# Patient Record
Sex: FEMALE | Race: BLACK OR AFRICAN AMERICAN | Employment: OTHER | URBAN - METROPOLITAN AREA
[De-identification: names, ages, dates, MRNs, and addresses within clinical notes are randomized per-mention and may not be internally consistent; named-entity substitution may affect disease eponyms.]

---

## 2016-06-06 DEVICE — BASEPLATE TIB SZ 5 KNEE CEM FIX BEAR ATTUNE
Type: IMPLANTABLE DEVICE | Site: KNEE | Status: NON-FUNCTIONAL
Removed: 2019-03-26

## 2017-01-04 ENCOUNTER — HOSPITAL ENCOUNTER (OUTPATIENT)
Dept: PHYSICAL THERAPY | Age: 52
Discharge: HOME | End: 2017-01-04
Payer: SUBSIDIZED

## 2017-01-04 PROCEDURE — 97140 MANUAL THERAPY 1/> REGIONS: CPT | Performed by: PHYSICAL THERAPIST

## 2017-01-04 PROCEDURE — 97014 ELECTRIC STIMULATION THERAPY: CPT | Performed by: PHYSICAL THERAPIST

## 2017-01-04 PROCEDURE — 97110 THERAPEUTIC EXERCISES: CPT | Performed by: PHYSICAL THERAPIST

## 2017-01-04 NOTE — PROGRESS NOTES
[facility]  [address], [address] [facility]    OUTPATIENT physical Therapy DAILY Treatment NOTe  Visit: 9    NAME: Leonel Cobos AGE: 46 y.o. GENDER: female  DATE: 1/4/2017  REFERRING PHYSICIAN: Melany Morales MD        GOALS  Short term goals  Time frame: 3 weeks  1. Patient will be compliance and independent with the initial HEP as evidenced by being able to perform without cuing. 2. Patient will report a 50% improvement in symptoms. 3. Patient report a 50% improvement in sleeping. 4. Patient will tolerate 15 minutes of clinic activities before onset of symptoms.      Long term goals  Time frame: 6 weeks  1. Patient will reports pain level decreased to 3/10 to allow increased ease of movement. 2. Patient will have an improved score on the LEFS outcome measure by 40 points to demonstrate an increase in functional activity tolerance. 3. Patient will be independent in final individualized HEP. 4. Patient will sleep 6-8 hours without being interrupted by pain. SUBJECTIVE:     \"I'm in so much pain today. I don't know how my right buttcheek is so sore. \"  Pain In:  10/10 right sided low back and right buttock;  Pain out: 5/10    OBJECTIVE DATA SUMMARY:     Pain:  Location:Pain right flank, low back down right LE  Quality: sharp  Now: 7.5/10  Best:  Worst:10/10  Factors that improve pain: rest, ice     Posture:    Flexed trunk     Strength:    Right LE    Knee extension, limited by pain in right hip     Range of Motion:    Trunk ROM     Flex 25 % of normal  Extension trace  Right SB trace  Left SB 25% of normal     Spinal Assessment:    Flattened lumbar spine     Joint Mobility:    Pt unable to tolerate mob of lumbar spine     Palpation:    TTP tx and lumbar paraspinal, gluteal, ITB     Neurologic Assessment:  Tone: normal  Sensation: pt reports numbness in lateral right LE  Reflexes: not tested     Special Tests:    + slump test R  +SLR on R     Mobility:  Transitional Movements: Antalgic   Gait: Antalgic, using st cane     Balance: Not tested     Functional Measure:     LEFS: 9/80     TREATMENT/INTERVENTION:  Modalities (Rationale): MHP with ES to right low back and right hip in left sidelying with right leg off mat for 15 minutes pre treatment to decrease pain and muscle guarding. Ice on right knee in sidelying for 15 minutes post session. -held     Therapeutic Exercises:  HEP     KTC, may need to use towel  LTR    PPT    Seated HS stretch    Additional clinical activity:  LBE x5 min level 3 -held today    Supine PPT: 10 reps with 10 second holds    Hip add with ball: 15 reps with 5 second holds   Hip abd with red TB: 15 reps     BKTC with yellow ball: 10 reps  HS Stretch using blue band   Single leg fall out  Bridges with TA hold: 10 reps  Forward flexion on blue physioball: 10 reps  Supine piriformis stretch, figure 4  Sidelying right leg dangle off mat: 4 reps with 30 second holds  VMO SLR Rt: 10 reps  Sidelying right hip abduction: 10 reps   Steamboats, no resistance x15    Manual Therapy:   Piriformis stretch 3 reps for 20 seconds  Trigger point release to right piriformis in sidelying  Muscle energy: 3 reps with 10 seconds -held (ASIS equal)  Isometric alt hip contraction  STM and foam roller to right hip musculature and right IT band      Neuro Re-Education:  Discussed importance of sitting with lumbar support, in good supportive chair.     Ambulation/Gait Training:  None today; decreased wt bearing on right LE secondary to pain      Activity tolerance and post treatment pain report: tolerated well    Education:  Education was provided to the patient on the following topics:   [x]    No changes were made to the home exercise program.  []    The following changes were made to the home exercise program:   Patient verbalized understanding of the topics presented.       ASSESSMENT:   Patient demonstrates equal ASIS pre and post session; hypomobility noted at right but little to no SIJ discomfort this date. Patient presents with increased pain in right low back and right buttock. Tenderness to palpation of right piriformis this date. Patient responded well to e stim and heat prior to exercises and manual therapy in order to decrease pain and muscle spasm. TENS unit utilized during supine mat exercises to improve tolerance due to increased pain and tenderness prior to session. Encouraged glute strengthening for home as able. Patients progression toward goals is as follows:  [x]     Improving appropriately and progressing toward goals  []     Improving slowly and progressing toward goals  []     Not making progress toward goals and plan of care will be adjusted    PLAN OF CARE:   Patient continues to benefit from skilled intervention to address the above impairments. [x]    Continue treatment per established plan of care.   []     Recommend the following changes to the plan of care    Recommendations/Intent for next treatment: continue to address core deficits, re-assess SIJ, strengthening of glut max/med    Brenda Porter, PT   Time Calculation: 55 mins  Patient Time in clinic:   Start Time: 9535   Stop Time: 4852

## 2017-01-06 ENCOUNTER — HOSPITAL ENCOUNTER (OUTPATIENT)
Dept: PHYSICAL THERAPY | Age: 52
Discharge: HOME | End: 2017-01-06
Payer: SUBSIDIZED

## 2017-01-06 ENCOUNTER — OFFICE VISIT (OUTPATIENT)
Dept: INTERNAL MEDICINE CLINIC | Age: 52
End: 2017-01-06

## 2017-01-06 ENCOUNTER — HOSPITAL ENCOUNTER (OUTPATIENT)
Dept: LAB | Age: 52
Discharge: HOME | End: 2017-01-06

## 2017-01-06 VITALS
RESPIRATION RATE: 18 BRPM | DIASTOLIC BLOOD PRESSURE: 89 MMHG | SYSTOLIC BLOOD PRESSURE: 137 MMHG | WEIGHT: 159.5 LBS | TEMPERATURE: 97.4 F | OXYGEN SATURATION: 99 % | HEART RATE: 82 BPM | BODY MASS INDEX: 30.11 KG/M2 | HEIGHT: 61 IN

## 2017-01-06 DIAGNOSIS — M54.31 RIGHT SIDED SCIATICA: ICD-10-CM

## 2017-01-06 DIAGNOSIS — Z96.651 STATUS POST TOTAL RIGHT KNEE REPLACEMENT: ICD-10-CM

## 2017-01-06 DIAGNOSIS — M17.11 PRIMARY OSTEOARTHRITIS OF RIGHT KNEE: Primary | ICD-10-CM

## 2017-01-06 DIAGNOSIS — E03.9 ACQUIRED HYPOTHYROIDISM: ICD-10-CM

## 2017-01-06 DIAGNOSIS — M54.41 MIDLINE LOW BACK PAIN WITH RIGHT-SIDED SCIATICA, UNSPECIFIED CHRONICITY: ICD-10-CM

## 2017-01-06 PROCEDURE — 97014 ELECTRIC STIMULATION THERAPY: CPT | Performed by: PHYSICAL THERAPIST

## 2017-01-06 PROCEDURE — 99001 SPECIMEN HANDLING PT-LAB: CPT | Performed by: NURSE PRACTITIONER

## 2017-01-06 PROCEDURE — 97110 THERAPEUTIC EXERCISES: CPT | Performed by: PHYSICAL THERAPIST

## 2017-01-06 PROCEDURE — 97140 MANUAL THERAPY 1/> REGIONS: CPT | Performed by: PHYSICAL THERAPIST

## 2017-01-06 RX ORDER — OXYCODONE AND ACETAMINOPHEN 10; 325 MG/1; MG/1
1 TABLET ORAL
Qty: 30 TAB | Refills: 0 | Status: SHIPPED | OUTPATIENT
Start: 2017-01-06 | End: 2017-02-03 | Stop reason: SDUPTHER

## 2017-01-06 RX ORDER — DULOXETIN HYDROCHLORIDE 30 MG/1
30 CAPSULE, DELAYED RELEASE ORAL DAILY
Qty: 30 CAP | Refills: 2 | Status: SHIPPED | OUTPATIENT
Start: 2017-01-06 | End: 2017-03-03 | Stop reason: SDUPTHER

## 2017-01-06 RX ORDER — OXYCODONE AND ACETAMINOPHEN 5; 325 MG/1; MG/1
1 TABLET ORAL
Qty: 45 TAB | Refills: 0 | Status: CANCELLED | OUTPATIENT
Start: 2017-01-06

## 2017-01-06 NOTE — PROGRESS NOTES
Maya Dean is a 46 y.o. female and presents with Medication Refill (oders pending)    Subjective:  Pt here to f/u right knee pain and lower back pain. Seen spinal specialist Dr. Heidy Unger, no treatments advised. Still in PT for sciatica. Last pain med last night. Pain Scale: 10 - Worst pain ever/10. Continues Gabapentin BID. Tried NORCO WITH OXYCODONE by Dr. Aubree Beth with no relief. Also with more fatigue, seen by GI about 2 weeks ago with labs and told thyroid levels are off. Advised to fax report to Dr. La Nena Kumar, endocrinologist who did thyroidectomy, will have next appt later this month. Review of Systems  Constitutional: Restarted immunotherapy yesterday. negative for fevers, chills, anorexia and weight loss  Eyes:   negative for visual disturbance, drainage, and irritation  ENT:   negative for tinnitus,sore throat,ear pain,and hoarseness  Respiratory:  + COPD improved.  negative for hemoptysis  CV:   negative for chest pain, palpitations, and lower extremity edema  GI:   negative for nausea, vomiting, diarrhea, abdominal pain, and melena  Endo:               negative for polyuria,polydipsia,polyphagia, and heat intolerance  Genitourinary: negative for urgency, dysuria, retention, and hematuria  Integument:  negative for rash, ulcerations  Hematologic:  negative for easy bruising and bleeding  Musculoskel:  negative for muscle weakness  Neurological:  negative for headaches, dizziness, vertigo,and memory problems  Behavl/Psych: negative for feelings of anxiety, depression, suicide, and mood changes    Past Medical History   Diagnosis Date    Asthma      uses inhalers    Chronic obstructive pulmonary disease (HCC)      bronchitis    GERD (gastroesophageal reflux disease)     Hypertension     Ill-defined condition      environmental allergies     Ill-defined condition      Multiple body piercings; unable to remove tongue and lip piercings    Thyroid disease      hypo    Ventral hernia 12/31/2013 Past Surgical History   Procedure Laterality Date    Hx hysterectomy       partial    Hx other surgical       hiatal hernia repair    Hx mohs procedure Right 3/8/11    Hx heent  2009     thyroidectomy     Social History     Social History    Marital status: SINGLE     Spouse name: N/A    Number of children: N/A    Years of education: N/A     Social History Main Topics    Smoking status: Former Smoker     Packs/day: 0.50     Years: 1.50     Types: Cigarettes     Quit date: 10/1/2015    Smokeless tobacco: Never Used      Comment: patient quit smoking for 10 years, began smoking again and then quit again in 2015    Alcohol use 0.6 oz/week     1 Glasses of wine per week    Drug use: No    Sexual activity: Yes     Partners: Female     Other Topics Concern    None     Social History Narrative     Family History   Problem Relation Age of Onset    Cancer Father      lung cancer    Heart Disease Mother     Hypertension Mother     Diabetes Mother     Anesth Problems Neg Hx      Current Outpatient Prescriptions   Medication Sig Dispense Refill    acetaminophen (TYLENOL) 650 mg CR tablet Take 1 Tab by mouth three (3) times daily as needed for Pain. 90 Tab 11    lidocaine (LIDODERM) 5 % Apply patch to the affected area for 12 hours a day and remove for 12 hours a day. 30 Each 11    fluticasone (FLONASE) 50 mcg/actuation nasal spray 2 Sprays by Both Nostrils route daily. 1 Bottle 11    oxyCODONE-acetaminophen (PERCOCET) 5-325 mg per tablet Take 1 Tab by mouth two (2) times daily as needed for Pain. Max Daily Amount: 2 Tabs. 45 Tab 0    gabapentin (NEURONTIN) 300 mg capsule Take 1 Cap by mouth three (3) times daily. Indications: right sciatica 90 Cap 3    methocarbamol (ROBAXIN-750) 750 mg tablet Take 1 Tab by mouth four (4) times daily as needed for Pain (spasms). 60 Tab 11    ondansetron (ZOFRAN ODT) 4 mg disintegrating tablet Take 1 Tab by mouth every eight (8) hours as needed for Nausea.  20 Tab 3  levothyroxine (SYNTHROID) 125 mcg tablet Take 1 Tab by mouth Daily (before breakfast). 90 Tab 3    amLODIPine (NORVASC) 5 mg tablet TAKE 1 TABLET BY MOUTH EVERY DAY 90 Tab 3    miscellaneous medical supply misc Shower seat for chronic knee pain, pt planning to have right TKR in June due to condition. Very limited range of motion. 1 Each 0    hydrochlorothiazide (HYDRODIURIL) 25 mg tablet daily. 6    montelukast (SINGULAIR) 10 mg tablet daily. 6    pantoprazole (PROTONIX) 40 mg tablet two (2) times a day. 5    loratadine (CLARITIN) 10 mg tablet Take 1 Tab by mouth daily. 30 Tab 5    budesonide-formoterol (SYMBICORT) 160-4.5 mcg/actuation HFA inhaler Take 2 Puffs by inhalation two (2) times a day.  albuterol (PROAIR HFA) 90 mcg/actuation inhaler Take 2 Puffs by inhalation every four (4) hours as needed.  albuterol (PROVENTIL VENTOLIN) 2.5 mg /3 mL (0.083 %) nebulizer solution by Nebulization route three (3) times daily.  diazePAM (VALIUM) 5 mg tablet        Allergies   Allergen Reactions    Mold Shortness of Breath and Itching    Ibuprofen Nausea Only       Objective:  Visit Vitals    /89 (BP 1 Location: Right arm, BP Patient Position: Sitting)    Pulse 82    Temp 97.4 °F (36.3 °C) (Oral)    Resp 18    Ht 5' 1\" (1.549 m)    Wt 159 lb 8 oz (72.3 kg)    LMP 09/14/2016 (Approximate)    SpO2 99%    BMI 30.14 kg/m2     Physical Exam:   General appearance - alert, fair appearing, and in moderate distress. Grimacing, moaning, and repositioning often. Mental status - A/O x 4, depressed mood and flat affect. Chest - CTA. Symmetric chest rise. No rales, cough, wheezing, or rhonchi. Heart - Normal rate, regular rhythm. Normal S1, S2. No MGR or clicks. Ext- Radial, DP pulses, 2+ bilaterally. No clubbing or cyanosis. Skin-Warm and dry. No hyperpigmentation, ulcerations, or suspicious lesions. Neuro - Normal speech, no focal findings or movement disorder.  Normal strength and muscle tone. Antalgic gait using cane. Right knee - LROM, improving. Medial aspect very TTP, no effusion. Back- alignment midline. Lumbar spinal and right paraspinal tenderness. No CVAT. LROM, +SLR. Assessment/Plan:  Chronic pain-UDS, Increased oxycodone to 10-325mg (#30 tabs). Continue PT. Anti-inflammatory LCS advised.  was reviewed while planning for pain management, no indications of drug diversion suspected. Prescription history is not suspicious for controlled substance overuse. See below for other orders   Follow-up Disposition: Not on File      ICD-10-CM ICD-9-CM    1. Primary osteoarthritis of right knee M17.11 715.16 oxyCODONE-acetaminophen (PERCOCET) 5-325 mg per tablet   2. Status post total right knee replacement Z96.651 V43.65 oxyCODONE-acetaminophen (PERCOCET) 5-325 mg per tablet   3. Right sided sciatica M54.31 724.3 oxyCODONE-acetaminophen (PERCOCET) 5-325 mg per tablet   4. Midline low back pain with right-sided sciatica, unspecified chronicity M54.41 724.3 oxyCODONE-acetaminophen (PERCOCET) 5-325 mg per tablet   5. Acquired hypothyroidism E03.9 244.9      No orders of the defined types were placed in this encounter. Mireya Khan expressed understanding of plan. An After Visit Summary was offered/printed and given to the patient.

## 2017-01-06 NOTE — PROGRESS NOTES
[facility]  [address], [address] [facility]    OUTPATIENT physical Therapy DAILY Treatment NOTe  Visit: 10    NAME: Ross Wesley AGE: 46 y.o. GENDER: female  DATE: 1/6/2017  REFERRING PHYSICIAN: Emma Black MD        GOALS  Short term goals  Time frame: 3 weeks  1. Patient will be compliance and independent with the initial HEP as evidenced by being able to perform without cuing. 2. Patient will report a 50% improvement in symptoms. 3. Patient report a 50% improvement in sleeping. 4. Patient will tolerate 15 minutes of clinic activities before onset of symptoms.      Long term goals  Time frame: 6 weeks  1. Patient will reports pain level decreased to 3/10 to allow increased ease of movement. 2. Patient will have an improved score on the LEFS outcome measure by 40 points to demonstrate an increase in functional activity tolerance. 3. Patient will be independent in final individualized HEP. 4. Patient will sleep 6-8 hours without being interrupted by pain. SUBJECTIVE:     \"I''m still have a lot of pain. \"    Pain In: 8/10 right sided low back and right buttock;  Pain out: 6/10    OBJECTIVE DATA SUMMARY:     Pain:  Location:Pain right flank, low back down right LE  Quality: sharp  Now: 7.5/10  Best:  Worst:10/10  Factors that improve pain: rest, ice     Posture:    Flexed trunk     Strength:    Right LE    Knee extension, limited by pain in right hip     Range of Motion:    Trunk ROM     Flex 25 % of normal  Extension trace  Right SB trace  Left SB 25% of normal     Spinal Assessment:    Flattened lumbar spine     Joint Mobility:    Pt unable to tolerate mob of lumbar spine     Palpation:    TTP tx and lumbar paraspinal, gluteal, ITB     Neurologic Assessment:  Tone: normal  Sensation: pt reports numbness in lateral right LE  Reflexes: not tested     Special Tests:    + slump test R  +SLR on R     Mobility:  Transitional Movements: Antalgic   Gait: Antalgic, using st cane     Balance: Not tested     Functional Measure:     LEFS: 9/80     TREATMENT/INTERVENTION:  Modalities (Rationale): MHP with ES to right low back and right hip in left sidelying with right leg off mat for 15 minutes pre treatment to decrease pain and muscle guarding. Ice on right knee in sidelying for 15 minutes post session. -held     Therapeutic Exercises:  HEP     KTC, may need to use towel  LTR    PPT    Seated HS stretch    Additional clinical activity:  LBE x5 min level 3 -held today    Supine PPT: 10 reps with 10 second holds    Hip add with ball: 15 reps with 5 second holds   Hip abd with red TB: 15 reps     BKTC with yellow ball: 10 reps  HS Stretch using blue band   Single leg fall out  Bridges with TA hold: 10 reps  Forward flexion on blue physioball: 10 reps  Supine piriformis stretch, figure 4  Sidelying right leg dangle off mat: 4 reps with 30 second holds  VMO SLR Rt: 10 reps  Sidelying right hip abduction: 10 reps   Steamboats, no resistance x15    Manual Therapy:   Piriformis stretch 3 reps for 20 seconds  Trigger point release to right piriformis in sidelying  Muscle energy: 3 reps with 10 seconds  Isometric alt hip contraction  STM and foam roller to right IT band and quadratus lumborum  PA mob at sacrum grade 4     Neuro Re-Education:  Discussed importance of sitting with lumbar support, in good supportive chair.     Ambulation/Gait Training:  None today; decreased wt bearing on right LE secondary to pain      Activity tolerance and post treatment pain report: tolerated well    Education:  Education was provided to the patient on the following topics:   [x]    No changes were made to the home exercise program.  []    The following changes were made to the home exercise program:   Patient verbalized understanding of the topics presented. ASSESSMENT:   Patient continues to present with right low back and buttock pain.  Patient demonstrates improved gait pattern this session, relying less on straight cane and able to maintain upright easily. Tenderness to palpation of right piriformis this date. Patient responded well to e stim and heat prior to exercises and manual therapy in order to decrease pain and muscle spasm. TENS unit utilized during supine mat exercises to improve tolerance due to increased pain and tenderness prior to session. Encouraged glute strengthening for home as able. Prep for discharge in two visits. Patients progression toward goals is as follows:  [x]     Improving appropriately and progressing toward goals  []     Improving slowly and progressing toward goals  []     Not making progress toward goals and plan of care will be adjusted    PLAN OF CARE:   Patient continues to benefit from skilled intervention to address the above impairments. [x]    Continue treatment per established plan of care.   []     Recommend the following changes to the plan of care    Recommendations/Intent for next treatment: Prep for discharge in two visits    Alyssa Manley, PT   Time Calculation: 65 mins  Patient Time in clinic:   Start Time: 0830   Stop Time: 1123

## 2017-01-06 NOTE — PROGRESS NOTES
1. Have you been to the ER, urgent care clinic since your last visit? Hospitalized since your last visit? No    2. Have you seen or consulted any other health care providers outside of the [address] [place] [address] since your last visit? Include any pap smears or colon screening. No     Pt is here for   Chief Complaint   Patient presents with    Medication Refill     oders pending     Pt states pain level is a 10 right knee and back. ..  Pt states last had something for pain last night

## 2017-01-06 NOTE — PATIENT INSTRUCTIONS
Lumbar Stenosis: Care Instructions  Your Care Instructions    Stenosis in the spine is a narrowing of the canal that is around the spinal cord and nerve roots in your back. It can happen as part of aging. Sometimes bone and other tissue grow into this canal and press on the spinal nerves. This can cause pain, numbness, and weakness. When it happens in the lower part of your back, it is called lumbar stenosis. Lumbar stenosis can cause problems in the legs, feet, and rear end (buttocks). You may be able to relieve symptoms of lumbar stenosis with pain medicine. Your doctor may suggest physical therapy and exercises to keep your spine strong and flexible. Some people try cortisone shots to reduce swelling. If pain and numbness in your legs are still so bad that you cannot do your normal activities, you may need surgery. Follow-up care is a key part of your treatment and safety. Be sure to make and go to all appointments, and call your doctor if you are having problems. It's also a good idea to know your test results and keep a list of the medicines you take. How can you care for yourself at home? · Take an over-the-counter pain medicine, such as acetaminophen (Tylenol), ibuprofen (Advil, Motrin), or naproxen (Aleve). Read and follow all instructions on the label. · Do not take two or more pain medicines at the same time unless the doctor told you to. Many pain medicines have acetaminophen, which is Tylenol. Too much acetaminophen (Tylenol) can be harmful. · Stay at a healthy weight. Being overweight puts extra strain on your spine. · Change positions often when you sit or stand. This can ease pain. For example, lean forward. This may reduce pressure on the spinal cord and its nerves. · Avoid doing things that make your symptoms worse. Walking downhill and standing for a long time may cause pain. · Stretch and strengthen your back muscles as your doctor or physical therapist recommends.  If your doctor says it is okay to do them, these exercises may help. ¨ Lie on your back with your knees bent. Gently pull one bent knee to your chest. Put that foot back on the floor, and then pull the other knee to your chest.  ¨ Do pelvic tilts. Lie on your back with your knees bent. Tighten your stomach muscles. Pull your belly button (navel) in and up toward your ribs. You should feel like your back is pressing to the floor and your hips and pelvis are slightly lifting off the floor. Hold for 6 seconds while breathing smoothly. ¨ Stand with your back flat against a wall. Slowly slide down until your knees are slightly bent. Hold for 10 seconds, then slide back up the wall. · Remove or change anything in your house that may cause you to fall. Keep walkways clear of clutter, electrical cords, and throw rugs. When should you call for help? Call 911 anytime you think you may need emergency care. For example, call if:  · You are unable to move a leg at all. Call your doctor now or seek immediate medical care if:  · You have new or worse symptoms in your legs, belly, or buttocks. Symptoms may include:  ¨ Numbness or tingling. ¨ Weakness. ¨ Pain. · You lose bladder or bowel control. Watch closely for changes in your health, and be sure to contact your doctor if:  · You are not getting better as expected. Where can you learn more? Go to http://ofelai-mindy.info/. Katja Machado in the search box to learn more about \"Lumbar Stenosis: Care Instructions. \"  Current as of: May 23, 2016  Content Version: 11.1  © 6559-8678 [email or web address]. Care instructions adapted under license by [employer] (which disclaims liability or warranty for this information). If you have questions about a medical condition or this instruction, always ask your healthcare professional. [address] any warranty or liability for your use of this information.

## 2017-01-06 NOTE — MR AVS SNAPSHOT
Visit Information Date & Time Provider Department Dept. Phone Encounter #  
 1/6/2017 10:00 AM Ana Laura Ruiz NP [address] [facility] [phone] [id number] Follow-up Instructions Return in about 4 weeks (around 2/3/2017) for 1 month f/u. Your Appointments 2/3/2017 10:20 AM  
ROUTINE CARE with Ana Laura Ruiz NP  
[address] [facility]) Appt Note: one month fu  
 [address] [address] [id number]  
[phone]  
  
   
 [address] [facility] Upcoming Health Maintenance Date Due Pneumococcal 19-64 Medium Risk (1 of 1 - PPSV23) 2/5/2017* BREAST CANCER SCRN MAMMOGRAM 2/5/2017* PAP AKA CERVICAL CYTOLOGY 2/5/2017* FOBT Q 1 YEAR AGE 50-75 5/26/2017 DTaP/Tdap/Td series (2 - Td) 7/6/2025 *Topic was postponed. The date shown is not the original due date. Allergies as of 1/6/2017  Review Complete On: 1/6/2017 By: Ana Laura Ruiz NP Severity Noted Reaction Type Reactions Mold  05/23/2016    Shortness of Breath, Itching Ibuprofen Low 03/30/2015   Intolerance Nausea Only Current Immunizations  Reviewed on 12/9/2016 Name Date Influenza Vaccine (Quad) Intradermal PF 12/9/2016 Not reviewed this visit You Were Diagnosed With   
  
 Codes Comments Primary osteoarthritis of right knee    -  Primary ICD-10-CM: M17.11 ICD-9-CM: 715.16 Status post total right knee replacement     ICD-10-CM: M92.408 ICD-9-CM: V43.65 Right sided sciatica     ICD-10-CM: M54.31 
ICD-9-CM: 724.3 Midline low back pain with right-sided sciatica, unspecified chronicity     ICD-10-CM: M54.41 
ICD-9-CM: 724.3 Acquired hypothyroidism     ICD-10-CM: E03.9 ICD-9-CM: 106. 9 Vitals BP Pulse Temp Resp Height(growth percentile) Weight(growth percentile)  137/89 (BP 1 Location: Right arm, BP Patient Position: Sitting) 82 97.4 °F (36.3 °C) (Oral) 18 5' 1\" (1.549 m) 159 lb 8 oz (72.3 kg) LMP SpO2 BMI OB Status Smoking Status 09/14/2016 (Approximate) 99% 30.14 kg/m2 Postmenopausal Former Smoker Vitals History BMI and BSA Data Body Mass Index Body Surface Area  
 30.14 kg/m 2 1.76 m 2 Preferred Pharmacy Pharmacy Name Phone [id number] [phone] Your Updated Medication List  
  
   
This list is accurate as of: 1/6/17 11:31 AM.  Always use your most recent med list.  
  
  
  
  
 acetaminophen 650 mg CR tablet Commonly known as:  TYLENOL Take 1 Tab by mouth three (3) times daily as needed for Pain. amLODIPine 5 mg tablet Commonly known as:  Suzon Salle TAKE 1 TABLET BY MOUTH EVERY DAY  
  
 diazePAM 5 mg tablet Commonly known as:  VALIUM  
  
 DULoxetine 30 mg capsule Commonly known as:  CYMBALTA Take 1 Cap by mouth daily. Indications: CHRONIC MUSCULOSKELETAL PAIN  
  
 fluticasone 50 mcg/actuation nasal spray Commonly known as:  Alric Eaves 2 Sprays by Both Nostrils route daily. gabapentin 300 mg capsule Commonly known as:  NEURONTIN Take 1 Cap by mouth three (3) times daily. Indications: right sciatica  
  
 hydroCHLOROthiazide 25 mg tablet Commonly known as:  HYDRODIURIL  
daily. levothyroxine 125 mcg tablet Commonly known as:  SYNTHROID Take 1 Tab by mouth Daily (before breakfast). lidocaine 5 % Commonly known as:  Hildegarde Una Apply patch to the affected area for 12 hours a day and remove for 12 hours a day. loratadine 10 mg tablet Commonly known as:  Hira Blowers Take 1 Tab by mouth daily. methocarbamol 750 mg tablet Commonly known as:  RYWQEPM-579 Take 1 Tab by mouth four (4) times daily as needed for Pain (spasms). miscellaneous medical supply Misc Shower seat for chronic knee pain, pt planning to have right TKR in June due to condition. Very limited range of motion. montelukast 10 mg tablet Commonly known as:  SINGULAIR  
daily. ondansetron 4 mg disintegrating tablet Commonly known as:  ZOFRAN ODT Take 1 Tab by mouth every eight (8) hours as needed for Nausea. oxyCODONE-acetaminophen  mg per tablet Commonly known as:  PERCOCET 10 Take 1 Tab by mouth daily as needed for Pain. Indications: PAIN  
  
 pantoprazole 40 mg tablet Commonly known as:  PROTONIX  
two (2) times a day. * PROAIR HFA 90 mcg/actuation inhaler Generic drug:  albuterol Take 2 Puffs by inhalation every four (4) hours as needed. * albuterol 2.5 mg /3 mL (0.083 %) nebulizer solution Commonly known as:  PROVENTIL VENTOLIN  
by Nebulization route three (3) times daily. SYMBICORT 160-4.5 mcg/actuation HFA inhaler Generic drug:  budesonide-formoterol Take 2 Puffs by inhalation two (2) times a day. * Notice: This list has 2 medication(s) that are the same as other medications prescribed for you. Read the directions carefully, and ask your doctor or other care provider to review them with you. Prescriptions Printed Refills  
 oxyCODONE-acetaminophen (PERCOCET 10)  mg per tablet 0 Sig: Take 1 Tab by mouth daily as needed for Pain. Indications: PAIN Class: Print Route: Oral  
  
Prescriptions Sent to Pharmacy Refills DULoxetine (CYMBALTA) 30 mg capsule 2 Sig: Take 1 Cap by mouth daily. Indications: CHRONIC MUSCULOSKELETAL PAIN Class: Normal  
 Pharmacy: [address] Ph #: [phone] Route: Oral  
  
We Performed the Following PAIN MGMT PANEL W/REFL, UR [[id number] Custom] TSH 3RD GENERATION [47093 CPT(R)] Follow-up Instructions Return in about 4 weeks (around 2/3/2017) for 1 month f/u. Patient Instructions Lumbar Stenosis: Care Instructions Your Care Instructions Stenosis in the spine is a narrowing of the canal that is around the spinal cord and nerve roots in your back. It can happen as part of aging. Sometimes bone and other tissue grow into this canal and press on the spinal nerves. This can cause pain, numbness, and weakness. When it happens in the lower part of your back, it is called lumbar stenosis. Lumbar stenosis can cause problems in the legs, feet, and rear end (buttocks). You may be able to relieve symptoms of lumbar stenosis with pain medicine. Your doctor may suggest physical therapy and exercises to keep your spine strong and flexible. Some people try cortisone shots to reduce swelling. If pain and numbness in your legs are still so bad that you cannot do your normal activities, you may need surgery. Follow-up care is a key part of your treatment and safety. Be sure to make and go to all appointments, and call your doctor if you are having problems. It's also a good idea to know your test results and keep a list of the medicines you take. How can you care for yourself at home? · Take an over-the-counter pain medicine, such as acetaminophen (Tylenol), ibuprofen (Advil, Motrin), or naproxen (Aleve). Read and follow all instructions on the label. · Do not take two or more pain medicines at the same time unless the doctor told you to. Many pain medicines have acetaminophen, which is Tylenol. Too much acetaminophen (Tylenol) can be harmful. · Stay at a healthy weight. Being overweight puts extra strain on your spine. · Change positions often when you sit or stand. This can ease pain. For example, lean forward. This may reduce pressure on the spinal cord and its nerves. · Avoid doing things that make your symptoms worse. Walking downhill and standing for a long time may cause pain. · Stretch and strengthen your back muscles as your doctor or physical therapist recommends. If your doctor says it is okay to do them, these exercises may help. ¨ Lie on your back with your knees bent. Gently pull one bent knee to your chest. Put that foot back on the floor, and then pull the other knee to your chest. 
¨ Do pelvic tilts. Lie on your back with your knees bent. Tighten your stomach muscles. Pull your belly button (navel) in and up toward your ribs. You should feel like your back is pressing to the floor and your hips and pelvis are slightly lifting off the floor. Hold for 6 seconds while breathing smoothly. ¨ Stand with your back flat against a wall. Slowly slide down until your knees are slightly bent. Hold for 10 seconds, then slide back up the wall. · Remove or change anything in your house that may cause you to fall. Keep walkways clear of clutter, electrical cords, and throw rugs. When should you call for help? Call 911 anytime you think you may need emergency care. For example, call if: 
· You are unable to move a leg at all. Call your doctor now or seek immediate medical care if: 
· You have new or worse symptoms in your legs, belly, or buttocks. Symptoms may include: ¨ Numbness or tingling. ¨ Weakness. ¨ Pain. · You lose bladder or bowel control. Watch closely for changes in your health, and be sure to contact your doctor if: 
· You are not getting better as expected. Where can you learn more? Go to http://ofelia-mindy.info/. Wood Nicole in the search box to learn more about \"Lumbar Stenosis: Care Instructions. \" Current as of: May 23, 2016 Content Version: 11.1 © 8005-8616 [employer]. Care instructions adapted under license by [employer] (which disclaims liability or warranty for this information). If you have questions about a medical condition or this instruction, always ask your healthcare professional. [place] [address] any warranty or liability for your use of this information. Introducing [facility]! Christen Villarreal introduces Crowd[employer]oster patient portal. Now you can access parts of your medical record, email your doctor's office, and request medication refills online. 1. In your internet browser, go to [email or web address]. [email or web address] 2. Click on the First Time User? Click Here link in the Sign In box. You will see the New Member Sign Up page. 3. Enter your [employer]oster Access Code exactly as it appears below. You will not need to use this code after youve completed the sign-up process. If you do not sign up before the expiration date, you must request a new code. · [employer] Access Code: [email or web address] Expires: 3/30/2017  8:27 AM 
 
4. Enter the last four digits of your Social Security Number (xxxx) and Date of Birth (mm/dd/yyyy) as indicated and click Submit. You will be taken to the next sign-up page. 5. Create a Crowdbooster ID. This will be your Crowd[employer]oster login ID and cannot be changed, so think of one that is secure and easy to remember. 6. Create a [employer] password. You can change your password at any time. 7. Enter your Password Reset Question and Answer. This can be used at a later time if you forget your password. 8. Enter your e-mail address. You will receive e-mail notification when new information is available in [address] 9. Click Sign Up. You can now view and download portions of your medical record. 10. Click the Download Summary menu link to download a portable copy of your medical information. If you have questions, please visit the Frequently Asked Questions section of the [employer] website. Remember, [employer] is NOT to be used for urgent needs. For medical emergencies, dial 911. Now available from your iPhone and Android! Please provide this summary of care documentation to your next provider. Your primary care clinician is listed as HUGO Elmore. If you have any questions after today's visit, please call [phone].

## 2017-01-07 LAB — TSH SERPL DL<=0.005 MIU/L-ACNC: 3.54 UIU/ML (ref 0.45–4.5)

## 2017-01-10 ENCOUNTER — HOSPITAL ENCOUNTER (OUTPATIENT)
Dept: PHYSICAL THERAPY | Age: 52
Discharge: HOME | End: 2017-01-10
Payer: SUBSIDIZED

## 2017-01-10 NOTE — PROGRESS NOTES
[facility]  [address], [address] [facility]    OUTPATIENT physical Therapy      1/10/2017:  Janette Callahan was not seen on this date for physical therapy for the following reasons:    [x]     Patient called to cancel the visit for the following reasons: Inclement weather  []     Patient missed the visit and did not call to cancel.     Maria Cadena, PT

## 2017-01-12 ENCOUNTER — HOSPITAL ENCOUNTER (OUTPATIENT)
Dept: PHYSICAL THERAPY | Age: 52
Discharge: HOME | End: 2017-01-12
Payer: SUBSIDIZED

## 2017-01-12 PROCEDURE — 97014 ELECTRIC STIMULATION THERAPY: CPT | Performed by: PHYSICAL THERAPIST

## 2017-01-12 PROCEDURE — 97110 THERAPEUTIC EXERCISES: CPT | Performed by: PHYSICAL THERAPIST

## 2017-01-12 NOTE — PROGRESS NOTES
[facility]  [address], [address] [facility]    OUTPATIENT physical Therapy DAILY Treatment NOTe  Visit: 11    NAME: Sania Orlando AGE: 46 y.o. GENDER: female  DATE: 1/12/2017  REFERRING PHYSICIAN: Vicky José MD        GOALS  Short term goals  Time frame: 3 weeks  1. Patient will be compliance and independent with the initial HEP as evidenced by being able to perform without cuing. 2. Patient will report a 50% improvement in symptoms. 3. Patient report a 50% improvement in sleeping. 4. Patient will tolerate 15 minutes of clinic activities before onset of symptoms.      Long term goals  Time frame: 6 weeks  1. Patient will reports pain level decreased to 3/10 to allow increased ease of movement. 2. Patient will have an improved score on the LEFS outcome measure by 40 points to demonstrate an increase in functional activity tolerance. 3. Patient will be independent in final individualized HEP. 4. Patient will sleep 6-8 hours without being interrupted by pain. SUBJECTIVE:     \"I' had a rough week. \"    Pain In: 8.5/10 right sided low back and right buttock;  Pain out: 6/10    OBJECTIVE DATA SUMMARY:     Pain:  Location:Pain right flank, low back down right LE  Quality: sharp  Now: 7.5/10  Best:  Worst:10/10  Factors that improve pain: rest, ice     Posture:    Flexed trunk     Strength:    Right LE    Knee extension, limited by pain in right hip     Range of Motion:    Trunk ROM     Flex 25 % of normal  Extension trace  Right SB trace  Left SB 25% of normal     Spinal Assessment:    Flattened lumbar spine     Joint Mobility:    Pt unable to tolerate mob of lumbar spine     Palpation:    TTP tx and lumbar paraspinal, gluteal, ITB     Neurologic Assessment:  Tone: normal  Sensation: pt reports numbness in lateral right LE  Reflexes: not tested     Special Tests:    + slump test R  +SLR on R     Mobility:  Transitional Movements: Antalgic   Gait: Antalgic, using st cane     Balance: Not tested     Functional Measure:     LEFS: 9/80     TREATMENT/INTERVENTION:  Modalities (Rationale): MHP with ES to right low back and right hip in left sidelying with right leg off mat for 15 minutes pre treatment to decrease pain and muscle guarding. Ice on right knee in sidelying for 15 minutes post session. -held     Therapeutic Exercises:  HEP     KTC, may need to use towel  LTR    PPT    Seated HS stretch    Additional clinical activity:  LBE x5 min level 3 -held today    Supine PPT: 10 reps with 10 second holds    Hip add with ball: 15 reps with 5 second holds   Hip abd with red TB: 15 reps     BKTC with yellow ball: 10 reps  HS Stretch using blue band   Single leg fall out  Bridges with TA hold: 15 reps  Forward flexion on blue physioball: 10 reps  Supine piriformis stretch, figure 4  Sidelying right leg dangle off mat: 4 reps with 30 second holds  VMO SLR Rt: 10 reps  Sidelying right hip abduction: 10 reps   Steamboats, no resistance x15    Manual Therapy:   Piriformis stretch 3 reps for 20 seconds  Trigger point release to right piriformis in sidelying  Muscle energy: 3 reps with 10 seconds -held; ASIS equal  Isometric alt hip contraction -held  STM and foam roller to right IT band and quadratus lumborum -held  PA mob at sacrum grade 4 -held     Neuro Re-Education:  Discussed importance of sitting with lumbar support, in good supportive chair.     Ambulation/Gait Training:  None today; decreased wt bearing on right LE secondary to pain      Activity tolerance and post treatment pain report: tolerated well    Education:  Education was provided to the patient on the following topics:   [x]    No changes were made to the home exercise program.  []    The following changes were made to the home exercise program:   Patient verbalized understanding of the topics presented. ASSESSMENT:   Patient continues to present with right low back and buttock pain.  Patient demonstrates improved gait pattern this session, relying less on straight cane and able to maintain upright easily. Tenderness to palpation of right piriformis this date. Patient responded well to e stim and heat prior to exercises and manual therapy in order to decrease pain and muscle spasm. Patient to be discharged next session; patient aware and to be provided with HEP. Patients progression toward goals is as follows:  [x]     Improving appropriately and progressing toward goals  []     Improving slowly and progressing toward goals  []     Not making progress toward goals and plan of care will be adjusted    PLAN OF CARE:   Patient continues to benefit from skilled intervention to address the above impairments. [x]    Continue treatment per established plan of care.   []     Recommend the following changes to the plan of care    Recommendations/Intent for next treatment: Prep for discharge next session; provided HEP which is in hard chart; have patient fill out LEFS outcome measure     Jessie Henderson PT   Time Calculation: 30 mins  Patient Time in clinic:   Start Time: 0930   Stop Time: 1000

## 2017-01-13 ENCOUNTER — HOSPITAL ENCOUNTER (OUTPATIENT)
Dept: GENERAL RADIOLOGY | Age: 52
Discharge: HOME | End: 2017-01-13
Payer: SELF-PAY

## 2017-01-13 DIAGNOSIS — R05.3 CHRONIC COUGH: ICD-10-CM

## 2017-01-13 PROCEDURE — 70220 X-RAY EXAM OF SINUSES: CPT

## 2017-01-13 PROCEDURE — 71020 XR CHEST PA LAT: CPT

## 2017-01-15 LAB
AMPHETAMINES UR QL SCN: NEGATIVE NG/ML
BARBITURATES UR QL SCN: NEGATIVE NG/ML
BENZODIAZ UR QL SCN: NEGATIVE NG/ML
BZE UR QL SCN: NEGATIVE NG/ML
CANNABINOIDS UR QL SCN: NEGATIVE NG/ML
CREAT UR-MCNC: 181.7 MG/DL (ref 20–300)
FENTANYL+NORFENTANYL UR QL SCN: NEGATIVE PG/ML
MEPERIDINE UR QL: NEGATIVE NG/ML
METHADONE UR QL SCN: NEGATIVE NG/ML
OPIATES UR QL SCN: NEGATIVE NG/ML
OXYCODONE+OXYMORPHONE UR QL SCN: POSITIVE
PCP UR QL: NEGATIVE NG/ML
PH UR: 5.9 [PH] (ref 4.5–8.9)
PLEASE NOTE:, 733157: ABNORMAL
PROPOXYPH UR QL SCN: NEGATIVE NG/ML
SP GR UR: 1.02
TRAMADOL UR QL SCN: NEGATIVE NG/ML

## 2017-01-16 ENCOUNTER — HOSPITAL ENCOUNTER (OUTPATIENT)
Dept: PHYSICAL THERAPY | Age: 52
Discharge: HOME | End: 2017-01-16
Payer: SUBSIDIZED

## 2017-01-16 PROCEDURE — 97110 THERAPEUTIC EXERCISES: CPT

## 2017-01-16 NOTE — PROGRESS NOTES
[facility]  [address], [address] [facility]    OUTPATIENT physical Therapy DAILY Treatment NOTe with Discharge  Visit: 12    NAME: Dorina Kanner AGE: 46 y.o. GENDER: female  DATE: 1/16/2017  REFERRING PHYSICIAN: Crystal Singletary MD        GOALS  Short term goals  Time frame: 3 weeks  1. Patient will be compliance and independent with the initial HEP as evidenced by being able to perform without cuing. 2. Patient will report a 50% improvement in symptoms. 3. Patient report a 50% improvement in sleeping. 4. Patient will tolerate 15 minutes of clinic activities before onset of symptoms.      Long term goals  Time frame: 6 weeks  1. Patient will reports pain level decreased to 3/10 to allow increased ease of movement. 2. Patient will have an improved score on the LEFS outcome measure by 40 points to demonstrate an increase in functional activity tolerance. 3. Patient will be independent in final individualized HEP. 4. Patient will sleep 6-8 hours without being interrupted by pain.            SUBJECTIVE:     Pain reports continued pain in her low back and right knee stiffness    OBJECTIVE DATA SUMMARY:     Pain:  Location:Pain right flank, low back down right LE  Quality: sharp  Now: 7.5/10  Best:  Worst:10/10  Factors that improve pain: rest, ice     Posture:    Flexed trunk     Strength:    Right LE    Knee extension, limited by pain in right hip     Range of Motion:    Trunk ROM     Flex 25 % of normal  Extension trace  Right SB trace  Left SB 25% of normal     Spinal Assessment:    Flattened lumbar spine     Joint Mobility:    Pt unable to tolerate mob of lumbar spine     Palpation:    TTP tx and lumbar paraspinal, gluteal, ITB     Neurologic Assessment:  Tone: normal  Sensation: pt reports numbness in lateral right LE  Reflexes: not tested     Special Tests:    + slump test R  +SLR on R     Mobility:  Transitional Movements: Antalgic   Gait: Antalgic, using st cane     Balance: Not tested     Functional Measure:     LEFS: 9/80     TREATMENT/INTERVENTION:  Modalities (Rationale): MHP with ES to right low back and right hip in left sidelying with right leg off mat for 15 minutes pre treatment to decrease pain and muscle guarding.     Therapeutic Exercises:  HEP     KTC, may need to use towel  LTR    PPT    Seated HS stretch    Additional clinical activity:  LBE x5 min level 3 -held today    Supine PPT: 10 reps with 10 second holds    Hip add with ball: 15 reps with 5 second holds   Hip abd with red TB: 15 reps     BKTC with yellow ball: 10 reps  HS Stretch using blue band   Single leg fall out  Bridges with TA hold: 15 reps  Forward flexion on blue physioball: 10 reps  Supine piriformis stretch, figure 4  Sidelying right leg dangle off mat: 4 reps with 30 second holds  VMO SLR Rt: 10 reps  Sidelying right hip abduction: 10 reps   Steamboats, no resistance x15    Manual Therapy:   Piriformis stretch 3 reps for 20 seconds  Trigger point release to right piriformis in sidelying  Muscle energy: 3 reps with 10 seconds -held; ASIS equal  Isometric alt hip contraction -held  STM and foam roller to right IT band and quadratus lumborum -held  PA mob at sacrum grade 4 -held     Neuro Re-Education:  Discussed importance of sitting with lumbar support, in good supportive chair.     Ambulation/Gait Training:  None today; decreased wt bearing on right LE secondary to pain      Activity tolerance and post treatment pain report: tolerated well    Education:  Education was provided to the patient on the following topics:   [x]    No changes were made to the home exercise program.  []    The following changes were made to the home exercise program:   Patient verbalized understanding of the topics presented. ASSESSMENT:   Patient continues to present with right low back and buttock pain. .Pt utilized a TENS unit during exercises, reported good response.  Pt has reached max benefit from PT, will discharge today with complete HEP. Pt will purchase a TENS unit for home use. Pt in agreement with above. Patients progression toward goals is as follows:  [x]     Improving appropriately and progressing toward goals  []     Improving slowly and progressing toward goals  []     Not making progress toward goals and plan of care will be adjusted    PLAN OF CARE:     [x]    Continue treatment per established plan of care.   [x]     Recommend the following changes to the plan of care: discharge      Sagar Paris PT     Patient Time in clinic: 35 minutes

## 2017-02-03 ENCOUNTER — OFFICE VISIT (OUTPATIENT)
Dept: INTERNAL MEDICINE CLINIC | Age: 52
End: 2017-02-03

## 2017-02-03 VITALS
SYSTOLIC BLOOD PRESSURE: 122 MMHG | WEIGHT: 170 LBS | BODY MASS INDEX: 32.1 KG/M2 | DIASTOLIC BLOOD PRESSURE: 80 MMHG | RESPIRATION RATE: 18 BRPM | TEMPERATURE: 97.9 F | HEART RATE: 74 BPM | HEIGHT: 61 IN

## 2017-02-03 DIAGNOSIS — M54.31 RIGHT SIDED SCIATICA: ICD-10-CM

## 2017-02-03 DIAGNOSIS — Z96.651 STATUS POST TOTAL RIGHT KNEE REPLACEMENT: ICD-10-CM

## 2017-02-03 DIAGNOSIS — M54.41 MIDLINE LOW BACK PAIN WITH RIGHT-SIDED SCIATICA, UNSPECIFIED CHRONICITY: ICD-10-CM

## 2017-02-03 DIAGNOSIS — M17.11 PRIMARY OSTEOARTHRITIS OF RIGHT KNEE: ICD-10-CM

## 2017-02-03 RX ORDER — OXYCODONE AND ACETAMINOPHEN 10; 325 MG/1; MG/1
1 TABLET ORAL
Qty: 30 TAB | Refills: 0 | Status: SHIPPED | OUTPATIENT
Start: 2017-02-03 | End: 2017-03-03 | Stop reason: SDUPTHER

## 2017-02-03 NOTE — PROGRESS NOTES
Sameer Duong is a 46 y.o. female and presents with Medication Refill (order spending)    Subjective:  Pt here to f/u right knee pain and lower back pain. Last pain med last night. Pain Scale: 9/10. Continues Gabapentin TID while awaiting Cymbalta, out of stock following last OV. Has not received meds from another provider. No change in pain presentation since last OV. Review of Systems  Constitutional: negative for fevers, chills, anorexia and weight loss  Eyes:   negative for visual disturbance, drainage, and irritation  ENT:   + deviated septum (per pt report) negative for tinnitus,sore throat,ear pain,and hoarseness  Respiratory:  + COPD improved.  negative for hemoptysis  CV:   negative for chest pain, palpitations, and lower extremity edema  GI:   negative for nausea, vomiting, diarrhea, abdominal pain, and melena  Endo:               negative for polyuria,polydipsia,polyphagia, and heat intolerance  Genitourinary: negative for urgency, dysuria, retention, and hematuria  Integument:  negative for rash, ulcerations  Hematologic:  negative for easy bruising and bleeding  Musculoskel:  negative for muscle weakness  Neurological:  negative for headaches, dizziness, vertigo,and memory problems  Behavl/Psych: negative for feelings of anxiety, depression, suicide, and mood changes    Past Medical History   Diagnosis Date    Asthma      uses inhalers    Chronic obstructive pulmonary disease (HCC)      bronchitis    GERD (gastroesophageal reflux disease)     Hypertension     Ill-defined condition      environmental allergies     Ill-defined condition      Multiple body piercings; unable to remove tongue and lip piercings    Thyroid disease      hypo    Ventral hernia 12/31/2013     Past Surgical History   Procedure Laterality Date    Hx hysterectomy       partial    Hx other surgical       hiatal hernia repair    Hx mohs procedure Right 3/8/11    Hx heent  2009     thyroidectomy     Social History Social History    Marital status: SINGLE     Spouse name: N/A    Number of children: N/A    Years of education: N/A     Social History Main Topics    Smoking status: Former Smoker     Packs/day: 0.50     Years: 1.50     Types: Cigarettes     Quit date: 10/1/2015    Smokeless tobacco: Never Used      Comment: patient quit smoking for 10 years, began smoking again and then quit again in 2015    Alcohol use 0.6 oz/week     1 Glasses of wine per week    Drug use: No    Sexual activity: Yes     Partners: Female     Other Topics Concern    None     Social History Narrative     Family History   Problem Relation Age of Onset    Cancer Father      lung cancer    Heart Disease Mother     Hypertension Mother     Diabetes Mother     Anesth Problems Neg Hx      Current Outpatient Prescriptions   Medication Sig Dispense Refill    oxyCODONE-acetaminophen (PERCOCET 10)  mg per tablet Take 1 Tab by mouth daily as needed for Pain. Indications: Pain 30 Tab 0    DULoxetine (CYMBALTA) 30 mg capsule Take 1 Cap by mouth daily. Indications: CHRONIC MUSCULOSKELETAL PAIN 30 Cap 2    acetaminophen (TYLENOL) 650 mg CR tablet Take 1 Tab by mouth three (3) times daily as needed for Pain. 90 Tab 11    lidocaine (LIDODERM) 5 % Apply patch to the affected area for 12 hours a day and remove for 12 hours a day. 30 Each 11    fluticasone (FLONASE) 50 mcg/actuation nasal spray 2 Sprays by Both Nostrils route daily. 1 Bottle 11    diazePAM (VALIUM) 5 mg tablet       gabapentin (NEURONTIN) 300 mg capsule Take 1 Cap by mouth three (3) times daily. Indications: right sciatica 90 Cap 3    methocarbamol (ROBAXIN-750) 750 mg tablet Take 1 Tab by mouth four (4) times daily as needed for Pain (spasms). 60 Tab 11    ondansetron (ZOFRAN ODT) 4 mg disintegrating tablet Take 1 Tab by mouth every eight (8) hours as needed for Nausea. 20 Tab 3    levothyroxine (SYNTHROID) 125 mcg tablet Take 1 Tab by mouth Daily (before breakfast). 90 Tab 3    amLODIPine (NORVASC) 5 mg tablet TAKE 1 TABLET BY MOUTH EVERY DAY 90 Tab 3    hydrochlorothiazide (HYDRODIURIL) 25 mg tablet daily. 6    montelukast (SINGULAIR) 10 mg tablet daily. 6    pantoprazole (PROTONIX) 40 mg tablet two (2) times a day. 5    loratadine (CLARITIN) 10 mg tablet Take 1 Tab by mouth daily. 30 Tab 5    budesonide-formoterol (SYMBICORT) 160-4.5 mcg/actuation HFA inhaler Take 2 Puffs by inhalation two (2) times a day.  albuterol (PROAIR HFA) 90 mcg/actuation inhaler Take 2 Puffs by inhalation every four (4) hours as needed.  albuterol (PROVENTIL VENTOLIN) 2.5 mg /3 mL (0.083 %) nebulizer solution by Nebulization route three (3) times daily.  miscellaneous medical supply misc Shower seat for chronic knee pain, pt planning to have right TKR in June due to condition. Very limited range of motion. 1 Each 0     Allergies   Allergen Reactions    Mold Shortness of Breath and Itching    Ibuprofen Nausea Only       Objective:  Visit Vitals    /80 (BP 1 Location: Right arm, BP Patient Position: Sitting)    Pulse 74    Temp 97.9 °F (36.6 °C) (Oral)    Resp 18    Ht 5' 1\" (1.549 m)    Wt 170 lb (77.1 kg)    LMP 09/14/2016 (Approximate)    BMI 32.12 kg/m2     Physical Exam:   General appearance - alert, fair appearing, and in moderate distress. Grimacing, moaning, and repositioning often. Mental status - A/O x 4, irritable mood and flat affect. Chest - Symmetric chest rise. No wheezing. No distress. Heart - Normal rate. Abdomen- Soft, round. Non-distended, NT. No pulsatile masses or hernias. Ext- Radial, DP pulses, 2+ bilaterally. No pedal edema, clubbing, or cyanosis. Skin-Warm and dry. No hyperpigmentation, ulcerations, or suspicious lesions. Neuro - Normal speech, no focal findings or movement disorder. Normal strength and muscle tone. Antalgic gait using cane. Right knee - LROM, no effusion. +allodynia.     Assessment/Plan:  Chronic pain-UDS, Increased oxycodone to 10-325mg (#30 tabs).  was reviewed while planning for pain management, no indications of drug diversion suspected. Prescription history is not suspicious for controlled substance overuse. Pain contract signed with pill inclusion. See below for other orders   Follow-up Disposition:  Return in about 4 weeks (around 3/3/2017) for pain med refill. ICD-10-CM ICD-9-CM    1. Primary osteoarthritis of right knee M17.11 715.16 PAIN MGMT PANEL W/REFL, UR      oxyCODONE-acetaminophen (PERCOCET 10)  mg per tablet   2. Status post total right knee replacement Z96.651 V43.65 PAIN MGMT PANEL W/REFL, UR      oxyCODONE-acetaminophen (PERCOCET 10)  mg per tablet   3. Right sided sciatica M54.31 724.3 PAIN MGMT PANEL W/REFL, UR      oxyCODONE-acetaminophen (PERCOCET 10)  mg per tablet   4. Midline low back pain with right-sided sciatica, unspecified chronicity M54.41 724.3 PAIN MGMT PANEL W/REFL, UR      oxyCODONE-acetaminophen (PERCOCET 10)  mg per tablet     Orders Placed This Encounter    PAIN MGMT PANEL W/REFL, UR    oxyCODONE-acetaminophen (PERCOCET 10)  mg per tablet     Sig: Take 1 Tab by mouth daily as needed for Pain. Indications: Pain     Dispense:  30 Tab     Refill:  0       Liliya Sykes expressed understanding of plan. An After Visit Summary was offered/printed and given to the patient.

## 2017-02-03 NOTE — PATIENT INSTRUCTIONS
Back Care and Preventing Injuries: Care Instructions  Your Care Instructions  You can hurt your back doing many everyday activities: lifting a heavy box, bending down to garden, exercising at the gym, and even getting out of bed. But you can keep your back strong and healthy by doing some exercises. You also can follow a few tips for sitting, sleeping, and lifting to avoid hurting your back again. Talk to your doctor before you start an exercise program. Ask for help if you want to learn more about keeping your back healthy. Follow-up care is a key part of your treatment and safety. Be sure to make and go to all appointments, and call your doctor if you are having problems. It's also a good idea to know your test results and keep a list of the medicines you take. How can you care for yourself at home? · Stay at a healthy weight to avoid strain on your lower back. · Do not smoke. Smoking increases the risk of osteoporosis, which weakens the spine. If you need help quitting, talk to your doctor about stop-smoking programs and medicines. These can increase your chances of quitting for good. · Make sure you sleep in a position that maintains your back's normal curves and on a mattress that feels comfortable. Sleep on your side with a pillow between your knees, or sleep on your back with a pillow under your knees. These positions can reduce strain on your back. · When you get out of bed, lie on your side and bend both knees. Drop your feet over the edge of the bed as you push up with both arms. Scoot to the edge of the bed. Make sure your feet are in line with your rear end (buttocks), and then stand up. · If you must stand for a long time, put one foot on a stool, ledge, or box. Exercise to strengthen your back and other muscles  · Get at least 30 minutes of exercise on most days of the week. Walking is a good choice.  You also may want to do other activities, such as running, swimming, cycling, or playing tennis or team sports. · Stretch your back muscles. Here are few exercises to try:  Cherie Trini on your back with your knees bent and your feet flat on the floor. Gently pull one bent knee to your chest. Put that foot back on the floor, and then pull the other knee to your chest. Hold for 15 to 30 seconds. Repeat 2 to 4 times. ¨ Do pelvic tilts. Lie on your back with your knees bent. Tighten your stomach muscles. Pull your belly button (navel) in and up toward your ribs. You should feel like your back is pressing to the floor and your hips and pelvis are slightly lifting off the floor. Hold for 6 seconds while breathing smoothly. · Keep your core muscles strong. The muscles of your back, belly (abdomen), and buttocks support your spine. ¨ Pull in your belly, and imagine pulling your navel toward your spine. Hold this for 6 seconds, then relax. Remember to keep breathing normally as you tense your muscles. ¨ Do curl-ups. Always do them with your knees bent. Keep your low back on the floor, and curl your shoulders toward your knees using a smooth, slow motion. Keep your arms folded across your chest. If this bothers your neck, try putting your hands behind your neck (not your head), with your elbows spread apart. ¨ Lie on your back with your knees bent and your feet flat on the floor. Tighten your belly muscles, and then push with your feet and raise your buttocks up a few inches. Hold this position 6 seconds as you continue to breathe normally, then lower yourself slowly to the floor. Repeat 8 to 12 times. ¨ If you like group exercise, try Pilates or yoga. These classes have poses that strengthen the core muscles. Protect your back when you sit  · Place a small pillow, a rolled-up towel, or a lumbar roll in the curve of your back if you need extra support. · Sit in a chair that is low enough to let you place both feet flat on the floor with both knees nearly level with your hips.  If your chair or desk is too high, use a foot rest to raise your knees. · When driving, keep your knees nearly level with your hips. Sit straight, and drive with both hands on the steering wheel. Your arms should be in a slightly bent position. · Try a kneeling chair, which helps tilt your hips forward. This takes pressure off your lower back. · Try sitting on an exercise ball. It can rock from side to side, which helps keep your back loose. Lift properly  · Squat down, bending at the hips and knees only. If you need to, put one knee to the floor and extend your other knee in front of you, bent at a right angle (half kneeling). · Press your chest straight forward. This helps keep your upper back straight while keeping a slight arch in your low back. · Hold the load as close to your body as possible, at the level of your navel. · Use your feet to change direction, taking small steps. · Lead with your hips as you change direction. Keep your shoulders in line with your hips as you move. Do not twist your body. · Set down your load carefully, squatting with your knees and hips only. When should you call for help? Watch closely for changes in your health, and be sure to contact your doctor if:  · You want more exercises to make your back and other core muscles stronger. Where can you learn more? Go to http://ofelia-mindy.info/. Enter S810 in the search box to learn more about \"Back Care and Preventing Injuries: Care Instructions. \"  Current as of: May 23, 2016  Content Version: 11.1  © 5142-2929 [employer]. Care instructions adapted under license by [employer] (which disclaims liability or warranty for this information). If you have questions about a medical condition or this instruction, always ask your healthcare professional. [address] any warranty or liability for your use of this information.

## 2017-02-11 LAB
AMPHETAMINES UR QL SCN: NEGATIVE NG/ML
BARBITURATES UR QL SCN: NEGATIVE NG/ML
BENZODIAZ UR QL: POSITIVE NG/ML
BZE UR QL SCN: NEGATIVE NG/ML
CANNABINOIDS UR QL CFM: POSITIVE
CREAT UR-MCNC: 220.6 MG/DL (ref 20–300)
FENTANYL+NORFENTANYL UR QL SCN: NEGATIVE PG/ML
MEPERIDINE UR QL: NEGATIVE NG/ML
METHADONE UR QL SCN: NEGATIVE NG/ML
OPIATES UR QL SCN: NEGATIVE NG/ML
OXYCODONE+OXYMORPHONE UR QL SCN: NEGATIVE NG/ML
PCP UR QL: NEGATIVE NG/ML
PH UR: 6.5 [PH] (ref 4.5–8.9)
PLEASE NOTE:, 733157: ABNORMAL
PROPOXYPH UR QL SCN: NEGATIVE NG/ML
SP GR UR: 1.02
TRAMADOL UR QL SCN: NEGATIVE NG/ML

## 2017-02-15 ENCOUNTER — TELEPHONE (OUTPATIENT)
Dept: INTERNAL MEDICINE CLINIC | Age: 52
End: 2017-02-15

## 2017-02-15 NOTE — TELEPHONE ENCOUNTER
Pt states she received letter from you ref to finding marijuana in her system. She is not understanding how you are finding this when she is not doing anything.  Pt # [phone]

## 2017-02-15 NOTE — TELEPHONE ENCOUNTER
I can only give results, there is no other explanation I can give her. Somehow, THC has gotten in her system. She will need to check/review anything ingested to identify the likely culprit to this result. At this time it was only a warning letter.

## 2017-03-02 DIAGNOSIS — Z12.39 SCREENING FOR MALIGNANT NEOPLASM OF BREAST: Primary | ICD-10-CM

## 2017-03-03 ENCOUNTER — OFFICE VISIT (OUTPATIENT)
Dept: INTERNAL MEDICINE CLINIC | Age: 52
End: 2017-03-03

## 2017-03-03 VITALS
DIASTOLIC BLOOD PRESSURE: 92 MMHG | SYSTOLIC BLOOD PRESSURE: 132 MMHG | WEIGHT: 169 LBS | TEMPERATURE: 97.8 F | BODY MASS INDEX: 31.91 KG/M2 | HEART RATE: 77 BPM | HEIGHT: 61 IN | RESPIRATION RATE: 18 BRPM

## 2017-03-03 DIAGNOSIS — Z23 ENCOUNTER FOR IMMUNIZATION: ICD-10-CM

## 2017-03-03 DIAGNOSIS — Z96.651 STATUS POST TOTAL RIGHT KNEE REPLACEMENT: ICD-10-CM

## 2017-03-03 DIAGNOSIS — M54.41 MIDLINE LOW BACK PAIN WITH RIGHT-SIDED SCIATICA, UNSPECIFIED CHRONICITY: ICD-10-CM

## 2017-03-03 DIAGNOSIS — M17.11 PRIMARY OSTEOARTHRITIS OF RIGHT KNEE: Primary | ICD-10-CM

## 2017-03-03 DIAGNOSIS — M54.31 RIGHT SIDED SCIATICA: ICD-10-CM

## 2017-03-03 DIAGNOSIS — L72.3 SEBACEOUS CYST: ICD-10-CM

## 2017-03-03 RX ORDER — DULOXETIN HYDROCHLORIDE 60 MG/1
60 CAPSULE, DELAYED RELEASE ORAL DAILY
Qty: 30 CAP | Refills: 11 | Status: SHIPPED | OUTPATIENT
Start: 2017-03-03 | End: 2018-02-28

## 2017-03-03 RX ORDER — OXYCODONE AND ACETAMINOPHEN 10; 325 MG/1; MG/1
TABLET ORAL
Qty: 45 TAB | Refills: 0 | Status: SHIPPED | OUTPATIENT
Start: 2017-03-03 | End: 2017-04-06 | Stop reason: SDUPTHER

## 2017-03-03 RX ORDER — PANTOPRAZOLE SODIUM 40 MG/1
TABLET, DELAYED RELEASE ORAL 2 TIMES DAILY
Refills: 5 | Status: CANCELLED | OUTPATIENT
Start: 2017-03-03

## 2017-03-03 NOTE — MR AVS SNAPSHOT
Visit Information Date & Time Provider Department Dept. Phone Encounter #  
 3/3/2017  9:20 AM Blanca Brown NP [address] [facility] [phone] [id number] Follow-up Instructions Return in about 4 weeks (around 3/31/2017) for pain med refill, PAP. Upcoming Health Maintenance Date Due  
 BREAST CANCER SCRN MAMMOGRAM 4/2/2017* PAP AKA CERVICAL CYTOLOGY 4/2/2017* FOBT Q 1 YEAR AGE 50-75 5/26/2017 DTaP/Tdap/Td series (2 - Td) 7/6/2025 *Topic was postponed. The date shown is not the original due date. Allergies as of 3/3/2017  Review Complete On: 3/3/2017 By: Blanca Brown NP Severity Noted Reaction Type Reactions Mold  05/23/2016    Shortness of Breath, Itching Ibuprofen Low 03/30/2015   Intolerance Nausea Only Current Immunizations  Reviewed on 12/9/2016 Name Date Influenza Vaccine (Quad) Intradermal PF 12/9/2016 Not reviewed this visit You Were Diagnosed With   
  
 Codes Comments Primary osteoarthritis of right knee    -  Primary ICD-10-CM: M17.11 ICD-9-CM: 715.16 Status post total right knee replacement     ICD-10-CM: Z28.464 ICD-9-CM: V43.65 Right sided sciatica     ICD-10-CM: M54.31 
ICD-9-CM: 724.3 Midline low back pain with right-sided sciatica, unspecified chronicity     ICD-10-CM: M54.41 
ICD-9-CM: 724.3 Vitals BP  
  
  
  
  
  
 (!) 132/92 (BP 1 Location: Right arm, BP Patient Position: Sitting) Vitals History BMI and BSA Data Body Mass Index Body Surface Area  
 31.93 kg/m 2 1.82 m 2 Preferred Pharmacy Pharmacy Name Phone [id number] [phone] Your Updated Medication List  
  
   
This list is accurate as of: 3/3/17 10:19 AM.  Always use your most recent med list.  
  
  
  
  
 acetaminophen 650 mg CR tablet Commonly known as:  TYLENOL  
 Take 1 Tab by mouth three (3) times daily as needed for Pain. amLODIPine 5 mg tablet Commonly known as:  Maame Angeles TAKE 1 TABLET BY MOUTH EVERY DAY  
  
 diazePAM 5 mg tablet Commonly known as:  VALIUM  
  
 DULoxetine 60 mg capsule Commonly known as:  CYMBALTA Take 1 Cap by mouth daily. Indications: CHRONIC MUSCULOSKELETAL PAIN  
  
 fluticasone 50 mcg/actuation nasal spray Commonly known as:  Jeannie Jumper 2 Sprays by Both Nostrils route daily. gabapentin 300 mg capsule Commonly known as:  NEURONTIN Take 1 Cap by mouth three (3) times daily. Indications: right sciatica  
  
 hydroCHLOROthiazide 25 mg tablet Commonly known as:  HYDRODIURIL  
daily. levothyroxine 125 mcg tablet Commonly known as:  SYNTHROID Take 1 Tab by mouth Daily (before breakfast). lidocaine 5 % Commonly known as:  Baylee Dueñas Apply patch to the affected area for 12 hours a day and remove for 12 hours a day. loratadine 10 mg tablet Commonly known as:  Radha Sanchez Take 1 Tab by mouth daily. methocarbamol 750 mg tablet Commonly known as:  RBVHNNF-055 Take 1 Tab by mouth four (4) times daily as needed for Pain (spasms). miscellaneous medical supply Misc Shower seat for chronic knee pain, pt planning to have right TKR in June due to condition. Very limited range of motion. montelukast 10 mg tablet Commonly known as:  SINGULAIR  
daily. ondansetron 4 mg disintegrating tablet Commonly known as:  ZOFRAN ODT Take 1 Tab by mouth every eight (8) hours as needed for Nausea. oxyCODONE-acetaminophen  mg per tablet Commonly known as:  PERCOCET 10 May take 1 tab TWICE DAILY, no more than every other day as needed for pain. Indications: Pain  
  
 pantoprazole 40 mg tablet Commonly known as:  PROTONIX  
two (2) times a day. * PROAIR HFA 90 mcg/actuation inhaler Generic drug:  albuterol Take 2 Puffs by inhalation every four (4) hours as needed. * albuterol 2.5 mg /3 mL (0.083 %) nebulizer solution Commonly known as:  PROVENTIL VENTOLIN  
by Nebulization route three (3) times daily. SYMBICORT 160-4.5 mcg/actuation HFA inhaler Generic drug:  budesonide-formoterol Take 2 Puffs by inhalation two (2) times a day. * Notice: This list has 2 medication(s) that are the same as other medications prescribed for you. Read the directions carefully, and ask your doctor or other care provider to review them with you. Prescriptions Printed Refills  
 oxyCODONE-acetaminophen (PERCOCET 10)  mg per tablet 0 Sig: May take 1 tab TWICE DAILY, no more than every other day as needed for pain. Indications: Pain Class: Print Prescriptions Sent to Pharmacy Refills DULoxetine (CYMBALTA) 60 mg capsule 11 Sig: Take 1 Cap by mouth daily. Indications: CHRONIC MUSCULOSKELETAL PAIN Class: Normal  
 Pharmacy: [address] [place] [address] #: [phone] Route: Oral  
  
We Performed the Following PAIN MGMT PANEL W/REFL, UR [[id number] Custom] Follow-up Instructions Return in about 4 weeks (around 3/31/2017) for pain med refill, PAP. Patient Instructions Arthritis: Care Instructions Your Care Instructions Arthritis, also called osteoarthritis, is a breakdown of the cartilage that cushions your joints. When the cartilage wears down, your bones rub against each other. This causes pain and stiffness. Many people have some arthritis as they age. Arthritis most often affects the joints of the spine, hands, hips, knees, or feet. You can take simple measures to protect your joints, ease your pain, and help you stay active. Follow-up care is a key part of your treatment and safety. Be sure to make and go to all appointments, and call your doctor if you are having problems. It's also a good idea to know your test results and keep a list of the medicines you take. How can you care for yourself at home? · Stay at a healthy weight. Being overweight puts extra strain on your joints. · Talk to your doctor or physical therapist about exercises that will help ease joint pain. ¨ Stretch. You may enjoy gentle forms of yoga to help keep your joints and muscles flexible. ¨ Walk instead of jog. Other types of exercise that are less stressful on the joints include riding a bicycle, swimming, or water exercise. ¨ Lift weights. Strong muscles help reduce stress on your joints. Stronger thigh muscles, for example, take some of the stress off of the knees and hips. Learn the right way to lift weights so you do not make joint pain worse. · Take your medicines exactly as prescribed. Call your doctor if you think you are having a problem with your medicine. · Take pain medicines exactly as directed. ¨ If the doctor gave you a prescription medicine for pain, take it as prescribed. ¨ If you are not taking a prescription pain medicine, ask your doctor if you can take an over-the-counter medicine. · Use a cane, crutch, walker, or another device if you need help to get around. These can help rest your joints. You also can use other things to make life easier, such as a higher toilet seat and padded handles on kitchen utensils. · Do not sit in low chairs, which can make it hard to get up. · Put heat or cold on your sore joints as needed. Use whichever helps you most. You also can take turns with hot and cold packs. ¨ Apply heat 2 or 3 times a day for 20 to 30 minutesusing a heating pad, hot shower, or hot packto relieve pain and stiffness. ¨ Put ice or a cold pack on your sore joint for 10 to 20 minutes at a time. Put a thin cloth between the ice and your skin. When should you call for help? Call your doctor now or seek immediate medical care if: 
· You have sudden swelling, warmth, or pain in any joint. · You have joint pain and a fever or rash. · You have such bad pain that you cannot use a joint. Watch closely for changes in your health, and be sure to contact your doctor if: 
· You have mild joint symptoms that continue even with more than 6 weeks of care at home. · You have stomach pain or other problems with your medicine. Where can you learn more? Go to http://ofelia-mindy.info/. Enter V034 in the search box to learn more about \"Arthritis: Care Instructions. \" Current as of: February 24, 2016 Content Version: 11.1 © 0660-9615 [employer]. Care instructions adapted under license by [employer] (which disclaims liability or warranty for this information). If you have questions about a medical condition or this instruction, always ask your healthcare professional. [address] any warranty or liability for your use of this information. Pneumococcal Conjugate Vaccine for Children: Care Instructions Your Care Instructions The pneumococcal shot (PCV13) protects against a type of bacteria that causes pneumonia, meningitis, blood infections (sepsis), and ear infections. All children need four dosesone at age 2 months, one at 4 months, one at 7 months, and one at 15 to 17 months. If your child does not get the shots in this time frame, ask your doctor about a schedule for catch-up shots. The shot may cause pain or swelling in the area where the shot is given. It may cause your child to feel sleepy or not feel like eating or cause a fever. These reactions may last 1 to 2 days. Follow-up care is a key part of your child's treatment and safety. Be sure to make and go to all appointments, and call your doctor if your child is having problems. It's also a good idea to know your child's test results and keep a list of the medicines your child takes. How can you care for your child at home?  
· Give your child acetaminophen (Tylenol) or ibuprofen (Advil, Motrin) for fever or for pain at the shot area. Be safe with medicines. Read and follow all instructions on the label. Do not give aspirin to anyone younger than 20. It has been linked to Reye syndrome, a serious illness. · Do not give a child two or more pain medicines at the same time unless the doctor told you to. Many pain medicines have acetaminophen, which is Tylenol. Too much acetaminophen (Tylenol) can be harmful. · Put ice or a cold pack on the sore area for 10 to 20 minutes at a time. Put a thin cloth between the ice and your child's skin. When should you call for help? Call 911 anytime you think your child may need emergency care. For example, call if: 
· Your child has symptoms of a severe allergic reaction. These may include: 
¨ Sudden raised, red areas (hives) all over the body. ¨ Swelling of the throat, mouth, lips, or tongue. ¨ Trouble breathing. ¨ Passing out (losing consciousness). Or your child may feel very lightheaded or suddenly feel weak, confused, or restless. · Your child has a seizure. Call your doctor now or seek immediate medical care if: 
· Your child has symptoms of an allergic reaction, such as: ¨ A rash or hives (raised, red areas on the skin). ¨ Itching. ¨ Swelling. ¨ Belly pain, nausea, or vomiting. · Your child has a high fever. Watch closely for changes in your child's health, and be sure to contact your doctor if: · A mild fever does not go away in 24 hours. · Your child does not get better as expected. Where can you learn more? Go to http://ofelia-mindy.info/. Enter O924 in the search box to learn more about \"Pneumococcal Conjugate Vaccine for Children: Care Instructions. \" Current as of: July 28, 2016 Content Version: 11.1 © 6342-7777 [employer]. Care instructions adapted under license by [employer] (which disclaims liability or warranty for this information).  If you have questions about a medical condition or this instruction, always ask your healthcare professional. [facility] [address] any warranty or liability for your use of this information. Introducing [facility]! Ike Tuttle introduces FREECULTR patient portal. Now you can access parts of your medical record, email your doctor's office, and request medication refills online. 1. In your internet browser, go to [email or web address] 2. Click on the First Time User? Click Here link in the Sign In box. You will see the New Member Sign Up page. 3. Enter your FREECULTR Access Code exactly as it appears below. You will not need to use this code after youve completed the sign-up process. If you do not sign up before the expiration date, you must request a new code. · FREECULTR Access Code: [email or web address] Expires: 3/30/2017  8:27 AM 
 
4. Enter the last four digits of your Social Security Number (xxxx) and Date of Birth (mm/dd/yyyy) as indicated and click Submit. You will be taken to the next sign-up page. 5. Create a FREECULTR ID. This will be your FREECULTR login ID and cannot be changed, so think of one that is secure and easy to remember. 6. Create a FREECULTR password. You can change your password at any time. 7. Enter your Password Reset Question and Answer. This can be used at a later time if you forget your password. 8. Enter your e-mail address. You will receive e-mail notification when new information is available in [address] 9. Click Sign Up. You can now view and download portions of your medical record. 10. Click the Download Summary menu link to download a portable copy of your medical information. If you have questions, please visit the Frequently Asked Questions section of the FREECULTR website. Remember, FREECULTR is NOT to be used for urgent needs. For medical emergencies, dial 911. Now available from your iPhone and Android! Please provide this summary of care documentation to your next provider. Your primary care clinician is listed as HUGO Hart. If you have any questions after today's visit, please call [phone].

## 2017-03-03 NOTE — PROGRESS NOTES
Luis Eduardo Whelan is a 46 y.o. female and presents with Medication Refill (orders pending)    Subjective:  Pt here to f/u right knee pain and lower back pain. Last pain med last night, but was off about 1 week since took twice daily 2 weeks ago for severe pain with colder weather. Pain Scale: 9/10. Has not received meds from another provider. No change in pain presentation since last OV. PT completed, still doing HEP however.     Review of Systems  Constitutional: negative for fevers, chills, anorexia and weight loss  Eyes:   negative for visual disturbance, drainage, and irritation  ENT:   + AR. negative for tinnitus,sore throat,ear pain,and hoarseness  Respiratory:  negative for hemoptysis  CV:   negative for chest pain, palpitations, and lower extremity edema  GI:   negative for nausea, vomiting, diarrhea, abdominal pain, and melena  Endo:               negative for polyuria,polydipsia,polyphagia, and heat intolerance  Genitourinary: negative for urgency, dysuria, retention, and hematuria  Integument:  negative for rash, ulcerations  Hematologic:  negative for easy bruising and bleeding  Musculoskel:  negative for muscle weakness  Neurological:  negative for headaches, dizziness, vertigo,and memory problems  Behavl/Psych: negative for feelings of anxiety, depression, suicide, and mood changes    Past Medical History:   Diagnosis Date    Asthma     uses inhalers    Chronic obstructive pulmonary disease (HCC)     bronchitis    GERD (gastroesophageal reflux disease)     Hypertension     Ill-defined condition     environmental allergies     Ill-defined condition     Multiple body piercings; unable to remove tongue and lip piercings    Thyroid disease     hypo    Ventral hernia 12/31/2013     Past Surgical History:   Procedure Laterality Date    HX HEENT  2009    thyroidectomy    HX HYSTERECTOMY      partial    HX MOHS PROCEDURES Right 3/8/11    HX OTHER SURGICAL      hiatal hernia repair     Social History     Social History    Marital status: SINGLE     Spouse name: N/A    Number of children: N/A    Years of education: N/A     Social History Main Topics    Smoking status: Former Smoker     Packs/day: 0.50     Years: 1.50     Types: Cigarettes     Quit date: 10/1/2015    Smokeless tobacco: Never Used      Comment: patient quit smoking for 10 years, began smoking again and then quit again in 2015    Alcohol use 0.6 oz/week     1 Glasses of wine per week    Drug use: No    Sexual activity: Yes     Partners: Female     Other Topics Concern    None     Social History Narrative     Family History   Problem Relation Age of Onset    Cancer Father      lung cancer    Heart Disease Mother     Hypertension Mother     Diabetes Mother     Anesth Problems Neg Hx      Current Outpatient Prescriptions   Medication Sig Dispense Refill    oxyCODONE-acetaminophen (PERCOCET 10)  mg per tablet May take 1 tab TWICE DAILY, no more than every other day as needed for pain. Indications: Pain 45 Tab 0    DULoxetine (CYMBALTA) 60 mg capsule Take 1 Cap by mouth daily. Indications: CHRONIC MUSCULOSKELETAL PAIN 30 Cap 11    acetaminophen (TYLENOL) 650 mg CR tablet Take 1 Tab by mouth three (3) times daily as needed for Pain. 90 Tab 11    lidocaine (LIDODERM) 5 % Apply patch to the affected area for 12 hours a day and remove for 12 hours a day. 30 Each 11    fluticasone (FLONASE) 50 mcg/actuation nasal spray 2 Sprays by Both Nostrils route daily. 1 Bottle 11    diazePAM (VALIUM) 5 mg tablet       gabapentin (NEURONTIN) 300 mg capsule Take 1 Cap by mouth three (3) times daily. Indications: right sciatica 90 Cap 3    methocarbamol (ROBAXIN-750) 750 mg tablet Take 1 Tab by mouth four (4) times daily as needed for Pain (spasms). 60 Tab 11    ondansetron (ZOFRAN ODT) 4 mg disintegrating tablet Take 1 Tab by mouth every eight (8) hours as needed for Nausea.  20 Tab 3    levothyroxine (SYNTHROID) 125 mcg tablet Take 1 Tab by mouth Daily (before breakfast). 90 Tab 3    amLODIPine (NORVASC) 5 mg tablet TAKE 1 TABLET BY MOUTH EVERY DAY 90 Tab 3    miscellaneous medical supply misc Shower seat for chronic knee pain, pt planning to have right TKR in June due to condition. Very limited range of motion. 1 Each 0    hydrochlorothiazide (HYDRODIURIL) 25 mg tablet daily. 6    montelukast (SINGULAIR) 10 mg tablet daily. 6    pantoprazole (PROTONIX) 40 mg tablet two (2) times a day. 5    loratadine (CLARITIN) 10 mg tablet Take 1 Tab by mouth daily. 30 Tab 5    budesonide-formoterol (SYMBICORT) 160-4.5 mcg/actuation HFA inhaler Take 2 Puffs by inhalation two (2) times a day.  albuterol (PROAIR HFA) 90 mcg/actuation inhaler Take 2 Puffs by inhalation every four (4) hours as needed.  albuterol (PROVENTIL VENTOLIN) 2.5 mg /3 mL (0.083 %) nebulizer solution by Nebulization route three (3) times daily. Allergies   Allergen Reactions    Mold Shortness of Breath and Itching    Ibuprofen Nausea Only       Objective:  Visit Vitals    BP (!) 132/92 (BP 1 Location: Right arm, BP Patient Position: Sitting)    Pulse 77    Temp 97.8 °F (36.6 °C) (Oral)    Resp 18    Ht 5' 1\" (1.549 m)    Wt 169 lb (76.7 kg)    LMP 09/14/2016 (Approximate)    BMI 31.93 kg/m2     Physical Exam:   General appearance - alert, fair appearing, and in moderate distress. Repositioning often. +raspy voice. Mental status - A/O x 4,  Normal mood and affect. Chest - left apex with exp wheezing. Rhonchi in all other lung fields. Symmetric chest rise. No wheezing. No distress. Heart - Normal rate and rhythm. Normal S1 and S2. No MGR. Abdomen- Soft, round. Non-distended, NT. No pulsatile masses or hernias. Ext- Radial, DP pulses, 2+ bilaterally. No pedal edema, clubbing, or cyanosis. Skin-Warm and dry. No hyperpigmentation, ulcerations, or suspicious lesions.  Left open comedone with underlying sebaceous cyst.  Neuro - Normal speech, no focal findings or movement disorder. Normal strength and muscle tone. Antalgic gait using cane. Right knee - LROM, no effusion. Back- alignment midline. Lumbar spinal and paraspinal tenderness. No CVAT. LROM, +SLR. Assessment/Plan:  Chronic pain-UDS, oxycodone to 10-325mg (#45 tabs).  was reviewed while planning for pain management, no indications of drug diversion suspected. Prescription history is not suspicious for controlled substance overuse. Will do PAP, given referral for mammo. See below for other orders   Follow-up Disposition:  Return in about 4 weeks (around 3/31/2017) for pain med refill, PAP. ICD-10-CM ICD-9-CM    1. Primary osteoarthritis of right knee M17.11 715.16 oxyCODONE-acetaminophen (PERCOCET 10)  mg per tablet      PAIN MGMT PANEL W/REFL, UR      DULoxetine (CYMBALTA) 60 mg capsule   2. Status post total right knee replacement Z96.651 V43.65 oxyCODONE-acetaminophen (PERCOCET 10)  mg per tablet      PAIN MGMT PANEL W/REFL, UR      DULoxetine (CYMBALTA) 60 mg capsule   3. Right sided sciatica M54.31 724.3 oxyCODONE-acetaminophen (PERCOCET 10)  mg per tablet      PAIN MGMT PANEL W/REFL, UR      DULoxetine (CYMBALTA) 60 mg capsule   4. Midline low back pain with right-sided sciatica, unspecified chronicity M54.41 724.3 oxyCODONE-acetaminophen (PERCOCET 10)  mg per tablet      PAIN MGMT PANEL W/REFL, UR      DULoxetine (CYMBALTA) 60 mg capsule   5. Encounter for immunization Z23 V03.89 PNEUMOCOCCAL POLYSACCHARIDE VACCINE, 23-VALENT, ADULT OR IMMUNOSUPPRESSED PT DOSE,     Orders Placed This Encounter    PNEUMOCOCCAL POLYSACCHARIDE VACCINE, 23-VALENT, ADULT OR IMMUNOSUPPRESSED PT DOSE,    PAIN MGMT PANEL W/REFL, UR    oxyCODONE-acetaminophen (PERCOCET 10)  mg per tablet     Sig: May take 1 tab TWICE DAILY, no more than every other day as needed for pain.   Indications: Pain     Dispense:  45 Tab     Refill:  0    DULoxetine (CYMBALTA) 60 mg capsule     Sig: Take 1 Cap by mouth daily. Indications: CHRONIC MUSCULOSKELETAL PAIN     Dispense:  30 Cap     Refill:  11       Liliya Sykes expressed understanding of plan. An After Visit Summary was offered/printed and given to the patient.

## 2017-03-03 NOTE — PATIENT INSTRUCTIONS
Arthritis: Care Instructions  Your Care Instructions  Arthritis, also called osteoarthritis, is a breakdown of the cartilage that cushions your joints. When the cartilage wears down, your bones rub against each other. This causes pain and stiffness. Many people have some arthritis as they age. Arthritis most often affects the joints of the spine, hands, hips, knees, or feet. You can take simple measures to protect your joints, ease your pain, and help you stay active. Follow-up care is a key part of your treatment and safety. Be sure to make and go to all appointments, and call your doctor if you are having problems. It's also a good idea to know your test results and keep a list of the medicines you take. How can you care for yourself at home? · Stay at a healthy weight. Being overweight puts extra strain on your joints. · Talk to your doctor or physical therapist about exercises that will help ease joint pain. ¨ Stretch. You may enjoy gentle forms of yoga to help keep your joints and muscles flexible. ¨ Walk instead of jog. Other types of exercise that are less stressful on the joints include riding a bicycle, swimming, or water exercise. ¨ Lift weights. Strong muscles help reduce stress on your joints. Stronger thigh muscles, for example, take some of the stress off of the knees and hips. Learn the right way to lift weights so you do not make joint pain worse. · Take your medicines exactly as prescribed. Call your doctor if you think you are having a problem with your medicine. · Take pain medicines exactly as directed. ¨ If the doctor gave you a prescription medicine for pain, take it as prescribed. ¨ If you are not taking a prescription pain medicine, ask your doctor if you can take an over-the-counter medicine. · Use a cane, crutch, walker, or another device if you need help to get around. These can help rest your joints.  You also can use other things to make life easier, such as a higher toilet seat and padded handles on kitchen utensils. · Do not sit in low chairs, which can make it hard to get up. · Put heat or cold on your sore joints as needed. Use whichever helps you most. You also can take turns with hot and cold packs. ¨ Apply heat 2 or 3 times a day for 20 to 30 minutes--using a heating pad, hot shower, or hot pack--to relieve pain and stiffness. ¨ Put ice or a cold pack on your sore joint for 10 to 20 minutes at a time. Put a thin cloth between the ice and your skin. When should you call for help? Call your doctor now or seek immediate medical care if:  · You have sudden swelling, warmth, or pain in any joint. · You have joint pain and a fever or rash. · You have such bad pain that you cannot use a joint. Watch closely for changes in your health, and be sure to contact your doctor if:  · You have mild joint symptoms that continue even with more than 6 weeks of care at home. · You have stomach pain or other problems with your medicine. Where can you learn more? Go to http://ofeliaTimePointsmindy.info/. Enter H536 in the search box to learn more about \"Arthritis: Care Instructions. \"  Current as of: February 24, 2016  Content Version: 11.1  © 1914-5177 [employer]. Care instructions adapted under license by [employer] (which disclaims liability or warranty for this information). If you have questions about a medical condition or this instruction, always ask your healthcare professional. [place] [address] any warranty or liability for your use of this information. Pneumococcal Conjugate Vaccine for Children: Care Instructions  Your Care Instructions  The pneumococcal shot (PCV13) protects against a type of bacteria that causes pneumonia, meningitis, blood infections (sepsis), and ear infections. All children need four doses--one at age 2 months, one at 4 months, one at 7 months, and one at 15 to 17 months.  If your child does not get the shots in this time frame, ask your doctor about a schedule for catch-up shots. The shot may cause pain or swelling in the area where the shot is given. It may cause your child to feel sleepy or not feel like eating or cause a fever. These reactions may last 1 to 2 days. Follow-up care is a key part of your child's treatment and safety. Be sure to make and go to all appointments, and call your doctor if your child is having problems. It's also a good idea to know your child's test results and keep a list of the medicines your child takes. How can you care for your child at home? · Give your child acetaminophen (Tylenol) or ibuprofen (Advil, Motrin) for fever or for pain at the shot area. Be safe with medicines. Read and follow all instructions on the label. Do not give aspirin to anyone younger than 20. It has been linked to Reye syndrome, a serious illness. · Do not give a child two or more pain medicines at the same time unless the doctor told you to. Many pain medicines have acetaminophen, which is Tylenol. Too much acetaminophen (Tylenol) can be harmful. · Put ice or a cold pack on the sore area for 10 to 20 minutes at a time. Put a thin cloth between the ice and your child's skin. When should you call for help? Call 911 anytime you think your child may need emergency care. For example, call if:  · Your child has symptoms of a severe allergic reaction. These may include:  ¨ Sudden raised, red areas (hives) all over the body. ¨ Swelling of the throat, mouth, lips, or tongue. ¨ Trouble breathing. ¨ Passing out (losing consciousness). Or your child may feel very lightheaded or suddenly feel weak, confused, or restless. · Your child has a seizure. Call your doctor now or seek immediate medical care if:  · Your child has symptoms of an allergic reaction, such as:  ¨ A rash or hives (raised, red areas on the skin). ¨ Itching. ¨ Swelling. ¨ Belly pain, nausea, or vomiting.   · Your child has a high fever. Watch closely for changes in your child's health, and be sure to contact your doctor if:  · A mild fever does not go away in 24 hours. · Your child does not get better as expected. Where can you learn more? Go to http://ofelia-mindy.info/. Enter X230 in the search box to learn more about \"Pneumococcal Conjugate Vaccine for Children: Care Instructions. \"  Current as of: July 28, 2016  Content Version: 11.1  © 6664-[address]15 [address]. Care instructions adapted under license by [employer] (which disclaims liability or warranty for this information). If you have questions about a medical condition or this instruction, always ask your healthcare professional. [address] any warranty or liability for your use of this information.

## 2017-03-03 NOTE — PROGRESS NOTES
1. Have you been to the ER, urgent care clinic since your last visit? Hospitalized since your last visit? No    2. Have you seen or consulted any other health care providers outside of the [address] [place] [address] since your last visit? Include any pap smears or colon screening.  No     Pt is here for   Chief Complaint   Patient presents with    Medication Refill     orders pending     Pt states pain level is a 9 right knee and back    Pt states last had something for pain last night

## 2017-03-11 LAB
AMPHETAMINES UR QL SCN: NEGATIVE NG/ML
BARBITURATES UR QL SCN: NEGATIVE NG/ML
BENZODIAZ UR QL SCN: NEGATIVE NG/ML
BZE UR QL SCN: NEGATIVE NG/ML
CANNABINOIDS UR QL SCN: NEGATIVE NG/ML
CREAT UR-MCNC: 106.1 MG/DL (ref 20–300)
FENTANYL+NORFENTANYL UR QL SCN: NEGATIVE PG/ML
MEPERIDINE UR QL: NEGATIVE NG/ML
METHADONE UR QL SCN: NEGATIVE NG/ML
OPIATES UR QL SCN: NEGATIVE NG/ML
OXYCODONE+OXYMORPHONE UR QL SCN: POSITIVE
PCP UR QL: NEGATIVE NG/ML
PH UR: 5.8 [PH] (ref 4.5–8.9)
PLEASE NOTE:, 733157: ABNORMAL
PROPOXYPH UR QL SCN: NEGATIVE NG/ML
SP GR UR: 1.02
SPECIMEN STATUS REPORT, ROLRST: NORMAL
TRAMADOL UR-MCNC: POSITIVE UG/ML

## 2017-03-22 ENCOUNTER — HOSPITAL ENCOUNTER (EMERGENCY)
Age: 52
Discharge: HOME | End: 2017-03-23
Attending: INTERNAL MEDICINE | Admitting: INTERNAL MEDICINE
Payer: SUBSIDIZED

## 2017-03-22 ENCOUNTER — APPOINTMENT (OUTPATIENT)
Dept: GENERAL RADIOLOGY | Age: 52
End: 2017-03-22
Attending: INTERNAL MEDICINE
Payer: SUBSIDIZED

## 2017-03-22 VITALS
SYSTOLIC BLOOD PRESSURE: 174 MMHG | WEIGHT: 165 LBS | TEMPERATURE: 97.7 F | HEIGHT: 61 IN | DIASTOLIC BLOOD PRESSURE: 102 MMHG | OXYGEN SATURATION: 99 % | HEART RATE: 75 BPM | RESPIRATION RATE: 16 BRPM | BODY MASS INDEX: 31.15 KG/M2

## 2017-03-22 DIAGNOSIS — S93.401A SPRAIN OF RIGHT ANKLE, UNSPECIFIED LIGAMENT, INITIAL ENCOUNTER: Primary | ICD-10-CM

## 2017-03-22 PROCEDURE — 73610 X-RAY EXAM OF ANKLE: CPT

## 2017-03-22 PROCEDURE — 99283 EMERGENCY DEPT VISIT LOW MDM: CPT

## 2017-03-22 PROCEDURE — 74011250637 HC RX REV CODE- 250/637: Performed by: INTERNAL MEDICINE

## 2017-03-22 PROCEDURE — L4350 ANKLE CONTROL ORTHO PRE OTS: HCPCS

## 2017-03-22 RX ORDER — IBUPROFEN 800 MG/1
800 TABLET ORAL
Qty: 20 TAB | Refills: 0 | Status: SHIPPED | OUTPATIENT
Start: 2017-03-22 | End: 2017-03-29

## 2017-03-22 RX ORDER — ONDANSETRON 4 MG/1
4 TABLET, FILM COATED ORAL
Qty: 20 TAB | Refills: 0 | Status: SHIPPED | OUTPATIENT
Start: 2017-03-22 | End: 2017-11-03 | Stop reason: SDUPTHER

## 2017-03-22 RX ORDER — ACETAMINOPHEN 500 MG
1000 TABLET ORAL ONCE
Status: COMPLETED | OUTPATIENT
Start: 2017-03-22 | End: 2017-03-22

## 2017-03-22 RX ORDER — ACETAMINOPHEN AND CODEINE PHOSPHATE 300; 30 MG/1; MG/1
1 TABLET ORAL
Qty: 20 TAB | Refills: 0 | Status: SHIPPED | OUTPATIENT
Start: 2017-03-22 | End: 2017-04-06

## 2017-03-22 RX ADMIN — ACETAMINOPHEN 1000 MG: 500 TABLET ORAL at 23:04

## 2017-03-23 NOTE — ED PROVIDER NOTES
HPI Comments: Porsha Vieyra is a 46 y.o. female with pertinent PMHx of HTN, TKR (2016) and hypothyroidism presenting ambulatory to the ED c/o constant \"12.5/10\" throbbing right ankle pain and 3/10 aching right knee pain s/p mechanical fall at ~1500 today. Pt states that her right ankle pain is increased greatly with movement and with weight bearing. Pt states that she was walking down the stairs when she tripped and fell, landing on her right knee and ankle. Pt denies any loss of consciousness, head injury, inability to ambulate, dizziness, lightheadedness, vision changes, weakness or episodes of urinary/fecal incontinence before/during/after her fall. Pt states that she has tried ice to her right ankle, with no significant relief. Pt specifically denies any N/V/D or recent fever/chills. Of note, pt states that they cannot procure a ride and will be driving home today. PCP: Henry Mayo NP  Social Hx: - tobacco use, + alcohol use, - illicit drug use    There are no other complaints, changes, or physical findings at this time. The history is provided by the patient. No  was used.         Past Medical History:   Diagnosis Date    Asthma     uses inhalers    Chronic obstructive pulmonary disease (HCC)     bronchitis    GERD (gastroesophageal reflux disease)     Hypertension     Ill-defined condition     environmental allergies     Ill-defined condition     Multiple body piercings; unable to remove tongue and lip piercings    Thyroid disease     hypo    Ventral hernia 12/31/2013       Past Surgical History:   Procedure Laterality Date    HX HEENT  2009    thyroidectomy    HX KNEE REPLACEMENT      R    HX MOHS PROCEDURES Right 3/8/11    HX OTHER SURGICAL      hiatal hernia repair    HX TUBAL LIGATION           Family History:   Problem Relation Age of Onset    Cancer Father      lung cancer    Heart Disease Mother     Hypertension Mother    [facility] Diabetes Mother    [facility] Problems Neg Hx        Social History     Social History    Marital status: SINGLE     Spouse name: N/A    Number of children: N/A    Years of education: N/A     Occupational History    Not on file. Social History Main Topics    Smoking status: Former Smoker     Packs/day: 0.50     Years: 1.50     Types: Cigarettes     Quit date: 10/1/2015    Smokeless tobacco: Never Used      Comment: patient quit smoking for 10 years, began smoking again and then quit again in 2015    Alcohol use No    Drug use: No    Sexual activity: Yes     Partners: Female     Other Topics Concern    Not on file     Social History Narrative         ALLERGIES: Hydrocodone; Mold; and Ibuprofen    Review of Systems   Constitutional: Negative. Negative for chills and fever. HENT: Negative. Eyes: Negative. Respiratory: Negative. Negative for cough, shortness of breath and wheezing. Cardiovascular: Negative. Negative for chest pain. Gastrointestinal: Negative. Negative for abdominal pain, diarrhea, nausea and vomiting. Genitourinary: Negative. Negative for difficulty urinating, dysuria and vaginal pain. Musculoskeletal: Positive for arthralgias (right knee, right ankle). Skin: Negative. Neurological: Negative. Psychiatric/Behavioral: Negative. All other systems reviewed and are negative. Vitals:    03/22/17 2220 03/22/17 2231   BP:  (!) 174/102   Pulse: 75    Resp: 16    Temp: 97.7 °F (36.5 °C)    SpO2: 99%    Weight: 74.8 kg (165 lb)    Height: 5' 1\" (1.549 m)             Physical Exam   Constitutional: She is oriented to person, place, and time. She appears well-developed and well-nourished. HENT:   Head: Normocephalic and atraumatic. Mouth/Throat: Oropharynx is clear and moist.   Eyes: Conjunctivae and EOM are normal. Pupils are equal, round, and reactive to light. Neck: Normal range of motion. Neck supple. Cardiovascular: Normal rate, regular rhythm and normal heart sounds.   Exam reveals no gallop and no friction rub. No murmur heard. Pulmonary/Chest: Effort normal and breath sounds normal. No respiratory distress. She has no wheezes. She has no rales. Abdominal: Soft. Bowel sounds are normal. She exhibits no distension. There is no tenderness. There is no rebound and no guarding. Musculoskeletal: She exhibits tenderness. She exhibits no edema. RIGHT KNEE  Able to flex knee to 90 deg  Appreciable swelling  Anterior tenderness    RIGHT ANKLE  TTP  Decreased ROM   Lymphadenopathy:     She has no cervical adenopathy. Neurological: She is alert and oriented to person, place, and time. She has normal strength. No cranial nerve deficit or sensory deficit. She displays a negative Romberg sign. Coordination and gait normal.   Skin: Skin is warm and dry. No ecchymosis, no lesion and no rash noted. Rash is not urticarial. She is not diaphoretic. No erythema. Psychiatric: She has a normal mood and affect. Nursing note and vitals reviewed. MDM  Number of Diagnoses or Management Options  Sprain of right ankle, unspecified ligament, initial encounter:   Diagnosis management comments: DDx: sprain, strain, fracture       Amount and/or Complexity of Data Reviewed  Tests in the radiology section of CPT®: ordered and reviewed  Review and summarize past medical records: yes  Independent visualization of images, tracings, or specimens: yes    Patient Progress  Patient progress: stable    ED Course       Procedures    IMAGING RESULTS:  XR ANKLE RT MIN 3 V   Final Result     EXAM: XR ANKLE RT MIN 3 V     INDICATION: Trauma.     COMPARISON: None.     FINDINGS: Three views of the right ankle demonstrate no fracture or disruption  of the ankle mortise. There is plantar heel spur. Soft tissue swelling about  the ankle.     IMPRESSION  IMPRESSION:   1. No visible fracture or disruption of the ankle mortise.    2. Soft tissue swelling.             MEDICATIONS GIVEN:  Medications   acetaminophen (TYLENOL) tablet 1,000 mg (1,000 mg Oral Given 3/22/17 7429)       IMPRESSION:  1. Sprain of right ankle, unspecified ligament, initial encounter        PLAN:  1. Current Discharge Medication List      START taking these medications    Details   ibuprofen (MOTRIN) 800 mg tablet Take 1 Tab by mouth every six (6) hours as needed for Pain for up to 7 days. Qty: 20 Tab, Refills: 0      ondansetron hcl (ZOFRAN, AS HYDROCHLORIDE,) 4 mg tablet Take 1 Tab by mouth every eight (8) hours as needed for Nausea. Qty: 20 Tab, Refills: 0      acetaminophen-codeine (TYLENOL-CODEINE #3) 300-30 mg per tablet Take 1 Tab by mouth every six (6) hours as needed for Pain. Max Daily Amount: 4 Tabs. Qty: 20 Tab, Refills: 0           2. Follow-up Information     Follow up With Details Comments Contact Info    Dyan Victor NP In 2 days If symptoms worsen [address]  [phone]          Return to ED if worse   DISCHARGE NOTE:  11:40 PM  The patient is ready for discharge. The patient's signs, symptoms, diagnosis, and discharge instructions have been discussed and the patient and/or family has conveyed their understanding. The patient and/or family is to follow up as recommended or return to the ER should their symptoms worsen. Plan has been discussed and the patient and/or family is in agreement. Written by Carlene Luna, ED Scribe, as dictated by Lizeth Morgan MD.     Attestation: This note is prepared by Lupe Alan. Fatuma Luna, acting as Scribe for Lizeth Morgan MD.    Lizeth Morgan MD: The scribe's documentation has been prepared under my direction and personally reviewed by me in its entirety. I confirm that the note above accurately reflects all work, treatment, procedures, and medical decision making performed by me.

## 2017-03-23 NOTE — ED NOTES
Patient (s) was given copy of dc instructions and one paper script(s) and no electronic scripts. Patient (s) has verbalized understanding of instructions and script (s). Patient was given a current medication reconciliation form and verbalized understanding of their medications. Patient (s) has verbalized understanding of the importance of discussing medications with  his or her physician or clinic they will be following up with. Patient alert and oriented and in no acute distress. Patient offered wheelchair from treatment area to hospital entrance, patient declined wheelchair.

## 2017-03-23 NOTE — ED NOTES
Pt in ED w/ complaint of R knee and R foot/ankle sprain & swelling X 8 hrs. Pt states she received the injury when she fell. Pt denies hitting her head and LOC w/ the fall. Pt is A&O X 4 and appears in no distress. Emergency Department Nursing Plan of Care       The Nursing Plan of Care is developed from the Nursing assessment and Emergency Department Attending provider initial evaluation. The plan of care may be reviewed in the ED Provider note.     The Plan of Care was developed with the following considerations:   Patient / Family readiness to learn indicated by:verbalized understanding  Persons(s) to be included in education: patient  Barriers to Learning/Limitations:No    Signed     Hemalatha Gary RN    3/22/2017   11:43 PM

## 2017-03-23 NOTE — DISCHARGE INSTRUCTIONS
Ankle Sprain: Care Instructions  Your Care Instructions    An ankle sprain can happen when you twist your ankle. The ligaments that support the ankle can get stretched and torn. Often the ankle is swollen and painful. Ankle sprains may take from several weeks to several months to heal. Usually, the more pain and swelling you have, the more severe your ankle sprain is and the longer it will take to heal. You can heal faster and regain strength in your ankle with good home treatment. It is very important to give your ankle time to heal completely, so that you do not easily hurt your ankle again. Follow-up care is a key part of your treatment and safety. Be sure to make and go to all appointments, and call your doctor if you are having problems. It's also a good idea to know your test results and keep a list of the medicines you take. How can you care for yourself at home? · Prop up your foot on pillows as much as possible for the next 3 days. Try to keep your ankle above the level of your heart. This will help reduce the swelling. · Follow your doctor's directions for wearing a splint or elastic bandage. Wrapping the ankle may help reduce or prevent swelling. · Your doctor may give you a splint, a brace, an air stirrup, or another form of ankle support to protect your ankle until it is healed. Wear it as directed while your ankle is healing. Do not remove it unless your doctor tells you to. After your ankle has healed, ask your doctor whether you should wear the brace when you exercise. · Put ice or cold packs on your injured ankle for 10 to 20 minutes at a time. Try to do this every 1 to 2 hours for the next 3 days (when you are awake) or until the swelling goes down. Put a thin cloth between the ice and your skin. · You may need to use crutches until you can walk without pain. If you do use crutches, try to bear some weight on your injured ankle if you can do so without pain.  This helps the ankle heal.  · Take pain medicines exactly as directed. ¨ If the doctor gave you a prescription medicine for pain, take it as prescribed. ¨ If you are not taking a prescription pain medicine, ask your doctor if you can take an over-the-counter medicine. · If you have been given ankle exercises to do at home, do them exactly as instructed. These can promote healing and help prevent lasting weakness. When should you call for help? Call your doctor now or seek immediate medical care if:  · Your pain is getting worse. · Your swelling is getting worse. · Your splint feels too tight or you are unable to loosen it. Watch closely for changes in your health, and be sure to contact your doctor if:  · You are not getting better after 1 week. Where can you learn more? Go to http://ofelia-mindy.info/. Enter K445 in the search box to learn more about \"Ankle Sprain: Care Instructions. \"  Current as of: May 23, 2016  Content Version: 11.1  © 3553-3015 [employer]. Care instructions adapted under license by [employer] (which disclaims liability or warranty for this information). If you have questions about a medical condition or this instruction, always ask your healthcare professional. [place] [address] any warranty or liability for your use of this information. Muscle Strain: Care Instructions  Your Care Instructions  A muscle strain happens when you overstretch, or pull, a muscle. It can happen when you exercise or lift something or when you have an accident. Rest and other home care can help the muscle heal.  Follow-up care is a key part of your treatment and safety. Be sure to make and go to all appointments, and call your doctor if you are having problems. Its also a good idea to know your test results and keep a list of the medicines you take. How can you care for yourself at home? · Rest the strained muscle. Do not put weight on it for a day or two. If your doctor advises you to, use crutches or a sling to rest a sore limb. · Put ice or a cold pack on the sore muscle for 10 to 20 minutes at a time to stop swelling. Put a thin cloth between the ice pack and your skin. · Prop up the sore arm or leg on a pillow when you ice it or anytime you sit or lie down during the next 3 days. Try to keep it above the level of your heart. This will help reduce swelling. · Take pain medicines exactly as directed. ¨ If the doctor gave you a prescription medicine for pain, take it as prescribed. ¨ If you are not taking a prescription pain medicine, ask your doctor if you can take an over-the-counter medicine. · Do not do anything that makes the pain worse. Return to exercise gradually as you feel better. When should you call for help? Call your doctor now or seek immediate medical care if:  · You have new severe pain. · Your injured limb is cool or pale or changes color. · You have tingling, weakness, or numbness in your injured limb. · You cannot move the injured area. Watch closely for changes in your health, and be sure to contact your doctor if:  · You cannot put weight on a joint, or it feels unsteady when you walk. · Pain and swelling get worse or do not start to get better after 2 days of home treatment. Where can you learn more? Go to http://ofelia-mindy.info/. Enter M473 in the search box to learn more about \"Muscle Strain: Care Instructions. \"  Current as of: May 23, 2016  Content Version: 11.1  © 7709-3479 [employer]. Care instructions adapted under license by [employer] (which disclaims liability or warranty for this information). If you have questions about a medical condition or this instruction, always ask your healthcare professional. [address] any warranty or liability for your use of this information.          Ankle Sprain: Rehab Exercises  Your Care Instructions  Here are some examples of typical rehabilitation exercises for your condition. Start each exercise slowly. Ease off the exercise if you start to have pain. Your doctor or physical therapist will tell you when you can start these exercises and which ones will work best for you. How to do the exercises  \"Alphabet\" exercise    1. Trace the alphabet with your toe. This helps your ankle move in all directions. Side-to-side knee swing exercise    1. Sit in a chair with your foot flat on the floor. 2. Slowly move your knee from side to side. Keep your foot pressed flat. 3. Continue this exercise for 2 to 3 minutes. Towel curl    1. While sitting, place your foot on a towel on the floor. Scrunch the towel toward you with your toes. 2. Then use your toes to push the towel away from you. 3. To make this exercise more challenging you can put something on the other end of the towel. A can of soup is about the right weight for this. Towel stretch    1. Sit with your legs extended and knees straight. 2. Place a towel around your foot just under the toes. 3. Hold each end of the towel in each hand, with your hands above your knees. 4. Pull back with the towel so that your foot stretches toward you. 5. Hold the position for at least 15 to 30 seconds. 6. Repeat 2 to 4 times a session. Do up to 5 sessions a day. Ankle eversion exercise    1. Start by sitting with your foot flat on the floor. Push your foot outward against a wall or a piece of furniture that doesn't move. Hold for about 6 seconds, and relax. Repeat 8 to 12 times. 2. After you feel comfortable with this, try using rubber tubing looped around the outside of your feet for resistance. Push your foot out to the side against the tubing, and then count to 10 as you slowly bring your foot back to the middle. Repeat 8 to 12 times. Isometric opposition exercises    1. While sitting, put your feet together flat on the floor.   2. Press your injured foot inward against your other foot. Hold for about 6 seconds, and relax. Repeat 8 to 12 times. 3. Then place the heel of your other foot on top of the injured one. Push down with the top heel while trying to push up with your injured foot. Hold for about 6 seconds, and relax. Repeat 8 to 12 times. Resisted ankle inversion    1. Sit on the floor with your good leg crossed over your other leg. 2. Hold both ends of an exercise band and loop the band around the inside of your affected foot. Then press your other foot against the band. 3. Keeping your legs crossed, slowly push your affected foot against the band so that foot moves away from your other foot. Then slowly relax. 4. Repeat 8 to 12 times. Resisted ankle eversion    1. Sit on the floor with your legs straight. 2. Hold both ends of an exercise band and loop the band around the outside of your affected foot. Then press your other foot against the band. 3. Keeping your leg straight, slowly push your affected foot outward against the band and away from your other foot without letting your leg rotate. Then slowly relax. 4. Repeat 8 to 12 times. Resisted ankle dorsiflexion    1. Tie the ends of an exercise band together to form a loop. Attach one end of the loop to a secure object or shut a door on it to hold it in place. (Or you can have someone hold one end of the loop to provide resistance.)  2. While sitting on the floor or in a chair, loop the other end of the band over the top of your affected foot. 3. Keeping your knee and leg straight, slowly flex your foot to pull back on the exercise band, and then slowly relax. 4. Repeat 8 to 12 times. Single-leg balance    1. Stand on a flat surface with your arms stretched out to your sides like you are making the letter \"T. \" Then lift your good leg off the floor, bending it at the knee. If you are not steady on your feet, use one hand to hold on to a chair, counter, or wall.   2. Standing on the leg with your affected ankle, keep that knee straight. Try to balance on that leg for up to 30 seconds. Then rest for up to 10 seconds. 3. Repeat 6 to 8 times. 4. When you can balance on your affected leg for 30 seconds with your eyes open, try to balance on it with your eyes closed. When you can do this exercise with your eyes closed for 30 seconds and with ease and no pain, try standing on a pillow or piece of foam, and repeat steps 1 through 4. Follow-up care is a key part of your treatment and safety. Be sure to make and go to all appointments, and call your doctor if you are having problems. It's also a good idea to know your test results and keep a list of the medicines you take. Where can you learn more? Go to http://ofelia-mindy.info/. Thomas Christie in the search box to learn more about \"Ankle Sprain: Rehab Exercises. \"  Current as of: May 23, 2016  Content Version: 11.1  © 2006-2016 [employer]. Care instructions adapted under license by [employer] (which disclaims liability or warranty for this information). If you have questions about a medical condition or this instruction, always ask your healthcare professional. [address] any warranty or liability for your use of this information. Learning About RICE (Rest, Ice, Compression, and Elevation)  What is RICE? RICE is a way to care for an injury. RICE helps relieve pain and swelling. It may also help with healing and flexibility. RICE stands for:  · Rest and protect the injured or sore area. · Ice or a cold pack used as soon as possible. · Compression, or wrapping the injured or sore area with an elastic bandage. · Elevation (propping up) the injured or sore area. How do you do RICE? You can use RICE for home treatment when you have general aches and pains or after an injury or surgery. Rest  · Do not put weight on the injury for at least 24 to 48 hours.   · Use crutches for a badly sprained knee or ankle.  · Support a sprained wrist, elbow, or shoulder with a sling. Ice  · Put ice or a cold pack on the injury right away to reduce pain and swelling. Frozen vegetables will also work as an ice pack. Put a thin cloth between the ice or cold pack and your skin. The cloth protects the injured area from getting too cold. · Use ice for 10 to 15 minutes at a time for the first 48 to 72 hours. Compression  · Use compression for sprains, strains, and surgeries of the arms and legs. · Wrap the injured area with an elastic bandage or compression sleeve to reduce swelling. · Don't wrap it too tightly. If the area below it feels numb, tingles, or feels cool, loosen the wrap. Elevation  · Use elevation for areas of the body that can be propped up, such as arms and legs. · Prop up the injured area on pillows whenever you use ice. Keep it propped up anytime you sit or lie down. · Try to keep the injured area at or above the level of your heart. This will help reduce swelling and bruising. Where can you learn more? Go to http://ofelia-mindy.info/. Enter Z352 in the search box to learn more about \"Learning About RICE (Rest, Ice, Compression, and Elevation). \"  Current as of: May 23, 2016  Content Version: 11.1  © 4675-5674 [employer]. Care instructions adapted under license by [email or web address] (which disclaims liability or warranty for this information). If you have questions about a medical condition or this instruction, always ask your healthcare professional. [place] [address] any warranty or liability for your use of this information.

## 2017-04-06 ENCOUNTER — OFFICE VISIT (OUTPATIENT)
Dept: INTERNAL MEDICINE CLINIC | Age: 52
End: 2017-04-06

## 2017-04-06 VITALS
RESPIRATION RATE: 18 BRPM | HEART RATE: 67 BPM | SYSTOLIC BLOOD PRESSURE: 146 MMHG | WEIGHT: 167 LBS | BODY MASS INDEX: 31.53 KG/M2 | DIASTOLIC BLOOD PRESSURE: 89 MMHG | TEMPERATURE: 98 F | HEIGHT: 61 IN

## 2017-04-06 DIAGNOSIS — M54.41 MIDLINE LOW BACK PAIN WITH RIGHT-SIDED SCIATICA, UNSPECIFIED CHRONICITY: ICD-10-CM

## 2017-04-06 DIAGNOSIS — S93.401A SPRAIN OF RIGHT ANKLE, UNSPECIFIED LIGAMENT, INITIAL ENCOUNTER: Primary | ICD-10-CM

## 2017-04-06 DIAGNOSIS — M17.11 PRIMARY OSTEOARTHRITIS OF RIGHT KNEE: ICD-10-CM

## 2017-04-06 DIAGNOSIS — M54.31 RIGHT SIDED SCIATICA: ICD-10-CM

## 2017-04-06 DIAGNOSIS — Z96.651 STATUS POST TOTAL RIGHT KNEE REPLACEMENT: ICD-10-CM

## 2017-04-06 RX ORDER — OXYCODONE AND ACETAMINOPHEN 5; 325 MG/1; MG/1
TABLET ORAL
Refills: 0 | COMMUNITY
Start: 2017-01-02 | End: 2017-04-06

## 2017-04-06 RX ORDER — OXYCODONE AND ACETAMINOPHEN 10; 325 MG/1; MG/1
TABLET ORAL
Qty: 45 TAB | Refills: 0 | Status: SHIPPED | OUTPATIENT
Start: 2017-04-06 | End: 2017-05-08 | Stop reason: SDUPTHER

## 2017-04-06 NOTE — PATIENT INSTRUCTIONS
Preventing Falls: Care Instructions  Your Care Instructions  Getting around your home safely can be a challenge if you have injuries or health problems that make it easy for you to fall. Loose rugs and furniture in walkways are among the dangers for many older people who have problems walking or who have poor eyesight. People who have conditions such as arthritis, osteoporosis, or dementia also have to be careful not to fall. You can make your home safer with a few simple measures. Follow-up care is a key part of your treatment and safety. Be sure to make and go to all appointments, and call your doctor if you are having problems. It's also a good idea to know your test results and keep a list of the medicines you take. How can you care for yourself at home? Taking care of yourself  · You may get dizzy if you do not drink enough water. To prevent dehydration, drink plenty of fluids, enough so that your urine is light yellow or clear like water. Choose water and other caffeine-free clear liquids. If you have kidney, heart, or liver disease and have to limit fluids, talk with your doctor before you increase the amount of fluids you drink. · Exercise regularly to improve your strength, muscle tone, and balance. Walk if you can. Swimming may be a good choice if you cannot walk easily. · Have your vision and hearing checked each year or any time you notice a change. If you have trouble seeing and hearing, you might not be able to avoid objects and could lose your balance. · Know the side effects of the medicines you take. Ask your doctor or pharmacist whether the medicines you take can affect your balance. Sleeping pills or sedatives can affect your balance. · Limit the amount of alcohol you drink. Alcohol can impair your balance and other senses. · Ask your doctor whether calluses or corns on your feet need to be removed.  If you wear loose-fitting shoes because of calluses or corns, you can lose your balance and fall. · Talk to your doctor if you have numbness in your feet. Preventing falls at home  · Remove raised doorway thresholds, throw rugs, and clutter. Repair loose carpet or raised areas in the floor. · Move furniture and electrical cords to keep them out of walking paths. · Use nonskid floor wax, and wipe up spills right away, especially on ceramic tile floors. · If you use a walker or cane, put rubber tips on it. If you use crutches, clean the bottoms of them regularly with an abrasive pad, such as steel wool. · Keep your house well lit, especially [place], and outside walkways. Use night-lights in areas such as hallways and bathrooms. Add extra light switches or use remote switches (such as switches that go on or off when you clap your hands) to make it easier to turn lights on if you have to get up during the night. · Install sturdy handrails on stairways. · Move items in your cabinets so that the things you use a lot are on the lower shelves (about waist level). · Keep a cordless phone and a flashlight with new batteries by your bed. If possible, put a phone in each of the main rooms of your house, or carry a cell phone in case you fall and cannot reach a phone. Or, you can wear a device around your neck or wrist. You push a button that sends a signal for help. · Wear low-heeled shoes that fit well and give your feet good support. Use footwear with nonskid soles. Check the heels and soles of your shoes for wear. Repair or replace worn heels or soles. · Do not wear socks without shoes on wood floors. · Walk on the grass when the sidewalks are slippery. If you live in an area that gets snow and ice in the winter, sprinkle salt on slippery steps and sidewalks. Preventing falls in the bath  · Install grab bars and nonskid mats inside and outside your shower or tub and near the toilet and sinks. · Use shower chairs and bath benches.   · Use a hand-held shower head that will allow you to sit while showering. · Get into a tub or shower by putting the weaker leg in first. Get out of a tub or shower with your strong side first.  · Repair loose toilet seats and consider installing a raised toilet seat to make getting on and off the toilet easier. · Keep your bathroom door unlocked while you are in the shower. Where can you learn more? Go to http://ofelia-mindy.info/. Enter [id number] [id number] in the search box to learn more about \"Preventing Falls: Care Instructions. \"  Current as of: August 4, 2016  Content Version: 11.2  © 8595-9919 [employer]. Care instructions adapted under license by [employer] (which disclaims liability or warranty for this information). If you have questions about a medical condition or this instruction, always ask your healthcare professional. [place] [address] any warranty or liability for your use of this information. Preventing Outdoor Falls: Care Instructions  Your Care Instructions  Worries about falls don't need to keep you indoors. Outdoor activities like walking have big benefits for your health. You will need to watch your step and learn a few safety measures. If you are worried about having a fall outdoors, ask your doctor about exercises, classes, or physical therapy that may help. You can learn ways to gain strength, flexibility, and balance. Ask if it might help to use a cane or walker. You can make your time outdoors safer with a few simple measures. Follow-up care is a key part of your treatment and safety. Be sure to make and go to all appointments, and call your doctor if you are having problems. It's also a good idea to know your test results and keep a list of the medicines you take. How can you prevent falls outdoors? · Wear shoes with firm soles and low heels. If you have to walk on an icy surface, use grippers that can be worn over your shoes in bad weather.   · Be extra careful if weather is bad. Walk on the grass when the sidewalks are slick. If you live in a place that gets snow and ice in the winter, sprinkle salt on slippery stairs and sidewalks. · Be careful getting on or off buses and trains or getting in and out of cars. If handrails are available, use them. · Be careful when you cross the street. Look for crosswalks or places where curb cuts or ramps are present. · Try not to hurry, especially if you are carrying something. · Be cautious in parking lots or garages. There may be curbs or changes in pavement, or the height of the pavement may vary. · Make sure to wear the correct eyeglasses, if you need them. Reading glasses or bifocals can make it harder to see hazards that might be in your way. · If you are walking outdoors for exercise, try to:  ¨ Walk in well-lighted, well-maintained areas. These include high school or college tracks, shopping malls, and public spaces. ¨ Walk with a partner. ¨ Watch out for cracked sidewalks, curbs, changes in the height of the pavement, exposed tree roots, and debris such as fallen leaves or branches. Where can you learn more? Go to http://ofelia-mindy.info/. Enter Z818 in the search box to learn more about \"Preventing Outdoor Falls: Care Instructions. \"  Current as of: August 4, 2016  Content Version: 11.2  © 5422-5506 [employer]. Care instructions adapted under license by [employer] (which disclaims liability or warranty for this information). If you have questions about a medical condition or this instruction, always ask your healthcare professional. [place] [address] any warranty or liability for your use of this information. How to Get Up Safely After a Fall: Care Instructions  Your Care Instructions  If you have injuries, health problems, or other reasons that may make it easy for you to fall at home, it is a good idea to learn how to get up safely after a fall.  Learning how to get up correctly can help you avoid making an injury worse. Also, knowing what to do if you cannot get up can help you stay safe until help arrives. Follow-up care is a key part of your treatment and safety. Be sure to make and go to all appointments, and call your doctor if you are having problems. It's also a good idea to know your test results and keep a list of the medicines you take. How can you care for yourself after a fall? If you think you can get up  First lie still for a few minutes and think about how you feel. If your body feels okay and you think you can get up safely, follow the rest of the steps below:  1. Look for a chair or other piece of furniture that is close to you. 2. Roll onto your side and rest. Roll by turning your head in the direction you want to roll, move your shoulder and arm, then hip and leg in the same direction. 3. Lie still for a moment to let your blood pressure adjust.  4. Slowly push your upper body up, lift your head, and take a moment to rest.  5. Slowly get up on your hands and knees, and crawl to the chair or other stable piece of furniture. 6. Put your hands on the chair. 7. Move one foot forward, and place it flat on the floor. Your other leg should be bent with the knee on the floor. 8. Rise slowly, turn your body, and sit in the chair. Stay seated for a bit and think about how you feel. Call for help. Even if you feel okay, let someone know what happened to you. You might not know that you have a serious injury. If you cannot get up  1. If you think you are injured after a fall or you cannot get up, try not to panic. 2. Call out for help. 3. If you have a phone within reach or you have an emergency call device, use it to call for help. 4. If you do not have a phone within reach, try to slide yourself toward it. If you cannot get to the phone, try to slide toward a door or window or a place where you think you can be heard.   5. [place] or use an object to make noise so someone might hear you. 6. If you can reach something that you can use for a pillow, place it under your head. Try to stay warm by covering yourself with a blanket or clothing while you wait for help. When should you call for help? Call 911 anytime you think you may need emergency care. For example, call if:  · You passed out (lost consciousness). · You cannot get up after a fall. · You believe you have serious or life-threatening injuries. Call your doctor now or seek immediate medical care if:  · You have severe pain. · You think you may have passed out but are not sure. · You hit your head or think you may have hit your head but are not sure. · You think your medicines may have caused you to fall. Watch closely for changes in your health, and be sure to contact your doctor if:  · You have fallen, even if you think you are not hurt. Do not feel embarrassed to let your doctor know you have fallen. Your doctor may be able to adjust your medicines or provide other help so you can prevent future falls. Where can you learn more? Go to http://ofelia-mindy.info/. Enter E051 in the search box to learn more about \"How to Get Up Safely After a Fall: Care Instructions. \"  Current as of: August 4, 2016  Content Version: 11.2  © 6277-9914 [employer]. Care instructions adapted under license by [employer] (which disclaims liability or warranty for this information). If you have questions about a medical condition or this instruction, always ask your healthcare professional. [place] [address] any warranty or liability for your use of this information.

## 2017-04-06 NOTE — PROGRESS NOTES
Pt is here for   Chief Complaint   Patient presents with    Medication Refill     orders pending    ED Follow-up     3/22/17 [facility] for Fall     Pt states pain level is a 10 right ankle. ... Pt states last had something for pain last night. ..

## 2017-04-06 NOTE — MR AVS SNAPSHOT
Visit Information Date & Time Provider Department Dept. Phone Encounter #  
 4/6/2017 11:40 AM Paco Moore NP [address] [facility] [phone] [id number] Follow-up Instructions Return in about 4 weeks (around 5/4/2017) for PAP, pain med refill. Upcoming Health Maintenance Date Due  
 PAP AKA CERVICAL CYTOLOGY 6/23/1984 BREAST CANCER SCRN MAMMOGRAM 4/6/2014 FOBT Q 1 YEAR AGE 50-75 5/26/2017 DTaP/Tdap/Td series (2 - Td) 7/6/2025 Allergies as of 4/6/2017  Review Complete On: 4/6/2017 By: Paco Moore NP Severity Noted Reaction Type Reactions Hydrocodone  03/22/2017    Itching Mold  05/23/2016    Shortness of Breath, Itching Ibuprofen Low 03/30/2015   Intolerance Nausea Only Current Immunizations  Reviewed on 3/3/2017 Name Date Influenza Vaccine (Quad) Intradermal PF 12/9/2016 Pneumococcal Polysaccharide (PPSV-23) 3/3/2017 Not reviewed this visit You Were Diagnosed With   
  
 Codes Comments Sprain of right ankle, unspecified ligament, initial encounter    -  Primary ICD-10-CM: T30.803J ICD-9-CM: 845.00 Primary osteoarthritis of right knee     ICD-10-CM: M17.11 ICD-9-CM: 715.16 Status post total right knee replacement     ICD-10-CM: W61.048 ICD-9-CM: V43.65 Right sided sciatica     ICD-10-CM: M54.31 
ICD-9-CM: 724.3 Midline low back pain with right-sided sciatica, unspecified chronicity     ICD-10-CM: M54.41 
ICD-9-CM: 724.3 Vitals BP Pulse Temp Resp Height(growth percentile) Weight(growth percentile) 146/89 (BP 1 Location: Left arm, BP Patient Position: Sitting) 67 98 °F (36.7 °C) (Oral) 18 5' 1\" (1.549 m) 167 lb (75.8 kg) LMP BMI OB Status Smoking Status 12/29/2016 (Exact Date) 31.55 kg/m2 Having regular periods Former Smoker Vitals History BMI and BSA Data Body Mass Index Body Surface Area 31.55 kg/m 2 1.81 m 2 Preferred Pharmacy Pharmacy Name Phone [id number] [phone] Your Updated Medication List  
  
   
This list is accurate as of: 4/6/17 12:52 PM.  Always use your most recent med list.  
  
  
  
  
 acetaminophen 650 mg CR tablet Commonly known as:  TYLENOL Take 1 Tab by mouth three (3) times daily as needed for Pain. amLODIPine 5 mg tablet Commonly known as:  Meghna Johnson TAKE 1 TABLET BY MOUTH EVERY DAY  
  
 diazePAM 5 mg tablet Commonly known as:  VALIUM  
  
 DULoxetine 60 mg capsule Commonly known as:  CYMBALTA Take 1 Cap by mouth daily. Indications: CHRONIC MUSCULOSKELETAL PAIN  
  
 fluticasone 50 mcg/actuation nasal spray Commonly known as:  Creasie Cockayne 2 Sprays by Both Nostrils route daily. gabapentin 300 mg capsule Commonly known as:  NEURONTIN Take 1 Cap by mouth three (3) times daily. Indications: right sciatica  
  
 hydroCHLOROthiazide 25 mg tablet Commonly known as:  HYDRODIURIL  
daily. levothyroxine 125 mcg tablet Commonly known as:  SYNTHROID Take 1 Tab by mouth Daily (before breakfast). lidocaine 5 % Commonly known as:  Zayra Buck Apply patch to the affected area for 12 hours a day and remove for 12 hours a day. loratadine 10 mg tablet Commonly known as:  Mike Petersen Take 1 Tab by mouth daily. methocarbamol 750 mg tablet Commonly known as:  OGBMBKL-278 Take 1 Tab by mouth four (4) times daily as needed for Pain (spasms). miscellaneous medical supply Misc Shower seat for chronic knee pain, pt planning to have right TKR in June due to condition. Very limited range of motion. montelukast 10 mg tablet Commonly known as:  SINGULAIR  
daily. ondansetron 4 mg disintegrating tablet Commonly known as:  ZOFRAN ODT Take 1 Tab by mouth every eight (8) hours as needed for Nausea. ondansetron hcl 4 mg tablet Commonly known as:  ZOFRAN (AS HYDROCHLORIDE) Take 1 Tab by mouth every eight (8) hours as needed for Nausea. oxyCODONE-acetaminophen  mg per tablet Commonly known as:  PERCOCET 10 May take 1 tab TWICE DAILY, no more than every other day as needed for pain. Indications: Pain  
  
 pantoprazole 40 mg tablet Commonly known as:  PROTONIX  
two (2) times a day. * PROAIR HFA 90 mcg/actuation inhaler Generic drug:  albuterol Take 2 Puffs by inhalation every four (4) hours as needed. * albuterol 2.5 mg /3 mL (0.083 %) nebulizer solution Commonly known as:  PROVENTIL VENTOLIN  
by Nebulization route three (3) times daily. SYMBICORT 160-4.5 mcg/actuation HFA inhaler Generic drug:  budesonide-formoterol Take 2 Puffs by inhalation two (2) times a day. * Notice: This list has 2 medication(s) that are the same as other medications prescribed for you. Read the directions carefully, and ask your doctor or other care provider to review them with you. Prescriptions Printed Refills  
 oxyCODONE-acetaminophen (PERCOCET 10)  mg per tablet 0 Sig: May take 1 tab TWICE DAILY, no more than every other day as needed for pain. Indications: Pain Class: Print We Performed the Following PAIN MGMT PANEL W/CINDY UR [[id number] Custom] Follow-up Instructions Return in about 4 weeks (around 5/4/2017) for PAP, pain med refill. Patient Instructions Preventing Falls: Care Instructions Your Care Instructions Getting around your home safely can be a challenge if you have injuries or health problems that make it easy for you to fall. Loose rugs and furniture in walkways are among the dangers for many older people who have problems walking or who have poor eyesight. People who have conditions such as arthritis, osteoporosis, or dementia also have to be careful not to fall. You can make your home safer with a few simple measures. Follow-up care is a key part of your treatment and safety. Be sure to make and go to all appointments, and call your doctor if you are having problems. It's also a good idea to know your test results and keep a list of the medicines you take. How can you care for yourself at home? Taking care of yourself · You may get dizzy if you do not drink enough water. To prevent dehydration, drink plenty of fluids, enough so that your urine is light yellow or clear like water. Choose water and other caffeine-free clear liquids. If you have kidney, heart, or liver disease and have to limit fluids, talk with your doctor before you increase the amount of fluids you drink. · Exercise regularly to improve your strength, muscle tone, and balance. Walk if you can. Swimming may be a good choice if you cannot walk easily. · Have your vision and hearing checked each year or any time you notice a change. If you have trouble seeing and hearing, you might not be able to avoid objects and could lose your balance. · Know the side effects of the medicines you take. Ask your doctor or pharmacist whether the medicines you take can affect your balance. Sleeping pills or sedatives can affect your balance. · Limit the amount of alcohol you drink. Alcohol can impair your balance and other senses. · Ask your doctor whether calluses or corns on your feet need to be removed. If you wear loose-fitting shoes because of calluses or corns, you can lose your balance and fall. · Talk to your doctor if you have numbness in your feet. Preventing falls at home · Remove raised doorway thresholds, throw rugs, and clutter. Repair loose carpet or raised areas in the floor. · Move furniture and electrical cords to keep them out of walking paths. · Use nonskid floor wax, and wipe up spills right away, especially on ceramic tile floors. · If you use a walker or cane, put rubber tips on it.  If you use crutches, clean the bottoms of them regularly with an abrasive pad, such as steel wool. · Keep your house well lit, especially Eleonore Guppy, and outside walkways. Use night-lights in areas such as hallways and bathrooms. Add extra light switches or use remote switches (such as switches that go on or off when you clap your hands) to make it easier to turn lights on if you have to get up during the night. · Install sturdy handrails on stairways. · Move items in your cabinets so that the things you use a lot are on the lower shelves (about waist level). · Keep a cordless phone and a flashlight with new batteries by your bed. If possible, put a phone in each of the main rooms of your house, or carry a cell phone in case you fall and cannot reach a phone. Or, you can wear a device around your neck or wrist. You push a button that sends a signal for help. · Wear low-heeled shoes that fit well and give your feet good support. Use footwear with nonskid soles. Check the heels and soles of your shoes for wear. Repair or replace worn heels or soles. · Do not wear socks without shoes on wood floors. · Walk on the grass when the sidewalks are slippery. If you live in an area that gets snow and ice in the winter, sprinkle salt on slippery steps and sidewalks. Preventing falls in the bath · Install grab bars and nonskid mats inside and outside your shower or tub and near the toilet and sinks. · Use shower chairs and bath benches. · Use a hand-held shower head that will allow you to sit while showering. · Get into a tub or shower by putting the weaker leg in first. Get out of a tub or shower with your strong side first. 
· Repair loose toilet seats and consider installing a raised toilet seat to make getting on and off the toilet easier. · Keep your bathroom door unlocked while you are in the shower. Where can you learn more? Go to http://ofelia-mindy.info/. Enter [id number] [id number] in the search box to learn more about \"Preventing Falls: Care Instructions. \" Current as of: August 4, 2016 Content Version: 11.2 © 2234-3922 [employer]. Care instructions adapted under license by [employer] (which disclaims liability or warranty for this information). If you have questions about a medical condition or this instruction, always ask your healthcare professional. [address] any warranty or liability for your use of this information. Preventing Outdoor Falls: Care Instructions Your Care Instructions Worries about falls don't need to keep you indoors. Outdoor activities like walking have big benefits for your health. You will need to watch your step and learn a few safety measures. If you are worried about having a fall outdoors, ask your doctor about exercises, classes, or physical therapy that may help. You can learn ways to gain strength, flexibility, and balance. Ask if it might help to use a cane or walker. You can make your time outdoors safer with a few simple measures. Follow-up care is a key part of your treatment and safety. Be sure to make and go to all appointments, and call your doctor if you are having problems. It's also a good idea to know your test results and keep a list of the medicines you take. How can you prevent falls outdoors? · Wear shoes with firm soles and low heels. If you have to walk on an icy surface, use grippers that can be worn over your shoes in bad weather. · Be extra careful if weather is bad. Walk on the grass when the sidewalks are slick. If you live in a place that gets snow and ice in the winter, sprinkle salt on slippery stairs and sidewalks. · Be careful getting on or off buses and trains or getting in and out of cars. If handrails are available, use them. · Be careful when you cross the street. Look for crosswalks or places where curb cuts or ramps are present. · Try not to hurry, especially if you are carrying something. · Be cautious in parking lots or garages. There may be curbs or changes in pavement, or the height of the pavement may vary. · Make sure to wear the correct eyeglasses, if you need them. Reading glasses or bifocals can make it harder to see hazards that might be in your way. · If you are walking outdoors for exercise, try to: 
¨ Walk in well-lighted, well-maintained areas. These include high school or college tracks, shopping malls, and public spaces. ¨ Walk with a partner. ¨ Watch out for cracked sidewalks, curbs, changes in the height of the pavement, exposed tree roots, and debris such as fallen leaves or branches. Where can you learn more? Go to http://ofelia-mindy.info/. Enter X153 in the search box to learn more about \"Preventing Outdoor Falls: Care Instructions. \" Current as of: August 4, 2016 Content Version: 11.2 © 3384-2366 [employer]. Care instructions adapted under license by [employer] (which disclaims liability or warranty for this information). If you have questions about a medical condition or this instruction, always ask your healthcare professional. [address] any warranty or liability for your use of this information. How to Get Up Safely After a Fall: Care Instructions Your Care Instructions If you have injuries, health problems, or other reasons that may make it easy for you to fall at home, it is a good idea to learn how to get up safely after a fall. Learning how to get up correctly can help you avoid making an injury worse. Also, knowing what to do if you cannot get up can help you stay safe until help arrives. Follow-up care is a key part of your treatment and safety. Be sure to make and go to all appointments, and call your doctor if you are having problems.  It's also a good idea to know your test results and keep a list of the medicines you take. How can you care for yourself after a fall? If you think you can get up First lie still for a few minutes and think about how you feel. If your body feels okay and you think you can get up safely, follow the rest of the steps below: 1. Look for a chair or other piece of furniture that is close to you. 2. Roll onto your side and rest. Roll by turning your head in the direction you want to roll, move your shoulder and arm, then hip and leg in the same direction. 3. Lie still for a moment to let your blood pressure adjust. 
4. Slowly push your upper body up, lift your head, and take a moment to rest. 
5. Slowly get up on your hands and knees, and crawl to the chair or other stable piece of furniture. 6. Put your hands on the chair. 7. Move one foot forward, and place it flat on the floor. Your other leg should be bent with the knee on the floor. 8. Rise slowly, turn your body, and sit in the chair. Stay seated for a bit and think about how you feel. Call for help. Even if you feel okay, let someone know what happened to you. You might not know that you have a serious injury. If you cannot get up 1. If you think you are injured after a fall or you cannot get up, try not to panic. 2. Call out for help. 3. If you have a phone within reach or you have an emergency call device, use it to call for help. 4. If you do not have a phone within reach, try to slide yourself toward it. If you cannot get to the phone, try to slide toward a door or window or a place where you think you can be heard. 5. Dickinson or use an object to make noise so someone might hear you. 6. If you can reach something that you can use for a pillow, place it under your head. Try to stay warm by covering yourself with a blanket or clothing while you wait for help. When should you call for help? Call 911 anytime you think you may need emergency care. For example, call if: 
· You passed out (lost consciousness). · You cannot get up after a fall. · You believe you have serious or life-threatening injuries. Call your doctor now or seek immediate medical care if: 
· You have severe pain. · You think you may have passed out but are not sure. · You hit your head or think you may have hit your head but are not sure. · You think your medicines may have caused you to fall. Watch closely for changes in your health, and be sure to contact your doctor if: 
· You have fallen, even if you think you are not hurt. Do not feel embarrassed to let your doctor know you have fallen. Your doctor may be able to adjust your medicines or provide other help so you can prevent future falls. Where can you learn more? Go to http://ofelia-mindy.info/. Enter I930 in the search box to learn more about \"How to Get Up Safely After a Fall: Care Instructions. \" Current as of: August 4, 2016 Content Version: 11.2 © 4529-5903 [employer]. Care instructions adapted under license by [employer] (which disclaims liability or warranty for this information). If you have questions about a medical condition or this instruction, always ask your healthcare professional. [address] any warranty or liability for your use of this information. Introducing [facility]! Honey Nunez introduces Tempeest patient portal. Now you can access parts of your medical record, email your doctor's office, and request medication refills online. 1. In your internet browser, go to [email or web address] 2. Click on the First Time User? Click Here link in the Sign In box. You will see the New Member Sign Up page. 3. Enter your Tempeest Access Code exactly as it appears below. You will not need to use this code after youve completed the sign-up process. If you do not sign up before the expiration date, you must request a new code. · Tempeest Access Code: [email or web address] Expires: 6/28/2017 10:08 PM 
 
4. Enter the last four digits of your Social Security Number (xxxx) and Date of Birth (mm/dd/yyyy) as indicated and click Submit. You will be taken to the next sign-up page. 5. Create a eTruck ID. This will be your eTruck login ID and cannot be changed, so think of one that is secure and easy to remember. 6. Create a eTruck password. You can change your password at any time. 7. Enter your Password Reset Question and Answer. This can be used at a later time if you forget your password. 8. Enter your e-mail address. You will receive e-mail notification when new information is available in [address] 9. Click Sign Up. You can now view and download portions of your medical record. 10. Click the Download Summary menu link to download a portable copy of your medical information. If you have questions, please visit the Frequently Asked Questions section of the eTruck website. Remember, eTruck is NOT to be used for urgent needs. For medical emergencies, dial 911. Now available from your iPhone and Android! Please provide this summary of care documentation to your next provider. Your primary care clinician is listed as [facility] [place]. If you have any questions after today's visit, please call [phone].

## 2017-04-06 NOTE — PROGRESS NOTES
Cristian Clements is a 46 y.o. female and presents with Medication Refill (orders pending) and ED Follow-up (3/22/17 [facility] for Fall)    Subjective:  Pt here to f/u right knee pain and lower back pain. Had fall on 3/23 when left knee gave out. Seen in ER and told she sprained her right ankle. Last pain med last night. Pain Scale: 10 - Worst pain ever/10. Has not received meds from another provider. No change in pain presentation since last OV. Continues HEP.     Review of Systems  Constitutional: negative for fevers, chills, anorexia and weight loss  Eyes:   negative for visual disturbance, drainage, and irritation  ENT:   + AR. negative for tinnitus,sore throat,ear pain,and hoarseness  Respiratory:  + cough, Asthma/COPD. negative for hemoptysis  CV:   negative for chest pain, palpitations, and lower extremity edema  GI:   negative for nausea, vomiting, diarrhea, abdominal pain, and melena  Endo:               negative for polyuria,polydipsia,polyphagia, and heat intolerance  Genitourinary: negative for urgency, dysuria, retention, and hematuria  Integument:  negative for rash, ulcerations  Hematologic:  negative for easy bruising and bleeding  Musculoskel:  negative for muscle weakness  Neurological:  negative for headaches, dizziness, vertigo,and memory problems  Behavl/Psych: negative for feelings of anxiety, depression, suicide, and mood changes    Past Medical History:   Diagnosis Date    Asthma     uses inhalers    Chronic obstructive pulmonary disease (HCC)     bronchitis    GERD (gastroesophageal reflux disease)     Hypertension     Ill-defined condition     environmental allergies     Ill-defined condition     Multiple body piercings; unable to remove tongue and lip piercings    Thyroid disease     hypo    Ventral hernia 12/31/2013     Past Surgical History:   Procedure Laterality Date    HX HEENT  2009    thyroidectomy    HX KNEE REPLACEMENT      R    HX MOHS PROCEDURES Right 3/8/11    HX OTHER SURGICAL      hiatal hernia repair    HX TUBAL LIGATION       Social History     Social History    Marital status: SINGLE     Spouse name: N/A    Number of children: N/A    Years of education: N/A     Social History Main Topics    Smoking status: Former Smoker     Packs/day: 0.50     Years: 1.50     Types: Cigarettes     Quit date: 10/1/2015    Smokeless tobacco: Never Used      Comment: patient quit smoking for 10 years, began smoking again and then quit again in 2015    Alcohol use No    Drug use: No    Sexual activity: Yes     Partners: Female     Other Topics Concern    None     Social History Narrative     Family History   Problem Relation Age of Onset    Cancer Father      lung cancer    Heart Disease Mother     Hypertension Mother     Diabetes Mother     Anesth Problems Neg Hx      Current Outpatient Prescriptions   Medication Sig Dispense Refill    oxyCODONE-acetaminophen (PERCOCET 10)  mg per tablet May take 1 tab TWICE DAILY, no more than every other day as needed for pain. Indications: Pain 45 Tab 0    ondansetron hcl (ZOFRAN, AS HYDROCHLORIDE,) 4 mg tablet Take 1 Tab by mouth every eight (8) hours as needed for Nausea. 20 Tab 0    DULoxetine (CYMBALTA) 60 mg capsule Take 1 Cap by mouth daily. Indications: CHRONIC MUSCULOSKELETAL PAIN 30 Cap 11    acetaminophen (TYLENOL) 650 mg CR tablet Take 1 Tab by mouth three (3) times daily as needed for Pain. 90 Tab 11    lidocaine (LIDODERM) 5 % Apply patch to the affected area for 12 hours a day and remove for 12 hours a day. 30 Each 11    fluticasone (FLONASE) 50 mcg/actuation nasal spray 2 Sprays by Both Nostrils route daily. 1 Bottle 11    gabapentin (NEURONTIN) 300 mg capsule Take 1 Cap by mouth three (3) times daily. Indications: right sciatica 90 Cap 3    methocarbamol (ROBAXIN-750) 750 mg tablet Take 1 Tab by mouth four (4) times daily as needed for Pain (spasms).  60 Tab 11    ondansetron (ZOFRAN ODT) 4 mg disintegrating tablet Take 1 Tab by mouth every eight (8) hours as needed for Nausea. 20 Tab 3    levothyroxine (SYNTHROID) 125 mcg tablet Take 1 Tab by mouth Daily (before breakfast). 90 Tab 3    amLODIPine (NORVASC) 5 mg tablet TAKE 1 TABLET BY MOUTH EVERY DAY 90 Tab 3    miscellaneous medical supply misc Shower seat for chronic knee pain, pt planning to have right TKR in June due to condition. Very limited range of motion. 1 Each 0    hydrochlorothiazide (HYDRODIURIL) 25 mg tablet daily. 6    montelukast (SINGULAIR) 10 mg tablet daily. 6    pantoprazole (PROTONIX) 40 mg tablet two (2) times a day. 5    loratadine (CLARITIN) 10 mg tablet Take 1 Tab by mouth daily. 30 Tab 5    budesonide-formoterol (SYMBICORT) 160-4.5 mcg/actuation HFA inhaler Take 2 Puffs by inhalation two (2) times a day.  albuterol (PROAIR HFA) 90 mcg/actuation inhaler Take 2 Puffs by inhalation every four (4) hours as needed.  albuterol (PROVENTIL VENTOLIN) 2.5 mg /3 mL (0.083 %) nebulizer solution by Nebulization route three (3) times daily. Allergies   Allergen Reactions    Hydrocodone Itching    Mold Shortness of Breath and Itching    Ibuprofen Nausea Only       Objective:  Visit Vitals    /89 (BP 1 Location: Left arm, BP Patient Position: Sitting)    Pulse 67    Temp 98 °F (36.7 °C) (Oral)    Resp 18    Ht 5' 1\" (1.549 m)    Wt 167 lb (75.8 kg)    LMP 12/29/2016 (Exact Date)    BMI 31.55 kg/m2     Physical Exam:   General appearance - alert, fair appearing, and in moderate distress. +raspy voice. Mental status - A/O x 4,  Normal mood and affect. Chest - Symmetric chest rise. No wheezing. No distress. Heart - Normal rate . Abdomen- Soft, round. Non-distended, NT. No pulsatile masses or hernias. Ext- No pedal edema, clubbing, or cyanosis. Right ankle with air splint. Skin-Warm and dry. No hyperpigmentation, ulcerations, or suspicious lesions. Left open comedone scabbed.    Neuro - Normal speech, no focal findings or movement disorder. Normal strength and muscle tone. Antalgic gait using cane. Right knee - LROM, no effusion. Trace swelling to quadriceps region, just above patella. Back- alignment midline. Lumbar spinal and paraspinal tenderness. No CVAT. LROM. Assessment/Plan:  Chronic pain-UDS, oxycodone to 10-325mg (#45 tabs), will drop back to #30 tab next OV.  was reviewed while planning for pain management, no indications of drug diversion suspected. Prescription history is not suspicious for controlled substance overuse. See below for other orders   Follow-up Disposition:  Return in about 4 weeks (around 5/4/2017) for PAP, pain med refill. ICD-10-CM ICD-9-CM    1. Sprain of right ankle, unspecified ligament, initial encounter S93.401A 845.00 oxyCODONE-acetaminophen (PERCOCET 10)  mg per tablet      PAIN MGMT PANEL W/REFL, UR   2. Primary osteoarthritis of right knee M17.11 715.16 oxyCODONE-acetaminophen (PERCOCET 10)  mg per tablet      PAIN MGMT PANEL W/REFL, UR   3. Status post total right knee replacement Z96.651 V43.65 oxyCODONE-acetaminophen (PERCOCET 10)  mg per tablet      PAIN MGMT PANEL W/REFL, UR   4. Right sided sciatica M54.31 724.3 oxyCODONE-acetaminophen (PERCOCET 10)  mg per tablet      PAIN MGMT PANEL W/REFL, UR   5. Midline low back pain with right-sided sciatica, unspecified chronicity M54.41 724.3 oxyCODONE-acetaminophen (PERCOCET 10)  mg per tablet      PAIN MGMT PANEL W/REFL, UR     Orders Placed This Encounter    PAIN MGMT PANEL W/REFL, UR    DISCONTD: oxyCODONE-acetaminophen (PERCOCET) 5-325 mg per tablet     Sig: TAKE 1 TABLET BY MOUTH TWICE A DAY AS NEEDED FOR PAIN     Refill:  0    oxyCODONE-acetaminophen (PERCOCET 10)  mg per tablet     Sig: May take 1 tab TWICE DAILY, no more than every other day as needed for pain. Indications: Pain     Dispense:  45 Tab     Refill:  0       Liliya Sykes expressed understanding of plan.  An After Visit Summary was offered/printed and given to the patient.

## 2017-04-12 LAB
AMPHETAMINES UR QL SCN: NEGATIVE NG/ML
BARBITURATES UR QL SCN: NEGATIVE NG/ML
BENZODIAZ UR QL SCN: NEGATIVE NG/ML
BZE UR QL SCN: NEGATIVE NG/ML
CANNABINOIDS UR QL CFM: NEGATIVE
CREAT UR-MCNC: 166.6 MG/DL (ref 20–300)
FENTANYL+NORFENTANYL UR QL SCN: NEGATIVE PG/ML
MEPERIDINE UR QL: NEGATIVE NG/ML
METHADONE UR QL SCN: NEGATIVE NG/ML
OPIATES UR QL SCN: NEGATIVE NG/ML
OXYCODONE+OXYMORPHONE UR QL SCN: POSITIVE
PCP UR QL: NEGATIVE NG/ML
PH UR: 5.7 [PH] (ref 4.5–8.9)
PLEASE NOTE:, 733157: ABNORMAL
PROPOXYPH UR QL SCN: NEGATIVE NG/ML
SP GR UR: 1.02
TRAMADOL UR QL SCN: NEGATIVE NG/ML

## 2017-05-08 ENCOUNTER — OFFICE VISIT (OUTPATIENT)
Dept: INTERNAL MEDICINE CLINIC | Age: 52
End: 2017-05-08

## 2017-05-08 VITALS
OXYGEN SATURATION: 97 % | WEIGHT: 166 LBS | BODY MASS INDEX: 31.34 KG/M2 | DIASTOLIC BLOOD PRESSURE: 117 MMHG | HEART RATE: 77 BPM | HEIGHT: 61 IN | SYSTOLIC BLOOD PRESSURE: 193 MMHG | RESPIRATION RATE: 18 BRPM | TEMPERATURE: 98 F

## 2017-05-08 DIAGNOSIS — G89.29 CHRONIC BILATERAL LOW BACK PAIN WITH RIGHT-SIDED SCIATICA: ICD-10-CM

## 2017-05-08 DIAGNOSIS — F32.0 MILD SINGLE CURRENT EPISODE OF MAJOR DEPRESSIVE DISORDER (HCC): ICD-10-CM

## 2017-05-08 DIAGNOSIS — Z96.651 STATUS POST TOTAL RIGHT KNEE REPLACEMENT: ICD-10-CM

## 2017-05-08 DIAGNOSIS — I10 MALIGNANT HYPERTENSION: ICD-10-CM

## 2017-05-08 DIAGNOSIS — M54.41 CHRONIC BILATERAL LOW BACK PAIN WITH RIGHT-SIDED SCIATICA: ICD-10-CM

## 2017-05-08 DIAGNOSIS — M25.561 CHRONIC PAIN OF RIGHT KNEE: Primary | ICD-10-CM

## 2017-05-08 DIAGNOSIS — G89.29 CHRONIC PAIN OF RIGHT KNEE: Primary | ICD-10-CM

## 2017-05-08 DIAGNOSIS — M17.11 PRIMARY OSTEOARTHRITIS OF RIGHT KNEE: ICD-10-CM

## 2017-05-08 RX ORDER — HYDROCHLOROTHIAZIDE 25 MG/1
25 TABLET ORAL DAILY
Qty: 30 TAB | Refills: 11 | Status: SHIPPED | OUTPATIENT
Start: 2017-05-08 | End: 2018-05-28 | Stop reason: SDUPTHER

## 2017-05-08 RX ORDER — BACLOFEN 20 MG/1
20 TABLET ORAL
Qty: 60 TAB | Refills: 3 | Status: SHIPPED | OUTPATIENT
Start: 2017-05-08 | End: 2018-04-19 | Stop reason: ALTCHOICE

## 2017-05-08 RX ORDER — OXYCODONE AND ACETAMINOPHEN 10; 325 MG/1; MG/1
TABLET ORAL
Qty: 30 TAB | Refills: 0 | Status: SHIPPED | OUTPATIENT
Start: 2017-05-08 | End: 2017-06-15 | Stop reason: SDUPTHER

## 2017-05-08 RX ORDER — GABAPENTIN 800 MG/1
800 TABLET ORAL 3 TIMES DAILY
Qty: 90 TAB | Refills: 11 | Status: SHIPPED | OUTPATIENT
Start: 2017-05-08 | End: 2018-09-08 | Stop reason: SDUPTHER

## 2017-05-08 RX ORDER — SERTRALINE HYDROCHLORIDE 25 MG/1
25 TABLET, FILM COATED ORAL DAILY
Qty: 30 TAB | Refills: 11 | Status: SHIPPED | OUTPATIENT
Start: 2017-05-08 | End: 2017-08-10 | Stop reason: SDUPTHER

## 2017-05-08 NOTE — LETTER
Dominic Tello :1965 MR #:[id number] Provider Name:Imani Valdez SHAGGY Rosario  
*RKWN-469* BSMG-491 () Page 1 of 5 Initial M[employer] CONTROLLED SUBSTANCE AGREEMENT I may be prescribed medications that are controlled substances as part  of my treatment plan for management of my medical condition(s). The goal of my treatment plan is to maintain and/or improve my health and wellbeing. Because controlled substances have an increased risk of abuse or harm, continual re-evaluation is needed determine if the goals of my treatment plan are being met for my safety and the safety of others. [facility] Billie  am entering into this Controlled Substance Agreement with my provider, Sydnee Craft NP at [place] [address] . I understand that successful treatment requires mutual trust and honesty between me and my provider. I understand that there are state and federal laws and regulations which apply to the medications that my provider may prescribe that must be followed. I understand there are risks and benefits ts of taking the medicines that my provider may prescribe. I understand and agree that following this Agreement is necessary in continuing my provider-patient relationship and success of my treatment plan. As a part of my treatment plan, I agree to the following: COMMUNICATION: 
 
1. I will communicate fully with my provider about my medical condition(s), including the effect on my daily life and how well my medications are helping. I will tell my provider all of the medications that I take for any reason, including medications I receive from another health care provider, and will notify my provider about all issues, problems or concerns, including any side effects, which may be related to my medications. I understand that this information allows my provider to adjust my treatment plan to help manage my medical condition.  I understand that this information will become part of my permanent medical record. 2. I will notify my provider if I have a history of alcohol/drug misuse/addiction or if I have had treatment for alcohol/drug addiction in the past, or if I have a new problem with or concern about alcohol/drug use/addiction, because this increases the likelihood of high risk behaviors and may lead to serious medical conditions. 3. Females Only: I will notify my provider if I am or become pregnant, or if I intend to become pregnant, or if I intend to breastfeed. I understand that communication of these issues with my provider is important, due to possible effects my medication could have on an unborn fetus or breastfeeding child. Name:. Ross Dao :1965 MR #:[id number] Provider Name:Imani Alcala NP  
[facility] MG-[facility] () Page 2 of 5 Initial [employer] MISUSE OF MEDICATIONS / DRUGS: 
 
1. I agree to take all controlled substances as prescribed, and will not misuse or abuse any controlled substances prescribed by my provider. For my safety, I will not increase the amount of medicine I take without first talking with and getting permission from my provider. 2. If I have a medical emergency, another health care provider may prescribe me medication. If I seek emergency treatment, I will notify my provider within seventy-two (72) hours. 3. I understand that my provider may discuss my use and/or possible misuse/abuse of controlled substances and alcohol, as appropriate, with any health care provider involved in my care, pharmacist or legal authority. ILLEGAL DRUGS: 
 
1. I will not use illegal drugs of any kind, including but not limited to marijuana, heroin, cocaine, or any prescription drug which is not prescribed to me. DRUG DIVERSION / PRESCRIPTION FRAUD: 
 
1. I will not share, sell, trade, give away, or otherwise misuse my prescriptions or medications. 2. I will not alter any prescriptions provided to me by my provider. SINGLE PROVIDER: 
 
1. I agree that all controlled substances that I take will be prescribed only by my provider (or his/her covering provider) under this Agreement. This agreement does not prevent me from seeking emergency medical treatment or receiving pain management related to a surgery. PROTECTING MEDICATIONS: 
 
1. I am responsible for keeping my prescriptions and medications in a safe and secure place including safeguarding them from loss or theft. I understand that lost, stolen or damaged/destroyed prescriptions or medications will not be replaced. Name:. Amarilis Prakash :1965 MR #:[id number] Provider Name:Imani Lara NP  
*ZGYS-726* BSMG-491 () Page 3 of 5 Initial [employer] PRESCRIPTION RENEWALS/REFILLS: 
 
1. I will follow my controlled substance medication schedule as prescribed by my provider. 2. I understand and agree that I will make any requests for renewals or refills of my prescriptions only at the time of an office visit or during my providers regular office hours subject to the prescription refill requirements of the individual practice. 3. I understand that my provider may not call in prescriptions for controlled substances to my pharmacy. 4. I understand that my provider may adjust or discontinue these medications as deemed appropriate for my medical treatment plan. This Agreement does not guarantee the prescription of controlled medications. 5. I agree that if my medications are adjusted or discontinued, I will properly dispose of any remaining medications. I understand that I will be required to dispose of any remaining controlled medications prior to being provided with any prescriptions for other controlled medications.  
 
 
1. I authorize my provider and my pharmacy to cooperate fully with any local, state, or federal law enforcement agency in the investigation of any possible misuse, sale, or other diversion of my controlled substance prescriptions or medications. RISKS: 
 
 
1. I understand that if I do not adhere to this Agreement in any way, my provider may change my prescriptions, stop prescribing controlled substances or end our provider-patient relationship. 2. If my provider decides to stop prescribing medication, or decides to end our provider-patient relationship,my provider may require that I taper my medications slowly. If necessary, my provider may also provide a prescription for other medications to treat my withdrawal symptoms. UNDERSTANDING THIS AGREEMENT: 
 
I understand that my provider may adjust or stop my prescriptions for controlled substances based on my medical condition and my treatment plan. I understand that this Agreement does not guarantee that I will be prescribed medications or controlled substances. I understand that controlled substances may be just one part 
of my treatment plan.  
 
My initial on each page and my signature below shows that I have read each page of this Agreement, I have had an opportunity to ask questions, and all of my questions have been answered to my satisfaction by my provider. By signing below, I agree to comply with this Agreement, and I understand that if I do not follow the Agreements listed above, my provider may stop 
 
 
 
_________________________________________  Date/Time 5/8/2017 2:18 PM   
             (Patient Signature)

## 2017-05-08 NOTE — PATIENT INSTRUCTIONS

## 2017-05-08 NOTE — PROGRESS NOTES
Encounter for pain management. Chronic Pain:  Pt here to f/u right knee pain and lower back pain. Had fall on 3/23 when left knee gave out. Seen in ER and told she sprained her right ankle. Last pain med last night. Pain Scale: 10 - Worst pain ever/10. Has not received meds from another provider. No change in pain presentation since last OV. Continues HEP. Patient has chronic BL knee pain for years, had right TKR last year, but pain and swelling exacerbating lately. Worsening left knee pain. Also with lower back pain and right sided sciatica since surgery. Has IMAGING for lumbar spine and right knee and has been seeing/seen by [facility] and spinal specialist months ago. Last PT earlier this year, finished. Pain in the right knee, leg, and lower back is still limiting walking, sitting, and standing, exacerbated by forementioned acitivities to include stair climbing. Has tried prednisone, tramadol, injections, PT, gabapentin, cymbalta in past with minimal relief. Pain has been controlled with Oxycodone, last taken yesterday, ran out earlier due to excruciating pain. Worse over past few days. The medication is kept safe by staying with her. Has NOT seen any other providers since last OV for pain medication. No significant changes to pain presentation since last OV. Symptoms onset: problem is longstanding. Rheumatological ROS: ongoing significant pain which is stable and controlled by pain med. Response to treatment plan: waxing and waning. Hypertension Review:  The patient has hypertension  Diet and Lifestyle: generally does try to follow a  low sodium diet, exercises rarely due to worsening pain. Home BP Monitoring: is not measured at home. Pertinent ROS: taking medications as instructed, but ran out of HCTZ yesterday. no medication side effects noted. No TIA's, chest pain on exertion, dyspnea on exertion, or swelling of ankles.    BP Readings from Last 3 Encounters:   05/08/17 (!) 193/117 04/06/17 146/89   03/22/17 (!) 174/102     Patient is seen for symptoms of depression. Treatment includes none, but taking Cymbalta for chronic pain. No psychotherapy in past or treatment for depression. The patient denies recurrent thoughts of death and suicidal thoughts without plan. The patient experiences the following side effects from the treatment: none.   PHQ over the last two weeks 5/8/2017   Little interest or pleasure in doing things More than half the days   Feeling down, depressed or hopeless Nearly every day   Total Score PHQ 2 5   Trouble falling or staying asleep, or sleeping too much More than half the days   Feeling tired or having little energy Several days   Poor appetite or overeating Several days   Feeling bad about yourself - or that you are a failure or have let yourself or your family down Nearly every day   Trouble concentrating on things such as school, work, reading or watching TV Not at all   Moving or speaking so slowly that other people could have noticed; or the opposite being so fidgety that others notice Not at all   Thoughts of being better off dead, or hurting yourself in some way Not at all   PHQ 9 Score 12   How difficult have these problems made it for you to do your work, take care of your home and get along with others Very difficult     Review of Systems  Constitutional: negative for fevers, chills, anorexia and weight loss  Eyes:   negative for visual disturbance, drainage, and irritation  ENT:   negative for tinnitus,sore throat,nasal congestion,ear pain,and hoarseness  Respiratory:  negative for cough, hemoptysis, dyspnea, and wheezing  CV:   negative for chest pain, palpitations, and lower extremity edema  GI:   negative for nausea, vomiting, diarrhea, abdominal pain, and melena  Endo:               negative for polyuria,polydipsia,polyphagia, and heat intolerance  Genitourinary: negative for frequency, urgency, dysuria, retention, and hematuria  Integument: negative for rash, ulcerations, and pruritus  Hematologic:  negative for easy bruising and bleeding  Musculoskel: negative for  muscle weakness,and joint pain/swelling  Neurological:  negative for headaches, dizziness, vertigo,and memory problems  Behavl/Psych: negative for feelings of  suicide    1./2. Medical history/Past medical History   Past Medical History:   Diagnosis Date    Asthma     uses inhalers    Chronic obstructive pulmonary disease (HCC)     bronchitis    GERD (gastroesophageal reflux disease)     Hypertension     Ill-defined condition     environmental allergies     Ill-defined condition     Multiple body piercings; unable to remove tongue and lip piercings    Thyroid disease     hypo    Ventral hernia 12/31/2013     Past Surgical History:   Procedure Laterality Date    HX HEENT  2009    thyroidectomy    HX KNEE REPLACEMENT      R    HX MOHS PROCEDURES Right 3/8/11    HX OTHER SURGICAL      hiatal hernia repair    HX TUBAL LIGATION       Patient Active Problem List   Diagnosis Code    Ventral hernia K43.9    Chronic pain of right knee M25.561, G89.29    Chronic right shoulder pain M25.511, G89.29    Environmental and seasonal allergies J30.89    Ventral hernia without obstruction or gangrene K43.9    Primary osteoarthritis of right knee M17.11    Mixed simple and mucopurulent chronic bronchitis (HCC) J41.8    Acquired hypothyroidism E03.9    Chronic bilateral low back pain with right-sided sciatica M54.41, G89.29    Status post total right knee replacement Z96.651    Malignant hypertension I10    Mild single current episode of major depressive disorder ([facility] [address] F32.0       3. Applicable records from prior treatment providers are apart of [facility] under the media/encounters tab. 4. Diagnostic, therapeutic and laboratory results are available in the [address] [place] [address] chart.       5. Consultation notes are available for review in the media/encounters tab of the [address] [place] [address] chart.    6. Treatment goals include: pain control, improve activity level and function in regards to activities of daily living, and improved comfort level. Previously pt has been limited by pain in all these aspects. 7. The risks and benefits of treatment have been discussed at this office visit with the pt.  she understands that the medication has addictive potential.  Additionally the pt has been advised that narcotic pain medication may impair mental and/or physical ability required for performance of tasks such as driving or operating any other machinery. 8. Pt has an updated signed pain contract on file and can be found under the media section of [facility]. 9. The pain contract is reviewed. Pain medication will be continued at the lower dosage. Urine drug screening ordered/collected today. Diagnostic studies are not indicated at this time. Interventional procedure are not indicated at this time. 10. Medication prescibed is oxycodone every 24 hours PRN # 30 with zero refills for a 1 month supply.  was reviewed while planning for pain/anxiety management, no indications of drug diversion suspected. Prescription history is NOT suspicious for controlled substance overuse. 11. Patient instructions have been reviewed in detail as outlined above and in the pain contract. 12. Re-evaluation is planned for 1 month(s). Current Outpatient Prescriptions   Medication Sig Dispense Refill    oxyCODONE-acetaminophen (PERCOCET 10)  mg per tablet May take 1 tab TWICE DAILY, no more than every other day as needed for pain. Indications: Pain 30 Tab 0    gabapentin (NEURONTIN) 800 mg tablet Take 1 Tab by mouth three (3) times daily. 90 Tab 11    hydroCHLOROthiazide (HYDRODIURIL) 25 mg tablet Take 1 Tab by mouth daily. Indications: hypertension 30 Tab 11    sertraline (ZOLOFT) 25 mg tablet Take 1 Tab by mouth daily.  30 Tab 11    ondansetron hcl (ZOFRAN, AS HYDROCHLORIDE,) 4 mg tablet Take 1 Tab by mouth every eight (8) hours as needed for Nausea. 20 Tab 0    DULoxetine (CYMBALTA) 60 mg capsule Take 1 Cap by mouth daily. Indications: CHRONIC MUSCULOSKELETAL PAIN 30 Cap 11    acetaminophen (TYLENOL) 650 mg CR tablet Take 1 Tab by mouth three (3) times daily as needed for Pain. 90 Tab 11    lidocaine (LIDODERM) 5 % Apply patch to the affected area for 12 hours a day and remove for 12 hours a day. 30 Each 11    fluticasone (FLONASE) 50 mcg/actuation nasal spray 2 Sprays by Both Nostrils route daily. 1 Bottle 11    methocarbamol (ROBAXIN-750) 750 mg tablet Take 1 Tab by mouth four (4) times daily as needed for Pain (spasms). 60 Tab 11    ondansetron (ZOFRAN ODT) 4 mg disintegrating tablet Take 1 Tab by mouth every eight (8) hours as needed for Nausea. 20 Tab 3    levothyroxine (SYNTHROID) 125 mcg tablet Take 1 Tab by mouth Daily (before breakfast). 90 Tab 3    amLODIPine (NORVASC) 5 mg tablet TAKE 1 TABLET BY MOUTH EVERY DAY 90 Tab 3    miscellaneous medical supply misc Shower seat for chronic knee pain, pt planning to have right TKR in June due to condition. Very limited range of motion. 1 Each 0    montelukast (SINGULAIR) 10 mg tablet daily. 6    pantoprazole (PROTONIX) 40 mg tablet two (2) times a day. 5    loratadine (CLARITIN) 10 mg tablet Take 1 Tab by mouth daily. 30 Tab 5    budesonide-formoterol (SYMBICORT) 160-4.5 mcg/actuation HFA inhaler Take 2 Puffs by inhalation two (2) times a day.  albuterol (PROAIR HFA) 90 mcg/actuation inhaler Take 2 Puffs by inhalation every four (4) hours as needed.  albuterol (PROVENTIL VENTOLIN) 2.5 mg /3 mL (0.083 %) nebulizer solution by Nebulization route three (3) times daily.        Allergies   Allergen Reactions    Hydrocodone Itching    Mold Shortness of Breath and Itching    Ibuprofen Nausea Only       Objective:  Visit Vitals    BP (!) 193/117 (BP 1 Location: Left arm, BP Patient Position: Sitting)  Pulse 77    Temp 98 °F (36.7 °C) (Oral)    Resp 18    Ht 5' 1\" (1.549 m)    Wt 166 lb (75.3 kg)    SpO2 97%    BMI 31.37 kg/m2     Wt Readings from Last 3 Encounters:   05/08/17 166 lb (75.3 kg)   04/06/17 167 lb (75.8 kg)   03/22/17 165 lb (74.8 kg)     Physical Exam:   General appearance - alert, fair appearing, and in moderate distress. Mental status - A/O x 4, depressed mood and affect. +tearful. Neck -Supple ,normal CSP. FROM, non-tender. No significant adenopathy/thyromegaly. No JVD. Chest - CTA. Symmetric chest rise. No wheezing, rales or rhonchi. Heart - Normal rate, regular rhythm. Normal S1, S2. No MGR or clicks. Abdomen - Soft,non-distended. Normoactive BS in all quadrants. NT, no mass or HSM. Ext- Radial, DP pulses, 2+ bilaterally. No pedal edema, clubbing, or cyanosis. Skin-Warm and dry. No hyperpigmentation, ulcerations, or suspicious lesions. Neuro - Normal speech, no focal findings or movement disorder. Normal strength and muscle tone. Antalgic gait using cane. Right Knee- LROM. Global  TTP. Varus deformity. No erythema. +crepitus and effusion. Back- alignment midline. Lumbar spinal and left paraspinal tenderness. No CVAT. LROM, +SLR. Assessment/Plan:  REFERRED to pain specialist. Increased gabapentin to 800 mg TID from 600 mg. Continue cymbalta. zoloft added, may refer INI at f/u. Reviewed addiction potential and warned this is last visit with overuse noted. NO PILLS in bottle for count today on med filled 4/25/17. HCTZ refilled, if BP elevated on Friday for BP check will increase amlodipine to 10 mg. I spent greater than 50% of 40 minute visit counseling patient about diagnostic results, impressions, prognosis, risk/benefits of treatment options, medication management and adequate follow-up, importance of adherence to treatment plan, and risk factor reduction.   Medication Side Effects and Warnings were discussed with patient: yes   Patient Labs were reviewed: yes  Patient Past Records were reviewed: yes    See below for other orders   Follow-up Disposition:  Return in about 4 weeks (around 6/5/2017) for zoloft f/u, chr pain. ICD-10-CM ICD-9-CM    1. Chronic pain of right knee M25.561 719.46 REFERRAL TO ORTHOPEDICS    G89.29 338.29 PAIN MGMT PANEL W/REFL, UR   2. Primary osteoarthritis of right knee M17.11 715.16 REFERRAL TO ORTHOPEDICS      PAIN MGMT PANEL W/REFL, UR      oxyCODONE-acetaminophen (PERCOCET 10)  mg per tablet   3. Chronic bilateral low back pain with right-sided sciatica M54.41 724.2 REFERRAL TO ORTHOPEDICS    G89.29 724.3 PAIN MGMT PANEL W/REFL, UR     338.29 gabapentin (NEURONTIN) 800 mg tablet   4. Status post total right knee replacement Z96.651 V43.65 oxyCODONE-acetaminophen (PERCOCET 10)  mg per tablet   5. Malignant hypertension I10 401.0 hydroCHLOROthiazide (HYDRODIURIL) 25 mg tablet   6. Mild single current episode of major depressive disorder (HCC) F32.0 296.21 sertraline (ZOLOFT) 25 mg tablet     Orders Placed This Encounter    PAIN MGMT PANEL W/REFL, UR    REFERRAL TO ORTHOPEDICS     Referral Priority:   Routine     Referral Type:   Consultation     Referral Reason:   Specialty Services Required     Referred to Provider:   Adarsh Luque MD    oxyCODONE-acetaminophen (PERCOCET 10)  mg per tablet     Sig: May take 1 tab TWICE DAILY, no more than every other day as needed for pain. Indications: Pain     Dispense:  30 Tab     Refill:  0    gabapentin (NEURONTIN) 800 mg tablet     Sig: Take 1 Tab by mouth three (3) times daily. Dispense:  90 Tab     Refill:  11    hydroCHLOROthiazide (HYDRODIURIL) 25 mg tablet     Sig: Take 1 Tab by mouth daily. Indications: hypertension     Dispense:  30 Tab     Refill:  11    sertraline (ZOLOFT) 25 mg tablet     Sig: Take 1 Tab by mouth daily. Dispense:  30 Tab     Refill:  11       Liliya Pitt expressed understanding of plan.  An After Visit Summary was offered/printed and given to the patient.

## 2017-05-08 NOTE — PROGRESS NOTES
Opioid Risk Tool:Clinician Form    1. Family History of Substance Abuse:   Alcohol Female 1 Male 3 =0  Illegal drugs Female 2 Male 3 =0  Prescription drugs Female 4 Male 4 +=0    2. Personal History of Substance Abuse:   Alcohol Y 3 =0  Illegal drugs Y  4=0   Prescription drugs Y  5 =0    3. Age (angel box if between 12 and 39) Y 1 =0    4. History of Preadolescent Sexual Abuse: Female 3 Male 0 =0    5.  Psychological Disease   Attention deficit disorder, obsessive- compulsive disorder, bipolar, schizophrenia Y  2 =0  Depression Y  1=1    Scoring Totals: (1)  (0-3) Low    (4-7) Moderate  (>8) High

## 2017-05-08 NOTE — MR AVS SNAPSHOT
Visit Information Date & Time Provider Department Dept. Phone Encounter #  
 5/8/2017  1:40 PM Urban Swift NP [address] [facility] [phone] [id number] Follow-up Instructions Return in about 4 weeks (around 6/5/2017) for zoloft f/u, chr pain. Upcoming Health Maintenance Date Due  
 PAP AKA CERVICAL CYTOLOGY 6/23/1984 BREAST CANCER SCRN MAMMOGRAM 4/6/2014 FOBT Q 1 YEAR AGE 50-75 5/26/2017 INFLUENZA AGE 9 TO ADULT 8/1/2017 DTaP/Tdap/Td series (2 - Td) 7/6/2025 Allergies as of 5/8/2017  Review Complete On: 5/8/2017 By: Kasandra Aguilar. ALYSSA Medina Severity Noted Reaction Type Reactions Hydrocodone  03/22/2017    Itching Mold  05/23/2016    Shortness of Breath, Itching Ibuprofen Low 03/30/2015   Intolerance Nausea Only Current Immunizations  Reviewed on 3/3/2017 Name Date Influenza Vaccine (Quad) Intradermal PF 12/9/2016 Pneumococcal Polysaccharide (PPSV-23) 3/3/2017 Not reviewed this visit You Were Diagnosed With   
  
 Codes Comments Chronic pain of right knee    -  Primary ICD-10-CM: M25.561, R00.91 ICD-9-CM: 719.46, 338.29 Primary osteoarthritis of right knee     ICD-10-CM: M17.11 ICD-9-CM: 715.16 Chronic bilateral low back pain with right-sided sciatica     ICD-10-CM: M54.41, G89.29 ICD-9-CM: 724.2, 724.3, 338.29 Status post total right knee replacement     ICD-10-CM: D04.206 ICD-9-CM: V43.65 Malignant hypertension     ICD-10-CM: I10 
ICD-9-CM: 401.0 Mild single current episode of major depressive disorder (HCC)     ICD-10-CM: F32.0 ICD-9-CM: 296.21 Vitals BP Pulse Temp Resp Height(growth percentile) Weight(growth percentile) (!) 193/117 (BP 1 Location: Left arm, BP Patient Position: Sitting) 77 98 °F (36.7 °C) (Oral) 18 5' 1\" (1.549 m) 166 lb (75.3 kg) SpO2 BMI OB Status Smoking Status 97% 31.37 kg/m2 Unknown Former Smoker Vitals History BMI and BSA Data Body Mass Index Body Surface Area  
 31.37 kg/m 2 1.8 m 2 Preferred Pharmacy Pharmacy Name Phone [id number] [phone] Your Updated Medication List  
  
   
This list is accurate as of: 5/8/17  2:44 PM.  Always use your most recent med list.  
  
  
  
  
 acetaminophen 650 mg CR tablet Commonly known as:  TYLENOL Take 1 Tab by mouth three (3) times daily as needed for Pain. amLODIPine 5 mg tablet Commonly known as:  Ninetta Hikes TAKE 1 TABLET BY MOUTH EVERY DAY DULoxetine 60 mg capsule Commonly known as:  CYMBALTA Take 1 Cap by mouth daily. Indications: CHRONIC MUSCULOSKELETAL PAIN  
  
 fluticasone 50 mcg/actuation nasal spray Commonly known as:  Shaila Bough 2 Sprays by Both Nostrils route daily. gabapentin 800 mg tablet Commonly known as:  NEURONTIN Take 1 Tab by mouth three (3) times daily. hydroCHLOROthiazide 25 mg tablet Commonly known as:  HYDRODIURIL Take 1 Tab by mouth daily. Indications: hypertension  
  
 levothyroxine 125 mcg tablet Commonly known as:  SYNTHROID Take 1 Tab by mouth Daily (before breakfast). lidocaine 5 % Commonly known as:  Azalee Kawasaki Apply patch to the affected area for 12 hours a day and remove for 12 hours a day. loratadine 10 mg tablet Commonly known as:  Latrell Luis Take 1 Tab by mouth daily. methocarbamol 750 mg tablet Commonly known as:  [id number] Take 1 Tab by mouth four (4) times daily as needed for Pain (spasms). miscellaneous medical supply Misc Shower seat for chronic knee pain, pt planning to have right TKR in June due to condition. Very limited range of motion. montelukast 10 mg tablet Commonly known as:  SINGULAIR  
daily. ondansetron 4 mg disintegrating tablet Commonly known as:  ZOFRAN ODT Take 1 Tab by mouth every eight (8) hours as needed for Nausea. ondansetron hcl 4 mg tablet Commonly known as:  ZOFRAN (AS HYDROCHLORIDE) Take 1 Tab by mouth every eight (8) hours as needed for Nausea. oxyCODONE-acetaminophen  mg per tablet Commonly known as:  PERCOCET 10 May take 1 tab TWICE DAILY, no more than every other day as needed for pain. Indications: Pain  
  
 pantoprazole 40 mg tablet Commonly known as:  PROTONIX  
two (2) times a day. * PROAIR HFA 90 mcg/actuation inhaler Generic drug:  albuterol Take 2 Puffs by inhalation every four (4) hours as needed. * albuterol 2.5 mg /3 mL (0.083 %) nebulizer solution Commonly known as:  PROVENTIL VENTOLIN  
by Nebulization route three (3) times daily. sertraline 25 mg tablet Commonly known as:  ZOLOFT Take 1 Tab by mouth daily. SYMBICORT 160-4.5 mcg/actuation HFA inhaler Generic drug:  budesonide-formoterol Take 2 Puffs by inhalation two (2) times a day. * Notice: This list has 2 medication(s) that are the same as other medications prescribed for you. Read the directions carefully, and ask your doctor or other care provider to review them with you. Prescriptions Printed Refills  
 oxyCODONE-acetaminophen (PERCOCET 10)  mg per tablet 0 Sig: May take 1 tab TWICE DAILY, no more than every other day as needed for pain. Indications: Pain Class: Print Prescriptions Sent to Pharmacy Refills  
 gabapentin (NEURONTIN) 800 mg tablet 11 Sig: Take 1 Tab by mouth three (3) times daily. Class: Normal  
 Pharmacy: [address] [place] [address] Ph #: [phone] Route: Oral  
 hydroCHLOROthiazide (HYDRODIURIL) 25 mg tablet 11 Sig: Take 1 Tab by mouth daily. Indications: hypertension Class: Normal  
 Pharmacy: [address] [place] [address] Ph #: [phone] Route: Oral  
 sertraline (ZOLOFT) 25 mg tablet 11 Sig: Take 1 Tab by mouth daily.   
 Class: Normal  
 Pharmacy: [facility]/pharmacy #[id number] - Michaela PANCHAL Ph #: [phone] Route: Oral  
  
We Performed the Following PAIN MGMT PANEL W/REFL, UR [[id number] Custom] REFERRAL TO ORTHOPEDICS [OZD387 Custom] Comments:  
 [facility] evaluate patient for right leg sciatica [facility] warm salt water [facility] Call to schedule appt: [phone] Located at [address] [facility] Follow-up Instructions Return in about 4 weeks (around 6/5/2017) for zoloft f/u, chr pain. Referral Information Referral ID Referred By Referred To  
  
 [id number] Erum Red MD   
   [address] [facility] [address] [facility] Phone: [phone] Fax: [phone] Visits Status Start Date End Date 1 New Request 5/8/17 5/8/18 If your referral has a status of pending review or denied, additional information will be sent to support the outcome of this decision. Patient Instructions High Blood Pressure: Care Instructions Your Care Instructions If your blood pressure is usually above 140/90, you have high blood pressure, or hypertension. That means the top number is 140 or higher or the bottom number is 90 or higher, or both. Despite what a lot of people think, high blood pressure usually doesn't cause headaches or make you feel dizzy or lightheaded. It usually has no symptoms. But it does increase your risk for heart attack, stroke, and kidney or eye damage. The higher your blood pressure, the more your risk increases. Your doctor will give you a goal for your blood pressure. Your goal will be based on your health and your age. An example of a goal is to keep your blood pressure below 140/90. Lifestyle changes, such as eating healthy and being active, are always important to help lower blood pressure.  You might also take medicine to reach your blood pressure goal. 
 Follow-up care is a key part of your treatment and safety. Be sure to make and go to all appointments, and call your doctor if you are having problems. It's also a good idea to know your test results and keep a list of the medicines you take. How can you care for yourself at home? Medical treatment · If you stop taking your medicine, your blood pressure will go back up. You may take one or more types of medicine to lower your blood pressure. Be safe with medicines. Take your medicine exactly as prescribed. Call your doctor if you think you are having a problem with your medicine. · Talk to your doctor before you start taking aspirin every day. Aspirin can help certain people lower their risk of a heart attack or stroke. But taking aspirin isn't right for everyone, because it can cause serious bleeding. · See your doctor regularly. You may need to see the doctor more often at first or until your blood pressure comes down. · If you are taking blood pressure medicine, talk to your doctor before you take decongestants or anti-inflammatory medicine, such as ibuprofen. Some of these medicines can raise blood pressure. · Learn how to check your blood pressure at home. Lifestyle changes · Stay at a healthy weight. This is especially important if you put on weight around the waist. Losing even 10 pounds can help you lower your blood pressure. · If your doctor recommends it, get more exercise. Walking is a good choice. Bit by bit, increase the amount you walk every day. Try for at least 30 minutes on most days of the week. You also may want to swim, bike, or do other activities. · Avoid or limit alcohol. Talk to your doctor about whether you can drink any alcohol. · Try to limit how much sodium you eat to less than 2,300 milligrams (mg) a day. Your doctor may ask you to try to eat less than 1,500 mg a day.  
· Eat plenty of fruits (such as bananas and oranges), vegetables, legumes, whole grains, and low-fat dairy products. · Lower the amount of saturated fat in your diet. Saturated fat is found in animal products such as milk, cheese, and meat. Limiting these foods may help you lose weight and also lower your risk for heart disease. · Do not smoke. Smoking increases your risk for heart attack and stroke. If you need help quitting, talk to your doctor about stop-smoking programs and medicines. These can increase your chances of quitting for good. When should you call for help? Call 911 anytime you think you may need emergency care. This may mean having symptoms that suggest that your blood pressure is causing a serious heart or blood vessel problem. Your blood pressure may be over 180/110. For example, call 911 if: 
· You have symptoms of a heart attack. These may include: ¨ Chest pain or pressure, or a strange feeling in the chest. 
¨ Sweating. ¨ Shortness of breath. ¨ Nausea or vomiting. ¨ Pain, pressure, or a strange feeling in the back, neck, jaw, or upper belly or in one or both shoulders or arms. ¨ Lightheadedness or sudden weakness. ¨ A fast or irregular heartbeat. · You have symptoms of a stroke. These may include: 
¨ Sudden numbness, tingling, weakness, or loss of movement in your face, arm, or leg, especially on only one side of your body. ¨ Sudden vision changes. ¨ Sudden trouble speaking. ¨ Sudden confusion or trouble understanding simple statements. ¨ Sudden problems with walking or balance. ¨ A sudden, severe headache that is different from past headaches. · You have severe back or belly pain. Do not wait until your blood pressure comes down on its own. Get help right away. Call your doctor now or seek immediate care if: 
· Your blood pressure is much higher than normal (such as 180/110 or higher), but you don't have symptoms. · You think high blood pressure is causing symptoms, such as: ¨ Severe headache. ¨ Blurry vision. Watch closely for changes in your health, and be sure to contact your doctor if: 
· Your blood pressure measures 140/90 or higher at least 2 times. That means the top number is 140 or higher or the bottom number is 90 or higher, or both. · You think you may be having side effects from your blood pressure medicine. · Your blood pressure is usually normal, but it goes above normal at least 2 times. Where can you learn more? Go to http://ofelia-mindy.info/. Enter C974 in the search box to learn more about \"High Blood Pressure: Care Instructions. \" Current as of: August 8, 2016 Content Version: 11.2 © 4825-4979 [employer]. Care instructions adapted under license by [employer] (which disclaims liability or warranty for this information). If you have questions about a medical condition or this instruction, always ask your healthcare professional. [address] any warranty or liability for your use of this information. Introducing [facility]! Ly Cleverly introduces [employer] patient portal. Now you can access parts of your medical record, email your doctor's office, and request medication refills online. 1. In your internet browser, go to [email or web address] 2. Click on the First Time User? Click Here link in the Sign In box. You will see the New Member Sign Up page. 3. Enter your [employer] Access Code exactly as it appears below. You will not need to use this code after youve completed the sign-up process. If you do not sign up before the expiration date, you must request a new code. · [employer] Access Code: [email or web address] Expires: 6/28/2017 10:08 PM 
 
4. Enter the last four digits of your Social Security Number (xxxx) and Date of Birth (mm/dd/yyyy) as indicated and click Submit. You will be taken to the next sign-up page. 5. Create a [employer] ID.  This will be your [employer] login ID and cannot be changed, so think of one that is secure and easy to remember. 6. Create a [employer] password. You can change your password at any time. 7. Enter your Password Reset Question and Answer. This can be used at a later time if you forget your password. 8. Enter your e-mail address. You will receive e-mail notification when new information is available in [address] 9. Click Sign Up. You can now view and download portions of your medical record. 10. Click the Download Summary menu link to download a portable copy of your medical information. If you have questions, please visit the Frequently Asked Questions section of the [employer] website. Remember, [employer] is NOT to be used for urgent needs. For medical emergencies, dial 911. Now available from your iPhone and Android! Please provide this summary of care documentation to your next provider. Your primary care clinician is listed as HUGO Anderson. If you have any questions after today's visit, please call [phone].

## 2017-05-11 PROBLEM — Z02.89 PAIN MANAGEMENT CONTRACT SIGNED: Status: ACTIVE | Noted: 2017-05-08

## 2017-05-12 ENCOUNTER — HOSPITAL ENCOUNTER (EMERGENCY)
Age: 52
Discharge: HOME | End: 2017-05-12
Attending: EMERGENCY MEDICINE | Admitting: EMERGENCY MEDICINE
Payer: SUBSIDIZED

## 2017-05-12 ENCOUNTER — APPOINTMENT (OUTPATIENT)
Dept: GENERAL RADIOLOGY | Age: 52
End: 2017-05-12
Attending: EMERGENCY MEDICINE
Payer: SUBSIDIZED

## 2017-05-12 ENCOUNTER — CLINICAL SUPPORT (OUTPATIENT)
Dept: INTERNAL MEDICINE CLINIC | Age: 52
End: 2017-05-12

## 2017-05-12 VITALS
TEMPERATURE: 98.6 F | SYSTOLIC BLOOD PRESSURE: 153 MMHG | RESPIRATION RATE: 18 BRPM | HEIGHT: 61 IN | OXYGEN SATURATION: 98 % | BODY MASS INDEX: 31.15 KG/M2 | DIASTOLIC BLOOD PRESSURE: 106 MMHG | WEIGHT: 165 LBS | HEART RATE: 96 BPM

## 2017-05-12 VITALS — DIASTOLIC BLOOD PRESSURE: 106 MMHG | SYSTOLIC BLOOD PRESSURE: 177 MMHG

## 2017-05-12 DIAGNOSIS — S83.91XA SPRAIN OF RIGHT KNEE, UNSPECIFIED LIGAMENT, INITIAL ENCOUNTER: ICD-10-CM

## 2017-05-12 DIAGNOSIS — S93.401A SPRAIN OF RIGHT ANKLE, UNSPECIFIED LIGAMENT, INITIAL ENCOUNTER: ICD-10-CM

## 2017-05-12 DIAGNOSIS — S63.610A SPRAIN OF RIGHT INDEX FINGER, UNSPECIFIED SITE OF FINGER, INITIAL ENCOUNTER: Primary | ICD-10-CM

## 2017-05-12 DIAGNOSIS — Z01.30 BP CHECK: Primary | ICD-10-CM

## 2017-05-12 PROCEDURE — 73610 X-RAY EXAM OF ANKLE: CPT

## 2017-05-12 PROCEDURE — L1930 AFO PLASTIC: HCPCS

## 2017-05-12 PROCEDURE — 73130 X-RAY EXAM OF HAND: CPT

## 2017-05-12 PROCEDURE — 73562 X-RAY EXAM OF KNEE 3: CPT

## 2017-05-12 PROCEDURE — 99283 EMERGENCY DEPT VISIT LOW MDM: CPT

## 2017-05-12 NOTE — DISCHARGE INSTRUCTIONS
Ankle Sprain: Care Instructions  Your Care Instructions    An ankle sprain can happen when you twist your ankle. The ligaments that support the ankle can get stretched and torn. Often the ankle is swollen and painful. Ankle sprains may take from several weeks to several months to heal. Usually, the more pain and swelling you have, the more severe your ankle sprain is and the longer it will take to heal. You can heal faster and regain strength in your ankle with good home treatment. It is very important to give your ankle time to heal completely, so that you do not easily hurt your ankle again. Follow-up care is a key part of your treatment and safety. Be sure to make and go to all appointments, and call your doctor if you are having problems. It's also a good idea to know your test results and keep a list of the medicines you take. How can you care for yourself at home? · Prop up your foot on pillows as much as possible for the next 3 days. Try to keep your ankle above the level of your heart. This will help reduce the swelling. · Follow your doctor's directions for wearing a splint or elastic bandage. Wrapping the ankle may help reduce or prevent swelling. · Your doctor may give you a splint, a brace, an air stirrup, or another form of ankle support to protect your ankle until it is healed. Wear it as directed while your ankle is healing. Do not remove it unless your doctor tells you to. After your ankle has healed, ask your doctor whether you should wear the brace when you exercise. · Put ice or cold packs on your injured ankle for 10 to 20 minutes at a time. Try to do this every 1 to 2 hours for the next 3 days (when you are awake) or until the swelling goes down. Put a thin cloth between the ice and your skin. · You may need to use crutches until you can walk without pain. If you do use crutches, try to bear some weight on your injured ankle if you can do so without pain.  This helps the ankle heal.  · Take pain medicines exactly as directed. ¨ If the doctor gave you a prescription medicine for pain, take it as prescribed. ¨ If you are not taking a prescription pain medicine, ask your doctor if you can take an over-the-counter medicine. · If you have been given ankle exercises to do at home, do them exactly as instructed. These can promote healing and help prevent lasting weakness. When should you call for help? Call your doctor now or seek immediate medical care if:  · Your pain is getting worse. · Your swelling is getting worse. · Your splint feels too tight or you are unable to loosen it. Watch closely for changes in your health, and be sure to contact your doctor if:  · You are not getting better after 1 week. Where can you learn more? Go to http://ofelia-mindy.info/. Enter J521 in the search box to learn more about \"Ankle Sprain: Care Instructions. \"  Current as of: May 23, 2016  Content Version: 11.2  © 4358-1450 [employer]. Care instructions adapted under license by [employer] (which disclaims liability or warranty for this information). If you have questions about a medical condition or this instruction, always ask your healthcare professional. [place] [address] any warranty or liability for your use of this information. Knee Sprain: Care Instructions  Your Care Instructions    A knee sprain is one or more stretched, partly torn, or completely torn knee ligaments. Ligaments are bands of ropelike tissue that connect bone to bone and make the knee stable. The knee has four main ligaments. Knee sprains often happen because of a twisting or bending injury from sports such as skiing, basketball, soccer, or football. The knee turns one way while the lower or upper leg goes another way. A sprain also can happen when the knee is hit from the side or the front.   If a knee ligament is slightly stretched, you will probably need only home treatment. You may need a splint or brace (immobilizer) for a partly torn ligament. A complete tear may need surgery. A minor knee sprain may take up to 6 weeks to heal, while a severe sprain may take months. Follow-up care is a key part of your treatment and safety. Be sure to make and go to all appointments, and call your doctor if you are having problems. It's also a good idea to know your test results and keep a list of the medicines you take. How can you care for yourself at home? · Follow instructions about how much weight you can put on your leg and how to walk with crutches. · Prop up your leg on a pillow when you ice it or anytime you sit or lie down for the next 3 days. Try to keep it above the level of your heart. This will help reduce swelling. · Put ice or a cold pack on your knee for 10 to 20 minutes at a time. Try to do this every 1 to 2 hours for the next 3 days (when you are awake) or until the swelling goes down. Put a thin cloth between the ice and your skin. Do not get the splint wet. · If you have an elastic bandage, make sure it is snug but not so tight that your leg is numb, tingles, or swells below the bandage. You can loosen the bandage if it is too tight. · Your doctor may recommend a brace (immobilizer) to support your knee while it heals. Wear it as directed. · Ask your doctor if you can take an over-the-counter pain medicine, such as acetaminophen (Tylenol), ibuprofen (Advil, Motrin), or naproxen (Aleve). Be safe with medicines. Read and follow all instructions on the label. When should you call for help? Call 911 anytime you think you may need emergency care. For example, call if:  · You have sudden chest pain and shortness of breath, or you cough up blood. Call your doctor now or seek immediate medical care if:  · You have increased or severe pain. · You cannot move your toes or ankle. · Your foot is cool or pale or changes color.   · You have tingling, weakness, or numbness in your foot or leg. · Your splint or brace feels too tight. · You are unable to straighten the knee, or the knee \"locks. \"  · You have signs of a blood clot in your leg, such as:  ¨ Pain in your calf, back of the knee, thigh, or groin. ¨ Redness and swelling in your leg. Watch closely for changes in your health, and be sure to contact your doctor if:  · Your pain is not getting better or is getting worse. Where can you learn more? Go to http://ofelia-mindy.info/. Enter N406 in the search box to learn more about \"Knee Sprain: Care Instructions. \"  Current as of: May 23, 2016  Content Version: 11.2  © 7664-1422 [employer]. Care instructions adapted under license by [employer] (which disclaims liability or warranty for this information). If you have questions about a medical condition or this instruction, always ask your healthcare professional. [place] [address] any warranty or liability for your use of this information. Finger Sprain: Rehab Exercises  Your Care Instructions  Here are some examples of typical rehabilitation exercises for your condition. Start each exercise slowly. Ease off the exercise if you start to have pain. Your doctor or your physical or occupational therapist will tell you when you can start these exercises and which ones will work best for you. How to do the exercises  Finger extension    1. Place your hand flat on a table, palm down. 2. Lift and then lower your affected finger off the table. 3. Repeat 8 to 12 times. MP extension    1. Place your good hand on a table, palm up. Put your hand with the affected finger on top of your good hand with your fingers wrapped around the thumb of your good hand like you are making a fist.  2. Slowly uncurl the joints of your hand with the affected finger where your fingers connect to your hand so that only the top two joints of your fingers are bent.  Your fingers will look like a hook. 3. Move back to your starting position, with your fingers wrapped around your good thumb. 4. Repeat 8 to 12 times. DIP flexion    1. With your good hand, grasp your affected finger. Your thumb will be on the top side of your finger just below the joint that is closest to your fingernail. 2. Slowly bend your affected finger only at the joint closest to your fingernail. Hold for about 6 seconds. 3. Repeat 8 to 12 times. PIP extension (with MP extension)    1. Place your good hand on a table, palm up. Put your hand with the affected finger on top of your good hand. 2. Use the thumb and fingers of your good hand to grasp below the middle joint of your affected finger. 3. Bend and then straighten the last two joints of your affected finger. 4. Repeat 8 to 12 times. Isolated PIP flexion    1. Place the hand with the affected finger flat on a table, palm up. With your other hand, press down on the fingers that are not affected. Your affected finger will be free to move. 2. Slowly bend your affected finger. Hold for about 6 seconds. Then straighten your finger. 3. Repeat 8 to 12 times. Imaginary ball squeeze    1. Pretend to hold an imaginary ball. 2. Slowly bend your fingers around the imaginary ball, and squeeze the \"ball\" for about 6 seconds. Then slowly straighten your fingers to release the \"ball. \"  3. Repeat 8 to 12 times. Tendon glides    In this exercise, the steps follow one another to a make a continuous movement. 1. Hold your hand upward. Your fingers and thumb will be pointing straight up. Your wrist should be relaxed, following the line of your fingers and thumb. 2. Curl your fingers so that the top two joints in them are bent, and your fingers wrap down. Your fingertips should touch or be near the base of your fingers. Your fingers will look like a hook. 3. Make a fist by bending your knuckles. Your thumb can gently rest against your index (pointing) finger.   4. Unwind your fingers slightly so that your fingertips can touch the base of your palm. Your thumb can rest against your index finger. 5. Move back to your starting position, with your fingers and thumb pointing up. 6. Repeat the series of motions 8 to 12 times. Towel squeeze    1. Place a small towel roll on a table. 2. With your palm facing down, grab the towel and squeeze it for about 6 seconds. Then slowly straighten your fingers to release the towel. 3. Repeat 8 to 12 times. Towel grab    1. Fold a small towel in half, and lay it flat on a table. 2. Put your hand flat on the towel, palm down. Grab the towel, and scrunch it toward you until your hand is in a fist.  3. Slowly straighten your fingers to push the towel back so it is flat on the table again. 4. Repeat 8 to 12 times. Follow-up care is a key part of your treatment and safety. Be sure to make and go to all appointments, and call your doctor if you are having problems. It's also a good idea to know your test results and keep a list of the medicines you take. Where can you learn more? Go to http://ofelia-mindy.info/. Enter 0498 [id number] in the search box to learn more about \"Finger Sprain: Rehab Exercises. \"  Current as of: May 23, 2016  Content Version: 11.2  © 8876-2991 [employer]. Care instructions adapted under license by [employer] (which disclaims liability or warranty for this information). If you have questions about a medical condition or this instruction, always ask your healthcare professional. [address] any warranty or liability for your use of this information.

## 2017-05-12 NOTE — PROGRESS NOTES
Pt was instructed to take 10mg of the amlodipine and to return on Monday 5/15/17 for BP check  BP Readings from Last 3 Encounters:   05/12/17 (!) 177/106   05/12/17 (!) 153/106   05/08/17 (!) 193/117

## 2017-05-12 NOTE — ED PROVIDER NOTES
Patient is a 46 y.o. female presenting with fall. The history is provided by the patient and medical records. Fall   Fall occurred: steps. She fell from a height of ground level. There was no blood loss. The point of impact was the right knee. The pain is present in the right knee (r ankle, r index finger). The pain is moderate. She was ambulatory at the scene. There was no entrapment after the fall. Pertinent negatives include no numbness, no loss of consciousness and no laceration. The risk factors include recurrent falls. The symptoms are aggravated by activity, standing, pressure on injury and use of injured limb. She has tried nothing for the symptoms. Pt with frequent falls. Already walks with cane. Has had R knee replacement at [facility] last year. Multiple events of \"legs giving out\" and falling over past few months. States she will speak with Dr Marycarmen Madrid about this at her appt today. [facility]  reviewed. Past Medical History:   Diagnosis Date    Asthma     uses inhalers    Chronic obstructive pulmonary disease (HCC)     bronchitis    GERD (gastroesophageal reflux disease)     Hypertension     Ill-defined condition     environmental allergies     Ill-defined condition     Multiple body piercings; unable to remove tongue and lip piercings    Thyroid disease     hypo    Ventral hernia 12/31/2013       Past Surgical History:   Procedure Laterality Date    HX HEENT  2009    thyroidectomy    HX KNEE REPLACEMENT      R    HX MOHS PROCEDURES Right 3/8/11    HX OTHER SURGICAL      hiatal hernia repair    HX TUBAL LIGATION           Family History:   Problem Relation Age of Onset    Cancer Father      lung cancer    Heart Disease Mother     Hypertension Mother    [facility] Diabetes Mother     Anesth Problems Neg Hx        Social History     Social History    Marital status: SINGLE     Spouse name: N/A    Number of children: N/A    Years of education: N/A     Occupational History    Not on file. Social History Main Topics    Smoking status: Former Smoker     Packs/day: 0.50     Years: 1.50     Types: Cigarettes     Quit date: 10/1/2015    Smokeless tobacco: Never Used      Comment: patient quit smoking for 10 years, began smoking again and then quit again in 2015    Alcohol use No    Drug use: No    Sexual activity: Yes     Partners: Female     Other Topics Concern    Not on file     Social History Narrative         ALLERGIES: Hydrocodone; Mold; and Ibuprofen    Review of Systems   Musculoskeletal: Positive for arthralgias. Neurological: Negative for loss of consciousness and numbness. All other systems reviewed and are negative. Vitals:    05/12/17 0652   BP: (!) 153/106   Pulse: 96   Resp: 18   Temp: 98.6 °F (37 °C)   SpO2: 98%   Weight: 74.8 kg (165 lb)   Height: 5' 1\" (1.549 m)            Physical Exam   Constitutional: She is oriented to person, place, and time. She appears well-developed and well-nourished. No distress. Pleasant appropriate Black female   HENT:   Head: Normocephalic and atraumatic. Mouth/Throat: Oropharynx is clear and moist.   Eyes: Conjunctivae and EOM are normal. Pupils are equal, round, and reactive to light. Right eye exhibits no discharge. Left eye exhibits no discharge. No scleral icterus. Neck: Normal range of motion. Neck supple. Cardiovascular: Normal rate and intact distal pulses. Pulmonary/Chest: Effort normal. No respiratory distress. Abdominal: Soft. Musculoskeletal: Normal range of motion. She exhibits edema and tenderness. Neurological: She is alert and oriented to person, place, and time. No cranial nerve deficit. Coordination normal.   Skin: Skin is warm and dry. No laceration noted. She is not diaphoretic. Psychiatric: She has a normal mood and affect. Her behavior is normal.   Nursing note and vitals reviewed.        MDM  ED Course       Procedures

## 2017-05-14 LAB
AMPHETAMINES UR QL SCN: NEGATIVE NG/ML
BARBITURATES UR QL SCN: NEGATIVE NG/ML
BENZODIAZ UR QL SCN: NEGATIVE NG/ML
BZE UR QL SCN: NEGATIVE NG/ML
CANNABINOIDS UR QL SCN: NEGATIVE NG/ML
CREAT UR-MCNC: 26.2 MG/DL (ref 20–300)
FENTANYL+NORFENTANYL UR QL SCN: NEGATIVE PG/ML
MEPERIDINE UR QL: NEGATIVE NG/ML
METHADONE UR QL SCN: NEGATIVE NG/ML
OPIATES UR QL SCN: NEGATIVE NG/ML
OXYCODONE+OXYMORPHONE UR QL SCN: NEGATIVE
PCP UR QL: NEGATIVE NG/ML
PH UR: 6.2 [PH] (ref 4.5–8.9)
PLEASE NOTE:, 733157: NORMAL
PROPOXYPH UR QL SCN: NEGATIVE NG/ML
SP GR UR: 1.01
TRAMADOL UR QL SCN: NEGATIVE NG/ML

## 2017-05-15 ENCOUNTER — CLINICAL SUPPORT (OUTPATIENT)
Dept: INTERNAL MEDICINE CLINIC | Age: 52
End: 2017-05-15

## 2017-05-15 VITALS — DIASTOLIC BLOOD PRESSURE: 96 MMHG | HEART RATE: 74 BPM | SYSTOLIC BLOOD PRESSURE: 133 MMHG

## 2017-05-15 DIAGNOSIS — I10 ESSENTIAL HYPERTENSION WITH GOAL BLOOD PRESSURE LESS THAN 140/90: ICD-10-CM

## 2017-05-15 DIAGNOSIS — Z01.30 BP CHECK: Primary | ICD-10-CM

## 2017-05-15 RX ORDER — AMLODIPINE BESYLATE 10 MG/1
TABLET ORAL
Qty: 90 TAB | Refills: 3 | Status: SHIPPED | OUTPATIENT
Start: 2017-05-15 | End: 2018-06-15 | Stop reason: SDUPTHER

## 2017-05-15 NOTE — PROGRESS NOTES
Pt is here for   Chief Complaint   Patient presents with    Blood Pressure Check     Pt states she's taking 10 mg of Amlodipine    Pt BP during today's visit 133/96    New Rx for 10mg of the Amlodipine will be sent to the pharmacy. Pt was instructed to return to have BP check during her next office visit.   BP Readings from Last 3 Encounters:   05/15/17 (!) 133/96   05/12/17 (!) 177/106   05/12/17 (!) 153/106

## 2017-05-15 NOTE — PROGRESS NOTES
Discussed misuse of medication prior to visit. Pt verbalized understanding of need to take med ONLY as prescribed and NOT more frequently.

## 2017-06-15 ENCOUNTER — OFFICE VISIT (OUTPATIENT)
Dept: INTERNAL MEDICINE CLINIC | Age: 52
End: 2017-06-15

## 2017-06-15 VITALS
HEART RATE: 72 BPM | OXYGEN SATURATION: 100 % | SYSTOLIC BLOOD PRESSURE: 199 MMHG | BODY MASS INDEX: 30 KG/M2 | HEIGHT: 61 IN | WEIGHT: 158.9 LBS | TEMPERATURE: 98.3 F | RESPIRATION RATE: 18 BRPM | DIASTOLIC BLOOD PRESSURE: 107 MMHG

## 2017-06-15 DIAGNOSIS — J30.89 ENVIRONMENTAL AND SEASONAL ALLERGIES: ICD-10-CM

## 2017-06-15 DIAGNOSIS — Z96.651 STATUS POST TOTAL RIGHT KNEE REPLACEMENT: ICD-10-CM

## 2017-06-15 DIAGNOSIS — I10 MALIGNANT HYPERTENSION: ICD-10-CM

## 2017-06-15 DIAGNOSIS — M25.562 CHRONIC PAIN OF LEFT KNEE: ICD-10-CM

## 2017-06-15 DIAGNOSIS — G89.29 CHRONIC BILATERAL LOW BACK PAIN WITH RIGHT-SIDED SCIATICA: Primary | ICD-10-CM

## 2017-06-15 DIAGNOSIS — M17.11 PRIMARY OSTEOARTHRITIS OF RIGHT KNEE: ICD-10-CM

## 2017-06-15 DIAGNOSIS — Z02.89 PAIN MANAGEMENT CONTRACT SIGNED: ICD-10-CM

## 2017-06-15 DIAGNOSIS — G89.29 CHRONIC PAIN OF LEFT KNEE: ICD-10-CM

## 2017-06-15 DIAGNOSIS — M54.41 CHRONIC BILATERAL LOW BACK PAIN WITH RIGHT-SIDED SCIATICA: Primary | ICD-10-CM

## 2017-06-15 RX ORDER — OXYCODONE AND ACETAMINOPHEN 10; 325 MG/1; MG/1
TABLET ORAL
Qty: 30 TAB | Refills: 0 | Status: SHIPPED | OUTPATIENT
Start: 2017-06-15 | End: 2017-07-13 | Stop reason: SDUPTHER

## 2017-06-15 NOTE — PROGRESS NOTES
Pt is here for   Chief Complaint   Patient presents with    Medication Refill     orders pending     1. Have you been to the ER, urgent care clinic since your last visit? Hospitalized since your last visit? No    2. Have you seen or consulted any other health care providers outside of the [address] [place] [address] since your last visit? Include any pap smears or colon screening.  No     Pt states last had something for pain yesterday (Baclofen)

## 2017-06-15 NOTE — PATIENT INSTRUCTIONS
Duloxetine (By mouth)   Duloxetine (doo-LOX-[id number]-teen)  Treats depression, anxiety, diabetic peripheral neuropathy, fibromyalgia, and chronic muscle or bone pain. This medicine is an SSNRI. Brand Name(s): Cymbalta, DermacinRx Murtaza Moran   There may be other brand names for this medicine. When This Medicine Should Not Be Used: This medicine is not right for everyone. Do not use it if you had an allergic reaction to duloxetine. How to Use This Medicine:   Capsule, Delayed Release Capsule  · Take your medicine as directed. Your dose may need to be changed several times to find what works best for you. · Delayed-release capsule: Swallow the capsule whole. Do not crush, chew, break, or open it. · This medicine should come with a Medication Guide. Ask your pharmacist for a copy if you do not have one. · Missed dose: Take a dose as soon as you remember. If it is almost time for your next dose, wait until then and take a regular dose. Do not take extra medicine to make up for a missed dose. · Store the medicine in a closed container at room temperature, away from heat, moisture, and direct light. Drugs and Foods to Avoid:   Ask your doctor or pharmacist before using any other medicine, including over-the-counter medicines, vitamins, and herbal products. · Do not take duloxetine if you have used an MAO inhibitor (MAOI) within the past 14 days. Do not start taking an MAO inhibitor within 5 days of stopping duloxetine. · Some medicines can affect how duloxetine works.  Tell your doctor if you are using any of the following:  ¨ Buspirone, cimetidine, ciprofloxacin, enoxacin, fentanyl, lithium, Karie's wort, theophylline, tramadol, tryptophan, or warfarin  ¨ Amphetamines  ¨ Blood pressure medicine  ¨ Diuretic (water pill)  ¨ Medicine for heart rhythm problems (including flecainide, propafenone, quinidine)  ¨ Medicine to treat migraine headaches (including triptans)  ¨ NSAID pain or arthritis medicine (including aspirin, celecoxib, diclofenac, ibuprofen, naproxen)  ¨ Other medicine to treat depression or mood disorders (including amitriptyline, desipramine, fluoxetine, imipramine, nortriptyline, paroxetine)  ¨ Phenothiazine medicine (including thioridazine)  · Tell your doctor if you use anything else that makes you sleepy. Some examples are allergy medicine, narcotic pain medicine, and alcohol. · Do not drink alcohol while you are using this medicine. Warnings While Using This Medicine:   · Tell your doctor if you are pregnant or breastfeeding, or if you have kidney disease, liver disease, diabetes, digestion problems, glaucoma, heart disease, high or low blood pressure, or problems with urination. Tell your doctor if you smoke or you have a history of seizures, or drug or alcohol addiction. · This medicine may cause the following problems:   ¨ Serious liver problems  ¨ Serotonin syndrome (more likely when used with certain other medicines)  ¨ Increased risk of bleeding problems  ¨ Serious skin reactions  ¨ Low sodium levels in the blood  · This medicine can increase thoughts of suicide. Tell your doctor right away if you start to feel depressed and have thoughts about hurting yourself. · This medicine can cause changes in your blood pressure. This may make you dizzy or drowsy. Do not drive or do anything that could be dangerous until you know how this medicine affects you. Stand up slowly to avoid falls. · Do not stop using this medicine suddenly. Your doctor will need to slowly decrease your dose before you stop it completely. · Your doctor will check your progress and the effects of this medicine at regular visits. Keep all appointments. · Keep all medicine out of the reach of children. Never share your medicine with anyone.   Possible Side Effects While Using This Medicine:   Call your doctor right away if you notice any of these side effects:  · Allergic reaction: Itching or hives, swelling in your face or hands, swelling or tingling in your mouth or throat, chest tightness, trouble breathing  · Anxiety, restlessness, fever, fast heartbeat, sweating, muscle spasms, diarrhea, seeing or hearing things that are not there  · Blistering, peeling, red skin rash  · Confusion, weakness, muscle twitching  · Dark urine or pale stools, nausea, vomiting, loss of appetite, stomach pain, yellow skin or eyes  · Decrease in how much or how often you urinate  · Eye pain, vision changes, seeing halos around lights  · Feeling more energetic than usual  · Lightheadedness, dizziness, or fainting  · Unusual moods or behaviors, worsening depression, thoughts about hurting yourself, trouble sleeping  · Unusual bleeding or bruising  If you notice these less serious side effects, talk with your doctor:   · Decrease in appetite or weight  · Dry mouth, constipation, mild nausea  · Unusual drowsiness, sleepiness, or tiredness  If you notice other side effects that you think are caused by this medicine, tell your doctor. Call your doctor for medical advice about side effects. You may report side effects to FDA at 8-047-FDA-6242  © 2017 [facility] Information is for End User's use only and may not be sold, redistributed or otherwise used for commercial purposes. The above information is an  only. It is not intended as medical advice for individual conditions or treatments. Talk to your doctor, nurse or pharmacist before following any medical regimen to see if it is safe and effective for you.

## 2017-06-15 NOTE — MR AVS SNAPSHOT
Visit Information Date & Time Provider Department Dept. Phone Encounter #  
 6/15/2017  9:20 AM Dago Coleman NP [address] [facility] [phone] [id number] Follow-up Instructions Return in about 4 weeks (around 7/13/2017) for pain med refill. BP check tomorrow. Your Appointments 7/5/2017  3:40 PM  
ROUTINE CARE with Dago Coleman NP  
[address] [facility] [address] [facility]) Appt Note: pain med refill [address] [facility] [place] [address] [id number]  
[phone]  
  
   
 [address] [facility] Upcoming Health Maintenance Date Due  
 PAP AKA CERVICAL CYTOLOGY 6/23/1984 BREAST CANCER SCRN MAMMOGRAM 4/6/2014 FOBT Q 1 YEAR AGE 50-75 5/26/2017 INFLUENZA AGE 9 TO ADULT 8/1/2017 DTaP/Tdap/Td series (2 - Td) 7/6/2025 Allergies as of 6/15/2017  Review Complete On: 6/15/2017 By: Dago Coleman NP Severity Noted Reaction Type Reactions Hydrocodone  03/22/2017    Itching Mold  05/23/2016    Shortness of Breath, Itching Ibuprofen Low 03/30/2015   Intolerance Nausea Only Current Immunizations  Reviewed on 3/3/2017 Name Date Influenza Vaccine (Quad) Intradermal PF 12/9/2016 Pneumococcal Polysaccharide (PPSV-23) 3/3/2017 Not reviewed this visit You Were Diagnosed With   
  
 Codes Comments Primary osteoarthritis of right knee     ICD-10-CM: M17.11 ICD-9-CM: 715.16 Status post total right knee replacement     ICD-10-CM: U63.869 ICD-9-CM: V43.65 Vitals BP Pulse Temp Resp Height(growth percentile) Weight(growth percentile) (!) 199/107 (BP 1 Location: Right arm, BP Patient Position: Sitting) 72 98.3 °F (36.8 °C) (Oral) 18 5' 1\" (1.549 m) 158 lb 14.4 oz (72.1 kg) SpO2 BMI OB Status Smoking Status 100% 30.02 kg/m2 Unknown Former Smoker Vitals History BMI and BSA Data Body Mass Index Body Surface Area  30.02 kg/m 2 1.76 m 2  
  
  
 Preferred Pharmacy Pharmacy Name Phone [id number] Serrano [phone] Your Updated Medication List  
  
   
This list is accurate as of: 6/15/17 10:34 AM.  Always use your most recent med list.  
  
  
  
  
 acetaminophen 650 mg CR tablet Commonly known as:  TYLENOL Take 1 Tab by mouth three (3) times daily as needed for Pain. amLODIPine 10 mg tablet Commonly known as:  Benjiman Floro TAKE 1 TABLET BY MOUTH EVERY DAY  Indications: hypertension  
  
 baclofen 20 mg tablet Commonly known as:  LIORESAL Take 1 Tab by mouth three (3) times daily as needed for Pain (spasms). DULoxetine 60 mg capsule Commonly known as:  CYMBALTA Take 1 Cap by mouth daily. Indications: CHRONIC MUSCULOSKELETAL PAIN  
  
 fluticasone 50 mcg/actuation nasal spray Commonly known as:  Hilaria Ignacia 2 Sprays by Both Nostrils route daily. gabapentin 800 mg tablet Commonly known as:  NEURONTIN Take 1 Tab by mouth three (3) times daily. hydroCHLOROthiazide 25 mg tablet Commonly known as:  HYDRODIURIL Take 1 Tab by mouth daily. Indications: hypertension  
  
 levothyroxine 125 mcg tablet Commonly known as:  SYNTHROID Take 1 Tab by mouth Daily (before breakfast). lidocaine 5 % Commonly known as:  Eb Cuellarch Apply patch to the affected area for 12 hours a day and remove for 12 hours a day. loratadine 10 mg tablet Commonly known as:  Eb Adan Take 1 Tab by mouth daily. miscellaneous medical supply Misc Shower seat for chronic knee pain, pt planning to have right TKR in June due to condition. Very limited range of motion. montelukast 10 mg tablet Commonly known as:  SINGULAIR  
daily. ondansetron 4 mg disintegrating tablet Commonly known as:  ZOFRAN ODT Take 1 Tab by mouth every eight (8) hours as needed for Nausea. ondansetron hcl 4 mg tablet Commonly known as:  ZOFRAN (AS HYDROCHLORIDE) Take 1 Tab by mouth every eight (8) hours as needed for Nausea. oxyCODONE-acetaminophen  mg per tablet Commonly known as:  PERCOCET 10 May take 1 tab ONCE DAILY, no more than every other day as needed for pain. Indications: Pain  
  
 pantoprazole 40 mg tablet Commonly known as:  PROTONIX  
two (2) times a day. * PROAIR HFA 90 mcg/actuation inhaler Generic drug:  albuterol Take 2 Puffs by inhalation every four (4) hours as needed. * albuterol 2.5 mg /3 mL (0.083 %) nebulizer solution Commonly known as:  PROVENTIL VENTOLIN  
by Nebulization route three (3) times daily. sertraline 25 mg tablet Commonly known as:  ZOLOFT Take 1 Tab by mouth daily. SYMBICORT 160-4.5 mcg/actuation HFA inhaler Generic drug:  budesonide-formoterol Take 2 Puffs by inhalation two (2) times a day. * Notice: This list has 2 medication(s) that are the same as other medications prescribed for you. Read the directions carefully, and ask your doctor or other care provider to review them with you. Prescriptions Printed Refills  
 oxyCODONE-acetaminophen (PERCOCET 10)  mg per tablet 0 Sig: May take 1 tab ONCE DAILY, no more than every other day as needed for pain. Indications: Pain Class: Print We Performed the Following PAIN MGMT PANEL W/REFL, UR [[id number] Custom] Follow-up Instructions Return in about 4 weeks (around 7/13/2017) for pain med refill. BP check tomorrow. Patient Instructions Duloxetine (By mouth) Duloxetine (doo-LOX-e-teen) Treats depression, anxiety, diabetic peripheral neuropathy, fibromyalgia, and chronic muscle or bone pain. This medicine is an SSNRI. Brand Name(s): Cymbalta, DermacinRx TOO Velazco There may be other brand names for this medicine. When This Medicine Should Not Be Used: This medicine is not right for everyone. Do not use it if you had an allergic reaction to duloxetine. How to Use This Medicine:  
Capsule, Delayed Release Capsule · Take your medicine as directed. Your dose may need to be changed several times to find what works best for you. · Delayed-release capsule: Swallow the capsule whole. Do not crush, chew, break, or open it. · This medicine should come with a Medication Guide. Ask your pharmacist for a copy if you do not have one. · Missed dose: Take a dose as soon as you remember. If it is almost time for your next dose, wait until then and take a regular dose. Do not take extra medicine to make up for a missed dose. · Store the medicine in a closed container at room temperature, away from heat, moisture, and direct light. Drugs and Foods to Avoid: Ask your doctor or pharmacist before using any other medicine, including over-the-counter medicines, vitamins, and herbal products. · Do not take duloxetine if you have used an MAO inhibitor (MAOI) within the past 14 days. Do not start taking an MAO inhibitor within 5 days of stopping duloxetine. · Some medicines can affect how duloxetine works. Tell your doctor if you are using any of the following: 
¨ Buspirone, cimetidine, ciprofloxacin, enoxacin, fentanyl, lithium, Karie's wort, theophylline, tramadol, tryptophan, or warfarin ¨ Amphetamines ¨ Blood pressure medicine ¨ Diuretic (water pill) ¨ Medicine for heart rhythm problems (including flecainide, propafenone, quinidine) ¨ Medicine to treat migraine headaches (including triptans) ¨ NSAID pain or arthritis medicine (including aspirin, celecoxib, diclofenac, ibuprofen, naproxen) ¨ Other medicine to treat depression or mood disorders (including amitriptyline, desipramine, fluoxetine, imipramine, nortriptyline, paroxetine) ¨ Phenothiazine medicine (including thioridazine) · Tell your doctor if you use anything else that makes you sleepy. Some examples are allergy medicine, narcotic pain medicine, and alcohol. · Do not drink alcohol while you are using this medicine. Warnings While Using This Medicine: · Tell your doctor if you are pregnant or breastfeeding, or if you have kidney disease, liver disease, diabetes, digestion problems, glaucoma, heart disease, high or low blood pressure, or problems with urination. Tell your doctor if you smoke or you have a history of seizures, or drug or alcohol addiction. · This medicine may cause the following problems:  
¨ Serious liver problems ¨ Serotonin syndrome (more likely when used with certain other medicines) ¨ Increased risk of bleeding problems ¨ Serious skin reactions ¨ Low sodium levels in the blood · This medicine can increase thoughts of suicide. Tell your doctor right away if you start to feel depressed and have thoughts about hurting yourself. · This medicine can cause changes in your blood pressure. This may make you dizzy or drowsy. Do not drive or do anything that could be dangerous until you know how this medicine affects you. Stand up slowly to avoid falls. · Do not stop using this medicine suddenly. Your doctor will need to slowly decrease your dose before you stop it completely. · Your doctor will check your progress and the effects of this medicine at regular visits. Keep all appointments. · Keep all medicine out of the reach of children. Never share your medicine with anyone. Possible Side Effects While Using This Medicine:  
Call your doctor right away if you notice any of these side effects: · Allergic reaction: Itching or hives, swelling in your face or hands, swelling or tingling in your mouth or throat, chest tightness, trouble breathing · Anxiety, restlessness, fever, fast heartbeat, sweating, muscle spasms, diarrhea, seeing or hearing things that are not there · Blistering, peeling, red skin rash · Confusion, weakness, muscle twitching · Dark urine or pale stools, nausea, vomiting, loss of appetite, stomach pain, yellow skin or eyes · Decrease in how much or how often you urinate · Eye pain, vision changes, seeing halos around lights · Feeling more energetic than usual 
· Lightheadedness, dizziness, or fainting · Unusual moods or behaviors, worsening depression, thoughts about hurting yourself, trouble sleeping · Unusual bleeding or bruising If you notice these less serious side effects, talk with your doctor: · Decrease in appetite or weight · Dry mouth, constipation, mild nausea · Unusual drowsiness, sleepiness, or tiredness If you notice other side effects that you think are caused by this medicine, tell your doctor. Call your doctor for medical advice about side effects. You may report side effects to FDA at 9-830-WXJ-3109 © 2017 [facility] Information is for End User's use only and may not be sold, redistributed or otherwise used for commercial purposes. The above information is an  only. It is not intended as medical advice for individual conditions or treatments. Talk to your doctor, nurse or pharmacist before following any medical regimen to see if it is safe and effective for you. Introducing [facility]! Celsa Condon introduces [employer] patient portal. Now you can access parts of your medical record, email your doctor's office, and request medication refills online. 1. In your internet browser, go to [email or web address] 2. Click on the First Time User? Click Here link in the Sign In box. You will see the New Member Sign Up page. 3. Enter your [employer] Access Code exactly as it appears below. You will not need to use this code after youve completed the sign-up process. If you do not sign up before the expiration date, you must request a new code. · [employer] Access Code: [email or web address] Expires: 6/28/2017 10:08 PM 
 
4.  Enter the last four digits of your Social Security Number (xxxx) and Date of Birth (mm/dd/yyyy) as indicated and click Submit. You will be taken to the next sign-up page. 5. Create a [employer] ID. This will be your [employer] login ID and cannot be changed, so think of one that is secure and easy to remember. 6. Create a [employer] password. You can change your password at any time. 7. Enter your Password Reset Question and Answer. This can be used at a later time if you forget your password. 8. Enter your e-mail address. You will receive e-mail notification when new information is available in [address] 9. Click Sign Up. You can now view and download portions of your medical record. 10. Click the Download Summary menu link to download a portable copy of your medical information. If you have questions, please visit the Frequently Asked Questions section of the [employer] website. Remember, [employer] is NOT to be used for urgent needs. For medical emergencies, dial 911. Now available from your iPhone and Android! Please provide this summary of care documentation to your next provider. Your primary care clinician is listed as HUGO I [address] If you have any questions after today's visit, please call [phone].

## 2017-06-15 NOTE — PROGRESS NOTES
Encounter for pain management. Chronic Pain:  Patient has chronic BL knee pain for years, had right TKR last year, with continued pain and swelling. Associated with progressively worsening left knee pain. Lower back pain and right sided sciatica since surgery. Has IMAGING for lumbar spine and right knee and has been seeing/seen by [facility] and spinal specialist months ago. Last PT March 2017, continuing HEP. Had fall in May, seen in ER and treated. Unsure of circumstances surrounding fall. Pain in the right knee, leg, and lower back is still limiting walking, sitting, and standing, exacerbated by forementioned acitivities to include stair climbing. Has tried prednisone, tramadol, injections, PT, gabapentin, cymbalta, and surgery in past with minimal relief. Pain has been controlled with Oxycodone, last taken 6 days ago. Worse following fall, taking baclofen since. The medication is kept safe by staying with her. Has NOT seen any other providers since last OV for pain medication. No significant changes to pain presentation since last OV. Symptoms onset: problem is longstanding. Rheumatological ROS: ongoing significant pain which is stable and controlled by pain med. Response to treatment plan: waxing and waning. Hypertension Review:  The patient has hypertension  Diet and Lifestyle: generally does try to follow a  low sodium diet, exercises rarely due to worsening pain. Home BP Monitoring: is not measured at home. Pertinent ROS: taking medications as instructed, however NOT taken yet this morning and Pain Scale: 10 - Worst pain ever/10. no medication side effects noted. No TIA's, chest pain on exertion, dyspnea on exertion, or swelling of ankles. BP Readings from Last 3 Encounters:   06/15/17 (!) 199/107   05/15/17 (!) 133/96   05/12/17 (!) 177/106     Patient is seen for symptoms of depression. Treatment includes Zoloft and Cymbalta.  Feels zoloft is helping stabilize mood, but NOT taking cymbalta as prescribed. No psychotherapy in past or treatment for depression. The patient denies recurrent thoughts of death and suicidal thoughts without plan. The patient experiences the following side effects from the treatment: none. Review of Systems  Constitutional: negative for fevers, chills, anorexia and weight loss  Eyes:   negative for visual disturbance, drainage, and irritation  ENT:   +AR and severe environmental allergies. negative for tinnitus,sore throat,ear pain,and hoarseness  Respiratory:  + asthma/COPD, mild exacerbation.  negative for hemoptysis  CV:   negative for chest pain, palpitations, and lower extremity edema  GI:   negative for nausea, vomiting, diarrhea, abdominal pain, and melena  Endo:               negative for polyuria,polydipsia,polyphagia, and heat intolerance  Genitourinary: negative for frequency, urgency, dysuria, retention, and hematuria  Integument:  negative for rash, ulcerations, and pruritus  Hematologic:  negative for easy bruising and bleeding  Musculoskel: negative for  muscle weakness  Neurological:  negative for headaches, dizziness, vertigo,and memory problems  Behavl/Psych: negative for feelings of  suicide    1./2. Medical history/Past medical History   Past Medical History:   Diagnosis Date    Asthma     uses inhalers    Chronic obstructive pulmonary disease (HCC)     bronchitis    GERD (gastroesophageal reflux disease)     Hypertension     Ill-defined condition     environmental allergies     Ill-defined condition     Multiple body piercings; unable to remove tongue and lip piercings    Thyroid disease     hypo    Ventral hernia 12/31/2013     Past Surgical History:   Procedure Laterality Date    HX HEENT  2009    thyroidectomy    HX KNEE REPLACEMENT      R    HX MOHS PROCEDURES Right 3/8/11    HX OTHER SURGICAL      hiatal hernia repair    HX TUBAL LIGATION       Patient Active Problem List   Diagnosis Code    Ventral hernia K43.9    Chronic pain of right knee M25.561, G89.29    Chronic right shoulder pain M25.511, G89.29    Environmental and seasonal allergies J30.89    Ventral hernia without obstruction or gangrene K43.9    Primary osteoarthritis of right knee M17.11    Mixed simple and mucopurulent chronic bronchitis (HCC) J41.8    Acquired hypothyroidism E03.9    Chronic bilateral low back pain with right-sided sciatica M54.41, G89.29    Status post total right knee replacement Z96.651    Malignant hypertension I10    Mild single current episode of major depressive disorder ([facility] [address] F32.0    Pain management contract signed Z02.89    Chronic pain of left knee M25.562, G89.29       3. Applicable records from prior treatment providers are apart of [facility] under the media/encounters tab. 4. Diagnostic, therapeutic and laboratory results are available in the [address] [place] [address] chart. 5. Consultation notes are available for review in the media/encounters tab of the [address] [place] [address] chart. 6. Treatment goals include: pain control, improve activity level and function in regards to activities of daily living, and improved comfort level. Previously pt has been limited by pain in all these aspects. 7. The risks and benefits of treatment have been discussed at this office visit with the pt.  she understands that the medication has addictive potential.  Additionally the pt has been advised that narcotic pain medication may impair mental and/or physical ability required for performance of tasks such as driving or operating any other machinery. 8. Pt has an updated signed pain contract on file and can be found under the media section of the [facility] chart. 9. The pain contract is reviewed. Pain medication will be continued at the same dosage, ONCE DAILY frequency resumed. Pill count: 0 tabs. Advised this is LAST VISIT, will STOP meds if seen again. Urine drug screening ordered/collected today.   Diagnostic studies are not indicated at this time. Interventional procedure are not indicated at this time. 10. Medication prescibed is oxycodone every 24 hours PRN # 30 with zero refills for a 1 month supply.  was reviewed while planning for pain/anxiety management, no indications of drug diversion suspected. Prescription history is NOT suspicious for controlled substance overuse. 11. Patient instructions have been reviewed in detail as outlined above and in the pain contract. 12. Re-evaluation is planned for 1 month(s). Current Outpatient Prescriptions   Medication Sig Dispense Refill    oxyCODONE-acetaminophen (PERCOCET 10)  mg per tablet May take 1 tab ONCE DAILY, no more than every other day as needed for pain. Indications: Pain 30 Tab 0    amLODIPine (NORVASC) 10 mg tablet TAKE 1 TABLET BY MOUTH EVERY DAY  Indications: hypertension 90 Tab 3    gabapentin (NEURONTIN) 800 mg tablet Take 1 Tab by mouth three (3) times daily. 90 Tab 11    hydroCHLOROthiazide (HYDRODIURIL) 25 mg tablet Take 1 Tab by mouth daily. Indications: hypertension 30 Tab 11    sertraline (ZOLOFT) 25 mg tablet Take 1 Tab by mouth daily. 30 Tab 11    baclofen (LIORESAL) 20 mg tablet Take 1 Tab by mouth three (3) times daily as needed for Pain (spasms). 60 Tab 3    ondansetron hcl (ZOFRAN, AS HYDROCHLORIDE,) 4 mg tablet Take 1 Tab by mouth every eight (8) hours as needed for Nausea. 20 Tab 0    DULoxetine (CYMBALTA) 60 mg capsule Take 1 Cap by mouth daily. Indications: CHRONIC MUSCULOSKELETAL PAIN 30 Cap 11    acetaminophen (TYLENOL) 650 mg CR tablet Take 1 Tab by mouth three (3) times daily as needed for Pain. 90 Tab 11    lidocaine (LIDODERM) 5 % Apply patch to the affected area for 12 hours a day and remove for 12 hours a day. 30 Each 11    fluticasone (FLONASE) 50 mcg/actuation nasal spray 2 Sprays by Both Nostrils route daily.  1 Bottle 11    ondansetron (ZOFRAN ODT) 4 mg disintegrating tablet Take 1 Tab by mouth every eight (8) hours as needed for Nausea. 20 Tab 3    levothyroxine (SYNTHROID) 125 mcg tablet Take 1 Tab by mouth Daily (before breakfast). 90 Tab 3    montelukast (SINGULAIR) 10 mg tablet daily. 6    pantoprazole (PROTONIX) 40 mg tablet two (2) times a day. 5    loratadine (CLARITIN) 10 mg tablet Take 1 Tab by mouth daily. 30 Tab 5    budesonide-formoterol (SYMBICORT) 160-4.5 mcg/actuation HFA inhaler Take 2 Puffs by inhalation two (2) times a day.  albuterol (PROAIR HFA) 90 mcg/actuation inhaler Take 2 Puffs by inhalation every four (4) hours as needed.  albuterol (PROVENTIL VENTOLIN) 2.5 mg /3 mL (0.083 %) nebulizer solution by Nebulization route three (3) times daily.  miscellaneous medical supply misc Shower seat for chronic knee pain, pt planning to have right TKR in June due to condition. Very limited range of motion. 1 Each 0     Allergies   Allergen Reactions    Hydrocodone Itching    Mold Shortness of Breath and Itching    Ibuprofen Nausea Only       Objective:  Visit Vitals    BP (!) 199/107 (BP 1 Location: Right arm, BP Patient Position: Sitting)    Pulse 72    Temp 98.3 °F (36.8 °C) (Oral)    Resp 18    Ht 5' 1\" (1.549 m)    Wt 158 lb 14.4 oz (72.1 kg)    SpO2 100%    BMI 30.02 kg/m2     Wt Readings from Last 3 Encounters:   06/15/17 158 lb 14.4 oz (72.1 kg)   05/12/17 165 lb (74.8 kg)   05/08/17 166 lb (75.3 kg)     Physical Exam:   General appearance - alert, fair appearing, and in moderate distress. Mental status - A/O x 4, depressed mood and affect. +tearful. Neck -Supple ,normal CSP. FROM, non-tender. No significant adenopathy/thyromegaly. No JVD. Chest - CTA. Symmetric chest rise. No wheezing, rales or rhonchi. Heart - Normal rate, regular rhythm. Normal S1, S2. No MGR or clicks. Abdomen - Soft,non-distended. Normoactive BS in all quadrants. NT, no mass or HSM. Ext- Radial, DP pulses, 2+ bilaterally. No pedal edema, clubbing, or cyanosis. Skin-Warm and dry. No hyperpigmentation, ulcerations, or suspicious lesions. Neuro - Normal speech, no focal findings or movement disorder. Normal strength and muscle tone. Antalgic gait using cane. Right Knee- LROM. Global  TTP. Varus deformity. No erythema. +crepitus and effusion. Back- alignment midline. Lumbar spinal and left paraspinal tenderness. No CVAT. LROM, +SLR. Assessment/Plan:  Holding BP med change until BP check tomorrow. May add losartan/valsartan INI. Medication Side Effects and Warnings were discussed with patient: yes   Patient Labs were reviewed: yes  Patient Past Records were reviewed: yes    See below for other orders   Follow-up Disposition:  Return in about 4 weeks (around 7/13/2017) for pain med refill. BP check tomorrow. ICD-10-CM ICD-9-CM    1. Chronic bilateral low back pain with right-sided sciatica M54.41 724.2     G89.29 724.3      338.29    2. Primary osteoarthritis of right knee M17.11 715.16 oxyCODONE-acetaminophen (PERCOCET 10)  mg per tablet      PAIN MGMT PANEL W/REFL, UR   3. Status post total right knee replacement Z96.651 V43.65 oxyCODONE-acetaminophen (PERCOCET 10)  mg per tablet      PAIN MGMT PANEL W/REFL, UR   4. Malignant hypertension I10 401.0    5. Pain management contract signed Z02.89 V68.89    6. Chronic pain of left knee M25.562 719.46     G89.29 338.29    7. Environmental and seasonal allergies J30.89 477.8      Orders Placed This Encounter    PAIN MGMT PANEL W/REFL, UR    oxyCODONE-acetaminophen (PERCOCET 10)  mg per tablet     Sig: May take 1 tab ONCE DAILY, no more than every other day as needed for pain. Indications: Pain     Dispense:  30 Tab     Refill:  0       Liliya Sykes expressed understanding of plan. An After Visit Summary was offered/printed and given to the patient.

## 2017-06-16 ENCOUNTER — CLINICAL SUPPORT (OUTPATIENT)
Dept: INTERNAL MEDICINE CLINIC | Age: 52
End: 2017-06-16

## 2017-06-16 VITALS — HEART RATE: 75 BPM | DIASTOLIC BLOOD PRESSURE: 105 MMHG | SYSTOLIC BLOOD PRESSURE: 166 MMHG

## 2017-06-16 DIAGNOSIS — I10 MALIGNANT HYPERTENSION: ICD-10-CM

## 2017-06-16 DIAGNOSIS — Z01.30 BP CHECK: Primary | ICD-10-CM

## 2017-06-16 LAB
AMPHETAMINES UR QL SCN: NEGATIVE NG/ML
BARBITURATES UR QL SCN: NEGATIVE NG/ML
BENZODIAZ UR QL SCN: NEGATIVE NG/ML
BZE UR QL SCN: NEGATIVE NG/ML
CANNABINOIDS UR QL SCN: NEGATIVE NG/ML
CREAT UR-MCNC: 195.2 MG/DL (ref 20–300)
FENTANYL+NORFENTANYL UR QL SCN: NEGATIVE PG/ML
MEPERIDINE UR QL: NEGATIVE NG/ML
METHADONE UR QL SCN: NEGATIVE NG/ML
OPIATES UR QL SCN: NEGATIVE NG/ML
OXYCODONE+OXYMORPHONE UR QL SCN: NEGATIVE NG/ML
PCP UR QL: NEGATIVE NG/ML
PH UR: 7.2 [PH] (ref 4.5–8.9)
PLEASE NOTE:, 733157: NORMAL
PROPOXYPH UR QL SCN: NEGATIVE NG/ML
SP GR UR: 1.02
TRAMADOL UR QL SCN: NEGATIVE NG/ML

## 2017-06-16 RX ORDER — METOPROLOL SUCCINATE 25 MG/1
25 TABLET, EXTENDED RELEASE ORAL DAILY
Qty: 30 TAB | Refills: 11 | Status: SHIPPED | OUTPATIENT
Start: 2017-06-16 | End: 2018-09-08 | Stop reason: SDUPTHER

## 2017-06-16 NOTE — PROGRESS NOTES
Pt is instructed to return on Monday for BP check. Pt is being sent Losartan to the pharmacy.  Pt was instructed to start medication today along with amlodipine and HCTZ  BP Readings from Last 3 Encounters:   06/16/17 (!) 166/105   06/15/17 (!) 199/107   05/15/17 (!) 133/96

## 2017-06-28 ENCOUNTER — CLINICAL SUPPORT (OUTPATIENT)
Dept: INTERNAL MEDICINE CLINIC | Age: 52
End: 2017-06-28

## 2017-06-28 VITALS — SYSTOLIC BLOOD PRESSURE: 160 MMHG | DIASTOLIC BLOOD PRESSURE: 93 MMHG

## 2017-06-28 DIAGNOSIS — I10 ESSENTIAL HYPERTENSION: Primary | ICD-10-CM

## 2017-07-13 ENCOUNTER — OFFICE VISIT (OUTPATIENT)
Dept: INTERNAL MEDICINE CLINIC | Age: 52
End: 2017-07-13

## 2017-07-13 VITALS
RESPIRATION RATE: 18 BRPM | BODY MASS INDEX: 30.06 KG/M2 | HEIGHT: 61 IN | SYSTOLIC BLOOD PRESSURE: 119 MMHG | HEART RATE: 82 BPM | TEMPERATURE: 98.9 F | WEIGHT: 159.2 LBS | OXYGEN SATURATION: 98 % | DIASTOLIC BLOOD PRESSURE: 84 MMHG

## 2017-07-13 DIAGNOSIS — M17.11 PRIMARY OSTEOARTHRITIS OF RIGHT KNEE: Primary | ICD-10-CM

## 2017-07-13 DIAGNOSIS — J01.90 SUBACUTE SINUSITIS, UNSPECIFIED LOCATION: ICD-10-CM

## 2017-07-13 DIAGNOSIS — Z96.651 STATUS POST TOTAL RIGHT KNEE REPLACEMENT: ICD-10-CM

## 2017-07-13 DIAGNOSIS — G89.29 CHRONIC BILATERAL LOW BACK PAIN WITH RIGHT-SIDED SCIATICA: ICD-10-CM

## 2017-07-13 DIAGNOSIS — J30.89 ENVIRONMENTAL AND SEASONAL ALLERGIES: ICD-10-CM

## 2017-07-13 DIAGNOSIS — M54.41 CHRONIC BILATERAL LOW BACK PAIN WITH RIGHT-SIDED SCIATICA: ICD-10-CM

## 2017-07-13 RX ORDER — OXYCODONE AND ACETAMINOPHEN 10; 325 MG/1; MG/1
TABLET ORAL
Qty: 30 TAB | Refills: 0 | Status: SHIPPED | OUTPATIENT
Start: 2017-07-13 | End: 2017-08-10 | Stop reason: SDUPTHER

## 2017-07-13 NOTE — PROGRESS NOTES
Encounter for pain management. Chronic Pain:  Patient has chronic BL knee pain for years, had right TKR last year, with continued pain and swelling. Lower back pain and right sided sciatica since surgery. Has IMAGING for lumbar spine and right knee and has been seeing/seen by [facility] and spinal specialist months ago. Last PT March 2017, continuing HEP. Pain in the right knee, leg, and lower back is still limiting walking, sitting, and standing, exacerbated by forementioned acitivities to include stair climbing. Has tried prednisone, tramadol, injections, PT, gabapentin, cymbalta, and surgery in past with minimal relief. Pain has been controlled with Oxycodone, last taken 3 days ago, using tylenol, patches, and gel to treat. The medication is kept safe by staying with her. Has NOT seen any other providers since last OV for pain medication. No significant changes to pain presentation since last OV. Symptoms onset: problem is longstanding. Rheumatological ROS: ongoing significant pain which is stable and controlled by pain med. Response to treatment plan: waxing and waning. Hypertension Review:  The patient has hypertension  Diet and Lifestyle: generally does try to follow a  low sodium diet, exercises rarely due to worsening pain. Home BP Monitoring: is not measured at home. Pertinent ROS: taking medications as instructed, however NOT taken yet this morning and Pain Scale: 10 - Worst pain ever/10. no medication side effects noted. No TIA's, chest pain on exertion, dyspnea on exertion, or swelling of ankles. BP Readings from Last 3 Encounters:   07/13/17 119/84   06/28/17 (!) 160/93   06/16/17 (!) 166/105     Pt feels zoloft and cymbalta still helpful, but reports 5 crying spells this week while attempting to coordinate bills and make financial arrangements. Otherwise feeling better. No suicidal ideations reported today.      Lastly with chills, nasal congestion, sneezing, and fatigue for past 4 days. No treatments tried, but continues to take flonase and claritin as prescribed. Unable to tolerate extreme heat and temperature changes. No sick contacts otherwise. Review of Systems  Constitutional: negative for fevers, chills, anorexia and weight loss  Eyes:   negative for visual disturbance, drainage, and irritation  ENT:   +AR and environmental allergies. negative for tinnitus,sore throat,ear pain,and hoarseness  Respiratory:  + asthma/COPD, mild exacerbation.  negative for hemoptysis  CV:   negative for chest pain, palpitations, and lower extremity edema  GI:   negative for nausea, vomiting, diarrhea, abdominal pain, and melena  Endo:               negative for polyuria,polydipsia,polyphagia, and heat intolerance  Genitourinary: negative for frequency, urgency, dysuria, retention, and hematuria  Integument:  negative for rash, ulcerations, and pruritus  Hematologic:  negative for easy bruising and bleeding  Musculoskel: negative for  muscle weakness  Neurological:  negative for headaches, dizziness, vertigo,and memory problems  Behavl/Psych: negative for feelings of  suicide    1./2. Medical history/Past medical History   Past Medical History:   Diagnosis Date    Asthma     uses inhalers    Chronic obstructive pulmonary disease (HCC)     bronchitis    GERD (gastroesophageal reflux disease)     Hypertension     Ill-defined condition     environmental allergies     Ill-defined condition     Multiple body piercings; unable to remove tongue and lip piercings    Thyroid disease     hypo    Ventral hernia 12/31/2013     Past Surgical History:   Procedure Laterality Date    HX HEENT  2009    thyroidectomy    HX KNEE REPLACEMENT      R    HX MOHS PROCEDURES Right 3/8/11    HX OTHER SURGICAL      hiatal hernia repair    HX TUBAL LIGATION       Patient Active Problem List   Diagnosis Code    Ventral hernia K43.9    Chronic pain of right knee M25.561, G89.29    Chronic right shoulder pain M25.511, G89.29    Environmental and seasonal allergies J30.89    Ventral hernia without obstruction or gangrene K43.9    Primary osteoarthritis of right knee M17.11    Mixed simple and mucopurulent chronic bronchitis (HCC) J41.8    Acquired hypothyroidism E03.9    Chronic bilateral low back pain with right-sided sciatica M54.41, G89.29    Status post total right knee replacement Z96.651    Malignant hypertension I10    Mild single current episode of major depressive disorder ([facility] [address] F32.0    Pain management contract signed Z02.89    Chronic pain of left knee M25.562, G89.29       3. Applicable records from prior treatment providers are apart of [facility] under the media/encounters tab. 4. Diagnostic, therapeutic and laboratory results are available in the [facility] chart. 5. Consultation notes are available for review in the media/encounters tab of the [facility] chart. 6. Treatment goals include: pain control, improve activity level and function in regards to activities of daily living, and improved comfort level. Previously pt has been limited by pain in all these aspects. 7. The risks and benefits of treatment have been discussed at this office visit with the pt.  she understands that the medication has addictive potential.  Additionally the pt has been advised that narcotic pain medication may impair mental and/or physical ability required for performance of tasks such as driving or operating any other machinery. 8. Pt has an updated signed pain contract on file and can be found under the media section of the [facility] chart. 9. The pain contract is reviewed. Pain medication will be continued at the same dosage. Pill count: 1 tab. Urine drug screening ordered/collected today. Diagnostic studies are not indicated at this time. Interventional procedure are not indicated at this time.         10. Medication prescibed is oxycodone every 24 hours PRN # 30 with zero refills for a 1 month supply.  was reviewed while planning for pain/anxiety management, no indications of drug diversion suspected. Prescription history is NOT suspicious for controlled substance overuse. 11. Patient instructions have been reviewed in detail as outlined above and in the pain contract. 12. Re-evaluation is planned for 1 month(s). Current Outpatient Prescriptions   Medication Sig Dispense Refill    oxyCODONE-acetaminophen (PERCOCET 10)  mg per tablet May take 1 tab ONCE DAILY, no more than every other day as needed for pain. Indications: Pain 30 Tab 0    metoprolol succinate (TOPROL-XL) 25 mg XL tablet Take 1 Tab by mouth daily. 30 Tab 11    amLODIPine (NORVASC) 10 mg tablet TAKE 1 TABLET BY MOUTH EVERY DAY  Indications: hypertension 90 Tab 3    gabapentin (NEURONTIN) 800 mg tablet Take 1 Tab by mouth three (3) times daily. 90 Tab 11    hydroCHLOROthiazide (HYDRODIURIL) 25 mg tablet Take 1 Tab by mouth daily. Indications: hypertension 30 Tab 11    sertraline (ZOLOFT) 25 mg tablet Take 1 Tab by mouth daily. 30 Tab 11    baclofen (LIORESAL) 20 mg tablet Take 1 Tab by mouth three (3) times daily as needed for Pain (spasms). 60 Tab 3    ondansetron hcl (ZOFRAN, AS HYDROCHLORIDE,) 4 mg tablet Take 1 Tab by mouth every eight (8) hours as needed for Nausea. 20 Tab 0    DULoxetine (CYMBALTA) 60 mg capsule Take 1 Cap by mouth daily. Indications: CHRONIC MUSCULOSKELETAL PAIN 30 Cap 11    acetaminophen (TYLENOL) 650 mg CR tablet Take 1 Tab by mouth three (3) times daily as needed for Pain. 90 Tab 11    lidocaine (LIDODERM) 5 % Apply patch to the affected area for 12 hours a day and remove for 12 hours a day. 30 Each 11    fluticasone (FLONASE) 50 mcg/actuation nasal spray 2 Sprays by Both Nostrils route daily. 1 Bottle 11    ondansetron (ZOFRAN ODT) 4 mg disintegrating tablet Take 1 Tab by mouth every eight (8) hours as needed for Nausea.  20 Tab 3    levothyroxine (SYNTHROID) 125 mcg tablet Take 1 Tab by mouth Daily (before breakfast). 80 Tab 3    miscellaneous medical supply misc Shower seat for chronic knee pain, pt planning to have right TKR in June due to condition. Very limited range of motion. 1 Each 0    montelukast (SINGULAIR) 10 mg tablet daily. 6    pantoprazole (PROTONIX) 40 mg tablet two (2) times a day. 5    loratadine (CLARITIN) 10 mg tablet Take 1 Tab by mouth daily. 30 Tab 5    budesonide-formoterol (SYMBICORT) 160-4.5 mcg/actuation HFA inhaler Take 2 Puffs by inhalation two (2) times a day.  albuterol (PROAIR HFA) 90 mcg/actuation inhaler Take 2 Puffs by inhalation every four (4) hours as needed.  albuterol (PROVENTIL VENTOLIN) 2.5 mg /3 mL (0.083 %) nebulizer solution by Nebulization route three (3) times daily. Allergies   Allergen Reactions    Hydrocodone Itching    Mold Shortness of Breath and Itching    Ibuprofen Nausea Only       Objective:  Visit Vitals    /84 (BP 1 Location: Right arm, BP Patient Position: Sitting)    Pulse 82    Temp 98.9 °F (37.2 °C) (Oral)    Resp 18    Ht 5' 1\" (1.549 m)    Wt 159 lb 3.2 oz (72.2 kg)    SpO2 98%    BMI 30.08 kg/m2     Wt Readings from Last 3 Encounters:   07/13/17 159 lb 3.2 oz (72.2 kg)   06/15/17 158 lb 14.4 oz (72.1 kg)   05/12/17 165 lb (74.8 kg)     Physical Exam:   General appearance - alert, fair appearing, and in mild distress. Mental status - A/O x 4, normal mood and affect. Nose- +allergic salute and sniffling  Eyes- trace drainage at lacrimal ducts BL. Trace redness. Neck -Supple ,normal CSP. FROM, non-tender. No significant adenopathy/thyromegaly. No JVD. Chest - CTA. Symmetric chest rise. No wheezing, rales or rhonchi. Heart - Normal rate, regular rhythm. Normal S1, S2. No MGR or clicks. Ext- Radial, DP pulses, 2+ bilaterally. No pedal edema, clubbing, or cyanosis. Skin-Warm and dry. No hyperpigmentation, ulcerations, or suspicious lesions.   Neuro - Normal speech, no focal findings or movement disorder. Normal strength and muscle tone. Limping gait. Assessment/Plan:  BP stable. Advised saline rinses and switch to zyrtec for next few days. Also advised pt to increase zoloft to 50 mg if crying spells continue for > 2 wk, 5 episodes this week reported. Medication Side Effects and Warnings were discussed with patient: yes   Patient Labs were reviewed: yes  Patient Past Records were reviewed: yes    See below for other orders   Follow-up Disposition:  Return in about 4 weeks (around 8/10/2017) for pain med refill. ICD-10-CM ICD-9-CM    1. Primary osteoarthritis of right knee M17.11 715.16 oxyCODONE-acetaminophen (PERCOCET 10)  mg per tablet      PAIN MGMT PANEL W/REFL, UR   2. Status post total right knee replacement Z96.651 V43.65 oxyCODONE-acetaminophen (PERCOCET 10)  mg per tablet      PAIN MGMT PANEL W/REFL, UR   3. Subacute sinusitis, unspecified location J01.90 461.9 PAIN MGMT PANEL W/REFL, UR   4. Chronic bilateral low back pain with right-sided sciatica M54.41 724.2     G89.29 724.3      338.29    5. Environmental and seasonal allergies J30.89 477.8      Orders Placed This Encounter    PAIN MGMT PANEL W/REFL, UR    oxyCODONE-acetaminophen (PERCOCET 10)  mg per tablet     Sig: May take 1 tab ONCE DAILY, no more than every other day as needed for pain. Indications: Pain     Dispense:  30 Tab     Refill:  0       Liliya Sykes expressed understanding of plan. An After Visit Summary was offered/printed and given to the patient.

## 2017-07-13 NOTE — PATIENT INSTRUCTIONS
Saline Nasal Washes: Care Instructions  Your Care Instructions  Saline nasal washes help keep the nasal passages open by washing out thick or dried mucus. This simple remedy can help relieve symptoms of allergies, sinusitis, and colds. It also can make the nose feel more comfortable by keeping the mucous membranes moist. You may notice a little burning sensation in your nose the first few times you use the solution, but this usually gets better in a few days. Follow-up care is a key part of your treatment and safety. Be sure to make and go to all appointments, and call your doctor if you are having problems. It's also a good idea to know your test results and keep a list of the medicines you take. How can you care for yourself at home? · You can buy premixed saline solution in a squeeze bottle or other sinus rinse products at a drugstore. Read and follow the instructions on the label. · You also can make your own saline solution by adding 1 teaspoon of salt and 1 teaspoon of baking soda to 2 cups of distilled water. · If you use a homemade solution, pour a small amount into a clean bowl. Using a rubber bulb syringe, squeeze the syringe and place the tip in the salt water. Pull a small amount of the salt water into the syringe by relaxing your hand. · Sit down with your head tilted slightly back. Do not lie down. Put the tip of the bulb syringe or the squeeze bottle a little way into one of your nostrils. Gently drip or squirt a few drops into the nostril. Repeat with the other nostril. Some sneezing and gagging are normal at first.  · Gently blow your nose. · Wipe the syringe or bottle tip clean after each use. · Repeat this 2 or 3 times a day. · Use nasal washes gently if you have nosebleeds often. When should you call for help? Watch closely for changes in your health, and be sure to contact your doctor if:  · You often get nosebleeds. · You have problems doing the nasal washes.   Where can you learn more? Go to http://ofelia-mindy.info/. Enter [id number] [id number] [phone] in the search box to learn more about \"Saline Nasal Washes: Care Instructions. \"  Current as of: May 4, 2017  Content Version: 11.3  © 6329-2428 [employer]. Care instructions adapted under license by [employer] (which disclaims liability or warranty for this information). If you have questions about a medical condition or this instruction, always ask your healthcare professional. [address] any warranty or liability for your use of this information.

## 2017-07-13 NOTE — PROGRESS NOTES
Pt is here for   Chief Complaint   Patient presents with    Medication Refill     orders pending     Pt states pain level is a 10 right knee and back  Pt states last had something for pain 2 days ago    1. Have you been to the ER, urgent care clinic since your last visit? Hospitalized since your last visit? No    2. Have you seen or consulted any other health care providers outside of the [facility] since your last visit? Include any pap smears or colon screening.  No

## 2017-07-13 NOTE — MR AVS SNAPSHOT
Visit Information Date & Time Provider Department Dept. Phone Encounter #  
 7/13/2017  1:40 PM Rubina Mckeon NP [address] [facility] [phone] [id number] Follow-up Instructions Return in about 4 weeks (around 8/10/2017) for pain med refill. Your Appointments 8/10/2017  1:00 PM  
ROUTINE CARE with Rubina Mckeon NP  
[address] [facility] [address] [facility]) Appt Note: pain med fu  
 [address] [facility] [phone] [id number]  
[phone]  
  
   
 [address] [facility] Upcoming Health Maintenance Date Due  
 PAP AKA CERVICAL CYTOLOGY 6/23/1984 BREAST CANCER SCRN MAMMOGRAM 4/6/2014 FOBT Q 1 YEAR AGE 50-75 5/26/2017 INFLUENZA AGE 9 TO ADULT 8/1/2017 DTaP/Tdap/Td series (2 - Td) 7/6/2025 Allergies as of 7/13/2017  Review Complete On: 7/13/2017 By: Rubina Mckeon NP Severity Noted Reaction Type Reactions Hydrocodone  03/22/2017    Itching Mold  05/23/2016    Shortness of Breath, Itching Ibuprofen Low 03/30/2015   Intolerance Nausea Only Current Immunizations  Reviewed on 3/3/2017 Name Date Influenza Vaccine (Quad) Intradermal PF 12/9/2016 Pneumococcal Polysaccharide (PPSV-23) 3/3/2017 Not reviewed this visit You Were Diagnosed With   
  
 Codes Comments Primary osteoarthritis of right knee    -  Primary ICD-10-CM: M17.11 ICD-9-CM: 715.16 Status post total right knee replacement     ICD-10-CM: B41.592 ICD-9-CM: V43.65 Subacute sinusitis, unspecified location     ICD-10-CM: J01.90 ICD-9-CM: 461.9 Chronic bilateral low back pain with right-sided sciatica     ICD-10-CM: M54.41, G89.29 ICD-9-CM: 724.2, 724.3, 338.29 Environmental and seasonal allergies     ICD-10-CM: J30.89 ICD-9-CM: 477.8 Vitals BP Pulse Temp Resp Height(growth percentile) Weight(growth percentile)  119/84 (BP 1 Location: Right arm, BP Patient Position: Sitting) 82 98.9 °F (37.2 °C) (Oral) 18 5' 1\" (1.549 m) 159 lb 3.2 oz (72.2 kg) SpO2 BMI OB Status Smoking Status 98% 30.08 kg/m2 Unknown Former Smoker Vitals History BMI and BSA Data Body Mass Index Body Surface Area 30.08 kg/m 2 1.76 m 2 Preferred Pharmacy Pharmacy Name Phone [facility] #[id number] Mosley [phone] Your Updated Medication List  
  
   
This list is accurate as of: 7/13/17  2:20 PM.  Always use your most recent med list.  
  
  
  
  
 acetaminophen 650 mg CR tablet Commonly known as:  TYLENOL Take 1 Tab by mouth three (3) times daily as needed for Pain. amLODIPine 10 mg tablet Commonly known as:  [place] Claudette TAKE 1 TABLET BY MOUTH EVERY DAY  Indications: hypertension  
  
 baclofen 20 mg tablet Commonly known as:  LIORESAL Take 1 Tab by mouth three (3) times daily as needed for Pain (spasms). DULoxetine 60 mg capsule Commonly known as:  CYMBALTA Take 1 Cap by mouth daily. Indications: CHRONIC MUSCULOSKELETAL PAIN  
  
 fluticasone 50 mcg/actuation nasal spray Commonly known as:  Marine Knack 2 Sprays by Both Nostrils route daily. gabapentin 800 mg tablet Commonly known as:  NEURONTIN Take 1 Tab by mouth three (3) times daily. hydroCHLOROthiazide 25 mg tablet Commonly known as:  HYDRODIURIL Take 1 Tab by mouth daily. Indications: hypertension  
  
 levothyroxine 125 mcg tablet Commonly known as:  SYNTHROID Take 1 Tab by mouth Daily (before breakfast). lidocaine 5 % Commonly known as:  Estil Finical Apply patch to the affected area for 12 hours a day and remove for 12 hours a day. loratadine 10 mg tablet Commonly known as:  Veryl Bleak Take 1 Tab by mouth daily. metoprolol succinate 25 mg XL tablet Commonly known as:  TOPROL-XL Take 1 Tab by mouth daily. miscellaneous medical supply Misc Shower seat for chronic knee pain, pt planning to have right TKR in June due to condition. Very limited range of motion. montelukast 10 mg tablet Commonly known as:  SINGULAIR  
daily. ondansetron 4 mg disintegrating tablet Commonly known as:  ZOFRAN ODT Take 1 Tab by mouth every eight (8) hours as needed for Nausea. ondansetron hcl 4 mg tablet Commonly known as:  ZOFRAN (AS HYDROCHLORIDE) Take 1 Tab by mouth every eight (8) hours as needed for Nausea. oxyCODONE-acetaminophen  mg per tablet Commonly known as:  PERCOCET 10 May take 1 tab ONCE DAILY, no more than every other day as needed for pain. Indications: Pain  
  
 pantoprazole 40 mg tablet Commonly known as:  PROTONIX  
two (2) times a day. * PROAIR HFA 90 mcg/actuation inhaler Generic drug:  albuterol Take 2 Puffs by inhalation every four (4) hours as needed. * albuterol 2.5 mg /3 mL (0.083 %) nebulizer solution Commonly known as:  PROVENTIL VENTOLIN  
by Nebulization route three (3) times daily. sertraline 25 mg tablet Commonly known as:  ZOLOFT Take 1 Tab by mouth daily. SYMBICORT 160-4.5 mcg/actuation HFA inhaler Generic drug:  budesonide-formoterol Take 2 Puffs by inhalation two (2) times a day. * Notice: This list has 2 medication(s) that are the same as other medications prescribed for you. Read the directions carefully, and ask your doctor or other care provider to review them with you. Prescriptions Printed Refills  
 oxyCODONE-acetaminophen (PERCOCET 10)  mg per tablet 0 Sig: May take 1 tab ONCE DAILY, no more than every other day as needed for pain. Indications: Pain Class: Print We Performed the Following PAIN MGMT PANEL W/REFL, UR [[id number] Custom] Follow-up Instructions Return in about 4 weeks (around 8/10/2017) for pain med refill. Patient Instructions Saline Nasal Washes: Care Instructions Your Care Instructions Saline nasal washes help keep the nasal passages open by washing out thick or dried mucus. This simple remedy can help relieve symptoms of allergies, sinusitis, and colds. It also can make the nose feel more comfortable by keeping the mucous membranes moist. You may notice a little burning sensation in your nose the first few times you use the solution, but this usually gets better in a few days. Follow-up care is a key part of your treatment and safety. Be sure to make and go to all appointments, and call your doctor if you are having problems. It's also a good idea to know your test results and keep a list of the medicines you take. How can you care for yourself at home? · You can buy premixed saline solution in a squeeze bottle or other sinus rinse products at a drugstore. Read and follow the instructions on the label. · You also can make your own saline solution by adding 1 teaspoon of salt and 1 teaspoon of baking soda to 2 cups of distilled water. · If you use a homemade solution, pour a small amount into a clean bowl. Using a rubber bulb syringe, squeeze the syringe and place the tip in the salt water. Pull a small amount of the salt water into the syringe by relaxing your hand. · Sit down with your head tilted slightly back. Do not lie down. Put the tip of the bulb syringe or the squeeze bottle a little way into one of your nostrils. Gently drip or squirt a few drops into the nostril. Repeat with the other nostril. Some sneezing and gagging are normal at first. 
· Gently blow your nose. · Wipe the syringe or bottle tip clean after each use. · Repeat this 2 or 3 times a day. · Use nasal washes gently if you have nosebleeds often. When should you call for help? Watch closely for changes in your health, and be sure to contact your doctor if: 
· You often get nosebleeds. · You have problems doing the nasal washes. Where can you learn more? Go to http://ofelia-mindy.info/. Enter [phone] in the search box to learn more about \"Saline Nasal Washes: Care Instructions. \" Current as of: May 4, 2017 Content Version: 11.3 © 4379-7895 [employer]. Care instructions adapted under license by [employer] (which disclaims liability or warranty for this information). If you have questions about a medical condition or this instruction, always ask your healthcare professional. [address] any warranty or liability for your use of this information. Introducing [facility]! [facility] introduces Temnos patient portal. Now you can access parts of your medical record, email your doctor's office, and request medication refills online. 1. In your internet browser, go to [email or web address] 2. Click on the First Time User? Click Here link in the Sign In box. You will see the New Member Sign Up page. 3. Enter your Temnos Access Code exactly as it appears below. You will not need to use this code after youve completed the sign-up process. If you do not sign up before the expiration date, you must request a new code. · Temnos Access Code: [email or web address] Expires: 10/11/2017  2:14 PM 
 
4. Enter the last four digits of your Social Security Number (xxxx) and Date of Birth (mm/dd/yyyy) as indicated and click Submit. You will be taken to the next sign-up page. 5. Create a FAD ? IOt ID. This will be your Temnos login ID and cannot be changed, so think of one that is secure and easy to remember. 6. Create a Temnos password. You can change your password at any time. 7. Enter your Password Reset Question and Answer. This can be used at a later time if you forget your password. 8. Enter your e-mail address. You will receive e-mail notification when new information is available in [address] 9. Click Sign Up. You can now view and download portions of your medical record. 10. Click the Download Summary menu link to download a portable copy of your medical information. If you have questions, please visit the Frequently Asked Questions section of the Nano ePrint website. Remember, Nano ePrint is NOT to be used for urgent needs. For medical emergencies, dial 911. Now available from your iPhone and Android! Please provide this summary of care documentation to your next provider. Your primary care clinician is listed as HUGO Payton. If you have any questions after today's visit, please call [phone].

## 2017-07-14 LAB
AMPHETAMINES UR QL SCN: NEGATIVE NG/ML
BARBITURATES UR QL SCN: NEGATIVE NG/ML
BENZODIAZ UR QL SCN: NEGATIVE NG/ML
BZE UR QL SCN: NEGATIVE NG/ML
CANNABINOIDS UR QL SCN: NEGATIVE NG/ML
CREAT UR-MCNC: 65.1 MG/DL (ref 20–300)
FENTANYL+NORFENTANYL UR QL SCN: NEGATIVE PG/ML
MEPERIDINE UR QL: NEGATIVE NG/ML
METHADONE UR QL SCN: NEGATIVE NG/ML
OPIATES UR QL SCN: NEGATIVE NG/ML
OXYCODONE+OXYMORPHONE UR QL SCN: NEGATIVE NG/ML
PCP UR QL: NEGATIVE NG/ML
PH UR: 8.3 [PH] (ref 4.5–8.9)
PLEASE NOTE:, 733157: NORMAL
PROPOXYPH UR QL SCN: NEGATIVE NG/ML
SP GR UR: 1.02
TRAMADOL UR QL SCN: NEGATIVE NG/ML

## 2017-07-18 ENCOUNTER — TELEPHONE (OUTPATIENT)
Dept: INTERNAL MEDICINE CLINIC | Age: 52
End: 2017-07-18

## 2017-07-18 NOTE — TELEPHONE ENCOUNTER
Pt states she needs a letter from you stating she is disabled for cooling assistance please.  Pt # [phone]

## 2017-08-10 ENCOUNTER — OFFICE VISIT (OUTPATIENT)
Dept: INTERNAL MEDICINE CLINIC | Age: 52
End: 2017-08-10

## 2017-08-10 VITALS
RESPIRATION RATE: 18 BRPM | WEIGHT: 158.8 LBS | OXYGEN SATURATION: 98 % | SYSTOLIC BLOOD PRESSURE: 136 MMHG | DIASTOLIC BLOOD PRESSURE: 93 MMHG | HEART RATE: 76 BPM | TEMPERATURE: 98.1 F | BODY MASS INDEX: 29.98 KG/M2 | HEIGHT: 61 IN

## 2017-08-10 DIAGNOSIS — Z96.651 STATUS POST TOTAL RIGHT KNEE REPLACEMENT: ICD-10-CM

## 2017-08-10 DIAGNOSIS — M25.512 ACUTE PAIN OF LEFT SHOULDER: ICD-10-CM

## 2017-08-10 DIAGNOSIS — M79.10 MYALGIA: ICD-10-CM
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
## 2017-08-10 DIAGNOSIS — F32.2 SEVERE SINGLE CURRENT EPISODE OF MAJOR DEPRESSIVE DISORDER, WITHOUT PSYCHOTIC FEATURES (HCC): ICD-10-CM

## 2017-08-10 DIAGNOSIS — M17.11 PRIMARY OSTEOARTHRITIS OF RIGHT KNEE: Primary | ICD-10-CM

## 2017-08-10 RX ORDER — SERTRALINE HYDROCHLORIDE 50 MG/1
50 TABLET, FILM COATED ORAL DAILY
Qty: 30 TAB | Refills: 11 | Status: SHIPPED | OUTPATIENT
Start: 2017-08-10 | End: 2017-12-07 | Stop reason: SDUPTHER

## 2017-08-10 RX ORDER — OXYCODONE AND ACETAMINOPHEN 10; 325 MG/1; MG/1
TABLET ORAL
Qty: 30 TAB | Refills: 0 | Status: SHIPPED | OUTPATIENT
Start: 2017-08-10 | End: 2017-09-08 | Stop reason: SDUPTHER
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
## 2017-08-10 NOTE — PATIENT INSTRUCTIONS
Depression Treatment: Care Instructions  Your Care Instructions  Depression is a condition that affects the way you feel, think, and act. It causes symptoms such as low energy, loss of interest in daily activities, and sadness or grouchiness that goes on for a long time. Depression is very common and affects men and women of all ages. Depression is a medical illness caused by changes in the natural chemicals in your brain. It is not a character flaw, and it does not mean that you are a bad or weak person. It does not mean that you are going crazy. It is important to know that depression can be treated. Medicines, counseling, and self-care can all help. Many people do not get help because they are embarrassed or think that they will get over the depression on their own. But some people do not get better without treatment. Follow-up care is a key part of your treatment and safety. Be sure to make and go to all appointments, and call your doctor if you are having problems. It's also a good idea to know your test results and keep a list of the medicines you take. How can you care for yourself at home? Learn about antidepressant medicines  Antidepressant medicines can improve or end the symptoms of depression. You may need to take the medicine for at least 6 months, and often longer. Keep taking your medicine even if you feel better. If you stop taking it too soon, your symptoms may come back or get worse. You may start to feel better within 1 to 3 weeks of taking antidepressant medicine. But it can take as many as 6 to 8 weeks to see more improvement. Talk to your doctor if you have problems with your medicine or if you do not notice any improvement after 3 weeks. Antidepressants can make you feel tired, dizzy, or nervous. Some people have dry mouth, constipation, headaches, sexual problems, an upset stomach, or diarrhea.  Many of these side effects are mild and go away on their own after you take the medicine for a few weeks. Some may last longer. Talk to your doctor if side effects bother you too much. You might be able to try a different medicine. If you are pregnant or breastfeeding, talk to your doctor about what medicines you can take. Learn about counseling  In many cases, counseling can work as well as medicines to treat mild to moderate depression. Counseling is done by licensed mental health providers, such as psychologists, social workers, and some types of nurses. It can be done in one-on-one sessions or in a group setting. Many people find group sessions helpful. Cognitive-behavioral therapy is a type of counseling. In this treatment therapy, you learn how to see and change unhelpful thinking styles that may be adding to your depression. Counseling and medicines often work well when used together. To manage depression  · Be physically active. Getting 30 minutes of exercise each day is good for your body and your mind. Begin slowly if it is hard for you to get started. If you already exercise, keep it up. · Plan something pleasant for yourself every day. Include activities that you have enjoyed in the past.  · Get enough sleep. Talk to your doctor if you have problems sleeping. · Eat a balanced diet. If you do not feel hungry, eat small snacks rather than large meals. · Do not drink alcohol, use illegal drugs, or take medicines that your doctor has not prescribed for you. They may interfere with your treatment. · Spend time with family and friends. It may help to speak openly about your depression with people you trust.  · Take your medicines exactly as prescribed. Call your doctor if you think you are having a problem with your medicine. · Do not make major life decisions while you are depressed. Depression may change the way you think. You will be able to make better decisions after you feel better. · Think positively.  Challenge negative thoughts with statements such as \"I am hopeful\"; \"Things will get better\"; and \"I can ask for the help I need. \" Write down these statements and read them often, even if you don't believe them yet. · Be patient with yourself. It took time for your depression to develop, and it will take time for your symptoms to improve. Do not take on too much or be too hard on yourself. · Learn all you can about depression from written and online materials. · Check out behavioral health classes to learn more about dealing with depression. · Keep the numbers for these national suicide hotlines: 5-930-585-TALK (2-675.888.1539) and 6-597-AERFDQM (8-189.683.2739). If you or someone you know talks about suicide or feeling hopeless, get help right away. When should you call for help? Call 911 anytime you think you may need emergency care. For example, call if:  · You feel you cannot stop from hurting yourself or someone else. Call your doctor now or seek immediate medical care if:  · You hear voices. · You feel much more depressed. Watch closely for changes in your health, and be sure to contact your doctor if:  · You are having problems with your depression medicine. · You are not getting better as expected. Where can you learn more? Go to http://ofelia-mindy.info/. Enter J749 in the search box to learn more about \"Depression Treatment: Care Instructions. \"  Current as of: July 26, 2016  Content Version: 11.3  © 7097-5263 [employer]. Care instructions adapted under license by [employer] (which disclaims liability or warranty for this information). If you have questions about a medical condition or this instruction, always ask your healthcare professional. [address] any warranty or liability for your use of this information.
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
## 2017-08-10 NOTE — PROGRESS NOTES
Pt states pain level is a 10+ left shoulder, back, right hip. Pt states last had something for pain over a week ago    1. Have you been to the ER, urgent care clinic since your last visit? Hospitalized since your last visit? No    2. Have you seen or consulted any other health care providers outside of the [address] [place] [address] since your last visit? Include any pap smears or colon screening.  No     Pt is here for   Chief Complaint   Patient presents with    Medication Refill     orders pending

## 2017-08-12 LAB
AMPHETAMINES UR QL SCN: NEGATIVE NG/ML
BARBITURATES UR QL SCN: NEGATIVE NG/ML
BENZODIAZ UR QL SCN: NEGATIVE NG/ML
BZE UR QL SCN: NEGATIVE NG/ML
CANNABINOIDS UR QL SCN: NEGATIVE NG/ML
CREAT UR-MCNC: 182.6 MG/DL (ref 20–300)
FENTANYL+NORFENTANYL UR QL SCN: NEGATIVE PG/ML
MEPERIDINE UR QL: NEGATIVE NG/ML
METHADONE UR QL SCN: NEGATIVE NG/ML
OPIATES UR QL SCN: NEGATIVE NG/ML
OXYCODONE+OXYMORPHONE UR QL SCN: NEGATIVE NG/ML
PCP UR QL: NEGATIVE NG/ML
PH UR: 7.6 [PH] (ref 4.5–8.9)
PLEASE NOTE:, 733157: NORMAL
PROPOXYPH UR QL SCN: NEGATIVE NG/ML
SP GR UR: 1.02
TRAMADOL UR QL SCN: NEGATIVE NG/ML

## 2017-08-17 DIAGNOSIS — E03.9 HYPOTHYROIDISM, UNSPECIFIED TYPE: ICD-10-CM

## 2017-08-18 RX ORDER — LEVOTHYROXINE SODIUM 125 UG/1
TABLET ORAL
Qty: 90 TAB | Refills: 0 | Status: SHIPPED | OUTPATIENT
Start: 2017-08-18 | End: 2018-01-08 | Stop reason: SDUPTHER

## 2017-09-08 ENCOUNTER — OFFICE VISIT (OUTPATIENT)
Dept: INTERNAL MEDICINE CLINIC | Age: 52
End: 2017-09-08

## 2017-09-08 VITALS
SYSTOLIC BLOOD PRESSURE: 95 MMHG | TEMPERATURE: 98.4 F | HEART RATE: 74 BPM | DIASTOLIC BLOOD PRESSURE: 64 MMHG | HEIGHT: 61 IN | OXYGEN SATURATION: 97 % | WEIGHT: 158 LBS | RESPIRATION RATE: 18 BRPM | BODY MASS INDEX: 29.83 KG/M2

## 2017-09-08 DIAGNOSIS — M17.11 PRIMARY OSTEOARTHRITIS OF RIGHT KNEE: Primary | ICD-10-CM

## 2017-09-08 DIAGNOSIS — F32.0 MILD SINGLE CURRENT EPISODE OF MAJOR DEPRESSIVE DISORDER (HCC): ICD-10-CM

## 2017-09-08 DIAGNOSIS — Z96.651 STATUS POST TOTAL RIGHT KNEE REPLACEMENT: ICD-10-CM

## 2017-09-08 DIAGNOSIS — Z91.81 HISTORY OF RECENT FALL: ICD-10-CM

## 2017-09-08 DIAGNOSIS — G89.29 CHRONIC LEFT SHOULDER PAIN: ICD-10-CM

## 2017-09-08 DIAGNOSIS — M54.41 CHRONIC BILATERAL LOW BACK PAIN WITH RIGHT-SIDED SCIATICA: ICD-10-CM

## 2017-09-08 DIAGNOSIS — M25.512 CHRONIC LEFT SHOULDER PAIN: ICD-10-CM

## 2017-09-08 DIAGNOSIS — G89.29 CHRONIC BILATERAL LOW BACK PAIN WITH RIGHT-SIDED SCIATICA: ICD-10-CM

## 2017-09-08 DIAGNOSIS — Z13.31 DEPRESSION SCREENING: ICD-10-CM

## 2017-09-08 RX ORDER — OXYCODONE AND ACETAMINOPHEN 10; 325 MG/1; MG/1
TABLET ORAL
Qty: 30 TAB | Refills: 0 | Status: SHIPPED | OUTPATIENT
Start: 2017-09-08 | End: 2017-10-06 | Stop reason: SDUPTHER
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
## 2017-09-08 NOTE — MR AVS SNAPSHOT
Visit Information Date & Time Provider Department Dept. Phone Encounter #  
 9/8/2017 10:40 AM Latoya Daley NP [address] [facility] [phone] [id number] Follow-up Instructions Return in about 4 weeks (around 10/6/2017) for pain med refill. Upcoming Health Maintenance Date Due  
 BREAST CANCER SCRN MAMMOGRAM 10/8/2017* PAP AKA CERVICAL CYTOLOGY 10/8/2017* FOBT Q 1 YEAR AGE 50-75 10/8/2017* DTaP/Tdap/Td series (2 - Td) 7/6/2025 *Topic was postponed. The date shown is not the original due date. Allergies as of 9/8/2017  Review Complete On: 9/8/2017 By: Latoya Daley NP Severity Noted Reaction Type Reactions Hydrocodone  03/22/2017    Itching Mold  05/23/2016    Shortness of Breath, Itching Ibuprofen Low 03/30/2015   Intolerance Nausea Only Current Immunizations  Reviewed on 3/3/2017 Name Date Influenza Vaccine (Quad) Intradermal PF 12/9/2016 Pneumococcal Polysaccharide (PPSV-23) 3/3/2017 Not reviewed this visit You Were Diagnosed With   
  
 Codes Comments Primary osteoarthritis of right knee    -  Primary ICD-10-CM: M17.11 ICD-9-CM: 715.16 Status post total right knee replacement     ICD-10-CM: F35.955 ICD-9-CM: V43.65 Chronic bilateral low back pain with right-sided sciatica     ICD-10-CM: M54.41, G89.29 ICD-9-CM: 724.2, 724.3, 338.29 Chronic left shoulder pain     ICD-10-CM: M25.512, G89.29 ICD-9-CM: 719.41, 338.29 Vitals BP Pulse Temp Resp Height(growth percentile) Weight(growth percentile) 95/64 (BP 1 Location: Right arm, BP Patient Position: Sitting) 74 98.4 °F (36.9 °C) (Oral) 18 5' 1\" (1.549 m) 158 lb (71.7 kg) SpO2 BMI OB Status Smoking Status 97% 29.85 kg/m2 Unknown Former Smoker Vitals History BMI and BSA Data Body Mass Index Body Surface Area  
 29.85 kg/m 2 1.76 m 2 Preferred Pharmacy Pharmacy Name Phone [id number] [phone] Your Updated Medication List  
  
   
This list is accurate as of: 9/8/17 11:33 AM.  Always use your most recent med list.  
  
  
  
  
 acetaminophen 650 mg CR tablet Commonly known as:  TYLENOL Take 1 Tab by mouth three (3) times daily as needed for Pain. amLODIPine 10 mg tablet Commonly known as:  Charles Lute TAKE 1 TABLET BY MOUTH EVERY DAY  Indications: hypertension  
  
 baclofen 20 mg tablet Commonly known as:  LIORESAL Take 1 Tab by mouth three (3) times daily as needed for Pain (spasms). DULoxetine 60 mg capsule Commonly known as:  CYMBALTA Take 1 Cap by mouth daily. Indications: CHRONIC MUSCULOSKELETAL PAIN  
  
 fluticasone 50 mcg/actuation nasal spray Commonly known as:  Imelda Mealy 2 Sprays by Both Nostrils route daily. gabapentin 800 mg tablet Commonly known as:  NEURONTIN Take 1 Tab by mouth three (3) times daily. hydroCHLOROthiazide 25 mg tablet Commonly known as:  HYDRODIURIL Take 1 Tab by mouth daily. Indications: hypertension  
  
 levothyroxine 125 mcg tablet Commonly known as:  SYNTHROID  
TAKE 1 TAB BY MOUTH DAILY (BEFORE BREAKFAST). lidocaine 5 % Commonly known as:  Teodoro Kocher Apply patch to the affected area for 12 hours a day and remove for 12 hours a day. loratadine 10 mg tablet Commonly known as:  Gennett Caroli Take 1 Tab by mouth daily. metoprolol succinate 25 mg XL tablet Commonly known as:  TOPROL-XL Take 1 Tab by mouth daily. miscellaneous medical supply Misc Shower seat for chronic knee pain, pt planning to have right TKR in June due to condition. Very limited range of motion. montelukast 10 mg tablet Commonly known as:  SINGULAIR  
daily. ondansetron 4 mg disintegrating tablet Commonly known as:  ZOFRAN ODT Take 1 Tab by mouth every eight (8) hours as needed for Nausea. ondansetron hcl 4 mg tablet Commonly known as:  ZOFRAN (AS HYDROCHLORIDE) Take 1 Tab by mouth every eight (8) hours as needed for Nausea. oxyCODONE-acetaminophen  mg per tablet Commonly known as:  PERCOCET 10 May take 1 tab ONCE DAILY, no more than every other day as needed for pain. Indications: Pain  
  
 pantoprazole 40 mg tablet Commonly known as:  PROTONIX  
two (2) times a day. * PROAIR HFA 90 mcg/actuation inhaler Generic drug:  albuterol Take 2 Puffs by inhalation every four (4) hours as needed. * albuterol 2.5 mg /3 mL (0.083 %) nebulizer solution Commonly known as:  PROVENTIL VENTOLIN  
by Nebulization route three (3) times daily. sertraline 50 mg tablet Commonly known as:  ZOLOFT Take 1 Tab by mouth daily. SYMBICORT 160-4.5 mcg/actuation HFA inhaler Generic drug:  budesonide-formoterol Take 2 Puffs by inhalation two (2) times a day. * Notice: This list has 2 medication(s) that are the same as other medications prescribed for you. Read the directions carefully, and ask your doctor or other care provider to review them with you. Prescriptions Printed Refills  
 oxyCODONE-acetaminophen (PERCOCET 10)  mg per tablet 0 Sig: May take 1 tab ONCE DAILY, no more than every other day as needed for pain. Indications: Pain Class: Print We Performed the Following PAIN MGMT PANEL W/REFL, UR [[id number] Custom] Follow-up Instructions Return in about 4 weeks (around 10/6/2017) for pain med refill. Patient Instructions BE CAREFUL on the STAIRS in the future, they can be dangerous. Use handrails, and keep your shoes tied. Preventing Falls: Care Instructions Your Care Instructions Getting around your home safely can be a challenge if you have injuries or health problems that make it easy for you to fall.  Loose rugs and furniture in walkways are among the dangers for many older people who have problems walking or who have poor eyesight. People who have conditions such as arthritis, osteoporosis, or dementia also have to be careful not to fall. You can make your home safer with a few simple measures. Follow-up care is a key part of your treatment and safety. Be sure to make and go to all appointments, and call your doctor if you are having problems. It's also a good idea to know your test results and keep a list of the medicines you take. How can you care for yourself at home? Taking care of yourself · You may get dizzy if you do not drink enough water. To prevent dehydration, drink plenty of fluids, enough so that your urine is light yellow or clear like water. Choose water and other caffeine-free clear liquids. If you have kidney, heart, or liver disease and have to limit fluids, talk with your doctor before you increase the amount of fluids you drink. · Exercise regularly to improve your strength, muscle tone, and balance. Walk if you can. Swimming may be a good choice if you cannot walk easily. · Have your vision and hearing checked each year or any time you notice a change. If you have trouble seeing and hearing, you might not be able to avoid objects and could lose your balance. · Know the side effects of the medicines you take. Ask your doctor or pharmacist whether the medicines you take can affect your balance. Sleeping pills or sedatives can affect your balance. · Limit the amount of alcohol you drink. Alcohol can impair your balance and other senses. · Ask your doctor whether calluses or corns on your feet need to be removed. If you wear loose-fitting shoes because of calluses or corns, you can lose your balance and fall. · Talk to your doctor if you have numbness in your feet. Preventing falls at home · Remove raised doorway thresholds, throw rugs, and clutter. Repair loose carpet or raised areas in the floor. · Move furniture and electrical cords to keep them out of walking paths. · Use nonskid floor wax, and wipe up spills right away, especially on ceramic tile floors. · If you use a walker or cane, put rubber tips on it. If you use crutches, clean the bottoms of them regularly with an abrasive pad, such as steel wool. · Keep your house well lit, especially [place], and outside walkways. Use night-lights in areas such as hallways and bathrooms. Add extra light switches or use remote switches (such as switches that go on or off when you clap your hands) to make it easier to turn lights on if you have to get up during the night. · Install sturdy handrails on stairways. · Move items in your cabinets so that the things you use a lot are on the lower shelves (about waist level). · Keep a cordless phone and a flashlight with new batteries by your bed. If possible, put a phone in each of the main rooms of your house, or carry a cell phone in case you fall and cannot reach a phone. Or, you can wear a device around your neck or wrist. You push a button that sends a signal for help. · Wear low-heeled shoes that fit well and give your feet good support. Use footwear with nonskid soles. Check the heels and soles of your shoes for wear. Repair or replace worn heels or soles. · Do not wear socks without shoes on wood floors. · Walk on the grass when the sidewalks are slippery. If you live in an area that gets snow and ice in the winter, sprinkle salt on slippery steps and sidewalks. Preventing falls in the bath · Install grab bars and nonskid mats inside and outside your shower or tub and near the toilet and sinks. · Use shower chairs and bath benches. · Use a hand-held shower head that will allow you to sit while showering.  
· Get into a tub or shower by putting the weaker leg in first. Get out of a tub or shower with your strong side first. 
 · Repair loose toilet seats and consider installing a raised toilet seat to make getting on and off the toilet easier. · Keep your bathroom door unlocked while you are in the shower. Where can you learn more? Go to http://ofelia-mindy.info/. Enter [phone] in the search box to learn more about \"Preventing Falls: Care Instructions. \" Current as of: August 4, 2016 Content Version: 11.3 © 2604-6636 [employer]. Care instructions adapted under license by [employer] (which disclaims liability or warranty for this information). If you have questions about a medical condition or this instruction, always ask your healthcare professional. [address] any warranty or liability for your use of this information. Introducing [facility] [place] [facility]! Rene Mosqueda introduces [employer] patient portal. Now you can access parts of your medical record, email your doctor's office, and request medication refills online. 1. In your internet browser, go to [email or web address] 2. Click on the First Time User? Click Here link in the Sign In box. You will see the New Member Sign Up page. 3. Enter your [employer] Access Code exactly as it appears below. You will not need to use this code after youve completed the sign-up process. If you do not sign up before the expiration date, you must request a new code. · [employer] Access Code: [email or web address] Expires: 10/11/2017  2:14 PM 
 
4. Enter the last four digits of your Social Security Number (xxxx) and Date of Birth (mm/dd/yyyy) as indicated and click Submit. You will be taken to the next sign-up page. 5. Create a [employer] ID. This will be your [employer] login ID and cannot be changed, so think of one that is secure and easy to remember. 6. Create a [employer] password. You can change your password at any time. 7. Enter your Password Reset Question and Answer.  This can be used at a later time if you forget your password. 8. Enter your e-mail address. You will receive e-mail notification when new information is available in [address] 9. Click Sign Up. You can now view and download portions of your medical record. 10. Click the Download Summary menu link to download a portable copy of your medical information. If you have questions, please visit the Frequently Asked Questions section of the [employer] website. Remember, [employer] is NOT to be used for urgent needs. For medical emergencies, dial 911. Now available from your iPhone and Android! Please provide this summary of care documentation to your next provider. Your primary care clinician is listed as [facility]. If you have any questions after today's visit, please call [phone].

## 2017-09-08 NOTE — PROGRESS NOTES
Pt is here for   Chief Complaint   Patient presents with    Medication Evaluation     zoloft follow up    Medication Refill     orders pending     Pt states pain level is a 10 left shoulder and right knee    1. Have you been to the ER, urgent care clinic since your last visit? Hospitalized since your last visit? No    2. Have you seen or consulted any other health care providers outside of the [address] [place] [address] since your last visit? Include any pap smears or colon screening.  No

## 2017-09-08 NOTE — PATIENT INSTRUCTIONS
BE CAREFUL on the STAIRS in the future, they can be dangerous. Use handrails, and keep your shoes tied. Preventing Falls: Care Instructions  Your Care Instructions  Getting around your home safely can be a challenge if you have injuries or health problems that make it easy for you to fall. Loose rugs and furniture in walkways are among the dangers for many older people who have problems walking or who have poor eyesight. People who have conditions such as arthritis, osteoporosis, or dementia also have to be careful not to fall. You can make your home safer with a few simple measures. Follow-up care is a key part of your treatment and safety. Be sure to make and go to all appointments, and call your doctor if you are having problems. It's also a good idea to know your test results and keep a list of the medicines you take. How can you care for yourself at home? Taking care of yourself  · You may get dizzy if you do not drink enough water. To prevent dehydration, drink plenty of fluids, enough so that your urine is light yellow or clear like water. Choose water and other caffeine-free clear liquids. If you have kidney, heart, or liver disease and have to limit fluids, talk with your doctor before you increase the amount of fluids you drink. · Exercise regularly to improve your strength, muscle tone, and balance. Walk if you can. Swimming may be a good choice if you cannot walk easily. · Have your vision and hearing checked each year or any time you notice a change. If you have trouble seeing and hearing, you might not be able to avoid objects and could lose your balance. · Know the side effects of the medicines you take. Ask your doctor or pharmacist whether the medicines you take can affect your balance. Sleeping pills or sedatives can affect your balance. · Limit the amount of alcohol you drink. Alcohol can impair your balance and other senses.   · Ask your doctor whether calluses or corns on your feet need to be removed. If you wear loose-fitting shoes because of calluses or corns, you can lose your balance and fall. · Talk to your doctor if you have numbness in your feet. Preventing falls at home  · Remove raised doorway thresholds, throw rugs, and clutter. Repair loose carpet or raised areas in the floor. · Move furniture and electrical cords to keep them out of walking paths. · Use nonskid floor wax, and wipe up spills right away, especially on ceramic tile floors. · If you use a walker or cane, put rubber tips on it. If you use crutches, clean the bottoms of them regularly with an abrasive pad, such as steel wool. · Keep your house well lit, especially Alyssa Longest, and outside walkways. Use night-lights in areas such as hallways and bathrooms. Add extra light switches or use remote switches (such as switches that go on or off when you clap your hands) to make it easier to turn lights on if you have to get up during the night. · Install sturdy handrails on stairways. · Move items in your cabinets so that the things you use a lot are on the lower shelves (about waist level). · Keep a cordless phone and a flashlight with new batteries by your bed. If possible, put a phone in each of the main rooms of your house, or carry a cell phone in case you fall and cannot reach a phone. Or, you can wear a device around your neck or wrist. You push a button that sends a signal for help. · Wear low-heeled shoes that fit well and give your feet good support. Use footwear with nonskid soles. Check the heels and soles of your shoes for wear. Repair or replace worn heels or soles. · Do not wear socks without shoes on wood floors. · Walk on the grass when the sidewalks are slippery. If you live in an area that gets snow and ice in the winter, sprinkle salt on slippery steps and sidewalks.   Preventing falls in the bath  · Install grab bars and nonskid mats inside and outside your shower or tub and near the toilet and sinks. · Use shower chairs and bath benches. · Use a hand-held shower head that will allow you to sit while showering. · Get into a tub or shower by putting the weaker leg in first. Get out of a tub or shower with your strong side first.  · Repair loose toilet seats and consider installing a raised toilet seat to make getting on and off the toilet easier. · Keep your bathroom door unlocked while you are in the shower. Where can you learn more? Go to http://ofelia-mindy.info/. Enter [id number] [phone] in the search box to learn more about \"Preventing Falls: Care Instructions. \"  Current as of: August 4, 2016  Content Version: 11.3  © 8045-6519 [employer]. Care instructions adapted under license by [employer] (which disclaims liability or warranty for this information). If you have questions about a medical condition or this instruction, always ask your healthcare professional. [place] [address] any warranty or liability for your use of this information.

## 2017-09-09 LAB
AMPHETAMINES UR QL SCN: NEGATIVE NG/ML
BARBITURATES UR QL SCN: NEGATIVE NG/ML
BENZODIAZ UR QL SCN: NEGATIVE NG/ML
BZE UR QL SCN: NEGATIVE NG/ML
CANNABINOIDS UR QL SCN: NEGATIVE NG/ML
CREAT UR-MCNC: 277.5 MG/DL (ref 20–300)
FENTANYL+NORFENTANYL UR QL SCN: NEGATIVE PG/ML
MEPERIDINE UR QL: NEGATIVE NG/ML
METHADONE UR QL SCN: NEGATIVE NG/ML
OPIATES UR QL SCN: NEGATIVE NG/ML
OXYCODONE+OXYMORPHONE UR QL SCN: NEGATIVE NG/ML
PCP UR QL: NEGATIVE NG/ML
PH UR: 5.6 [PH] (ref 4.5–8.9)
PLEASE NOTE:, 733157: NORMAL
PROPOXYPH UR QL SCN: NEGATIVE NG/ML
SP GR UR: 1.02
TRAMADOL UR QL SCN: NEGATIVE NG/ML

## 2017-10-06 ENCOUNTER — OFFICE VISIT (OUTPATIENT)
Dept: INTERNAL MEDICINE CLINIC | Age: 52
End: 2017-10-06

## 2017-10-06 VITALS
DIASTOLIC BLOOD PRESSURE: 77 MMHG | HEIGHT: 61 IN | WEIGHT: 160.2 LBS | RESPIRATION RATE: 18 BRPM | BODY MASS INDEX: 30.25 KG/M2 | TEMPERATURE: 97.9 F | SYSTOLIC BLOOD PRESSURE: 130 MMHG | HEART RATE: 67 BPM | OXYGEN SATURATION: 99 %

## 2017-10-06 DIAGNOSIS — M17.11 PRIMARY OSTEOARTHRITIS OF RIGHT KNEE: Primary | ICD-10-CM

## 2017-10-06 DIAGNOSIS — G89.29 CHRONIC PAIN OF RIGHT KNEE: ICD-10-CM

## 2017-10-06 DIAGNOSIS — Z96.651 STATUS POST TOTAL RIGHT KNEE REPLACEMENT: ICD-10-CM

## 2017-10-06 DIAGNOSIS — M25.511 CHRONIC RIGHT SHOULDER PAIN: ICD-10-CM

## 2017-10-06 DIAGNOSIS — G89.29 CHRONIC RIGHT SHOULDER PAIN: ICD-10-CM

## 2017-10-06 DIAGNOSIS — M25.561 CHRONIC PAIN OF RIGHT KNEE: ICD-10-CM

## 2017-10-06 RX ORDER — OXYCODONE AND ACETAMINOPHEN 10; 325 MG/1; MG/1
TABLET ORAL
Qty: 30 TAB | Refills: 0 | Status: SHIPPED | OUTPATIENT
Start: 2017-10-06 | End: 2017-11-03 | Stop reason: SDUPTHER

## 2017-10-06 NOTE — MR AVS SNAPSHOT
Visit Information Date & Time Provider Department Dept. Phone Encounter #  
 10/6/2017  9:20 AM Blanca Brown NP [address] [facility] [phone] [id number] Follow-up Instructions Return in about 4 weeks (around 11/3/2017) for pain med refill. Your Appointments 11/3/2017  9:20 AM  
ROUTINE CARE with Blanca Brown NP  
[address] [facility] [address]) Appt Note: one month fu  
 [address] [facility] [address] [id number]  
[phone]  
  
   
 [address] [facility] Upcoming Health Maintenance Date Due  
 BREAST CANCER SCRN MAMMOGRAM 10/8/2017* PAP AKA CERVICAL CYTOLOGY 10/8/2017* FOBT Q 1 YEAR AGE 50-75 10/8/2017* DTaP/Tdap/Td series (2 - Td) 7/6/2025 *Topic was postponed. The date shown is not the original due date. Allergies as of 10/6/2017  Review Complete On: 10/6/2017 By: Blanca Brown NP Severity Noted Reaction Type Reactions Hydrocodone  03/22/2017    Itching Mold  05/23/2016    Shortness of Breath, Itching Ibuprofen Low 03/30/2015   Intolerance Nausea Only Current Immunizations  Reviewed on 3/3/2017 Name Date Influenza Vaccine (Quad) Intradermal PF 12/9/2016 Pneumococcal Polysaccharide (PPSV-23) 3/3/2017 Not reviewed this visit You Were Diagnosed With   
  
 Codes Comments Primary osteoarthritis of right knee    -  Primary ICD-10-CM: M17.11 ICD-9-CM: 715.16 Status post total right knee replacement     ICD-10-CM: R59.922 ICD-9-CM: V43.65 Chronic right shoulder pain     ICD-10-CM: M25.511, G89.29 ICD-9-CM: 719.41, 338.29 Chronic pain of right knee     ICD-10-CM: M25.561, G89.29 ICD-9-CM: 719.46, 338.29 Vitals BP Pulse Temp Resp Height(growth percentile) Weight(growth percentile) 130/77 (BP 1 Location: Right arm, BP Patient Position: Sitting) 67 97.9 °F (36.6 °C) (Oral) 18 5' 1\" (1.549 m) 160 lb 3.2 oz (72.7 kg) SpO2 BMI OB Status Smoking Status 99% 30.27 kg/m2 Unknown Former Smoker Vitals History BMI and BSA Data Body Mass Index Body Surface Area  
 30.27 kg/m 2 1.77 m 2 Preferred Pharmacy Pharmacy Name Phone [id number] [phone] Your Updated Medication List  
  
   
This list is accurate as of: 10/6/17  9:49 AM.  Always use your most recent med list.  
  
  
  
  
 acetaminophen 650 mg CR tablet Commonly known as:  TYLENOL Take 1 Tab by mouth three (3) times daily as needed for Pain. amLODIPine 10 mg tablet Commonly known as:  Smith Presser TAKE 1 TABLET BY MOUTH EVERY DAY  Indications: hypertension  
  
 baclofen 20 mg tablet Commonly known as:  LIORESAL Take 1 Tab by mouth three (3) times daily as needed for Pain (spasms). DULoxetine 60 mg capsule Commonly known as:  CYMBALTA Take 1 Cap by mouth daily. Indications: CHRONIC MUSCULOSKELETAL PAIN  
  
 fluticasone 50 mcg/actuation nasal spray Commonly known as:  Euna Jason 2 Sprays by Both Nostrils route daily. gabapentin 800 mg tablet Commonly known as:  NEURONTIN Take 1 Tab by mouth three (3) times daily. hydroCHLOROthiazide 25 mg tablet Commonly known as:  HYDRODIURIL Take 1 Tab by mouth daily. Indications: hypertension  
  
 levothyroxine 125 mcg tablet Commonly known as:  SYNTHROID  
TAKE 1 TAB BY MOUTH DAILY (BEFORE BREAKFAST). lidocaine 5 % Commonly known as:  Sherrie Vanessa Apply patch to the affected area for 12 hours a day and remove for 12 hours a day. loratadine 10 mg tablet Commonly known as:  [place] Smita Take 1 Tab by mouth daily. metoprolol succinate 25 mg XL tablet Commonly known as:  TOPROL-XL Take 1 Tab by mouth daily. miscellaneous medical supply Misc Shower seat for chronic knee pain, pt planning to have right TKR in June due to condition. Very limited range of motion. montelukast 10 mg tablet Commonly known as:  SINGULAIR  
daily. ondansetron 4 mg disintegrating tablet Commonly known as:  ZOFRAN ODT Take 1 Tab by mouth every eight (8) hours as needed for Nausea. ondansetron hcl 4 mg tablet Commonly known as:  ZOFRAN (AS HYDROCHLORIDE) Take 1 Tab by mouth every eight (8) hours as needed for Nausea. oxyCODONE-acetaminophen  mg per tablet Commonly known as:  PERCOCET 10 May take 1 tab ONCE DAILY as needed for pain. Indications: Pain  
  
 pantoprazole 40 mg tablet Commonly known as:  PROTONIX  
two (2) times a day. * PROAIR HFA 90 mcg/actuation inhaler Generic drug:  albuterol Take 2 Puffs by inhalation every four (4) hours as needed. * albuterol 2.5 mg /3 mL (0.083 %) nebulizer solution Commonly known as:  PROVENTIL VENTOLIN  
by Nebulization route three (3) times daily. sertraline 50 mg tablet Commonly known as:  ZOLOFT Take 1 Tab by mouth daily. SYMBICORT 160-4.5 mcg/actuation Hfaa Generic drug:  budesonide-formoterol Take 2 Puffs by inhalation two (2) times a day. * Notice: This list has 2 medication(s) that are the same as other medications prescribed for you. Read the directions carefully, and ask your doctor or other care provider to review them with you. Prescriptions Printed Refills  
 oxyCODONE-acetaminophen (PERCOCET 10)  mg per tablet 0 Sig: May take 1 tab ONCE DAILY as needed for pain. Indications: Pain Class: Print We Performed the Following Kirill Adjutant 13 (MW) [[id number] Custom] Follow-up Instructions Return in about 4 weeks (around 11/3/2017) for pain med refill. Patient Instructions Arthritis: Care Instructions Your Care Instructions Arthritis, also called osteoarthritis, is a breakdown of the cartilage that cushions your joints.  When the cartilage wears down, your bones rub against each other. This causes pain and stiffness. Many people have some arthritis as they age. Arthritis most often affects the joints of the spine, hands, hips, knees, or feet. You can take simple measures to protect your joints, ease your pain, and help you stay active. Follow-up care is a key part of your treatment and safety. Be sure to make and go to all appointments, and call your doctor if you are having problems. It's also a good idea to know your test results and keep a list of the medicines you take. How can you care for yourself at home? · Stay at a healthy weight. Being overweight puts extra strain on your joints. · Talk to your doctor or physical therapist about exercises that will help ease joint pain. ¨ Stretch. You may enjoy gentle forms of yoga to help keep your joints and muscles flexible. ¨ Walk instead of jog. Other types of exercise that are less stressful on the joints include riding a bicycle, swimming, mikhail chi, or water exercise. ¨ Lift weights. Strong muscles help reduce stress on your joints. Stronger thigh muscles, for example, take some of the stress off of the knees and hips. Learn the right way to lift weights so you do not make joint pain worse. · Take your medicines exactly as prescribed. Call your doctor if you think you are having a problem with your medicine. · Take pain medicines exactly as directed. ¨ If the doctor gave you a prescription medicine for pain, take it as prescribed. ¨ If you are not taking a prescription pain medicine, ask your doctor if you can take an over-the-counter medicine. · Use a cane, crutch, walker, or another device if you need help to get around. These can help rest your joints. You also can use other things to make life easier, such as a higher toilet seat and padded handles on kitchen utensils. · Do not sit in low chairs, which can make it hard to get up. · Put heat or cold on your sore joints as needed.  Use whichever helps you most. You also can take turns with hot and cold packs. ¨ Apply heat 2 or 3 times a day for 20 to 30 minutesusing a heating pad, hot shower, or hot packto relieve pain and stiffness. ¨ Put ice or a cold pack on your sore joint for 10 to 20 minutes at a time. Put a thin cloth between the ice and your skin. When should you call for help? Call your doctor now or seek immediate medical care if: 
· You have sudden swelling, warmth, or pain in any joint. · You have joint pain and a fever or rash. · You have such bad pain that you cannot use a joint. Watch closely for changes in your health, and be sure to contact your doctor if: 
· You have mild joint symptoms that continue even with more than 6 weeks of care at home. · You have stomach pain or other problems with your medicine. Where can you learn more? Go to http://ofeliaInterMed Discoverymindy.info/. Enter Z971 in the search box to learn more about \"Arthritis: Care Instructions. \" Current as of: November 28, 2016 Content Version: 11.3 © 1898-2804 [employer]. Care instructions adapted under license by [employer] (which disclaims liability or warranty for this information). If you have questions about a medical condition or this instruction, always ask your healthcare professional. [place] [address] any warranty or liability for your use of this information. Joint Pain: Care Instructions Your Care Instructions Many people have small aches and pains from overuse or injury to muscles and joints. Joint injuries often happen during sports or recreation, work tasks, or projects around the home. An overuse injury can happen when you put too much stress on a joint or when you do an activity that stresses the joint over and over, such as using the computer or rowing a boat. You can take action at home to help your muscles and joints get better. You should feel better in 1 to 2 weeks, but it can take 3 months or more to heal completely. Follow-up care is a key part of your treatment and safety. Be sure to make and go to all appointments, and call your doctor if you are having problems. It's also a good idea to know your test results and keep a list of the medicines you take. How can you care for yourself at home? · Do not put weight on the injured joint for at least a day or two. · For the first day or two after an injury, do not take hot showers or baths, and do not use hot packs. The heat could make swelling worse. · Put ice or a cold pack on the sore joint for 10 to 20 minutes at a time. Try to do this every 1 to 2 hours for the next 3 days (when you are awake) or until the swelling goes down. Put a thin cloth between the ice and your skin. · Wrap the injury in an elastic bandage. Do not wrap it too tightly because this can cause more swelling. · Prop up the sore joint on a pillow when you ice it or anytime you sit or lie down during the next 3 days. Try to keep it above the level of your heart. This will help reduce swelling. · Take an over-the-counter pain medicine, such as acetaminophen (Tylenol), ibuprofen (Advil, Motrin), or naproxen (Aleve). Read and follow all instructions on the label. · After 1 or 2 days of rest, begin moving the joint gently. While the joint is still healing, you can begin to exercise using activities that do not strain or hurt the painful joint. When should you call for help? Call your doctor now or seek immediate medical care if: 
· You have signs of infection, such as: 
¨ Increased pain, swelling, warmth, and redness. ¨ Red streaks leading from the joint. ¨ A fever. Watch closely for changes in your health, and be sure to contact your doctor if: 
· Your movement or symptoms are not getting better after 1 to 2 weeks of home treatment. Where can you learn more? Go to http://ofelia-mindy.info/. Enter P205 in the search box to learn more about \"Joint Pain: Care Instructions. \" Current as of: March 21, 2017 Content Version: 11.3 © 2125-2502 [employer]. Care instructions adapted under license by [employer] (which disclaims liability or warranty for this information). If you have questions about a medical condition or this instruction, always ask your healthcare professional. [address] any warranty or liability for your use of this information. Introducing \[facility] [place] [facility]\"" & HEALTH SERVICES! [facility] introduces Conversion Sound patient portal. Now you can access parts of your medical record, email your doctor's office, and request medication refills online. 1. In your internet browser, go to https://"[employer]. [employer]/"[employer] 2. Click on the First Time User? Click Here link in the Sign In box. You will see the New Member Sign Up page. 3. Enter your Conversion Sound Access Code exactly as it appears below. You will not need to use this code after youve completed the sign-up process. If you do not sign up before the expiration date, you must request a new code. · Conversion Sound Access Code: [email or web address] Expires: 10/11/2017  2:14 PM 
 
4. Enter the last four digits of your Social Security Number (xxxx) and Date of Birth (mm/dd/yyyy) as indicated and click Submit. You will be taken to the next sign-up page. 5. Create a Conversion Sound ID. This will be your Conversion Sound login ID and cannot be changed, so think of one that is secure and easy to remember. 6. Create a Con[employer] Sound password. You can change your password at any time. 7. Enter your Password Reset Question and Answer. This can be used at a later time if you forget your password. 8. Enter your e-mail address. You will receive e-mail notification when new information is available in [address] 9. Click Sign Up. You can now view and download portions of your medical record. 10. Click the Download Summary menu link to download a portable copy of your medical information. If you have questions, please visit the Frequently Asked Questions section of the [employer] website. Remember, Be [employer] is NOT to be used for urgent needs. For medical emergencies, dial 911. Now available from your iPhone and Android! Please provide this summary of care documentation to your next provider. Your primary care clinician is listed as HUGO Gardiner. If you have any questions after today's visit, please call [phone].
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
## 2017-10-06 NOTE — PATIENT INSTRUCTIONS
Arthritis: Care Instructions  Your Care Instructions  Arthritis, also called osteoarthritis, is a breakdown of the cartilage that cushions your joints. When the cartilage wears down, your bones rub against each other. This causes pain and stiffness. Many people have some arthritis as they age. Arthritis most often affects the joints of the spine, hands, hips, knees, or feet. You can take simple measures to protect your joints, ease your pain, and help you stay active. Follow-up care is a key part of your treatment and safety. Be sure to make and go to all appointments, and call your doctor if you are having problems. It's also a good idea to know your test results and keep a list of the medicines you take. How can you care for yourself at home? · Stay at a healthy weight. Being overweight puts extra strain on your joints. · Talk to your doctor or physical therapist about exercises that will help ease joint pain. ¨ Stretch. You may enjoy gentle forms of yoga to help keep your joints and muscles flexible. ¨ Walk instead of jog. Other types of exercise that are less stressful on the joints include riding a bicycle, swimming, mikhail chi, or water exercise. ¨ Lift weights. Strong muscles help reduce stress on your joints. Stronger thigh muscles, for example, take some of the stress off of the knees and hips. Learn the right way to lift weights so you do not make joint pain worse. · Take your medicines exactly as prescribed. Call your doctor if you think you are having a problem with your medicine. · Take pain medicines exactly as directed. ¨ If the doctor gave you a prescription medicine for pain, take it as prescribed. ¨ If you are not taking a prescription pain medicine, ask your doctor if you can take an over-the-counter medicine. · Use a cane, crutch, walker, or another device if you need help to get around. These can help rest your joints.  You also can use other things to make life easier, such as a higher toilet seat and padded handles on kitchen utensils. · Do not sit in low chairs, which can make it hard to get up. · Put heat or cold on your sore joints as needed. Use whichever helps you most. You also can take turns with hot and cold packs. ¨ Apply heat 2 or 3 times a day for 20 to 30 minutes--using a heating pad, hot shower, or hot pack--to relieve pain and stiffness. ¨ Put ice or a cold pack on your sore joint for 10 to 20 minutes at a time. Put a thin cloth between the ice and your skin. When should you call for help? Call your doctor now or seek immediate medical care if:  · You have sudden swelling, warmth, or pain in any joint. · You have joint pain and a fever or rash. · You have such bad pain that you cannot use a joint. Watch closely for changes in your health, and be sure to contact your doctor if:  · You have mild joint symptoms that continue even with more than 6 weeks of care at home. · You have stomach pain or other problems with your medicine. Where can you learn more? Go to http://ofeliaVice Mediamindy.info/. Enter Q614 in the search box to learn more about \"Arthritis: Care Instructions. \"  Current as of: November 28, 2016  Content Version: 11.3  © 0886-3224 [employer]. Care instructions adapted under license by [employer] (which disclaims liability or warranty for this information). If you have questions about a medical condition or this instruction, always ask your healthcare professional. [place] [address] any warranty or liability for your use of this information. Joint Pain: Care Instructions  Your Care Instructions  Many people have small aches and pains from overuse or injury to muscles and joints. Joint injuries often happen during sports or recreation, work tasks, or projects around the home.  An overuse injury can happen when you put too much stress on a joint or when you do an activity that stresses the joint over and over, such as using the computer or rowing a boat. You can take action at home to help your muscles and joints get better. You should feel better in 1 to 2 weeks, but it can take 3 months or more to heal completely. Follow-up care is a key part of your treatment and safety. Be sure to make and go to all appointments, and call your doctor if you are having problems. It's also a good idea to know your test results and keep a list of the medicines you take. How can you care for yourself at home? · Do not put weight on the injured joint for at least a day or two. · For the first day or two after an injury, do not take hot showers or baths, and do not use hot packs. The heat could make swelling worse. · Put ice or a cold pack on the sore joint for 10 to 20 minutes at a time. Try to do this every 1 to 2 hours for the next 3 days (when you are awake) or until the swelling goes down. Put a thin cloth between the ice and your skin. · Wrap the injury in an elastic bandage. Do not wrap it too tightly because this can cause more swelling. · Prop up the sore joint on a pillow when you ice it or anytime you sit or lie down during the next 3 days. Try to keep it above the level of your heart. This will help reduce swelling. · Take an over-the-counter pain medicine, such as acetaminophen (Tylenol), ibuprofen (Advil, Motrin), or naproxen (Aleve). Read and follow all instructions on the label. · After 1 or 2 days of rest, begin moving the joint gently. While the joint is still healing, you can begin to exercise using activities that do not strain or hurt the painful joint. When should you call for help? Call your doctor now or seek immediate medical care if:  · You have signs of infection, such as:  ¨ Increased pain, swelling, warmth, and redness. ¨ Red streaks leading from the joint. ¨ A fever.   Watch closely for changes in your health, and be sure to contact your doctor if:  · Your movement or symptoms are not getting better after 1 to 2 weeks of home treatment. Where can you learn more? Go to http://ofelia-mindy.info/. Enter P205 in the search box to learn more about \"Joint Pain: Care Instructions. \"  Current as of: March 21, 2017  Content Version: 11.3  © 4669-2480 [employer]. Care instructions adapted under license by [employer] (which disclaims liability or warranty for this information). If you have questions about a medical condition or this instruction, always ask your healthcare professional. [address] any warranty or liability for your use of this information.

## 2017-10-06 NOTE — PROGRESS NOTES
Pt is here for   Chief Complaint   Patient presents with    Medication Refill     orders pending     1. Have you been to the ER, urgent care clinic since your last visit? Hospitalized since your last visit? No    2. Have you seen or consulted any other health care providers outside of the [address] [place] [address] since your last visit? Include any pap smears or colon screening.  No    Pt states pain level is a 8 right knee and back  Pt states last had something for pain yesterday

## 2017-10-13 LAB — DRUGS UR: NORMAL

## 2017-10-24 ENCOUNTER — TELEPHONE (OUTPATIENT)
Dept: INTERNAL MEDICINE CLINIC | Age: 52
End: 2017-10-24

## 2017-10-24 NOTE — TELEPHONE ENCOUNTER
Is she referring to the letter for her to get assistance for keeping her environment cool during the summer. This was written for a different reason. Also applying for disability usually comes with a form for me to complete, so writing a letter is duplicate work and unnecessary. Also the fact that the letter mentions the conditions as CHRONIC, gives them their time frame, she has had all her conditions for years. She may tell her current employer that, no new letter is needed, if this is the letter she is referring to.

## 2017-10-24 NOTE — TELEPHONE ENCOUNTER
Patient states that a letter was done for her for work, they need to know howl long is she going to be disable and yes she did apply for disability, this needs to go on letter

## 2017-10-25 ENCOUNTER — TELEPHONE (OUTPATIENT)
Dept: INTERNAL MEDICINE CLINIC | Age: 52
End: 2017-10-25

## 2017-10-25 NOTE — TELEPHONE ENCOUNTER
Pt states she called yesterday. You gave her a letter stating she is disabled. She states she needs another letter stating how long she will be disabled for general relief. And she does not have a form for you to fill out. She states she has applied for disability, and has an  handling this since July. If possible she would like to pick this up today.    Pt # [id number]

## 2017-11-03 ENCOUNTER — OFFICE VISIT (OUTPATIENT)
Dept: INTERNAL MEDICINE CLINIC | Age: 52
End: 2017-11-03

## 2017-11-03 VITALS
TEMPERATURE: 99 F | HEART RATE: 60 BPM | HEIGHT: 61 IN | SYSTOLIC BLOOD PRESSURE: 137 MMHG | DIASTOLIC BLOOD PRESSURE: 71 MMHG | RESPIRATION RATE: 18 BRPM | WEIGHT: 157 LBS | BODY MASS INDEX: 29.64 KG/M2

## 2017-11-03 DIAGNOSIS — Z96.651 STATUS POST TOTAL RIGHT KNEE REPLACEMENT: ICD-10-CM

## 2017-11-03 DIAGNOSIS — G89.29 CHRONIC RIGHT SHOULDER PAIN: ICD-10-CM

## 2017-11-03 DIAGNOSIS — M25.511 CHRONIC RIGHT SHOULDER PAIN: ICD-10-CM

## 2017-11-03 DIAGNOSIS — M25.561 CHRONIC PAIN OF RIGHT KNEE: ICD-10-CM

## 2017-11-03 DIAGNOSIS — Z23 ENCOUNTER FOR IMMUNIZATION: ICD-10-CM

## 2017-11-03 DIAGNOSIS — M17.11 PRIMARY OSTEOARTHRITIS OF RIGHT KNEE: Primary | ICD-10-CM

## 2017-11-03 DIAGNOSIS — G89.29 CHRONIC PAIN OF RIGHT KNEE: ICD-10-CM

## 2017-11-03 RX ORDER — DICLOFENAC SODIUM 10 MG/G
2 GEL TOPICAL EVERY 6 HOURS
Qty: 100 G | Refills: 5 | Status: SHIPPED | OUTPATIENT
Start: 2017-11-03 | End: 2022-04-21

## 2017-11-03 RX ORDER — MELOXICAM 15 MG/1
TABLET ORAL
Refills: 11 | COMMUNITY
Start: 2017-09-14 | End: 2018-08-28 | Stop reason: SDUPTHER

## 2017-11-03 RX ORDER — OXYCODONE AND ACETAMINOPHEN 10; 325 MG/1; MG/1
TABLET ORAL
Qty: 30 TAB | Refills: 0 | Status: SHIPPED | OUTPATIENT
Start: 2017-11-14 | End: 2017-12-07 | Stop reason: SDUPTHER

## 2017-11-03 NOTE — PROGRESS NOTES
Encounter for pain management. Chronic Pain:  Patient has chronic BL knee pain for years, had right TKR last year. Associated with lower back pain and right sided sciatica. Pain Scale: 9/10. Had IMAGING for lumbar spine and right knee and has been seeing/seen by ORTHO. Last PT March 2017, continuing HEP. Pain in the right knee, leg, and lower back is still limiting walking, sitting, and standing, exacerbated by forementioned acitivities to include stair climbing. Has tried prednisone, tramadol, injections, PT, gabapentin, cymbalta, and surgery in past with minimal relief. Pain has been controlled with Oxycodone, last taken for yesterday, ran out taking twice daily \"as you said I could take two a day and my pain has been hurting with this weather\". The medication is kept safe by staying with her. Has NOT seen any other providers since last OV for pain medication. No significant changes to pain presentation since last OV. she is  able to do her normal daily activities. she reports the following adverse side effects: none. Least pain over the last week has been 6/10. Worst pain over the last week has been 10/10. Aberrant behaviors: Running out of meds early, last fill 10/15         Symptoms onset: problem is longstanding. Rheumatological ROS: ongoing significant pain which is stable and controlled by pain med. Response to treatment plan: waxing and waning. Review of Systems  Constitutional: negative for fevers, chills, anorexia and weight loss  Eyes:   negative for visual disturbance, drainage, and irritation  ENT:   +AR and environmental allergies.  negative for tinnitus,sore throat,ear pain,and hoarseness  Respiratory:  + asthma/COPD. negative for hemoptysis  CV:   negative for chest pain, palpitations, and lower extremity edema  GI:   negative for nausea, vomiting, diarrhea, abdominal pain, and melena  Endo:               negative for polyuria,polydipsia,polyphagia, and heat intolerance  Genitourinary: negative for frequency, urgency, dysuria, retention, and hematuria  Integument:  negative for rash, ulcerations, and pruritus  Hematologic:  negative for easy bruising and bleeding  Musculoskel: negative for  muscle weakness  Neurological:  negative for headaches, dizziness, vertigo,and memory problems  Behavl/Psych: +depression, on cymbalta and zoloft. negative for feelings of  suicide    1./2. Medical history/Past medical History   Past Medical History:   Diagnosis Date    Asthma     uses inhalers    Chronic obstructive pulmonary disease (HCC)     bronchitis    GERD (gastroesophageal reflux disease)     Hypertension     Ill-defined condition     environmental allergies     Ill-defined condition     Multiple body piercings; unable to remove tongue and lip piercings    Thyroid disease     hypo    Ventral hernia 12/31/2013     Past Surgical History:   Procedure Laterality Date    HX HEENT  2009    thyroidectomy    HX KNEE REPLACEMENT      R    HX MOHS PROCEDURES Right 3/8/11    HX OTHER SURGICAL      hiatal hernia repair    HX TUBAL LIGATION       Patient Active Problem List   Diagnosis Code    Ventral hernia K43.9    Chronic pain of right knee M25.561, G89.29    Chronic right shoulder pain M25.511, G89.29    Environmental and seasonal allergies J30.89    Ventral hernia without obstruction or gangrene K43.9    Primary osteoarthritis of right knee M17.11    Mixed simple and mucopurulent chronic bronchitis (HCC) J41.8    Acquired hypothyroidism E03.9    Chronic bilateral low back pain with right-sided sciatica M54.41, G89.29    Status post total right knee replacement Z96.651    Malignant hypertension I10    Mild single current episode of major depressive disorder ([facility] [address] F32.0    Pain management contract signed Z02.89    Chronic pain of left knee M25.562, G89.29       3.  Applicable records from prior treatment providers are apart of [facility] under the media/encounters tab. 4. Diagnostic, therapeutic and laboratory results are available in the [address] [place] [address] chart. 5. Consultation notes are available for review in the media/encounters tab of the [address] [place] [address] chart. 6. Treatment goals include: pain control, improve activity level and function in regards to activities of daily living, and improved comfort level. Previously pt has been limited by pain in all these aspects. 7. The risks and benefits of treatment have been discussed at this office visit with the pt.  she understands that the medication has addictive potential.  Additionally the pt has been advised that narcotic pain medication may impair mental and/or physical ability required for performance of tasks such as driving or operating any other machinery. 8. Pt has an updated signed pain contract on file and can be found under the media section of the [facility] chart. 9. The pain contract is reviewed. Pain medication will be continued at the same dosage. Pill count: 0 tab COUNSELED this is LAST OV with misuse reported will TERMINATE care for VIOLATION of pain agreement if seen again. Urine drug screening ordered/collected today. Diagnostic studies are not indicated at this time. Interventional procedure are not indicated at this time. 10. Medication prescibed is oxycodone every 24 hours PRN # 30 with zero refills for a 1 month supply.  was reviewed while planning for pain/anxiety management, no indications of drug diversion suspected. Prescription history is NOT suspicious for controlled substance overuse. 11. Patient instructions have been reviewed in detail as outlined above and in the pain contract. 12. Re-evaluation is planned for 1 month(s). Current Outpatient Prescriptions   Medication Sig Dispense Refill    meloxicam (MOBIC) 15 mg tablet TAKE 1 TABLET (15 MG TOTAL) BY MOUTH DAILY.   11    [START ON 11/14/2017] oxyCODONE-acetaminophen (PERCOCET 10)  mg per tablet May take 1 tab ONCE DAILY as needed for pain. Indications: Pain 30 Tab 0    diclofenac (VOLTAREN) 1 % gel Apply 2 g to affected area every six (6) hours. 100 g 5    levothyroxine (SYNTHROID) 125 mcg tablet TAKE 1 TAB BY MOUTH DAILY (BEFORE BREAKFAST). 90 Tab 0    sertraline (ZOLOFT) 50 mg tablet Take 1 Tab by mouth daily. 30 Tab 11    metoprolol succinate (TOPROL-XL) 25 mg XL tablet Take 1 Tab by mouth daily. 30 Tab 11    amLODIPine (NORVASC) 10 mg tablet TAKE 1 TABLET BY MOUTH EVERY DAY  Indications: hypertension 90 Tab 3    gabapentin (NEURONTIN) 800 mg tablet Take 1 Tab by mouth three (3) times daily. 90 Tab 11    hydroCHLOROthiazide (HYDRODIURIL) 25 mg tablet Take 1 Tab by mouth daily. Indications: hypertension 30 Tab 11    baclofen (LIORESAL) 20 mg tablet Take 1 Tab by mouth three (3) times daily as needed for Pain (spasms). 60 Tab 3    DULoxetine (CYMBALTA) 60 mg capsule Take 1 Cap by mouth daily. Indications: CHRONIC MUSCULOSKELETAL PAIN 30 Cap 11    acetaminophen (TYLENOL) 650 mg CR tablet Take 1 Tab by mouth three (3) times daily as needed for Pain. 90 Tab 11    lidocaine (LIDODERM) 5 % Apply patch to the affected area for 12 hours a day and remove for 12 hours a day. 30 Each 11    fluticasone (FLONASE) 50 mcg/actuation nasal spray 2 Sprays by Both Nostrils route daily. 1 Bottle 11    ondansetron (ZOFRAN ODT) 4 mg disintegrating tablet Take 1 Tab by mouth every eight (8) hours as needed for Nausea. 21 Tab 3    miscellaneous medical supply misc Shower seat for chronic knee pain, pt planning to have right TKR in June due to condition. Very limited range of motion. 1 Each 0    montelukast (SINGULAIR) 10 mg tablet daily. 6    pantoprazole (PROTONIX) 40 mg tablet two (2) times a day. 5    loratadine (CLARITIN) 10 mg tablet Take 1 Tab by mouth daily.  30 Tab 5    budesonide-formoterol (SYMBICORT) 160-4.5 mcg/actuation HFA inhaler Take 2 Puffs by inhalation two (2) times a day.      albuterol (PROAIR HFA) 90 mcg/actuation inhaler Take 2 Puffs by inhalation every four (4) hours as needed.  albuterol (PROVENTIL VENTOLIN) 2.5 mg /3 mL (0.083 %) nebulizer solution by Nebulization route three (3) times daily. Allergies   Allergen Reactions    Hydrocodone Itching    Mold Shortness of Breath and Itching    Ibuprofen Nausea Only       Objective:  Visit Vitals    /71 (BP 1 Location: Right arm, BP Patient Position: Sitting)    Pulse 60    Temp 99 °F (37.2 °C) (Oral)    Resp 18    Ht 5' 1\" (1.549 m)    Wt 157 lb (71.2 kg)    BMI 29.66 kg/m2     Wt Readings from Last 3 Encounters:   11/03/17 157 lb (71.2 kg)   10/06/17 160 lb 3.2 oz (72.7 kg)   09/08/17 158 lb (71.7 kg)     Physical Exam:   General appearance - alert, well appearing, and in mild distress. Mental status - A/O x 4, normal mood and affect. Neck -Supple ,normal CSP. FROM, non-tender. No significant adenopathy/thyromegaly. No JVD. Chest - CTA. Symmetric chest rise. No wheezing, rales or rhonchi. Heart - Normal rate, regular rhythm. Normal S1, S2. No MGR or clicks. Ext- Radial, DP pulses, 2+ bilaterally. No pedal edema, clubbing, or cyanosis. Skin-Warm and dry. No hyperpigmentation, ulcerations, or suspicious lesions. Neuro - normal speech, no focal findings or movement disorder. Normal strength and muscle tone. Limping gait using cane. Left shoulder with FROM. Assessment/Plan:  The current medical regimen is effective;  continue present plan and medications. Medication Side Effects and Warnings were discussed with patient: yes   Patient Labs were reviewed: yes  Patient Past Records were reviewed: yes    See below for other orders   Follow-up Disposition:  Return in about 6 weeks (around 12/12/2017) for pain med refill and schedule annual with labs for 1/12. ICD-10-CM ICD-9-CM    1.  Primary osteoarthritis of right knee M17.11 715.16 oxyCODONE-acetaminophen (PERCOCET 10)  mg per tablet      TOXASSURE SELECT 13 (MW)      diclofenac (VOLTAREN) 1 % gel   2. Encounter for immunization Z23 V03.89 INFLUENZA VIRUS VAC QUAD,SPLIT,PRESV FREE SYRINGE IM   3. Status post total right knee replacement Z96.651 V43.65 oxyCODONE-acetaminophen (PERCOCET 10)  mg per tablet      TOXASSURE SELECT 13 (MW)      diclofenac (VOLTAREN) 1 % gel   4. Chronic right shoulder pain M25.511 719.41 oxyCODONE-acetaminophen (PERCOCET 10)  mg per tablet    G89.29 338.29 TOXASSURE SELECT 13 (MW)      diclofenac (VOLTAREN) 1 % gel   5. Chronic pain of right knee M25.561 719.46 oxyCODONE-acetaminophen (PERCOCET 10)  mg per tablet    G89.29 338.29 TOXASSURE SELECT 13 (MW)      diclofenac (VOLTAREN) 1 % gel     Orders Placed This Encounter    Influenza virus vaccine (QUADRIVALENT PRES FREE SYRINGE) IM (25764)    TOXASSURE SELECT 13 (MW)    meloxicam (MOBIC) 15 mg tablet     Sig: TAKE 1 TABLET (15 MG TOTAL) BY MOUTH DAILY. Refill:  11    oxyCODONE-acetaminophen (PERCOCET 10)  mg per tablet     Sig: May take 1 tab ONCE DAILY as needed for pain. Indications: Pain     Dispense:  30 Tab     Refill:  0    diclofenac (VOLTAREN) 1 % gel     Sig: Apply 2 g to affected area every six (6) hours. Dispense:  100 g     Refill:  5       Liliya Sykes expressed understanding of plan. An After Visit Summary was offered/printed and given to the patient.

## 2017-11-03 NOTE — PATIENT INSTRUCTIONS
Vaccine Information Statement    Influenza (Flu) Vaccine (Inactivated or Recombinant): What you need to know    Many Vaccine Information Statements are available in Persian and other languages. See www.immunize.org/vis  Hojas de Información Sobre Vacunas están disponibles en Español y en muchos otros idiomas. Visite www.immunize.org/vis    1. Why get vaccinated? Influenza (flu) is a contagious disease that spreads around the [place] every year, usually between October and May. Flu is caused by influenza viruses, and is spread mainly by coughing, sneezing, and close contact. Anyone can get flu. Flu strikes suddenly and can last several days. Symptoms vary by age, but can include:   fever/chills   sore throat   muscle aches   fatigue   cough   headache    runny or stuffy nose    Flu can also lead to pneumonia and blood infections, and cause diarrhea and seizures in children. If you have a medical condition, such as heart or lung disease, flu can make it worse. Flu is more dangerous for some people. Infants and young children, people 72years of age and older, pregnant women, and people with certain health conditions or a weakened immune system are at greatest risk. Each year thousands of people in the [facility] die from flu, and many more are hospitalized. Flu vaccine can:   keep you from getting flu,   make flu less severe if you do get it, and   keep you from spreading flu to your family and other people. 2. Inactivated and recombinant flu vaccines    A dose of flu vaccine is recommended every flu season. Children 6 months through 6years of age may need two doses during the same flu season. Everyone else needs only one dose each flu season.        Some inactivated flu vaccines contain a very small amount of a mercury-based preservative called thimerosal. Studies have not shown thimerosal in vaccines to be harmful, but flu vaccines that do not contain thimerosal last 1 or 2 days. More serious problems following a flu shot can include the following:     There may be a small increased risk of Guillain-Barré Syndrome (GBS) after inactivated flu vaccine. This risk has been estimated at 1 or 2 additional cases per million people vaccinated. This is much lower than the risk of severe complications from flu, which can be prevented by flu vaccine.  Young children who get the flu shot along with pneumococcal vaccine (PCV13) and/or DTaP vaccine at the same time might be slightly more likely to have a seizure caused by fever. Ask your doctor for more information. Tell your doctor if a child who is getting flu vaccine has ever had a seizure. Problems that could happen after any injected vaccine:      People sometimes faint after a medical procedure, including vaccination. Sitting or lying down for about 15 minutes can help prevent fainting, and injuries caused by a fall. Tell your doctor if you feel dizzy, or have vision changes or ringing in the ears.  Some people get severe pain in the shoulder and have difficulty moving the arm where a shot was given. This happens very rarely.  Any medication can cause a severe allergic reaction. Such reactions from a vaccine are very rare, estimated at about 1 in a million doses, and would happen within a few minutes to a few hours after the vaccination. As with any medicine, there is a very remote chance of a vaccine causing a serious injury or death. The safety of vaccines is always being monitored. For more information, visit: www.cdc.gov/vaccinesafety/    5. What if there is a serious reaction? What should I look for?  Look for anything that concerns you, such as signs of a severe allergic reaction, very high fever, or unusual behavior.     Signs of a severe allergic reaction can include hives, swelling of the face and throat, difficulty breathing, a fast heartbeat, dizziness, and weakness - usually within a few minutes to a few hours after the vaccination. What should I do?  If you think it is a severe allergic reaction or other emergency that cant wait, call 9-1-1 and get the person to the nearest hospital. Otherwise, call your doctor.  Reactions should be reported to the Vaccine Adverse Event Reporting System (VAERS). Your doctor should file this report, or you can do it yourself through  the VAERS web site at www.vaers. [facility].gov, or by calling 1-427.106.4040. VAERS does not give medical advice. 6. The National Vaccine Injury Compensation Program    The [facility] Vaccine Injury Compensation Program (VICP) is a federal program that was created to compensate people who may have been injured by certain vaccines. Persons who believe they may have been injured by a vaccine can learn about the program and about filing a claim by calling 7-425.216.6847 or visiting the [employer] website at www.[facility].gov/vaccinecompensation. There is a time limit to file a claim for compensation. 7. How can I learn more?  Ask your healthcare provider. He or she can give you the vaccine package insert or suggest other sources of information.  Call your local or state health department.  Contact the Centers for Disease Control and Prevention (CDC):  - Call 3-570.727.9581 (1-800-CDC-INFO) or  - Visit CDCs website at www.cdc.gov/flu    Vaccine Information Statement   Inactivated Influenza Vaccine   8/7/2015  [address]pe Bill [id number]    Department of Health and Human Services  Centers for Disease Control and Prevention    Office Use Only

## 2017-11-03 NOTE — PROGRESS NOTES
Dorina Kanner is a 45 y.o. female  Chief Complaint   Patient presents with    Medication Refill     oxycodone eval and refill     1. Have you been to an emergency room, urgent clinic, or hospitalized since your last visit? NO  If yes, where when, and reason for visit? 2. Have seen or consulted any other health care provider since your last visit? NO  Please include any pap smears or colon screening in this section  If yes, where when, and reason for visit? 6. Do you have an Advanced Directive/ Living Will in place?  NO  If yes, do we have a copy on file NO  If no, would you like information NO

## 2017-11-03 NOTE — MR AVS SNAPSHOT
Visit Information Date & Time Provider Department Dept. Phone Encounter #  
 11/3/2017  9:20 AM Talita Vann NP [address] [facility] [phone] [id number] Follow-up Instructions Return in about 6 weeks (around 12/12/2017) for pain med refill and schedule annual with labs for 1/10. Upcoming Health Maintenance Date Due  
 PAP AKA CERVICAL CYTOLOGY 6/23/1984 BREAST CANCER SCRN MAMMOGRAM 4/6/2014 FOBT Q 1 YEAR AGE 50-75 5/26/2017 DTaP/Tdap/Td series (2 - Td) 7/6/2025 Allergies as of 11/3/2017  Review Complete On: 11/3/2017 By: Talita Vann NP Severity Noted Reaction Type Reactions Hydrocodone  03/22/2017    Itching Mold  05/23/2016    Shortness of Breath, Itching Ibuprofen Low 03/30/2015   Intolerance Nausea Only Current Immunizations  Reviewed on 3/3/2017 Name Date Influenza Vaccine (Quad) Intradermal PF 12/9/2016 Influenza Vaccine (Quad) PF 11/3/2017 Pneumococcal Polysaccharide (PPSV-23) 3/3/2017 Not reviewed this visit You Were Diagnosed With   
  
 Codes Comments Primary osteoarthritis of right knee    -  Primary ICD-10-CM: M17.11 ICD-9-CM: 715.16 Encounter for immunization     ICD-10-CM: Y05 ICD-9-CM: V03.89 Status post total right knee replacement     ICD-10-CM: C42.117 ICD-9-CM: V43.65 Chronic right shoulder pain     ICD-10-CM: M25.511, G89.29 ICD-9-CM: 719.41, 338.29 Chronic pain of right knee     ICD-10-CM: M25.561, G89.29 ICD-9-CM: 719.46, 338.29 Vitals BP Pulse Temp Resp Height(growth percentile) Weight(growth percentile)  
 137/71 (BP 1 Location: Right arm, BP Patient Position: Sitting) 60 99 °F (37.2 °C) (Oral) 18 5' 1\" (1.549 m) 157 lb (71.2 kg) BMI OB Status Smoking Status 29.66 kg/m2 Menopause Former Smoker BMI and BSA Data Body Mass Index Body Surface Area  
 29.66 kg/m 2 1.75 m 2 Preferred Pharmacy Pharmacy Name Phone [facility]/PHARMACY #[id number] [phone] Your Updated Medication List  
  
   
This list is accurate as of: 11/3/17 10:42 AM.  Always use your most recent med list.  
  
  
  
  
 acetaminophen 650 mg Tber Commonly known as:  TYLENOL Take 1 Tab by mouth three (3) times daily as needed for Pain. amLODIPine 10 mg tablet Commonly known as:  [place] TAKE 1 TABLET BY MOUTH EVERY DAY  Indications: hypertension  
  
 baclofen 20 mg tablet Commonly known as:  LIORESAL Take 1 Tab by mouth three (3) times daily as needed for Pain (spasms). diclofenac 1 % Gel Commonly known as:  VOLTAREN Apply 2 g to affected area every six (6) hours. DULoxetine 60 mg capsule Commonly known as:  CYMBALTA Take 1 Cap by mouth daily. Indications: CHRONIC MUSCULOSKELETAL PAIN  
  
 fluticasone 50 mcg/actuation nasal spray Commonly known as:  [place] 2 Sprays by Both Nostrils route daily. gabapentin 800 mg tablet Commonly known as:  NEURONTIN Take 1 Tab by mouth three (3) times daily. hydroCHLOROthiazide 25 mg tablet Commonly known as:  HYDRODIURIL Take 1 Tab by mouth daily. Indications: hypertension  
  
 levothyroxine 125 mcg tablet Commonly known as:  SYNTHROID  
TAKE 1 TAB BY MOUTH DAILY (BEFORE BREAKFAST). lidocaine 5 % Commonly known as:  Malva Leny Apply patch to the affected area for 12 hours a day and remove for 12 hours a day. loratadine 10 mg tablet Commonly known as:  Sonia Pace Take 1 Tab by mouth daily. meloxicam 15 mg tablet Commonly known as:  MOBIC  
TAKE 1 TABLET (15 MG TOTAL) BY MOUTH DAILY. metoprolol succinate 25 mg XL tablet Commonly known as:  TOPROL-XL Take 1 Tab by mouth daily. miscellaneous medical supply Misc Shower seat for chronic knee pain, pt planning to have right TKR in June due to condition. Very limited range of motion. montelukast 10 mg tablet Commonly known as:  SINGULAIR  
daily. ondansetron 4 mg disintegrating tablet Commonly known as:  ZOFRAN ODT Take 1 Tab by mouth every eight (8) hours as needed for Nausea. oxyCODONE-acetaminophen  mg per tablet Commonly known as:  PERCOCET 10 May take 1 tab ONCE DAILY as needed for pain. Indications: Pain Start taking on:  11/14/2017  
  
 pantoprazole 40 mg tablet Commonly known as:  PROTONIX  
two (2) times a day. * PROAIR HFA 90 mcg/actuation inhaler Generic drug:  albuterol Take 2 Puffs by inhalation every four (4) hours as needed. * albuterol 2.5 mg /3 mL (0.083 %) nebulizer solution Commonly known as:  PROVENTIL VENTOLIN  
by Nebulization route three (3) times daily. sertraline 50 mg tablet Commonly known as:  ZOLOFT Take 1 Tab by mouth daily. SYMBICORT 160-4.5 mcg/actuation Hfaa Generic drug:  budesonide-formoterol Take 2 Puffs by inhalation two (2) times a day. * Notice: This list has 2 medication(s) that are the same as other medications prescribed for you. Read the directions carefully, and ask your doctor or other care provider to review them with you. Prescriptions Printed Refills  
 oxyCODONE-acetaminophen (PERCOCET 10)  mg per tablet 0 Starting on: 11/14/2017 Sig: May take 1 tab ONCE DAILY as needed for pain. Indications: Pain Class: Print Prescriptions Sent to Pharmacy Refills  
 diclofenac (VOLTAREN) 1 % gel 5 Sig: Apply 2 g to affected area every six (6) hours. Class: Normal  
 Pharmacy: [address], [place] Ph #: [phone] Route: Topical  
  
We Performed the Following INFLUENZA VIRUS VAC QUAD,SPLIT,PRESV FREE SYRINGE IM [id number] CPT(R)] Jaya Clark 13 (MW) [[id number] Custom] Follow-up Instructions Return in about 6 weeks (around 12/12/2017) for pain med refill and schedule annual with labs for 1/10. Patient Instructions Vaccine Information Statement Influenza (Flu) Vaccine (Inactivated or Recombinant): What you need to know Many Vaccine Information Statements are available in [place] and other languages. See www.immunize.org/vis Hojas de Información Sobre Vacunas están disponibles en Español y en muchos otros idiomas. Visite www.immunize.org/vis 1. Why get vaccinated? Influenza (flu) is a contagious disease that spreads around the [facility] every year, usually between October and May. Flu is caused by influenza viruses, and is spread mainly by coughing, sneezing, and close contact. Anyone can get flu. Flu strikes suddenly and can last several days. Symptoms vary by age, but can include: 
 fever/chills  sore throat  muscle aches  fatigue  cough  headache  runny or stuffy nose Flu can also lead to pneumonia and blood infections, and cause diarrhea and seizures in children. If you have a medical condition, such as heart or lung disease, flu can make it worse. Flu is more dangerous for some people. Infants and young children, people 72years of age and older, pregnant women, and people with certain health conditions or a weakened immune system are at greatest risk. Each year thousands of people in the [facility] die from flu, and many more are hospitalized. Flu vaccine can: 
 keep you from getting flu, 
 make flu less severe if you do get it, and 
 keep you from spreading flu to your family and other people. 2. Inactivated and recombinant flu vaccines A dose of flu vaccine is recommended every flu season. Children 6 months through 6years of age may need two doses during the same flu season. Everyone else needs only one dose each flu season.   
 
 
Some inactivated flu vaccines contain a very small amount of a mercury-based preservative called thimerosal. Studies have not shown thimerosal in vaccines to be harmful, but flu vaccines that do not contain thimerosal are available. There is no live flu virus in flu shots. They cannot cause the flu. There are many flu viruses, and they are always changing. Each year a new flu vaccine is made to protect against three or four viruses that are likely to cause disease in the upcoming flu season. But even when the vaccine doesnt exactly match these viruses, it may still provide some protection Flu vaccine cannot prevent: 
 flu that is caused by a virus not covered by the vaccine, or 
 illnesses that look like flu but are not. It takes about 2 weeks for protection to develop after vaccination, and protection lasts through the flu season. 3. Some people should not get this vaccine Tell the person who is giving you the vaccine:  If you have any severe, life-threatening allergies. If you ever had a life-threatening allergic reaction after a dose of flu vaccine, or have a severe allergy to any part of this vaccine, you may be advised not to get vaccinated. Most, but not all, types of flu vaccine contain a small amount of egg protein.  If you ever had Guillain-Barré Syndrome (also called GBS). Some people with a history of GBS should not get this vaccine. This should be discussed with your doctor.  If you are not feeling well. It is usually okay to get flu vaccine when you have a mild illness, but you might be asked to come back when you feel better. 4. Risks of a vaccine reaction With any medicine, including vaccines, there is a chance of reactions. These are usually mild and go away on their own, but serious reactions are also possible. Most people who get a flu shot do not have any problems with it. Minor problems following a flu shot include:  
 soreness, redness, or swelling where the shot was given  hoarseness  sore, red or itchy eyes  cough  fever  aches  headache  itching  fatigue If these problems occur, they usually begin soon after the shot and last 1 or 2 days. More serious problems following a flu shot can include the following:  There may be a small increased risk of Guillain-Barré Syndrome (GBS) after inactivated flu vaccine. This risk has been estimated at 1 or 2 additional cases per million people vaccinated. This is much lower than the risk of severe complications from flu, which can be prevented by flu vaccine.  Young children who get the flu shot along with pneumococcal vaccine (PCV13) and/or DTaP vaccine at the same time might be slightly more likely to have a seizure caused by fever. Ask your doctor for more information. Tell your doctor if a child who is getting flu vaccine has ever had a seizure. Problems that could happen after any injected vaccine:  People sometimes faint after a medical procedure, including vaccination. Sitting or lying down for about 15 minutes can help prevent fainting, and injuries caused by a fall. Tell your doctor if you feel dizzy, or have vision changes or ringing in the ears.  Some people get severe pain in the shoulder and have difficulty moving the arm where a shot was given. This happens very rarely.  Any medication can cause a severe allergic reaction. Such reactions from a vaccine are very rare, estimated at about 1 in a million doses, and would happen within a few minutes to a few hours after the vaccination. As with any medicine, there is a very remote chance of a vaccine causing a serious injury or death. The safety of vaccines is always being monitored. For more information, visit: www.cdc.gov/vaccinesafety/ 
 
5. What if there is a serious reaction? What should I look for?  Look for anything that concerns you, such as signs of a severe allergic reaction, very high fever, or unusual behavior.  
 
Signs of a severe allergic reaction can include hives, swelling of the face and throat, difficulty breathing, a fast heartbeat, dizziness, and weakness  usually within a few minutes to a few hours after the vaccination. What should I do?  If you think it is a severe allergic reaction or other emergency that cant wait, call 9-1-1 and get the person to the nearest hospital. Otherwise, call your doctor.  Reactions should be reported to the Vaccine Adverse Event Reporting System (VAERS). Your doctor should file this report, or you can do it yourself through  the VAERS web site at www.vaers. [facility].gov, or by calling 3-674.332.4083. VAERS does not give medical advice. 6. The National Vaccine Injury Compensation Program 
 
The [facility] Vaccine Injury Compensation Program (VICP) is a federal program that was created to compensate people who may have been injured by certain vaccines. Persons who believe they may have been injured by a vaccine can learn about the program and about filing a claim by calling 9-890.514.4080 or visiting the [address] [employer] [employer] website at www.[facility].gov/vaccinecompensation. There is a time limit to file a claim for compensation. 7. How can I learn more?  Ask your healthcare provider. He or she can give you the vaccine package insert or suggest other sources of information.  Call your local or state health department.  Contact the Centers for Disease Control and Prevention (CDC): 
- Call 6-227.773.2012 (1-800-CDC-INFO) or 
- Visit CDCs website at www.cdc.gov/flu Vaccine Information Statement Inactivated Influenza Vaccine 8/7/2015 
[address] [id number] Department of Health and [employer] Centers for Disease Control and Prevention Office Use Only Introducing [facility]! Jarrett Hartley introduces [employer] patient portal. Now you can access parts of your medical record, email your doctor's office, and request medication refills online. 1. In your internet browser, go to [email or web address] 2. Click on the First Time User? Click Here link in the Sign In box. You will see the New Member Sign Up page. 3. Enter your [employer] Access Code exactly as it appears below. You will not need to use this code after youve completed the sign-up process. If you do not sign up before the expiration date, you must request a new code. · [employer] Access Code: [email or web address] Expires: 2/1/2018  9:52 AM 
 
4. Enter the last four digits of your Social Security Number (xxxx) and Date of Birth (mm/dd/yyyy) as indicated and click Submit. You will be taken to the next sign-up page. 5. Create a [employer] ID. This will be your [employer] login ID and cannot be changed, so think of one that is secure and easy to remember. 6. Create a [employer] password. You can change your password at any time. 7. Enter your Password Reset Question and Answer. This can be used at a later time if you forget your password. 8. Enter your e-mail address. You will receive e-mail notification when new information is available in [address] 9. Click Sign Up. You can now view and download portions of your medical record. 10. Click the Download Summary menu link to download a portable copy of your medical information. If you have questions, please visit the Frequently Asked Questions section of the [employer] website. Remember, [employer] is NOT to be used for urgent needs. For medical emergencies, dial 911. Now available from your iPhone and Android! Please provide this summary of care documentation to your next provider. Your primary care clinician is listed as HUGO Carey. If you have any questions after today's visit, please call [phone].

## 2017-11-11 LAB — DRUGS UR: NORMAL

## 2017-12-07 ENCOUNTER — OFFICE VISIT (OUTPATIENT)
Dept: INTERNAL MEDICINE CLINIC | Age: 52
End: 2017-12-07

## 2017-12-07 VITALS
WEIGHT: 157 LBS | BODY MASS INDEX: 29.64 KG/M2 | DIASTOLIC BLOOD PRESSURE: 75 MMHG | HEART RATE: 80 BPM | OXYGEN SATURATION: 94 % | HEIGHT: 61 IN | RESPIRATION RATE: 18 BRPM | SYSTOLIC BLOOD PRESSURE: 122 MMHG | TEMPERATURE: 97.3 F

## 2017-12-07 DIAGNOSIS — M25.561 CHRONIC PAIN OF RIGHT KNEE: ICD-10-CM

## 2017-12-07 DIAGNOSIS — M17.11 PRIMARY OSTEOARTHRITIS OF RIGHT KNEE: ICD-10-CM

## 2017-12-07 DIAGNOSIS — G89.29 CHRONIC RIGHT SHOULDER PAIN: ICD-10-CM

## 2017-12-07 DIAGNOSIS — Z96.651 STATUS POST TOTAL RIGHT KNEE REPLACEMENT: ICD-10-CM

## 2017-12-07 DIAGNOSIS — F32.2 SEVERE SINGLE CURRENT EPISODE OF MAJOR DEPRESSIVE DISORDER, WITHOUT PSYCHOTIC FEATURES (HCC): Primary | ICD-10-CM

## 2017-12-07 DIAGNOSIS — G89.29 CHRONIC PAIN OF RIGHT KNEE: ICD-10-CM

## 2017-12-07 DIAGNOSIS — M25.511 CHRONIC RIGHT SHOULDER PAIN: ICD-10-CM

## 2017-12-07 RX ORDER — SERTRALINE HYDROCHLORIDE 100 MG/1
100 TABLET, FILM COATED ORAL DAILY
Qty: 30 TAB | Refills: 11 | Status: SHIPPED | OUTPATIENT
Start: 2017-12-07 | End: 2018-11-07

## 2017-12-07 RX ORDER — OXYCODONE AND ACETAMINOPHEN 10; 325 MG/1; MG/1
TABLET ORAL
Qty: 30 TAB | Refills: 0 | Status: SHIPPED | OUTPATIENT
Start: 2017-12-07 | End: 2018-01-31 | Stop reason: SDUPTHER

## 2017-12-07 RX ORDER — PREDNISONE 20 MG/1
60 TABLET ORAL
Qty: 15 TAB | Refills: 0 | Status: SHIPPED | OUTPATIENT
Start: 2017-12-07 | End: 2018-03-28

## 2017-12-07 NOTE — MR AVS SNAPSHOT
Visit Information Date & Time Provider Department Dept. Phone Encounter #  
 12/7/2017 11:20 AM Len Adam NP [address] [facility] [phone] [id number] Follow-up Instructions Return in about 4 weeks (around 1/4/2018) for pain med refill, cold f/u. Your Appointments 1/12/2018 10:40 AM  
ROUTINE CARE with Len Adam NP  
[address] [facility]) Appt Note: Annual with labs [address] [facility] [address] [id number]  
[phone] [address] [facility] Upcoming Health Maintenance Date Due  
 PAP AKA CERVICAL CYTOLOGY 6/23/1984 BREAST CANCER SCRN MAMMOGRAM 4/6/2014 FOBT Q 1 YEAR AGE 50-75 5/26/2017 DTaP/Tdap/Td series (2 - Td) 7/6/2025 Allergies as of 12/7/2017  Review Complete On: 12/7/2017 By: Kezia Bell. ALYSSA Medina Severity Noted Reaction Type Reactions Hydrocodone  03/22/2017    Itching Mold  05/23/2016    Shortness of Breath, Itching Ibuprofen Low 03/30/2015   Intolerance Nausea Only Current Immunizations  Reviewed on 3/3/2017 Name Date Influenza Vaccine (Quad) Intradermal PF 12/9/2016 Influenza Vaccine (Quad) PF 11/3/2017 Pneumococcal Polysaccharide (PPSV-23) 3/3/2017 Not reviewed this visit You Were Diagnosed With   
  
 Codes Comments Severe single current episode of major depressive disorder, without psychotic features ([facility]    -  Primary ICD-10-CM: F32.2 ICD-9-CM: 296.23 Primary osteoarthritis of right knee     ICD-10-CM: M17.11 ICD-9-CM: 715.16 Status post total right knee replacement     ICD-10-CM: L12.168 ICD-9-CM: V43.65 Chronic right shoulder pain     ICD-10-CM: M25.511, G89.29 ICD-9-CM: 719.41, 338.29 Chronic pain of right knee     ICD-10-CM: M25.561, G89.29 ICD-9-CM: 719.46, 338.29 Vitals BP Pulse Temp Resp Height(growth percentile) Weight(growth percentile) 122/75 (BP 1 Location: Right arm, BP Patient Position: Sitting) 80 97.3 °F (36.3 °C) (Oral) 18 5' 1\" (1.549 m) 157 lb (71.2 kg) SpO2 BMI OB Status Smoking Status 94% 29.66 kg/m2 Menopause Former Smoker Vitals History BMI and BSA Data Body Mass Index Body Surface Area  
 29.66 kg/m 2 1.75 m 2 Preferred Pharmacy Pharmacy Name Phone [id number] [phone] Your Updated Medication List  
  
   
This list is accurate as of: 12/7/17 12:26 PM.  Always use your most recent med list.  
  
  
  
  
 acetaminophen 650 mg Tber Commonly known as:  TYLENOL Take 1 Tab by mouth three (3) times daily as needed for Pain. amLODIPine 10 mg tablet Commonly known as:  Concepcion Conine TAKE 1 TABLET BY MOUTH EVERY DAY  Indications: hypertension  
  
 baclofen 20 mg tablet Commonly known as:  LIORESAL Take 1 Tab by mouth three (3) times daily as needed for Pain (spasms). diclofenac 1 % Gel Commonly known as:  VOLTAREN Apply 2 g to affected area every six (6) hours. DULoxetine 60 mg capsule Commonly known as:  CYMBALTA Take 1 Cap by mouth daily. Indications: CHRONIC MUSCULOSKELETAL PAIN  
  
 fluticasone 50 mcg/actuation nasal spray Commonly known as:  Fredi Crawford 2 Sprays by Both Nostrils route daily. gabapentin 800 mg tablet Commonly known as:  NEURONTIN Take 1 Tab by mouth three (3) times daily. hydroCHLOROthiazide 25 mg tablet Commonly known as:  HYDRODIURIL Take 1 Tab by mouth daily. Indications: hypertension  
  
 levothyroxine 125 mcg tablet Commonly known as:  SYNTHROID  
TAKE 1 TAB BY MOUTH DAILY (BEFORE BREAKFAST). lidocaine 5 % Commonly known as:  Clarke Patch Apply patch to the affected area for 12 hours a day and remove for 12 hours a day. loratadine 10 mg tablet Commonly known as:  Litzy Shaniqua Take 1 Tab by mouth daily. meloxicam 15 mg tablet Commonly known as:  MOBIC  
TAKE 1 TABLET (15 MG TOTAL) BY MOUTH DAILY. metoprolol succinate 25 mg XL tablet Commonly known as:  TOPROL-XL Take 1 Tab by mouth daily. miscellaneous medical supply Misc Shower seat for chronic knee pain, pt planning to have right TKR in June due to condition. Very limited range of motion. montelukast 10 mg tablet Commonly known as:  SINGULAIR  
daily. ondansetron 4 mg disintegrating tablet Commonly known as:  ZOFRAN ODT Take 1 Tab by mouth every eight (8) hours as needed for Nausea. oxyCODONE-acetaminophen  mg per tablet Commonly known as:  PERCOCET 10 May take 1 tab ONCE DAILY as needed for pain. Indications: Pain  
  
 pantoprazole 40 mg tablet Commonly known as:  PROTONIX  
two (2) times a day. predniSONE 20 mg tablet Commonly known as:  Hollace Adan Take 3 Tabs by mouth daily (with breakfast). * PROAIR HFA 90 mcg/actuation inhaler Generic drug:  albuterol Take 2 Puffs by inhalation every four (4) hours as needed. * albuterol 2.5 mg /3 mL (0.083 %) nebulizer solution Commonly known as:  PROVENTIL VENTOLIN  
by Nebulization route three (3) times daily. sertraline 100 mg tablet Commonly known as:  ZOLOFT Take 1 Tab by mouth daily. SYMBICORT 160-4.5 mcg/actuation Hfaa Generic drug:  budesonide-formoterol Take 2 Puffs by inhalation two (2) times a day. * Notice: This list has 2 medication(s) that are the same as other medications prescribed for you. Read the directions carefully, and ask your doctor or other care provider to review them with you. Prescriptions Printed Refills  
 oxyCODONE-acetaminophen (PERCOCET 10)  mg per tablet 0 Sig: May take 1 tab ONCE DAILY as needed for pain. Indications: Pain Class: Print Prescriptions Sent to Pharmacy Refills  
 sertraline (ZOLOFT) 100 mg tablet 11 Sig: Take 1 Tab by mouth daily. Class: Normal  
 Pharmacy: [address] Ph #: [phone] Route: Oral  
 predniSONE (DELTASONE) 20 mg tablet 0 Sig: Take 3 Tabs by mouth daily (with breakfast). Class: Normal  
 Pharmacy: [address] Ph #: [phone] Route: Oral  
  
We Performed the Following [place] [facility] (MW) [[id number] Custom] Follow-up Instructions Return in about 4 weeks (around 1/4/2018) for pain med refill, cold f/u. Patient Instructions Chronic Pain: Care Instructions Your Care Instructions Chronic pain is pain that lasts a long time (months or even years) and may or may not have a clear cause. It is different from acute pain, which usually does have a clear cause-like an injury or illness-and gets better over time. Chronic pain: 
· Lasts over time but may vary from day to day. · Does not go away despite efforts to end it. · May disrupt your sleep and lead to fatigue. · May cause depression or anxiety. · May make your muscles tense, causing more pain. · Can disrupt your work, hobbies, home life, and relationships with friends and family. Chronic pain is a very real condition. It is not just in your head. Treatment can help and usually includes several methods used together, such as medicines, physical therapy, exercise, and other treatments. Learning how to relax and changing negative thought patterns can also help you cope. Chronic pain is complex. Taking an active role in your treatment will help you better manage your pain. Tell your doctor if you have trouble dealing with your pain. You may have to try several things before you find what works best for you. Follow-up care is a key part of your treatment and safety. Be sure to make and go to all appointments, and call your doctor if you are having problems.  It's also a good idea to know your test results and keep a list of the medicines you take. How can you care for yourself at home? · Pace yourself. Break up large jobs into smaller tasks. Save harder tasks for days when you have less pain, or go back and forth between hard tasks and easier ones. Take rest breaks. · Relax, and reduce stress. Relaxation techniques such as deep breathing or meditation can help. · Keep moving. Gentle, daily exercise can help reduce pain over the long run. Try low- or no-impact exercises such as walking, swimming, and stationary biking. Do stretches to stay flexible. · Try heat, cold packs, and massage. · Get enough sleep. Chronic pain can make you tired and drain your energy. Talk with your doctor if you have trouble sleeping because of pain. · Think positive. Your thoughts can affect your pain level. Do things that you enjoy to distract yourself when you have pain instead of focusing on the pain. See a movie, read a book, listen to music, or spend time with a friend. · If you think you are depressed, talk to your doctor about treatment. · Keep a daily pain diary. Record how your moods, thoughts, sleep patterns, activities, and medicine affect your pain. You may find that your pain is worse during or after certain activities or when you are feeling a certain emotion. Having a record of your pain can help you and your doctor find the best ways to treat your pain. · Take pain medicines exactly as directed. ¨ If the doctor gave you a prescription medicine for pain, take it as prescribed. ¨ If you are not taking a prescription pain medicine, ask your doctor if you can take an over-the-counter medicine. Reducing constipation caused by pain medicine · Include fruits, vegetables, beans, and whole grains in your diet each day. These foods are high in fiber. · Drink plenty of fluids, enough so that your urine is light yellow or clear like water.  If you have kidney, heart, or liver disease and have to limit fluids, talk with your doctor before you increase the amount of fluids you drink. · If your doctor recommends it, get more exercise. Walking is a good choice. Bit by bit, increase the amount you walk every day. Try for at least 30 minutes on most days of the week. · Schedule time each day for a bowel movement. A daily routine may help. Take your time and do not strain when having a bowel movement. When should you call for help? Call your doctor now or seek immediate medical care if: 
? · Your pain gets worse or is out of control. ? · You feel down or blue, or you do not enjoy things like you once did. You may be depressed, which is common in people with chronic pain. Depression can be treated. ? · You have vomiting or cramps for more than 2 hours. ? Watch closely for changes in your health, and be sure to contact your doctor if: 
? · You cannot sleep because of pain. ? · You are very worried or anxious about your pain. ? · You have trouble taking your pain medicine. ? · You have any concerns about your pain medicine. ? · You have trouble with bowel movements, such as: 
¨ No bowel movement in 3 days. ¨ Blood in the anal area, in your stool, or on the toilet paper. ¨ Diarrhea for more than 24 hours. Where can you learn more? Go to http://ofelia-mindy.info/. Enter N004 in the search box to learn more about \"Chronic Pain: Care Instructions. \" Current as of: October 14, 2016 Content Version: 11.4 © 0101-7623 [employer]. Care instructions adapted under license by [employer] (which disclaims liability or warranty for this information). If you have questions about a medical condition or this instruction, always ask your healthcare professional. [place] [address] any warranty or liability for your use of this information. Introducing [facility]!    
 Cody Moore introduces [employer] patient portal. Now you can access parts of your medical record, email your doctor's office, and request medication refills online. 1. In your internet browser, go to [email or web address] 2. Click on the First Time User? Click Here link in the Sign In box. You will see the New Member Sign Up page. 3. Enter your [employer] Access Code exactly as it appears below. You will not need to use this code after youve completed the sign-up process. If you do not sign up before the expiration date, you must request a new code. · [employer] Access Code: [email or web address] Expires: 2/1/2018  8:52 AM 
 
4. Enter the last four digits of your Social Security Number (xxxx) and Date of Birth (mm/dd/yyyy) as indicated and click Submit. You will be taken to the next sign-up page. 5. Create a [employer]odo ID. This will be your [employer] login ID and cannot be changed, so think of one that is secure and easy to remember. 6. Create a [employer] password. You can change your password at any time. 7. Enter your Password Reset Question and Answer. This can be used at a later time if you forget your password. 8. Enter your e-mail address. You will receive e-mail notification when new information is available in [address] 9. Click Sign Up. You can now view and download portions of your medical record. 10. Click the Download Summary menu link to download a portable copy of your medical information. If you have questions, please visit the Frequently Asked Questions section of the [employer] website. Remember, [employer] is NOT to be used for urgent needs. For medical emergencies, dial 911. Now available from your iPhone and Android! Please provide this summary of care documentation to your next provider. Your primary care clinician is listed as HUGO Lorenzana. If you have any questions after today's visit, please call [phone].

## 2017-12-07 NOTE — PROGRESS NOTES
Encounter for pain management. Chronic Pain:  Patient has chronic BL knee pain for years, had right TKR last year. Associated with lower back pain and right sided sciatica. Pain Scale: 10 - Worst pain ever/10. Had IMAGING for lumbar spine and right knee and has been seeing/seen by ORTHO. Last PT March 2017, continuing HEP. Pain in the right knee, leg, and lower back is still limiting walking, sitting, and standing, exacerbated by forementioned acitivities to include stair climbing. Has tried prednisone, tramadol, injections, PT, gabapentin, cymbalta, and surgery in past with minimal relief. Pain has been controlled with Oxycodone, last taken 3 days ago. The medication is kept safe by staying with her. Has NOT seen any other providers since last OV for pain medication. No significant changes to pain presentation since last OV. she is  able to do her normal daily activities. she reports the following adverse side effects: none. Least pain over the last week has been 7/10. Worst pain over the last week has been 10/10. Aberrant behaviors: None         Symptoms onset: problem is longstanding. Rheumatological ROS: ongoing significant pain which is stable and controlled by pain med. Response to treatment plan: waxing and waning. Also with congestion, headache, coughing, ear ache, and runny nose. Has NOT seen allergist in a while, immunotherapy stopped. Still using inhaler and taking flonase, singulair, and claritin daily however. Associated with chest tightness and SOB on exertion. No additional treatments tried. Denies fever at this time. Ongoing for past 1.5 weeks. Lastly with increased depression symptoms of sadness, decreased interest, racing thoughts, and feeling overwhelmed. Continues Zoloft 50 mg dose currently. Denies suicidal ideation however.      Review of Systems  Constitutional: negative for fevers, chills, anorexia and weight loss  Eyes:   negative for visual disturbance, drainage, and irritation  ENT:   +AR and environmental allergies.  negative for tinnitus,sore throat,ear pain,and hoarseness  Respiratory:  + asthma/COPD. negative for hemoptysis  CV:   negative for chest pain, palpitations, and lower extremity edema  GI:   negative for nausea, vomiting, diarrhea, abdominal pain, and melena  Endo:               negative for polyuria,polydipsia,polyphagia, and heat intolerance  Genitourinary: negative for frequency, urgency, dysuria, retention, and hematuria  Integument:  negative for rash, ulcerations, and pruritus  Hematologic:  negative for easy bruising and bleeding  Musculoskel: negative for  muscle weakness  Neurological:  negative for headaches, dizziness, vertigo,and memory problems  Behavl/Psych: +depression, on cymbalta and zoloft. negative for feelings of  suicide    1./2. Medical history/Past medical History   Past Medical History:   Diagnosis Date    Asthma     uses inhalers    Chronic obstructive pulmonary disease (HCC)     bronchitis    GERD (gastroesophageal reflux disease)     Hypertension     Ill-defined condition     environmental allergies     Ill-defined condition     Multiple body piercings; unable to remove tongue and lip piercings    Thyroid disease     hypo    Ventral hernia 12/31/2013     Past Surgical History:   Procedure Laterality Date    HX HEENT  2009    thyroidectomy    HX KNEE REPLACEMENT      R    HX MOHS PROCEDURES Right 3/8/11    HX OTHER SURGICAL      hiatal hernia repair    HX TUBAL LIGATION       Patient Active Problem List   Diagnosis Code    Ventral hernia K43.9    Chronic pain of right knee M25.561, G89.29    Chronic right shoulder pain M25.511, G89.29    Environmental and seasonal allergies J30.89    Ventral hernia without obstruction or gangrene K43.9    Primary osteoarthritis of right knee M17.11    Mixed simple and mucopurulent chronic bronchitis (HCC) J41.8    Acquired hypothyroidism E03.9    Chronic bilateral low back pain with right-sided sciatica M54.41, G89.29    Status post total right knee replacement Z96.651    Malignant hypertension I10    Mild single current episode of major depressive disorder ([facility] [address] F32.0    Pain management contract signed Z02.89    Chronic pain of left knee M25.562, G89.29       3. Applicable records from prior treatment providers are apart of [facility] under the media/encounters tab. 4. Diagnostic, therapeutic and laboratory results are available in the [address] [place] [address] chart. 5. Consultation notes are available for review in the media/encounters tab of the [address] [place] [address] chart. 6. Treatment goals include: pain control, improve activity level and function in regards to activities of daily living, and improved comfort level. Previously pt has been limited by pain in all these aspects. 7. The risks and benefits of treatment have been discussed at this office visit with the pt.  she understands that the medication has addictive potential.  Additionally the pt has been advised that narcotic pain medication may impair mental and/or physical ability required for performance of tasks such as driving or operating any other machinery. 8. Pt has an updated signed pain contract on file and can be found under the media section of the [facility] chart. 9. The pain contract is reviewed. Pain medication will be continued at the same dosage. Pill count: 7 tabs. Urine drug screening ordered/collected today. Diagnostic studies are not indicated at this time. Interventional procedure are not indicated at this time. 10. Medication prescibed is oxycodone every 24 hours PRN # 30 with zero refills for a 1 month supply.  was reviewed while planning for pain/anxiety management, no indications of drug diversion suspected. Prescription history is NOT suspicious for controlled substance overuse. 11. Patient instructions have been reviewed in detail as outlined above and in the pain contract. 12. Re-evaluation is planned for 1 month(s). Current Outpatient Prescriptions   Medication Sig Dispense Refill    oxyCODONE-acetaminophen (PERCOCET 10)  mg per tablet May take 1 tab ONCE DAILY as needed for pain. Indications: Pain 30 Tab 0    sertraline (ZOLOFT) 100 mg tablet Take 1 Tab by mouth daily. 30 Tab 11    predniSONE (DELTASONE) 20 mg tablet Take 3 Tabs by mouth daily (with breakfast). 15 Tab 0    meloxicam (MOBIC) 15 mg tablet TAKE 1 TABLET (15 MG TOTAL) BY MOUTH DAILY. 11    diclofenac (VOLTAREN) 1 % gel Apply 2 g to affected area every six (6) hours. 100 g 5    levothyroxine (SYNTHROID) 125 mcg tablet TAKE 1 TAB BY MOUTH DAILY (BEFORE BREAKFAST). 90 Tab 0    metoprolol succinate (TOPROL-XL) 25 mg XL tablet Take 1 Tab by mouth daily. 30 Tab 11    amLODIPine (NORVASC) 10 mg tablet TAKE 1 TABLET BY MOUTH EVERY DAY  Indications: hypertension 90 Tab 3    gabapentin (NEURONTIN) 800 mg tablet Take 1 Tab by mouth three (3) times daily. 90 Tab 11    hydroCHLOROthiazide (HYDRODIURIL) 25 mg tablet Take 1 Tab by mouth daily. Indications: hypertension 30 Tab 11    baclofen (LIORESAL) 20 mg tablet Take 1 Tab by mouth three (3) times daily as needed for Pain (spasms). 60 Tab 3    DULoxetine (CYMBALTA) 60 mg capsule Take 1 Cap by mouth daily. Indications: CHRONIC MUSCULOSKELETAL PAIN 30 Cap 11    acetaminophen (TYLENOL) 650 mg CR tablet Take 1 Tab by mouth three (3) times daily as needed for Pain. 90 Tab 11    lidocaine (LIDODERM) 5 % Apply patch to the affected area for 12 hours a day and remove for 12 hours a day. 30 Each 11    fluticasone (FLONASE) 50 mcg/actuation nasal spray 2 Sprays by Both Nostrils route daily. 1 Bottle 11    ondansetron (ZOFRAN ODT) 4 mg disintegrating tablet Take 1 Tab by mouth every eight (8) hours as needed for Nausea. 21 Tab 3    miscellaneous medical supply misc Shower seat for chronic knee pain, pt planning to have right TKR in June due to condition.  Very limited range of motion. 1 Each 0    montelukast (SINGULAIR) 10 mg tablet daily. 6    pantoprazole (PROTONIX) 40 mg tablet two (2) times a day. 5    loratadine (CLARITIN) 10 mg tablet Take 1 Tab by mouth daily. 30 Tab 5    budesonide-formoterol (SYMBICORT) 160-4.5 mcg/actuation HFA inhaler Take 2 Puffs by inhalation two (2) times a day.  albuterol (PROAIR HFA) 90 mcg/actuation inhaler Take 2 Puffs by inhalation every four (4) hours as needed.  albuterol (PROVENTIL VENTOLIN) 2.5 mg /3 mL (0.083 %) nebulizer solution by Nebulization route three (3) times daily. Allergies   Allergen Reactions    Hydrocodone Itching    Mold Shortness of Breath and Itching    Ibuprofen Nausea Only       Objective:  Visit Vitals    /75 (BP 1 Location: Right arm, BP Patient Position: Sitting)    Pulse 80    Temp 97.3 °F (36.3 °C) (Oral)    Resp 18    Ht 5' 1\" (1.549 m)    Wt 157 lb (71.2 kg)    SpO2 94%    BMI 29.66 kg/m2     Wt Readings from Last 3 Encounters:   12/07/17 157 lb (71.2 kg)   11/03/17 157 lb (71.2 kg)   10/06/17 160 lb 3.2 oz (72.7 kg)     Physical Exam:   General appearance - alert, well appearing, and in mild distress. Mental status - A/O x 4, normal mood and affect. Eyes- +allergic shiners  Ears- TM injected CRISTIANE, no erythema or drainage on right. Left  with redness, no drainage. Nose- turbinates boggy and pink. Neck -Supple ,normal CSP. FROM, non-tender. No significant adenopathy/thyromegaly. No JVD. Chest - CTA. Symmetric chest rise. No wheezing, rales or rhonchi. Heart - Normal rate, regular rhythm. Normal S1, S2. No MGR or clicks. Ext- Radial, DP pulses, 2+ bilaterally. No pedal edema, clubbing, or cyanosis. Skin-Warm and dry. No hyperpigmentation, ulcerations, or suspicious lesions. Neuro - normal speech, no focal findings or movement disorder. Normal strength and muscle tone. Limping gait using cane. Assessment/Plan:  INCREASED Zoloft to 100 mg. Prednisone added and advised to hold claritin for Zyrtec until symptoms resolve. Otherwise continue current regimen. Medication Side Effects and Warnings were discussed with patient: yes   Patient Labs were reviewed: yes  Patient Past Records were reviewed: yes    See below for other orders   Follow-up Disposition:  Return in about 4 weeks (around 1/4/2018) for pain med refill, cold, zoloft med change  f/u. ICD-10-CM ICD-9-CM    1. Severe single current episode of major depressive disorder, without psychotic features [facility] F32.2 296.23 sertraline (ZOLOFT) 100 mg tablet   2. Primary osteoarthritis of right knee M17.11 715.16 oxyCODONE-acetaminophen (PERCOCET 10)  mg per tablet      TOXASSURE SELECT 13 (MW)      predniSONE (DELTASONE) 20 mg tablet   3. Status post total right knee replacement Z96.651 V43.65 oxyCODONE-acetaminophen (PERCOCET 10)  mg per tablet      TOXASSURE SELECT 13 (MW)      predniSONE (DELTASONE) 20 mg tablet   4. Chronic right shoulder pain M25.511 719.41 oxyCODONE-acetaminophen (PERCOCET 10)  mg per tablet    G89.29 338.29 TOXASSURE SELECT 13 (MW)      predniSONE (DELTASONE) 20 mg tablet   5. Chronic pain of right knee M25.561 719.46 oxyCODONE-acetaminophen (PERCOCET 10)  mg per tablet    G89.29 338.29 TOXASSURE SELECT 13 (MW)      predniSONE (DELTASONE) 20 mg tablet     Orders Placed This Encounter    TOXASSURE SELECT 13 (MW)    oxyCODONE-acetaminophen (PERCOCET 10)  mg per tablet     Sig: May take 1 tab ONCE DAILY as needed for pain. Indications: Pain     Dispense:  30 Tab     Refill:  0    sertraline (ZOLOFT) 100 mg tablet     Sig: Take 1 Tab by mouth daily. Dispense:  30 Tab     Refill:  11    predniSONE (DELTASONE) 20 mg tablet     Sig: Take 3 Tabs by mouth daily (with breakfast). Dispense:  15 Tab     Refill:  0       Liliya Sykes expressed understanding of plan. An After Visit Summary was offered/printed and given to the patient.

## 2017-12-07 NOTE — PATIENT INSTRUCTIONS
Chronic Pain: Care Instructions  Your Care Instructions    Chronic pain is pain that lasts a long time (months or even years) and may or may not have a clear cause. It is different from acute pain, which usually does have a clear cause-like an injury or illness-and gets better over time. Chronic pain:  · Lasts over time but may vary from day to day. · Does not go away despite efforts to end it. · May disrupt your sleep and lead to fatigue. · May cause depression or anxiety. · May make your muscles tense, causing more pain. · Can disrupt your work, hobbies, home life, and relationships with friends and family. Chronic pain is a very real condition. It is not just in your head. Treatment can help and usually includes several methods used together, such as medicines, physical therapy, exercise, and other treatments. Learning how to relax and changing negative thought patterns can also help you cope. Chronic pain is complex. Taking an active role in your treatment will help you better manage your pain. Tell your doctor if you have trouble dealing with your pain. You may have to try several things before you find what works best for you. Follow-up care is a key part of your treatment and safety. Be sure to make and go to all appointments, and call your doctor if you are having problems. It's also a good idea to know your test results and keep a list of the medicines you take. How can you care for yourself at home? · Pace yourself. Break up large jobs into smaller tasks. Save harder tasks for days when you have less pain, or go back and forth between hard tasks and easier ones. Take rest breaks. · Relax, and reduce stress. Relaxation techniques such as deep breathing or meditation can help. · Keep moving. Gentle, daily exercise can help reduce pain over the long run. Try low- or no-impact exercises such as walking, swimming, and stationary biking. Do stretches to stay flexible.   · Try heat, cold packs, and massage. · Get enough sleep. Chronic pain can make you tired and drain your energy. Talk with your doctor if you have trouble sleeping because of pain. · Think positive. Your thoughts can affect your pain level. Do things that you enjoy to distract yourself when you have pain instead of focusing on the pain. See a movie, read a book, listen to music, or spend time with a friend. · If you think you are depressed, talk to your doctor about treatment. · Keep a daily pain diary. Record how your moods, thoughts, sleep patterns, activities, and medicine affect your pain. You may find that your pain is worse during or after certain activities or when you are feeling a certain emotion. Having a record of your pain can help you and your doctor find the best ways to treat your pain. · Take pain medicines exactly as directed. ¨ If the doctor gave you a prescription medicine for pain, take it as prescribed. ¨ If you are not taking a prescription pain medicine, ask your doctor if you can take an over-the-counter medicine. Reducing constipation caused by pain medicine  · Include fruits, vegetables, beans, and whole grains in your diet each day. These foods are high in fiber. · Drink plenty of fluids, enough so that your urine is light yellow or clear like water. If you have kidney, heart, or liver disease and have to limit fluids, talk with your doctor before you increase the amount of fluids you drink. · If your doctor recommends it, get more exercise. Walking is a good choice. Bit by bit, increase the amount you walk every day. Try for at least 30 minutes on most days of the week. · Schedule time each day for a bowel movement. A daily routine may help. Take your time and do not strain when having a bowel movement. When should you call for help? Call your doctor now or seek immediate medical care if:  ? · Your pain gets worse or is out of control.    ? · You feel down or blue, or you do not enjoy things like you once did. You may be depressed, which is common in people with chronic pain. Depression can be treated. ? · You have vomiting or cramps for more than 2 hours. ? Watch closely for changes in your health, and be sure to contact your doctor if:  ? · You cannot sleep because of pain. ? · You are very worried or anxious about your pain. ? · You have trouble taking your pain medicine. ? · You have any concerns about your pain medicine. ? · You have trouble with bowel movements, such as:  ¨ No bowel movement in 3 days. ¨ Blood in the anal area, in your stool, or on the toilet paper. ¨ Diarrhea for more than 24 hours. Where can you learn more? Go to http://ofelia-mindy.info/. Enter N004 in the search box to learn more about \"Chronic Pain: Care Instructions. \"  Current as of: October 14, 2016  Content Version: 11.4  © 1148-2983 [employer]. Care instructions adapted under license by [employer] (which disclaims liability or warranty for this information). If you have questions about a medical condition or this instruction, always ask your healthcare professional. [place] [address] any warranty or liability for your use of this information.

## 2017-12-07 NOTE — PROGRESS NOTES
Pt is her efor   Chief Complaint   Patient presents with    Medication Refill     orders pending     Pt states pain level is a 10, back and right knee    1. Have you been to the ER, urgent care clinic since your last visit? Hospitalized since your last visit? No    2. Have you seen or consulted any other health care providers outside of the [facility] since your last visit? Include any pap smears or colon screening.  No     Pt states last had something fro pain 3 days ago 12/4/17    PHQ over the last two weeks 12/7/2017   Little interest or pleasure in doing things Several days   Feeling down, depressed or hopeless Nearly every day   Total Score PHQ 2 4   Trouble falling or staying asleep, or sleeping too much -   Feeling tired or having little energy -   Poor appetite or overeating -   Feeling bad about yourself - or that you are a failure or have let yourself or your family down -   Trouble concentrating on things such as school, work, reading or watching TV -   Moving or speaking so slowly that other people could have noticed; or the opposite being so fidgety that others notice -   Thoughts of being better off dead, or hurting yourself in some way -   PHQ 9 Score -   How difficult have these problems made it for you to do your work, take care of your home and get along with others -

## 2017-12-14 LAB — DRUGS UR: NORMAL

## 2018-01-05 DIAGNOSIS — M17.11 PRIMARY OSTEOARTHRITIS OF RIGHT KNEE: ICD-10-CM

## 2018-01-05 DIAGNOSIS — M54.41 MIDLINE LOW BACK PAIN WITH RIGHT-SIDED SCIATICA, UNSPECIFIED CHRONICITY: ICD-10-CM

## 2018-01-06 DIAGNOSIS — M17.11 PRIMARY OSTEOARTHRITIS OF RIGHT KNEE: ICD-10-CM

## 2018-01-09 RX ORDER — METHOCARBAMOL 750 MG/1
TABLET, FILM COATED ORAL
Qty: 60 TAB | Refills: 0 | Status: SHIPPED | OUTPATIENT
Start: 2018-01-09 | End: 2018-04-19 | Stop reason: SDUPTHER

## 2018-01-09 RX ORDER — DEXTROMETHORPHAN HYDROBROMIDE, GUAIFENESIN 5; 100 MG/5ML; MG/5ML
LIQUID ORAL
Qty: 90 TAB | Refills: 0 | Status: SHIPPED | OUTPATIENT
Start: 2018-01-09 | End: 2018-12-23 | Stop reason: SDUPTHER

## 2018-01-31 ENCOUNTER — OFFICE VISIT (OUTPATIENT)
Dept: INTERNAL MEDICINE CLINIC | Age: 53
End: 2018-01-31

## 2018-01-31 VITALS
WEIGHT: 181 LBS | BODY MASS INDEX: 34.17 KG/M2 | SYSTOLIC BLOOD PRESSURE: 138 MMHG | RESPIRATION RATE: 18 BRPM | HEIGHT: 61 IN | HEART RATE: 57 BPM | TEMPERATURE: 97.9 F | DIASTOLIC BLOOD PRESSURE: 79 MMHG | OXYGEN SATURATION: 94 %

## 2018-01-31 DIAGNOSIS — J44.1 COPD EXACERBATION (HCC): ICD-10-CM

## 2018-01-31 DIAGNOSIS — M25.561 CHRONIC PAIN OF RIGHT KNEE: ICD-10-CM

## 2018-01-31 DIAGNOSIS — R68.89 FLU-LIKE SYMPTOMS: Primary | ICD-10-CM

## 2018-01-31 DIAGNOSIS — J01.01 ACUTE RECURRENT MAXILLARY SINUSITIS: ICD-10-CM

## 2018-01-31 DIAGNOSIS — G89.29 CHRONIC PAIN OF RIGHT KNEE: ICD-10-CM

## 2018-01-31 DIAGNOSIS — M25.511 CHRONIC RIGHT SHOULDER PAIN: ICD-10-CM

## 2018-01-31 DIAGNOSIS — Z96.651 STATUS POST TOTAL RIGHT KNEE REPLACEMENT: ICD-10-CM

## 2018-01-31 DIAGNOSIS — G89.29 CHRONIC RIGHT SHOULDER PAIN: ICD-10-CM

## 2018-01-31 DIAGNOSIS — M17.11 PRIMARY OSTEOARTHRITIS OF RIGHT KNEE: ICD-10-CM

## 2018-01-31 LAB
QUICKVUE INFLUENZA TEST: NEGATIVE
VALID INTERNAL CONTROL?: YES

## 2018-01-31 RX ORDER — ALBUTEROL SULFATE 0.83 MG/ML
2.5 SOLUTION RESPIRATORY (INHALATION)
Qty: 24 EACH | Refills: 2 | Status: SHIPPED | OUTPATIENT
Start: 2018-01-31

## 2018-01-31 RX ORDER — BENZONATATE 200 MG/1
200 CAPSULE ORAL
Qty: 21 CAP | Refills: 0 | Status: SHIPPED | OUTPATIENT
Start: 2018-01-31 | End: 2018-02-07

## 2018-01-31 RX ORDER — OXYCODONE AND ACETAMINOPHEN 10; 325 MG/1; MG/1
TABLET ORAL
Qty: 30 TAB | Refills: 0 | Status: SHIPPED | OUTPATIENT
Start: 2018-01-31 | End: 2018-02-28 | Stop reason: SDUPTHER

## 2018-01-31 RX ORDER — DOXYCYCLINE HYCLATE 100 MG
100 TABLET ORAL 2 TIMES DAILY
Qty: 14 TAB | Refills: 0 | Status: SHIPPED | OUTPATIENT
Start: 2018-01-31 | End: 2018-02-07

## 2018-01-31 NOTE — MR AVS SNAPSHOT
[address] [place] [address] [address] [id number] 
[phone] Patient: Yasmin Myers MRN: [id number] :1965 Visit Information Date & Time Provider Department Dept. Phone Encounter #  
 2018 11:20 AM Varsha Robin NP [address] [facility] [phone] [id number] Follow-up Instructions Return in about 4 weeks (around 2018) for pain med refill, COPD f/u. Upcoming Health Maintenance Date Due  
 PAP AKA CERVICAL CYTOLOGY 1984 BREAST CANCER SCRN MAMMOGRAM 2014 FOBT Q 1 YEAR AGE 50-75 2017 DTaP/Tdap/Td series (2 - Td) 2025 Allergies as of 2018  Review Complete On: 2018 By: Leslee Medina LPN Severity Noted Reaction Type Reactions Hydrocodone  2017    Itching Mold  2016    Shortness of Breath, Itching Ibuprofen Low 2015   Intolerance Nausea Only Current Immunizations  Reviewed on 3/3/2017 Name Date Influenza Vaccine (Quad) Intradermal PF 2016 Influenza Vaccine (Quad) PF 11/3/2017 Pneumococcal Polysaccharide (PPSV-23) 3/3/2017 Not reviewed this visit You Were Diagnosed With   
  
 Codes Comments Flu-like symptoms    -  Primary ICD-10-CM: R68.89 ICD-9-CM: 780.99 Primary osteoarthritis of right knee     ICD-10-CM: M17.11 ICD-9-CM: 715.16 Status post total right knee replacement     ICD-10-CM: H72.725 ICD-9-CM: V43.65 Chronic right shoulder pain     ICD-10-CM: M25.511, G89.29 ICD-9-CM: 719.41, 338.29 Chronic pain of right knee     ICD-10-CM: M25.561, G89.29 ICD-9-CM: 719.46, 338.29   
 COPD exacerbation ([facility] [address]     ICD-10-CM: J44.1 ICD-9-CM: 491.21 Acute recurrent maxillary sinusitis     ICD-10-CM: J01.01 
ICD-9-CM: 461.0 Vitals BP Pulse Temp Resp Height(growth percentile) Weight(growth percentile)  138/79 (BP 1 Location: Left arm, BP Patient Position: Sitting) (!) 57 97.9 °F (36.6 °C) (Oral) 18 5' 1\" (1.549 m) 181 lb (82.1 kg) SpO2 BMI OB Status Smoking Status 94% 34.2 kg/m2 Menopause Former Smoker Vitals History BMI and BSA Data Body Mass Index Body Surface Area  
 34.2 kg/m 2 1.88 m 2 Preferred Pharmacy Pharmacy Name Phone [id number] Blackmon [phone] Your Updated Medication List  
  
   
This list is accurate as of: 1/31/18 11:42 AM.  Always use your most recent med list.  
  
  
  
  
 acetaminophen 650 mg Tber Commonly known as:  TYLENOL  
TAKE 1 TAB BY MOUTH THREE (3) TIMES DAILY AS NEEDED FOR PAIN. amLODIPine 10 mg tablet Commonly known as:  Arva Brazen TAKE 1 TABLET BY MOUTH EVERY DAY  Indications: hypertension  
  
 baclofen 20 mg tablet Commonly known as:  LIORESAL Take 1 Tab by mouth three (3) times daily as needed for Pain (spasms). benzonatate 200 mg capsule Commonly known as:  TESSALON Take 1 Cap by mouth three (3) times daily as needed for Cough for up to 7 days. diclofenac 1 % Gel Commonly known as:  VOLTAREN Apply 2 g to affected area every six (6) hours. doxycycline 100 mg tablet Commonly known as:  VIBRA-TABS Take 1 Tab by mouth two (2) times a day for 7 days. DULoxetine 60 mg capsule Commonly known as:  CYMBALTA Take 1 Cap by mouth daily. Indications: CHRONIC MUSCULOSKELETAL PAIN  
  
 fluticasone 50 mcg/actuation nasal spray Commonly known as:  Romulus Oats 2 Sprays by Both Nostrils route daily. gabapentin 800 mg tablet Commonly known as:  NEURONTIN Take 1 Tab by mouth three (3) times daily. hydroCHLOROthiazide 25 mg tablet Commonly known as:  HYDRODIURIL Take 1 Tab by mouth daily. Indications: hypertension  
  
 levothyroxine 125 mcg tablet Commonly known as:  SYNTHROID  
TAKE 1 TAB BY MOUTH DAILY (BEFORE BREAKFAST). lidocaine 5 % Commonly known as:  Yolette Stock  
 Apply patch to the affected area for 12 hours a day and remove for 12 hours a day. loratadine 10 mg tablet Commonly known as:  Edward Graham Take 1 Tab by mouth daily. meloxicam 15 mg tablet Commonly known as:  MOBIC  
TAKE 1 TABLET (15 MG TOTAL) BY MOUTH DAILY. methocarbamol 750 mg tablet Commonly known as:  ROBAXIN  
TAKE 1 TAB BY MOUTH FOUR (4) TIMES DAILY AS NEEDED FOR PAIN (SPASMS). metoprolol succinate 25 mg XL tablet Commonly known as:  TOPROL-XL Take 1 Tab by mouth daily. miscellaneous medical supply Misc Shower seat for chronic knee pain, pt planning to have right TKR in June due to condition. Very limited range of motion. montelukast 10 mg tablet Commonly known as:  SINGULAIR  
daily. ondansetron 4 mg disintegrating tablet Commonly known as:  ZOFRAN ODT Take 1 Tab by mouth every eight (8) hours as needed for Nausea. oxyCODONE-acetaminophen  mg per tablet Commonly known as:  PERCOCET 10 May take 1 tab ONCE DAILY as needed for pain. Indications: Pain  
  
 pantoprazole 40 mg tablet Commonly known as:  PROTONIX  
two (2) times a day. predniSONE 20 mg tablet Commonly known as:  Tammy Savers Take 3 Tabs by mouth daily (with breakfast). * PROAIR HFA 90 mcg/actuation inhaler Generic drug:  albuterol Take 2 Puffs by inhalation every four (4) hours as needed. * albuterol 2.5 mg /3 mL (0.083 %) nebulizer solution Commonly known as:  PROVENTIL VENTOLIN  
3 mL by Nebulization route every four (4) hours as needed for Wheezing. sertraline 100 mg tablet Commonly known as:  ZOLOFT Take 1 Tab by mouth daily. SYMBICORT 160-4.5 mcg/actuation Hfaa Generic drug:  budesonide-formoterol Take 2 Puffs by inhalation two (2) times a day. * Notice: This list has 2 medication(s) that are the same as other medications prescribed for you.  Read the directions carefully, and ask your doctor or other care provider to review them with you. Prescriptions Printed Refills  
 oxyCODONE-acetaminophen (PERCOCET 10)  mg per tablet 0 Sig: May take 1 tab ONCE DAILY as needed for pain. Indications: Pain Class: Print Prescriptions Sent to Pharmacy Refills  
 albuterol (PROVENTIL VENTOLIN) 2.5 mg /3 mL (0.083 %) nebulizer solution 2 Sig: 3 mL by Nebulization route every four (4) hours as needed for Wheezing. Class: Normal  
 Pharmacy: [address] [place] [address] [facility] Ph #: [phone] Route: Nebulization  
 doxycycline (VIBRA-TABS) 100 mg tablet 0 Sig: Take 1 Tab by mouth two (2) times a day for 7 days. Class: Normal  
 [facility]: [address] [place] [address] [facility] [phone] Route: Oral  
 benzonatate (TESSALON) 200 mg capsule 0 Sig: Take 1 Cap by mouth three (3) times daily as needed for Cough for up to 7 days. Class: Normal  
 Pharmacy: [address] [place] [address] [facility]: [phone] Route: Oral  
  
We Performed the Following AMB POC RAPID INFLUENZA TEST [35344 CPT(R)] Lyssa Perry 13 (MW) [[id number] Custom] Follow-up Instructions Return in about 4 weeks (around 2/28/2018) for pain med refill, COPD f/u. Patient Instructions Chronic Obstructive Pulmonary Disease (COPD): Care Instructions Your Care Instructions Chronic obstructive pulmonary disease (COPD) is a general term for a group of lung diseases, including emphysema and chronic bronchitis. People with COPD have decreased airflow in and out of the lungs, which makes it hard to breathe. The airways also can get clogged with thick mucus. Cigarette smoking is a major cause of COPD. Although there is no cure for COPD, you can slow its progress. Following your treatment plan and taking care of yourself can help you feel better and live longer. Follow-up care is a key part of your treatment and safety. Be sure to make and go to all appointments, and call your doctor if you are having problems. It's also a good idea to know your test results and keep a list of the medicines you take. How can you care for yourself at home? ?Staying healthy ? · Do not smoke. This is the most important step you can take to prevent more damage to your lungs. If you need help quitting, talk to your doctor about stop-smoking programs and medicines. These can increase your chances of quitting for good. ? · Avoid colds and flu. Get a pneumococcal vaccine shot. If you have had one before, ask your doctor whether you need a second dose. Get the flu vaccine every fall. If you must be around people with colds or the flu, wash your hands often. ? · Avoid secondhand smoke, air pollution, and high altitudes. Also avoid cold, dry air and hot, humid air. Stay at home with your windows closed when air pollution is bad. ?Medicines and oxygen therapy ? · Take your medicines exactly as prescribed. Call your doctor if you think you are having a problem with your medicine. ? · You may be taking medicines such as: ¨ Bronchodilators. These help open your airways and make breathing easier. Bronchodilators are either short-acting (work for 6 to 9 hours) or long-acting (work for 24 hours). You inhale most bronchodilators, so they start to act quickly. Always carry your quick-relief inhaler with you in case you need it while you are away from home. ¨ Corticosteroids (prednisone, budesonide). These reduce airway inflammation. They come in pill or inhaled form. You must take these medicines every day for them to work well. ? · A spacer may help you get more inhaled medicine to your lungs. Ask your doctor or pharmacist if a spacer is right for you. If it is, ask how to use it properly.   
? · Do not take any vitamins, over-the-counter medicine, or herbal products without talking to your doctor first.  
? · If your doctor prescribed antibiotics, take them as directed. Do not stop taking them just because you feel better. You need to take the full course of antibiotics. ? · Oxygen therapy boosts the amount of oxygen in your blood and helps you breathe easier. Use the flow rate your doctor has recommended, and do not change it without talking to your doctor first.  
Activity ? · Get regular exercise. Walking is an easy way to get exercise. Start out slowly, and walk a little more each day. ? · Pay attention to your breathing. You are exercising too hard if you cannot talk while you are exercising. ? · Take short rest breaks when doing household chores and other activities. ? · Learn breathing methods-such as breathing through pursed lips-to help you become less short of breath. ? · If your doctor has not set you up with a pulmonary rehabilitation program, talk to him or her about whether rehab is right for you. Rehab includes exercise programs, education about your disease and how to manage it, help with diet and other changes, and emotional support. Diet ? · Eat regular, healthy meals. Use bronchodilators about 1 hour before you eat to make it easier to eat. Eat several small meals instead of three large ones. Drink beverages at the end of the meal. Avoid foods that are hard to chew. ? · Eat foods that contain protein so that you do not lose muscle mass. ? · Talk with your doctor if you gain too much weight or if you lose weight without trying. ?Mental health ? · Talk to your family, friends, or a therapist about your feelings. It is normal to feel frightened, angry, hopeless, helpless, and even guilty. Talking openly about bad feelings can help you cope. If these feelings last, talk to your doctor. When should you call for help? Call 911 anytime you think you may need emergency care. For example, call if: ? · You have severe trouble breathing. ?Call your doctor now or seek immediate medical care if: 
? · You have new or worse trouble breathing. ? · You cough up blood. ? · You have a fever. ? Watch closely for changes in your health, and be sure to contact your doctor if: 
? · You cough more deeply or more often, especially if you notice more mucus or a change in the color of your mucus. ? · You have new or worse swelling in your legs or belly. ? · You are not getting better as expected. Where can you learn more? Go to http://ofelia-mindy.info/. Reva Head in the search box to learn more about \"Chronic Obstructive Pulmonary Disease (COPD): Care Instructions. \" Current as of: May 12, 2017 Content Version: 11.4 © 8015-9282 [employer]. Care instructions adapted under license by [employer] (which disclaims liability or warranty for this information). If you have questions about a medical condition or this instruction, always ask your healthcare professional. [place] [address] any warranty or liability for your use of this information. Learning About COPD and Upper Respiratory Infections What are upper respiratory infections? An upper respiratory infection, also called a URI, is an infection of the nose, sinuses, or throat. Viruses or bacteria can cause URIs. Colds, the flu, and sinusitis are examples of URIs. These infections are spread by coughs, sneezes, and close contact. How do these infections affect COPD? A URI can worsen COPD symptoms, such as having too much mucus in your lungs, coughing, or being short of breath. What can you do to manage most infections at home? · Do not smoke. Avoid secondhand smoke. · Take an over-the-counter pain medicine, such as acetaminophen (Tylenol), ibuprofen (Advil, Motrin), or naproxen (Aleve). Read and follow all instructions on the label. · Be careful when taking over-the-counter cold or flu medicines and Tylenol at the same time. Many of these medicines have acetaminophen, which is Tylenol. Read the labels to make sure that you are not taking more than the recommended dose. Too much acetaminophen (Tylenol) can be harmful. · If your doctor prescribed antibiotics, take them as directed. Do not stop taking them just because you feel better. You need to take the full course of antibiotics. · Ask your doctor about cough medicines and decongestants. Some doctors recommend these medicines, while others feel that they do not help. · Learn breathing techniques for COPD, such as breathing through pursed lips. These techniques can help you breathe easier. What can you do to prevent these infections? Stay healthy · Do not smoke. This is the most important step you can take to prevent more damage to your lungs. If you need help quitting, talk to your doctor about stop-smoking programs and medicines. These can increase your chances of quitting for good. · Avoid secondhand smoke, air pollution, and high altitudes. Also avoid cold, dry air and hot, humid air. Stay at home with your windows closed when air pollution is bad. · Get a flu shot every year. · Get a pneumococcal vaccine shot. If you have had one before, ask your doctor whether you need another dose. · If you must be around people with colds or the flu, wash your hands often. Exercise and eat well · If your doctor recommends it, get more exercise. Walking is a good choice. Bit by bit, increase the amount you walk every day. Try for at least 30 minutes on most days of the week. · Eat regular, well-balanced meals. Eating right keeps your energy levels up and helps your body fight infection. · Get plenty of rest and sleep. Follow-up care is a key part of your treatment and safety.  Be sure to make and go to all appointments, and call your doctor if you are having problems. It's also a good idea to know your test results and keep a list of the medicines you take. Where can you learn more? Go to http://ofelia-mindy.info/. Enter V617 in the search box to learn more about \"Learning About COPD and Upper Respiratory Infections. \" Current as of: May 12, 2017 Content Version: 11.4 © 2211-4718 [employer]. Care instructions adapted under license by [employer] (which disclaims liability or warranty for this information). If you have questions about a medical condition or this instruction, always ask your healthcare professional. [place] [address] any warranty or liability for your use of this information. Introducing [facility]! [address] introduces OfficeDrop patient portal. Now you can access parts of your medical record, email your doctor's office, and request medication refills online. 1. In your internet browser, go to [email or web address] 2. Click on the First Time User? Click Here link in the Sign In box. You will see the New Member Sign Up page. 3. Enter your OfficeDrop Access Code exactly as it appears below. You will not need to use this code after youve completed the sign-up process. If you do not sign up before the expiration date, you must request a new code. · OfficeDrop Access Code: [email or web address] Expires: 2/1/2018  8:52 AM 
 
4. Enter the last four digits of your Social Security Number (xxxx) and Date of Birth (mm/dd/yyyy) as indicated and click Submit. You will be taken to the next sign-up page. 5. Create a OfficeDrop ID. This will be your OfficeDrop login ID and cannot be changed, so think of one that is secure and easy to remember. 6. Create a OfficeDrop password. You can change your password at any time. 7. Enter your Password Reset Question and Answer. This can be used at a later time if you forget your password. 8. Enter your e-mail address. You will receive e-mail notification when new information is available in [address] 9. Click Sign Up. You can now view and download portions of your medical record. 10. Click the Download Summary menu link to download a portable copy of your medical information. If you have questions, please visit the Frequently Asked Questions section of the [employer] website. Remember, Shoefitr is NOT to be used for urgent needs. For medical emergencies, dial 911. Now available from your iPhone and Android! Please provide this summary of care documentation to your next provider. Your primary care clinician is listed as HUGO Gonzalez. If you have any questions after today's visit, please call [phone].

## 2018-01-31 NOTE — PATIENT INSTRUCTIONS
Chronic Obstructive Pulmonary Disease (COPD): Care Instructions  Your Care Instructions    Chronic obstructive pulmonary disease (COPD) is a general term for a group of lung diseases, including emphysema and chronic bronchitis. People with COPD have decreased airflow in and out of the lungs, which makes it hard to breathe. The airways also can get clogged with thick mucus. Cigarette smoking is a major cause of COPD. Although there is no cure for COPD, you can slow its progress. Following your treatment plan and taking care of yourself can help you feel better and live longer. Follow-up care is a key part of your treatment and safety. Be sure to make and go to all appointments, and call your doctor if you are having problems. It's also a good idea to know your test results and keep a list of the medicines you take. How can you care for yourself at home? ?Staying healthy  ? · Do not smoke. This is the most important step you can take to prevent more damage to your lungs. If you need help quitting, talk to your doctor about stop-smoking programs and medicines. These can increase your chances of quitting for good. ? · Avoid colds and flu. Get a pneumococcal vaccine shot. If you have had one before, ask your doctor whether you need a second dose. Get the flu vaccine every fall. If you must be around people with colds or the flu, wash your hands often. ? · Avoid secondhand smoke, air pollution, and high altitudes. Also avoid cold, dry air and hot, humid air. Stay at home with your windows closed when air pollution is bad. ?Medicines and oxygen therapy  ? · Take your medicines exactly as prescribed. Call your doctor if you think you are having a problem with your medicine. ? · You may be taking medicines such as:  ¨ Bronchodilators. These help open your airways and make breathing easier. Bronchodilators are either short-acting (work for 6 to 9 hours) or long-acting (work for 24 hours).  You inhale most bronchodilators, so they start to act quickly. Always carry your quick-relief inhaler with you in case you need it while you are away from home. ¨ Corticosteroids (prednisone, budesonide). These reduce airway inflammation. They come in pill or inhaled form. You must take these medicines every day for them to work well. ? · A spacer may help you get more inhaled medicine to your lungs. Ask your doctor or pharmacist if a spacer is right for you. If it is, ask how to use it properly. ? · Do not take any vitamins, over-the-counter medicine, or herbal products without talking to your doctor first.   ? · If your doctor prescribed antibiotics, take them as directed. Do not stop taking them just because you feel better. You need to take the full course of antibiotics. ? · Oxygen therapy boosts the amount of oxygen in your blood and helps you breathe easier. Use the flow rate your doctor has recommended, and do not change it without talking to your doctor first.   Activity  ? · Get regular exercise. Walking is an easy way to get exercise. Start out slowly, and walk a little more each day. ? · Pay attention to your breathing. You are exercising too hard if you cannot talk while you are exercising. ? · Take short rest breaks when doing household chores and other activities. ? · Learn breathing methods-such as breathing through pursed lips-to help you become less short of breath. ? · If your doctor has not set you up with a pulmonary rehabilitation program, talk to him or her about whether rehab is right for you. Rehab includes exercise programs, education about your disease and how to manage it, help with diet and other changes, and emotional support. Diet  ? · Eat regular, healthy meals. Use bronchodilators about 1 hour before you eat to make it easier to eat. Eat several small meals instead of three large ones. Drink beverages at the end of the meal. Avoid foods that are hard to chew.    ? · Eat foods that contain protein so that you do not lose muscle mass. ? · Talk with your doctor if you gain too much weight or if you lose weight without trying. ?Mental health  ? · Talk to your family, friends, or a therapist about your feelings. It is normal to feel frightened, angry, hopeless, helpless, and even guilty. Talking openly about bad feelings can help you cope. If these feelings last, talk to your doctor. When should you call for help? Call 911 anytime you think you may need emergency care. For example, call if:  ? · You have severe trouble breathing. ?Call your doctor now or seek immediate medical care if:  ? · You have new or worse trouble breathing. ? · You cough up blood. ? · You have a fever. ? Watch closely for changes in your health, and be sure to contact your doctor if:  ? · You cough more deeply or more often, especially if you notice more mucus or a change in the color of your mucus. ? · You have new or worse swelling in your legs or belly. ? · You are not getting better as expected. Where can you learn more? Go to http://ofeliaViamericasmindy.info/. Andie Daugherty in the search box to learn more about \"Chronic Obstructive Pulmonary Disease (COPD): Care Instructions. \"  Current as of: May 12, 2017  Content Version: 11.4  © 5427-7960 [employer]. Care instructions adapted under license by [employer] (which disclaims liability or warranty for this information). If you have questions about a medical condition or this instruction, always ask your healthcare professional. [place] [address] any warranty or liability for your use of this information. Learning About COPD and Upper Respiratory Infections  What are upper respiratory infections? An upper respiratory infection, also called a URI, is an infection of the nose, sinuses, or throat. Viruses or bacteria can cause URIs. Colds, the flu, and sinusitis are examples of URIs.  These infections are spread by coughs, sneezes, and close contact. How do these infections affect COPD? A URI can worsen COPD symptoms, such as having too much mucus in your lungs, coughing, or being short of breath. What can you do to manage most infections at home? · Do not smoke. Avoid secondhand smoke. · Take an over-the-counter pain medicine, such as acetaminophen (Tylenol), ibuprofen (Advil, Motrin), or naproxen (Aleve). Read and follow all instructions on the label. · Be careful when taking over-the-counter cold or flu medicines and Tylenol at the same time. Many of these medicines have acetaminophen, which is Tylenol. Read the labels to make sure that you are not taking more than the recommended dose. Too much acetaminophen (Tylenol) can be harmful. · If your doctor prescribed antibiotics, take them as directed. Do not stop taking them just because you feel better. You need to take the full course of antibiotics. · Ask your doctor about cough medicines and decongestants. Some doctors recommend these medicines, while others feel that they do not help. · Learn breathing techniques for COPD, such as breathing through pursed lips. These techniques can help you breathe easier. What can you do to prevent these infections? Stay healthy  · Do not smoke. This is the most important step you can take to prevent more damage to your lungs. If you need help quitting, talk to your doctor about stop-smoking programs and medicines. These can increase your chances of quitting for good. · Avoid secondhand smoke, air pollution, and high altitudes. Also avoid cold, dry air and hot, humid air. Stay at home with your windows closed when air pollution is bad. · Get a flu shot every year. · Get a pneumococcal vaccine shot. If you have had one before, ask your doctor whether you need another dose. · If you must be around people with colds or the flu, wash your hands often.   Exercise and eat well  · If your doctor recommends it, get more exercise. Walking is a good choice. Bit by bit, increase the amount you walk every day. Try for at least 30 minutes on most days of the week. · Eat regular, well-balanced meals. Eating right keeps your energy levels up and helps your body fight infection. · Get plenty of rest and sleep. Follow-up care is a key part of your treatment and safety. Be sure to make and go to all appointments, and call your doctor if you are having problems. It's also a good idea to know your test results and keep a list of the medicines you take. Where can you learn more? Go to http://ofelia-mindy.info/. Enter X449 in the search box to learn more about \"Learning About COPD and Upper Respiratory Infections. \"  Current as of: May 12, 2017  Content Version: 11.4  © 1399-8550 [employer]. Care instructions adapted under license by [employer] (which disclaims liability or warranty for this information). If you have questions about a medical condition or this instruction, always ask your healthcare professional. [address] any warranty or liability for your use of this information.

## 2018-01-31 NOTE — PROGRESS NOTES
Encounter for pain management. Chronic Pain:  Patient has chronic BL knee pain for years, had right TKR last year. Associated with bodyaches from illness. Pain Scale: 9/10. Had IMAGING for lumbar spine and right knee and has been seeing/seen by ORTHO. Last PT March 2017, continuing HEP. Pain in the right knee, leg, and lower back is still limiting walking, sitting, and standing, exacerbated by forementioned acitivities to include stair climbing. Has tried prednisone, tramadol, injections, PT, gabapentin, cymbalta, and surgery in past with minimal relief. Pain has been controlled with Oxycodone, last taken 5 days ago. The medication is kept safe by staying with her. Has NOT seen any other providers since last OV for pain medication. No significant changes to pain presentation since last OV. she is  able to do her normal daily activities. she reports the following adverse side effects: none. Least pain over the last week has been 7/10. Worst pain over the last week has been 10/10. Aberrant behaviors: None         Symptoms onset: problem is longstanding. Rheumatological ROS: ongoing significant pain which is stable and controlled by pain med. Response to treatment plan: waxing and waning. Also with congestion, coughing, left ear ache, myalgias, and runny nose. Using inhaler and taking flonase, singulair, and Zyrtec daily however. Associated with chest tightness yesterday and SOB at rest. No additional treatments tried. Denies fever at this time. Ongoing for past 4 days. However had sick contact with niece; flu. Depression symptoms of sadness, decreased interest, racing thoughts, and feeling overwhelmed; less now since dose increased to 100 mg. Denies suicidal ideation however. Review of Systems  Constitutional: negative for fevers, chills, anorexia and weight loss  Eyes:   negative for visual disturbance, drainage, and irritation  ENT:   +AR and environmental allergies.  negative for tinnitus,sore throat,ear pain,and hoarseness  Respiratory:  + asthma/COPD. negative for hemoptysis  CV:   negative for chest pain, palpitations, and lower extremity edema  GI:   negative for nausea, vomiting, diarrhea, abdominal pain, and melena  Endo:               negative for polyuria,polydipsia,polyphagia, and heat intolerance  Genitourinary: negative for frequency, urgency, dysuria, retention, and hematuria  Integument:  negative for rash, ulcerations, and pruritus  Hematologic:  negative for easy bruising and bleeding  Musculoskel: negative for  muscle weakness  Neurological:  negative for headaches, dizziness, vertigo,and memory problems  Behavl/Psych: +depression, on cymbalta and zoloft. negative for feelings of  suicide    1./2. Medical history/Past medical History   Past Medical History:   Diagnosis Date    Asthma     uses inhalers    Chronic obstructive pulmonary disease (HCC)     bronchitis    GERD (gastroesophageal reflux disease)     Hypertension     Ill-defined condition     environmental allergies     Ill-defined condition     Multiple body piercings; unable to remove tongue and lip piercings    Thyroid disease     hypo    Ventral hernia 12/31/2013     Past Surgical History:   Procedure Laterality Date    HX HEENT  2009    thyroidectomy    HX KNEE REPLACEMENT      R    HX MOHS PROCEDURES Right 3/8/11    HX OTHER SURGICAL      hiatal hernia repair    HX TUBAL LIGATION       Patient Active Problem List   Diagnosis Code    Ventral hernia K43.9    Chronic pain of right knee M25.561, G89.29    Chronic right shoulder pain M25.511, G89.29    Environmental and seasonal allergies J30.89    Ventral hernia without obstruction or gangrene K43.9    Primary osteoarthritis of right knee M17.11    Mixed simple and mucopurulent chronic bronchitis (HCC) J41.8    Acquired hypothyroidism E03.9    Chronic bilateral low back pain with right-sided sciatica M54.41, G89.29    Status post total right knee replacement Z96.651    Malignant hypertension I10    Mild single current episode of major depressive disorder ([facility] [address] F32.0    Pain management contract signed Z02.89    Chronic pain of left knee M25.562, G89.29       3. Applicable records from prior treatment providers are apart of [facility] under the media/encounters tab. 4. Diagnostic, therapeutic and laboratory results are available in the [facility] chart. 5. Consultation notes are available for review in the media/encounters tab of the [facility] chart. 6. Treatment goals include: pain control, improve activity level and function in regards to activities of daily living, and improved comfort level. Previously pt has been limited by pain in all these aspects. 7. The risks and benefits of treatment have been discussed at this office visit with the pt.  she understands that the medication has addictive potential.  Additionally the pt has been advised that narcotic pain medication may impair mental and/or physical ability required for performance of tasks such as driving or operating any other machinery. 8. Pt has an updated signed pain contract on file and can be found under the media section of the [facility] chart. 9. The pain contract is reviewed. Pain medication will be continued at the same dosage. Pill count: 0 tabs, last fill 12/13. Urine drug screening ordered/collected today. Diagnostic studies are not indicated at this time. Interventional procedure are not indicated at this time. 10. Medication prescibed is oxycodone every 24 hours PRN # 30 with zero refills for a 1 month supply.  was reviewed while planning for pain/anxiety management, no indications of drug diversion suspected. Prescription history is NOT suspicious for controlled substance overuse. 11. Patient instructions have been reviewed in detail as outlined above and in the pain contract. 12. Re-evaluation is planned for 1 month(s). Current Outpatient Prescriptions   Medication Sig Dispense Refill    oxyCODONE-acetaminophen (PERCOCET 10)  mg per tablet May take 1 tab ONCE DAILY as needed for pain. Indications: Pain 30 Tab 0    albuterol (PROVENTIL VENTOLIN) 2.5 mg /3 mL (0.083 %) nebulizer solution 3 mL by Nebulization route every four (4) hours as needed for Wheezing. 24 Each 2    methocarbamol (ROBAXIN) 750 mg tablet TAKE 1 TAB BY MOUTH FOUR (4) TIMES DAILY AS NEEDED FOR PAIN (SPASMS). 60 Tab 0    levothyroxine (SYNTHROID) 125 mcg tablet TAKE 1 TAB BY MOUTH DAILY (BEFORE BREAKFAST). 90 Tab 0    sertraline (ZOLOFT) 100 mg tablet Take 1 Tab by mouth daily. 30 Tab 11    meloxicam (MOBIC) 15 mg tablet TAKE 1 TABLET (15 MG TOTAL) BY MOUTH DAILY. 11    diclofenac (VOLTAREN) 1 % gel Apply 2 g to affected area every six (6) hours. 100 g 5    metoprolol succinate (TOPROL-XL) 25 mg XL tablet Take 1 Tab by mouth daily. 30 Tab 11    amLODIPine (NORVASC) 10 mg tablet TAKE 1 TABLET BY MOUTH EVERY DAY  Indications: hypertension 90 Tab 3    gabapentin (NEURONTIN) 800 mg tablet Take 1 Tab by mouth three (3) times daily. 90 Tab 11    hydroCHLOROthiazide (HYDRODIURIL) 25 mg tablet Take 1 Tab by mouth daily. Indications: hypertension 30 Tab 11    baclofen (LIORESAL) 20 mg tablet Take 1 Tab by mouth three (3) times daily as needed for Pain (spasms). 60 Tab 3    DULoxetine (CYMBALTA) 60 mg capsule Take 1 Cap by mouth daily. Indications: CHRONIC MUSCULOSKELETAL PAIN 30 Cap 11    lidocaine (LIDODERM) 5 % Apply patch to the affected area for 12 hours a day and remove for 12 hours a day. 30 Each 11    fluticasone (FLONASE) 50 mcg/actuation nasal spray 2 Sprays by Both Nostrils route daily. 1 Bottle 11    ondansetron (ZOFRAN ODT) 4 mg disintegrating tablet Take 1 Tab by mouth every eight (8) hours as needed for Nausea. 20 Tab 3    montelukast (SINGULAIR) 10 mg tablet daily. 6    pantoprazole (PROTONIX) 40 mg tablet two (2) times a day. 5    loratadine (CLARITIN) 10 mg tablet Take 1 Tab by mouth daily. 30 Tab 5    budesonide-formoterol (SYMBICORT) 160-4.5 mcg/actuation HFA inhaler Take 2 Puffs by inhalation two (2) times a day.  albuterol (PROAIR HFA) 90 mcg/actuation inhaler Take 2 Puffs by inhalation every four (4) hours as needed.  acetaminophen (TYLENOL) 650 mg TbER TAKE 1 TAB BY MOUTH THREE (3) TIMES DAILY AS NEEDED FOR PAIN. 90 Tab 0    predniSONE (DELTASONE) 20 mg tablet Take 3 Tabs by mouth daily (with breakfast). 15 Tab 0    miscellaneous medical supply misc Shower seat for chronic knee pain, pt planning to have right TKR in June due to condition. Very limited range of motion. 1 Each 0     Allergies   Allergen Reactions    Hydrocodone Itching    Mold Shortness of Breath and Itching    Ibuprofen Nausea Only       Objective:  Visit Vitals    /79 (BP 1 Location: Left arm, BP Patient Position: Sitting)    Pulse (!) 57    Temp 97.9 °F (36.6 °C) (Oral)    Resp 18    Ht 5' 1\" (1.549 m)    Wt 181 lb (82.1 kg)    SpO2 94%    BMI 34.2 kg/m2     Wt Readings from Last 3 Encounters:   01/31/18 181 lb (82.1 kg)   12/07/17 157 lb (71.2 kg)   11/03/17 157 lb (71.2 kg)     Physical Exam:   General appearance - alert, well appearing, and in mild distress. Repositions often and groans with movement. +malaise. Mental status - A/O x 4, normal mood and affect. Ears- TM injected CRISTIANE with air fluid levels, no erythema or drainage on right. Left  with redness, no drainage. Nose- turbinates boggy and pale on right. Maxillary sinus tenderness. Neck -Supple ,normal CSP. FROM, non-tender. No significant adenopathy/thyromegaly. No JVD. Chest - Transmitted upper airway sounds. Bases diminished. Symmetric chest rise. No wheezing, rales or rhonchi. Heart - Normal rate, regular rhythm. Normal S1, S2. No MGR or clicks. Ext- Radial, DP pulses, 2+ bilaterally. No pedal edema, clubbing, or cyanosis. Skin-Warm and dry.  No hyperpigmentation, ulcerations, or suspicious lesions. Neuro - normal speech, no focal findings or movement disorder. Normal strength and muscle tone. Limping gait using cane. Assessment/Plan:  Flu test Negative. Doxcycline started and tessalon perles. Discussed the patient's BMI with her. The BMI follow up plan is as follows:     I have reviewed/discussed the above normal BMI (Body mass index is 34.2 kg/(m^2). ) with the patient. I have recommended the following interventions: encourage exercise, monitor weight, and dietary management education, guidance, and counseling, . Medication Side Effects and Warnings were discussed with patient: yes   Patient Labs were reviewed: yes  Patient Past Records were reviewed: yes    See below for other orders   Follow-up Disposition: Not on File      ICD-10-CM ICD-9-CM    1. Flu-like symptoms R68.89 780.99 albuterol (PROVENTIL VENTOLIN) 2.5 mg /3 mL (0.083 %) nebulizer solution      AMB POC RAPID INFLUENZA TEST   2. Primary osteoarthritis of right knee M17.11 715.16 oxyCODONE-acetaminophen (PERCOCET 10)  mg per tablet   3. Status post total right knee replacement Z96.651 V43.65 oxyCODONE-acetaminophen (PERCOCET 10)  mg per tablet   4. Chronic right shoulder pain M25.511 719.41 oxyCODONE-acetaminophen (PERCOCET 10)  mg per tablet    G89.29 338.29 TOXASSURE SELECT 13 (MW)   5. Chronic pain of right knee M25.561 719.46 oxyCODONE-acetaminophen (PERCOCET 10)  mg per tablet    G89.29 338.29 TOXASSURE SELECT 13 (MW)   6. COPD exacerbation (HCC) J44.1 491.21      Orders Placed This Encounter    TOXASSURE SELECT 13 (MW)    AMB POC RAPID INFLUENZA TEST    oxyCODONE-acetaminophen (PERCOCET 10)  mg per tablet     Sig: May take 1 tab ONCE DAILY as needed for pain.   Indications: Pain     Dispense:  30 Tab     Refill:  0    albuterol (PROVENTIL VENTOLIN) 2.5 mg /3 mL (0.083 %) nebulizer solution     Sig: 3 mL by Nebulization route every four (4) hours as needed for Wheezing. Dispense:  24 Each     Refill:  2       Liliya Sykes expressed understanding of plan. An After Visit Summary was offered/printed and given to the patient.

## 2018-01-31 NOTE — PROGRESS NOTES
Pt is here for   Chief Complaint   Patient presents with    Medication Refill     orders pending    Cough     Pt states pain level is a 9 back knee left leg and shoulder  Pt states last had something for pain 2 days ago    1. Have you been to the ER, urgent care clinic since your last visit? Hospitalized since your last visit? No    2. Have you seen or consulted any other health care providers outside of the [address] [place] [address] since your last visit? Include any pap smears or colon screening.  No

## 2018-02-06 LAB — DRUGS UR: NORMAL

## 2018-02-12 NOTE — PROGRESS NOTES
Pt is here for   Chief Complaint   Patient presents with    Medication Refill     orders pending     Pt states pain level is a 10 back and right knee. .. Pt states last had something for pain yesterday    1. Have you been to the ER, urgent care clinic since your last visit? Hospitalized since your last visit? No    2. Have you seen or consulted any other health care providers outside of the [address] [place] [address] since your last visit? Include any pap smears or colon screening.  No Area L Indication Text: Tumors in this location are included in Area L (trunk and extremities).  Mohs surgery is indicated for larger tumors, 2 cm or larger, in these anatomic locations.

## 2018-02-20 RX ORDER — PANTOPRAZOLE SODIUM 40 MG/1
TABLET, DELAYED RELEASE ORAL
Qty: 90 TAB | Refills: 2 | Status: SHIPPED | OUTPATIENT
Start: 2018-02-20 | End: 2018-12-14 | Stop reason: SDUPTHER

## 2018-02-28 ENCOUNTER — OFFICE VISIT (OUTPATIENT)
Dept: INTERNAL MEDICINE CLINIC | Age: 53
End: 2018-02-28

## 2018-02-28 VITALS
DIASTOLIC BLOOD PRESSURE: 84 MMHG | WEIGHT: 171 LBS | OXYGEN SATURATION: 93 % | HEIGHT: 61 IN | BODY MASS INDEX: 32.28 KG/M2 | TEMPERATURE: 97.2 F | RESPIRATION RATE: 18 BRPM | SYSTOLIC BLOOD PRESSURE: 122 MMHG | HEART RATE: 73 BPM

## 2018-02-28 DIAGNOSIS — M17.11 PRIMARY OSTEOARTHRITIS OF RIGHT KNEE: Primary | ICD-10-CM

## 2018-02-28 DIAGNOSIS — J30.89 ENVIRONMENTAL AND SEASONAL ALLERGIES: ICD-10-CM

## 2018-02-28 DIAGNOSIS — F33.1 MODERATE EPISODE OF RECURRENT MAJOR DEPRESSIVE DISORDER (HCC): ICD-10-CM

## 2018-02-28 DIAGNOSIS — J41.8 MIXED SIMPLE AND MUCOPURULENT CHRONIC BRONCHITIS (HCC): ICD-10-CM

## 2018-02-28 DIAGNOSIS — F51.04 PSYCHOPHYSIOLOGICAL INSOMNIA: ICD-10-CM

## 2018-02-28 DIAGNOSIS — G89.29 CHRONIC BILATERAL LOW BACK PAIN WITH RIGHT-SIDED SCIATICA: ICD-10-CM

## 2018-02-28 DIAGNOSIS — M54.41 CHRONIC BILATERAL LOW BACK PAIN WITH RIGHT-SIDED SCIATICA: ICD-10-CM

## 2018-02-28 DIAGNOSIS — M25.562 CHRONIC PAIN OF LEFT KNEE: ICD-10-CM

## 2018-02-28 DIAGNOSIS — M25.561 CHRONIC PAIN OF RIGHT KNEE: ICD-10-CM

## 2018-02-28 DIAGNOSIS — G89.29 CHRONIC PAIN OF LEFT KNEE: ICD-10-CM

## 2018-02-28 DIAGNOSIS — Z96.651 STATUS POST TOTAL RIGHT KNEE REPLACEMENT: ICD-10-CM

## 2018-02-28 DIAGNOSIS — G89.29 CHRONIC PAIN OF RIGHT KNEE: ICD-10-CM

## 2018-02-28 RX ORDER — MONTELUKAST SODIUM 10 MG/1
10 TABLET ORAL DAILY
Qty: 30 TAB | Refills: 6 | Status: SHIPPED | OUTPATIENT
Start: 2018-02-28 | End: 2019-07-24 | Stop reason: SDUPTHER

## 2018-02-28 RX ORDER — OXYCODONE AND ACETAMINOPHEN 10; 325 MG/1; MG/1
TABLET ORAL
Qty: 30 TAB | Refills: 0 | Status: SHIPPED | OUTPATIENT
Start: 2018-02-28 | End: 2018-03-28 | Stop reason: SDUPTHER

## 2018-02-28 RX ORDER — TRAZODONE HYDROCHLORIDE 50 MG/1
TABLET ORAL
Qty: 60 TAB | Refills: 11 | Status: SHIPPED | OUTPATIENT
Start: 2018-02-28 | End: 2019-11-06 | Stop reason: SDUPTHER

## 2018-02-28 NOTE — MR AVS SNAPSHOT
[address] [place] [address] [address] [address] 
[phone] Patient: Monika Gallagher MRN: [id number] :1965 Visit Information Date & Time Provider Department Dept. Phone Encounter #  
 2018 11:20 AM Lawson Mathews NP [address] [facility] [phone] [id number] Follow-up Instructions Return in about 4 weeks (around 3/28/2018) for pain med refill. Upcoming Health Maintenance Date Due  
 PAP AKA CERVICAL CYTOLOGY 1984 BREAST CANCER SCRN MAMMOGRAM 2014 FOBT Q 1 YEAR AGE 50-75 2017 DTaP/Tdap/Td series (2 - Td) 2025 Allergies as of 2018  Review Complete On: 2018 By: Anne-Marie Greene. ALYSSA Medina Severity Noted Reaction Type Reactions Hydrocodone  2017    Itching Mold  2016    Shortness of Breath, Itching Ibuprofen Low 2015   Intolerance Nausea Only Current Immunizations  Reviewed on 3/3/2017 Name Date Influenza Vaccine (Quad) Intradermal PF 2016 Influenza Vaccine (Quad) PF 11/3/2017 Pneumococcal Polysaccharide (PPSV-23) 3/3/2017 Not reviewed this visit You Were Diagnosed With   
  
 Codes Comments Primary osteoarthritis of right knee    -  Primary ICD-10-CM: M17.11 ICD-9-CM: 715.16 Chronic pain of left knee     ICD-10-CM: M25.562, G89.29 ICD-9-CM: 719.46, 338.29 Chronic pain of right knee     ICD-10-CM: M25.561, G89.29 ICD-9-CM: 719.46, 338.29 Chronic bilateral low back pain with right-sided sciatica     ICD-10-CM: M54.41, G89.29 ICD-9-CM: 724.2, 724.3, 338.29 Environmental and seasonal allergies     ICD-10-CM: J30.89 ICD-9-CM: 477.8 Moderate episode of recurrent major depressive disorder (HCC)     ICD-10-CM: F33.1 ICD-9-CM: 296.32 Psychophysiological insomnia     ICD-10-CM: F51.04 
ICD-9-CM: 307.42 Status post total right knee replacement     ICD-10-CM: G72.037 ICD-9-CM: V43.65   
 Mixed simple and mucopurulent chronic bronchitis (HCC)     ICD-10-CM: J41.8 ICD-9-CM: 491.1 Vitals BP Pulse Temp Resp Height(growth percentile) Weight(growth percentile) 122/84 (BP 1 Location: Right arm, BP Patient Position: Sitting) 73 97.2 °F (36.2 °C) (Oral) 18 5' 1\" (1.549 m) 171 lb (77.6 kg) SpO2 BMI OB Status Smoking Status 93% 32.31 kg/m2 Menopause Former Smoker Vitals History BMI and BSA Data Body Mass Index Body Surface Area  
 32.31 kg/m 2 1.83 m 2 Preferred Pharmacy Pharmacy Name Phone [facility] [phone] Your Updated Medication List  
  
   
This list is accurate as of 2/28/18 12:35 PM.  Always use your most recent med list.  
  
  
  
  
 acetaminophen 650 mg Tber Commonly known as:  TYLENOL  
TAKE 1 TAB BY MOUTH THREE (3) TIMES DAILY AS NEEDED FOR PAIN. amLODIPine 10 mg tablet Commonly known as:  Larayne Anthony TAKE 1 TABLET BY MOUTH EVERY DAY  Indications: hypertension  
  
 baclofen 20 mg tablet Commonly known as:  LIORESAL Take 1 Tab by mouth three (3) times daily as needed for Pain (spasms). diclofenac 1 % Gel Commonly known as:  VOLTAREN Apply 2 g to affected area every six (6) hours. fluticasone 50 mcg/actuation nasal spray Commonly known as:  [place] Speller 2 Sprays by Both Nostrils route daily. gabapentin 800 mg tablet Commonly known as:  NEURONTIN Take 1 Tab by mouth three (3) times daily. hydroCHLOROthiazide 25 mg tablet Commonly known as:  HYDRODIURIL Take 1 Tab by mouth daily. Indications: hypertension  
  
 levothyroxine 125 mcg tablet Commonly known as:  SYNTHROID  
TAKE 1 TAB BY MOUTH DAILY (BEFORE BREAKFAST). lidocaine 5 % Commonly known as:  Jesus Fleet Apply patch to the affected area for 12 hours a day and remove for 12 hours a day. loratadine 10 mg tablet Commonly known as:  Marky Kumari  
 Take 1 Tab by mouth daily. meloxicam 15 mg tablet Commonly known as:  MOBIC  
TAKE 1 TABLET (15 MG TOTAL) BY MOUTH DAILY. methocarbamol 750 mg tablet Commonly known as:  ROBAXIN  
TAKE 1 TAB BY MOUTH FOUR (4) TIMES DAILY AS NEEDED FOR PAIN (SPASMS). metoprolol succinate 25 mg XL tablet Commonly known as:  TOPROL-XL Take 1 Tab by mouth daily. miscellaneous medical supply Misc Shower seat for chronic knee pain, pt planning to have right TKR in June due to condition. Very limited range of motion. montelukast 10 mg tablet Commonly known as:  SINGULAIR Take 1 Tab by mouth daily. ondansetron 4 mg disintegrating tablet Commonly known as:  ZOFRAN ODT Take 1 Tab by mouth every eight (8) hours as needed for Nausea. oxyCODONE-acetaminophen  mg per tablet Commonly known as:  PERCOCET 10 May take 1 tab ONCE DAILY as needed for pain. Indications: Pain  
  
 pantoprazole 40 mg tablet Commonly known as:  PROTONIX  
TAKE 1 TABLET BY MOUTH ONCE EVERY DAY AS DIRECTED  
  
 predniSONE 20 mg tablet Commonly known as:  Merwyn Going Take 3 Tabs by mouth daily (with breakfast). * PROAIR HFA 90 mcg/actuation inhaler Generic drug:  albuterol Take 2 Puffs by inhalation every four (4) hours as needed. * albuterol 2.5 mg /3 mL (0.083 %) nebulizer solution Commonly known as:  PROVENTIL VENTOLIN  
3 mL by Nebulization route every four (4) hours as needed for Wheezing. sertraline 100 mg tablet Commonly known as:  ZOLOFT Take 1 Tab by mouth daily. SYMBICORT 160-4.5 mcg/actuation Hfaa Generic drug:  budesonide-formoterol Take 2 Puffs by inhalation two (2) times a day. traZODone 50 mg tablet Commonly known as:  Gladystine Gemma Take 1-2 tabs every night for sleep. * Notice: This list has 2 medication(s) that are the same as other medications prescribed for you.  Read the directions carefully, and ask your doctor or other care provider to review them with you. Prescriptions Printed Refills  
 oxyCODONE-acetaminophen (PERCOCET 10)  mg per tablet 0 Sig: May take 1 tab ONCE DAILY as needed for pain. Indications: Pain Class: Print Prescriptions Sent to Pharmacy Refills  
 traZODone (DESYREL) 50 mg tablet 11 Sig: Take 1-2 tabs every night for sleep. Class: Normal  
 Pharmacy: [address] [place] [address] Ph #: [phone]  
 montelukast (SINGULAIR) 10 mg tablet 6 Sig: Take 1 Tab by mouth daily. Class: Normal  
 Pharmacy: [address] [place] [address] Ph #: [phone] Route: Oral  
  
We Performed the Following Daniel Madrigal 13 () [[id number] Custom] Follow-up Instructions Return in about 4 weeks (around 3/28/2018) for pain med refill. Introducing [facility]! [employer] introduces [employer] patient portal. Now you can access parts of your medical record, email your doctor's office, and request medication refills online. 1. In your internet browser, go to [email or web address] 2. Click on the First Time User? Click Here link in the Sign In box. You will see the New Member Sign Up page. 3. Enter your [employer] Access Code exactly as it appears below. You will not need to use this code after youve completed the sign-up process. If you do not sign up before the expiration date, you must request a new code. · [employer] Access Code: [email or web address] Expires: 5/29/2018 12:35 PM 
 
4. Enter the last four digits of your Social Security Number (xxxx) and Date of Birth (mm/dd/yyyy) as indicated and click Submit. You will be taken to the next sign-up page. 5. Create a [employer] ID. This will be your [employer] login ID and cannot be changed, so think of one that is secure and easy to remember. 6. Create a [employer] password. You can change your password at any time. 7. Enter your Password Reset Question and Answer. This can be used at a later time if you forget your password. 8. Enter your e-mail address. You will receive e-mail notification when new information is available in [address] 9. Click Sign Up. You can now view and download portions of your medical record. 10. Click the Download Summary menu link to download a portable copy of your medical information. If you have questions, please visit the Frequently Asked Questions section of the [employer] website. Remember, [employer] is NOT to be used for urgent needs. For medical emergencies, dial 911. Now available from your iPhone and Android! Please provide this summary of care documentation to your next provider. Your primary care clinician is listed as HUGO Tello. If you have any questions after today's visit, please call [phone].

## 2018-02-28 NOTE — PROGRESS NOTES
1. Have you been to the ER, urgent care clinic since your last visit? Hospitalized since your last visit? No    2. Have you seen or consulted any other health care providers outside of the [address] [place] [address] since your last visit? Include any pap smears or colon screening. No     Pt is here for   Chief Complaint   Patient presents with    Medication Refill     orders pending    Allergies    Sleep Problem     pt states she's having trouble staying asleep      Pt states last had something for pain this morning.     Pt states pain level is a 10 all over body pains

## 2018-03-11 LAB — DRUGS UR: NORMAL

## 2018-03-28 ENCOUNTER — OFFICE VISIT (OUTPATIENT)
Dept: INTERNAL MEDICINE CLINIC | Age: 53
End: 2018-03-28

## 2018-03-28 VITALS
TEMPERATURE: 97.8 F | BODY MASS INDEX: 35.19 KG/M2 | RESPIRATION RATE: 16 BRPM | HEART RATE: 61 BPM | WEIGHT: 186.4 LBS | DIASTOLIC BLOOD PRESSURE: 82 MMHG | SYSTOLIC BLOOD PRESSURE: 133 MMHG | HEIGHT: 61 IN

## 2018-03-28 DIAGNOSIS — M17.11 PRIMARY OSTEOARTHRITIS OF RIGHT KNEE: ICD-10-CM

## 2018-03-28 DIAGNOSIS — Z96.651 STATUS POST TOTAL RIGHT KNEE REPLACEMENT: ICD-10-CM

## 2018-03-28 DIAGNOSIS — R11.10 POST-TUSSIVE VOMITING: ICD-10-CM

## 2018-03-28 DIAGNOSIS — J41.8 MIXED SIMPLE AND MUCOPURULENT CHRONIC BRONCHITIS (HCC): ICD-10-CM

## 2018-03-28 DIAGNOSIS — J30.89 ENVIRONMENTAL AND SEASONAL ALLERGIES: ICD-10-CM

## 2018-03-28 DIAGNOSIS — M25.561 CHRONIC PAIN OF RIGHT KNEE: Primary | ICD-10-CM

## 2018-03-28 DIAGNOSIS — Z12.11 COLON CANCER SCREENING: ICD-10-CM

## 2018-03-28 DIAGNOSIS — G89.29 CHRONIC PAIN OF RIGHT KNEE: Primary | ICD-10-CM

## 2018-03-28 PROBLEM — E66.01 SEVERE OBESITY (BMI 35.0-39.9) WITH COMORBIDITY (HCC): Status: ACTIVE | Noted: 2018-03-28

## 2018-03-28 RX ORDER — ONDANSETRON 4 MG/1
4 TABLET, ORALLY DISINTEGRATING ORAL
Qty: 20 TAB | Refills: 1 | Status: SHIPPED | OUTPATIENT
Start: 2018-03-28 | End: 2018-12-23 | Stop reason: SDUPTHER

## 2018-03-28 RX ORDER — NALOXONE HYDROCHLORIDE 4 MG/.1ML
SPRAY NASAL
Qty: 2 EACH | Refills: 0 | Status: SHIPPED | OUTPATIENT
Start: 2018-03-28 | End: 2021-10-28 | Stop reason: SDUPTHER

## 2018-03-28 RX ORDER — OXYCODONE AND ACETAMINOPHEN 10; 325 MG/1; MG/1
TABLET ORAL
Qty: 30 TAB | Refills: 0 | Status: SHIPPED | OUTPATIENT
Start: 2018-03-28 | End: 2018-04-25 | Stop reason: SDUPTHER

## 2018-03-28 RX ORDER — PROMETHAZINE HYDROCHLORIDE 6.25 MG/5ML
12.5 SYRUP ORAL
Qty: 120 ML | Refills: 0 | Status: SHIPPED | OUTPATIENT
Start: 2018-03-28 | End: 2018-04-04

## 2018-03-28 NOTE — MR AVS SNAPSHOT
[address] [place] [address] [address] [address] 
[phone] Patient: Gerardo Lynn MRN: [id number] :1965 Visit Information Date & Time Provider Department Dept. Phone Encounter #  
 3/28/2018 11:40 AM Sandy Samuel NP [facility] [phone] [id number] Follow-up Instructions Return in about 4 weeks (around 2018) for pain med refill, COPD f/u. Upcoming Health Maintenance Date Due  
 PAP AKA CERVICAL CYTOLOGY 1984 BREAST CANCER SCRN MAMMOGRAM 2014 FOBT Q 1 YEAR AGE 50-75 2017 DTaP/Tdap/Td series (2 - Td) 2025 Allergies as of 3/28/2018  Review Complete On: 3/28/2018 By: Sandy Samuel NP Severity Noted Reaction Type Reactions Hydrocodone  2017    Itching Mold  2016    Shortness of Breath, Itching Ibuprofen Low 2015   Intolerance Nausea Only Current Immunizations  Reviewed on 3/3/2017 Name Date Influenza Vaccine (Quad) Intradermal PF 2016 Influenza Vaccine (Quad) PF 11/3/2017 Pneumococcal Polysaccharide (PPSV-23) 3/3/2017 Not reviewed this visit You Were Diagnosed With   
  
 Codes Comments Chronic pain of right knee    -  Primary ICD-10-CM: M25.561, G24.95 ICD-9-CM: 719.46, 338.29 Primary osteoarthritis of right knee     ICD-10-CM: M17.11 ICD-9-CM: 715.16 Status post total right knee replacement     ICD-10-CM: M96.100 ICD-9-CM: V43.65 Post-tussive vomiting     ICD-10-CM: R11.10 ICD-9-CM: 787.03 Environmental and seasonal allergies     ICD-10-CM: J30.89 ICD-9-CM: 477.8 Mixed simple and mucopurulent chronic bronchitis (HCC)     ICD-10-CM: J41.8 ICD-9-CM: 491.1 Vitals BP Pulse Temp Resp Height(growth percentile) Weight(growth percentile) 133/82 (BP 1 Location: Right arm, BP Patient Position: Sitting) 61 97.8 °F (36.6 °C) 16 5' 1\" (1.549 m) 186 lb 6.4 oz (84.6 kg) BMI OB Status Smoking Status 35.22 kg/m2 Menopause Former Smoker Vitals History BMI and BSA Data Body Mass Index Body Surface Area  
 35.22 kg/m 2 1.91 m 2 Preferred Pharmacy Pharmacy Name Phone [id number] [phone] Your Updated Medication List  
  
   
This list is accurate as of 3/28/18 12:24 PM.  Always use your most recent med list.  
  
  
  
  
 acetaminophen 650 mg Tber Commonly known as:  TYLENOL  
TAKE 1 TAB BY MOUTH THREE (3) TIMES DAILY AS NEEDED FOR PAIN. amLODIPine 10 mg tablet Commonly known as:  Krystina Glatter TAKE 1 TABLET BY MOUTH EVERY DAY  Indications: hypertension  
  
 baclofen 20 mg tablet Commonly known as:  LIORESAL Take 1 Tab by mouth three (3) times daily as needed for Pain (spasms). diclofenac 1 % Gel Commonly known as:  VOLTAREN Apply 2 g to affected area every six (6) hours. fluticasone 50 mcg/actuation nasal spray Commonly known as:  Dhiraj Remedies 2 Sprays by Both Nostrils route daily. gabapentin 800 mg tablet Commonly known as:  NEURONTIN Take 1 Tab by mouth three (3) times daily. hydroCHLOROthiazide 25 mg tablet Commonly known as:  HYDRODIURIL Take 1 Tab by mouth daily. Indications: hypertension  
  
 levothyroxine 125 mcg tablet Commonly known as:  SYNTHROID  
TAKE 1 TAB BY MOUTH DAILY (BEFORE BREAKFAST). lidocaine 5 % Commonly known as:  Burnjohanna Stacks Apply patch to the affected area for 12 hours a day and remove for 12 hours a day. loratadine 10 mg tablet Commonly known as:  Maria De Jesus Utting Take 1 Tab by mouth daily. meloxicam 15 mg tablet Commonly known as:  MOBIC  
TAKE 1 TABLET (15 MG TOTAL) BY MOUTH DAILY. methocarbamol 750 mg tablet Commonly known as:  ROBAXIN  
TAKE 1 TAB BY MOUTH FOUR (4) TIMES DAILY AS NEEDED FOR PAIN (SPASMS). metoprolol succinate 25 mg XL tablet Commonly known as:  TOPROL-XL  
 Take 1 Tab by mouth daily. miscellaneous medical supply Misc Shower seat for chronic knee pain, pt planning to have right TKR in June due to condition. Very limited range of motion. montelukast 10 mg tablet Commonly known as:  SINGULAIR Take 1 Tab by mouth daily. naloxone 4 mg/actuation nasal spray Commonly known as:  [employer] Use 1 spray into 1 nostril for OVERDOSE. Call 911. For subsequent doses, give in alternating nostrils. May repeat every 2 to 3 min. ondansetron 4 mg disintegrating tablet Commonly known as:  ZOFRAN ODT Take 1 Tab by mouth every eight (8) hours as needed for Nausea. oxyCODONE-acetaminophen  mg per tablet Commonly known as:  PERCOCET 10 May take 1 tab ONCE DAILY as needed for pain. Indications: Pain  
  
 pantoprazole 40 mg tablet Commonly known as:  PROTONIX  
TAKE 1 TABLET BY MOUTH ONCE EVERY DAY AS DIRECTED * PROAIR HFA 90 mcg/actuation inhaler Generic drug:  albuterol Take 2 Puffs by inhalation every four (4) hours as needed. * albuterol 2.5 mg /3 mL (0.083 %) nebulizer solution Commonly known as:  PROVENTIL VENTOLIN  
3 mL by Nebulization route every four (4) hours as needed for Wheezing. promethazine 6.25 mg/5 mL syrup Commonly known as:  PHENERGAN Take 10 mL by mouth two (2) times daily as needed (coughing) for up to 7 days. Do NOT Drive, may cause drowsiness  
  
 sertraline 100 mg tablet Commonly known as:  ZOLOFT Take 1 Tab by mouth daily. SYMBICORT 160-4.5 mcg/actuation Hfaa Generic drug:  budesonide-formoterol Take 2 Puffs by inhalation two (2) times a day. traZODone 50 mg tablet Commonly known as:  Hector Mediate Take 1-2 tabs every night for sleep. * Notice: This list has 2 medication(s) that are the same as other medications prescribed for you. Read the directions carefully, and ask your doctor or other care provider to review them with you. Prescriptions Printed Refills  
 oxyCODONE-acetaminophen (PERCOCET 10)  mg per tablet 0 Sig: May take 1 tab ONCE DAILY as needed for pain. Indications: Pain Class: Print Prescriptions Sent to Pharmacy Refills  
 ondansetron (ZOFRAN ODT) 4 mg disintegrating tablet 1 Sig: Take 1 Tab by mouth every eight (8) hours as needed for Nausea. Class: Normal  
 Pharmacy: [address] [place] [address] Ph #: [phone] Route: Oral  
 promethazine (PHENERGAN) 6.25 mg/5 mL syrup 0 Sig: Take 10 mL by mouth two (2) times daily as needed (coughing) for up to 7 days. Do NOT Drive, may cause drowsiness Class: Normal  
 Pharmacy: [address] [place] [address] Ph #: [phone] Route: Oral  
 naloxone (NARCAN) 4 mg/actuation nasal spray 0 Sig: Use 1 spray into 1 nostril for OVERDOSE. Call 911. For subsequent doses, give in alternating nostrils. May repeat every 2 to 3 min. Class: Normal  
 Pharmacy: [address] [place] [address] Ph #: [phone] Follow-up Instructions Return in about 4 weeks (around 4/25/2018) for pain med refill, COPD f/u. Introducing [facility]! [facility] introduces [facility] patient portal. Now you can access parts of your medical record, email your doctor's office, and request medication refills online. 1. In your internet browser, go to [email or web address] 2. Click on the First Time User? Click Here link in the Sign In box. You will see the New Member Sign Up page. 3. Enter your [facility] Access Code exactly as it appears below. You will not need to use this code after youve completed the sign-up process. If you do not sign up before the expiration date, you must request a new code. · [facility] Access Code: [email or web address] Expires: 5/29/2018  1:35 PM 
 
4.  Enter the last four digits of your Social Security Number (xxxx) and Date of Birth (mm/dd/yyyy) as indicated and click Submit. You will be taken to the next sign-up page. 5. Create a Couchy[employer]com ID. This will be your Couch[employer] login ID and cannot be changed, so think of one that is secure and easy to remember. 6. Create a [employer]com password. You can change your password at any time. 7. Enter your Password Reset Question and Answer. This can be used at a later time if you forget your password. 8. Enter your e-mail address. You will receive e-mail notification when new information is available in [address] 9. Click Sign Up. You can now view and download portions of your medical record. 10. Click the Download Summary menu link to download a portable copy of your medical information. If you have questions, please visit the Frequently Asked Questions section of the [email or web address] website. Remember, [email or web address] is NOT to be used for urgent needs. For medical emergencies, dial 911. Now available from your iPhone and Android! Please provide this summary of care documentation to your next provider. Your primary care clinician is listed as HUGO Coleman. If you have any questions after today's visit, please call [phone].

## 2018-03-28 NOTE — PROGRESS NOTES
Encounter for pain management. Chronic Pain:  Patient has chronic BL knee pain for years, had right TKR last year. Pain Scale: 10 - Worst pain ever/10. Had IMAGING for lumbar spine and right knee and has been seeing/seen by ORTHO. Last PT March 2017, continuing HEP. Pain in the right knee, leg, and lower back is still limiting walking, sitting, and standing, exacerbated by forementioned acitivities to include stair climbing. Has tried prednisone, tramadol, injections, PT, gabapentin, cymbalta, and surgery in past with minimal relief. Pain has been controlled with Oxycodone, last taken this morning. The medication is kept safe by staying with her. Has NOT seen any other providers since last OV for pain medication. No significant changes to pain presentation since last OV. she is  able to do her normal daily activities. she reports the following adverse side effects: none. Least pain over the last week has been 7/10. Worst pain over the last week has been 10/10. Aberrant behaviors: None         Symptoms onset: problem is longstanding. Rheumatological ROS: ongoing significant pain which is stable and controlled by pain med. Response to treatment plan: waxing and waning. Having cough for past 3 days. Associated with vomiting, mostly after coughing. Intermittent SOB and wheezing. No additional treatments tried however. Has not slept much due to cough and also traveled from [facility] this morning for appt, helped with niece out of state overnight. Review of Systems  Constitutional: negative for fevers, chills, anorexia and weight loss  Eyes:   negative for visual disturbance, drainage, and irritation  ENT:   +AR and environmental allergies.  negative for tinnitus,sore throat,ear pain,and hoarseness  Respiratory:  + asthma/COPD. negative for hemoptysis  CV:   negative for chest pain, palpitations, and lower extremity edema  GI:   negative for nausea, vomiting, diarrhea, abdominal pain, and melena  Endo:               negative for polyuria,polydipsia,polyphagia, and heat intolerance  Genitourinary: negative for frequency, urgency, dysuria, retention, and hematuria  Integument:  negative for rash, ulcerations, and pruritus  Hematologic:  negative for easy bruising and bleeding  Musculoskel: negative for  muscle weakness  Neurological:  negative for headaches, dizziness, vertigo,and memory problems  Behavl/Psych: +depression, on cymbalta and zoloft. negative for feelings of  suicide    1./2. Medical history/Past medical History   Past Medical History:   Diagnosis Date    Asthma     uses inhalers    Chronic obstructive pulmonary disease (HCC)     bronchitis    GERD (gastroesophageal reflux disease)     Hypertension     Ill-defined condition     environmental allergies     Ill-defined condition     Multiple body piercings; unable to remove tongue and lip piercings    Thyroid disease     hypo    Ventral hernia 12/31/2013     Past Surgical History:   Procedure Laterality Date    HX HEENT  2009    thyroidectomy    HX KNEE REPLACEMENT      R    HX MOHS PROCEDURES Right 3/8/11    HX OTHER SURGICAL      hiatal hernia repair    HX TUBAL LIGATION       Patient Active Problem List   Diagnosis Code    Ventral hernia K43.9    Chronic pain of right knee M25.561, G89.29    Chronic right shoulder pain M25.511, G89.29    Environmental and seasonal allergies J30.89    Ventral hernia without obstruction or gangrene K43.9    Primary osteoarthritis of right knee M17.11    Mixed simple and mucopurulent chronic bronchitis (HCC) J41.8    Acquired hypothyroidism E03.9    Chronic bilateral low back pain with right-sided sciatica M54.41, G89.29    Status post total right knee replacement Z96.651    Malignant hypertension I10    Mild single current episode of major depressive disorder ([facility] [address] F32.0    Pain management contract signed Z02.89    Chronic pain of left knee M25.562, G89.29    Moderate episode of recurrent major depressive disorder (HCC) F33.1    Psychophysiological insomnia F51.04    Severe obesity (BMI 35.0-39. 9) with comorbidity (HCC) E66.01    Post-tussive vomiting R11.10       3. Applicable records from prior treatment providers are apart of [facility] under the media/encounters tab. 4. Diagnostic, therapeutic and laboratory results are available in the [address] [place] [address] chart. 5. Consultation notes are available for review in the media/encounters tab of the [address] [place] [address] chart. 6. Treatment goals include: pain control, improve activity level and function in regards to activities of daily living, and improved comfort level. Previously pt has been limited by pain in all these aspects. 7. The risks and benefits of treatment have been discussed at this office visit with the pt.  she understands that the medication has addictive potential.  Additionally the pt has been advised that narcotic pain medication may impair mental and/or physical ability required for performance of tasks such as driving or operating any other machinery. 8. Pt has an updated signed pain contract on file and can be found under the media section of the [facility] chart. 9. The pain contract is reviewed. Pain medication will be continued at the same dosage. Pill count: 7. Urine drug screening ordered/collected today. Diagnostic studies are not indicated at this time. Interventional procedure are not indicated at this time. Order for [employer] sent. 10. Medication prescibed is oxycodone every 24 hours PRN # 30 with zero refills for a 1 month supply.  was reviewed while planning for pain/anxiety management, no indications of drug diversion suspected. Prescription history is NOT suspicious for controlled substance overuse. 11. Patient instructions have been reviewed in detail as outlined above and in the pain contract. 12. Re-evaluation is planned for 1 month(s).        Current Outpatient Prescriptions   Medication Sig Dispense Refill    oxyCODONE-acetaminophen (PERCOCET 10)  mg per tablet May take 1 tab ONCE DAILY as needed for pain. Indications: Pain 30 Tab 0    ondansetron (ZOFRAN ODT) 4 mg disintegrating tablet Take 1 Tab by mouth every eight (8) hours as needed for Nausea. 20 Tab 1    promethazine (PHENERGAN) 6.25 mg/5 mL syrup Take 10 mL by mouth two (2) times daily as needed (coughing) for up to 7 days. Do NOT Drive, may cause drowsiness 120 mL 0    naloxone (NARCAN) 4 mg/actuation nasal spray Use 1 spray into 1 nostril for OVERDOSE. Call 911. For subsequent doses, give in alternating nostrils. May repeat every 2 to 3 min. 2 Each 0    traZODone (DESYREL) 50 mg tablet Take 1-2 tabs every night for sleep. 60 Tab 11    montelukast (SINGULAIR) 10 mg tablet Take 1 Tab by mouth daily. 30 Tab 6    pantoprazole (PROTONIX) 40 mg tablet TAKE 1 TABLET BY MOUTH ONCE EVERY DAY AS DIRECTED 90 Tab 2    albuterol (PROVENTIL VENTOLIN) 2.5 mg /3 mL (0.083 %) nebulizer solution 3 mL by Nebulization route every four (4) hours as needed for Wheezing. 24 Each 2    acetaminophen (TYLENOL) 650 mg TbER TAKE 1 TAB BY MOUTH THREE (3) TIMES DAILY AS NEEDED FOR PAIN. 90 Tab 0    methocarbamol (ROBAXIN) 750 mg tablet TAKE 1 TAB BY MOUTH FOUR (4) TIMES DAILY AS NEEDED FOR PAIN (SPASMS). 60 Tab 0    levothyroxine (SYNTHROID) 125 mcg tablet TAKE 1 TAB BY MOUTH DAILY (BEFORE BREAKFAST). 90 Tab 0    sertraline (ZOLOFT) 100 mg tablet Take 1 Tab by mouth daily. 30 Tab 11    meloxicam (MOBIC) 15 mg tablet TAKE 1 TABLET (15 MG TOTAL) BY MOUTH DAILY. 11    diclofenac (VOLTAREN) 1 % gel Apply 2 g to affected area every six (6) hours. 100 g 5    metoprolol succinate (TOPROL-XL) 25 mg XL tablet Take 1 Tab by mouth daily.  30 Tab 11    amLODIPine (NORVASC) 10 mg tablet TAKE 1 TABLET BY MOUTH EVERY DAY  Indications: hypertension 90 Tab 3    gabapentin (NEURONTIN) 800 mg tablet Take 1 Tab by mouth three (3) times daily. 90 Tab 11    hydroCHLOROthiazide (HYDRODIURIL) 25 mg tablet Take 1 Tab by mouth daily. Indications: hypertension 30 Tab 11    baclofen (LIORESAL) 20 mg tablet Take 1 Tab by mouth three (3) times daily as needed for Pain (spasms). 60 Tab 3    lidocaine (LIDODERM) 5 % Apply patch to the affected area for 12 hours a day and remove for 12 hours a day. 30 Each 11    fluticasone (FLONASE) 50 mcg/actuation nasal spray 2 Sprays by Both Nostrils route daily. 1 Bottle 11    loratadine (CLARITIN) 10 mg tablet Take 1 Tab by mouth daily. 30 Tab 5    budesonide-formoterol (SYMBICORT) 160-4.5 mcg/actuation HFA inhaler Take 2 Puffs by inhalation two (2) times a day.  albuterol (PROAIR HFA) 90 mcg/actuation inhaler Take 2 Puffs by inhalation every four (4) hours as needed.  miscellaneous medical supply misc Shower seat for chronic knee pain, pt planning to have right TKR in June due to condition. Very limited range of motion. 1 Each 0     Allergies   Allergen Reactions    Hydrocodone Itching    Mold Shortness of Breath and Itching    Ibuprofen Nausea Only       Objective:  Visit Vitals    /82 (BP 1 Location: Right arm, BP Patient Position: Sitting)    Pulse 61    Temp 97.8 °F (36.6 °C)    Resp 16    Ht 5' 1\" (1.549 m)    Wt 186 lb 6.4 oz (84.6 kg)    BMI 35.22 kg/m2     Wt Readings from Last 3 Encounters:   03/28/18 186 lb 6.4 oz (84.6 kg)   02/28/18 171 lb (77.6 kg)   01/31/18 181 lb (82.1 kg)     Physical Exam:   General appearance - alert, well appearing, and in mild distress. Mental status - A/O x 4, normal mood and affect. Chest - CTA. Symmetric chest rise. No wheezing, rales or rhonchi. Heart - Normal rate, regular rhythm. Normal S1, S2. No MGR or clicks. Abd- soft, obese, non-distended. Normoactive BS. +gagging. Mild TTP in epigastrum. Ext- Radial, DP pulses, 2+ bilaterally. No pedal edema, clubbing, or cyanosis. Skin-Warm and dry.  No hyperpigmentation, ulcerations, or suspicious lesions. Neuro - normal speech, no focal findings or movement disorder. Normal strength and muscle tone. Limping gait using, no cane. Right knee with edema and TTP. No effusion. Assessment/Plan:  Narcan sent. Medication Side Effects and Warnings were discussed with patient: yes   Patient Labs were reviewed: yes  Patient Past Records were reviewed: yes    See below for other orders   Follow-up Disposition:  Return in about 4 weeks (around 4/25/2018) for pain med refill, COPD f/u. ICD-10-CM ICD-9-CM    1. Chronic pain of right knee M25.561 719.46 oxyCODONE-acetaminophen (PERCOCET 10)  mg per tablet    G89.29 338.29    2. Primary osteoarthritis of right knee M17.11 715.16 oxyCODONE-acetaminophen (PERCOCET 10)  mg per tablet   3. Status post total right knee replacement Z96.651 V43.65 oxyCODONE-acetaminophen (PERCOCET 10)  mg per tablet   4. Post-tussive vomiting R11.10 787.03 ondansetron (ZOFRAN ODT) 4 mg disintegrating tablet   5. Environmental and seasonal allergies J30.89 477.8    6. Mixed simple and mucopurulent chronic bronchitis (HCC) J41.8 491.1    7. Colon cancer screening Z12.11 V76.51 OCCULT BLOOD, IMMUNOASSAY (FIT)     Orders Placed This Encounter    OCCULT BLOOD, IMMUNOASSAY (FIT)    oxyCODONE-acetaminophen (PERCOCET 10)  mg per tablet     Sig: May take 1 tab ONCE DAILY as needed for pain. Indications: Pain     Dispense:  30 Tab     Refill:  0    ondansetron (ZOFRAN ODT) 4 mg disintegrating tablet     Sig: Take 1 Tab by mouth every eight (8) hours as needed for Nausea. Dispense:  20 Tab     Refill:  1    promethazine (PHENERGAN) 6.25 mg/5 mL syrup     Sig: Take 10 mL by mouth two (2) times daily as needed (coughing) for up to 7 days. Do NOT Drive, may cause drowsiness     Dispense:  120 mL     Refill:  0    naloxone (NARCAN) 4 mg/actuation nasal spray     Sig: Use 1 spray into 1 nostril for OVERDOSE. Call 911.  For subsequent doses, give in alternating nostrils. May repeat every 2 to 3 min. Dispense:  2 Each     Refill:  0       Liliya Sykes expressed understanding of plan. An After Visit Summary was offered/printed and given to the patient.

## 2018-03-28 NOTE — PROGRESS NOTES
Pt is here for   Chief Complaint   Patient presents with    Medication Refill     orders pending     Pt states pain level is a 10 all over body pains. 1. Have you been to the ER, urgent care clinic since your last visit? Hospitalized since your last visit? No    2. Have you seen or consulted any other health care providers outside of the [address] [place] [address] since your last visit? Include any pap smears or colon screening.  No

## 2018-03-31 LAB — DRUGS UR: NORMAL

## 2018-04-08 LAB — HEMOCCULT STL QL IA: NEGATIVE

## 2018-04-16 ENCOUNTER — HOSPITAL ENCOUNTER (EMERGENCY)
Age: 53
Discharge: HOME | End: 2018-04-16
Attending: EMERGENCY MEDICINE | Admitting: EMERGENCY MEDICINE
Payer: SUBSIDIZED

## 2018-04-16 VITALS
HEART RATE: 84 BPM | TEMPERATURE: 98.1 F | WEIGHT: 176 LBS | HEIGHT: 61 IN | BODY MASS INDEX: 33.23 KG/M2 | SYSTOLIC BLOOD PRESSURE: 137 MMHG | RESPIRATION RATE: 17 BRPM | DIASTOLIC BLOOD PRESSURE: 79 MMHG

## 2018-04-16 DIAGNOSIS — B00.1 HERPES LABIALIS WITHOUT COMPLICATION: Primary | ICD-10-CM

## 2018-04-16 PROCEDURE — 99282 EMERGENCY DEPT VISIT SF MDM: CPT

## 2018-04-16 RX ORDER — ACYCLOVIR 400 MG/1
400 TABLET ORAL 3 TIMES DAILY
Qty: 21 TAB | Refills: 0 | Status: SHIPPED | OUTPATIENT
Start: 2018-04-16 | End: 2018-04-23

## 2018-04-16 RX ORDER — DIPHENHYDRAMINE HCL 25 MG
25 CAPSULE ORAL
Qty: 20 CAP | Refills: 0 | Status: SHIPPED | OUTPATIENT
Start: 2018-04-16 | End: 2019-02-12 | Stop reason: SDDI

## 2018-04-16 NOTE — DISCHARGE INSTRUCTIONS
Cold Sores: Care Instructions  Your Care Instructions  Cold sores are clusters of small blisters on the lip and skin around or inside the mouth. Often the first sign of a cold sore is a spot that tingles, burns, or itches. A blister usually forms within 24 hours. The skin around the blisters can be red and inflamed. The blisters can break open, weep a clear fluid, and then scab over after a few days. Cold sores most often heal in 7 to 10 days without a scar. They are sometimes called fever blisters. Cold sores are caused by a virus called the herpes simplex virus. This virus also causes some cases of genital herpes. The virus can spread from a sore in the genital area to the lips. Or it can spread from a cold sore on the lips to the genital area. Cold sores most often go away on their own. But if they are severe, embarrass you, or cause pain, your doctor may prescribe antiviral medicine to relieve pain and help prevent outbreaks. Follow-up care is a key part of your treatment and safety. Be sure to make and go to all appointments, and call your doctor if you are having problems. It's also a good idea to know your test results and keep a list of the medicines you take. How can you care for yourself at home? · Wash your hands often. And try not to touch your cold sores. This will help to avoid spreading the virus to your eyes or genital area, or to other people. This is more likely to happen if this is your first cold sore outbreak. · Place ice or a cool, wet towel on the sores 3 times a day. Do this for 20 minutes each time. It may help to reduce redness and swelling. · If you are just getting a cold sore, try over-the-counter docosanol (Abreva) cream to reduce symptoms. · If your doctor prescribed antiviral medicine to relieve pain and help prevent outbreaks, be sure to follow the directions.   · Take an over-the-counter pain medicine, such as acetaminophen (Tylenol), ibuprofen (Advil, Motrin), or naproxen (Aleve), as needed. Read and follow all instructions on the label. · Do not take two or more pain medicines at the same time unless the doctor told you to. Many pain medicines have acetaminophen, which is Tylenol. Too much acetaminophen (Tylenol) can be harmful. · Avoid citrus fruit, tomatoes, and other foods that contain acid. · Use over-the-counter ointments to numb sore areas in the mouth or on the lips. These include Orajel and Anbesol. · Do not kiss or have oral sex with anyone while you have a cold sore. To prevent cold sores in the future  · Avoid long exposure of your lips to the sun. (Wear a hat to help shade your mouth.)  · Do not kiss or have oral sex with someone who has a cold sore. Do not have sex or oral sex with someone who has a genital herpes outbreak. · Using lip balm that contains sunscreen may help reduce outbreaks of cold sores. · Avoid foods that seem to cause your cold sores to come back. · Do not share towels, razors, silverware, toothbrushes, or other objects with a person who has a cold sore. When should you call for help? Call your doctor now or seek immediate medical care if:  ? · Your symptoms are painful and you want to try antiviral medicine. ? · You have signs of infection, such as:  ¨ Increased pain, swelling, warmth, or redness. ¨ Red streaks leading from a cold sore. ¨ Pus draining from a cold sore. ¨ A fever. ? · You have a cold sore and develop eye pain, eye discharge, or any changes in your vision. ? Watch closely for changes in your health, and be sure to contact your doctor if:  ? · The cold sore does not heal in 7 to 10 days. ? · You get cold sores often. Where can you learn more? Go to http://ofelia-mindy.info/. Enter B083 in the search box to learn more about \"Cold Sores: Care Instructions. \"  Current as of: March 20, 2017  Content Version: 11.4  © 8184-5195 [employer], [employer].  Care instructions adapted under license by [address] (which disclaims liability or warranty for this information). If you have questions about a medical condition or this instruction, always ask your healthcare professional. [address] any warranty or liability for your use of this information.

## 2018-04-16 NOTE — ED TRIAGE NOTES
Pt arrived in ER with c/o lip swollen,and sts\"I need allergy shot,as I have thousands of allergy,pt alert,oriented x 4,speech clear,no respiratory distress noticed on arrival.

## 2018-04-16 NOTE — ED NOTES
Pt DC by provider and pt accepted plan of care and left unit steady gait. Patient (s)  given copy of dc instructions and 1 script(s). Patient (s)  verbalized understanding of instructions and script (s). Patient given a current medication reconciliation form and verbalized understanding of their medications. Patient (s) verbalized understanding of the importance of discussing medications with  his or her physician or clinic they will be following up with. Patient alert and oriented and in no acute distress. Patient discharged home ambulatory with self.

## 2018-04-16 NOTE — ED NOTES
Pt her for evaluation of lip sore verus allergic reaction. Provider at bedside evaluating patient. Pt is speaking in complete sentence NAD noted. Emergency Department Nursing Plan of Care       The Nursing Plan of Care is developed from the Nursing assessment and Emergency Department Attending provider initial evaluation. The plan of care may be reviewed in the ED Provider note.     The Plan of Care was developed with the following considerations:   Patient / Family readiness to learn indicated by:verbalized understanding  Persons(s) to be included in education: patient  Barriers to Learning/Limitations:No    Signed     Viktoriya Perry RN    4/16/2018   2:24 PM

## 2018-04-16 NOTE — ED PROVIDER NOTES
EMERGENCY DEPARTMENT HISTORY AND PHYSICAL EXAM      Date: 4/16/2018  Patient Name: Vivian Prince    History of Presenting Illness     Chief Complaint   Patient presents with    Allergic Reaction     pt sts\"I have lots off allergy,I need allergy shot thats it. \"       History Provided By: Patient    HPI: Vivian Prince, 45 y.o. female with PMHx significant for ventral hernia, GERD, hypothyroidism, asthma, environmental allergies, COPD  presents ambulatory to the ED with cc of allergic reaction. Pt reports left upper lip and facial swelling upon waking this am. Pt reports swelling has since subsided. Denies trouble swallowing, SOB, tongue swelling, scratchy throat. Pt reports hx seasonal allergies and she has not received allergy shots recently. Denies discomfort currently. Denies hx hsv. PCP: Latoya Daley NP    There are no other complaints, changes, or physical findings at this time. Current Outpatient Prescriptions   Medication Sig Dispense Refill    acyclovir (ZOVIRAX) 400 mg tablet Take 1 Tab by mouth three (3) times daily for 7 days. 21 Tab 0    diphenhydrAMINE (BENADRYL) 25 mg capsule Take 1 Cap by mouth every six (6) hours as needed. 20 Cap 0    oxyCODONE-acetaminophen (PERCOCET 10)  mg per tablet May take 1 tab ONCE DAILY as needed for pain. Indications: Pain 30 Tab 0    ondansetron (ZOFRAN ODT) 4 mg disintegrating tablet Take 1 Tab by mouth every eight (8) hours as needed for Nausea. 20 Tab 1    naloxone (NARCAN) 4 mg/actuation nasal spray Use 1 spray into 1 nostril for OVERDOSE. Call 911. For subsequent doses, give in alternating nostrils. May repeat every 2 to 3 min. 2 Each 0    traZODone (DESYREL) 50 mg tablet Take 1-2 tabs every night for sleep. 60 Tab 11    montelukast (SINGULAIR) 10 mg tablet Take 1 Tab by mouth daily.  30 Tab 6    pantoprazole (PROTONIX) 40 mg tablet TAKE 1 TABLET BY MOUTH ONCE EVERY DAY AS DIRECTED 90 Tab 2    albuterol (PROVENTIL VENTOLIN) 2.5 mg /3 mL (0.083 %) nebulizer solution 3 mL by Nebulization route every four (4) hours as needed for Wheezing. 24 Each 2    acetaminophen (TYLENOL) 650 mg TbER TAKE 1 TAB BY MOUTH THREE (3) TIMES DAILY AS NEEDED FOR PAIN. 90 Tab 0    methocarbamol (ROBAXIN) 750 mg tablet TAKE 1 TAB BY MOUTH FOUR (4) TIMES DAILY AS NEEDED FOR PAIN (SPASMS). 60 Tab 0    levothyroxine (SYNTHROID) 125 mcg tablet TAKE 1 TAB BY MOUTH DAILY (BEFORE BREAKFAST). 90 Tab 0    sertraline (ZOLOFT) 100 mg tablet Take 1 Tab by mouth daily. 30 Tab 11    meloxicam (MOBIC) 15 mg tablet TAKE 1 TABLET (15 MG TOTAL) BY MOUTH DAILY. 11    diclofenac (VOLTAREN) 1 % gel Apply 2 g to affected area every six (6) hours. 100 g 5    metoprolol succinate (TOPROL-XL) 25 mg XL tablet Take 1 Tab by mouth daily. 30 Tab 11    amLODIPine (NORVASC) 10 mg tablet TAKE 1 TABLET BY MOUTH EVERY DAY  Indications: hypertension 90 Tab 3    gabapentin (NEURONTIN) 800 mg tablet Take 1 Tab by mouth three (3) times daily. 90 Tab 11    hydroCHLOROthiazide (HYDRODIURIL) 25 mg tablet Take 1 Tab by mouth daily. Indications: hypertension 30 Tab 11    baclofen (LIORESAL) 20 mg tablet Take 1 Tab by mouth three (3) times daily as needed for Pain (spasms). 60 Tab 3    lidocaine (LIDODERM) 5 % Apply patch to the affected area for 12 hours a day and remove for 12 hours a day. 30 Each 11    fluticasone (FLONASE) 50 mcg/actuation nasal spray 2 Sprays by Both Nostrils route daily. 1 Bottle 11    miscellaneous medical supply misc Shower seat for chronic knee pain, pt planning to have right TKR in June due to condition. Very limited range of motion. 1 Each 0    loratadine (CLARITIN) 10 mg tablet Take 1 Tab by mouth daily. 30 Tab 5    budesonide-formoterol (SYMBICORT) 160-4.5 mcg/actuation HFA inhaler Take 2 Puffs by inhalation two (2) times a day.  albuterol (PROAIR HFA) 90 mcg/actuation inhaler Take 2 Puffs by inhalation every four (4) hours as needed.          Past History Past Medical History:  Past Medical History:   Diagnosis Date    Asthma     uses inhalers    Chronic obstructive pulmonary disease (HCC)     bronchitis    GERD (gastroesophageal reflux disease)     Hypertension     Ill-defined condition     environmental allergies     Ill-defined condition     Multiple body piercings; unable to remove tongue and lip piercings    Thyroid disease     hypo    Ventral hernia 12/31/2013       Past Surgical History:  Past Surgical History:   Procedure Laterality Date    HX HEENT  2009    thyroidectomy    HX KNEE REPLACEMENT      R    HX MOHS PROCEDURES Right 3/8/11    HX OTHER SURGICAL      hiatal hernia repair    HX TUBAL LIGATION         Family History:  Family History   Problem Relation Age of Onset    Cancer Father      lung cancer    Heart Disease Mother     Hypertension Mother     Diabetes Mother     Anesth Problems Neg Hx        Social History:  Social History   Substance Use Topics    Smoking status: Former Smoker     Packs/day: 0.50     Years: 1.50     Types: Cigarettes     Quit date: 10/1/2015    Smokeless tobacco: Never Used      Comment: patient quit smoking for 10 years, began smoking again and then quit again in 2015    Alcohol use No       Allergies: Allergies   Allergen Reactions    Hydrocodone Itching    Mold Shortness of Breath and Itching    Ibuprofen Nausea Only         Review of Systems   Review of Systems   Constitutional: Negative for chills and fever. HENT: Negative for ear pain, sore throat and trouble swallowing. Lip pain and swelling   Respiratory: Negative for shortness of breath. Cardiovascular: Negative for chest pain. Gastrointestinal: Negative for abdominal pain, nausea and vomiting. Genitourinary: Negative for flank pain. Musculoskeletal: Negative for back pain and myalgias. Skin: Negative for color change, pallor, rash and wound. Neurological: Negative for dizziness, weakness and light-headedness.    All other systems reviewed and are negative. Physical Exam   Physical Exam   Constitutional: She is oriented to person, place, and time. She appears well-developed and well-nourished. No distress. HENT:   Head: Normocephalic and atraumatic. Right Ear: Tympanic membrane, external ear and ear canal normal.   Left Ear: Tympanic membrane, external ear and ear canal normal.   Nose: Nose normal.   Mouth/Throat: Oral lesions present. Eyes: Conjunctivae are normal.   Cardiovascular: Normal rate, regular rhythm and normal heart sounds. Pulmonary/Chest: Effort normal and breath sounds normal. No respiratory distress. Abdominal: Soft. Bowel sounds are normal. She exhibits no distension. Musculoskeletal: Normal range of motion. Neurological: She is alert and oriented to person, place, and time. Skin: Skin is warm. No rash noted. Psychiatric: She has a normal mood and affect. Her behavior is normal.   Nursing note and vitals reviewed. Diagnostic Study Results     Labs -   No results found for this or any previous visit (from the past 12 hour(s)). Radiologic Studies -   No orders to display     CT Results  (Last 48 hours)    None        CXR Results  (Last 48 hours)    None            Medical Decision Making   I am the first provider for this patient. I reviewed the vital signs, available nursing notes, past medical history, past surgical history, family history and social history. Vital Signs-Reviewed the patient's vital signs. Patient Vitals for the past 12 hrs:   Temp Pulse Resp BP   04/16/18 1305 98.1 °F (36.7 °C) 84 17 137/79         Records Reviewed: Nursing Notes and Old Medical Records    Provider Notes (Medical Decision Making):   DDx: HSV, Allergic reaction, angioedema    ED Course:   Initial assessment performed. The patients presenting problems have been discussed, and they are in agreement with the care plan formulated and outlined with them.   I have encouraged them to ask questions as they arise throughout their visit. Disposition:  Discussed dx and treatment plan. Discussed importance of  PCP follow up. All questions answered. Pt voiced they understood. Return if sx worsen. PLAN:  1. Current Discharge Medication List      START taking these medications    Details   acyclovir (ZOVIRAX) 400 mg tablet Take 1 Tab by mouth three (3) times daily for 7 days. Qty: 21 Tab, Refills: 0      diphenhydrAMINE (BENADRYL) 25 mg capsule Take 1 Cap by mouth every six (6) hours as needed. Qty: 20 Cap, Refills: 0           2. Follow-up Information     Follow up With Details Comments [address] Est[address] Plunkett NP Schedule an appointment as soon as possible for a visit As needed [place] [address] [address]  [place] [address]  [phone]          Return to ED if worse     Diagnosis     Clinical Impression:   1.  Herpes labialis without complication

## 2018-04-25 ENCOUNTER — OFFICE VISIT (OUTPATIENT)
Dept: INTERNAL MEDICINE CLINIC | Age: 53
End: 2018-04-25

## 2018-04-25 VITALS
OXYGEN SATURATION: 97 % | SYSTOLIC BLOOD PRESSURE: 109 MMHG | DIASTOLIC BLOOD PRESSURE: 64 MMHG | BODY MASS INDEX: 35.19 KG/M2 | WEIGHT: 186.4 LBS | RESPIRATION RATE: 16 BRPM | TEMPERATURE: 98.3 F | HEART RATE: 51 BPM | HEIGHT: 61 IN

## 2018-04-25 DIAGNOSIS — G89.29 CHRONIC PAIN OF RIGHT KNEE: ICD-10-CM

## 2018-04-25 DIAGNOSIS — M25.562 CHRONIC PAIN OF LEFT KNEE: ICD-10-CM

## 2018-04-25 DIAGNOSIS — G89.29 CHRONIC PAIN OF LEFT KNEE: ICD-10-CM

## 2018-04-25 DIAGNOSIS — M54.41 CHRONIC BILATERAL LOW BACK PAIN WITH RIGHT-SIDED SCIATICA: ICD-10-CM

## 2018-04-25 DIAGNOSIS — F11.90 CHRONIC, CONTINUOUS USE OF OPIOIDS: ICD-10-CM

## 2018-04-25 DIAGNOSIS — T78.40XA ALLERGIC REACTION, INITIAL ENCOUNTER: ICD-10-CM

## 2018-04-25 DIAGNOSIS — G89.29 CHRONIC BILATERAL LOW BACK PAIN WITH RIGHT-SIDED SCIATICA: ICD-10-CM

## 2018-04-25 DIAGNOSIS — Z96.651 STATUS POST TOTAL RIGHT KNEE REPLACEMENT: ICD-10-CM

## 2018-04-25 DIAGNOSIS — M17.11 PRIMARY OSTEOARTHRITIS OF RIGHT KNEE: Primary | ICD-10-CM

## 2018-04-25 DIAGNOSIS — M25.561 CHRONIC PAIN OF RIGHT KNEE: ICD-10-CM

## 2018-04-25 DIAGNOSIS — R60.1 GENERALIZED EDEMA: ICD-10-CM

## 2018-04-25 RX ORDER — OXYCODONE AND ACETAMINOPHEN 10; 325 MG/1; MG/1
TABLET ORAL
Qty: 30 TAB | Refills: 0 | Status: SHIPPED | OUTPATIENT
Start: 2018-04-25 | End: 2018-05-24 | Stop reason: SDUPTHER

## 2018-04-25 RX ORDER — PREDNISONE 20 MG/1
60 TABLET ORAL
Qty: 15 TAB | Refills: 0 | Status: SHIPPED | OUTPATIENT
Start: 2018-04-25 | End: 2018-05-24

## 2018-04-25 NOTE — PROGRESS NOTES
Chief Complaint   Patient presents with    Medication Refill     labs pending    Follow-up     Copd and f/u visit [facility] allegic reation 4/16/2018    Swelling     C/o of swelling all over started apox 3 days ago. 1. Have you been to the ER, urgent care clinic since your last visit? Hospitalized since your last visit? Yes Reason for visit: Allegic reaction 4/16/2018 [facility]    2. Have you seen or consulted any other health care providers outside of the [address] [place] [address] since your last visit? Include any pap smears or colon screening. No       Pt. Level pain level 10 all over also c/o swell all over.

## 2018-04-25 NOTE — PATIENT INSTRUCTIONS
Prednisone (By mouth)   Prednisone (PRED-ni-sone)  Treats many diseases and conditions, especially problems related to inflammation. This medicine is a corticosteroid. Brand Name(s): Contrast Allergy PreMed Pack, Fadi, predniSONE Intensol   There may be other brand names for this medicine. When This Medicine Should Not Be Used: This medicine is not right for everyone. Do not use if you had an allergic reaction to prednisone or if you are pregnant. How to Use This Medicine:   Liquid, Tablet, Delayed Release Tablet  · Take your medicine as directed. Your dose may need to be changed several times to find what works best for you. · It is best to take this medicine with food or milk. · Swallow the delayed-release tablet whole. Do not crush, break, or chew it. · Measure the oral liquid medicine with a marked measuring spoon, oral syringe, or medicine cup. · Missed dose: Take a dose as soon as you remember. If it is almost time for your next dose, wait until then and take a regular dose. Do not take extra medicine to make up for a missed dose. · Store the medicine in a closed container at room temperature, away from heat, moisture, and direct light. Do not freeze the oral liquid. Drugs and Foods to Avoid:   Ask your doctor or pharmacist before using any other medicine, including over-the-counter medicines, vitamins, and herbal products. · Tell your doctor if you use any of the following:  ¨ Aminoglutethimide, amphotericin B, carbamazepine, cholestyramine, cyclosporine, digoxin, isoniazid, ketoconazole, phenobarbital, phenytoin, or rifampin  ¨ Blood thinner, such as warfarin  ¨ NSAID pain or arthritis medicine, such as aspirin, diclofenac, ibuprofen, naproxen, celecoxib  ¨ Diuretic (water pill)  ¨ Diabetes medicine  ¨ Macrolide antibiotic, such as azithromycin, clarithromycin, erythromycin  ¨ Estrogen, including birth control pills or hormone replacement therapy  · This medicine may interfere with vaccines. Ask your doctor before you get a flu shot or any other vaccines. Warnings While Using This Medicine:   · It is not safe to take this medicine during pregnancy. It could harm an unborn baby. Tell your doctor right away if you become pregnant. · Tell your doctor if you are breastfeeding or if you have kidney problems, heart failure, high blood pressure, a recent heart attack, diabetes, glaucoma, osteoporosis, or thyroid problems. Tell your doctor about any infection you have. Also tell your doctor if you have had mental or emotional problems (such as depression) or stomach or bowel problems (such as an ulcer or diverticulitis). · This medicine may cause the following problems:  ¨ Mood or behavior changes  ¨ Higher blood pressure, retaining water, changes in salt or potassium levels in your body  ¨ Cataracts or glaucoma (with long-term use)  ¨ Weak bones or osteoporosis (with long-term use)  ¨ Slow growth in children (with long-term use)  ¨ Muscle problems (with high doses, especially if you have myasthenia gravis or similar nerve and muscle problems)  · Do not stop using this medicine suddenly. Your doctor will need to slowly decrease your dose before you stop it completely. · This medicine could cause you to get infections more easily. Tell your doctor right away if you are exposed to chicken pox, measles, or other serious infection. Tell your doctor if you had a serious infection in the past, such as tuberculosis or herpes. · Tell your doctor about any extra stress or anxiety in your life. Your dose might need to be changed for a short time. · Tell any doctor or dentist who treats you that you are using this medicine. This medicine may affect certain medical test results. · Keep all medicine out of the reach of children. Never share your medicine with anyone.   Possible Side Effects While Using This Medicine:   Call your doctor right away if you notice any of these side effects:  · Allergic reaction: Itching or hives, swelling in your face or hands, swelling or tingling in your mouth or throat, chest tightness, trouble breathing  · Dark freckles, skin color changes, coldness, weakness, tiredness, nausea, vomiting, weight loss  · Depression, unusual thoughts, feelings, or behaviors, trouble sleeping  · Fever, chills, cough, sore throat, and body aches  · Muscle pain or weakness  · Rapid weight gain, swelling in your hands, ankles, or feet  · Severe stomach pain, nausea, vomiting, or red or black stools  · Skin changes or growths  · Trouble seeing, eye pain, headache  If you notice these less serious side effects, talk with your doctor:   · Increased appetite  · Round, puffy face  · Weight gain around your neck, upper back, breast, face, or waist  If you notice other side effects that you think are caused by this medicine, tell your doctor. Call your doctor for medical advice about side effects. You may report side effects to FDA at 3-213-ERD-7187  © 2017 [facility] Information is for End User's use only and may not be sold, redistributed or otherwise used for commercial purposes. The above information is an  only. It is not intended as medical advice for individual conditions or treatments. Talk to your doctor, nurse or pharmacist before following any medical regimen to see if it is safe and effective for you.

## 2018-04-25 NOTE — MR AVS SNAPSHOT
[address] [place] [address] [address] [id number] 
[phone] Patient: Micheal Fishman MRN: [id number] :1965 Visit Information Date & Time Provider Department Dept. Phone Encounter #  
 2018 11:00 AM Jere Pascual NP [address] [facility] [phone] [id number] Follow-up Instructions Return in about 4 weeks (around 2018) for pain referral f/u, med refill. Upcoming Health Maintenance Date Due  
 PAP AKA CERVICAL CYTOLOGY 1984 BREAST CANCER SCRN MAMMOGRAM 2014 MEDICARE YEARLY EXAM 2018 FOBT Q 1 YEAR AGE 50-75 2019 DTaP/Tdap/Td series (2 - Td) 2025 Allergies as of 2018  Review Complete On: 2018 By: Norbert Ortiz LPN Severity Noted Reaction Type Reactions Hydrocodone  2017    Itching Mold  2016    Shortness of Breath, Itching Ibuprofen Low 2015   Intolerance Nausea Only Current Immunizations  Reviewed on 3/3/2017 Name Date Influenza Vaccine (Quad) Intradermal PF 2016 Influenza Vaccine (Quad) PF 11/3/2017 Pneumococcal Polysaccharide (PPSV-23) 3/3/2017 Not reviewed this visit You Were Diagnosed With   
  
 Codes Comments Primary osteoarthritis of right knee    -  Primary ICD-10-CM: M17.11 ICD-9-CM: 715.16 Chronic bilateral low back pain with right-sided sciatica     ICD-10-CM: M54.41, G89.29 ICD-9-CM: 724.2, 724.3, 338.29 Chronic pain of left knee     ICD-10-CM: M25.562, G89.29 ICD-9-CM: 719.46, 338.29 Chronic, continuous use of opioids     ICD-10-CM: F11.90 ICD-9-CM: 305.51 Generalized edema     ICD-10-CM: R60.1 ICD-9-CM: 326. 3 Allergic reaction, initial encounter     ICD-10-CM: T78.40XA ICD-9-CM: 214. 3 Vitals BP Pulse Temp Resp Height(growth percentile) Weight(growth percentile)  109/64 (BP 1 Location: Right arm, BP Patient Position: Sitting) (!) 51 98.3 °F (36.8 °C) (Oral) 16 5' 1\" (1.549 m) 186 lb 6.4 oz (84.6 kg) SpO2 BMI OB Status Smoking Status 97% 35.22 kg/m2 Menopause Former Smoker Vitals History BMI and BSA Data Body Mass Index Body Surface Area  
 35.22 kg/m 2 1.91 m 2 Preferred Pharmacy Pharmacy Name Phone [facility] #[id number] Orta [phone] Your Updated Medication List  
  
   
This list is accurate as of 4/25/18  1:03 PM.  Always use your most recent med list.  
  
  
  
  
 acetaminophen 650 mg Tber Commonly known as:  TYLENOL  
TAKE 1 TAB BY MOUTH THREE (3) TIMES DAILY AS NEEDED FOR PAIN. amLODIPine 10 mg tablet Commonly known as:  Sherrye Acron TAKE 1 TABLET BY MOUTH EVERY DAY  Indications: hypertension  
  
 diclofenac 1 % Gel Commonly known as:  VOLTAREN Apply 2 g to affected area every six (6) hours. diphenhydrAMINE 25 mg capsule Commonly known as:  BENADRYL Take 1 Cap by mouth every six (6) hours as needed. fluticasone 50 mcg/actuation nasal spray Commonly known as:  Thaddeus Thompson 2 Sprays by Both Nostrils route daily. gabapentin 800 mg tablet Commonly known as:  NEURONTIN Take 1 Tab by mouth three (3) times daily. hydroCHLOROthiazide 25 mg tablet Commonly known as:  HYDRODIURIL Take 1 Tab by mouth daily. Indications: hypertension  
  
 levothyroxine 125 mcg tablet Commonly known as:  SYNTHROID  
TAKE 1 TAB BY MOUTH DAILY (BEFORE BREAKFAST). lidocaine 5 % Commonly known as:  Carole Shelton Apply patch to the affected area for 12 hours a day and remove for 12 hours a day. loratadine 10 mg tablet Commonly known as:  Lattie  Take 1 Tab by mouth daily. meloxicam 15 mg tablet Commonly known as:  MOBIC  
TAKE 1 TABLET (15 MG TOTAL) BY MOUTH DAILY. methocarbamol 750 mg tablet Commonly known as:  ROBAXIN  
TAKE 1 TAB BY MOUTH FOUR (4) TIMES DAILY AS NEEDED FOR PAIN (SPASMS). metoprolol succinate 25 mg XL tablet Commonly known as:  TOPROL-XL Take 1 Tab by mouth daily. miscellaneous medical supply Misc Shower seat for chronic knee pain, pt planning to have right TKR in June due to condition. Very limited range of motion. montelukast 10 mg tablet Commonly known as:  SINGULAIR Take 1 Tab by mouth daily. naloxone 4 mg/actuation nasal spray Commonly known as:  [employer] Use 1 spray into 1 nostril for OVERDOSE. Call 911. For subsequent doses, give in alternating nostrils. May repeat every 2 to 3 min. ondansetron 4 mg disintegrating tablet Commonly known as:  ZOFRAN ODT Take 1 Tab by mouth every eight (8) hours as needed for Nausea. oxyCODONE-acetaminophen  mg per tablet Commonly known as:  PERCOCET 10 May take 1 tab ONCE DAILY as needed for pain. Indications: Pain  
  
 pantoprazole 40 mg tablet Commonly known as:  PROTONIX  
TAKE 1 TABLET BY MOUTH ONCE EVERY DAY AS DIRECTED  
  
 predniSONE 20 mg tablet Commonly known as:  Leverne Mariela Take 3 Tabs by mouth daily (with breakfast). * PROAIR HFA 90 mcg/actuation inhaler Generic drug:  albuterol Take 2 Puffs by inhalation every four (4) hours as needed. * albuterol 2.5 mg /3 mL (0.083 %) nebulizer solution Commonly known as:  PROVENTIL VENTOLIN  
3 mL by Nebulization route every four (4) hours as needed for Wheezing. sertraline 100 mg tablet Commonly known as:  ZOLOFT Take 1 Tab by mouth daily. SYMBICORT 160-4.5 mcg/actuation Hfaa Generic drug:  budesonide-formoterol Take 2 Puffs by inhalation two (2) times a day. traZODone 50 mg tablet Commonly known as:  Angeli Hand Take 1-2 tabs every night for sleep. * Notice: This list has 2 medication(s) that are the same as other medications prescribed for you. Read the directions carefully, and ask your doctor or other care provider to review them with you. Prescriptions Sent to Pharmacy Refills  
 predniSONE (DELTASONE) 20 mg tablet 0 Sig: Take 3 Tabs by mouth daily (with breakfast). Class: Normal  
 Pharmacy: [address]  #: [phone] Route: Oral  
  
We Performed the Following REFERRAL TO PAIN CLINIC [EGX27 Custom] Comments:  
 Please evaluate patient for chronic knee pain, med management. Please CALL 
[phone] [address] [place] [address] [address] [place] [address], [address] [place] [address] [address] [place], [facility] [address] [place] [address] Follow-up Instructions Return in about 4 weeks (around 5/23/2018) for pain referral f/u, med refill. Referral Information Referral ID Referred By Referred To  
  
 [id number] [facility], [address] [place] [address],[address] [place] [address], [id number] Phone: [phone] Fax: [phone] Visits Status Start Date End Date 1 New Request 4/25/18 4/25/19 If your referral has a status of pending review or denied, additional information will be sent to support the outcome of this decision. Introducing [facility]! [facility] introduces [employer] patient portal. Now you can access parts of your medical record, email your doctor's office, and request medication refills online. 1. In your internet browser, go to [email or web address] 2. Click on the First Time User? Click Here link in the Sign In box. You will see the New Member Sign Up page. 3. Enter your [employer] Access Code exactly as it appears below. You will not need to use this code after youve completed the sign-up process. If you do not sign up before the expiration date, you must request a new code. · [employer] Access Code: [email or web address] Expires: 5/29/2018  1:35 PM 
 
4.  Enter the last four digits of your Social Security Number (xxxx) and Date of Birth (mm/dd/yyyy) as indicated and click Submit. You will be taken to the next sign-up page. 5. Create a Pacer[employer] ID. This will be your [employer] login ID and cannot be changed, so think of one that is secure and easy to remember. 6. Create a [employer] password. You can change your password at any time. 7. Enter your Password Reset Question and Answer. This can be used at a later time if you forget your password. 8. Enter your e-mail address. You will receive e-mail notification when new information is available in [address] 9. Click Sign Up. You can now view and download portions of your medical record. 10. Click the Download Summary menu link to download a portable copy of your medical information. If you have questions, please visit the Frequently Asked Questions section of the [employer]er[employer] website. Remember, [employer] is NOT to be used for urgent needs. For medical emergencies, dial 911. Now available from your iPhone and Android! Please provide this summary of care documentation to your next provider. Your primary care clinician is listed as HUGO Delgado. If you have any questions after today's visit, please call [phone].

## 2018-04-25 NOTE — PROGRESS NOTES
Encounter for pain management. Chronic Pain:  Patient has chronic BL knee pain for years, had right TKR last year (2016). Pain Scale: 10 - Worst pain ever/10. Had IMAGING for lumbar spine and right knee and has been seeing/seen by ORTHO. Last PT March 2017, continuing HEP. Pain in the right knee, leg, and lower back is still limiting walking, sitting, and standing, exacerbated by forementioned acitivities to include stair climbing. Has tried prednisone, tramadol, injections, PT, gabapentin, cymbalta, and surgery in past with minimal relief. Pain has been controlled with Oxycodone, last taken this morning. The medication is kept safe by staying with her. Has NOT seen any other providers since last OV for pain medication. No significant changes to pain presentation since last OV. she is  able to do her normal daily activities. she reports the following adverse side effects: none. Least pain over the last week has been 8/10. Worst pain over the last week has been 10/10. Aberrant behaviors: None         Symptoms onset: problem is longstanding. Rheumatological ROS: ongoing significant pain which is stable and controlled by pain med. Response to treatment plan: waxing and waning. Pt is here for ER follow-up on 4/16 for allergic reaction to nose and lips, presumably from pollen. Diagnosed with herpes labialis. Was given acyclovir and benadryl. Instructed to f/u with PCP/specialty. Reports feeling BETTER THAN when in ER, however has noticed swelling all over since for the past few days. Review of Systems  Constitutional: negative for fevers, chills, anorexia and weight loss  Eyes:   negative for visual disturbance, drainage, and irritation  ENT:   +AR and environmental allergies.  negative for tinnitus,sore throat,ear pain,and hoarseness  Respiratory:  + asthma/COPD. negative for hemoptysis  CV:   negative for chest pain, palpitations, and lower extremity edema  GI:   negative for nausea, vomiting, diarrhea, abdominal pain, and melena  Endo:               negative for polyuria,polydipsia,polyphagia, and heat intolerance  Genitourinary: negative for frequency, urgency, dysuria, retention, and hematuria  Integument:  negative for rash, ulcerations, and pruritus  Hematologic:  negative for easy bruising and bleeding  Musculoskel: negative for  muscle weakness  Neurological:  negative for headaches, dizziness, vertigo,and memory problems  Behavl/Psych: +depression, on cymbalta and zoloft. negative for feelings of  suicide    1./2. Medical history/Past medical History   Past Medical History:   Diagnosis Date    Asthma     uses inhalers    Chronic obstructive pulmonary disease (HCC)     bronchitis    GERD (gastroesophageal reflux disease)     Hypertension     Ill-defined condition     environmental allergies     Ill-defined condition     Multiple body piercings; unable to remove tongue and lip piercings    Thyroid disease     hypo    Ventral hernia 12/31/2013     Past Surgical History:   Procedure Laterality Date    HX HEENT  2009    thyroidectomy    HX KNEE REPLACEMENT      R    HX MOHS PROCEDURES Right 3/8/11    HX OTHER SURGICAL      hiatal hernia repair    HX TUBAL LIGATION       Patient Active Problem List   Diagnosis Code    Ventral hernia K43.9    Chronic pain of right knee M25.561, G89.29    Chronic right shoulder pain M25.511, G89.29    Environmental and seasonal allergies J30.89    Ventral hernia without obstruction or gangrene K43.9    Primary osteoarthritis of right knee M17.11    Mixed simple and mucopurulent chronic bronchitis (HCC) J41.8    Acquired hypothyroidism E03.9    Chronic bilateral low back pain with right-sided sciatica M54.41, G89.29    Status post total right knee replacement Z96.651    Malignant hypertension I10    Mild single current episode of major depressive disorder ([facility] F32.0    Pain management contract signed Z02.89    Chronic pain of left knee M25.562, G89.29    Moderate episode of recurrent major depressive disorder (HCC) F33.1    Psychophysiological insomnia F51.04    Severe obesity (BMI 35.0-39. 9) with comorbidity (HCC) E66.01    Post-tussive vomiting R11.10       3. Applicable records from prior treatment providers are apart of [facility] under the media/encounters tab. 4. Diagnostic, therapeutic and laboratory results are available in the [facility] chart. 5. Consultation notes are available for review in the media/encounters tab of the [facility] chart. 6. Treatment goals include: pain control, improve activity level and function in regards to activities of daily living, and improved comfort level. Previously pt has been limited by pain in all these aspects. 7. The risks and benefits of treatment have been discussed at this office visit with the pt.  she understands that the medication has addictive potential.  Additionally the pt has been advised that narcotic pain medication may impair mental and/or physical ability required for performance of tasks such as driving or operating any other machinery. 8. Pt has an updated signed pain contract on file and can be found under the media section of the [facility] chart. 9. The pain contract is reviewed. Pain medication will be continued at the same dosage. Pill count: 7, last fill 4/2/18. Urine drug screening ordered/collected today. Diagnostic studies are not indicated at this time. Interventional procedure are not indicated at this time. Order for [employer] sent. 10. Medication prescibed is oxycodone every 24 hours PRN # 30 with zero refills for a 1 month supply.  was reviewed while planning for pain/anxiety management, no indications of drug diversion suspected. Prescription history is NOT suspicious for controlled substance overuse. 11. Patient instructions have been reviewed in detail as outlined above and in the pain contract.       12. Re-evaluation is planned for 1 month(s). Current Outpatient Prescriptions   Medication Sig Dispense Refill    predniSONE (DELTASONE) 20 mg tablet Take 3 Tabs by mouth daily (with breakfast). 15 Tab 0    oxyCODONE-acetaminophen (PERCOCET 10)  mg per tablet May take 1 tab ONCE DAILY as needed for pain. Indications: Pain 30 Tab 0    methocarbamol (ROBAXIN) 750 mg tablet TAKE 1 TAB BY MOUTH FOUR (4) TIMES DAILY AS NEEDED FOR PAIN (SPASMS). 60 Tab 1    diphenhydrAMINE (BENADRYL) 25 mg capsule Take 1 Cap by mouth every six (6) hours as needed. 20 Cap 0    ondansetron (ZOFRAN ODT) 4 mg disintegrating tablet Take 1 Tab by mouth every eight (8) hours as needed for Nausea. 20 Tab 1    naloxone (NARCAN) 4 mg/actuation nasal spray Use 1 spray into 1 nostril for OVERDOSE. Call 911. For subsequent doses, give in alternating nostrils. May repeat every 2 to 3 min. 2 Each 0    traZODone (DESYREL) 50 mg tablet Take 1-2 tabs every night for sleep. 60 Tab 11    pantoprazole (PROTONIX) 40 mg tablet TAKE 1 TABLET BY MOUTH ONCE EVERY DAY AS DIRECTED 90 Tab 2    albuterol (PROVENTIL VENTOLIN) 2.5 mg /3 mL (0.083 %) nebulizer solution 3 mL by Nebulization route every four (4) hours as needed for Wheezing. 24 Each 2    acetaminophen (TYLENOL) 650 mg TbER TAKE 1 TAB BY MOUTH THREE (3) TIMES DAILY AS NEEDED FOR PAIN. 90 Tab 0    levothyroxine (SYNTHROID) 125 mcg tablet TAKE 1 TAB BY MOUTH DAILY (BEFORE BREAKFAST). 90 Tab 0    sertraline (ZOLOFT) 100 mg tablet Take 1 Tab by mouth daily. 30 Tab 11    meloxicam (MOBIC) 15 mg tablet TAKE 1 TABLET (15 MG TOTAL) BY MOUTH DAILY. 11    diclofenac (VOLTAREN) 1 % gel Apply 2 g to affected area every six (6) hours. 100 g 5    metoprolol succinate (TOPROL-XL) 25 mg XL tablet Take 1 Tab by mouth daily. 30 Tab 11    amLODIPine (NORVASC) 10 mg tablet TAKE 1 TABLET BY MOUTH EVERY DAY  Indications: hypertension 90 Tab 3    hydroCHLOROthiazide (HYDRODIURIL) 25 mg tablet Take 1 Tab by mouth daily.  Indications: hypertension 30 Tab 11    lidocaine (LIDODERM) 5 % Apply patch to the affected area for 12 hours a day and remove for 12 hours a day. 30 Each 11    fluticasone (FLONASE) 50 mcg/actuation nasal spray 2 Sprays by Both Nostrils route daily. 1 Bottle 11    miscellaneous medical supply misc Shower seat for chronic knee pain, pt planning to have right TKR in June due to condition. Very limited range of motion. 1 Each 0    loratadine (CLARITIN) 10 mg tablet Take 1 Tab by mouth daily. 30 Tab 5    budesonide-formoterol (SYMBICORT) 160-4.5 mcg/actuation HFA inhaler Take 2 Puffs by inhalation two (2) times a day.  albuterol (PROAIR HFA) 90 mcg/actuation inhaler Take 2 Puffs by inhalation every four (4) hours as needed.  montelukast (SINGULAIR) 10 mg tablet Take 1 Tab by mouth daily. 30 Tab 6    gabapentin (NEURONTIN) 800 mg tablet Take 1 Tab by mouth three (3) times daily. 90 Tab 11     Allergies   Allergen Reactions    Hydrocodone Itching    Mold Shortness of Breath and Itching    Ibuprofen Nausea Only       Objective:  Visit Vitals    /64 (BP 1 Location: Right arm, BP Patient Position: Sitting)    Pulse (!) 51    Temp 98.3 °F (36.8 °C) (Oral)    Resp 16    Ht 5' 1\" (1.549 m)    Wt 186 lb 6.4 oz (84.6 kg)    SpO2 97%    BMI 35.22 kg/m2     Wt Readings from Last 3 Encounters:   04/25/18 186 lb 6.4 oz (84.6 kg)   04/16/18 176 lb (79.8 kg)   03/28/18 186 lb 6.4 oz (84.6 kg)     Physical Exam:   General appearance - alert, well appearing, and in mild distress. Mental status - A/O x 4, normal mood and affect. Chest - CTA. Symmetric chest rise. No wheezing, rales or rhonchi. Heart - Normal rate, regular rhythm. Normal S1, S2. No MGR or clicks. Abd- soft, obese, non-distended. Normoactive BS. NT, no masses. Ext- Radial, DP pulses, 2+ bilaterally. No clubbing or cyanosis. BL LLE, 2+  Skin-Warm and dry. No hyperpigmentation, ulcerations, or suspicious lesions.   Neuro - normal speech, no focal findings or movement disorder. Normal strength and muscle tone. Limping gait using, no cane. Right knee with edema and TTP. No effusion. Assessment/Plan:  Prednisone 60 mg x 5 days. Referred to pain mgt due to increasing clinical ineffectiveness with current opiate therapy. Will continue at same dose until seen by pain mgt. Discussed this plan with pt, who agrees. Medication Side Effects and Warnings were discussed with patient: yes   Patient Labs were reviewed: yes  Patient Past Records were reviewed: yes    See below for other orders   Follow-up Disposition:  Return in about 4 weeks (around 5/23/2018) for pain referral f/u, med refill. ICD-10-CM ICD-9-CM    1. Primary osteoarthritis of right knee M17.11 715.16 REFERRAL TO PAIN CLINIC      oxyCODONE-acetaminophen (PERCOCET 10)  mg per tablet   2. Chronic bilateral low back pain with right-sided sciatica M54.41 724.2 REFERRAL TO PAIN CLINIC    G89.29 724.3      338.29    3. Chronic pain of left knee M25.562 719.46 REFERRAL TO PAIN CLINIC    G89.29 338.29    4. Chronic, continuous use of opioids F11.90 305.51 REFERRAL TO PAIN CLINIC   5. Generalized edema R60.1 782. 3 predniSONE (DELTASONE) 20 mg tablet   6. Allergic reaction, initial encounter T78.40XA 995. 3 predniSONE (DELTASONE) 20 mg tablet   7. Status post total right knee replacement Z96.651 V43.65 oxyCODONE-acetaminophen (PERCOCET 10)  mg per tablet   8. Chronic pain of right knee M25.561 719.46 oxyCODONE-acetaminophen (PERCOCET 10)  mg per tablet    G89.29 338.29      Orders Placed This Encounter    REFERRAL TO PAIN CLINIC     Referral Priority:   Routine     Referral Type:   Consultation     Referral Reason:   Specialty Services Required     Referral Location:   [address] [place] [address]    predniSONE (DELTASONE) 20 mg tablet     Sig: Take 3 Tabs by mouth daily (with breakfast).      Dispense:  15 Tab     Refill:  0    oxyCODONE-acetaminophen (PERCOCET 10)  mg per tablet Sig: May take 1 tab ONCE DAILY as needed for pain. Indications: Pain     Dispense:  30 Tab     Refill:  0       Liliya Sykes expressed understanding of plan. An After Visit Summary was offered/printed and given to the patient.

## 2018-05-10 DIAGNOSIS — E03.9 HYPOTHYROIDISM, UNSPECIFIED TYPE: ICD-10-CM

## 2018-05-10 RX ORDER — LEVOTHYROXINE SODIUM 125 UG/1
TABLET ORAL
Qty: 90 TAB | Refills: 1 | Status: SHIPPED | OUTPATIENT
Start: 2018-05-10 | End: 2019-06-11 | Stop reason: SDUPTHER

## 2018-05-24 ENCOUNTER — OFFICE VISIT (OUTPATIENT)
Dept: INTERNAL MEDICINE CLINIC | Age: 53
End: 2018-05-24

## 2018-05-24 VITALS
DIASTOLIC BLOOD PRESSURE: 89 MMHG | HEIGHT: 61 IN | WEIGHT: 183.6 LBS | RESPIRATION RATE: 20 BRPM | SYSTOLIC BLOOD PRESSURE: 162 MMHG | TEMPERATURE: 98.4 F | OXYGEN SATURATION: 96 % | HEART RATE: 70 BPM | BODY MASS INDEX: 34.66 KG/M2

## 2018-05-24 DIAGNOSIS — Z13.21 SCREENING FOR ENDOCRINE, NUTRITIONAL, METABOLIC AND IMMUNITY DISORDER: ICD-10-CM

## 2018-05-24 DIAGNOSIS — Z13.0 SCREENING FOR ENDOCRINE, NUTRITIONAL, METABOLIC AND IMMUNITY DISORDER: ICD-10-CM

## 2018-05-24 DIAGNOSIS — Z96.651 STATUS POST TOTAL RIGHT KNEE REPLACEMENT: ICD-10-CM

## 2018-05-24 DIAGNOSIS — Z13.220 SCREENING FOR LIPID DISORDERS: ICD-10-CM

## 2018-05-24 DIAGNOSIS — M17.11 PRIMARY OSTEOARTHRITIS OF RIGHT KNEE: Primary | ICD-10-CM

## 2018-05-24 DIAGNOSIS — Z13.29 SCREENING FOR ENDOCRINE, NUTRITIONAL, METABOLIC AND IMMUNITY DISORDER: ICD-10-CM

## 2018-05-24 DIAGNOSIS — Z79.891 CHRONIC USE OF OPIATE FOR THERAPEUTIC PURPOSE: ICD-10-CM

## 2018-05-24 DIAGNOSIS — G89.29 CHRONIC PAIN OF RIGHT KNEE: ICD-10-CM

## 2018-05-24 DIAGNOSIS — E03.9 ACQUIRED HYPOTHYROIDISM: ICD-10-CM

## 2018-05-24 DIAGNOSIS — G89.29 CHRONIC BILATERAL LOW BACK PAIN WITH RIGHT-SIDED SCIATICA: ICD-10-CM

## 2018-05-24 DIAGNOSIS — M25.561 CHRONIC PAIN OF RIGHT KNEE: ICD-10-CM

## 2018-05-24 DIAGNOSIS — M54.41 CHRONIC BILATERAL LOW BACK PAIN WITH RIGHT-SIDED SCIATICA: ICD-10-CM

## 2018-05-24 DIAGNOSIS — Z13.228 SCREENING FOR ENDOCRINE, NUTRITIONAL, METABOLIC AND IMMUNITY DISORDER: ICD-10-CM

## 2018-05-24 RX ORDER — OXYCODONE AND ACETAMINOPHEN 10; 325 MG/1; MG/1
TABLET ORAL
Qty: 30 TAB | Refills: 0 | Status: SHIPPED | OUTPATIENT
Start: 2018-06-24 | End: 2018-07-19 | Stop reason: SDUPTHER

## 2018-05-24 RX ORDER — OXYCODONE AND ACETAMINOPHEN 10; 325 MG/1; MG/1
TABLET ORAL
Qty: 30 TAB | Refills: 0 | Status: SHIPPED | OUTPATIENT
Start: 2018-05-24 | End: 2018-05-24 | Stop reason: SDUPTHER

## 2018-05-24 NOTE — PATIENT INSTRUCTIONS
Hypothyroidism: Care Instructions  Your Care Instructions    You have hypothyroidism, which means that your body is not making enough thyroid hormone. This hormone helps your body use energy. If your thyroid level is low, you may feel tired, be constipated, have an increase in your blood pressure, or have dry skin or memory problems. You may also get cold easily, even when it is warm. Women with low thyroid levels may have heavy menstrual periods. A blood test to find your thyroid-stimulating hormone (TSH) level is used to check for hypothyroidism. A high TSH level may mean that you have low thyroid. When your body is not making enough thyroid hormone, TSH levels rise in an effort to make the body produce more. The treatment for hypothyroidism is to take thyroid hormone pills. You should start to feel better in 1 to 2 weeks. But it can take several months to see changes in the TSH level. You will need regular visits with your doctor to make sure you have the right dose of medicine. Most people need treatment for the rest of their lives. You will need to see your doctor regularly to have blood tests and to make sure you are doing well. Follow-up care is a key part of your treatment and safety. Be sure to make and go to all appointments, and call your doctor if you are having problems. It's also a good idea to know your test results and keep a list of the medicines you take. How can you care for yourself at home? · Take your thyroid hormone medicine exactly as prescribed. Call your doctor if you think you are having a problem with your medicine. Most people do not have side effects if they take the right amount of medicine regularly. ¨ Take the medicine 30 minutes before breakfast, and do not take it with calcium, vitamins, or iron. ¨ Do not take extra doses of your thyroid medicine. It will not help you get better any faster, and it may cause side effects.   ¨ If you forget to take a dose, do NOT take a double dose of medicine. Take your usual dose the next day. · Tell your doctor about all prescription, herbal, or over-the-counter products you take. · Take care of yourself. Eat a healthy diet, get enough sleep, and get regular exercise. When should you call for help? Call 911 anytime you think you may need emergency care. For example, call if:  ? · You passed out (lost consciousness). ? · You have severe trouble breathing. ? · You have a very slow heartbeat (less than 60 beats a minute). ? · You have a low body temperature (95°F or below). ?Call your doctor now or seek immediate medical care if:  ? · You feel tired, sluggish, or weak. ? · You have trouble remembering things or concentrating. ? · You do not begin to feel better 2 weeks after starting your medicine. ? Watch closely for changes in your health, and be sure to contact your doctor if you have any problems. Where can you learn more? Go to http://ofelia-mindy.info/. Enter T675 in the search box to learn more about \"Hypothyroidism: Care Instructions. \"  Current as of: May 12, 2017  Content Version: 11.4  © 9756-8281 [employer]. Care instructions adapted under license by [employer] (which disclaims liability or warranty for this information). If you have questions about a medical condition or this instruction, always ask your healthcare professional. [place] [address] any warranty or liability for your use of this information.

## 2018-05-24 NOTE — PROGRESS NOTES
Encounter for pain management. Chronic Pain:  Patient has chronic BL knee pain for years, had right TKR last year (2016). However distracted with sickness of wife in ICU, not taking meds past few days from crying. Pain Scale: 10 - Worst pain ever/10. Had IMAGING for lumbar spine and right knee and has been seeing/seen by ORTHO. Sent info in to Physical Medicine, but won't get an appt for a few months. Last PT March 2017, continuing HEP. Pain in the right knee, leg, and lower back is still limiting walking, sitting, and standing, exacerbated by forementioned acitivities to include stair climbing. Has tried prednisone, tramadol, injections, PT, gabapentin, cymbalta, and surgery in past with minimal relief. Pain has been controlled with Oxycodone, last taken two days ago. The medication is kept safe by staying with her. Has NOT seen any other providers since last OV for pain medication. No significant changes to pain presentation since last OV. she is  able to do her normal daily activities. she reports the following adverse side effects: none. Least pain over the last week has been 5/10. Worst pain over the last week has been 10/10. Aberrant behaviors: None         Symptoms onset: problem is longstanding. Rheumatological ROS: ongoing significant pain which is stable and controlled by pain med. Response to treatment plan: waxing and waning. Review of Systems  Constitutional: negative for fevers, chills, anorexia and weight loss  Eyes:   negative for visual disturbance, drainage, and irritation  ENT:   +AR and environmental allergies.  negative for tinnitus,sore throat,ear pain,and hoarseness  Respiratory:  + asthma/COPD. negative for hemoptysis  CV:   negative for chest pain, palpitations, and lower extremity edema  GI:   negative for nausea, vomiting, diarrhea, abdominal pain, and melena  Endo:               negative for polyuria,polydipsia,polyphagia, and heat intolerance  Genitourinary: negative for frequency, urgency, dysuria, retention, and hematuria  Integument:  negative for rash, ulcerations, and pruritus  Hematologic:  negative for easy bruising and bleeding  Musculoskel: negative for  muscle weakness  Neurological:  negative for headaches, dizziness, vertigo,and memory problems  Behavl/Psych: +depression, on cymbalta and zoloft. negative for feelings of  suicide    1./2. Medical history/Past medical History   Past Medical History:   Diagnosis Date    Asthma     uses inhalers    Chronic obstructive pulmonary disease (HCC)     bronchitis    GERD (gastroesophageal reflux disease)     Hypertension     Ill-defined condition     environmental allergies     Ill-defined condition     Multiple body piercings; unable to remove tongue and lip piercings    Thyroid disease     hypo    Ventral hernia 12/31/2013     Past Surgical History:   Procedure Laterality Date    HX HEENT  2009    thyroidectomy    HX KNEE REPLACEMENT      R    HX MOHS PROCEDURES Right 3/8/11    HX OTHER SURGICAL      hiatal hernia repair    HX TUBAL LIGATION       Patient Active Problem List   Diagnosis Code    Ventral hernia K43.9    Chronic pain of right knee M25.561, G89.29    Chronic right shoulder pain M25.511, G89.29    Environmental and seasonal allergies J30.89    Ventral hernia without obstruction or gangrene K43.9    Primary osteoarthritis of right knee M17.11    Mixed simple and mucopurulent chronic bronchitis (HCC) J41.8    Acquired hypothyroidism E03.9    Chronic bilateral low back pain with right-sided sciatica M54.41, G89.29    Status post total right knee replacement Z96.651    Malignant hypertension I10    Mild single current episode of major depressive disorder ([address] F32.0    Pain management contract signed Z02.89    Chronic pain of left knee M25.562, G89.29    Moderate episode of recurrent major depressive disorder (HCC) F33.1    Psychophysiological insomnia F51.04    Severe obesity (BMI 35.0-39. 9) with comorbidity (HCC) E66.01    Post-tussive vomiting R11.10    Chronic use of opiate for therapeutic purpose Z79.891       3. Applicable records from prior treatment providers are apart of [facility] under the media/encounters tab. 4. Diagnostic, therapeutic and laboratory results are available in the [facility] chart. 5. Consultation notes are available for review in the media/encounters tab of the [facility] chart. 6. Treatment goals include: pain control, improve activity level and function in regards to activities of daily living, and improved comfort level. Previously pt has been limited by pain in all these aspects. 7. The risks and benefits of treatment have been discussed at this office visit with the pt.  she understands that the medication has addictive potential.  Additionally the pt has been advised that narcotic pain medication may impair mental and/or physical ability required for performance of tasks such as driving or operating any other machinery. 8. Pt has an updated signed pain contract on file and can be found under the media section of the [facility] chart. 9. The pain contract is reviewed. Pain medication will be continued at the same dosage. Pill count: 6, last fill 5/1/18. Urine drug screening ordered/collected today. Diagnostic studies are not indicated at this time. Interventional procedure are not indicated at this time. Order for [facility] sent. 10. Medication prescibed is oxycodone every 24 hours PRN # 30 with 1 printed refills for a 2 month supply.  was reviewed while planning for pain/anxiety management, no indications of drug diversion suspected. Prescription history is NOT suspicious for controlled substance overuse. 11. Patient instructions have been reviewed in detail as outlined above and in the pain contract.       12. Re-evaluation is planned for 2 months      Current Outpatient Prescriptions   Medication Sig Dispense Refill  [START ON 6/24/2018] oxyCODONE-acetaminophen (PERCOCET 10)  mg per tablet May take 1 tab ONCE DAILY as needed for pain. Indications: Pain 30 Tab 0    levothyroxine (SYNTHROID) 125 mcg tablet TAKE 1 TABLET BY MOUTH EVERY DAY BEFORE BREAKFAST 90 Tab 1    methocarbamol (ROBAXIN) 750 mg tablet TAKE 1 TAB BY MOUTH FOUR (4) TIMES DAILY AS NEEDED FOR PAIN (SPASMS). 60 Tab 1    diphenhydrAMINE (BENADRYL) 25 mg capsule Take 1 Cap by mouth every six (6) hours as needed. 20 Cap 0    ondansetron (ZOFRAN ODT) 4 mg disintegrating tablet Take 1 Tab by mouth every eight (8) hours as needed for Nausea. 20 Tab 1    naloxone (NARCAN) 4 mg/actuation nasal spray Use 1 spray into 1 nostril for OVERDOSE. Call 911. For subsequent doses, give in alternating nostrils. May repeat every 2 to 3 min. 2 Each 0    traZODone (DESYREL) 50 mg tablet Take 1-2 tabs every night for sleep. 60 Tab 11    montelukast (SINGULAIR) 10 mg tablet Take 1 Tab by mouth daily. 30 Tab 6    pantoprazole (PROTONIX) 40 mg tablet TAKE 1 TABLET BY MOUTH ONCE EVERY DAY AS DIRECTED 90 Tab 2    albuterol (PROVENTIL VENTOLIN) 2.5 mg /3 mL (0.083 %) nebulizer solution 3 mL by Nebulization route every four (4) hours as needed for Wheezing. 24 Each 2    acetaminophen (TYLENOL) 650 mg TbER TAKE 1 TAB BY MOUTH THREE (3) TIMES DAILY AS NEEDED FOR PAIN. 90 Tab 0    sertraline (ZOLOFT) 100 mg tablet Take 1 Tab by mouth daily. 30 Tab 11    meloxicam (MOBIC) 15 mg tablet TAKE 1 TABLET (15 MG TOTAL) BY MOUTH DAILY. 11    diclofenac (VOLTAREN) 1 % gel Apply 2 g to affected area every six (6) hours. 100 g 5    metoprolol succinate (TOPROL-XL) 25 mg XL tablet Take 1 Tab by mouth daily. 30 Tab 11    amLODIPine (NORVASC) 10 mg tablet TAKE 1 TABLET BY MOUTH EVERY DAY  Indications: hypertension 90 Tab 3    gabapentin (NEURONTIN) 800 mg tablet Take 1 Tab by mouth three (3) times daily.  90 Tab 11    hydroCHLOROthiazide (HYDRODIURIL) 25 mg tablet Take 1 Tab by mouth daily. Indications: hypertension 30 Tab 11    lidocaine (LIDODERM) 5 % Apply patch to the affected area for 12 hours a day and remove for 12 hours a day. 30 Each 11    fluticasone (FLONASE) 50 mcg/actuation nasal spray 2 Sprays by Both Nostrils route daily. 1 Bottle 11    miscellaneous medical supply misc Shower seat for chronic knee pain, pt planning to have right TKR in June due to condition. Very limited range of motion. 1 Each 0    loratadine (CLARITIN) 10 mg tablet Take 1 Tab by mouth daily. 30 Tab 5    budesonide-formoterol (SYMBICORT) 160-4.5 mcg/actuation HFA inhaler Take 2 Puffs by inhalation two (2) times a day.  albuterol (PROAIR HFA) 90 mcg/actuation inhaler Take 2 Puffs by inhalation every four (4) hours as needed. Allergies   Allergen Reactions    Hydrocodone Itching    Mold Shortness of Breath and Itching    Ibuprofen Nausea Only       Objective:  Visit Vitals    /89 (BP 1 Location: Right arm, BP Patient Position: Sitting)    Pulse 70    Temp 98.4 °F (36.9 °C) (Oral)    Resp 20    Ht 5' 1\" (1.549 m)    Wt 183 lb 9.6 oz (83.3 kg)    SpO2 96%    BMI 34.69 kg/m2     Wt Readings from Last 3 Encounters:   05/24/18 183 lb 9.6 oz (83.3 kg)   04/25/18 186 lb 6.4 oz (84.6 kg)   04/16/18 176 lb (79.8 kg)     Physical Exam:   General appearance - alert, well appearing, and in mild distress. Mental status - A/O x 4, sad mood and affect. +tearful  Chest - CTA. Symmetric chest rise. No wheezing, rales or rhonchi. Heart - Normal rate, regular rhythm. Normal S1, S2. No MGR or clicks. Abd- soft, obese, non-distended. Normoactive BS. NT, no masses. Ext- Radial, DP pulses, 2+ bilaterally. No edema, clubbing or cyanosis. Skin-Warm and dry. No hyperpigmentation, ulcerations, or suspicious lesions. Neuro - normal speech, no focal findings or movement disorder. Normal strength and muscle tone. Limping, antalgic gait using cane. Right knee with edema and TTP, less tender.  No effusion. Assessment/Plan:  The current medical regimen is effective;  continue present plan and medications. Medication Side Effects and Warnings were discussed with patient: yes   Patient Labs were reviewed: yes  Patient Past Records were reviewed: yes    See below for other orders   Follow-up Disposition:  Return in about 8 weeks (around 7/19/2018) for pain med. ICD-10-CM ICD-9-CM    1. Primary osteoarthritis of right knee M17.11 715.16 oxyCODONE-acetaminophen (PERCOCET 10)  mg per tablet      DISCONTINUED: oxyCODONE-acetaminophen (PERCOCET 10)  mg per tablet   2. Status post total right knee replacement Z96.651 V43.65 oxyCODONE-acetaminophen (PERCOCET 10)  mg per tablet      DISCONTINUED: oxyCODONE-acetaminophen (PERCOCET 10)  mg per tablet   3. Chronic pain of right knee M25.561 719.46 oxyCODONE-acetaminophen (PERCOCET 10)  mg per tablet    G89.29 338.29 DISCONTINUED: oxyCODONE-acetaminophen (PERCOCET 10)  mg per tablet   4. Chronic use of opiate for therapeutic purpose Z79.891 V58.69 TOXASSURE SELECT 13 (MW)   5. Chronic bilateral low back pain with right-sided sciatica M54.41 724.2     G89.29 724.3      338.29    6. Screening for lipid disorders Z13.220 V77.91 LIPID PANEL   7. Screening for endocrine, nutritional, metabolic and immunity disorder W04.80 T43.53 METABOLIC PANEL, COMPREHENSIVE    Z13.21  CBC WITH AUTOMATED DIFF    Z13.228      Z13.0     8. Acquired hypothyroidism E03.9 244.9 TSH 3RD GENERATION     Orders Placed This Encounter    TOXASSURE SELECT 13 (MW)    TSH 3RD GENERATION    METABOLIC PANEL, COMPREHENSIVE    CBC WITH AUTOMATED DIFF    LIPID PANEL    DISCONTD: oxyCODONE-acetaminophen (PERCOCET 10)  mg per tablet     Sig: May take 1 tab ONCE DAILY as needed for pain. Indications: Pain     Dispense:  30 Tab     Refill:  0    oxyCODONE-acetaminophen (PERCOCET 10)  mg per tablet     Sig: May take 1 tab ONCE DAILY as needed for pain. Indications: Pain     Dispense:  30 Tab     Refill:  0       Liliya Sykes expressed understanding of plan. An After Visit Summary was offered/printed and given to the patient.

## 2018-05-24 NOTE — PROGRESS NOTES
1. Have you been to the ER, urgent care clinic since your last visit? Hospitalized since your last visit? No.    2. Have you seen or consulted any other health care providers outside of the [facility] since your last visit? Include any pap smears or colon screening.  No.

## 2018-05-24 NOTE — MR AVS SNAPSHOT
[address] [place] [address] [address] [id number] 
[phone] Patient: Donell Valladares MRN: [id number] :1965 Visit Information Date & Time Provider Department Dept. Phone Encounter #  
 2018 10:00 AM Molly Paul NP [address] [facility] [phone] [id number] Follow-up Instructions Return in about 8 weeks (around 2018) for pain med. Upcoming Health Maintenance Date Due  
 PAP AKA CERVICAL CYTOLOGY 1984 BREAST CANCER SCRN MAMMOGRAM 2014 Influenza Age 5 to Adult 2018 FOBT Q 1 YEAR AGE 50-75 2019 DTaP/Tdap/Td series (2 - Td) 2025 Allergies as of 2018  Review Complete On: 2018 By: Molly Paul NP Severity Noted Reaction Type Reactions Hydrocodone  2017    Itching Mold  2016    Shortness of Breath, Itching Ibuprofen Low 2015   Intolerance Nausea Only Current Immunizations  Reviewed on 3/3/2017 Name Date Influenza Vaccine (Quad) Intradermal PF 2016 Influenza Vaccine (Quad) PF 11/3/2017 Pneumococcal Polysaccharide (PPSV-23) 3/3/2017 Not reviewed this visit You Were Diagnosed With   
  
 Codes Comments Primary osteoarthritis of right knee    -  Primary ICD-10-CM: M17.11 ICD-9-CM: 715.16 Status post total right knee replacement     ICD-10-CM: S80.728 ICD-9-CM: V43.65 Chronic pain of right knee     ICD-10-CM: M25.561, G89.29 ICD-9-CM: 719.46, 338.29 Chronic use of opiate for therapeutic purpose     ICD-10-CM: Z79.891 ICD-9-CM: V58.69 Chronic bilateral low back pain with right-sided sciatica     ICD-10-CM: M54.41, G89.29 ICD-9-CM: 724.2, 724.3, 338.29 Screening for lipid disorders     ICD-10-CM: Z13.220 ICD-9-CM: V77.91 Screening for endocrine, nutritional, metabolic and immunity disorder     ICD-10-CM: Z13.29, Z13.21, Z13.228, Z13.0 ICD-9-CM: V77.99   
 Acquired hypothyroidism     ICD-10-CM: E03.9 ICD-9-CM: 915. 9 Vitals BP Pulse Temp Resp Height(growth percentile) Weight(growth percentile) 162/89 (BP 1 Location: Right arm, BP Patient Position: Sitting) 70 98.4 °F (36.9 °C) (Oral) 20 5' 1\" (1.549 m) 183 lb 9.6 oz (83.3 kg) SpO2 BMI OB Status Smoking Status 96% 34.69 kg/m2 Menopause Former Smoker Vitals History BMI and BSA Data Body Mass Index Body Surface Area  
 34.69 kg/m 2 1.89 m 2 Preferred Pharmacy Pharmacy Name Phone [id number] [phone] Your Updated Medication List  
  
   
This list is accurate as of 5/24/18 11:56 AM.  Always use your most recent med list.  
  
  
  
  
 acetaminophen 650 mg Tber Commonly known as:  TYLENOL  
TAKE 1 TAB BY MOUTH THREE (3) TIMES DAILY AS NEEDED FOR PAIN. amLODIPine 10 mg tablet Commonly known as:  Leward Ellington TAKE 1 TABLET BY MOUTH EVERY DAY  Indications: hypertension  
  
 diclofenac 1 % Gel Commonly known as:  VOLTAREN Apply 2 g to affected area every six (6) hours. diphenhydrAMINE 25 mg capsule Commonly known as:  BENADRYL Take 1 Cap by mouth every six (6) hours as needed. fluticasone 50 mcg/actuation nasal spray Commonly known as:  Jc David 2 Sprays by Both Nostrils route daily. gabapentin 800 mg tablet Commonly known as:  NEURONTIN Take 1 Tab by mouth three (3) times daily. hydroCHLOROthiazide 25 mg tablet Commonly known as:  HYDRODIURIL Take 1 Tab by mouth daily. Indications: hypertension  
  
 levothyroxine 125 mcg tablet Commonly known as:  SYNTHROID  
TAKE 1 TABLET BY MOUTH EVERY DAY BEFORE BREAKFAST  
  
 lidocaine 5 % Commonly known as:  Cesar Serve Apply patch to the affected area for 12 hours a day and remove for 12 hours a day. loratadine 10 mg tablet Commonly known as:  Andra Badder Take 1 Tab by mouth daily. meloxicam 15 mg tablet Commonly known as:  MOBIC  
TAKE 1 TABLET (15 MG TOTAL) BY MOUTH DAILY. methocarbamol 750 mg tablet Commonly known as:  ROBAXIN  
TAKE 1 TAB BY MOUTH FOUR (4) TIMES DAILY AS NEEDED FOR PAIN (SPASMS). metoprolol succinate 25 mg XL tablet Commonly known as:  TOPROL-XL Take 1 Tab by mouth daily. miscellaneous medical supply Misc Shower seat for chronic knee pain, pt planning to have right TKR in June due to condition. Very limited range of motion. montelukast 10 mg tablet Commonly known as:  SINGULAIR Take 1 Tab by mouth daily. naloxone 4 mg/actuation nasal spray Commonly known as:  [employer] Use 1 spray into 1 nostril for OVERDOSE. Call 911. For subsequent doses, give in alternating nostrils. May repeat every 2 to 3 min. ondansetron 4 mg disintegrating tablet Commonly known as:  ZOFRAN ODT Take 1 Tab by mouth every eight (8) hours as needed for Nausea. oxyCODONE-acetaminophen  mg per tablet Commonly known as:  PERCOCET 10 May take 1 tab ONCE DAILY as needed for pain. Indications: Pain Start taking on:  6/24/2018  
  
 pantoprazole 40 mg tablet Commonly known as:  PROTONIX  
TAKE 1 TABLET BY MOUTH ONCE EVERY DAY AS DIRECTED * PROAIR HFA 90 mcg/actuation inhaler Generic drug:  albuterol Take 2 Puffs by inhalation every four (4) hours as needed. * albuterol 2.5 mg /3 mL (0.083 %) nebulizer solution Commonly known as:  PROVENTIL VENTOLIN  
3 mL by Nebulization route every four (4) hours as needed for Wheezing. sertraline 100 mg tablet Commonly known as:  ZOLOFT Take 1 Tab by mouth daily. SYMBICORT 160-4.5 mcg/actuation Hfaa Generic drug:  budesonide-formoterol Take 2 Puffs by inhalation two (2) times a day. traZODone 50 mg tablet Commonly known as:  Elizabeth Odor Take 1-2 tabs every night for sleep. * Notice:   This list has 2 medication(s) that are the same as other medications prescribed for you. Read the directions carefully, and ask your doctor or other care provider to review them with you. Prescriptions Printed Refills  
 oxyCODONE-acetaminophen (PERCOCET 10)  mg per tablet 0 Starting on: 6/24/2018 Sig: May take 1 tab ONCE DAILY as needed for pain. Indications: Pain Class: Print We Performed the Following CBC WITH AUTOMATED DIFF [62393 CPT(R)] LIPID PANEL [02688 CPT(R)] METABOLIC PANEL, COMPREHENSIVE [24950 CPT(R)] Neris Conception 13 (MW) [[id number] Custom] TSH 3RD GENERATION [20547 CPT(R)] Follow-up Instructions Return in about 8 weeks (around 7/19/2018) for pain med. Patient Instructions Hypothyroidism: Care Instructions Your Care Instructions You have hypothyroidism, which means that your body is not making enough thyroid hormone. This hormone helps your body use energy. If your thyroid level is low, you may feel tired, be constipated, have an increase in your blood pressure, or have dry skin or memory problems. You may also get cold easily, even when it is warm. Women with low thyroid levels may have heavy menstrual periods. A blood test to find your thyroid-stimulating hormone (TSH) level is used to check for hypothyroidism. A high TSH level may mean that you have low thyroid. When your body is not making enough thyroid hormone, TSH levels rise in an effort to make the body produce more. The treatment for hypothyroidism is to take thyroid hormone pills. You should start to feel better in 1 to 2 weeks. But it can take several months to see changes in the TSH level. You will need regular visits with your doctor to make sure you have the right dose of medicine. Most people need treatment for the rest of their lives. You will need to see your doctor regularly to have blood tests and to make sure you are doing well. Follow-up care is a key part of your treatment and safety. Be sure to make and go to all appointments, and call your doctor if you are having problems. It's also a good idea to know your test results and keep a list of the medicines you take. How can you care for yourself at home? · Take your thyroid hormone medicine exactly as prescribed. Call your doctor if you think you are having a problem with your medicine. Most people do not have side effects if they take the right amount of medicine regularly. ¨ Take the medicine 30 minutes before breakfast, and do not take it with calcium, vitamins, or iron. ¨ Do not take extra doses of your thyroid medicine. It will not help you get better any faster, and it may cause side effects. ¨ If you forget to take a dose, do NOT take a double dose of medicine. Take your usual dose the next day. · Tell your doctor about all prescription, herbal, or over-the-counter products you take. · Take care of yourself. Eat a healthy diet, get enough sleep, and get regular exercise. When should you call for help? Call 911 anytime you think you may need emergency care. For example, call if: 
? · You passed out (lost consciousness). ? · You have severe trouble breathing. ? · You have a very slow heartbeat (less than 60 beats a minute). ? · You have a low body temperature (95°F or below). ?Call your doctor now or seek immediate medical care if: 
? · You feel tired, sluggish, or weak. ? · You have trouble remembering things or concentrating. ? · You do not begin to feel better 2 weeks after starting your medicine. ? Watch closely for changes in your health, and be sure to contact your doctor if you have any problems. Where can you learn more? Go to http://ofelia-mindy.info/. Enter N134 in the search box to learn more about \"Hypothyroidism: Care Instructions. \" Current as of: May 12, 2017 Content Version: 11.4 © 2043-6243 [employer]. Care instructions adapted under license by [employer] (which disclaims liability or warranty for this information). If you have questions about a medical condition or this instruction, always ask your healthcare professional. [address] any warranty or liability for your use of this information. Introducing [facility]! Melvin Chung introduces [employer] patient portal. Now you can access parts of your medical record, email your doctor's office, and request medication refills online. 1. In your internet browser, go to [email or web address] 2. Click on the First Time User? Click Here link in the Sign In box. You will see the New Member Sign Up page. 3. Enter your [employer] Access Code exactly as it appears below. You will not need to use this code after youve completed the sign-up process. If you do not sign up before the expiration date, you must request a new code. · [employer] Access Code: [email or web address] Expires: 5/29/2018  1:35 PM 
 
4. Enter the last four digits of your Social Security Number (xxxx) and Date of Birth (mm/dd/yyyy) as indicated and click Submit. You will be taken to the next sign-up page. 5. Create a [employer] ID. This will be your [employer] login ID and cannot be changed, so think of one that is secure and easy to remember. 6. Create a [employer] password. You can change your password at any time. 7. Enter your Password Reset Question and Answer. This can be used at a later time if you forget your password. 8. Enter your e-mail address. You will receive e-mail notification when new information is available in [address] 9. Click Sign Up. You can now view and download portions of your medical record. 10. Click the Download Summary menu link to download a portable copy of your medical information.  
 
If you have questions, please visit the Frequently Asked Questions section of the [employer]. Remember, [employer] is NOT to be used for urgent needs. For medical emergencies, dial 911. Now available from your iPhone and Android! Please provide this summary of care documentation to your next provider. Your primary care clinician is listed as HUGO Avery. If you have any questions after today's visit, please call [phone].

## 2018-05-28 DIAGNOSIS — I10 MALIGNANT HYPERTENSION: ICD-10-CM

## 2018-05-28 RX ORDER — HYDROCHLOROTHIAZIDE 25 MG/1
TABLET ORAL
Qty: 30 TAB | Refills: 6 | Status: SHIPPED | OUTPATIENT
Start: 2018-05-28 | End: 2019-06-11 | Stop reason: SDUPTHER

## 2018-05-30 LAB
ALBUMIN SERPL-MCNC: NORMAL G/DL
ALP SERPL-CCNC: NORMAL U/L
ALT SERPL-CCNC: NORMAL U/L
AST SERPL-CCNC: NORMAL U/L
BASOPHILS # BLD AUTO: NORMAL 10*3/UL
BILIRUB SERPL-MCNC: NORMAL MG/DL
BUN SERPL-MCNC: NORMAL MG/DL
CALCIUM SERPL-MCNC: NORMAL MG/DL
CHLORIDE SERPL-SCNC: NORMAL MMOL/L
CHOLEST SERPL-MCNC: NORMAL MG/DL
CO2 SERPL-SCNC: NORMAL MMOL/L
CREAT SERPL-MCNC: NORMAL MG/DL
DRUGS UR: NORMAL
EOSINOPHIL # BLD AUTO: NORMAL 10*3/UL
EOSINOPHIL NFR BLD AUTO: NORMAL %
GLUCOSE SERPL-MCNC: NORMAL MG/DL
HCT VFR BLD AUTO: NORMAL %
HDLC SERPL-MCNC: NORMAL MG/DL
HGB BLD-MCNC: NORMAL G/DL
INTERPRETATION, 910389: NORMAL
LYMPHOCYTES # BLD AUTO: NORMAL 10*3/UL
LYMPHOCYTES NFR BLD AUTO: NORMAL %
MONOCYTES NFR BLD AUTO: NORMAL %
NEUTROPHILS NFR BLD AUTO: NORMAL %
PDF IMAGE, 910387: NORMAL
PLATELET # BLD AUTO: NORMAL 10*3/UL
POTASSIUM SERPL-SCNC: NORMAL MMOL/L
PROT SERPL-MCNC: NORMAL G/DL
RBC # BLD AUTO: NORMAL 10*6/UL
SODIUM SERPL-SCNC: NORMAL MMOL/L
TRIGL SERPL-MCNC: NORMAL MG/DL (ref ?–150)
TSH SERPL DL<=0.005 MIU/L-ACNC: NORMAL UIU/ML
WBC # BLD AUTO: NORMAL X10E3/UL

## 2018-07-19 ENCOUNTER — OFFICE VISIT (OUTPATIENT)
Dept: INTERNAL MEDICINE CLINIC | Age: 53
End: 2018-07-19

## 2018-07-19 ENCOUNTER — HOSPITAL ENCOUNTER (OUTPATIENT)
Dept: GENERAL RADIOLOGY | Age: 53
Discharge: HOME | End: 2018-07-19
Payer: SUBSIDIZED

## 2018-07-19 VITALS
TEMPERATURE: 98 F | WEIGHT: 183 LBS | BODY MASS INDEX: 34.55 KG/M2 | SYSTOLIC BLOOD PRESSURE: 126 MMHG | RESPIRATION RATE: 16 BRPM | HEIGHT: 61 IN | HEART RATE: 67 BPM | OXYGEN SATURATION: 95 % | DIASTOLIC BLOOD PRESSURE: 79 MMHG

## 2018-07-19 DIAGNOSIS — M25.461 SWELLING OF RIGHT KNEE JOINT: ICD-10-CM

## 2018-07-19 DIAGNOSIS — M25.562 CHRONIC PAIN OF LEFT KNEE: ICD-10-CM

## 2018-07-19 DIAGNOSIS — M25.561 CHRONIC PAIN OF RIGHT KNEE: ICD-10-CM

## 2018-07-19 DIAGNOSIS — G89.29 CHRONIC PAIN OF RIGHT KNEE: ICD-10-CM

## 2018-07-19 DIAGNOSIS — Z96.651 STATUS POST TOTAL RIGHT KNEE REPLACEMENT: ICD-10-CM

## 2018-07-19 DIAGNOSIS — G89.29 CHRONIC PAIN OF LEFT KNEE: ICD-10-CM

## 2018-07-19 DIAGNOSIS — Z02.89 PAIN MANAGEMENT CONTRACT SIGNED: ICD-10-CM

## 2018-07-19 DIAGNOSIS — M17.11 PRIMARY OSTEOARTHRITIS OF RIGHT KNEE: Primary | ICD-10-CM

## 2018-07-19 PROCEDURE — 73562 X-RAY EXAM OF KNEE 3: CPT

## 2018-07-19 RX ORDER — OXYCODONE AND ACETAMINOPHEN 10; 325 MG/1; MG/1
TABLET ORAL
Qty: 30 TAB | Refills: 0 | Status: SHIPPED | OUTPATIENT
Start: 2018-07-24 | End: 2018-08-28 | Stop reason: SDUPTHER

## 2018-07-19 RX ORDER — PREDNISONE 20 MG/1
40 TABLET ORAL
Qty: 10 TAB | Refills: 0 | Status: SHIPPED | OUTPATIENT
Start: 2018-07-19 | End: 2018-08-28

## 2018-07-19 NOTE — MR AVS SNAPSHOT
[address] [place] [address] [id number] 
[phone] Patient: Dorina Kanner MRN: [id number] :1965 Visit Information Date & Time Provider Department Dept. Phone Encounter #  
 2018 10:20 AM Julio Rolon NP [address] [facility] [phone] [id number] Follow-up Instructions Return in 5 weeks (on 2018) for pain med refill. Upcoming Health Maintenance Date Due  
 PAP AKA CERVICAL CYTOLOGY 1984 BREAST CANCER SCRN MAMMOGRAM 2014 Influenza Age 5 to Adult 2018 FOBT Q 1 YEAR AGE 50-75 2019 DTaP/Tdap/Td series (2 - Td) 2025 Allergies as of 2018  Review Complete On: 2018 By: Efraín Camarena. ALYSSA Medina Severity Noted Reaction Type Reactions Hydrocodone  2017    Itching Mold  2016    Shortness of Breath, Itching Ibuprofen Low 2015   Intolerance Nausea Only Current Immunizations  Reviewed on 3/3/2017 Name Date Influenza Vaccine (Quad) Intradermal PF 2016 Influenza Vaccine (Quad) PF 11/3/2017 Pneumococcal Polysaccharide (PPSV-23) 3/3/2017 Not reviewed this visit You Were Diagnosed With   
  
 Codes Comments Primary osteoarthritis of right knee    -  Primary ICD-10-CM: M17.11 ICD-9-CM: 715.16 Status post total right knee replacement     ICD-10-CM: Q26.226 ICD-9-CM: V43.65 Chronic pain of right knee     ICD-10-CM: M25.561, G89.29 ICD-9-CM: 719.46, 338.29 Chronic pain of left knee     ICD-10-CM: M25.562, G89.29 ICD-9-CM: 719.46, 338.29 Pain management contract signed     ICD-10-CM: Z02.89 ICD-9-CM: V68.89 Swelling of right knee joint     ICD-10-CM: M25.461 ICD-9-CM: 719.06 Vitals BP Pulse Temp Resp Height(growth percentile) Weight(growth percentile) 126/79 (BP 1 Location: Right arm, BP Patient Position: Sitting) 67 98 °F (36.7 °C) (Oral) 16 5' 1\" (1.549 m) 183 lb (83 kg) SpO2 BMI OB Status Smoking Status 95% 34.58 kg/m2 Menopause Current Every Day Smoker Vitals History BMI and BSA Data Body Mass Index Body Surface Area 34.58 kg/m 2 1.89 m 2 Preferred Pharmacy Pharmacy Name Phone [id number] Forde [phone] Your Updated Medication List  
  
   
This list is accurate as of 7/19/18 11:27 AM.  Always use your most recent med list.  
  
  
  
  
 acetaminophen 650 mg Tber Commonly known as:  TYLENOL  
TAKE 1 TAB BY MOUTH THREE (3) TIMES DAILY AS NEEDED FOR PAIN. amLODIPine 10 mg tablet Commonly known as:  [place] Citron TAKE 1 TABLET BY MOUTH EVERY DAY FOR HYPERTENSION  
  
 diclofenac 1 % Gel Commonly known as:  VOLTAREN Apply 2 g to affected area every six (6) hours. diphenhydrAMINE 25 mg capsule Commonly known as:  BENADRYL Take 1 Cap by mouth every six (6) hours as needed. fluticasone 50 mcg/actuation nasal spray Commonly known as:  Montine [place] 2 Sprays by Both Nostrils route daily. gabapentin 800 mg tablet Commonly known as:  NEURONTIN Take 1 Tab by mouth three (3) times daily. hydroCHLOROthiazide 25 mg tablet Commonly known as:  HYDRODIURIL  
TAKE 1 TAB BY MOUTH DAILY FOR HYPERTENSION  
  
 levothyroxine 125 mcg tablet Commonly known as:  SYNTHROID  
TAKE 1 TABLET BY MOUTH EVERY DAY BEFORE BREAKFAST  
  
 lidocaine 5 % Commonly known as:  Hayden Villela Apply patch to the affected area for 12 hours a day and remove for 12 hours a day. loratadine 10 mg tablet Commonly known as:  Shellye Drape Take 1 Tab by mouth daily. meloxicam 15 mg tablet Commonly known as:  MOBIC  
TAKE 1 TABLET (15 MG TOTAL) BY MOUTH DAILY. methocarbamol 750 mg tablet Commonly known as:  ROBAXIN  
TAKE 1 TAB BY MOUTH FOUR (4) TIMES DAILY AS NEEDED FOR PAIN (SPASMS). metoprolol succinate 25 mg XL tablet Commonly known as:  TOPROL-XL  
 Take 1 Tab by mouth daily. miscellaneous medical supply Misc Shower seat for chronic knee pain, pt planning to have right TKR in June due to condition. Very limited range of motion. montelukast 10 mg tablet Commonly known as:  SINGULAIR Take 1 Tab by mouth daily. naloxone 4 mg/actuation nasal spray Commonly known as:  [employer] Use 1 spray into 1 nostril for OVERDOSE. Call 911. For subsequent doses, give in alternating nostrils. May repeat every 2 to 3 min. ondansetron 4 mg disintegrating tablet Commonly known as:  ZOFRAN ODT Take 1 Tab by mouth every eight (8) hours as needed for Nausea. oxyCODONE-acetaminophen  mg per tablet Commonly known as:  PERCOCET 10 May take 1 tab ONCE DAILY as needed for pain. Indications: Pain Start taking on:  7/24/2018  
  
 pantoprazole 40 mg tablet Commonly known as:  PROTONIX  
TAKE 1 TABLET BY MOUTH ONCE EVERY DAY AS DIRECTED  
  
 predniSONE 20 mg tablet Commonly known as:  Dodson Stair Take 2 Tabs by mouth daily (with breakfast). * PROAIR HFA 90 mcg/actuation inhaler Generic drug:  albuterol Take 2 Puffs by inhalation every four (4) hours as needed. * albuterol 2.5 mg /3 mL (0.083 %) nebulizer solution Commonly known as:  PROVENTIL VENTOLIN  
3 mL by Nebulization route every four (4) hours as needed for Wheezing. sertraline 100 mg tablet Commonly known as:  ZOLOFT Take 1 Tab by mouth daily. SYMBICORT 160-4.5 mcg/actuation Hfaa Generic drug:  budesonide-formoterol Take 2 Puffs by inhalation two (2) times a day. traZODone 50 mg tablet Commonly known as:  Uyen Cast Take 1-2 tabs every night for sleep. * Notice: This list has 2 medication(s) that are the same as other medications prescribed for you. Read the directions carefully, and ask your doctor or other care provider to review them with you. Prescriptions Printed Refills oxyCODONE-acetaminophen (PERCOCET 10)  mg per tablet 0 Starting on: 7/24/2018 Sig: May take 1 tab ONCE DAILY as needed for pain. Indications: Pain Class: Print Prescriptions Sent to Pharmacy Refills  
 predniSONE (DELTASONE) 20 mg tablet 0 Sig: Take 2 Tabs by mouth daily (with breakfast). Class: Normal  
 Pharmacy: [address], [place] Ph #: [phone] Route: Oral  
  
Follow-up Instructions Return in 5 weeks (on 8/23/2018) for pain med refill. To-Do List   
 07/19/2018 Imaging:  XR KNEE LT MIN 4 V Introducing [facility]! Claudette Lima introduces [employer] patient portal. Now you can access parts of your medical record, email your doctor's office, and request medication refills online. 1. In your internet browser, go to [email or web address] 2. Click on the First Time User? Click Here link in the Sign In box. You will see the New Member Sign Up page. 3. Enter your De[employer]ubs Access Code exactly as it appears below. You will not need to use this code after youve completed the sign-up process. If you do not sign up before the expiration date, you must request a new code. · De[employer] Access Code: [email or web address] Expires: 10/17/2018 11:22 AM 
 
4. Enter the last four digits of your Social Security Number (xxxx) and Date of Birth (mm/dd/yyyy) as indicated and click Submit. You will be taken to the next sign-up page. 5. Create a [place] ID. This will be your Deehubs login ID and cannot be changed, so think of one that is secure and easy to remember. 6. Create a [employer]ubs password. You can change your password at any time. 7. Enter your Password Reset Question and Answer. This can be used at a later time if you forget your password. 8. Enter your e-mail address. You will receive e-mail notification when new information is available in [address] 9. Click Sign Up. You can now view and download portions of your medical record. 10. Click the Download Summary menu link to download a portable copy of your medical information. If you have questions, please visit the Frequently Asked Questions section of the [employer] website. Remember, [employer] is NOT to be used for urgent needs. For medical emergencies, dial 911. Now available from your iPhone and Android! Please provide this summary of care documentation to your next provider. Your primary care clinician is listed as HUGO Lorenzana. If you have any questions after today's visit, please call [phone].

## 2018-07-19 NOTE — PATIENT INSTRUCTIONS
Joint Pain: Care Instructions  Your Care Instructions    Many people have small aches and pains from overuse or injury to muscles and joints. Joint injuries often happen during sports or recreation, work tasks, or projects around the home. An overuse injury can happen when you put too much stress on a joint or when you do an activity that stresses the joint over and over, such as using the computer or rowing a boat. You can take action at home to help your muscles and joints get better. You should feel better in 1 to 2 weeks, but it can take 3 months or more to heal completely. Follow-up care is a key part of your treatment and safety. Be sure to make and go to all appointments, and call your doctor if you are having problems. It's also a good idea to know your test results and keep a list of the medicines you take. How can you care for yourself at home? · Do not put weight on the injured joint for at least a day or two. · For the first day or two after an injury, do not take hot showers or baths, and do not use hot packs. The heat could make swelling worse. · Put ice or a cold pack on the sore joint for 10 to 20 minutes at a time. Try to do this every 1 to 2 hours for the next 3 days (when you are awake) or until the swelling goes down. Put a thin cloth between the ice and your skin. · Wrap the injury in an elastic bandage. Do not wrap it too tightly because this can cause more swelling. · Prop up the sore joint on a pillow when you ice it or anytime you sit or lie down during the next 3 days. Try to keep it above the level of your heart. This will help reduce swelling. · Take an over-the-counter pain medicine, such as acetaminophen (Tylenol), ibuprofen (Advil, Motrin), or naproxen (Aleve). Read and follow all instructions on the label. · After 1 or 2 days of rest, begin moving the joint gently.  While the joint is still healing, you can begin to exercise using activities that do not strain or hurt the painful joint. When should you call for help? Call your doctor now or seek immediate medical care if:    · You have signs of infection, such as:  ¨ Increased pain, swelling, warmth, and redness. ¨ Red streaks leading from the joint. ¨ A fever.    Watch closely for changes in your health, and be sure to contact your doctor if:    · Your movement or symptoms are not getting better after 1 to 2 weeks of home treatment. Where can you learn more? Go to http://ofelia-mindy.info/. Enter P205 in the search box to learn more about \"Joint Pain: Care Instructions. \"  Current as of: November 29, 2017  Content Version: 11.7  © 6366-0443 [employer]. Care instructions adapted under license by [employer] (which disclaims liability or warranty for this information). If you have questions about a medical condition or this instruction, always ask your healthcare professional. [address] any warranty or liability for your use of this information.

## 2018-07-19 NOTE — LETTER
Kayla Coleman :1965 MR #:[id number] Provider Name:Imani Guerrier NP  
*NGUL-498* BSMG-491 () Page 1 of 5 Initial [employer] CONTROLLED SUBSTANCE AGREEMENT I may be prescribed medications that are controlled substances as part  of my treatment plan for management of my medical condition(s). The goal of my treatment plan is to maintain and/or improve my health and wellbeing. Because controlled substances have an increased risk of abuse or harm, continual re-evaluation is needed determine if the goals of my treatment plan are being met for my safety and the safety of others. Samra Saab  am entering into this Controlled Substance Agreement with my provider, Jimmy Woodard NP at [address] [facility] . I understand that successful treatment requires mutual trust and honesty between me and my provider. I understand that there are state and federal laws and regulations which apply to the medications that my provider may prescribe that must be followed. I understand there are risks and benefits ts of taking the medicines that my provider may prescribe. I understand and agree that following this Agreement is necessary in continuing my provider-patient relationship and success of my treatment plan. As a part of my treatment plan, I agree to the following: COMMUNICATION: 
 
1. I will communicate fully with my provider about my medical condition(s), including the effect on my daily life and how well my medications are helping. I will tell my provider all of the medications that I take for any reason, including medications I receive from another health care provider, and will notify my provider about all issues, problems or concerns, including any side effects, which may be related to my medications. I understand that this information allows my provider to adjust my treatment plan to help manage my medical condition.  I understand that this information will become part of my permanent medical record. 2. I will notify my provider if I have a history of alcohol/drug misuse/addiction or if I have had treatment for alcohol/drug addiction in the past, or if I have a new problem with or concern about alcohol/drug use/addiction, because this increases the likelihood of high risk behaviors and may lead to serious medical conditions. 3. Females Only: I will notify my provider if I am or become pregnant, or if I intend to become pregnant, or if I intend to breastfeed. I understand that communication of these issues with my provider is important, due to possible effects my medication could have on an unborn fetus or breastfeeding child. Name:. Khalida Laguna :1965 MR #:[id number] Provider Name:Imani Villela, SHAGGY  
*ERKG-982* BSMG-491 () Page 2 of 5 Initial [employer] MISUSE OF MEDICATIONS / DRUGS: 
 
1. I agree to take all controlled substances as prescribed, and will not misuse or abuse any controlled substances prescribed by my provider. For my safety, I will not increase the amount of medicine I take without first talking with and getting permission from my provider. 2. If I have a medical emergency, another health care provider may prescribe me medication. If I seek emergency treatment, I will notify my provider within seventy-two (72) hours. 3. I understand that my provider may discuss my use and/or possible misuse/abuse of controlled substances and alcohol, as appropriate, with any health care provider involved in my care, pharmacist or legal authority. ILLEGAL DRUGS: 
 
1. I will not use illegal drugs of any kind, including but not limited to marijuana, heroin, cocaine, or any prescription drug which is not prescribed to me. DRUG DIVERSION / PRESCRIPTION FRAUD: 
 
1. I will not share, sell, trade, give away, or otherwise misuse my prescriptions or medications. 2. I will not alter any prescriptions provided to me by my provider. SINGLE PROVIDER: 
 
1. I agree that all controlled substances that I take will be prescribed only by my provider (or his/her covering provider) under this Agreement. This agreement does not prevent me from seeking emergency medical treatment or receiving pain management related to a surgery. PROTECTING MEDICATIONS: 
 
1. I am responsible for keeping my prescriptions and medications in a safe and secure place including safeguarding them from loss or theft. I understand that lost, stolen or damaged/destroyed prescriptions or medications will not be replaced. Name:Inez Harry :1965 MR #:[id number] Provider Name:Imani Del Valle, NP  
*SRFB-942* BSMG-491 () Page 3 of 5 Initial [employer] PRESCRIPTION RENEWALS/REFILLS: 
 
1. I will follow my controlled substance medication schedule as prescribed by my provider. 2. I understand and agree that I will make any requests for renewals or refills of my prescriptions only at the time of an office visit or during my providers regular office hours subject to the prescription refill requirements of the individual practice. 3. I understand that my provider may not call in prescriptions for controlled substances to my pharmacy. 4. I understand that my provider may adjust or discontinue these medications as deemed appropriate for my medical treatment plan. This Agreement does not guarantee the prescription of controlled medications. 5. I agree that if my medications are adjusted or discontinued, I will properly dispose of any remaining medications. I understand that I will be required to dispose of any remaining controlled medications prior to being provided with any prescriptions for other controlled medications.  
 
 
1. I authorize my provider and my pharmacy to cooperate fully with any local, state, or federal law enforcement agency in the investigation of any possible misuse, sale, or other diversion of my controlled substance prescriptions or medications. RISKS: 
 
 
1. I understand that if I do not adhere to this Agreement in any way, my provider may change my prescriptions, stop prescribing controlled substances or end our provider-patient relationship. 2. If my provider decides to stop prescribing medication, or decides to end our provider-patient relationship,my provider may require that I taper my medications slowly. If necessary, my provider may also provide a prescription for other medications to treat my withdrawal symptoms. UNDERSTANDING THIS AGREEMENT: 
 
I understand that my provider may adjust or stop my prescriptions for controlled substances based on my medical condition and my treatment plan. I understand that this Agreement does not guarantee that I will be prescribed medications or controlled substances. I understand that controlled substances may be just one part 
of my treatment plan.  
 
My initial on each page and my signature below shows that I have read each page of this Agreement, I have had an opportunity to ask questions, and all of my questions have been answered to my satisfaction by my provider. By signing below, I agree to comply with this Agreement, and I understand that if I do not follow the Agreements listed above, my provider may stop 
 
 
 
_________________________________________  Date/Time 7/19/2018 11:15 AM   
             (Patient Signature)

## 2018-07-19 NOTE — PROGRESS NOTES
Pt is here for   Chief Complaint   Patient presents with    Medication Refill     orders pending     Swelling     knee     Pt states pain level is a 10 right knee and back     1. Have you been to the ER, urgent care clinic since your last visit? Hospitalized since your last visit? No    2. Have you seen or consulted any other health care providers outside of the [address] [place] [address] since your last visit? Include any pap smears or colon screening.  No

## 2018-07-19 NOTE — PROGRESS NOTES
Encounter for pain management. Chronic Pain:  Patient has chronic BL knee pain for years, had right TKR last year (2016). However right knee swelling and left knee pain. Pain Scale: 10 - Worst pain ever/10. Had IMAGING for lumbar spine and right knee and has been seeing/seen by ORTHO. Sent info in to Physical Medicine, but won't get an appt for a few months. Last PT March 2017, continuing HEP. Pain in the right knee, leg, and lower back is still limiting walking, sitting, and standing, exacerbated by forementioned acitivities to include stair climbing. Has tried prednisone, tramadol, injections, PT, gabapentin, cymbalta, and surgery in past with minimal relief. Pain has been controlled with Oxycodone, last taken yesterday. The medication is kept safe by staying with her. Has NOT seen any other providers since last OV for pain medication. No significant changes to pain presentation since last OV. she is  able to do her normal daily activities. she reports the following adverse side effects: none. Least pain over the last week has been 7/10. Worst pain over the last week has been 10/10. Aberrant behaviors: None         Symptoms onset: problem is longstanding. Rheumatological ROS: ongoing significant pain which is stable and controlled by pain med. Response to treatment plan: waxing and waning. Review of Systems  Constitutional: negative for fevers, chills, anorexia and weight loss  Eyes:   negative for visual disturbance, drainage, and irritation  ENT:   +AR and environmental allergies.  negative for tinnitus,sore throat,ear pain,and hoarseness  Respiratory:  + asthma/COPD. negative for hemoptysis  CV:   negative for chest pain, palpitations, and lower extremity edema  GI:   negative for nausea, vomiting, diarrhea, abdominal pain, and melena  Endo:               negative for polyuria,polydipsia,polyphagia, and heat intolerance  Genitourinary: negative for frequency, urgency, dysuria, retention, and hematuria  Integument:  negative for rash, ulcerations, and pruritus  Hematologic:  negative for easy bruising and bleeding  Musculoskel: negative for  muscle weakness  Neurological:  negative for headaches, dizziness, vertigo,and memory problems  Behavl/Psych: +depression, on cymbalta and zoloft. negative for feelings of  suicide    1./2. Medical history/Past medical History   Past Medical History:   Diagnosis Date    Asthma     uses inhalers    Chronic obstructive pulmonary disease (HCC)     bronchitis    GERD (gastroesophageal reflux disease)     Hypertension     Ill-defined condition     environmental allergies     Ill-defined condition     Multiple body piercings; unable to remove tongue and lip piercings    Thyroid disease     hypo    Ventral hernia 12/31/2013     Past Surgical History:   Procedure Laterality Date    HX HEENT  2009    thyroidectomy    HX KNEE REPLACEMENT      R    HX MOHS PROCEDURES Right 3/8/11    HX OTHER SURGICAL      hiatal hernia repair    HX TUBAL LIGATION       Patient Active Problem List   Diagnosis Code    Ventral hernia K43.9    Chronic pain of right knee M25.561, G89.29    Chronic right shoulder pain M25.511, G89.29    Environmental and seasonal allergies J30.89    Ventral hernia without obstruction or gangrene K43.9    Primary osteoarthritis of right knee M17.11    Mixed simple and mucopurulent chronic bronchitis (HCC) J41.8    Acquired hypothyroidism E03.9    Chronic bilateral low back pain with right-sided sciatica M54.41, G89.29    Status post total right knee replacement Z96.651    Malignant hypertension I10    Mild single current episode of major depressive disorder ([facility] [address] F32.0    Pain management contract signed Z02.89    Chronic pain of left knee M25.562, G89.29    Moderate episode of recurrent major depressive disorder (HCC) F33.1    Psychophysiological insomnia F51.04    Severe obesity (BMI 35.0-39. 9) with comorbidity ([facility] [address] E66.01    Post-tussive vomiting R11.10    Chronic use of opiate for therapeutic purpose Z79.891       3. Applicable records from prior treatment providers are apart of [facility] under the media/encounters tab. 4. Diagnostic, therapeutic and laboratory results are available in the [facility] chart. 5. Consultation notes are available for review in the media/encounters tab of the [facility] chart. 6. Treatment goals include: pain control, improve activity level and function in regards to activities of daily living, and improved comfort level. Previously pt has been limited by pain in all these aspects. 7. The risks and benefits of treatment have been discussed at this office visit with the pt.  she understands that the medication has addictive potential.  Additionally the pt has been advised that narcotic pain medication may impair mental and/or physical ability required for performance of tasks such as driving or operating any other machinery. 8. Pt has an updated signed pain contract on file TODAY and can be found under the media section of the [facility] chart. 9. The pain contract is reviewed. Pain medication will be continued at the same dosage. Pill count: 4, last fill 7/1/18. Counseled on MISUSE, advised if we have this discussion/finding will TERMINATE pain agreement. Urine drug screening ordered/collected today. Diagnostic studies are not indicated at this time. Interventional procedure are not indicated at this time. Order for [employer] sent. 10. Medication prescibed is oxycodone every 24 hours PRN # 30 with ZERO refills for a 1 month supply.  was reviewed while planning for pain/anxiety management, no indications of drug diversion suspected. Prescription history is NOT suspicious for controlled substance overuse. 11. Patient instructions have been reviewed in detail as outlined above and in the pain contract.       12. Re-evaluation is planned for 1 months      Current Outpatient Prescriptions   Medication Sig Dispense Refill    [START ON 7/24/2018] oxyCODONE-acetaminophen (PERCOCET 10)  mg per tablet May take 1 tab ONCE DAILY as needed for pain. Indications: Pain 30 Tab 0    predniSONE (DELTASONE) 20 mg tablet Take 2 Tabs by mouth daily (with breakfast). 10 Tab 0    amLODIPine (NORVASC) 10 mg tablet TAKE 1 TABLET BY MOUTH EVERY DAY FOR HYPERTENSION 90 Tab 3    hydroCHLOROthiazide (HYDRODIURIL) 25 mg tablet TAKE 1 TAB BY MOUTH DAILY FOR HYPERTENSION 30 Tab 6    levothyroxine (SYNTHROID) 125 mcg tablet TAKE 1 TABLET BY MOUTH EVERY DAY BEFORE BREAKFAST 90 Tab 1    methocarbamol (ROBAXIN) 750 mg tablet TAKE 1 TAB BY MOUTH FOUR (4) TIMES DAILY AS NEEDED FOR PAIN (SPASMS). 60 Tab 1    diphenhydrAMINE (BENADRYL) 25 mg capsule Take 1 Cap by mouth every six (6) hours as needed. 20 Cap 0    ondansetron (ZOFRAN ODT) 4 mg disintegrating tablet Take 1 Tab by mouth every eight (8) hours as needed for Nausea. 20 Tab 1    naloxone (NARCAN) 4 mg/actuation nasal spray Use 1 spray into 1 nostril for OVERDOSE. Call 911. For subsequent doses, give in alternating nostrils. May repeat every 2 to 3 min. 2 Each 0    traZODone (DESYREL) 50 mg tablet Take 1-2 tabs every night for sleep. 60 Tab 11    montelukast (SINGULAIR) 10 mg tablet Take 1 Tab by mouth daily. 30 Tab 6    pantoprazole (PROTONIX) 40 mg tablet TAKE 1 TABLET BY MOUTH ONCE EVERY DAY AS DIRECTED 90 Tab 2    albuterol (PROVENTIL VENTOLIN) 2.5 mg /3 mL (0.083 %) nebulizer solution 3 mL by Nebulization route every four (4) hours as needed for Wheezing. 24 Each 2    acetaminophen (TYLENOL) 650 mg TbER TAKE 1 TAB BY MOUTH THREE (3) TIMES DAILY AS NEEDED FOR PAIN. 90 Tab 0    sertraline (ZOLOFT) 100 mg tablet Take 1 Tab by mouth daily. 30 Tab 11    meloxicam (MOBIC) 15 mg tablet TAKE 1 TABLET (15 MG TOTAL) BY MOUTH DAILY. 11    diclofenac (VOLTAREN) 1 % gel Apply 2 g to affected area every six (6) hours.  100 g 5    metoprolol succinate (TOPROL-XL) 25 mg XL tablet Take 1 Tab by mouth daily. 30 Tab 11    gabapentin (NEURONTIN) 800 mg tablet Take 1 Tab by mouth three (3) times daily. 90 Tab 11    lidocaine (LIDODERM) 5 % Apply patch to the affected area for 12 hours a day and remove for 12 hours a day. 30 Each 11    fluticasone (FLONASE) 50 mcg/actuation nasal spray 2 Sprays by Both Nostrils route daily. 1 Bottle 11    loratadine (CLARITIN) 10 mg tablet Take 1 Tab by mouth daily. 30 Tab 5    budesonide-formoterol (SYMBICORT) 160-4.5 mcg/actuation HFA inhaler Take 2 Puffs by inhalation two (2) times a day.  albuterol (PROAIR HFA) 90 mcg/actuation inhaler Take 2 Puffs by inhalation every four (4) hours as needed.  miscellaneous medical supply misc Shower seat for chronic knee pain, pt planning to have right TKR in June due to condition. Very limited range of motion. 1 Each 0     Allergies   Allergen Reactions    Hydrocodone Itching    Mold Shortness of Breath and Itching    Ibuprofen Nausea Only       Objective:  Visit Vitals    /79 (BP 1 Location: Right arm, BP Patient Position: Sitting)    Pulse 67    Temp 98 °F (36.7 °C) (Oral)    Resp 16    Ht 5' 1\" (1.549 m)    Wt 183 lb (83 kg)    SpO2 95%    BMI 34.58 kg/m2     Wt Readings from Last 3 Encounters:   07/19/18 183 lb (83 kg)   05/24/18 183 lb 9.6 oz (83.3 kg)   04/25/18 186 lb 6.4 oz (84.6 kg)     Physical Exam:   General appearance - alert, well appearing, and in mild distress. Mental status - A/O x 4, sad mood and affect. +tearful  Chest - CTA. Symmetric chest rise. No wheezing, rales or rhonchi. Heart - Normal rate, regular rhythm. Normal S1, S2. No MGR or clicks. Abd- soft, obese, non-distended. Normoactive BS. NT, no masses. Ext- Radial, DP pulses, 2+ bilaterally. No edema, clubbing or cyanosis. Skin-Warm and dry. No hyperpigmentation, ulcerations, or suspicious lesions.   Neuro - normal speech, no focal findings or movement disorder. Normal strength and muscle tone. Limping, antalgic gait using cane. Left Knee- LROM. BL joint line tenderness, Lateral >medial. no quadriceps tendonitis. no patellar tendonitis. BL femoral epicondyle tenderness, lateral>medial. BL tibial plateau tenderness. Varus deformity. No erythema or effusions. +crepitus. no laxity. no Lachman's test.   Right knee with medial effusion and LROM. TTP. Assessment/Plan:  The current medical regimen is effective;  continue present plan and medications. Medication Side Effects and Warnings were discussed with patient: yes   Patient Labs were reviewed: yes  Patient Past Records were reviewed: yes    See below for other orders   Follow-up Disposition:  Return in 5 weeks (on 8/23/2018) for pain med refill. ICD-10-CM ICD-9-CM    1. Primary osteoarthritis of right knee M17.11 715.16 oxyCODONE-acetaminophen (PERCOCET 10)  mg per tablet   2. Status post total right knee replacement Z96.651 V43.65 oxyCODONE-acetaminophen (PERCOCET 10)  mg per tablet   3. Chronic pain of right knee M25.561 719.46 oxyCODONE-acetaminophen (PERCOCET 10)  mg per tablet    G89.29 338.29    4. Chronic pain of left knee M25.562 719.46 CANCELED: XR KNEE LT MIN 4 V    G89.29 338.29    5. Pain management contract signed Z02.89 V68.89    6. Swelling of right knee joint M25.461 719.06      Orders Placed This Encounter    oxyCODONE-acetaminophen (PERCOCET 10)  mg per tablet     Sig: May take 1 tab ONCE DAILY as needed for pain. Indications: Pain     Dispense:  30 Tab     Refill:  0    predniSONE (DELTASONE) 20 mg tablet     Sig: Take 2 Tabs by mouth daily (with breakfast). Dispense:  10 Tab     Refill:  0       Liliya Sykes expressed understanding of plan. An After Visit Summary was offered/printed and given to the patient.

## 2018-07-26 LAB — DRUGS UR: NORMAL

## 2018-08-28 ENCOUNTER — OFFICE VISIT (OUTPATIENT)
Dept: INTERNAL MEDICINE CLINIC | Age: 53
End: 2018-08-28

## 2018-08-28 VITALS
TEMPERATURE: 98.5 F | SYSTOLIC BLOOD PRESSURE: 112 MMHG | DIASTOLIC BLOOD PRESSURE: 82 MMHG | WEIGHT: 181.6 LBS | OXYGEN SATURATION: 91 % | RESPIRATION RATE: 20 BRPM | BODY MASS INDEX: 34.29 KG/M2 | HEART RATE: 90 BPM | HEIGHT: 61 IN

## 2018-08-28 DIAGNOSIS — Z96.651 STATUS POST TOTAL RIGHT KNEE REPLACEMENT: ICD-10-CM

## 2018-08-28 DIAGNOSIS — G89.29 CHRONIC PAIN OF LEFT KNEE: ICD-10-CM

## 2018-08-28 DIAGNOSIS — Z91.81 HISTORY OF RECENT FALL: ICD-10-CM

## 2018-08-28 DIAGNOSIS — Z79.891 CHRONIC USE OF OPIATE FOR THERAPEUTIC PURPOSE: ICD-10-CM

## 2018-08-28 DIAGNOSIS — M25.561 CHRONIC PAIN OF RIGHT KNEE: ICD-10-CM

## 2018-08-28 DIAGNOSIS — M17.11 PRIMARY OSTEOARTHRITIS OF RIGHT KNEE: ICD-10-CM

## 2018-08-28 DIAGNOSIS — M25.511 CHRONIC RIGHT SHOULDER PAIN: Primary | ICD-10-CM

## 2018-08-28 DIAGNOSIS — G89.29 CHRONIC PAIN OF RIGHT KNEE: ICD-10-CM

## 2018-08-28 DIAGNOSIS — G89.29 CHRONIC RIGHT SHOULDER PAIN: Primary | ICD-10-CM

## 2018-08-28 DIAGNOSIS — M25.562 CHRONIC PAIN OF LEFT KNEE: ICD-10-CM

## 2018-08-28 RX ORDER — OXYCODONE AND ACETAMINOPHEN 10; 325 MG/1; MG/1
TABLET ORAL
Qty: 30 TAB | Refills: 0 | Status: SHIPPED | OUTPATIENT
Start: 2018-08-28 | End: 2018-09-25 | Stop reason: SDUPTHER

## 2018-08-28 RX ORDER — MELOXICAM 15 MG/1
15 TABLET ORAL
Qty: 30 TAB | Refills: 11 | Status: SHIPPED | OUTPATIENT
Start: 2018-08-28 | End: 2019-05-09

## 2018-08-28 NOTE — MR AVS SNAPSHOT
[address] [place] [address] [address] [id number] 
[phone] Patient: Maya Dean MRN: [id number] :1965 Visit Information Date & Time Provider Department Dept. Phone Encounter #  
 2018 10:20 AM Jean Paul Duran NP [address] [facility] [phone] [id number] Follow-up Instructions Return in about 4 weeks (around 2018) for pain med refill. Upcoming Health Maintenance Date Due  
 PAP AKA CERVICAL CYTOLOGY 1984 BREAST CANCER SCRN MAMMOGRAM 2014 Influenza Age 5 to Adult 2018 FOBT Q 1 YEAR AGE 50-75 2019 DTaP/Tdap/Td series (2 - Td) 2025 Allergies as of 2018  Review Complete On: 2018 By: Yandy Stevens LPN Severity Noted Reaction Type Reactions Hydrocodone  2017    Itching Mold  2016    Shortness of Breath, Itching Ibuprofen Low 2015   Intolerance Nausea Only Current Immunizations  Reviewed on 3/3/2017 Name Date Influenza Vaccine (Quad) Intradermal PF 2016 Influenza Vaccine (Quad) PF 11/3/2017 Pneumococcal Polysaccharide (PPSV-23) 3/3/2017 Not reviewed this visit You Were Diagnosed With   
  
 Codes Comments Chronic right shoulder pain    -  Primary ICD-10-CM: M25.511, G89.29 ICD-9-CM: 719.41, 338.29 Primary osteoarthritis of right knee     ICD-10-CM: M17.11 ICD-9-CM: 715.16 Status post total right knee replacement     ICD-10-CM: K92.304 ICD-9-CM: V43.65 Chronic pain of right knee     ICD-10-CM: M25.561, G89.29 ICD-9-CM: 719.46, 338.29 Chronic pain of left knee     ICD-10-CM: M25.562, G89.29 ICD-9-CM: 719.46, 338.29 Chronic use of opiate for therapeutic purpose     ICD-10-CM: Z79.891 ICD-9-CM: V58.69 History of recent fall     ICD-10-CM: Z91.81 
ICD-9-CM: V15.88 Vitals BP Pulse Temp Resp Height(growth percentile) Weight(growth percentile) 112/82 (BP 1 Location: Left arm, BP Patient Position: Sitting) 90 98.5 °F (36.9 °C) (Oral) 20 5' 1\" (1.549 m) 181 lb 9.6 oz (82.4 kg) SpO2 BMI OB Status Smoking Status 91% 34.31 kg/m2 Menopause Current Every Day Smoker BMI and BSA Data Body Mass Index Body Surface Area  
 34.31 kg/m 2 1.88 m 2 Preferred Pharmacy Pharmacy Name Phone [id number] Stokes [phone] Your Updated Medication List  
  
   
This list is accurate as of 8/28/18 11:59 AM.  Always use your most recent med list.  
  
  
  
  
 acetaminophen 650 mg Tber Commonly known as:  TYLENOL  
TAKE 1 TAB BY MOUTH THREE (3) TIMES DAILY AS NEEDED FOR PAIN. amLODIPine 10 mg tablet Commonly known as:  Amame Angeles TAKE 1 TABLET BY MOUTH EVERY DAY FOR HYPERTENSION  
  
 diclofenac 1 % Gel Commonly known as:  VOLTAREN Apply 2 g to affected area every six (6) hours. diphenhydrAMINE 25 mg capsule Commonly known as:  BENADRYL Take 1 Cap by mouth every six (6) hours as needed. fluticasone 50 mcg/actuation nasal spray Commonly known as:  Jeannie Jumper 2 Sprays by Both Nostrils route daily. gabapentin 800 mg tablet Commonly known as:  NEURONTIN Take 1 Tab by mouth three (3) times daily. hydroCHLOROthiazide 25 mg tablet Commonly known as:  HYDRODIURIL  
TAKE 1 TAB BY MOUTH DAILY FOR HYPERTENSION  
  
 levothyroxine 125 mcg tablet Commonly known as:  SYNTHROID  
TAKE 1 TABLET BY MOUTH EVERY DAY BEFORE BREAKFAST  
  
 lidocaine 5 % Commonly known as:  Baylee Dueñas Apply patch to the affected area for 12 hours a day and remove for 12 hours a day. loratadine 10 mg tablet Commonly known as:  Radha Sanchez Take 1 Tab by mouth daily. meloxicam 15 mg tablet Commonly known as:  MOBIC Take 1 Tab by mouth daily (with breakfast). methocarbamol 750 mg tablet Commonly known as:  ROBAXIN  
 TAKE 1 TAB BY MOUTH FOUR (4) TIMES DAILY AS NEEDED FOR PAIN (SPASMS). metoprolol succinate 25 mg XL tablet Commonly known as:  TOPROL-XL Take 1 Tab by mouth daily. miscellaneous medical supply Misc Shower seat for chronic knee pain, pt planning to have right TKR in June due to condition. Very limited range of motion. montelukast 10 mg tablet Commonly known as:  SINGULAIR Take 1 Tab by mouth daily. naloxone 4 mg/actuation nasal spray Commonly known as:  [facility] Use 1 spray into 1 nostril for OVERDOSE. Call 911. For subsequent doses, give in alternating nostrils. May repeat every 2 to 3 min. ondansetron 4 mg disintegrating tablet Commonly known as:  ZOFRAN ODT Take 1 Tab by mouth every eight (8) hours as needed for Nausea. oxyCODONE-acetaminophen  mg per tablet Commonly known as:  PERCOCET 10 May take 1 tab ONCE DAILY as needed for pain. Indications: Pain  
  
 pantoprazole 40 mg tablet Commonly known as:  PROTONIX  
TAKE 1 TABLET BY MOUTH ONCE EVERY DAY AS DIRECTED * PROAIR HFA 90 mcg/actuation inhaler Generic drug:  albuterol Take 2 Puffs by inhalation every four (4) hours as needed. * albuterol 2.5 mg /3 mL (0.083 %) nebulizer solution Commonly known as:  PROVENTIL VENTOLIN  
3 mL by Nebulization route every four (4) hours as needed for Wheezing. sertraline 100 mg tablet Commonly known as:  ZOLOFT Take 1 Tab by mouth daily. SYMBICORT 160-4.5 mcg/actuation [facility] Generic drug:  budesonide-formoterol Take 2 Puffs by inhalation two (2) times a day. traZODone 50 mg tablet Commonly known as:  True Angelina Take 1-2 tabs every night for sleep. * Notice: This list has 2 medication(s) that are the same as other medications prescribed for you. Read the directions carefully, and ask your doctor or other care provider to review them with you. Prescriptions Printed Refills oxyCODONE-acetaminophen (PERCOCET 10)  mg per tablet 0 Sig: May take 1 tab ONCE DAILY as needed for pain. Indications: Pain Class: Print Prescriptions Sent to Pharmacy Refills  
 meloxicam (MOBIC) 15 mg tablet 11 Sig: Take 1 Tab by mouth daily (with breakfast). Class: Normal  
 Pharmacy: [address] [place] [address] #: [phone] Route: Oral  
  
Follow-up Instructions Return in about 4 weeks (around 9/25/2018) for pain med refill. Introducing [facility]! [employer] introduces [employer] patient portal. Now you can access parts of your medical record, email your doctor's office, and request medication refills online. 1. In your internet browser, go to [email or web address] 2. Click on the First Time User? Click Here link in the Sign In box. You will see the New Member Sign Up page. 3. Enter your [employer] Access Code exactly as it appears below. You will not need to use this code after youve completed the sign-up process. If you do not sign up before the expiration date, you must request a new code. · [employer] Access Code: [email or web address] Expires: 10/17/2018 11:22 AM 
 
4. Enter the last four digits of your Social Security Number (xxxx) and Date of Birth (mm/dd/yyyy) as indicated and click Submit. You will be taken to the next sign-up page. 5. Create a [employer] ID. This will be your [employer] login ID and cannot be changed, so think of one that is secure and easy to remember. 6. Create a [employer] password. You can change your password at any time. 7. Enter your Password Reset Question and Answer. This can be used at a later time if you forget your password. 8. Enter your e-mail address. You will receive e-mail notification when new information is available in [address] 9. Click Sign Up. You can now view and download portions of your medical record. 10. Click the Download Summary menu link to download a portable copy of your medical information. If you have questions, please visit the Frequently Asked Questions section of the [address] website. Remember, [facility] [address] is NOT to be used for urgent needs. For medical emergencies, dial 911. Now available from your iPhone and Android! Please provide this summary of care documentation to your next provider. Your primary care clinician is listed as HUGO Lyles. If you have any questions after today's visit, please call [phone].

## 2018-08-28 NOTE — PROGRESS NOTES
Encounter for pain management. Chronic Pain:  Patient has chronic BL knee pain for years, had right TKR last year (2016). However right knee swelling and left knee pain. Pain Scale: 9/10. Had IMAGING for lumbar spine and right knee and has been seeing/seen by ORTHO. Sent info in to Physical Medicine, but won't get an appt for a few months. Last PT March 2017, continuing HEP. Pain in the right knee, leg, and lower back is still limiting walking, sitting, and standing, exacerbated by forementioned acitivities to include stair climbing. Has tried prednisone, tramadol, injections, PT, gabapentin, cymbalta, and surgery in past with minimal relief. Pain has been controlled with Oxycodone, last taken yesterday, using every other day. The medication is kept safe by staying with her. Has NOT seen any other providers since last OV for pain medication. No significant changes to pain presentation since last OV. she is  able to do her normal daily activities. she reports the following adverse side effects: none. Least pain over the last week has been 7/10. Worst pain over the last week has been 10/10. Aberrant behaviors: None         Symptoms onset: problem is longstanding. Rheumatological ROS: ongoing significant pain which is stable and controlled by pain med. Response to treatment plan: waxing and waning. Also with left shoulder/arm pain following fall last week when shoe came off while going up the stairs. Sharp pain, 8/10. Moving worsens. Resting improves pain and ice, but only temporarily. Review of Systems  Constitutional: negative for fevers, chills, anorexia and weight loss  Eyes:   negative for visual disturbance, drainage, and irritation  ENT:   +AR and environmental allergies.  negative for tinnitus,sore throat,ear pain,and hoarseness  Respiratory:  + asthma/COPD. negative for hemoptysis  CV:   negative for chest pain, palpitations, and lower extremity edema  GI:   negative for nausea, vomiting, diarrhea, abdominal pain, and melena  Endo:               negative for polyuria,polydipsia,polyphagia, and heat intolerance  Genitourinary: negative for frequency, urgency, dysuria, retention, and hematuria  Integument:  negative for rash, ulcerations, and pruritus  Hematologic:  negative for easy bruising and bleeding  Musculoskel: negative for  muscle weakness  Neurological:  negative for headaches, dizziness, vertigo,and memory problems  Behavl/Psych: +depression, on cymbalta and zoloft. negative for feelings of  suicide    1./2. Medical history/Past medical History   Past Medical History:   Diagnosis Date    Asthma     uses inhalers    Chronic obstructive pulmonary disease (HCC)     bronchitis    GERD (gastroesophageal reflux disease)     Hypertension     Ill-defined condition     environmental allergies     Ill-defined condition     Multiple body piercings; unable to remove tongue and lip piercings    Thyroid disease     hypo    Ventral hernia 12/31/2013     Past Surgical History:   Procedure Laterality Date    HX HEENT  2009    thyroidectomy    HX KNEE REPLACEMENT      R    HX MOHS PROCEDURES Right 3/8/11    HX OTHER SURGICAL      hiatal hernia repair    HX TUBAL LIGATION       Patient Active Problem List   Diagnosis Code    Ventral hernia K43.9    Chronic pain of right knee M25.561, G89.29    Chronic right shoulder pain M25.511, G89.29    Environmental and seasonal allergies J30.89    Ventral hernia without obstruction or gangrene K43.9    Primary osteoarthritis of right knee M17.11    Mixed simple and mucopurulent chronic bronchitis (HCC) J41.8    Acquired hypothyroidism E03.9    Chronic bilateral low back pain with right-sided sciatica M54.41, G89.29    Status post total right knee replacement Z96.651    Malignant hypertension I10    Mild single current episode of major depressive disorder ([facility] F32.0    Pain management contract signed Z02.89    Chronic pain of left knee M25.562, G89.29    Moderate episode of recurrent major depressive disorder (HCC) F33.1    Psychophysiological insomnia F51.04    Severe obesity (BMI 35.0-39. 9) with comorbidity (HCC) E66.01    Post-tussive vomiting R11.10    Chronic use of opiate for therapeutic purpose Z79.891       3. Applicable records from prior treatment providers are apart of [facility] under the media/encounters tab. 4. Diagnostic, therapeutic and laboratory results are available in the [address] [place] [address] chart. 5. Consultation notes are available for review in the media/encounters tab of the [address] [place] [address] chart. 6. Treatment goals include: pain control, improve activity level and function in regards to activities of daily living, and improved comfort level. Previously pt has been limited by pain in all these aspects. 7. The risks and benefits of treatment have been discussed at this office visit with the pt.  she understands that the medication has addictive potential.  Additionally the pt has been advised that narcotic pain medication may impair mental and/or physical ability required for performance of tasks such as driving or operating any other machinery. 8. Pt has an updated signed pain contract on file and can be found under the media section of the [facility] chart. 9. The pain contract is reviewed. Pain medication will be continued at the same dosage. Pill count: 7, last fill 8/7/18. Urine drug screening ordered/collected today. Diagnostic studies are not indicated at this time. Interventional procedure are not indicated at this time. Order for [employer] sent. 10. Medication prescibed is oxycodone every 24 hours PRN # 30 with ZERO refills for a 1 month supply.  was reviewed while planning for pain/anxiety management, no indications of drug diversion suspected. Prescription history is NOT suspicious for controlled substance overuse.     11. Patient instructions have been reviewed in detail as outlined above and in the pain contract. 12. Re-evaluation is planned for 1 months      Current Outpatient Prescriptions   Medication Sig Dispense Refill    meloxicam (MOBIC) 15 mg tablet Take 1 Tab by mouth daily (with breakfast). 30 Tab 11    oxyCODONE-acetaminophen (PERCOCET 10)  mg per tablet May take 1 tab ONCE DAILY as needed for pain. Indications: Pain 30 Tab 0    amLODIPine (NORVASC) 10 mg tablet TAKE 1 TABLET BY MOUTH EVERY DAY FOR HYPERTENSION 90 Tab 3    hydroCHLOROthiazide (HYDRODIURIL) 25 mg tablet TAKE 1 TAB BY MOUTH DAILY FOR HYPERTENSION 30 Tab 6    levothyroxine (SYNTHROID) 125 mcg tablet TAKE 1 TABLET BY MOUTH EVERY DAY BEFORE BREAKFAST 90 Tab 1    methocarbamol (ROBAXIN) 750 mg tablet TAKE 1 TAB BY MOUTH FOUR (4) TIMES DAILY AS NEEDED FOR PAIN (SPASMS). 60 Tab 1    diphenhydrAMINE (BENADRYL) 25 mg capsule Take 1 Cap by mouth every six (6) hours as needed. 20 Cap 0    ondansetron (ZOFRAN ODT) 4 mg disintegrating tablet Take 1 Tab by mouth every eight (8) hours as needed for Nausea. 20 Tab 1    traZODone (DESYREL) 50 mg tablet Take 1-2 tabs every night for sleep. 60 Tab 11    montelukast (SINGULAIR) 10 mg tablet Take 1 Tab by mouth daily. 30 Tab 6    pantoprazole (PROTONIX) 40 mg tablet TAKE 1 TABLET BY MOUTH ONCE EVERY DAY AS DIRECTED 90 Tab 2    albuterol (PROVENTIL VENTOLIN) 2.5 mg /3 mL (0.083 %) nebulizer solution 3 mL by Nebulization route every four (4) hours as needed for Wheezing. 24 Each 2    acetaminophen (TYLENOL) 650 mg TbER TAKE 1 TAB BY MOUTH THREE (3) TIMES DAILY AS NEEDED FOR PAIN. 90 Tab 0    sertraline (ZOLOFT) 100 mg tablet Take 1 Tab by mouth daily. 30 Tab 11    diclofenac (VOLTAREN) 1 % gel Apply 2 g to affected area every six (6) hours. 100 g 5    metoprolol succinate (TOPROL-XL) 25 mg XL tablet Take 1 Tab by mouth daily. 30 Tab 11    gabapentin (NEURONTIN) 800 mg tablet Take 1 Tab by mouth three (3) times daily.  90 Tab 11    lidocaine (LIDODERM) 5 % Apply patch to the affected area for 12 hours a day and remove for 12 hours a day. 30 Each 11    fluticasone (FLONASE) 50 mcg/actuation nasal spray 2 Sprays by Both Nostrils route daily. 1 Bottle 11    miscellaneous medical supply misc Shower seat for chronic knee pain, pt planning to have right TKR in June due to condition. Very limited range of motion. 1 Each 0    loratadine (CLARITIN) 10 mg tablet Take 1 Tab by mouth daily. 30 Tab 5    budesonide-formoterol (SYMBICORT) 160-4.5 mcg/actuation HFA inhaler Take 2 Puffs by inhalation two (2) times a day.  albuterol (PROAIR HFA) 90 mcg/actuation inhaler Take 2 Puffs by inhalation every four (4) hours as needed.  naloxone (NARCAN) 4 mg/actuation nasal spray Use 1 spray into 1 nostril for OVERDOSE. Call 911. For subsequent doses, give in alternating nostrils. May repeat every 2 to 3 min. 2 Each 0     Allergies   Allergen Reactions    Hydrocodone Itching    Mold Shortness of Breath and Itching    Ibuprofen Nausea Only       Objective:  Visit Vitals    /82 (BP 1 Location: Left arm, BP Patient Position: Sitting)    Pulse 90    Temp 98.5 °F (36.9 °C) (Oral)    Resp 20    Ht 5' 1\" (1.549 m)    Wt 181 lb 9.6 oz (82.4 kg)    SpO2 91%    BMI 34.31 kg/m2     Wt Readings from Last 3 Encounters:   08/28/18 181 lb 9.6 oz (82.4 kg)   07/19/18 183 lb (83 kg)   05/24/18 183 lb 9.6 oz (83.3 kg)     Physical Exam:   General appearance - alert, well appearing, and in mild distress. Mental status - A/O x 4, sad mood and affect. Chest - CTA. Symmetric chest rise. No wheezing, rales or rhonchi. Heart - Normal rate, regular rhythm. Normal S1, S2. No MGR or clicks. Abd- soft, obese, non-distended. Normoactive BS. NT, no masses. Ext- Radial, DP pulses, 2+ bilaterally. No edema, clubbing or cyanosis. Skin-Warm and dry. No hyperpigmentation, ulcerations, or suspicious lesions. Neuro - normal speech, no focal findings or movement disorder.  Normal strength and muscle tone. Limping, antalgic gait using stroller. Left Knee- LROM. NT. Right knee with medial effusion and LROM. TTP. Back- alignment midline. Thoracic spinal and right lumbar paraspinal tenderness. No CVAT. LROM, +SLR. Left shoulder TTP, LROM to lateral elevation at 90 degrees. Assessment/Plan:  The current medical regimen is effective;  continue present plan and medications. Medication Side Effects and Warnings were discussed with patient: yes   Patient Labs were reviewed: yes  Patient Past Records were reviewed: yes    See below for other orders   Follow-up Disposition: Not on File      ICD-10-CM ICD-9-CM    1. Chronic right shoulder pain M25.511 719.41 meloxicam (MOBIC) 15 mg tablet    G89.29 338.29    2. Primary osteoarthritis of right knee M17.11 715.16 meloxicam (MOBIC) 15 mg tablet      oxyCODONE-acetaminophen (PERCOCET 10)  mg per tablet   3. Status post total right knee replacement Z96.651 V43.65 meloxicam (MOBIC) 15 mg tablet      oxyCODONE-acetaminophen (PERCOCET 10)  mg per tablet   4. Chronic pain of right knee M25.561 719.46 meloxicam (MOBIC) 15 mg tablet    G89.29 338.29 oxyCODONE-acetaminophen (PERCOCET 10)  mg per tablet   5. Chronic pain of left knee M25.562 719.46 meloxicam (MOBIC) 15 mg tablet    G89.29 338.29    6. Chronic use of opiate for therapeutic purpose Z79.891 V58.69 meloxicam (MOBIC) 15 mg tablet   7. History of recent fall Z91.81 V15.88 meloxicam (MOBIC) 15 mg tablet     Orders Placed This Encounter    meloxicam (MOBIC) 15 mg tablet     Sig: Take 1 Tab by mouth daily (with breakfast). Dispense:  30 Tab     Refill:  11    oxyCODONE-acetaminophen (PERCOCET 10)  mg per tablet     Sig: May take 1 tab ONCE DAILY as needed for pain. Indications: Pain     Dispense:  30 Tab     Refill:  0       Liliya Sykes expressed understanding of plan. An After Visit Summary was offered/printed and given to the patient.

## 2018-08-28 NOTE — PROGRESS NOTES
Chief Complaint   Patient presents with    Medication Refill     oxyCODONE-acetaminophen (PERCOCET 10)  mg per tablet    Arm Pain     due to fall       1. Have you been to the ER, urgent care clinic since your last visit? Hospitalized since your last visit? No    2. Have you seen or consulted any other health care providers outside of the [address] [place] [address] since your last visit? Include any pap smears or colon screening. No    Fall Risk Assessment, last 12 mths 8/28/2018   Able to walk? Yes   Fall in past 12 months?  No   Fall with injury? -   Number of falls in past 12 months -   Fall Risk Score -

## 2018-09-01 LAB — DRUGS UR: NORMAL

## 2018-09-25 ENCOUNTER — HOSPITAL ENCOUNTER (OUTPATIENT)
Dept: GENERAL RADIOLOGY | Age: 53
Discharge: HOME | End: 2018-09-25
Payer: SUBSIDIZED

## 2018-09-25 ENCOUNTER — OFFICE VISIT (OUTPATIENT)
Dept: INTERNAL MEDICINE CLINIC | Age: 53
End: 2018-09-25

## 2018-09-25 VITALS
RESPIRATION RATE: 18 BRPM | TEMPERATURE: 97.2 F | HEART RATE: 73 BPM | SYSTOLIC BLOOD PRESSURE: 131 MMHG | BODY MASS INDEX: 35.19 KG/M2 | WEIGHT: 186.4 LBS | DIASTOLIC BLOOD PRESSURE: 77 MMHG | HEIGHT: 61 IN

## 2018-09-25 DIAGNOSIS — Z13.0 SCREENING FOR ENDOCRINE, NUTRITIONAL, METABOLIC AND IMMUNITY DISORDER: ICD-10-CM

## 2018-09-25 DIAGNOSIS — E66.9 OBESITY, CLASS II, BMI 35-39.9: ICD-10-CM

## 2018-09-25 DIAGNOSIS — M25.512 CHRONIC LEFT SHOULDER PAIN: ICD-10-CM

## 2018-09-25 DIAGNOSIS — M54.41 MIDLINE LOW BACK PAIN WITH RIGHT-SIDED SCIATICA, UNSPECIFIED CHRONICITY: ICD-10-CM

## 2018-09-25 DIAGNOSIS — Z96.651 STATUS POST TOTAL RIGHT KNEE REPLACEMENT: ICD-10-CM

## 2018-09-25 DIAGNOSIS — G89.29 CHRONIC LEFT SHOULDER PAIN: ICD-10-CM

## 2018-09-25 DIAGNOSIS — M17.11 PRIMARY OSTEOARTHRITIS OF RIGHT KNEE: Primary | ICD-10-CM

## 2018-09-25 DIAGNOSIS — F33.1 MODERATE EPISODE OF RECURRENT MAJOR DEPRESSIVE DISORDER (HCC): ICD-10-CM

## 2018-09-25 DIAGNOSIS — Z13.29 SCREENING FOR ENDOCRINE, NUTRITIONAL, METABOLIC AND IMMUNITY DISORDER: ICD-10-CM

## 2018-09-25 DIAGNOSIS — Z79.891 CHRONIC USE OF OPIATE FOR THERAPEUTIC PURPOSE: ICD-10-CM

## 2018-09-25 DIAGNOSIS — Z13.21 SCREENING FOR ENDOCRINE, NUTRITIONAL, METABOLIC AND IMMUNITY DISORDER: ICD-10-CM

## 2018-09-25 DIAGNOSIS — Z13.228 SCREENING FOR ENDOCRINE, NUTRITIONAL, METABOLIC AND IMMUNITY DISORDER: ICD-10-CM

## 2018-09-25 DIAGNOSIS — M25.532 LEFT WRIST PAIN: ICD-10-CM

## 2018-09-25 DIAGNOSIS — Z91.81 HISTORY OF RECENT FALL: ICD-10-CM

## 2018-09-25 DIAGNOSIS — Z23 ENCOUNTER FOR IMMUNIZATION: ICD-10-CM

## 2018-09-25 DIAGNOSIS — Z13.220 SCREENING FOR LIPID DISORDERS: ICD-10-CM

## 2018-09-25 PROCEDURE — 73030 X-RAY EXAM OF SHOULDER: CPT

## 2018-09-25 PROCEDURE — 72050 X-RAY EXAM NECK SPINE 4/5VWS: CPT

## 2018-09-25 RX ORDER — OXYCODONE AND ACETAMINOPHEN 10; 325 MG/1; MG/1
TABLET ORAL
Qty: 30 TAB | Refills: 0 | Status: SHIPPED | OUTPATIENT
Start: 2018-09-25 | End: 2018-10-23 | Stop reason: SDUPTHER

## 2018-09-25 NOTE — PATIENT INSTRUCTIONS
Preventing Falls: Care Instructions  Your Care Instructions    Getting around your home safely can be a challenge if you have injuries or health problems that make it easy for you to fall. Loose rugs and furniture in walkways are among the dangers for many older people who have problems walking or who have poor eyesight. People who have conditions such as arthritis, osteoporosis, or dementia also have to be careful not to fall. You can make your home safer with a few simple measures. Follow-up care is a key part of your treatment and safety. Be sure to make and go to all appointments, and call your doctor if you are having problems. It's also a good idea to know your test results and keep a list of the medicines you take. How can you care for yourself at home? Taking care of yourself  · You may get dizzy if you do not drink enough water. To prevent dehydration, drink plenty of fluids, enough so that your urine is light yellow or clear like water. Choose water and other caffeine-free clear liquids. If you have kidney, heart, or liver disease and have to limit fluids, talk with your doctor before you increase the amount of fluids you drink. · Exercise regularly to improve your strength, muscle tone, and balance. Walk if you can. Swimming may be a good choice if you cannot walk easily. · Have your vision and hearing checked each year or any time you notice a change. If you have trouble seeing and hearing, you might not be able to avoid objects and could lose your balance. · Know the side effects of the medicines you take. Ask your doctor or pharmacist whether the medicines you take can affect your balance. Sleeping pills or sedatives can affect your balance. · Limit the amount of alcohol you drink. Alcohol can impair your balance and other senses. · Ask your doctor whether calluses or corns on your feet need to be removed.  If you wear loose-fitting shoes because of calluses or corns, you can lose your balance and fall. · Talk to your doctor if you have numbness in your feet. Preventing falls at home  · Remove raised doorway thresholds, throw rugs, and clutter. Repair loose carpet or raised areas in the floor. · Move furniture and electrical cords to keep them out of walking paths. · Use nonskid floor wax, and wipe up spills right away, especially on ceramic tile floors. · If you use a walker or cane, put rubber tips on it. If you use crutches, clean the bottoms of them regularly with an abrasive pad, such as steel wool. · Keep your house well lit, especially [employer], and outside walkways. Use night-lights in areas such as hallways and bathrooms. Add extra light switches or use remote switches (such as switches that go on or off when you clap your hands) to make it easier to turn lights on if you have to get up during the night. · Install sturdy handrails on stairways. · Move items in your cabinets so that the things you use a lot are on the lower shelves (about waist level). · Keep a cordless phone and a flashlight with new batteries by your bed. If possible, put a phone in each of the main rooms of your house, or carry a cell phone in case you fall and cannot reach a phone. Or, you can wear a device around your neck or wrist. You push a button that sends a signal for help. · Wear low-heeled shoes that fit well and give your feet good support. Use footwear with nonskid soles. Check the heels and soles of your shoes for wear. Repair or replace worn heels or soles. · Do not wear socks without shoes on wood floors. · Walk on the grass when the sidewalks are slippery. If you live in an area that gets snow and ice in the winter, sprinkle salt on slippery steps and sidewalks. Preventing falls in the bath  · Install grab bars and nonskid mats inside and outside your shower or tub and near the toilet and sinks. · Use shower chairs and bath benches.   · Use a hand-held shower head that will allow you to sit while showering. · Get into a tub or shower by putting the weaker leg in first. Get out of a tub or shower with your strong side first.  · Repair loose toilet seats and consider installing a raised toilet seat to make getting on and off the toilet easier. · Keep your bathroom door unlocked while you are in the shower. Where can you learn more? Go to http://ofelia-mindy.info/. Enter [id number] [id number] in the search box to learn more about \"Preventing Falls: Care Instructions. \"  Current as of: May 12, 2017  Content Version: 11.7  © 1535-3091 [employer]. Care instructions adapted under license by [employer] (which disclaims liability or warranty for this information). If you have questions about a medical condition or this instruction, always ask your healthcare professional. [place] [address] any warranty or liability for your use of this information. Shoulder Bursitis: Exercises  Your Care Instructions  Here are some examples of typical rehabilitation exercises for your condition. Start each exercise slowly. Ease off the exercise if you start to have pain. Your doctor or physical therapist will tell you when you can start these exercises and which ones will work best for you. How to do the exercises  Posterior stretching exercise    1. Hold the elbow of your injured arm with your other hand. 2. Use your hand to pull your injured arm gently up and across your body. You will feel a gentle stretch across the back of your injured shoulder. 3. Hold for at least 15 to 30 seconds. Then slowly lower your arm. 4. Repeat 2 to 4 times. Up-the-back stretch    Your doctor or physical therapist may want you to wait to do this stretch until you have regained most of your range of motion and strength. You can do this stretch in different ways. Hold any of these stretches for at least 15 to 30 seconds. Repeat them 2 to 4 times.   1. Put your hand in your back pocket. Let it rest there to stretch your shoulder. 2. With your other hand, hold your injured arm (palm outward) behind your back by the wrist. Pull your arm up gently to stretch your shoulder. 3. Next, put a towel over your other shoulder. Put the hand of your injured arm behind your back. Now hold the back end of the towel. With the other hand, hold the front end of the towel in front of your body. Pull gently on the front end of the towel. This will bring your hand farther up your back to stretch your shoulder. Overhead stretch    1. Standing about an arm's length away, grasp onto a solid surface. You could use a countertop, a doorknob, or the back of a sturdy chair. 2. With your knees slightly bent, bend forward with your arms straight. Lower your upper body, and let your shoulders stretch. 3. As your shoulders are able to stretch farther, you may need to take a step or two backward. 4. Hold for at least 15 to 30 seconds. Then stand up and relax. If you had stepped back during your stretch, step forward so you can keep your hands on the solid surface. 5. Repeat 2 to 4 times. Shoulder flexion (lying down)    To make a wand for this exercise, use a piece of PVC pipe or a broom handle with the broom removed. Make the wand about a foot wider than your shoulders. 1. Lie on your back, holding a wand with both hands. Your palms should face down as you hold the wand. 2. Keeping your elbows straight, slowly raise your arms over your head. Raise them until you feel a stretch in your shoulders, upper back, and chest.  3. Hold for 15 to 30 seconds. 4. Repeat 2 to 4 times. Shoulder rotation (lying down)    To make a wand for this exercise, use a piece of PVC pipe or a broom handle with the broom removed. Make the wand about a foot wider than your shoulders. 1. Lie on your back. Hold a wand with both hands with your elbows bent and palms up.   2. Keep your elbows close to your body, and move the wand across your body toward the sore arm. 3. Hold for 8 to 12 seconds. 4. Repeat 2 to 4 times. Shoulder blade squeeze    1. Stand with your arms at your sides, and squeeze your shoulder blades together. Do not raise your shoulders up as you squeeze. 2. Hold 6 seconds. 3. Repeat 8 to 12 times. Shoulder flexor and extensor exercise    These are isometric exercises. That means you contract your muscles without actually moving. 1. Push forward (flex): Stand facing a wall or doorjamb, about 6 inches or less back. Hold your injured arm against your body. Make a closed fist with your thumb on top. Then gently push your hand forward into the wall with about 25% to 50% of your strength. Don't let your body move backward as you push. Hold for about 6 seconds. Relax for a few seconds. Repeat 8 to 12 times. 2. Push backward (extend): Stand with your back flat against a wall. Your upper arm should be against the wall, with your elbow bent 90 degrees (your hand straight ahead). Push your elbow gently back against the wall with about 25% to 50% of your strength. Don't let your body move forward as you push. Hold for about 6 seconds. Relax for a few seconds. Repeat 8 to 12 times. Scapular exercise: Wall push-ups    This exercise is best done with your fingers somewhat turned out, rather than straight up and down. 1. Stand facing a wall, about 12 inches to 18 inches away. 2. Place your hands on the wall at shoulder height. 3. Slowly bend your elbows and bring your face to the wall. Keep your back and hips straight. 4. Push back to where you started. 5. Repeat 8 to 12 times. 6. When you can do this exercise against a wall comfortably, you can try it against a counter. You can then slowly progress to the end of a couch, then to a sturdy chair, and finally to the floor. Scapular exercise: Retraction    For this exercise, you will need elastic exercise material, such as surgical tubing or Thera-Band.   1. Put the band around a solid object at about waist level. (A bedpost will work well.) Each hand should hold an end of the band. 2. With your elbows at your sides and bent to 90 degrees, pull the band back. Your shoulder blades should move toward each other. Then move your arms back where you started. 3. Repeat 8 to 12 times. 4. If you have good range of motion in your shoulders, try this exercise with your arms lifted out to the sides. Keep your elbows at a 90-degree angle. Raise the elastic band up to about shoulder level. Pull the band back to move your shoulder blades toward each other. Then move your arms back where you started. Internal rotator strengthening exercise    1. Start by tying a piece of elastic exercise material to a doorknob. You can use surgical tubing or Thera-Band. 2. Stand or sit with your shoulder relaxed and your elbow bent 90 degrees. Your upper arm should rest comfortably against your side. Squeeze a rolled towel between your elbow and your body for comfort. This will help keep your arm at your side. 3. Hold one end of the elastic band in the hand of the painful arm. 4. Slowly rotate your forearm toward your body until it touches your belly. Slowly move it back to where you started. 5. Keep your elbow and upper arm firmly tucked against the towel roll or at your side. 6. Repeat 8 to 12 times. External rotator strengthening exercise    1. Start by tying a piece of elastic exercise material to a doorknob. You can use surgical tubing or Thera-Band. (You may also hold one end of the band in each hand.)  2. Stand or sit with your shoulder relaxed and your elbow bent 90 degrees. Your upper arm should rest comfortably against your side. Squeeze a rolled towel between your elbow and your body for comfort. This will help keep your arm at your side. 3. Hold one end of the elastic band with the hand of the painful arm. 4. Start with your forearm across your belly. Slowly rotate the forearm out away from your body. Keep your elbow and upper arm tucked against the towel roll or the side of your body until you begin to feel tightness in your shoulder. Slowly move your arm back to where you started. 5. Repeat 8 to 12 times. Follow-up care is a key part of your treatment and safety. Be sure to make and go to all appointments, and call your doctor if you are having problems. It's also a good idea to know your test results and keep a list of the medicines you take. Where can you learn more? Go to http://ofelia-mindy.info/. Enter H751 in the search box to learn more about \"Shoulder Bursitis: Exercises. \"  Current as of: November 29, 2017  Content Version: 11.7  © 6296-6302 [employer]. Care instructions adapted under license by [email or web address] (which disclaims liability or warranty for this information). If you have questions about a medical condition or this instruction, always ask your healthcare professional. [place] [address] any warranty or liability for your use of this information. Shoulder Bursitis: Care Instructions  Your Care Instructions    Bursitis is inflammation of the bursa. A bursa is a small sac of fluid that cushions a joint and helps it move easily. A bursa sits under the highest point of your shoulder. You can get bursitis by overusing your shoulder, which can happen with activities such as lifting, pitching a ball, or painting. Symptoms of bursitis include pain when you move your arm. Your arm may hurt when you try to lift it, and the pain can reach down the side of your arm. You may have trouble sleeping because of the pain. Bursitis usually gets better if you avoid the activity that caused it. If pain lasts or gets worse despite home treatment, your doctor may draw fluid from the bursa through a needle. This may relieve your pain and help your doctor know if you have an infection. If so, your doctor will prescribe antibiotics.  If you have inflammation only, you may get a corticosteroid shot to reduce swelling and pain. Sometimes surgery is needed to drain or remove the bursa. Follow-up care is a key part of your treatment and safety. Be sure to make and go to all appointments, and call your doctor if you are having problems. It's also a good idea to know your test results and keep a list of the medicines you take. How can you care for yourself at home? · Put ice or a cold pack on your shoulder for 10 to 20 minutes at a time. Put a thin cloth between the ice and your skin. · After 3 days of using ice, use heat on your shoulder. You can use a hot water bottle, a heating pad set on low, or a warm, moist towel. Some doctors suggest alternating between hot and cold. · Rest your shoulder. Stop any activities that cause pain. Switch to activities that do not stress your shoulder. · Take your medicines exactly as prescribed. Call your doctor if you think you are having a problem with your medicine. · If your doctor recommends it, take anti-inflammatory medicines to reduce pain. These include ibuprofen (Advil, Motrin) and naproxen (Aleve). Read and follow all instructions on the label. · To prevent stiffness, gently move the shoulder joint through its full range of motion. As the pain gets better, keep doing range-of-motion exercises. Ask your doctor for exercises that will make the muscles around the shoulder joint stronger. Do these as directed. · You can slowly return to the activity that caused the pain, but do it with less effort until you can do it without pain or swelling. Be sure to warm up before and stretch after you do the activity. When should you call for help?   Call your doctor now or seek immediate medical care if:    · You have a fever.     · You have increased swelling or redness in your shoulder.     · You cannot use your shoulder, or the pain in your shoulder gets worse.    Watch closely for changes in your health, and be sure to contact your doctor if:    · You have pain for 2 weeks or longer despite home treatment. Where can you learn more? Go to http://ofelia-mindy.info/. Enter M955 in the search box to learn more about \"Shoulder Bursitis: Care Instructions. \"  Current as of: November 29, 2017  Content Version: 11.7  © 3541-3418 [employer]. Care instructions adapted under license by [employer] (which disclaims liability or warranty for this information). If you have questions about a medical condition or this instruction, always ask your healthcare professional. [address] any warranty or liability for your use of this information. Joint Injections: Care Instructions  Your Care Instructions    Joint injections are shots into a joint, such as the knee. They may be used to put in medicines, such as pain relievers. A corticosteroid, or steroid, shot is used to reduce inflammation in tendons or joints. It is often used to treat problems such as arthritis, tendinitis, and bursitis. Steroids can be injected directly into a painful, inflamed joint. They can also help reduce inflammation of a bursa. A bursa is a sac of fluid. It cushions and lubricates areas where tendons, ligaments, skin, muscles, or bones rub against each other. A steroid shot can sometimes help with short-term pain relief when other treatments haven't worked. If steroid shots help, pain may improve for weeks or months. Follow-up care is a key part of your treatment and safety. Be sure to make and go to all appointments, and call your doctor if you are having problems. It's also a good idea to know your test results and keep a list of the medicines you take. How can you care for yourself at home? · Put ice or a cold pack on the area for 10 to 20 minutes at a time. Put a thin cloth between the ice and your skin.   · Ask your doctor if you can take an over-the-counter pain medicine, such as acetaminophen (Tylenol), ibuprofen (Advil, Motrin), or naproxen (Aleve). Be safe with medicines. Read and follow all instructions on the label. · Avoid strenuous activities for several days. In particular, avoid ones that put stress on the area where you got the shot. · If you have dressings over the area, keep them clean and dry. You may remove them when your doctor tells you to. When should you call for help? Call your doctor now or seek immediate medical care if:    · You have signs of infection, such as:  ¨ Increased pain, swelling, warmth, or redness. ¨ Red streaks leading from the site. ¨ Pus draining from the site. ¨ A fever.    Watch closely for changes in your health, and be sure to contact your doctor if you have any problems. Where can you learn more? Go to http://ofelia-mindy.info/. Enter N616 in the search box to learn more about \"Joint Injections: Care Instructions. \"  Current as of: March 24, 2017  Content Version: 11.7  © 3713-1684 [employer]. Care instructions adapted under license by [facility] (which disclaims liability or warranty for this information). If you have questions about a medical condition or this instruction, always ask your healthcare professional. [address] any warranty or liability for your use of this information.

## 2018-09-25 NOTE — MR AVS SNAPSHOT
[address] [facility] 
 
 
 [address] [facility] [address] [id number] 
[phone] Patient: Jackie Kimble MRN: [id number] :1965 Visit Information Date & Time Provider Department Dept. Phone Encounter #  
 2018 10:15 AM John Cox NP [address] [facility] [phone] [id number] Follow-up Instructions Return in about 4 weeks (around 10/23/2018) for pain med refill, left shoulder inj. Your Appointments 10/23/2018 10:30 AM  
ROUTINE CARE with John Cox NP  
[address] [facility]) Appt Note: PAIN MED REFILL AND LEFT SHOULDER INJ  
 [address] [facility] [address] [address]  
[phone]  
  
   
 [address] [facility] Upcoming Health Maintenance Date Due  
 PAP AKA CERVICAL CYTOLOGY 1984 Shingrix Vaccine Age 50> (1 of 2) 2013 BREAST CANCER SCRN MAMMOGRAM 2014 Influenza Age 5 to Adult 2018 FOBT Q 1 YEAR AGE 50-75 2019 DTaP/Tdap/Td series (2 - Td) 2025 Allergies as of 2018  Review Complete On: 2018 By: Foreign Medina LPN Severity Noted Reaction Type Reactions Hydrocodone  2017    Itching Mold  2016    Shortness of Breath, Itching Ibuprofen Low 2015   Intolerance Nausea Only Current Immunizations  Reviewed on 3/3/2017 Name Date Influenza Vaccine (Quad) Intradermal PF 2016 Influenza Vaccine (Quad) PF  Incomplete, 11/3/2017 Pneumococcal Polysaccharide (PPSV-23) 3/3/2017 Not reviewed this visit You Were Diagnosed With   
  
 Codes Comments Primary osteoarthritis of right knee    -  Primary ICD-10-CM: M17.11 ICD-9-CM: 715.16 Status post total right knee replacement     ICD-10-CM: Q45.237 ICD-9-CM: V43.65 Moderate episode of recurrent major depressive disorder (HCC)     ICD-10-CM: F33.1 ICD-9-CM: 296.32   
 Midline low back pain with right-sided sciatica, unspecified chronicity     ICD-10-CM: M54.41 
ICD-9-CM: 272. 3 Chronic use of opiate for therapeutic purpose     ICD-10-CM: Z79.891 ICD-9-CM: V58.69 Chronic left shoulder pain     ICD-10-CM: M25.512, G89.29 ICD-9-CM: 719.41, 338.29 Left wrist pain     ICD-10-CM: M25.532 ICD-9-CM: 719.43 Obesity, Class II, BMI 35-39.9     ICD-10-CM: E66.9 ICD-9-CM: 278.00 Screening for lipid disorders     ICD-10-CM: Z13.220 ICD-9-CM: V77.91 Screening for endocrine, nutritional, metabolic and immunity disorder     ICD-10-CM: Z13.29, Z13.21, Z13.228, Z13.0 ICD-9-CM: V77.99 History of recent fall     ICD-10-CM: Z91.81 
ICD-9-CM: V15.88 Encounter for immunization     ICD-10-CM: U87 ICD-9-CM: V03.89 Vitals BP Pulse Temp Resp Height(growth percentile) Weight(growth percentile) 131/77 (BP 1 Location: Right arm, BP Patient Position: Sitting) 73 97.2 °F (36.2 °C) (Oral) 18 5' 1\" (1.549 m) 186 lb 6.4 oz (84.6 kg) BMI OB Status Smoking Status 35.22 kg/m2 Menopause Current Every Day Smoker Vitals History BMI and BSA Data Body Mass Index Body Surface Area  
 35.22 kg/m 2 1.91 m 2 Preferred Pharmacy Pharmacy Name Phone [id number] Frirambo [phone] Your Updated Medication List  
  
   
This list is accurate as of 9/25/18 10:45 AM.  Always use your most recent med list.  
  
  
  
  
 acetaminophen 650 mg Tber Commonly known as:  TYLENOL  
TAKE 1 TAB BY MOUTH THREE (3) TIMES DAILY AS NEEDED FOR PAIN. amLODIPine 10 mg tablet Commonly known as:  Chip Chafe TAKE 1 TABLET BY MOUTH EVERY DAY FOR HYPERTENSION  
  
 diclofenac 1 % Gel Commonly known as:  VOLTAREN Apply 2 g to affected area every six (6) hours. diphenhydrAMINE 25 mg capsule Commonly known as:  BENADRYL Take 1 Cap by mouth every six (6) hours as needed. fluticasone 50 mcg/actuation nasal spray Commonly known as:  Claire Fryer 2 Sprays by Both Nostrils route daily. gabapentin 800 mg tablet Commonly known as:  NEURONTIN  
TAKE 1 TAB BY MOUTH THREE (3) TIMES DAILY. hydroCHLOROthiazide 25 mg tablet Commonly known as:  HYDRODIURIL  
TAKE 1 TAB BY MOUTH DAILY FOR HYPERTENSION  
  
 levothyroxine 125 mcg tablet Commonly known as:  SYNTHROID  
TAKE 1 TABLET BY MOUTH EVERY DAY BEFORE BREAKFAST  
  
 lidocaine 5 % Commonly known as:  Delayne Matter Apply patch to the affected area for 12 hours a day and remove for 12 hours a day. loratadine 10 mg tablet Commonly known as:  Ronda Dustman Take 1 Tab by mouth daily. meloxicam 15 mg tablet Commonly known as:  MOBIC Take 1 Tab by mouth daily (with breakfast). methocarbamol 750 mg tablet Commonly known as:  ROBAXIN  
TAKE 1 TAB BY MOUTH FOUR (4) TIMES DAILY AS NEEDED FOR PAIN (SPASMS). metoprolol succinate 25 mg XL tablet Commonly known as:  TOPROL-XL  
TAKE 1 TABLET BY MOUTH DAILY. [facility] Misc Shower seat for chronic knee pain, pt planning to have right TKR in June due to condition. Very limited range of motion. montelukast 10 mg tablet Commonly known as:  SINGULAIR Take 1 Tab by mouth daily. naloxone 4 mg/actuation nasal spray Commonly known as:  [employer] Use 1 spray into 1 nostril for OVERDOSE. Call 911. For subsequent doses, give in alternating nostrils. May repeat every 2 to 3 min. ondansetron 4 mg disintegrating tablet Commonly known as:  ZOFRAN ODT Take 1 Tab by mouth every eight (8) hours as needed for Nausea. oxyCODONE-acetaminophen  mg per tablet Commonly known as:  PERCOCET 10 May take 1 tab ONCE DAILY as needed for pain. Indications: Pain  
  
 pantoprazole 40 mg tablet Commonly known as:  PROTONIX  
TAKE 1 TABLET BY MOUTH ONCE EVERY DAY AS DIRECTED * PROAIR HFA 90 mcg/actuation inhaler Generic drug:  albuterol Take 2 Puffs by inhalation every four (4) hours as needed. * albuterol 2.5 mg /3 mL (0.083 %) nebulizer solution Commonly known as:  PROVENTIL VENTOLIN  
3 mL by Nebulization route every four (4) hours as needed for Wheezing. sertraline 100 mg tablet Commonly known as:  ZOLOFT Take 1 Tab by mouth daily. SYMBICORT 160-4.5 mcg/actuation Hfaa Generic drug:  budesonide-formoterol Take 2 Puffs by inhalation two (2) times a day. traZODone 50 mg tablet Commonly known as:  Bernjohannace Staggers Take 1-2 tabs every night for sleep. * Notice: This list has 2 medication(s) that are the same as other medications prescribed for you. Read the directions carefully, and ask your doctor or other care provider to review them with you. Prescriptions Printed Refills  
 oxyCODONE-acetaminophen (PERCOCET 10)  mg per tablet 0 Sig: May take 1 tab ONCE DAILY as needed for pain. Indications: Pain Class: Print We Performed the Following CBC WITH AUTOMATED DIFF [18288 CPT(R)] HEMOGLOBIN A1C WITH EAG [05383 CPT(R)] INFLUENZA VIRUS VAC QUAD,SPLIT,PRESV FREE SYRINGE IM [id number] CPT(R)] LIPID PANEL [60079 CPT(R)] METABOLIC PANEL, COMPREHENSIVE [65590 CPT(R)] REFERRAL TO PHYSICAL THERAPY [OLB13 Custom] Comments:  
 Please allow maximum allowable visits per insurance to include iontophoresis/phonophoresis. No PAINFUL ROM. Aquatic therapy if indicated. For left shoulder pain [facility] at location of pt choice [place]- [phone] [address]- [phone] Tano Mendenhall NWI-[phone] Linda White [phone] REFERRAL TO PSYCHIATRY [REF91 Custom] Comments:  
 depression Supriya Fret 13 (MW) [[id number] Custom] TSH 3RD GENERATION [60902 CPT(R)] Follow-up Instructions Return in about 4 weeks (around 10/23/2018) for pain med refill, left shoulder inj.   
  
To-Do List   
 09/25/2018 Imaging:  XR SHOULDER LT AP/LAT MIN 2 V   
  
 09/25/2018 Imaging:  XR SPINE CERV 4 OR 5 V Referral Information Referral ID Referred By Referred To  
  
 [id number] Destini العلي, SHAGGY   
   [address] [facility] [address] Phone: [phone] Fax: [phone] Visits Status Start Date End Date 1 New Request 9/25/18 9/25/19 If your referral has a status of pending review or denied, additional information will be sent to support the outcome of this decision. Referral ID Referred By Referred To  
 [id number] Sina Yoder I [facility] PHYSICAL THERAPY  
   [address], [address] Phone: [phone] Visits Status Start Date End Date 1 New Request 9/25/18 9/25/19 If your referral has a status of pending review or denied, additional information will be sent to support the outcome of this decision. Patient Instructions Preventing Falls: Care Instructions Your Care Instructions Getting around your home safely can be a challenge if you have injuries or health problems that make it easy for you to fall. Loose rugs and furniture in walkways are among the dangers for many older people who have problems walking or who have poor eyesight. People who have conditions such as arthritis, osteoporosis, or dementia also have to be careful not to fall. You can make your home safer with a few simple measures. Follow-up care is a key part of your treatment and safety. Be sure to make and go to all appointments, and call your doctor if you are having problems. It's also a good idea to know your test results and keep a list of the medicines you take. How can you care for yourself at home? Taking care of yourself · You may get dizzy if you do not drink enough water.  To prevent dehydration, drink plenty of fluids, enough so that your urine is light yellow or clear like water. Choose water and other caffeine-free clear liquids. If you have kidney, heart, or liver disease and have to limit fluids, talk with your doctor before you increase the amount of fluids you drink. · Exercise regularly to improve your strength, muscle tone, and balance. Walk if you can. Swimming may be a good choice if you cannot walk easily. · Have your vision and hearing checked each year or any time you notice a change. If you have trouble seeing and hearing, you might not be able to avoid objects and could lose your balance. · Know the side effects of the medicines you take. Ask your doctor or pharmacist whether the medicines you take can affect your balance. Sleeping pills or sedatives can affect your balance. · Limit the amount of alcohol you drink. Alcohol can impair your balance and other senses. · Ask your doctor whether calluses or corns on your feet need to be removed. If you wear loose-fitting shoes because of calluses or corns, you can lose your balance and fall. · Talk to your doctor if you have numbness in your feet. Preventing falls at home · Remove raised doorway thresholds, throw rugs, and clutter. Repair loose carpet or raised areas in the floor. · Move furniture and electrical cords to keep them out of walking paths. · Use nonskid floor wax, and wipe up spills right away, especially on ceramic tile floors. · If you use a walker or cane, put rubber tips on it. If you use crutches, clean the bottoms of them regularly with an abrasive pad, such as steel wool. · Keep your house well lit, especially [facility], and outside walkways. Use night-lights in areas such as hallways and bathrooms. Add extra light switches or use remote switches (such as switches that go on or off when you clap your hands) to make it easier to turn lights on if you have to get up during the night. · Install sturdy handrails on stairways. · Move items in your cabinets so that the things you use a lot are on the lower shelves (about waist level). · Keep a cordless phone and a flashlight with new batteries by your bed. If possible, put a phone in each of the main rooms of your house, or carry a cell phone in case you fall and cannot reach a phone. Or, you can wear a device around your neck or wrist. You push a button that sends a signal for help. · Wear low-heeled shoes that fit well and give your feet good support. Use footwear with nonskid soles. Check the heels and soles of your shoes for wear. Repair or replace worn heels or soles. · Do not wear socks without shoes on wood floors. · Walk on the grass when the sidewalks are slippery. If you live in an area that gets snow and ice in the winter, sprinkle salt on slippery steps and sidewalks. Preventing falls in the bath · Install grab bars and nonskid mats inside and outside your shower or tub and near the toilet and sinks. · Use shower chairs and bath benches. · Use a hand-held shower head that will allow you to sit while showering. · Get into a tub or shower by putting the weaker leg in first. Get out of a tub or shower with your strong side first. 
· Repair loose toilet seats and consider installing a raised toilet seat to make getting on and off the toilet easier. · Keep your bathroom door unlocked while you are in the shower. Where can you learn more? Go to http://ofelia-mindy.info/. Enter [id number] in the search box to learn more about \"Preventing Falls: Care Instructions. \" Current as of: May 12, 2017 Content Version: 11.7 © 1044-6699 [employer]. Care instructions adapted under license by [employer] (which disclaims liability or warranty for this information).  If you have questions about a medical condition or this instruction, always ask your healthcare professional. [employer] disclaims any warranty or liability for your use of this information. Shoulder Bursitis: Exercises Your Care Instructions Here are some examples of typical rehabilitation exercises for your condition. Start each exercise slowly. Ease off the exercise if you start to have pain. Your doctor or physical therapist will tell you when you can start these exercises and which ones will work best for you. How to do the exercises Posterior stretching exercise 1. Hold the elbow of your injured arm with your other hand. 2. Use your hand to pull your injured arm gently up and across your body. You will feel a gentle stretch across the back of your injured shoulder. 3. Hold for at least 15 to 30 seconds. Then slowly lower your arm. 4. Repeat 2 to 4 times. Up-the-back stretch Your doctor or physical therapist may want you to wait to do this stretch until you have regained most of your range of motion and strength. You can do this stretch in different ways. Hold any of these stretches for at least 15 to 30 seconds. Repeat them 2 to 4 times. 1. Put your hand in your back pocket. Let it rest there to stretch your shoulder. 2. With your other hand, hold your injured arm (palm outward) behind your back by the wrist. Pull your arm up gently to stretch your shoulder. 3. Next, put a towel over your other shoulder. Put the hand of your injured arm behind your back. Now hold the back end of the towel. With the other hand, hold the front end of the towel in front of your body. Pull gently on the front end of the towel. This will bring your hand farther up your back to stretch your shoulder. Overhead stretch 1. Standing about an arm's length away, grasp onto a solid surface. You could use a countertop, a doorknob, or the back of a sturdy chair. 2. With your knees slightly bent, bend forward with your arms straight. Lower your upper body, and let your shoulders stretch. 3. As your shoulders are able to stretch farther, you may need to take a step or two backward. 4. Hold for at least 15 to 30 seconds. Then stand up and relax. If you had stepped back during your stretch, step forward so you can keep your hands on the solid surface. 5. Repeat 2 to 4 times. Shoulder flexion (lying down) To make a wand for this exercise, use a piece of PVC pipe or a broom handle with the broom removed. Make the wand about a foot wider than your shoulders. 1. Lie on your back, holding a wand with both hands. Your palms should face down as you hold the wand. 2. Keeping your elbows straight, slowly raise your arms over your head. Raise them until you feel a stretch in your shoulders, upper back, and chest. 
3. Hold for 15 to 30 seconds. 4. Repeat 2 to 4 times. Shoulder rotation (lying down) To make a wand for this exercise, use a piece of PVC pipe or a broom handle with the broom removed. Make the wand about a foot wider than your shoulders. 1. Lie on your back. Hold a wand with both hands with your elbows bent and palms up. 2. Keep your elbows close to your body, and move the wand across your body toward the sore arm. 3. Hold for 8 to 12 seconds. 4. Repeat 2 to 4 times. Shoulder blade squeeze 1. Stand with your arms at your sides, and squeeze your shoulder blades together. Do not raise your shoulders up as you squeeze. 2. Hold 6 seconds. 3. Repeat 8 to 12 times. Shoulder flexor and extensor exercise These are isometric exercises. That means you contract your muscles without actually moving. 1. Push forward (flex): Stand facing a wall or doorjamb, about 6 inches or less back. Hold your injured arm against your body. Make a closed fist with your thumb on top. Then gently push your hand forward into the wall with about 25% to 50% of your strength. Don't let your body move backward as you push. Hold for about 6 seconds. Relax for a few seconds. Repeat 8 to 12 times. 2. Push backward (extend): Stand with your back flat against a wall. Your upper arm should be against the wall, with your elbow bent 90 degrees (your hand straight ahead). Push your elbow gently back against the wall with about 25% to 50% of your strength. Don't let your body move forward as you push. Hold for about 6 seconds. Relax for a few seconds. Repeat 8 to 12 times. Scapular exercise: Wall push-ups This exercise is best done with your fingers somewhat turned out, rather than straight up and down. 1. Stand facing a wall, about 12 inches to 18 inches away. 2. Place your hands on the wall at shoulder height. 3. Slowly bend your elbows and bring your face to the wall. Keep your back and hips straight. 4. Push back to where you started. 5. Repeat 8 to 12 times. 6. When you can do this exercise against a wall comfortably, you can try it against a counter. You can then slowly progress to the end of a couch, then to a sturdy chair, and finally to the floor. Scapular exercise: Retraction For this exercise, you will need elastic exercise material, such as surgical tubing or Thera-Band. 1. Put the band around a solid object at about waist level. (A bedpost will work well.) Each hand should hold an end of the band. 2. With your elbows at your sides and bent to 90 degrees, pull the band back. Your shoulder blades should move toward each other. Then move your arms back where you started. 3. Repeat 8 to 12 times. 4. If you have good range of motion in your shoulders, try this exercise with your arms lifted out to the sides. Keep your elbows at a 90-degree angle. Raise the elastic band up to about shoulder level. Pull the band back to move your shoulder blades toward each other. Then move your arms back where you started. Internal rotator strengthening exercise 1. Start by tying a piece of elastic exercise material to a doorknob. You can use surgical tubing or Thera-Band. 2. Stand or sit with your shoulder relaxed and your elbow bent 90 degrees. Your upper arm should rest comfortably against your side. Squeeze a rolled towel between your elbow and your body for comfort. This will help keep your arm at your side. 3. Hold one end of the elastic band in the hand of the painful arm. 4. Slowly rotate your forearm toward your body until it touches your belly. Slowly move it back to where you started. 5. Keep your elbow and upper arm firmly tucked against the towel roll or at your side. 6. Repeat 8 to 12 times. External rotator strengthening exercise 1. Start by tying a piece of elastic exercise material to a doorknob. You can use surgical tubing or Thera-Band. (You may also hold one end of the band in each hand.) 2. Stand or sit with your shoulder relaxed and your elbow bent 90 degrees. Your upper arm should rest comfortably against your side. Squeeze a rolled towel between your elbow and your body for comfort. This will help keep your arm at your side. 3. Hold one end of the elastic band with the hand of the painful arm. 4. Start with your forearm across your belly. Slowly rotate the forearm out away from your body. Keep your elbow and upper arm tucked against the towel roll or the side of your body until you begin to feel tightness in your shoulder. Slowly move your arm back to where you started. 5. Repeat 8 to 12 times. Follow-up care is a key part of your treatment and safety. Be sure to make and go to all appointments, and call your doctor if you are having problems. It's also a good idea to know your test results and keep a list of the medicines you take. Where can you learn more? Go to http://ofelia-mindy.info/. Enter Y278 in the search box to learn more about \"Shoulder Bursitis: Exercises. \" Current as of: November 29, 2017 Content Version: 11.7 © 7740-4073 [employer].  Care instructions adapted under license by [address] (which disclaims liability or warranty for this information). If you have questions about a medical condition or this instruction, always ask your healthcare professional. [address] any warranty or liability for your use of this information. Shoulder Bursitis: Care Instructions Your Care Instructions Bursitis is inflammation of the bursa. A bursa is a small sac of fluid that cushions a joint and helps it move easily. A bursa sits under the highest point of your shoulder. You can get bursitis by overusing your shoulder, which can happen with activities such as lifting, pitching a ball, or painting. Symptoms of bursitis include pain when you move your arm. Your arm may hurt when you try to lift it, and the pain can reach down the side of your arm. You may have trouble sleeping because of the pain. Bursitis usually gets better if you avoid the activity that caused it. If pain lasts or gets worse despite home treatment, your doctor may draw fluid from the bursa through a needle. This may relieve your pain and help your doctor know if you have an infection. If so, your doctor will prescribe antibiotics. If you have inflammation only, you may get a corticosteroid shot to reduce swelling and pain. Sometimes surgery is needed to drain or remove the bursa. Follow-up care is a key part of your treatment and safety. Be sure to make and go to all appointments, and call your doctor if you are having problems. It's also a good idea to know your test results and keep a list of the medicines you take. How can you care for yourself at home? · Put ice or a cold pack on your shoulder for 10 to 20 minutes at a time. Put a thin cloth between the ice and your skin. · After 3 days of using ice, use heat on your shoulder. You can use a hot water bottle, a heating pad set on low, or a warm, moist towel. Some doctors suggest alternating between hot and cold. · Rest your shoulder. Stop any activities that cause pain. Switch to activities that do not stress your shoulder. · Take your medicines exactly as prescribed. Call your doctor if you think you are having a problem with your medicine. · If your doctor recommends it, take anti-inflammatory medicines to reduce pain. These include ibuprofen (Advil, Motrin) and naproxen (Aleve). Read and follow all instructions on the label. · To prevent stiffness, gently move the shoulder joint through its full range of motion. As the pain gets better, keep doing range-of-motion exercises. Ask your doctor for exercises that will make the muscles around the shoulder joint stronger. Do these as directed. · You can slowly return to the activity that caused the pain, but do it with less effort until you can do it without pain or swelling. Be sure to warm up before and stretch after you do the activity. When should you call for help? Call your doctor now or seek immediate medical care if: 
  · You have a fever.  
  · You have increased swelling or redness in your shoulder.  
  · You cannot use your shoulder, or the pain in your shoulder gets worse.  
 Watch closely for changes in your health, and be sure to contact your doctor if: 
  · You have pain for 2 weeks or longer despite home treatment. Where can you learn more? Go to http://ofelia-mindy.info/. Enter M955 in the search box to learn more about \"Shoulder Bursitis: Care Instructions. \" Current as of: November 29, 2017 Content Version: 11.7 © 4807-0760 [employer]. Care instructions adapted under license by [employer] (which disclaims liability or warranty for this information). If you have questions about a medical condition or this instruction, always ask your healthcare professional. [place] [id number] any warranty or liability for your use of this information. Joint Injections: Care Instructions Your Care Instructions Joint injections are shots into a joint, such as the knee. They may be used to put in medicines, such as pain relievers. A corticosteroid, or steroid, shot is used to reduce inflammation in tendons or joints. It is often used to treat problems such as arthritis, tendinitis, and bursitis. Steroids can be injected directly into a painful, inflamed joint. They can also help reduce inflammation of a bursa. A bursa is a sac of fluid. It cushions and lubricates areas where tendons, ligaments, skin, muscles, or bones rub against each other. A steroid shot can sometimes help with short-term pain relief when other treatments haven't worked. If steroid shots help, pain may improve for weeks or months. Follow-up care is a key part of your treatment and safety. Be sure to make and go to all appointments, and call your doctor if you are having problems. It's also a good idea to know your test results and keep a list of the medicines you take. How can you care for yourself at home? · Put ice or a cold pack on the area for 10 to 20 minutes at a time. Put a thin cloth between the ice and your skin. · Ask your doctor if you can take an over-the-counter pain medicine, such as acetaminophen (Tylenol), ibuprofen (Advil, Motrin), or naproxen (Aleve). Be safe with medicines. Read and follow all instructions on the label. · Avoid strenuous activities for several days. In particular, avoid ones that put stress on the area where you got the shot. · If you have dressings over the area, keep them clean and dry. You may remove them when your doctor tells you to. When should you call for help? Call your doctor now or seek immediate medical care if: 
  · You have signs of infection, such as: 
¨ Increased pain, swelling, warmth, or redness. ¨ Red streaks leading from the site. ¨ Pus draining from the site.  
¨ A fever.  
 Watch closely for changes in your health, and be sure to contact your doctor if you have any problems. Where can you learn more? Go to http://ofelia-mindy.info/. Enter N616 in the search box to learn more about \"Joint Injections: Care Instructions. \" Current as of: March 24, 2017 Content Version: 11.7 © 4132-2874 [employer]. Care instructions adapted under license by [employer] (which disclaims liability or warranty for this information). If you have questions about a medical condition or this instruction, always ask your healthcare professional. [address] any warranty or liability for your use of this information. Introducing [facility]! Mikki Stroud introduces CardioLogs patient portal. Now you can access parts of your medical record, email your doctor's office, and request medication refills online. 1. In your internet browser, go to [email or web address] 2. Click on the First Time User? Click Here link in the Sign In box. You will see the New Member Sign Up page. 3. Enter your CardioLogs Access Code exactly as it appears below. You will not need to use this code after youve completed the sign-up process. If you do not sign up before the expiration date, you must request a new code. · CardioLogs Access Code: [email or web address] Expires: 10/17/2018 11:22 AM 
 
4. Enter the last four digits of your Social Security Number (xxxx) and Date of Birth (mm/dd/yyyy) as indicated and click Submit. You will be taken to the next sign-up page. 5. Create a CardioLogs ID. This will be your CardioLogs login ID and cannot be changed, so think of one that is secure and easy to remember. 6. Create a CardioLogs password. You can change your password at any time. 7. Enter your Password Reset Question and Answer. This can be used at a later time if you forget your password. 8. Enter your e-mail address. You will receive e-mail notification when new information is available in [address] 9. Click Sign Up. You can now view and download portions of your medical record. 10. Click the Download Summary menu link to download a portable copy of your medical information. If you have questions, please visit the Frequently Asked Questions section of the [employer] website. Remember, B[employer] is NOT to be used for urgent needs. For medical emergencies, dial 911. Now available from your iPhone and Android! Please provide this summary of care documentation to your next provider. Your primary care clinician is listed as HUGO Nelson. If you have any questions after today's visit, please call [phone].

## 2018-09-25 NOTE — PROGRESS NOTES
Pt states last had something for pain on Sunday 9/23/18. Pt states pain level is a 9/10 left wrist, right hip and knee    Pt is here for   Chief Complaint   Patient presents with    Wrist Pain     left wrist.. pt states that she has cyst removed from the left arm and she thinks it could be in the wrist as well     Hip Pain     right hip    Medication Refill     orders pending     Depression       1. Have you been to the ER, urgent care clinic since your last visit? Hospitalized since your last visit? No    2. Have you seen or consulted any other health care providers outside of the [address] [place] [address] since your last visit? Include any pap smears or colon screening. Joanne    Bryant Hennessy is a 52 y.o. female who presents for routine immunizations. She denies any symptoms , reactions or allergies that would exclude them from being immunized today. Risks and adverse reactions were discussed and the VIS was given to them. All questions were addressed. She was observed for 10 min post injection. There were no reactions observed. Jayy Medina LPN

## 2018-09-25 NOTE — PROGRESS NOTES
Encounter for pain management. Chronic Pain:  Patient has chronic BL knee pain for years, had right TKR last year (2016). However, left shoulder pain worsening with intense pain in wrist following fall in August. Olive Arriola again last week, left leg gave out. Hit head on head board. No LOC. Pain Scale: 9/10. Had IMAGING for lumbar spine and right knee and has been seeing/seen by ORTHO. Sent info in to Physical Medicine, but won't get an appt for a few months. Last PT March 2017, continuing HEP. Pain in the left shoulder, wrist, knees, legs, and lower back is still limiting walking, sitting, reaching, lifting, and standing, exacerbated by forementioned acitivities to include stair climbing. Has tried prednisone, tramadol, injections, PT, gabapentin, cymbalta, and surgery in past with minimal relief. Pain has been controlled with Oxycodone, last taken Sunday, used couple extra after fall. The medication is kept safe by staying with her. Has NOT seen any other providers since last OV for pain medication. No significant changes to pain presentation since last OV. she is  able to do her normal daily activities. she reports the following adverse side effects: none. Least pain over the last week has been 7/10. Worst pain over the last week has been 10/10. Aberrant behaviors: None         Symptoms onset: problem is longstanding. Rheumatological ROS: ongoing significant pain which is stable and controlled by pain med. Response to treatment plan: waxing and waning. Review of Systems  Constitutional: negative for fevers, chills, anorexia and weight loss  Eyes:   negative for visual disturbance, drainage, and irritation  ENT:   +AR and environmental allergies.  negative for tinnitus,sore throat,ear pain,and hoarseness  Respiratory:  + asthma/COPD. negative for hemoptysis  CV:   negative for chest pain, palpitations, and lower extremity edema  GI:   negative for nausea, vomiting, diarrhea, abdominal pain, and melena  Endo:               negative for polyuria,polydipsia,polyphagia, and heat intolerance  Genitourinary: negative for frequency, urgency, dysuria, retention, and hematuria  Integument:  negative for rash, ulcerations, and pruritus  Hematologic:  negative for easy bruising and bleeding  Musculoskel: negative for  muscle weakness  Neurological:  negative for headaches, dizziness, vertigo,and memory problems  Behavl/Psych: +depression, on cymbalta and zoloft. negative for feelings of  suicide    1./2. Medical history/Past medical History   Past Medical History:   Diagnosis Date    Asthma     uses inhalers    Chronic obstructive pulmonary disease (HCC)     bronchitis    GERD (gastroesophageal reflux disease)     Hypertension     Ill-defined condition     environmental allergies     Ill-defined condition     Multiple body piercings; unable to remove tongue and lip piercings    Thyroid disease     hypo    Ventral hernia 12/31/2013     Past Surgical History:   Procedure Laterality Date    HX HEENT  2009    thyroidectomy    HX KNEE REPLACEMENT      R    HX MOHS PROCEDURES Right 3/8/11    HX OTHER SURGICAL      hiatal hernia repair    HX TUBAL LIGATION       Patient Active Problem List   Diagnosis Code    Ventral hernia K43.9    Chronic pain of right knee M25.561, G89.29    Chronic right shoulder pain M25.511, G89.29    Environmental and seasonal allergies J30.89    Ventral hernia without obstruction or gangrene K43.9    Primary osteoarthritis of right knee M17.11    Mixed simple and mucopurulent chronic bronchitis (HCC) J41.8    Acquired hypothyroidism E03.9    Chronic bilateral low back pain with right-sided sciatica M54.41, G89.29    Status post total right knee replacement Z96.651    Malignant hypertension I10    Mild single current episode of major depressive disorder ([facility] [address] F32.0    Pain management contract signed Z02.89    Chronic pain of left knee M25.562, G89.29    Moderate episode of recurrent major depressive disorder (HCC) F33.1    Psychophysiological insomnia F51.04    Severe obesity (BMI 35.0-39. 9) with comorbidity (HCC) E66.01    Post-tussive vomiting R11.10    Chronic use of opiate for therapeutic purpose Z79.891    Obesity, Class II, BMI 35-39.9 E66.9    Left wrist pain M25.532    Chronic left shoulder pain M25.512, G89.29       3. Applicable records from prior treatment providers are apart of [facility] under the media/encounters tab. 4. Diagnostic, therapeutic and laboratory results are available in the [facility] chart. 5. Consultation notes are available for review in the media/encounters tab of the [facility] chart. 6. Treatment goals include: pain control, improve activity level and function in regards to activities of daily living, and improved comfort level. Previously pt has been limited by pain in all these aspects. 7. The risks and benefits of treatment have been discussed at this office visit with the pt.  she understands that the medication has addictive potential.  Additionally the pt has been advised that narcotic pain medication may impair mental and/or physical ability required for performance of tasks such as driving or operating any other machinery. 8. Pt has an updated signed pain contract on file and can be found under the media section of the [facility] chart. 9. The pain contract is reviewed. Pain medication will be continued at the same dosage. Pill count: 4, last fill 9/6/18. Urine drug screening ordered/collected today. Diagnostic studies are not indicated at this time. Interventional procedure are not indicated at this time. Narcan order sent in past.       10. Medication prescibed is oxycodone every 24 hours PRN # 30 with ZERO refills for a 1 month supply.  was reviewed while planning for pain/anxiety management, no indications of drug diversion suspected.  Prescription history is NOT suspicious for controlled substance overuse. 11. Patient instructions have been reviewed in detail as outlined above and in the pain contract. 12. Re-evaluation is planned for 1 month      Current Outpatient Prescriptions   Medication Sig Dispense Refill    oxyCODONE-acetaminophen (PERCOCET 10)  mg per tablet May take 1 tab ONCE DAILY as needed for pain. Indications: Pain 30 Tab 0    metoprolol succinate (TOPROL-XL) 25 mg XL tablet TAKE 1 TABLET BY MOUTH DAILY. 90 Tab 3    gabapentin (NEURONTIN) 800 mg tablet TAKE 1 TAB BY MOUTH THREE (3) TIMES DAILY. 90 Tab 5    meloxicam (MOBIC) 15 mg tablet Take 1 Tab by mouth daily (with breakfast). 30 Tab 11    amLODIPine (NORVASC) 10 mg tablet TAKE 1 TABLET BY MOUTH EVERY DAY FOR HYPERTENSION 90 Tab 3    hydroCHLOROthiazide (HYDRODIURIL) 25 mg tablet TAKE 1 TAB BY MOUTH DAILY FOR HYPERTENSION 30 Tab 6    levothyroxine (SYNTHROID) 125 mcg tablet TAKE 1 TABLET BY MOUTH EVERY DAY BEFORE BREAKFAST 90 Tab 1    methocarbamol (ROBAXIN) 750 mg tablet TAKE 1 TAB BY MOUTH FOUR (4) TIMES DAILY AS NEEDED FOR PAIN (SPASMS). 60 Tab 1    diphenhydrAMINE (BENADRYL) 25 mg capsule Take 1 Cap by mouth every six (6) hours as needed. 20 Cap 0    ondansetron (ZOFRAN ODT) 4 mg disintegrating tablet Take 1 Tab by mouth every eight (8) hours as needed for Nausea. 20 Tab 1    naloxone (NARCAN) 4 mg/actuation nasal spray Use 1 spray into 1 nostril for OVERDOSE. Call 911. For subsequent doses, give in alternating nostrils. May repeat every 2 to 3 min. 2 Each 0    traZODone (DESYREL) 50 mg tablet Take 1-2 tabs every night for sleep. 60 Tab 11    montelukast (SINGULAIR) 10 mg tablet Take 1 Tab by mouth daily. 30 Tab 6    pantoprazole (PROTONIX) 40 mg tablet TAKE 1 TABLET BY MOUTH ONCE EVERY DAY AS DIRECTED 90 Tab 2    albuterol (PROVENTIL VENTOLIN) 2.5 mg /3 mL (0.083 %) nebulizer solution 3 mL by Nebulization route every four (4) hours as needed for Wheezing.  24 Each 2    acetaminophen (TYLENOL) 650 mg TbER TAKE 1 TAB BY MOUTH THREE (3) TIMES DAILY AS NEEDED FOR PAIN. 90 Tab 0    sertraline (ZOLOFT) 100 mg tablet Take 1 Tab by mouth daily. 30 Tab 11    diclofenac (VOLTAREN) 1 % gel Apply 2 g to affected area every six (6) hours. 100 g 5    lidocaine (LIDODERM) 5 % Apply patch to the affected area for 12 hours a day and remove for 12 hours a day. 30 Each 11    fluticasone (FLONASE) 50 mcg/actuation nasal spray 2 Sprays by Both Nostrils route daily. 1 Bottle 11    miscellaneous medical supply misc Shower seat for chronic knee pain, pt planning to have right TKR in June due to condition. Very limited range of motion. 1 Each 0    loratadine (CLARITIN) 10 mg tablet Take 1 Tab by mouth daily. 30 Tab 5    budesonide-formoterol (SYMBICORT) 160-4.5 mcg/actuation HFA inhaler Take 2 Puffs by inhalation two (2) times a day.  albuterol (PROAIR HFA) 90 mcg/actuation inhaler Take 2 Puffs by inhalation every four (4) hours as needed. Allergies   Allergen Reactions    Hydrocodone Itching    Mold Shortness of Breath and Itching    Ibuprofen Nausea Only       Objective:  Visit Vitals    /77 (BP 1 Location: Right arm, BP Patient Position: Sitting)    Pulse 73    Temp 97.2 °F (36.2 °C) (Oral)    Resp 18    Ht 5' 1\" (1.549 m)    Wt 186 lb 6.4 oz (84.6 kg)    BMI 35.22 kg/m2     Wt Readings from Last 3 Encounters:   09/25/18 186 lb 6.4 oz (84.6 kg)   08/28/18 181 lb 9.6 oz (82.4 kg)   07/19/18 183 lb (83 kg)     Physical Exam:   General appearance - alert, well appearing, and in mild distress. Mental status - A/O x 4, sad mood and affect. Chest - CTA. Symmetric chest rise. No wheezing, rales or rhonchi. Heart - Normal rate, regular rhythm. Normal S1, S2. No MGR or clicks. Abd- soft, obese, non-distended. Normoactive BS. NT, no masses. Ext- Radial, DP pulses, 2+ bilaterally. No edema, clubbing or cyanosis. Skin-Warm and dry. No hyperpigmentation, ulcerations, or suspicious lesions.   Neuro - normal speech, no focal findings or movement disorder. Normal strength and muscle tone. Limping, antalgic gait using stroller. Left Knee- LROM. NT. Right knee with medial effusion and LROM. TTP. Back- alignment midline. Thoracic spinal and right lumbar paraspinal tenderness. No CVAT. LROM, +SLR. Left shoulder TTP to Ac joint, posterior and anterior bursa, LROM to internal/external rotation and lateral elevation at 60 degrees. Assessment/Plan:  Imaging ordered. PT prescribed. INI in 2-4 weeks, inj offered. Counseled on med misuse, only by 1 however. Medication Side Effects and Warnings were discussed with patient: yes   Patient Labs were reviewed: yes  Patient Past Records were reviewed: yes    See below for other orders   Follow-up Disposition: Not on File      ICD-10-CM ICD-9-CM    1. Primary osteoarthritis of right knee M17.11 715.16 oxyCODONE-acetaminophen (PERCOCET 10)  mg per tablet      TOXASSURE SELECT 13 (MW)   2. Status post total right knee replacement Z96.651 V43.65 oxyCODONE-acetaminophen (PERCOCET 10)  mg per tablet      TOXASSURE SELECT 13 (MW)   3. Moderate episode of recurrent major depressive disorder (HCC) F33.1 296.32 REFERRAL TO PSYCHIATRY   4. Midline low back pain with right-sided sciatica, unspecified chronicity M54.41 724.3 TOXASSURE SELECT 13 (MW)   5. Chronic use of opiate for therapeutic purpose Z79.891 V58.69 TOXASSURE SELECT 13 (MW)   6. Chronic left shoulder pain M25.512 719.41 TOXASSURE SELECT 13 (MW)    G89.29 338.29 XR SHOULDER LT AP/LAT MIN 2 V      REFERRAL TO PHYSICAL THERAPY   7. Left wrist pain M25.532 719.43 REFERRAL TO PHYSICAL THERAPY      XR SPINE CERV 4 OR 5 V   8. Obesity, Class II, BMI 35-39.9 E66.9 278.00 TSH 3RD GENERATION      HEMOGLOBIN A1C WITH EAG   9. Screening for lipid disorders Z13.220 V77.91 LIPID PANEL   10.  Screening for endocrine, nutritional, metabolic and immunity disorder Z13.29 V77.99 TSH 3RD GENERATION    E76.28  METABOLIC PANEL, COMPREHENSIVE    Z13.228  CBC WITH AUTOMATED DIFF    Z13.0  HEMOGLOBIN A1C WITH EAG   11. History of recent fall Z91.81 V15.88    12. Encounter for immunization Z23 V03.89 INFLUENZA VIRUS VAC QUAD,SPLIT,PRESV FREE SYRINGE IM     Orders Placed This Encounter    XR SHOULDER LT AP/LAT MIN 2 V     Standing Status:   Future     Standing Expiration Date:   10/25/2019     Order Specific Question:   Reason for Exam     Answer:   left shoulder pain    XR SPINE CERV 4 OR 5 V     Standing Status:   Future     Standing Expiration Date:   10/25/2019     Order Specific Question:   Reason for Exam     Answer:   left shoulder and wrist pain. r/o cervical radiculopathy    INFLUENZA VIRUS VACCINE QUADRIVALENT, PRESERVATIVE FREE SYRINGE (62505)    TOXASSURE SELECT 13 (MW)    LIPID PANEL    TSH 3RD GENERATION    METABOLIC PANEL, COMPREHENSIVE    CBC WITH AUTOMATED DIFF    HEMOGLOBIN A1C WITH EAG    REFERRAL TO PSYCHIATRY     Referral Priority:   Routine     Referral Type:   Behavioral Health     Referral Reason:   Specialty Services Required     Referred to Provider:   Ebony Lee NP    REFERRAL TO PHYSICAL THERAPY     Referral Priority:   Routine     Referral Type:   PT/OT/ST     Referral Reason:   Specialty Services Required    oxyCODONE-acetaminophen (PERCOCET 10)  mg per tablet     Sig: May take 1 tab ONCE DAILY as needed for pain. Indications: Pain     Dispense:  30 Tab     Refill:  0       Liliya Sykes expressed understanding of plan. An After Visit Summary was offered/printed and given to the patient.

## 2018-09-26 DIAGNOSIS — M19.012 PRIMARY OSTEOARTHRITIS OF LEFT SHOULDER: ICD-10-CM

## 2018-09-26 PROBLEM — M47.22 OSTEOARTHRITIS OF SPINE WITH RADICULOPATHY, CERVICAL REGION: Status: ACTIVE | Noted: 2018-09-26

## 2018-09-26 LAB
ALBUMIN SERPL-MCNC: 3.8 G/DL (ref 3.5–5.5)
ALBUMIN/GLOB SERPL: 1.3 {RATIO} (ref 1.2–2.2)
ALP SERPL-CCNC: 112 IU/L (ref 39–117)
ALT SERPL-CCNC: 9 IU/L (ref 0–32)
AST SERPL-CCNC: 18 IU/L (ref 0–40)
BASOPHILS # BLD AUTO: 0 X10E3/UL (ref 0–0.2)
BASOPHILS NFR BLD AUTO: 0 %
BILIRUB SERPL-MCNC: 0.4 MG/DL (ref 0–1.2)
BUN SERPL-MCNC: 15 MG/DL (ref 6–24)
BUN/CREAT SERPL: 18 (ref 9–23)
CALCIUM SERPL-MCNC: 8.9 MG/DL (ref 8.7–10.2)
CHLORIDE SERPL-SCNC: 103 MMOL/L (ref 96–106)
CHOLEST SERPL-MCNC: 179 MG/DL (ref 100–199)
CO2 SERPL-SCNC: 24 MMOL/L (ref 20–29)
CREAT SERPL-MCNC: 0.85 MG/DL (ref 0.57–1)
EOSINOPHIL # BLD AUTO: 0.2 X10E3/UL (ref 0–0.4)
EOSINOPHIL NFR BLD AUTO: 4 %
ERYTHROCYTE [DISTWIDTH] IN BLOOD BY AUTOMATED COUNT: 15.2 % (ref 12.3–15.4)
EST. AVERAGE GLUCOSE BLD GHB EST-MCNC: 108 MG/DL
GLOBULIN SER CALC-MCNC: 2.9 G/DL (ref 1.5–4.5)
GLUCOSE SERPL-MCNC: 96 MG/DL (ref 65–99)
HBA1C MFR BLD: 5.4 % (ref 4.8–5.6)
HCT VFR BLD AUTO: 42.3 % (ref 34–46.6)
HDLC SERPL-MCNC: 51 MG/DL
HGB BLD-MCNC: 13.8 G/DL (ref 11.1–15.9)
IMM GRANULOCYTES # BLD: 0 X10E3/UL (ref 0–0.1)
IMM GRANULOCYTES NFR BLD: 0 %
INTERPRETATION, 910389: NORMAL
LDLC SERPL CALC-MCNC: 108 MG/DL (ref 0–99)
LYMPHOCYTES # BLD AUTO: 2.1 X10E3/UL (ref 0.7–3.1)
LYMPHOCYTES NFR BLD AUTO: 50 %
MCH RBC QN AUTO: 28.7 PG (ref 26.6–33)
MCHC RBC AUTO-ENTMCNC: 32.6 G/DL (ref 31.5–35.7)
MCV RBC AUTO: 88 FL (ref 79–97)
MONOCYTES # BLD AUTO: 0.4 X10E3/UL (ref 0.1–0.9)
MONOCYTES NFR BLD AUTO: 11 %
NEUTROPHILS # BLD AUTO: 1.5 X10E3/UL (ref 1.4–7)
NEUTROPHILS NFR BLD AUTO: 35 %
PLATELET # BLD AUTO: 210 X10E3/UL (ref 150–379)
POTASSIUM SERPL-SCNC: 3.7 MMOL/L (ref 3.5–5.2)
PROT SERPL-MCNC: 6.7 G/DL (ref 6–8.5)
RBC # BLD AUTO: 4.81 X10E6/UL (ref 3.77–5.28)
SODIUM SERPL-SCNC: 141 MMOL/L (ref 134–144)
TRIGL SERPL-MCNC: 100 MG/DL (ref 0–149)
TSH SERPL DL<=0.005 MIU/L-ACNC: 2.25 UIU/ML (ref 0.45–4.5)
VLDLC SERPL CALC-MCNC: 20 MG/DL (ref 5–40)
WBC # BLD AUTO: 4.2 X10E3/UL (ref 3.4–10.8)

## 2018-10-01 LAB — DRUGS UR: NORMAL

## 2018-10-03 ENCOUNTER — HOSPITAL ENCOUNTER (OUTPATIENT)
Dept: PHYSICAL THERAPY | Age: 53
Discharge: HOME | End: 2018-10-03
Payer: SUBSIDIZED

## 2018-10-03 PROCEDURE — 97161 PT EVAL LOW COMPLEX 20 MIN: CPT | Performed by: PHYSICAL THERAPIST

## 2018-10-03 PROCEDURE — 97110 THERAPEUTIC EXERCISES: CPT | Performed by: PHYSICAL THERAPIST
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
## 2018-10-08 ENCOUNTER — HOSPITAL ENCOUNTER (OUTPATIENT)
Dept: PHYSICAL THERAPY | Age: 53
Discharge: HOME | End: 2018-10-08
Payer: SUBSIDIZED

## 2018-10-08 PROCEDURE — 97140 MANUAL THERAPY 1/> REGIONS: CPT | Performed by: PHYSICAL THERAPIST

## 2018-10-08 PROCEDURE — 97110 THERAPEUTIC EXERCISES: CPT | Performed by: PHYSICAL THERAPIST

## 2018-10-08 NOTE — PROGRESS NOTES
[facility]  [address], [address] [facility]    OUTPATIENT physical Therapy DAILY Treatment NOTe  Visit: 2    NAME: Robert Larose AGE: 52 y.o. GENDER: female  DATE: 10/8/2018  REFERRING PHYSICIAN: Adele Lopez NP      GOALS  Short term goals  Time frame: 3 weeks  1. Patient will be compliant and independent with the initial HEP as evidenced by being able to perform without cueing. 2. Patient will report a 25% improvement in symptoms. 3. Patient report a 25% improvement in sleeping. 4. Patient will have a 10 degree increase in left shoulder flexion ROM to allow ease with ADLs. 5. Patient will report decreased intensity of radicular symptoms down LUE. 6. Patient will tolerate 10 minutes of clinic activities before increase of symptoms.      Long term goals  Time frame: 6 weeks  1. Patient will report pain level decrease to 6/10 to allow increased ease of movement. 2. Patient will have an improved score on the QuickDASH outcome measure by 15 points to demonstrate an increase in functional activity tolerance. 3. Patient will be independent in final individualized HEP. 4. Patient will have an increase in left shoulder ROM to 140 degrees to allow performance of household tasks. 5. Patient will have an increase in left shoulder flexion strength to 3+/5 to allow performance of household tasks. 6. Patient will return to pushing stroller without being limited by symptoms. 7. Patient will sleep 6-8 hours without being interrupted by pain. SUBJECTIVE:   \"I got a wrist brace and I think that's helped. \"    Pain In: 7-8/10 left shoulder and left wrist     OBJECTIVE DATA SUMMARY:   Objective data from initial evaluation:  EXAMINATION/PRESENTATION/DECISION MAKING:   Pain:   Location: Left shoulder; left wrist  Quality: aching and dull  Now: 10/10  Factors that improve pain: medication: used and beneficial     Strength:   RUE: 5/5     Left UE:  Shoulder flexion and abduction: unable to test secondary to increased pain and limited ROM  Shoulder IR: 4/5; mild pain  Shoulder ER: 3+/5; reports pain at left UT  Elbow flexion: 4/5; pain  Elbow extension: 4/5   Wrist flexion: 3+/5; pain  Wrist extension: 3+/5; pain  Radial deviation: 4/5; pain  Ulnar deviation: 4/5; pain      Dynamometer:  Right : 40 lbs   Left : 25 lbs      Range of Motion:   Left shoulder AAROM in supine:   Flexion: 105 degrees; pain  Abduction: 95 degrees; pain  IR: 50 degrees; pain  ER: 30 degrees; pain     Joint Mobility:   Unable to tolerate     Palpation:   Patient tender to palpation at left UT, infraspinatus, supraspinatus, and biceps tendon     Neurologic Assessment:                         Tone: Normal                         Sensation: Patient reports tingling down LUE from shoulder to digits 2,3,4 of left hand                         Reflexes: NT     Mobility:                         Transitional Movements: Increased time to perform                          Gait: Patient ambulates with cane secondary to sciatica (LLE)     Balance: WNL     Functional Measure:   QuickDASH: 88.6% impaired     TREATMENT/INTERVENTION:  Modalities (Rationale): None this date     Therapeutic Exercises:  Initial HEP provided 10/3/18:  strengthening ( yellow putty dispensed), UT stretch, Bicep curls, shoulder blade squeeze, shoulder ER stretch, Shoulder flexion stretch over table     UT stretch: 2 reps with 30 second holds  Biceps curls: 10 reps  Shoulder blade squeeze: 10 reps  Shoulder ER stretch: 5 reps  Shoulder flexion stretch over table: 5 reps  Serratus punches: 10 reps    Wrist flexion/extensio: 10 reps  Wrist radial and ulnar deviation: 10 reps     Manual Therapy:  Gentle left shoulder PROM into flexion, abduction, ER/IR  Dorsal and volar glides of carpal bones, left    Unable to tolerate GH joint mobs    Neuromuscular Reeducation:   Kinesiotape applied in two vertical curved strips at left elbow.  Patient educated on purpose and removal of tape with good understanding verbalized.      Activity tolerance and post treatment pain report:   Fair; 7/10    Education:  Education was provided to the patient on the following topics. [x]    No changes were made to the home exercise program.  []    The following changes were made to the home exercise program  Patient verbalized understanding of the topics presented. ASSESSMENT:   Patient returns following initial evaluation. Patient presents with decreased left shoulder, elbow, and wrist pain to a 7-8/10 today. Patient with improved tolerance to exercises today but remain fairly limited secondary to pain. Patient remains limited with left shoulder ROM secondary to generalized pain throughout left shoulder. Patient bought a wrist brace as recommended and has noted a reduction in pain. Tingling reported in digits 2-5 of left hand. Kinesiotape applied to left elbow for support and pain relief. Patients progression toward goals is as follows:  [x]     Improving appropriately and progressing toward goals  []     Improving slowly and progressing toward goals  []     Not making progress toward goals and plan of care will be adjusted    PLAN OF CARE:   Patient continues to benefit from skilled intervention to address the above impairments. [x]    Continue treatment per established plan of care.   []     Recommend the following changes to the plan of care    Recommendations/Intent for next treatment: reassess kinesiotape    Maximino Billingsley, PT   Time Calculation: 30 mins  Patient Time in clinic:   Start Time: 0930   Stop Time: 1000

## 2018-10-12 ENCOUNTER — HOSPITAL ENCOUNTER (OUTPATIENT)
Dept: PHYSICAL THERAPY | Age: 53
Discharge: HOME | End: 2018-10-12
Payer: SUBSIDIZED
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
## 2018-10-15 ENCOUNTER — HOSPITAL ENCOUNTER (OUTPATIENT)
Dept: PHYSICAL THERAPY | Age: 53
Discharge: HOME | End: 2018-10-15
Payer: SUBSIDIZED

## 2018-10-15 PROCEDURE — 97112 NEUROMUSCULAR REEDUCATION: CPT | Performed by: PHYSICAL THERAPIST

## 2018-10-15 PROCEDURE — 97110 THERAPEUTIC EXERCISES: CPT | Performed by: PHYSICAL THERAPIST

## 2018-10-15 NOTE — PROGRESS NOTES
[facility]  [address], [address] [facility]    OUTPATIENT physical Therapy DAILY Treatment NOTe  Visit: 3    NAME: Mauricia Schilder AGE: 52 y.o. GENDER: female  DATE: 10/15/2018  REFERRING PHYSICIAN: Gladis Guaman NP      GOALS  Short term goals  Time frame: 3 weeks  1. Patient will be compliant and independent with the initial HEP as evidenced by being able to perform without cueing. 2. Patient will report a 25% improvement in symptoms. 3. Patient report a 25% improvement in sleeping. 4. Patient will have a 10 degree increase in left shoulder flexion ROM to allow ease with ADLs. 5. Patient will report decreased intensity of radicular symptoms down LUE. 6. Patient will tolerate 10 minutes of clinic activities before increase of symptoms.      Long term goals  Time frame: 6 weeks  1. Patient will report pain level decrease to 6/10 to allow increased ease of movement. 2. Patient will have an improved score on the QuickDASH outcome measure by 15 points to demonstrate an increase in functional activity tolerance. 3. Patient will be independent in final individualized HEP. 4. Patient will have an increase in left shoulder ROM to 140 degrees to allow performance of household tasks. 5. Patient will have an increase in left shoulder flexion strength to 3+/5 to allow performance of household tasks. 6. Patient will return to pushing stroller without being limited by symptoms. 7. Patient will sleep 6-8 hours without being interrupted by pain. SUBJECTIVE:   \"The wrist brace and tape on my elbow has helped but my shoulder hurts a lot today. \"    Pain In: 7/10 left elbow and left wrist; 10/10 left shoulder    OBJECTIVE DATA SUMMARY:   Objective data from initial evaluation:  EXAMINATION/PRESENTATION/DECISION MAKING:   Pain:   Location: Left shoulder; left wrist  Quality: aching and dull  Now: 10/10  Factors that improve pain: medication: used and beneficial     Strength: RUE: 5/5     Left UE:  Shoulder flexion and abduction: unable to test secondary to increased pain and limited ROM  Shoulder IR: 4/5; mild pain  Shoulder ER: 3+/5; reports pain at left UT  Elbow flexion: 4/5; pain  Elbow extension: 4/5   Wrist flexion: 3+/5; pain  Wrist extension: 3+/5; pain  Radial deviation: 4/5; pain  Ulnar deviation: 4/5; pain      Dynamometer:  Right : 40 lbs   Left : 25 lbs      Range of Motion:   Left shoulder AAROM in supine:   Flexion: 105 degrees; pain  Abduction: 95 degrees; pain  IR: 50 degrees; pain  ER: 30 degrees; pain     Joint Mobility:   Unable to tolerate     Palpation:   Patient tender to palpation at left UT, infraspinatus, supraspinatus, and biceps tendon     Neurologic Assessment:                         Tone: Normal                         Sensation: Patient reports tingling down LUE from shoulder to digits 2,3,4 of left hand                         Reflexes: NT     Mobility:                         Transitional Movements: Increased time to perform                          Gait: Patient ambulates with cane secondary to sciatica (LLE)     Balance:   WNL     Functional Measure:   QuickDASH: 88.6% impaired     TREATMENT/INTERVENTION:  Modalities (Rationale): None this date     Therapeutic Exercises:  Initial HEP provided 10/3/18:  strengthening ( yellow putty dispensed), UT stretch, Bicep curls, shoulder blade squeeze, shoulder ER stretch, Shoulder flexion stretch over table    Exercises in BOLD performed this date:     UT stretch: 2 reps with 30 second holds  Biceps curls: 10 reps  Shoulder blade squeeze: 10 reps  Shoulder ER stretch: 5 reps   Shoulder flexion stretch over table: 5 reps  Serratus punches: 10 reps  Pulleys: 15 reps    Wrist flexion/extension: 10 reps  Wrist radial and ulnar deviation: 10 reps  Forearm supination/pronation: 15 reps     Manual Therapy:  Gentle left shoulder PROM into flexion, abduction, ER/IR  Dorsal and volar glides of carpal bones, left   Unable to tolerate GH joint mobs    Neuromuscular Reeducation:   Kinesiotape applied in two vertical curved strips at left elbow. Patient educated on purpose and removal of tape with good understanding verbalized. Also applied kinesiotape from anterior shoulder to mid scapula in one horizontal strip.      Activity tolerance and post treatment pain report:   Fair; 7/10    Education:  Education was provided to the patient on the following topics. [x]    No changes were made to the home exercise program.  []    The following changes were made to the home exercise program  Patient verbalized understanding of the topics presented. ASSESSMENT:   Patient presents with decreased left elbow and wrist pain at a 7/10 today, but continued left shoulder pain at 10/10. Patient with improved tolerance to exercises today but remain fairly limited secondary to pain. Patient remains limited with left shoulder ROM secondary to generalized pain throughout left shoulder. Patient bought a wrist brace as recommended and has noted a reduction in pain. Tingling reported in digits 2-5 of left hand. Kinesiotape applied to left elbow for support and pain relief. Also added kinesiotape to left shoulder today; reassess next session. Patients progression toward goals is as follows:  [x]     Improving appropriately and progressing toward goals  []     Improving slowly and progressing toward goals  []     Not making progress toward goals and plan of care will be adjusted    PLAN OF CARE:   Patient continues to benefit from skilled intervention to address the above impairments. [x]    Continue treatment per established plan of care.   []     Recommend the following changes to the plan of care    Recommendations/Intent for next treatment: reassess kinesiotape    Sanchez Wilson PT   Time Calculation: 35 mins  Patient Time in clinic:   Start Time: 7737   Stop Time: 1010

## 2018-10-18 ENCOUNTER — HOSPITAL ENCOUNTER (OUTPATIENT)
Dept: PHYSICAL THERAPY | Age: 53
Discharge: HOME | End: 2018-10-18
Payer: SUBSIDIZED

## 2018-10-18 PROCEDURE — 97110 THERAPEUTIC EXERCISES: CPT

## 2018-10-18 NOTE — PROGRESS NOTES
[facility]  [address], [address] [facility]    OUTPATIENT physical Therapy DAILY Treatment NOTe  Visit: 4    NAME: Ignacio Mathias AGE: 52 y.o. GENDER: female  DATE: 10/18/2018  REFERRING PHYSICIAN: Freida Bright NP      GOALS  Short term goals  Time frame: 3 weeks  1. Patient will be compliant and independent with the initial HEP as evidenced by being able to perform without cueing. 2. Patient will report a 25% improvement in symptoms. 3. Patient report a 25% improvement in sleeping. 4. Patient will have a 10 degree increase in left shoulder flexion ROM to allow ease with ADLs. 5. Patient will report decreased intensity of radicular symptoms down LUE. 6. Patient will tolerate 10 minutes of clinic activities before increase of symptoms.      Long term goals  Time frame: 6 weeks  1. Patient will report pain level decrease to 6/10 to allow increased ease of movement. 2. Patient will have an improved score on the QuickDASH outcome measure by 15 points to demonstrate an increase in functional activity tolerance. 3. Patient will be independent in final individualized HEP. 4. Patient will have an increase in left shoulder ROM to 140 degrees to allow performance of household tasks. 5. Patient will have an increase in left shoulder flexion strength to 3+/5 to allow performance of household tasks. 6. Patient will return to pushing stroller without being limited by symptoms. 7. Patient will sleep 6-8 hours without being interrupted by pain. SUBJECTIVE:   \"The tape really seems to be helping. \" My shoulder is bothering me today and this one is starting too. \"    Pain In: 8/10 left elbow and left wrist; 10/10 left shoulder  Patient arrived 14 minutes late to appointment with an infant under her care. Worked within available time and until her grand daughter started to cry.    OBJECTIVE DATA SUMMARY:   Objective data from initial evaluation:  EXAMINATION/PRESENTATION/DECISION MAKING:   Pain:   Location: Left shoulder; left wrist  Quality: aching and dull  Now: 10/10  Factors that improve pain: medication: used and beneficial     Strength:   RUE: 5/5     Left UE:  Shoulder flexion and abduction: unable to test secondary to increased pain and limited ROM  Shoulder IR: 4/5; mild pain  Shoulder ER: 3+/5; reports pain at left UT  Elbow flexion: 4/5; pain  Elbow extension: 4/5   Wrist flexion: 3+/5; pain  Wrist extension: 3+/5; pain  Radial deviation: 4/5; pain  Ulnar deviation: 4/5; pain      Dynamometer:  Right : 40 lbs   Left : 25 lbs      Range of Motion:   Left shoulder AAROM in supine:   Flexion: 105 degrees; pain  Abduction: 95 degrees; pain  IR: 50 degrees; pain  ER: 30 degrees; pain     Joint Mobility:   Unable to tolerate     Palpation:   Patient tender to palpation at left UT, infraspinatus, supraspinatus, and biceps tendon     Neurologic Assessment:                         Tone: Normal                         Sensation: Patient reports tingling down LUE from shoulder to digits 2,3,4 of left hand                         Reflexes: NT     Mobility:                         Transitional Movements: Increased time to perform                          Gait: Patient ambulates with cane secondary to sciatica (LLE)     Balance:   WNL     Functional Measure:   QuickDASH: 88.6% impaired     TREATMENT/INTERVENTION:  Modalities (Rationale): None this date     Therapeutic Exercises:  Initial HEP provided 10/3/18:  strengthening ( yellow putty dispensed), UT stretch, Bicep curls, shoulder blade squeeze, shoulder ER stretch, Shoulder flexion stretch over table    Exercises in BOLD performed this date:     UT stretch: 2 reps with 30 second holds  Biceps curls: 10 reps  Shoulder blade squeeze: 10 reps  Shoulder ER stretch: 5 reps   Shoulder flexion stretch over table: 5 reps  Serratus punches: 10 reps  Pulleys: 15 reps flexion and scaption    Wrist flexion/extension: 10 reps  Wrist radial and ulnar deviation: 10 reps  Forearm supination/pronation: 15 reps     Manual Therapy:  Gentle left shoulder PROM into flexion, abduction, ER/IR    Dorsal and volar glides of carpal bones, left   Unable to tolerate GH joint mobs    Neuromuscular Reeducation:   Kinesiotape applied in two vertical curved strips at left elbow. Patient educated on purpose and removal of tape with good understanding verbalized. Also applied kinesiotape from anterior shoulder to mid scapula in one horizontal strip. Remained intact from last visit and left     Activity tolerance and post treatment pain report:   Fair; 7/10    Education:  Education was provided to the patient on the following topics. [x]    No changes were made to the home exercise program.  []    The following changes were made to the home exercise program  Patient verbalized understanding of the topics presented. ASSESSMENT:   Intervention limited today d/t late arrival and the patient brought her infant grand daughter with her. She indicates compliance with home exercise and good improvement in symptoms with application of kinesiotape and her wrist brace. Her shoulder ROM remains grossly impaired by pain that is slowly improving. Patients progression toward goals is as follows:  [x]     Improving appropriately and progressing toward goals  []     Improving slowly and progressing toward goals  []     Not making progress toward goals and plan of care will be adjusted    PLAN OF CARE:   Patient continues to benefit from skilled intervention to address the above impairments. [x]    Continue treatment per established plan of care.   []     Recommend the following changes to the plan of care    Recommendations/Intent for next treatment: reassess kinesiotape    Yohana Restrepo PT, DPT   Time Calculation: 28 mins  Patient Time in clinic:   Start Time: 9441   Stop Time: 1013

## 2018-10-22 ENCOUNTER — HOSPITAL ENCOUNTER (OUTPATIENT)
Dept: PHYSICAL THERAPY | Age: 53
Discharge: HOME | End: 2018-10-22
Payer: SUBSIDIZED

## 2018-10-22 PROCEDURE — 97110 THERAPEUTIC EXERCISES: CPT | Performed by: PHYSICAL THERAPIST

## 2018-10-22 NOTE — PROGRESS NOTES
[facility]  [address], [address] [facility]    OUTPATIENT physical Therapy DAILY Treatment NOTe  Visit: 5    NAME: Mello Craft AGE: 52 y.o. GENDER: female  DATE: 10/22/2018  REFERRING PHYSICIAN: Yamilka Harris NP      GOALS  Short term goals  Time frame: 3 weeks  1. Patient will be compliant and independent with the initial HEP as evidenced by being able to perform without cueing. 2. Patient will report a 25% improvement in symptoms. 3. Patient report a 25% improvement in sleeping. 4. Patient will have a 10 degree increase in left shoulder flexion ROM to allow ease with ADLs. 5. Patient will report decreased intensity of radicular symptoms down LUE. 6. Patient will tolerate 10 minutes of clinic activities before increase of symptoms.      Long term goals  Time frame: 6 weeks  1. Patient will report pain level decrease to 6/10 to allow increased ease of movement. 2. Patient will have an improved score on the QuickDASH outcome measure by 15 points to demonstrate an increase in functional activity tolerance. 3. Patient will be independent in final individualized HEP. 4. Patient will have an increase in left shoulder ROM to 140 degrees to allow performance of household tasks. 5. Patient will have an increase in left shoulder flexion strength to 3+/5 to allow performance of household tasks. 6. Patient will return to pushing stroller without being limited by symptoms. 7. Patient will sleep 6-8 hours without being interrupted by pain. SUBJECTIVE:   \"The tape's been helping for the elbow and shoulder\"    Pain In: 7.5/10 left elbow and left wrist; 8.5/10 left shoulder    Patient arrived 27 minutes late to session.    OBJECTIVE DATA SUMMARY:   Objective data from initial evaluation:  EXAMINATION/PRESENTATION/DECISION MAKING:   Pain:   Location: Left shoulder; left wrist  Quality: aching and dull  Now: 10/10  Factors that improve pain: medication: used and beneficial     Strength:   RUE: 5/5     Left UE:  Shoulder flexion and abduction: unable to test secondary to increased pain and limited ROM  Shoulder IR: 4/5; mild pain  Shoulder ER: 3+/5; reports pain at left UT  Elbow flexion: 4/5; pain  Elbow extension: 4/5   Wrist flexion: 3+/5; pain  Wrist extension: 3+/5; pain  Radial deviation: 4/5; pain  Ulnar deviation: 4/5; pain      Dynamometer:  Right : 40 lbs   Left : 25 lbs      Range of Motion:   Left shoulder AAROM in supine:   Flexion: 105 degrees; pain  Abduction: 95 degrees; pain  IR: 50 degrees; pain  ER: 30 degrees; pain     Joint Mobility:   Unable to tolerate     Palpation:   Patient tender to palpation at left UT, infraspinatus, supraspinatus, and biceps tendon     Neurologic Assessment:                         Tone: Normal                         Sensation: Patient reports tingling down LUE from shoulder to digits 2,3,4 of left hand                         Reflexes: NT     Mobility:                         Transitional Movements: Increased time to perform                          Gait: Patient ambulates with cane secondary to sciatica (LLE)     Balance:   WNL     Functional Measure:   QuickDASH: 88.6% impaired     TREATMENT/INTERVENTION:  Modalities (Rationale): None this date     Therapeutic Exercises:  Initial HEP provided 10/3/18:  strengthening ( yellow putty dispensed), UT stretch, Bicep curls, shoulder blade squeeze, shoulder ER stretch, Shoulder flexion stretch over table    Exercises in BOLD performed this date:     UT stretch: 2 reps with 30 second holds  Biceps curls: 10 reps  Shoulder blade squeeze: 10 reps  Shoulder ER stretch: 5 reps   Shoulder scaption stretch against wall: 5 reps  Shoulder flexion stretch over table: 5 reps  Serratus punches: 10 reps  Pulleys: 15 reps; flexion and scaption    Rows with yellow TB: 10 reps  Pull downs with yellow TB: 10 reps  Isotonic shoulder ER/IR with 1# weight: 10 reps    Wrist flexion/extension: 10 reps  Wrist radial and ulnar deviation: 10 reps  Forearm supination/pronation: 15 reps     Manual Therapy:  Gentle left shoulder PROM into flexion, abduction, ER/IR    Dorsal and volar glides of carpal bones, left   Unable to tolerate GH joint mobs    Neuromuscular Reeducation:   Kinesiotape applied in two vertical curved strips at left elbow. One piece of tape to supinator. Patient educated on purpose and removal of tape with good understanding verbalized. Also applied kinesiotape from anterior shoulder to mid scapula in one horizontal strip.      Activity tolerance and post treatment pain report:   Fair; improved tolerance to exercises; 7/10    Education:  Education was provided to the patient on the following topics. []    No changes were made to the home exercise program.  [x]    The following changes were made to the home exercise program: rows with TB (yellow TB dispensed); isotonic shoulder ER/IR; shoulder scaption stretch against wall  Patient verbalized understanding of the topics presented. ASSESSMENT:   Patient presents with 7.5/10 pain at left wrist and elbow and 8.5/10 pain at left shoulder. Patient reports good response to kinesiotaping at both left shoulder and elbow. Patient has experienced less pain in left wrist with wrist brace. She indicates compliance with home exercise. Her shoulder ROM remains grossly impaired by pain but improvement noted in pulleys this date. Patient tolerated additional exercises well today. Patients progression toward goals is as follows:  [x]     Improving appropriately and progressing toward goals  []     Improving slowly and progressing toward goals  []     Not making progress toward goals and plan of care will be adjusted    PLAN OF CARE:   Patient continues to benefit from skilled intervention to address the above impairments. [x]    Continue treatment per established plan of care.   []     Recommend the following changes to the plan of care    Recommendations/Intent for next treatment: reassess yvan Curry, PT   Time Calculation: 29 mins  Patient Time in clinic:   Start Time: 0957   Stop Time: 1026

## 2018-10-23 ENCOUNTER — OFFICE VISIT (OUTPATIENT)
Dept: INTERNAL MEDICINE CLINIC | Age: 53
End: 2018-10-23

## 2018-10-23 VITALS
BODY MASS INDEX: 36.44 KG/M2 | RESPIRATION RATE: 18 BRPM | TEMPERATURE: 97.2 F | DIASTOLIC BLOOD PRESSURE: 72 MMHG | WEIGHT: 193 LBS | HEIGHT: 61 IN | HEART RATE: 74 BPM | SYSTOLIC BLOOD PRESSURE: 106 MMHG

## 2018-10-23 DIAGNOSIS — Z79.891 CHRONIC USE OF OPIATE FOR THERAPEUTIC PURPOSE: ICD-10-CM

## 2018-10-23 DIAGNOSIS — M17.11 PRIMARY OSTEOARTHRITIS OF RIGHT KNEE: ICD-10-CM

## 2018-10-23 DIAGNOSIS — M19.012 PRIMARY OSTEOARTHRITIS OF LEFT SHOULDER: Primary | ICD-10-CM

## 2018-10-23 DIAGNOSIS — Z96.651 STATUS POST TOTAL RIGHT KNEE REPLACEMENT: ICD-10-CM

## 2018-10-23 RX ORDER — OXYCODONE AND ACETAMINOPHEN 10; 325 MG/1; MG/1
TABLET ORAL
Qty: 14 TAB | Refills: 0 | Status: SHIPPED | OUTPATIENT
Start: 2018-10-23 | End: 2018-11-16 | Stop reason: SDUPTHER

## 2018-10-23 NOTE — PROGRESS NOTES
Pt is here for   Chief Complaint   Patient presents with    Medication Refill     orders pending     Injection     left shoulder     Pt states pain level is a 9 knee and left shoulder. Pt last had something for pain 3 days ago    1. Have you been to the ER, urgent care clinic since your last visit? Hospitalized since your last visit? No    2. Have you seen or consulted any other health care providers outside of the [address] [place] [address] since your last visit? Include any pap smears or colon screening.  No

## 2018-10-23 NOTE — PATIENT INSTRUCTIONS
Shoulder Arthritis: Exercises  Your Care Instructions  Here are some examples of typical rehabilitation exercises for your condition. Start each exercise slowly. Ease off the exercise if you start to have pain. Your doctor or physical therapist will tell you when you can start these exercises and which ones will work best for you. How to do the exercises  Shoulder flexion (lying down)    1. Lie on your back, holding a wand with both hands. Your palms should face down as you hold the wand. 2. Keeping your elbows straight, slowly raise your arms over your head. Raise them until you feel a stretch in your shoulders, upper back, and chest.  3. Hold for 15 to 30 seconds. 4. Repeat 2 to 4 times. Shoulder rotation (lying down)    1. Lie on your back. Hold a wand with both hands with your elbows bent and palms up. 2. Keep your elbows close to your body, and move the wand across your body toward the sore arm. 3. Hold for 8 to 12 seconds. 4. Repeat 2 to 4 times. Shoulder internal rotation with towel    1. Hold a towel above and behind your head with the arm that is not sore. 2. With your sore arm, reach behind your back and grasp the towel. 3. With the arm above your head, pull the towel upward. Do this until you feel a stretch on the front and outside of your sore shoulder. 4. Hold 15 to 30 seconds. 5. Repeat 2 to 4 times. Shoulder blade squeeze    1. Stand with your arms at your sides, and squeeze your shoulder blades together. Do not raise your shoulders up as you squeeze. 2. Hold 6 seconds. 3. Repeat 8 to 12 times. Resisted rows    1. Put the band around a solid object at about waist level. (A bedpost will work well.) Each hand should hold an end of the band. 2. With your elbows at your sides and bent to 90 degrees, pull the band back. Your shoulder blades should move toward each other. Return to the starting position. 3. Repeat 8 to 12 times.     External rotator strengthening exercise    1. Start by tying a piece of elastic exercise material to a doorknob. You can use surgical tubing or Thera-Band. (You may also hold one end of the band in each hand.)  2. Stand or sit with your shoulder relaxed and your elbow bent 90 degrees. Your upper arm should rest comfortably against your side. Squeeze a rolled towel between your elbow and your body for comfort. This will help keep your arm at your side. 3. Hold one end of the elastic band with the hand of the painful arm. 4. Start with your forearm across your belly. Slowly rotate the forearm out away from your body. Keep your elbow and upper arm tucked against the towel roll or the side of your body until you begin to feel tightness in your shoulder. Slowly move your arm back to where you started. 5. Repeat 8 to 12 times. Internal rotator strengthening exercise    1. Start by tying a piece of elastic exercise material to a doorknob. You can use surgical tubing or Thera-Band. 2. Stand or sit with your shoulder relaxed and your elbow bent 90 degrees. Your upper arm should rest comfortably against your side. Squeeze a rolled towel between your elbow and your body for comfort. This will help keep your arm at your side. 3. Hold one end of the elastic band in the hand of the painful arm. 4. Slowly rotate your forearm toward your body until it touches your belly. Slowly move it back to where you started. 5. Keep your elbow and upper arm firmly tucked against the towel roll or at your side. 6. Repeat 8 to 12 times. Pendulum swing    1. Hold on to a table or the back of a chair with your good arm. Then bend forward a little and let your sore arm hang straight down. This exercise does not use the arm muscles. Rather, use your legs and your hips to create movement that makes your arm swing freely. 2. Use the movement from your hips and legs to guide the slightly swinging arm back and forth like a pendulum (or elephant trunk).  Then guide it in circles that start small (about the size of a dinner plate). Make the circles a bit larger each day, as your pain allows. 3. Do this exercise for 5 minutes, 5 to 7 times each day. 4. As you have less pain, try bending over a little farther to do this exercise. This will increase the amount of movement at your shoulder. Follow-up care is a key part of your treatment and safety. Be sure to make and go to all appointments, and call your doctor if you are having problems. It's also a good idea to know your test results and keep a list of the medicines you take. Where can you learn more? Go to http://ofelia-mindy.info/. Enter H562 in the search box to learn more about \"Shoulder Arthritis: Exercises. \"  Current as of: November 29, 2017  Content Version: 11.8  © 6342-2646 [employer]. Care instructions adapted under license by [employer] (which disclaims liability or warranty for this information). If you have questions about a medical condition or this instruction, always ask your healthcare professional. [address] any warranty or liability for your use of this information. Joint Injections: Care Instructions  Your Care Instructions    Joint injections are shots into a joint, such as the knee. They may be used to put in medicines, such as pain relievers. A corticosteroid, or steroid, shot is used to reduce inflammation in tendons or joints. It is often used to treat problems such as arthritis, tendinitis, and bursitis. Steroids can be injected directly into a painful, inflamed joint. They can also help reduce inflammation of a bursa. A bursa is a sac of fluid. It cushions and lubricates areas where tendons, ligaments, skin, muscles, or bones rub against each other. A steroid shot can sometimes help with short-term pain relief when other treatments haven't worked. If steroid shots help, pain may improve for weeks or months.   Follow-up care is a key part of your treatment and safety. Be sure to make and go to all appointments, and call your doctor if you are having problems. It's also a good idea to know your test results and keep a list of the medicines you take. How can you care for yourself at home? · Put ice or a cold pack on the area for 10 to 20 minutes at a time. Put a thin cloth between the ice and your skin. · Ask your doctor if you can take an over-the-counter pain medicine, such as acetaminophen (Tylenol), ibuprofen (Advil, Motrin), or naproxen (Aleve). Be safe with medicines. Read and follow all instructions on the label. · Avoid strenuous activities for several days. In particular, avoid ones that put stress on the area where you got the shot. · If you have dressings over the area, keep them clean and dry. You may remove them when your doctor tells you to. When should you call for help? Call your doctor now or seek immediate medical care if:    · You have signs of infection, such as:  ? Increased pain, swelling, warmth, or redness. ? Red streaks leading from the site. ? Pus draining from the site. ? A fever.    Watch closely for changes in your health, and be sure to contact your doctor if you have any problems. Where can you learn more? Go to http://ofelia-mindy.info/. Enter N616 in the search box to learn more about \"Joint Injections: Care Instructions. \"  Current as of: November 29, 2017  Content Version: 11.8  © 3778-2714 [employer]. Care instructions adapted under license by [employer] (which disclaims liability or warranty for this information). If you have questions about a medical condition or this instruction, always ask your healthcare professional. [address] any warranty or liability for your use of this information.

## 2018-10-23 NOTE — PROGRESS NOTES
Encounter for pain management. Chronic Pain:  Patient has chronic BL knee pain for years, had right TKR last year (2016). Left shoulder continues to hurt, in PT now, but pain worse following PT. Pain Scale: 9/10. Had IMAGING for lumbar spine and right knee and has been seeing/seen by ORTHO. Pain in the left shoulder, wrist, knees, legs, and lower back is still limiting walking, sitting, reaching, lifting, and standing, exacerbated by forementioned acitivities to include stair climbing. Has tried prednisone, tramadol, injections, PT, gabapentin, cymbalta, and surgery in past with minimal relief. Pain has been controlled with Oxycodone, last taken . The medication is kept safe by staying with her. Has NOT seen any other providers since last OV for pain medication. No significant changes to pain presentation since last OV. she is  able to do her normal daily activities. she reports the following adverse side effects: none. Least pain over the last week has been 7/10. Worst pain over the last week has been 10/10. Aberrant behaviors: None         Symptoms onset: problem is longstanding. Rheumatological ROS: ongoing significant pain which is stable and controlled by pain med. Response to treatment plan: waxing and waning. Review of Systems  Constitutional: negative for fevers, chills, anorexia and weight loss  Eyes:   negative for visual disturbance, drainage, and irritation  ENT:   +AR and environmental allergies.  negative for tinnitus,sore throat,ear pain,and hoarseness  Respiratory:  + asthma/COPD. negative for hemoptysis  CV:   negative for chest pain, palpitations, and lower extremity edema  GI:   negative for nausea, vomiting, diarrhea, abdominal pain, and melena  Endo:               negative for polyuria,polydipsia,polyphagia, and heat intolerance  Genitourinary: negative for frequency, urgency, dysuria, retention, and hematuria  Integument:  negative for rash, ulcerations, and pruritus  Hematologic:  negative for easy bruising and bleeding  Musculoskel: negative for  muscle weakness  Neurological:  negative for headaches, dizziness, vertigo,and memory problems  Behavl/Psych: +depression, on cymbalta and zoloft. negative for feelings of  suicide    1./2. Medical history/Past medical History   Past Medical History:   Diagnosis Date    Asthma     uses inhalers    Chronic obstructive pulmonary disease (HCC)     bronchitis    GERD (gastroesophageal reflux disease)     Hypertension     Ill-defined condition     environmental allergies     Ill-defined condition     Multiple body piercings; unable to remove tongue and lip piercings    Thyroid disease     hypo    Ventral hernia 12/31/2013     Past Surgical History:   Procedure Laterality Date    HX HEENT  2009    thyroidectomy    HX KNEE REPLACEMENT      R    HX MOHS PROCEDURES Right 3/8/11    HX OTHER SURGICAL      hiatal hernia repair    HX TUBAL LIGATION       Patient Active Problem List   Diagnosis Code    Ventral hernia K43.9    Chronic pain of right knee M25.561, G89.29    Chronic right shoulder pain M25.511, G89.29    Environmental and seasonal allergies J30.89    Ventral hernia without obstruction or gangrene K43.9    Primary osteoarthritis of right knee M17.11    Mixed simple and mucopurulent chronic bronchitis (HCC) J41.8    Acquired hypothyroidism E03.9    Chronic bilateral low back pain with right-sided sciatica M54.41, G89.29    Status post total right knee replacement Z96.651    Malignant hypertension I10    Mild single current episode of major depressive disorder ([facility] [address] F32.0    Pain management contract signed Z02.89    Chronic pain of left knee M25.562, G89.29    Moderate episode of recurrent major depressive disorder (HCC) F33.1    Psychophysiological insomnia F51.04    Severe obesity (BMI 35.0-39. 9) with comorbidity (HCC) E66.01    Post-tussive vomiting R11.10    Chronic use of opiate for therapeutic purpose Z79.891    Obesity, Class II, BMI 35-39.9 E66.9    Left wrist pain M25.532    Chronic left shoulder pain M25.512, G89.29    Osteoarthritis of spine with radiculopathy, cervical region M47.22    Primary osteoarthritis of left shoulder M19.012       3. Applicable records from prior treatment providers are apart of [facility] under the media/encounters tab. 4. Diagnostic, therapeutic and laboratory results are available in the [address] [place] [address] chart. 5. Consultation notes are available for review in the media/encounters tab of the [address] [place] [address] chart. 6. Treatment goals include: pain control, improve activity level and function in regards to activities of daily living, and improved comfort level. Previously pt has been limited by pain in all these aspects. 7. The risks and benefits of treatment have been discussed at this office visit with the pt.  she understands that the medication has addictive potential.  Additionally the pt has been advised that narcotic pain medication may impair mental and/or physical ability required for performance of tasks such as driving or operating any other machinery. 8. Pt has an updated signed pain contract on file and can be found under the media section of the [facility] chart. 9. The pain contract is reviewed. Pain medication will be continued at the same dosage. Pill count: 5, last fill 10/5/18. Urine drug screening ordered/collected today. Diagnostic studies are not indicated at this time. Interventional procedure are not indicated at this time. Narcan order sent in past.       10. Medication prescibed is oxycodone every 24 hours PRN # 14 with ZERO refills for a 1/2 month supply.  was reviewed while planning for pain/anxiety management, no indications of drug diversion suspected. Prescription history is NOT suspicious for controlled substance overuse.     11. Patient instructions have been reviewed in detail as outlined above and in the pain contract. 12. Re-evaluation is planned for 1/2 month. Current Outpatient Medications   Medication Sig Dispense Refill    oxyCODONE-acetaminophen (PERCOCET 10)  mg per tablet May take 1 tab ONCE DAILY as needed for pain. 14 Tab 0    metoprolol succinate (TOPROL-XL) 25 mg XL tablet TAKE 1 TABLET BY MOUTH DAILY. 90 Tab 3    gabapentin (NEURONTIN) 800 mg tablet TAKE 1 TAB BY MOUTH THREE (3) TIMES DAILY. 90 Tab 5    meloxicam (MOBIC) 15 mg tablet Take 1 Tab by mouth daily (with breakfast). 30 Tab 11    amLODIPine (NORVASC) 10 mg tablet TAKE 1 TABLET BY MOUTH EVERY DAY FOR HYPERTENSION 90 Tab 3    hydroCHLOROthiazide (HYDRODIURIL) 25 mg tablet TAKE 1 TAB BY MOUTH DAILY FOR HYPERTENSION 30 Tab 6    levothyroxine (SYNTHROID) 125 mcg tablet TAKE 1 TABLET BY MOUTH EVERY DAY BEFORE BREAKFAST 90 Tab 1    methocarbamol (ROBAXIN) 750 mg tablet TAKE 1 TAB BY MOUTH FOUR (4) TIMES DAILY AS NEEDED FOR PAIN (SPASMS). 60 Tab 1    ondansetron (ZOFRAN ODT) 4 mg disintegrating tablet Take 1 Tab by mouth every eight (8) hours as needed for Nausea. 20 Tab 1    naloxone (NARCAN) 4 mg/actuation nasal spray Use 1 spray into 1 nostril for OVERDOSE. Call 911. For subsequent doses, give in alternating nostrils. May repeat every 2 to 3 min. 2 Each 0    traZODone (DESYREL) 50 mg tablet Take 1-2 tabs every night for sleep. 60 Tab 11    montelukast (SINGULAIR) 10 mg tablet Take 1 Tab by mouth daily. 30 Tab 6    pantoprazole (PROTONIX) 40 mg tablet TAKE 1 TABLET BY MOUTH ONCE EVERY DAY AS DIRECTED 90 Tab 2    albuterol (PROVENTIL VENTOLIN) 2.5 mg /3 mL (0.083 %) nebulizer solution 3 mL by Nebulization route every four (4) hours as needed for Wheezing. 24 Each 2    acetaminophen (TYLENOL) 650 mg TbER TAKE 1 TAB BY MOUTH THREE (3) TIMES DAILY AS NEEDED FOR PAIN. 90 Tab 0    sertraline (ZOLOFT) 100 mg tablet Take 1 Tab by mouth daily.  30 Tab 11    diclofenac (VOLTAREN) 1 % gel Apply 2 g to affected area every six (6) hours. 100 g 5    lidocaine (LIDODERM) 5 % Apply patch to the affected area for 12 hours a day and remove for 12 hours a day. 30 Each 11    fluticasone (FLONASE) 50 mcg/actuation nasal spray 2 Sprays by Both Nostrils route daily. 1 Bottle 11    miscellaneous medical supply misc Shower seat for chronic knee pain, pt planning to have right TKR in June due to condition. Very limited range of motion. 1 Each 0    loratadine (CLARITIN) 10 mg tablet Take 1 Tab by mouth daily. 30 Tab 5    budesonide-formoterol (SYMBICORT) 160-4.5 mcg/actuation HFA inhaler Take 2 Puffs by inhalation two (2) times a day.  albuterol (PROAIR HFA) 90 mcg/actuation inhaler Take 2 Puffs by inhalation every four (4) hours as needed.  diphenhydrAMINE (BENADRYL) 25 mg capsule Take 1 Cap by mouth every six (6) hours as needed. 20 Cap 0     Allergies   Allergen Reactions    Hydrocodone Itching    Mold Shortness of Breath and Itching    Ibuprofen Nausea Only       Objective:  Visit Vitals  /72 (BP 1 Location: Right arm, BP Patient Position: Sitting)   Pulse 74   Temp 97.2 °F (36.2 °C) (Oral)   Resp 18   Ht 5' 1\" (1.549 m)   Wt 193 lb (87.5 kg)   BMI 36.47 kg/m²     Wt Readings from Last 3 Encounters:   10/23/18 193 lb (87.5 kg)   09/25/18 186 lb 6.4 oz (84.6 kg)   08/28/18 181 lb 9.6 oz (82.4 kg)     Physical Exam:   General appearance - alert, well appearing, and in mild distress. Mental status - A/O x 4, sad mood and affect. Chest - CTA. Symmetric chest rise. No wheezing, rales or rhonchi. Heart - Normal rate, regular rhythm. Normal S1, S2. No MGR or clicks. Abd- soft, obese, non-distended. Normoactive BS. NT, no masses. Ext- Radial, DP pulses, 2+ bilaterally. No edema, clubbing or cyanosis. Skin-Warm and dry. No hyperpigmentation, ulcerations, or suspicious lesions. Neuro - normal speech, no focal findings or movement disorder. Normal strength and muscle tone. Limping, antalgic gait using stroller.   Back- alignment midline. Thoracic spinal and right lumbar paraspinal tenderness. No CVAT. LROM, +SLR. Left shoulder STILL TTP to Ac joint, posterior and anterior bursa, LROM to internal/external rotation and lateral elevation at 75 degrees. kinesiology tape in place. Assessment/Plan:  Will INJECT next OV. Medication Side Effects and Warnings were discussed with patient: yes   Patient Labs were reviewed: yes  Patient Past Records were reviewed: yes    See below for other orders   Follow-up Disposition:  Return in about 10 days (around 11/2/2018) for SHOULDER INJ and rest of pain med refill. ICD-10-CM ICD-9-CM    1. Primary osteoarthritis of left shoulder M19.012 715.11 TOXASSURE SELECT 13 (MW)   2. Primary osteoarthritis of right knee M17.11 715.16 oxyCODONE-acetaminophen (PERCOCET 10)  mg per tablet      TOXASSURE SELECT 13 (MW)   3. Status post total right knee replacement Z96.651 V43.65 oxyCODONE-acetaminophen (PERCOCET 10)  mg per tablet      TOXASSURE SELECT 13 (MW)   4. Chronic use of opiate for therapeutic purpose Z79.891 V58.69 TOXASSURE SELECT 13 (MW)     Orders Placed This Encounter    TOXASSURE SELECT 13 (MW)    oxyCODONE-acetaminophen (PERCOCET 10)  mg per tablet     Sig: May take 1 tab ONCE DAILY as needed for pain. Dispense:  14 Tab     Refill:  0       Liliya Sykes expressed understanding of plan. An After Visit Summary was offered/printed and given to the patient.

## 2018-10-24 ENCOUNTER — HOSPITAL ENCOUNTER (OUTPATIENT)
Dept: PHYSICAL THERAPY | Age: 53
Discharge: HOME | End: 2018-10-24
Payer: SUBSIDIZED

## 2018-10-24 PROCEDURE — 97110 THERAPEUTIC EXERCISES: CPT | Performed by: PHYSICAL THERAPIST

## 2018-10-24 NOTE — PROGRESS NOTES
[facility]  [address], [address] [facility]    OUTPATIENT physical Therapy DAILY Treatment NOTe  Visit: 6    NAME: Seda Pérez AGE: 52 y.o. GENDER: female  DATE: 10/24/2018  REFERRING PHYSICIAN: Tari Veras NP      GOALS  Short term goals  Time frame: 3 weeks  1. Patient will be compliant and independent with the initial HEP as evidenced by being able to perform without cueing. 2. Patient will report a 25% improvement in symptoms. 3. Patient report a 25% improvement in sleeping. 4. Patient will have a 10 degree increase in left shoulder flexion ROM to allow ease with ADLs. 5. Patient will report decreased intensity of radicular symptoms down LUE. 6. Patient will tolerate 10 minutes of clinic activities before increase of symptoms.      Long term goals  Time frame: 6 weeks  1. Patient will report pain level decrease to 6/10 to allow increased ease of movement. 2. Patient will have an improved score on the QuickDASH outcome measure by 15 points to demonstrate an increase in functional activity tolerance. 3. Patient will be independent in final individualized HEP. 4. Patient will have an increase in left shoulder ROM to 140 degrees to allow performance of household tasks. 5. Patient will have an increase in left shoulder flexion strength to 3+/5 to allow performance of household tasks. 6. Patient will return to pushing stroller without being limited by symptoms. 7. Patient will sleep 6-8 hours without being interrupted by pain. SUBJECTIVE:   \"I drove a lot yesterday so it's really hurting\"    Pain In: 7/10 left elbow and left wrist; 10/10 left shoulder    Patient arrived 17 minutes late to session.    OBJECTIVE DATA SUMMARY:   Objective data from initial evaluation:  EXAMINATION/PRESENTATION/DECISION MAKING:   Pain:   Location: Left shoulder; left wrist  Quality: aching and dull  Now: 10/10  Factors that improve pain: medication: used and beneficial     Strength:   RUE: 5/5     Left UE:  Shoulder flexion and abduction: unable to test secondary to increased pain and limited ROM  Shoulder IR: 4/5; mild pain  Shoulder ER: 3+/5; reports pain at left UT  Elbow flexion: 4/5; pain  Elbow extension: 4/5   Wrist flexion: 3+/5; pain  Wrist extension: 3+/5; pain  Radial deviation: 4/5; pain  Ulnar deviation: 4/5; pain      Dynamometer:  Right : 40 lbs   Left : 25 lbs      Range of Motion:   Left shoulder AAROM in supine:   Flexion: 105 degrees; pain  Abduction: 95 degrees; pain  IR: 50 degrees; pain  ER: 30 degrees; pain     Joint Mobility:   Unable to tolerate     Palpation:   Patient tender to palpation at left UT, infraspinatus, supraspinatus, and biceps tendon     Neurologic Assessment:                         Tone: Normal                         Sensation: Patient reports tingling down LUE from shoulder to digits 2,3,4 of left hand                         Reflexes: NT     Mobility:                         Transitional Movements: Increased time to perform                          Gait: Patient ambulates with cane secondary to sciatica (LLE)     Balance:   WNL     Functional Measure:   QuickDASH: 88.6% impaired     TREATMENT/INTERVENTION:  Modalities (Rationale): None this date     Therapeutic Exercises:  Initial HEP provided 10/3/18:  strengthening ( yellow putty dispensed), UT stretch, Bicep curls, shoulder blade squeeze, shoulder ER stretch, Shoulder flexion stretch over table    Exercises in BOLD performed this date:     UT stretch: 3 reps with 30 second holds  Shoulder shrugs: 10 reps  Shoulder ER stretch: 5 reps   Shoulder scaption stretch against wall: 5 reps  Shoulder flexion stretch over table: 5 reps  Serratus punches: 10 reps  Pendulums: 10 reps  Pulleys: 15 reps; flexion and scaption    Rows with yellow TB: 10 reps  Pull downs with yellow TB: 10 reps  Crossover pull downs with yellow TB: 10 reps  Biceps curls with yellow TB: 10 reps  Shoulder IR/ER with yellow TB: 10 reps  Isotonic shoulder ER/IR with 1# weight: 10 reps  Low rows/saw with 1# weight: 10 reps    Wrist flexion/extension: 10 reps  Wrist radial and ulnar deviation: 10 reps  Forearm supination/pronation: 15 reps  Biceps curls: 10 reps  Shoulder scaption: 10 reps  Overhead press: 10 reps     Manual Therapy:  Gentle left shoulder PROM into flexion, abduction, ER/IR  Dorsal and volar glides of carpal bones, left   Unable to tolerate GH joint mobs    Neuromuscular Reeducation:   Kinesiotape applied in two vertical curved strips at left elbow. One piece of tape to supinator. Patient educated on purpose and removal of tape with good understanding verbalized. Also applied kinesiotape from anterior shoulder to mid scapula in one horizontal strip.      Activity tolerance and post treatment pain report:   Fair; improved tolerance to exercises; 7/10    Education:  Education was provided to the patient on the following topics. [x]    No changes were made to the home exercise program.  []    The following changes were made to the home exercise program  Patient verbalized understanding of the topics presented. ASSESSMENT:   Patient presents with 7/10 pain at left wrist and elbow and 10/10 pain at left shoulder. Patient reports increased left shoulder pain after driving for a longer length of time yesterday. Patient reports good response to kinesiotaping at both left shoulder and elbow; reapplied this date. Patient has experienced less pain in left wrist with wrist brace. She indicates compliance with home exercise. Her shoulder ROM remains grossly impaired by pain but improvement noted today. Patient tolerated additional exercises well today.     Patients progression toward goals is as follows:  [x]     Improving appropriately and progressing toward goals  []     Improving slowly and progressing toward goals  []     Not making progress toward goals and plan of care will be adjusted    PLAN OF CARE:   Patient continues to benefit from skilled intervention to address the above impairments. [x]    Continue treatment per established plan of care.   []     Recommend the following changes to the plan of care    Recommendations/Intent for next treatment: reassess kinesiotape; IASTM to left UT    Josy Tong PT   Time Calculation: 29 mins  Patient Time in clinic:   Start Time: [address]   Stop Time: [phone]

## 2018-10-29 LAB — DRUGS UR: NORMAL

## 2018-10-30 ENCOUNTER — HOSPITAL ENCOUNTER (OUTPATIENT)
Dept: PHYSICAL THERAPY | Age: 53
Discharge: HOME | End: 2018-10-30
Payer: SUBSIDIZED

## 2018-10-30 PROCEDURE — 97110 THERAPEUTIC EXERCISES: CPT | Performed by: PHYSICAL THERAPIST

## 2018-10-30 NOTE — PROGRESS NOTES
[facility], [place]  [address], [address] [facility]    OUTPATIENT physical Therapy DAILY Treatment NOTe  Visit: 7    NAME: Tk Hercules AGE: 52 y.o. GENDER: female  DATE: 10/30/2018  REFERRING PHYSICIAN: Herbert Mazariegos NP      GOALS  Short term goals  Time frame: 3 weeks  1. Patient will be compliant and independent with the initial HEP as evidenced by being able to perform without cueing. 2. Patient will report a 25% improvement in symptoms. 3. Patient report a 25% improvement in sleeping. 4. Patient will have a 10 degree increase in left shoulder flexion ROM to allow ease with ADLs. 5. Patient will report decreased intensity of radicular symptoms down LUE. 6. Patient will tolerate 10 minutes of clinic activities before increase of symptoms.      Long term goals  Time frame: 6 weeks  1. Patient will report pain level decrease to 6/10 to allow increased ease of movement. 2. Patient will have an improved score on the QuickDASH outcome measure by 15 points to demonstrate an increase in functional activity tolerance. 3. Patient will be independent in final individualized HEP. 4. Patient will have an increase in left shoulder ROM to 140 degrees to allow performance of household tasks. 5. Patient will have an increase in left shoulder flexion strength to 3+/5 to allow performance of household tasks. 6. Patient will return to pushing stroller without being limited by symptoms. 7. Patient will sleep 6-8 hours without being interrupted by pain. SUBJECTIVE:   \"It's hurting but I have more motion.  I was even able to lift the baby over the weekend\"    Pain In: 7/10 left elbow and left wrist; 9/10 left shoulder    OBJECTIVE DATA SUMMARY:   Objective data from initial evaluation:  EXAMINATION/PRESENTATION/DECISION MAKING:   Pain:   Location: Left shoulder; left wrist  Quality: aching and dull  Now: 10/10  Factors that improve pain: medication: used and beneficial     Strength:   RUE: 5/5     Left UE:  Shoulder flexion and abduction: unable to test secondary to increased pain and limited ROM  Shoulder IR: 4/5; mild pain  Shoulder ER: 3+/5; reports pain at left UT  Elbow flexion: 4/5; pain  Elbow extension: 4/5   Wrist flexion: 3+/5; pain  Wrist extension: 3+/5; pain  Radial deviation: 4/5; pain  Ulnar deviation: 4/5; pain      Dynamometer:  Right : 40 lbs   Left : 25 lbs      Range of Motion:   Left shoulder AAROM in supine:   Flexion: 105 degrees; pain  Abduction: 95 degrees; pain  IR: 50 degrees; pain  ER: 30 degrees; pain     Joint Mobility:   Unable to tolerate     Palpation:   Patient tender to palpation at left UT, infraspinatus, supraspinatus, and biceps tendon     Neurologic Assessment:                         Tone: Normal                         Sensation: Patient reports tingling down LUE from shoulder to digits 2,3,4 of left hand                         Reflexes: NT     Mobility:                         Transitional Movements: Increased time to perform                          Gait: Patient ambulates with cane secondary to sciatica (LLE)     Balance:   WNL     Functional Measure:   QuickDASH: 88.6% impaired     TREATMENT/INTERVENTION:  Modalities (Rationale): None this date     Therapeutic Exercises:  Initial HEP provided 10/3/18:  strengthening ( yellow putty dispensed), UT stretch, Bicep curls, shoulder blade squeeze, shoulder ER stretch, Shoulder flexion stretch over table    Exercises in BOLD performed this date:     UT stretch: 3 reps with 30 second holds  Shoulder shrugs: 10 reps  Shoulder ER stretch: 5 reps   Shoulder scaption stretch against wall: 5 reps  Shoulder flexion stretch over table: 5 reps  Serratus punches: 10 reps  Pendulums: 10 reps  Pulleys: 15 reps; flexion and scaption  Wall ladder: 3 reps    Rows with yellow TB: 2 sets of 10 reps  Pull downs with yellow TB: 2 sets of  10 reps  Crossover pull downs with yellow TB: 2 sets of 10 reps  Biceps curls with yellow TB: 2 sets of 10 reps  Shoulder IR/ER with yellow TB: 2 sets of 10 reps  Isotonic shoulder ER/IR with 1# weight: 2 sets of 10 reps  Low rows/saw with 1# weight: 2 sets of 10 reps    Wrist flexion/extension: 10 reps  Wrist radial and ulnar deviation: 10 reps  Forearm supination/pronation: 15 reps  Biceps curls: 10 reps  Shoulder scaption: 10 reps  Overhead press: 10 reps     Manual Therapy:  Gentle left shoulder PROM into flexion, abduction, ER/IR  Dorsal and volar glides of carpal bones, left   Unable to tolerate GH joint mobs    Neuromuscular Reeducation:   Kinesiotape applied in two vertical curved strips at left elbow. One piece of tape to supinator. Patient educated on purpose and removal of tape with good understanding verbalized. Also applied kinesiotape from anterior shoulder to mid scapula in one horizontal strip.      Activity tolerance and post treatment pain report:   Fair; improved tolerance to exercises; 7/10    Education:  Education was provided to the patient on the following topics. [x]    No changes were made to the home exercise program.  []    The following changes were made to the home exercise program  Patient verbalized understanding of the topics presented. ASSESSMENT:   Patient presents with 7/10 pain at left wrist and elbow and 9/10 pain at left shoulder. Patient reports that she was able to lift her infant granddaughter for the first time in months. Patient does demonstrate increased right shoulder active and passive ROM, however, pain levels remain high. Patient reports good response to kinesiotaping at both left shoulder and elbow; reapplied this date. Patient has experienced less pain in left wrist with wrist brace. She indicates compliance with home exercise. Patient tolerated additional repetitions of resistance exercises well today.     Patients progression toward goals is as follows:  [x]     Improving appropriately and progressing toward goals  []     Improving slowly and progressing toward goals  []     Not making progress toward goals and plan of care will be adjusted    PLAN OF CARE:   Patient continues to benefit from skilled intervention to address the above impairments. [x]    Continue treatment per established plan of care.   []     Recommend the following changes to the plan of care    Recommendations/Intent for next treatment: reassess kinesiotape; IASTM to left UT    Sherrie Villafana PT   Time Calculation: 30 mins  Patient Time in clinic:   Start Time: 0940   Stop Time: 1010

## 2018-11-01 ENCOUNTER — HOSPITAL ENCOUNTER (OUTPATIENT)
Dept: PHYSICAL THERAPY | Age: 53
Discharge: HOME | End: 2018-11-01
Payer: SUBSIDIZED

## 2018-11-01 PROCEDURE — 97140 MANUAL THERAPY 1/> REGIONS: CPT | Performed by: PHYSICAL THERAPIST

## 2018-11-01 PROCEDURE — 97110 THERAPEUTIC EXERCISES: CPT | Performed by: PHYSICAL THERAPIST

## 2018-11-01 NOTE — PROGRESS NOTES
[facility]  [address], [address] [facility]    OUTPATIENT physical Therapy DAILY Treatment NOTe  Visit: 7    NAME: Sidney Prajapati AGE: 52 y.o. GENDER: female  DATE: 11/1/2018  REFERRING PHYSICIAN: Ronda Rubio NP      GOALS  Short term goals  Time frame: 3 weeks  1. Patient will be compliant and independent with the initial HEP as evidenced by being able to perform without cueing. 2. Patient will report a 25% improvement in symptoms. 3. Patient report a 25% improvement in sleeping. 4. Patient will have a 10 degree increase in left shoulder flexion ROM to allow ease with ADLs. 5. Patient will report decreased intensity of radicular symptoms down LUE. 6. Patient will tolerate 10 minutes of clinic activities before increase of symptoms.      Long term goals  Time frame: 6 weeks  1. Patient will report pain level decrease to 6/10 to allow increased ease of movement. 2. Patient will have an improved score on the QuickDASH outcome measure by 15 points to demonstrate an increase in functional activity tolerance. 3. Patient will be independent in final individualized HEP. 4. Patient will have an increase in left shoulder ROM to 140 degrees to allow performance of household tasks. 5. Patient will have an increase in left shoulder flexion strength to 3+/5 to allow performance of household tasks. 6. Patient will return to pushing stroller without being limited by symptoms. 7. Patient will sleep 6-8 hours without being interrupted by pain.        SUBJECTIVE:   \"I'm feeling it most in my shoulder\"    Pain In: 6/10 left elbow and left wrist; 9/10 left shoulder    OBJECTIVE DATA SUMMARY:   Objective data from initial evaluation:  EXAMINATION/PRESENTATION/DECISION MAKING:   Pain:   Location: Left shoulder; left elbow and wrist  Quality: aching and dull  Now: 10/10  Factors that improve pain: medication: used and beneficial     Strength:   RUE: 5/5     Left UE:  Shoulder flexion and abduction: 3+/5 Updated 11/1/18  Shoulder IR: 4/5; mild pain  Shoulder ER: 3+/5; reports pain at left UT  Elbow flexion: 4/5; pain  Elbow extension: 4/5   Wrist flexion: 3+/5; pain  Wrist extension: 3+/5; pain  Radial deviation: 4/5; pain  Ulnar deviation: 4/5; pain      Dynamometer:  Right : 40 lbs   Left : 25 lbs      Range of Motion:   Left shoulder AAROM in supine:   Flexion: 105 degrees; pain 105  Abduction: 95 degrees; pain   145  IR: 50 degrees; pain 75  ER: 30 degrees; pain  65    Updated 11/1/18:  Left shoulder AAROM in supine:   Flexion: 105 degrees; pain   Abduction: 145 degrees; pain  IR: 75 degrees; pain  ER: 65 degrees; pain     Joint Mobility:   Unable to tolerate     Palpation:   Patient tender to palpation at left UT, infraspinatus, supraspinatus, and biceps tendon     Neurologic Assessment:                         Tone: Normal                         Sensation: Patient reports tingling down LUE from shoulder to digits 2,3,4 of left hand                         Reflexes: NT     Mobility:                         Transitional Movements: Increased time to perform                          Gait: Patient ambulates with cane secondary to sciatica (LLE)     Balance:   WNL     Functional Measure:   QuickDASH: 88.6% impaired     TREATMENT/INTERVENTION:  Modalities (Rationale): None this date     Therapeutic Exercises:  Initial HEP provided 10/3/18:  strengthening ( yellow putty dispensed), UT stretch, Bicep curls, shoulder blade squeeze, shoulder ER stretch, Shoulder flexion stretch over table    Exercises in BOLD performed this date:     UT stretch: 3 reps with 30 second holds  Shoulder shrugs: 10 reps  Shoulder ER stretch: 5 reps   Shoulder scaption stretch against wall: 5 reps  Shoulder flexion stretch over table: 5 reps  Serratus punches: 10 reps  Pendulums: 10 reps  Pulleys: 15 reps; flexion and scaption  Wall ladder: 3 reps    Rows with yellow TB: 2 sets of 10 reps  Pull downs with yellow TB: 2 sets of  10 reps  Crossover pull downs with yellow TB: 2 sets of 10 reps  Biceps curls with yellow TB: 2 sets of 10 reps  Shoulder IR/ER with yellow TB: 2 sets of 10 reps  Isotonic shoulder ER/IR with 1# weight: 2 sets of 10 reps  Low rows/saw with 1# weight: 2 sets of 10 reps    Wrist flexion/extension: 10 reps  Wrist radial and ulnar deviation: 10 reps  Forearm supination/pronation: 15 reps  Biceps curls: 10 reps  Shoulder scaption: 10 reps  Overhead press: 10 reps     Manual Therapy:  Gentle left shoulder PROM into flexion, abduction, ER/IR  AP GH joint mobs, left  Dorsal and volar glides of carpal bones, left     Neuromuscular Reeducation:   Kinesiotape applied in two vertical curved strips at left elbow. One piece of tape to supinator. Patient educated on purpose and removal of tape with good understanding verbalized. Also applied kinesiotape from anterior shoulder to mid scapula in one horizontal strip.      Activity tolerance and post treatment pain report:   Fair; improved tolerance to exercises; 3/10    Education:  Education was provided to the patient on the following topics. [x]    No changes were made to the home exercise program.  []    The following changes were made to the home exercise program  Patient verbalized understanding of the topics presented. ASSESSMENT:   Patient presents with 6/10 pain at left wrist and elbow and 9/10 pain at left shoulder. Reduced to 3/10 at end of session. Patient was able to tolerate gentle PROM of left shoulder as well as AP GH joint mobs. Patient does demonstrate increased right shoulder active and passive ROM, however, pain levels remain high. Patient reports good response to kinesiotaping at both left shoulder and elbow; reapplied this date. Patient has experienced less pain in left wrist with wrist brace. She indicates compliance with home exercise.  Patient reports that she was able to lift her infant granddaughter for the first time in months. Patient tolerated additional repetitions of resistance exercises well. Patients progression toward goals is as follows:  [x]     Improving appropriately and progressing toward goals  []     Improving slowly and progressing toward goals  []     Not making progress toward goals and plan of care will be adjusted    PLAN OF CARE:   Patient continues to benefit from skilled intervention to address the above impairments. [x]    Continue treatment per established plan of care.   []     Recommend the following changes to the plan of care    Recommendations/Intent for next treatment: reassess kinesiotape; IASTM to left UT; AP [address] [facility] joint mobruben Alford Bras, PT   Time Calculation: 33 mins  Patient Time in clinic:   Start Time: [address]   Stop Time: 21 223

## 2018-11-02 ENCOUNTER — OFFICE VISIT (OUTPATIENT)
Dept: INTERNAL MEDICINE CLINIC | Age: 53
End: 2018-11-02

## 2018-11-02 VITALS
DIASTOLIC BLOOD PRESSURE: 73 MMHG | RESPIRATION RATE: 16 BRPM | HEIGHT: 61 IN | SYSTOLIC BLOOD PRESSURE: 126 MMHG | WEIGHT: 196.2 LBS | HEART RATE: 83 BPM | TEMPERATURE: 98.3 F | BODY MASS INDEX: 37.04 KG/M2

## 2018-11-02 DIAGNOSIS — M25.511 CHRONIC RIGHT SHOULDER PAIN: Primary | ICD-10-CM

## 2018-11-02 DIAGNOSIS — G89.29 CHRONIC RIGHT SHOULDER PAIN: Primary | ICD-10-CM

## 2018-11-02 RX ORDER — BETAMETHASONE SODIUM PHOSPHATE AND BETAMETHASONE ACETATE 3; 3 MG/ML; MG/ML
12 INJECTION, SUSPENSION INTRA-ARTICULAR; INTRALESIONAL; INTRAMUSCULAR; SOFT TISSUE ONCE
Qty: 1 VIAL | Refills: 0
Start: 2018-11-02 | End: 2018-11-02

## 2018-11-02 NOTE — PATIENT INSTRUCTIONS
Apply ice every 20 min for the next 3 hours, then 3 more times over the next 24 hours. Rest over the next 24-48 hours and gradually increase activity after that. Learning About Joint Injections  What are joint injections? Joint injections are shots into a joint, such as the knee or shoulder. They are used to put in medicines, such as pain relievers and steroid medicines. Steroids can be injected directly into a swollen and painful joint to reduce inflammation. A steroid shot can sometimes help with short-term pain relief when other treatments haven't worked. If steroid shots help, pain may improve for weeks or months. How are they done? First, the area over the joint will be cleaned. Your doctor may then use a tiny needle to numb the skin in the area where you will get the joint injection. If a tiny needle is used to numb the area, your doctor will use another needle to inject the medicine. Your doctor may use a pain reliever, a steroid, or both. You may feel some pressure or discomfort. The procedure takes 10 to 30 minutes. But the injection itself usually takes only a few minutes. Your doctor may put ice on the area before you go home. You will probably go home soon after your shot. What can you expect after a joint injection? You may have numbness around the joint for a few hours. If your shot included both a pain reliever and a steroid, then the pain will probably go away right away. But it might come back after a few hours. This might happen if the pain reliever wears off and the steroid hasn't started to work yet. Steroids don't always work. But when they do, the pain relief can last for several days to a few months or longer. Your doctor may tell you to use ice on the area. You can also use ice if the pain comes back. Put ice or a cold pack on your joint for 10 to 20 minutes at a time. Put a thin cloth between the ice and your skin.   Follow your doctor's instructions carefully. Follow-up care is a key part of your treatment and safety. Be sure to make and go to all appointments, and call your doctor if you are having problems. It's also a good idea to know your test results and keep a list of the medicines you take. Where can you learn more? Go to http://ofelia-mindy.info/. Enter U109 in the search box to learn more about \"Learning About Joint Injections. \"  Current as of: September 14, 2017  Content Version: 11.8  © 9027-3929 [employer]. Care instructions adapted under license by [employer] (which disclaims liability or warranty for this information). If you have questions about a medical condition or this instruction, always ask your healthcare professional. [address] any warranty or liability for your use of this information.

## 2018-11-02 NOTE — PROGRESS NOTES
June Boswell is a 52 y.o. female  No chief complaint on file. 1. Have you been to the ER, urgent care clinic since your last visit? Hospitalized since your last visit? 2. Have you seen or consulted any other health care providers outside of the [facility] since your last visit? Include any pap smears or colon screening.      Health Maintenance Due   Topic Date Due    PAP AKA CERVICAL CYTOLOGY  06/23/1984    Shingrix Vaccine Age 50> (1 of 2) 06/23/2013    BREAST CANCER SCRN MAMMOGRAM  04/06/2014    MEDICARE YEARLY EXAM  11/01/2018     Visit Vitals  /73 (BP 1 Location: Right arm, BP Patient Position: Sitting)   Pulse 83   Temp 98.3 °F (36.8 °C) (Oral)   Resp 16   Ht 5' 1\" (1.549 m)   Wt 196 lb 3.2 oz (89 kg)   BMI 37.07 kg/m²
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
## 2018-11-06 ENCOUNTER — HOSPITAL ENCOUNTER (OUTPATIENT)
Dept: PHYSICAL THERAPY | Age: 53
Discharge: HOME | End: 2018-11-06
Payer: SUBSIDIZED

## 2018-11-06 PROCEDURE — 97110 THERAPEUTIC EXERCISES: CPT | Performed by: PHYSICAL THERAPIST

## 2018-11-06 PROCEDURE — 97140 MANUAL THERAPY 1/> REGIONS: CPT | Performed by: PHYSICAL THERAPIST

## 2018-11-06 NOTE — PROGRESS NOTES
[facility]  [address], [address] [facility]    OUTPATIENT physical Therapy DAILY Treatment NOTe  Visit: 8    NAME: Chiquita Robledo AGE: 52 y.o. GENDER: female  DATE: 11/6/2018  REFERRING PHYSICIAN: Maribel Simmons NP      GOALS  Short term goals  Time frame: 3 weeks  1. Patient will be compliant and independent with the initial HEP as evidenced by being able to perform without cueing. 2. Patient will report a 25% improvement in symptoms. 3. Patient report a 25% improvement in sleeping. 4. Patient will have a 10 degree increase in left shoulder flexion ROM to allow ease with ADLs. 5. Patient will report decreased intensity of radicular symptoms down LUE. 6. Patient will tolerate 10 minutes of clinic activities before increase of symptoms.      Long term goals  Time frame: 6 weeks  1. Patient will report pain level decrease to 6/10 to allow increased ease of movement. 2. Patient will have an improved score on the QuickDASH outcome measure by 15 points to demonstrate an increase in functional activity tolerance. 3. Patient will be independent in final individualized HEP. 4. Patient will have an increase in left shoulder ROM to 140 degrees to allow performance of household tasks. 5. Patient will have an increase in left shoulder flexion strength to 3+/5 to allow performance of household tasks. 6. Patient will return to pushing stroller without being limited by symptoms. 7. Patient will sleep 6-8 hours without being interrupted by pain. SUBJECTIVE:   \"The shoulder mostly feels tight. The wrist hurts today. \"    Pain In: 7/10 left elbow, 9/10 left wrist; 8/10 left shoulder    OBJECTIVE DATA SUMMARY:   Objective data from initial evaluation:  EXAMINATION/PRESENTATION/DECISION MAKING:   Pain:   Location: Left shoulder; left elbow and wrist  Quality: aching and dull  Now: 10/10  Factors that improve pain: medication: used and beneficial     Strength:   RUE: 5/5     Left UE:  Shoulder flexion and abduction: 3+/5 Updated 11/1/18  Shoulder IR: 4/5; mild pain  Shoulder ER: 3+/5; reports pain at left UT  Elbow flexion: 4/5; pain  Elbow extension: 4/5   Wrist flexion: 3+/5; pain  Wrist extension: 3+/5; pain  Radial deviation: 4/5; pain  Ulnar deviation: 4/5; pain      Dynamometer:  Right : 40 lbs   Left : 25 lbs      Range of Motion:   Left shoulder AAROM in supine:   Flexion: 105 degrees; pain 105  Abduction: 95 degrees; pain   145  IR: 50 degrees; pain 75  ER: 30 degrees; pain  65    Updated 11/1/18:  Left shoulder AAROM in supine:   Flexion: 105 degrees; pain   Abduction: 145 degrees; pain  IR: 75 degrees; pain  ER: 65 degrees; pain     Joint Mobility:   Unable to tolerate     Palpation:   Patient tender to palpation at left UT, infraspinatus, supraspinatus, and biceps tendon     Neurologic Assessment:                         Tone: Normal                         Sensation: Patient reports tingling down LUE from shoulder to digits 2,3,4 of left hand                         Reflexes: NT     Mobility:                         Transitional Movements: Increased time to perform                          Gait: Patient ambulates with cane secondary to sciatica (LLE)     Balance:   WNL     Functional Measure:   QuickDASH: 88.6% impaired     TREATMENT/INTERVENTION:  Modalities (Rationale): to decrease pain and muscle guarding  MHP to left shoulder for 10 minutes in sitting at end of session; no skin irritation noted     Therapeutic Exercises:  Initial HEP provided 10/3/18:  strengthening ( yellow putty dispensed), UT stretch, Bicep curls, shoulder blade squeeze, shoulder ER stretch, Shoulder flexion stretch over table    Exercises in BOLD performed this date:     UT stretch: 3 reps with 30 second holds  Shoulder shrugs: 10 reps  Shoulder ER stretch: 5 reps   Shoulder scaption stretch against wall: 5 reps  Shoulder flexion stretch over table: 5 reps  Serratus punches: 10 reps  Pendulums: 10 reps  Pulleys: 15 reps; flexion and scaption  Wall ladder: 3 reps    Rows with yellow TB: 10 reps  Pull downs with yellow TB: 10 reps  Crossover pull downs with yellow TB: 10 reps  Biceps curls with yellow TB: 10 reps  Shoulder IR/ER with yellow TB: 2 sets of 10 reps  Isotonic shoulder ER/IR with 1# weight: 15 reps  Low rows/saw with 1# weight: 15 reps    Wrist flexion/extension: 10 reps  Wrist radial and ulnar deviation: 10 reps  Forearm supination/pronation: 15 reps  Biceps curls: 10 reps  Shoulder scaption: 10 reps  Overhead press: 10 reps     Manual Therapy:  Gentle left shoulder PROM into flexion, abduction, ER/IR  AP GH joint mobs, left; low tolerance today so held  Dorsal and volar glides of carpal bones, left   Instrument assisted soft tissue mobilization to left UT and levator scapulae. Patient educated on purpose and affect of intervention. Patient verbalized good understanding. Neuromuscular Reeducation:   Kinesiotape applied in two vertical curved strips at left elbow. One piece of tape to supinator. Patient educated on purpose and removal of tape with good understanding verbalized. Also applied kinesiotape from anterior shoulder to mid scapula in one horizontal strip. Radial and median nerve glides in sitting x 10 reps each; decreased tingling     Activity tolerance and post treatment pain report:   Fair; improved tolerance to exercises; 3/10    Education:  Education was provided to the patient on the following topics. [x]    No changes were made to the home exercise program.  []    The following changes were made to the home exercise program  Patient verbalized understanding of the topics presented. ASSESSMENT:   Patient continues to present with high pain levels at left shoulder, elbow, and wrist. Varies day to day. Pain is reduced to 3/10 at end of session after stretches, kinestiotape, and nerve glides.  Patient was able to tolerate gentle PROM of left shoulder; low tolerance of [address] joint mobs so held today. Patient also tender at left UT and levator scapulae; pain relief with IASTM. Patient does demonstrate increased right shoulder active and passive ROM. Patient reports good response to kinesiotaping at both left shoulder and elbow; reapplied this date. Patient continues to wear left wrist brace. She indicates compliance with home exercise. Patient reports improvement in ADLs. Patient may benefit from orthopedic consult and/or left shoulder MRI if pain levels remain high. Patients progression toward goals is as follows:  [x]     Improving appropriately and progressing toward goals  []     Improving slowly and progressing toward goals  []     Not making progress toward goals and plan of care will be adjusted    PLAN OF CARE:   Patient continues to benefit from skilled intervention to address the above impairments. [x]    Continue treatment per established plan of care.   []     Recommend the following changes to the plan of care    Recommendations/Intent for next treatment: reassess kinesiotape; IASTM to left UT; AP GH joint mobs    Jelani Eng, PT   Time Calculation: 30 mins  Patient Time in clinic:   Start Time: 0930   Stop Time: 1000

## 2018-11-07 ENCOUNTER — OFFICE VISIT (OUTPATIENT)
Dept: BEHAVIORAL/MENTAL HEALTH CLINIC | Age: 53
End: 2018-11-07

## 2018-11-07 VITALS
DIASTOLIC BLOOD PRESSURE: 86 MMHG | HEIGHT: 61 IN | BODY MASS INDEX: 37 KG/M2 | HEART RATE: 75 BPM | SYSTOLIC BLOOD PRESSURE: 121 MMHG | WEIGHT: 196 LBS

## 2018-11-07 DIAGNOSIS — Z87.898 HISTORY OF ALCOHOL USE: ICD-10-CM

## 2018-11-07 DIAGNOSIS — F51.04 PSYCHOPHYSIOLOGICAL INSOMNIA: ICD-10-CM

## 2018-11-07 DIAGNOSIS — G47.00 INSOMNIA, UNSPECIFIED TYPE: ICD-10-CM

## 2018-11-07 DIAGNOSIS — F14.91 HISTORY OF COCAINE USE: ICD-10-CM

## 2018-11-07 DIAGNOSIS — F43.23 ADJUSTMENT DISORDER WITH MIXED ANXIETY AND DEPRESSED MOOD: ICD-10-CM

## 2018-11-07 DIAGNOSIS — F06.31 MOOD DISORDER WITH DEPRESSIVE FEATURES DUE TO GENERAL MEDICAL CONDITION: Primary | ICD-10-CM

## 2018-11-07 RX ORDER — SERTRALINE HYDROCHLORIDE 100 MG/1
TABLET, FILM COATED ORAL
Qty: 60 TAB | Refills: 0 | Status: SHIPPED | OUTPATIENT
Start: 2018-11-07 | End: 2019-01-25 | Stop reason: SDUPTHER

## 2018-11-07 NOTE — PATIENT INSTRUCTIONS
Sleep Tips    What to avoid    · Do not have drinks with caffeine, such as coffee or black tea, for 8 hours before bed. · Do not smoke or use other types of tobacco near bedtime. Nicotine is a stimulant and can keep you awake. · Avoid drinking alcohol late in the evening, because it can cause you to wake in the middle of the night. · Do not eat a big meal close to bedtime. If you are hungry, eat a light snack. · Do not drink a lot of water close to bedtime, because the need to urinate may wake you up during the night. · Do not read or watch TV in bed. Use the bed only for sleeping and sexual activity. What to try    · Go to bed at the same time every night, and wake up at the same time every morning. Do not take naps during the day. · Keep your bedroom quiet, dark, and cool. · Get regular exercise, but not within 3 to 4 hours of your bedtime. · Sleep on a comfortable pillow and mattress. · If watching the clock makes you anxious, turn it facing away from you so you cannot see the time. · If you worry when you lie down, start a worry book. Well before bedtime, write down your worries, and then set the book and your concerns aside. · Try meditation or other relaxation techniques before you go to bed. · If you cannot fall asleep, get up and go to another room until you feel sleepy. Do something relaxing. Repeat your bedtime routine before you go to bed again. Make your house quiet and calm about an hour before bedtime. Turn down the lights, turn off the TV, log off the computer, and turn down the volume on music. This can help you relax after a busy day. Depression and Chronic Disease: Care Instructions  Your Care Instructions    A chronic disease is one that you have for a long time. Some chronic diseases can be controlled, but they usually cannot be cured. Depression is common in people with chronic diseases, but it often goes unnoticed.   Many people have concerns about seeking treatment for a mental health problem. You may think it's a sign of weakness, or you don't want people to know about it. It's important to overcome these reasons for not seeking treatment. Treating depression or anxiety is good for your health. Follow-up care is a key part of your treatment and safety. Be sure to make and go to all appointments, and call your doctor if you are having problems. It's also a good idea to know your test results and keep a list of the medicines you take. How can you care for yourself at home? Watch for symptoms of depression  The symptoms of depression are often subtle at first. You may think they are caused by your disease rather than depression. Or you may think it is normal to be depressed when you have a chronic disease. If you are depressed you may:  Feel sad or hopeless. Feel guilty or worthless. Not enjoy the things you used to enjoy. Feel hopeless, as though life is not worth living. Have trouble thinking or remembering. Have low energy, and you may not eat or sleep well. Pull away from others. Think often about death or killing yourself. (Keep the numbers for these national suicide hotlines: 2-529-007-TALK [1-423.259.6432] and 1-666-WYQZLWM [1-449.660.8575]. )  Get treatment  By treating your depression, you can feel more hopeful and have more energy. If you feel better, you may take better care of yourself, so your health may improve. Talk to your doctor if you have any changes in mood during treatment for your disease. Ask your doctor for help. Counseling, antidepressant medicine, or a combination of the two can help most people with depression. Often a combination works best. Counseling can also help you cope with having a chronic disease. When should you call for help? Call 911 anytime you think you may need emergency care.  For example, call if:    You feel like hurting yourself or someone else.     Someone you know has depression and is about to attempt or is attempting suicide.    Call your doctor now or seek immediate medical care if:    You hear voices.     Someone you know has depression and:  Starts to give away his or her possessions. Uses illegal drugs or drinks alcohol heavily. Talks or writes about death, including writing suicide notes or talking about guns, knives, or pills. Starts to spend a lot of time alone. Acts very aggressively or suddenly appears calm.    Watch closely for changes in your health, and be sure to contact your doctor if:    You do not get better as expected. Where can you learn more? Go to http://ofleia-mindy.info/. Enter Y867 in the search box to learn more about \"Depression and Chronic Disease: Care Instructions. \"  Current as of: December 7, 2017  Content Version: 11.8  © 4265-1386 [employer]. Care instructions adapted under license by [employer] (which disclaims liability or warranty for this information). If you have questions about a medical condition or this instruction, always ask your healthcare professional. [place] [address] any warranty or liability for your use of this information.     ·

## 2018-11-07 NOTE — PROGRESS NOTES
Ambulatory Initial Psychiatric Evaluation     Chief Complaint: \" I have been depression\"    History of Present Illness: Alicia Angel is a 52 y.o. AA female who presents with symptoms of depression and anxiety     Patient reports/evidences the following emotional symptoms: Clinet denied any past h/o depression. reported h/o cocaine and alcohol use and was in [place] and reported sober for 19 years. Client reported had break up with partner last year, has medical issues- asthma, COPD, HT, Goiter,sciatica,  chronic pain. And stressors- financial, raising niece's daughter and she is asking for custody. Reported symptoms of depression began last years and received AD from PCP in dec 2017. Reported had benefits initially. Reported sleeping for 3 hrs and reported difficulty initiating and maintaining sleep. Reported has decreased interest, decreased energy, irritability,  appetite is good, able to focus and concentrate. denied any hopelessness or helpelessnes or passive suicide thoughts. Denied any symptoms of psychosis or irma. Additional symptomatology include anxiety. The above symptoms have been present for a   1 year. The patient reports onset of symptoms last year. These symptoms are of high severity as per patient's report. The symptoms are  variable in nature. The patient's condition has been precipitated by and condition worsened with stressors. Stressors/life events: breakup with partner, medical co morbidities, HT, chronic pain. Pt denied any flashbacks, hypervigilance or avoidance or reexperience or night graham. Pt denied any h/o seizures or head trauma or neurological problems. Client denied any SI or any plan or intent; denied HI or SIB. There is no evidence of hallucinations, psychosis or irma at this time.      Past Psychiatric History:     Previous psychiatric care: admits  2017-  from partner  dec 2017- PCP- Sertraline and trazodone- no benefits    Previous suicide attempts: denied    Previous self injurious behavior: No    Previous Psych Hospitalizations: denies    Current psychotropics: sertraline and trazodone          Previous psychiatric medications/ECT/TMS: admits  sertraline , trazodone          Past history of SA,rehab, detox, withdrawal: 20 years ago -[place]- cocaine and alcohol    Social History:   Social History     Socioeconomic History    Marital status: SINGLE     Spouse name: Not on file    Number of children: Not on file    Years of education: Not on file    Highest education level: Not on file   Social Needs    Financial resource strain: Not on file    Food insecurity - worry: Not on file    Food insecurity - inability: Not on file   [place] needs - medical: Not on file   [place] needs - non-medical: Not on file   Occupational History    Not on file   Tobacco Use    Smoking status: Current Every Day Smoker     Packs/day: 0.50     Years: 1.50     Pack years: 0.75     Types: Cigarettes     Last attempt to quit: 10/1/2015     Years since quitting: 3.1    Smokeless tobacco: Never Used    Tobacco comment: patient quit smoking for 10 years, began smoking again and then quit again in 2015   Substance and Sexual Activity    Alcohol use: No     Alcohol/week: 0.6 oz     Types: 1 Glasses of wine per week    Drug use: No    Sexual activity: Yes     Partners: Female   Other Topics Concern    Not on file   Social History Narrative    Not on file        Ethnic:   Relationship Status: single-   Kids: 2 - ages 45, 24  Living Situation: With family   Born: [place], [place]  Raised by: parents  Siblings: 29 - step siblings  Education: associate degree  Employment: unemployed  Tobacco:  tobacco use: smoked 1 packs per day(s) for 3 years  Caffeine: no caffeine use  Alcohol: alcohol intake:history of alcohol abuse sober for 19 years, was in [place]   Illicit Drug Social History:  H/o  smoked cocaine, sober for 19 years  Hobbies:  music   Abuse: denied  Sexual:  homosexual  Support: family  Legal: incarcerated for malicious wounding, denied any charges pending    Family History:   Family History   Problem Relation Age of Onset    Cancer Father         lung cancer    Heart Disease Mother     Hypertension Mother     Diabetes Mother     Anesth Problems Neg Hx         Family Psychiatric history: Her sister is on antidepressants. There is no history of suicide attempt in the family. Past Medical/Surgical History:   Past Medical History:   Diagnosis Date    Asthma     uses inhalers    Chronic obstructive pulmonary disease (HCC)     bronchitis    GERD (gastroesophageal reflux disease)     Hypertension     Ill-defined condition     environmental allergies     Ill-defined condition     Multiple body piercings; unable to remove tongue and lip piercings    Thyroid disease     hypo    Ventral hernia 12/31/2013         Allergies: Allergies   Allergen Reactions    Hydrocodone Itching    Mold Shortness of Breath and Itching    Ibuprofen Nausea Only        Medication List:   Current Outpatient Medications   Medication Sig Dispense Refill    sertraline (ZOLOFT) 100 mg tablet Please take one and half tablet daily for 7 days and then take 2 tabs daily 60 Tab 0    oxyCODONE-acetaminophen (PERCOCET 10)  mg per tablet May take 1 tab ONCE DAILY as needed for pain. 14 Tab 0    metoprolol succinate (TOPROL-XL) 25 mg XL tablet TAKE 1 TABLET BY MOUTH DAILY. 90 Tab 3    gabapentin (NEURONTIN) 800 mg tablet TAKE 1 TAB BY MOUTH THREE (3) TIMES DAILY. 90 Tab 5    meloxicam (MOBIC) 15 mg tablet Take 1 Tab by mouth daily (with breakfast).  30 Tab 11    amLODIPine (NORVASC) 10 mg tablet TAKE 1 TABLET BY MOUTH EVERY DAY FOR HYPERTENSION 90 Tab 3    hydroCHLOROthiazide (HYDRODIURIL) 25 mg tablet TAKE 1 TAB BY MOUTH DAILY FOR HYPERTENSION 30 Tab 6    levothyroxine (SYNTHROID) 125 mcg tablet TAKE 1 TABLET BY MOUTH EVERY DAY BEFORE BREAKFAST 90 Tab 1    methocarbamol (ROBAXIN) 750 mg tablet TAKE 1 TAB BY MOUTH FOUR (4) TIMES DAILY AS NEEDED FOR PAIN (SPASMS). 60 Tab 1    diphenhydrAMINE (BENADRYL) 25 mg capsule Take 1 Cap by mouth every six (6) hours as needed. 20 Cap 0    ondansetron (ZOFRAN ODT) 4 mg disintegrating tablet Take 1 Tab by mouth every eight (8) hours as needed for Nausea. 20 Tab 1    naloxone (NARCAN) 4 mg/actuation nasal spray Use 1 spray into 1 nostril for OVERDOSE. Call 911. For subsequent doses, give in alternating nostrils. May repeat every 2 to 3 min. 2 Each 0    traZODone (DESYREL) 50 mg tablet Take 1-2 tabs every night for sleep. 60 Tab 11    montelukast (SINGULAIR) 10 mg tablet Take 1 Tab by mouth daily. 30 Tab 6    pantoprazole (PROTONIX) 40 mg tablet TAKE 1 TABLET BY MOUTH ONCE EVERY DAY AS DIRECTED 90 Tab 2    albuterol (PROVENTIL VENTOLIN) 2.5 mg /3 mL (0.083 %) nebulizer solution 3 mL by Nebulization route every four (4) hours as needed for Wheezing. 24 Each 2    acetaminophen (TYLENOL) 650 mg TbER TAKE 1 TAB BY MOUTH THREE (3) TIMES DAILY AS NEEDED FOR PAIN. 90 Tab 0    diclofenac (VOLTAREN) 1 % gel Apply 2 g to affected area every six (6) hours. 100 g 5    lidocaine (LIDODERM) 5 % Apply patch to the affected area for 12 hours a day and remove for 12 hours a day. 30 Each 11    fluticasone (FLONASE) 50 mcg/actuation nasal spray 2 Sprays by Both Nostrils route daily. 1 Bottle 11    miscellaneous medical supply misc Shower seat for chronic knee pain, pt planning to have right TKR in June due to condition. Very limited range of motion. 1 Each 0    loratadine (CLARITIN) 10 mg tablet Take 1 Tab by mouth daily. 30 Tab 5    budesonide-formoterol (SYMBICORT) 160-4.5 mcg/actuation HFA inhaler Take 2 Puffs by inhalation two (2) times a day.  albuterol (PROAIR HFA) 90 mcg/actuation inhaler Take 2 Puffs by inhalation every four (4) hours as needed.           A comprehensive review of systems was negative except for that written in the HPI. Psychiatric/Mental Status Examination:     MENTAL STATUS EXAM:  Sensorium  oriented to time, place and person   Orientation person, place, time/date, situation, day of week, month of year and year   Relations cooperative   Eye Contact appropriate   Appearance:  age appropriate, casually dressed, overweight and within normal Limits   Motor Behavior:  gait stable and within normal limits   Speech:  normal pitch and normal volume   Vocabulary average   Thought Process: goal directed, logical and within normal limits   Thought Content free of delusions and free of hallucinations   Suicidal ideations none   Homicidal ideations none   Mood:  anxious and depressed   Affect:  anxious, depressed and mood-congruent   Memory recent  adequate   Memory remote:  adequate   Concentration:  adequate   Abstraction:  abstract   Insight:  fair   Reliability fair   Judgment:  fair     Labs:  Results for orders placed or performed in visit on 10/23/18   TOXASSURE SELECT 13 (MW)   Result Value Ref Range    Summary FINAL          Assessment:  reviewed: Filled oxycodone on 10/05/18- Imani jenkins  The client, Cristiana Gray is a 52 y.o. AA female presents with adjustment disorder with depression and anxiety. reported symptoms of depression and anxiety worsened related to increased stressors. Reported has trial of sertraline and trazodone from PCP, initially it benefited and now reports no benefits. She reported symptoms of depression and anxiety. Plan to increase the dose of sertraline to target depression and anxiety  and trazodone to target insomnia. Plan to adjust the medications as per the response and tolerability. Discussed importance of psychotherapy in her treatment plan and gave resources. Reviewed labs and records. Patient denies SI/HI/SIB. No evidence of AH/VH or delusions. Possible organic causes contributing are: asthma, HT, goiter, obesity  Reviewed medical admissions and discussed with the patient.  Client is medically . .stable. Vitals stable    PHQ 9 score: 14- moderate depression  HAM:20- mild to moderate anxiety    Diagnosis: adjustment disorder with anxiety and depression, insomnia nos, mood disorder dperessive features due to medical condition , h/o cocaine use, h/o alcohol use    Treatment Plan:   1. Medication: Increase sertraline to 150 mg daily for 7 days and then after take 2 tabs daily                            Continue trazodone 50 mg hs- take                                                       Gave referral for weight management    2. Discussed:  the risks and benefits of the proposed medication  the potential medication side effects  dry mouth, GI disturbance, headache, insomnia, libido decreased, weight gain, weight loss, somnolence  patient given opportunity to ask questions  3. Psychotherapy: Recommended: CBT- gave a list of local providers. 4. Medical: PCP- Imani jenkins  5. Return to Clinic: Follow-up Disposition:  Return in about 4 weeks (around 12/5/2018) for med check and follow up. or sooner prn    The risk versus benefits of treatment were discussed and side effects explained. Patient agreed with plan. Patient instructed to call with any side effects.   - Instructed patient to call the clinic, and if after hours call the provider on call if experiences any suicidal thought or ideas to hurt herself or other. Also instructed to call 911 or go to the ED. Patient verbalized understanding and agreed to call. Patient was given an after visit summary or informed of [facility] which includes patient instructions, diagnoses, current medications, & vitals.       Time spent with Patient:  30 to 74 minutes    Juany Castro NP  11/7/2018

## 2018-11-08 ENCOUNTER — HOSPITAL ENCOUNTER (OUTPATIENT)
Dept: PHYSICAL THERAPY | Age: 53
Discharge: HOME | End: 2018-11-08
Payer: SUBSIDIZED

## 2018-11-08 PROCEDURE — 97110 THERAPEUTIC EXERCISES: CPT | Performed by: PHYSICAL THERAPIST

## 2018-11-08 NOTE — PROGRESS NOTES
[facility]  [address], [address] [facility]    OUTPATIENT physical Therapy DAILY Treatment NOTe  Visit: 9    NAME: Helga Wilson AGE: 52 y.o. GENDER: female  DATE: 11/8/2018  REFERRING PHYSICIAN: Keyana Salgado NP      GOALS  Short term goals  Time frame: 3 weeks  1. Patient will be compliant and independent with the initial HEP as evidenced by being able to perform without cueing. 2. Patient will report a 25% improvement in symptoms. 3. Patient report a 25% improvement in sleeping. 4. Patient will have a 10 degree increase in left shoulder flexion ROM to allow ease with ADLs. 5. Patient will report decreased intensity of radicular symptoms down LUE. 6. Patient will tolerate 10 minutes of clinic activities before increase of symptoms.      Long term goals  Time frame: 6 weeks  1. Patient will report pain level decrease to 6/10 to allow increased ease of movement. 2. Patient will have an improved score on the QuickDASH outcome measure by 15 points to demonstrate an increase in functional activity tolerance. 3. Patient will be independent in final individualized HEP. 4. Patient will have an increase in left shoulder ROM to 140 degrees to allow performance of household tasks. 5. Patient will have an increase in left shoulder flexion strength to 3+/5 to allow performance of household tasks. 6. Patient will return to pushing stroller without being limited by symptoms. 7. Patient will sleep 6-8 hours without being interrupted by pain. SUBJECTIVE:   \"My shoulder feels numb. \"    Pain In: 6/10 left elbow, 7/10 left wrist; 8/10 left shoulder    OBJECTIVE DATA SUMMARY:   Objective data from initial evaluation:  EXAMINATION/PRESENTATION/DECISION MAKING:   Pain:   Location: Left shoulder; left elbow and wrist  Quality: aching and dull  Now: 10/10  Factors that improve pain: medication: used and beneficial     Strength:   RUE: 5/5     Left UE:  Shoulder flexion and abduction: 3+/5 Updated 11/1/18  Shoulder IR: 4/5; mild pain  Shoulder ER: 3+/5; reports pain at left UT  Elbow flexion: 4/5; pain  Elbow extension: 4/5   Wrist flexion: 3+/5; pain  Wrist extension: 3+/5; pain  Radial deviation: 4/5; pain  Ulnar deviation: 4/5; pain      Dynamometer:  Right : 40 lbs   Left : 25 lbs      Range of Motion:   Left shoulder AAROM in supine:   Flexion: 105 degrees; pain 105  Abduction: 95 degrees; pain   145  IR: 50 degrees; pain 75  ER: 30 degrees; pain  65    Updated 11/1/18:  Left shoulder AAROM in supine:   Flexion: 105 degrees; pain   Abduction: 145 degrees; pain  IR: 75 degrees; pain  ER: 65 degrees; pain     Joint Mobility:   Unable to tolerate     Palpation:   Patient tender to palpation at left UT, infraspinatus, supraspinatus, and biceps tendon     Neurologic Assessment:                         Tone: Normal                         Sensation: Patient reports tingling down LUE from shoulder to digits 2,3,4 of left hand                         Reflexes: NT     Mobility:                         Transitional Movements: Increased time to perform                          Gait: Patient ambulates with cane secondary to sciatica (LLE)     Balance:   WNL     Functional Measure:   QuickDASH: 88.6% impaired     TREATMENT/INTERVENTION:  Modalities (Rationale): to decrease pain and muscle guarding  MHP to left shoulder for 10 minutes in sitting at end of session; no skin irritation noted -held this date     Therapeutic Exercises:  Initial HEP provided 10/3/18:  strengthening ( yellow putty dispensed), UT stretch, Bicep curls, shoulder blade squeeze, shoulder ER stretch, Shoulder flexion stretch over table    Exercises in BOLD performed this date:     UT stretch: 3 reps with 30 second holds  Shoulder shrugs: 10 reps  Shoulder ER stretch: 5 reps   Shoulder scaption stretch against wall: 5 reps  Shoulder flexion stretch over table: 5 reps  Serratus punches: 10 reps  Pendulums: 10 reps  Pulleys: 15 reps; flexion and scaption  Wall ladder: 3 reps    Rows with yellow TB: 2 sets of 10 reps  Pull downs with yellow TB: 2 sets of 10 reps  Crossover pull downs with yellow TB: 10 reps  Biceps curls with yellow TB: 10 reps  Shoulder IR/ER with yellow TB: 2 sets of 10 reps  Isotonic shoulder ER/IR with 1# weight: 15 reps  Low rows/saw with 1# weight: 15 reps    Wrist flexion/extension: 10 reps  Wrist radial and ulnar deviation: 10 reps  Forearm supination/pronation: 15 reps  Biceps curls: 10 reps  Shoulder scaption: 10 reps  Overhead press: 10 reps     Manual Therapy:  Gentle left shoulder PROM into flexion, abduction, ER/IR  AP GH joint mobs, left; low tolerance today so held  Dorsal and volar glides of carpal bones, left   Instrument assisted soft tissue mobilization to left UT and levator scapulae. Patient educated on purpose and affect of intervention. Patient verbalized good understanding. Neuromuscular Reeducation:   Kinesiotape applied in two vertical curved strips at left elbow. One piece of tape to supinator. Patient educated on purpose and removal of tape with good understanding verbalized. Also applied kinesiotape from anterior shoulder to mid scapula in one horizontal strip. Radial and median nerve glides in sitting x 10 reps each; decreased tingling     Activity tolerance and post treatment pain report:   Fair; improved tolerance to exercises; 3/10    Education:  Education was provided to the patient on the following topics. [x]    No changes were made to the home exercise program.  []    The following changes were made to the home exercise program  Patient verbalized understanding of the topics presented. ASSESSMENT:   Patient continues to present with mild decrease in pain levels at left shoulder, elbow, and wrist from last session. Patient reports left shoulder feels \"numb\" today. Varies day to day. Patient also tender at left UT and levator scapulae; pain relief with IASTM. Patient does demonstrate increased right shoulder active and passive ROM. Patient reports good response to kinesiotaping at both left shoulder and elbow. Patient continues to wear left wrist brace. She indicates compliance with home exercise. Patient reports improvement in ADLs. Patient may benefit from orthopedic consult and/or left shoulder MRI if pain levels remain high. Patients progression toward goals is as follows:  [x]     Improving appropriately and progressing toward goals  []     Improving slowly and progressing toward goals  []     Not making progress toward goals and plan of care will be adjusted    PLAN OF CARE:   Patient continues to benefit from skilled intervention to address the above impairments. [x]    Continue treatment per established plan of care.   []     Recommend the following changes to the plan of care    Recommendations/Intent for next treatment: IASTM to left UT; AP GH joint mobs; may benefit from ortho consult for left shoulder given continued high pain levels    Cintia Nava, PT   Time Calculation: 30 mins  Patient Time in clinic:   Start Time: 1000   Stop Time: 21

## 2018-11-13 ENCOUNTER — HOSPITAL ENCOUNTER (OUTPATIENT)
Dept: PHYSICAL THERAPY | Age: 53
Discharge: HOME | End: 2018-11-13
Payer: SUBSIDIZED

## 2018-11-13 PROCEDURE — 97110 THERAPEUTIC EXERCISES: CPT | Performed by: PHYSICAL THERAPIST

## 2018-11-13 PROCEDURE — 97140 MANUAL THERAPY 1/> REGIONS: CPT | Performed by: PHYSICAL THERAPIST
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
## 2018-11-16 ENCOUNTER — CLINICAL SUPPORT (OUTPATIENT)
Dept: INTERNAL MEDICINE CLINIC | Age: 53
End: 2018-11-16

## 2018-11-16 VITALS
HEART RATE: 72 BPM | BODY MASS INDEX: 37.76 KG/M2 | HEIGHT: 61 IN | RESPIRATION RATE: 20 BRPM | TEMPERATURE: 97.9 F | DIASTOLIC BLOOD PRESSURE: 74 MMHG | SYSTOLIC BLOOD PRESSURE: 118 MMHG | WEIGHT: 200 LBS

## 2018-11-16 DIAGNOSIS — Z96.651 STATUS POST TOTAL RIGHT KNEE REPLACEMENT: Primary | ICD-10-CM

## 2018-11-16 DIAGNOSIS — M25.511 CHRONIC RIGHT SHOULDER PAIN: ICD-10-CM

## 2018-11-16 DIAGNOSIS — Z79.891 CHRONIC USE OF OPIATE FOR THERAPEUTIC PURPOSE: ICD-10-CM

## 2018-11-16 DIAGNOSIS — M17.11 PRIMARY OSTEOARTHRITIS OF RIGHT KNEE: ICD-10-CM

## 2018-11-16 DIAGNOSIS — G89.29 CHRONIC RIGHT SHOULDER PAIN: ICD-10-CM

## 2018-11-16 RX ORDER — OXYCODONE AND ACETAMINOPHEN 10; 325 MG/1; MG/1
TABLET ORAL
Qty: 30 TAB | Refills: 0 | Status: SHIPPED | OUTPATIENT
Start: 2018-11-16 | End: 2018-12-14 | Stop reason: SDUPTHER

## 2018-11-16 RX ORDER — OXYCODONE AND ACETAMINOPHEN 10; 325 MG/1; MG/1
TABLET ORAL
Qty: 30 TAB | Refills: 0 | Status: CANCELLED | OUTPATIENT
Start: 2018-11-16

## 2018-11-16 NOTE — PROGRESS NOTES
Pt last took med yesterday. Has 8 pills left today. Last fill 11/6/18, expected at least 4. Refilled med for 30 day supply and UDS collected.

## 2018-11-16 NOTE — PROGRESS NOTES
Chief Complaint   Patient presents with    Medication Evaluation    Medication Refill     oxyCODONE-acetaminophen (PERCOCET 10)  mg per tablet          Verbal order given by: Imani Dao-NP To order needed lab test  for pt. .      1. Have you been to the ER, urgent care clinic since your last visit? Hospitalized since your last visit? No    2. Have you seen or consulted any other health care providers outside of the [address] [place] [address] since your last visit? Include any pap smears or colon screening.  No

## 2018-11-23 LAB — DRUGS UR: NORMAL

## 2018-12-14 ENCOUNTER — OFFICE VISIT (OUTPATIENT)
Dept: INTERNAL MEDICINE CLINIC | Age: 53
End: 2018-12-14

## 2018-12-14 VITALS
OXYGEN SATURATION: 100 % | HEIGHT: 61 IN | BODY MASS INDEX: 37.76 KG/M2 | RESPIRATION RATE: 16 BRPM | SYSTOLIC BLOOD PRESSURE: 123 MMHG | DIASTOLIC BLOOD PRESSURE: 66 MMHG | HEART RATE: 96 BPM | TEMPERATURE: 96.2 F | WEIGHT: 200 LBS

## 2018-12-14 DIAGNOSIS — M17.11 PRIMARY OSTEOARTHRITIS OF RIGHT KNEE: ICD-10-CM

## 2018-12-14 DIAGNOSIS — K21.9 GERD WITHOUT ESOPHAGITIS: ICD-10-CM

## 2018-12-14 DIAGNOSIS — E66.9 OBESITY, CLASS II, BMI 35-39.9: ICD-10-CM

## 2018-12-14 DIAGNOSIS — Z79.891 CHRONIC USE OF OPIATE FOR THERAPEUTIC PURPOSE: Primary | ICD-10-CM

## 2018-12-14 DIAGNOSIS — Z96.651 STATUS POST TOTAL RIGHT KNEE REPLACEMENT: ICD-10-CM

## 2018-12-14 DIAGNOSIS — G89.29 CHRONIC RIGHT SHOULDER PAIN: ICD-10-CM

## 2018-12-14 DIAGNOSIS — M19.012 PRIMARY OSTEOARTHRITIS OF LEFT SHOULDER: ICD-10-CM

## 2018-12-14 DIAGNOSIS — M54.41 MIDLINE LOW BACK PAIN WITH RIGHT-SIDED SCIATICA, UNSPECIFIED CHRONICITY: ICD-10-CM

## 2018-12-14 DIAGNOSIS — M25.511 CHRONIC RIGHT SHOULDER PAIN: ICD-10-CM

## 2018-12-14 RX ORDER — OXYCODONE AND ACETAMINOPHEN 10; 325 MG/1; MG/1
1 TABLET ORAL
Qty: 30 TAB | Refills: 0 | Status: SHIPPED | OUTPATIENT
Start: 2019-01-18 | End: 2019-03-14 | Stop reason: SDUPTHER

## 2018-12-14 RX ORDER — OXYCODONE AND ACETAMINOPHEN 10; 325 MG/1; MG/1
1 TABLET ORAL
Qty: 30 TAB | Refills: 0 | Status: SHIPPED | OUTPATIENT
Start: 2018-12-14 | End: 2018-12-14 | Stop reason: SDUPTHER

## 2018-12-14 RX ORDER — OXYCODONE AND ACETAMINOPHEN 10; 325 MG/1; MG/1
1 TABLET ORAL
Qty: 30 TAB | Refills: 0 | Status: SHIPPED | OUTPATIENT
Start: 2019-02-18 | End: 2018-12-14 | Stop reason: SDUPTHER

## 2018-12-14 RX ORDER — PANTOPRAZOLE SODIUM 40 MG/1
40 TABLET, DELAYED RELEASE ORAL DAILY
Qty: 30 TAB | Refills: 1 | Status: SHIPPED | OUTPATIENT
Start: 2018-12-14 | End: 2020-07-02 | Stop reason: SDUPTHER

## 2018-12-14 NOTE — PROGRESS NOTES
Blue Garcia is a 52 y.o. female    Chief Complaint   Patient presents with    Medication Refill     Pt is here for a medication refill. 1. Have you been to the ER, urgent care clinic since your last visit? Hospitalized since your last visit? No    2. Have you seen or consulted any other health care providers outside of the [address] [place] [address] since your last visit? Include any pap smears or colon screening.   No    Visit Vitals  /66 (BP 1 Location: Left arm, BP Patient Position: Sitting)   Pulse 96   Temp 96.2 °F (35.7 °C) (Oral)   Resp 16   Ht 5' 1\" (1.549 m)   Wt 200 lb (90.7 kg)   SpO2 100%   BMI 37.79 kg/m²

## 2018-12-14 NOTE — PATIENT INSTRUCTIONS
Avoid acidic, spicy, greasy, tomato-based, and dairy foods. Avoid NSAIDs- Ibuprofen, Motrin, Advil, and Aleve. Try Tylenol or heat for pain. Gastroesophageal Reflux Disease (GERD): Care Instructions  Your Care Instructions    Gastroesophageal reflux disease (GERD) is the backward flow of stomach acid into the esophagus. The esophagus is the tube that leads from your throat to your stomach. A one-way valve prevents the stomach acid from moving up into this tube. When you have GERD, this valve does not close tightly enough. If you have mild GERD symptoms including heartburn, you may be able to control the problem with antacids or over-the-counter medicine. Changing your diet, losing weight, and making other lifestyle changes can also help reduce symptoms. Follow-up care is a key part of your treatment and safety. Be sure to make and go to all appointments, and call your doctor if you are having problems. It's also a good idea to know your test results and keep a list of the medicines you take. How can you care for yourself at home? · Take your medicines exactly as prescribed. Call your doctor if you think you are having a problem with your medicine. · Your doctor may recommend over-the-counter medicine. For mild or occasional indigestion, antacids, such as Tums, Gaviscon, Mylanta, or Maalox, may help. Your doctor also may recommend over-the-counter acid reducers, such as Pepcid AC, Tagamet HB, Zantac 75, or Prilosec. Read and follow all instructions on the label. If you use these medicines often, talk with your doctor. · Change your eating habits. ? It's best to eat several small meals instead of two or three large meals. ? After you eat, wait 2 to 3 hours before you lie down. ? Chocolate, mint, and alcohol can make GERD worse. ? Spicy foods, foods that have a lot of acid (like tomatoes and oranges), and coffee can make GERD symptoms worse in some people.  If your symptoms are worse after you eat a certain food, you may want to stop eating that food to see if your symptoms get better. · Do not smoke or chew tobacco. Smoking can make GERD worse. If you need help quitting, talk to your doctor about stop-smoking programs and medicines. These can increase your chances of quitting for good. · If you have GERD symptoms at night, raise the head of your bed 6 to 8 inches by putting the frame on blocks or placing a foam wedge under the head of your mattress. (Adding extra pillows does not work.)  · Do not wear tight clothing around your middle. · Lose weight if you need to. Losing just 5 to 10 pounds can help. When should you call for help? Call your doctor now or seek immediate medical care if:    · You have new or different belly pain.     · Your stools are black and tarlike or have streaks of blood.    Watch closely for changes in your health, and be sure to contact your doctor if:    · Your symptoms have not improved after 2 days.     · Food seems to catch in your throat or chest.   Where can you learn more? Go to http://ofelia-mindy.info/. Enter V953 in the search box to learn more about \"Gastroesophageal Reflux Disease (GERD): Care Instructions. \"  Current as of: March 28, 2018  Content Version: 11.8  © 2673-1488 [employer]. Care instructions adapted under license by [employer] (which disclaims liability or warranty for this information). If you have questions about a medical condition or this instruction, always ask your healthcare professional. [place] [address] any warranty or liability for your use of this information.

## 2018-12-14 NOTE — PROGRESS NOTES
Encounter for pain management. Chronic Pain:  Patient has chronic BL knee pain for years, had right TKR last year (2016). Left shoulder continues to hurt, injection NOT helpful. Pain Scale: 9/10. Had IMAGING for lumbar spine and right knee and has been seeing/seen by ORTHO. Pain in the left shoulder, wrist, knees, legs, and lower back is still limiting walking, sitting, reaching, lifting, and standing, exacerbated by forementioned acitivities to include stair climbing. Has tried prednisone, tramadol, injections, PT, gabapentin, cymbalta, and surgery in past with minimal relief. Pain has been controlled with Oxycodone, last taken yesterday. The medication is kept safe by staying with her. Has NOT seen any other providers since last OV for pain medication. No significant changes to pain presentation since last OV,b ut always worse with colder weather. she is  able to do her normal daily activities. she reports the following adverse side effects: none. Least pain over the last week has been 7/10. Worst pain over the last week has been 10/10. Aberrant behaviors: None         Symptoms onset: problem is longstanding. Rheumatological ROS: ongoing significant pain which is stable and controlled by pain med. Response to treatment plan: waxing and waning. Review of Systems  Constitutional: negative for fevers, chills, anorexia and weight loss  Eyes:   negative for visual disturbance, drainage, and irritation  ENT:   +AR and environmental allergies. negative for tinnitus,sore throat,ear pain,and hoarseness  Respiratory:  + asthma/COPD. negative for hemoptysis  CV:   negative for chest pain, palpitations, and lower extremity edema  GI:   + GERD.  negative for nausea, vomiting, diarrhea, abdominal pain, and melena  Endo:               negative for polyuria,polydipsia,polyphagia, and heat intolerance  Genitourinary: negative for frequency, urgency, dysuria, retention, and hematuria  Integument:  negative for rash, ulcerations, and pruritus  Hematologic:  negative for easy bruising and bleeding  Musculoskel: negative for  muscle weakness  Neurological:  negative for headaches, dizziness, vertigo,and memory problems  Behavl/Psych: +depression, on cymbalta and zoloft. negative for feelings of  suicide    1./2. Medical history/Past medical History   Past Medical History:   Diagnosis Date    Asthma     uses inhalers    Chronic obstructive pulmonary disease (HCC)     bronchitis    GERD (gastroesophageal reflux disease)     Hypertension     Ill-defined condition     environmental allergies     Ill-defined condition     Multiple body piercings; unable to remove tongue and lip piercings    Thyroid disease     hypo    Ventral hernia 12/31/2013     Past Surgical History:   Procedure Laterality Date    HX HEENT  2009    thyroidectomy    HX KNEE REPLACEMENT      R    HX MOHS PROCEDURES Right 3/8/11    HX OTHER SURGICAL      hiatal hernia repair    HX TUBAL LIGATION       Patient Active Problem List   Diagnosis Code    Ventral hernia K43.9    Chronic pain of right knee M25.561, G89.29    Chronic right shoulder pain M25.511, G89.29    Environmental and seasonal allergies J30.89    Ventral hernia without obstruction or gangrene K43.9    Primary osteoarthritis of right knee M17.11    Mixed simple and mucopurulent chronic bronchitis (HCC) J41.8    Acquired hypothyroidism E03.9    Chronic bilateral low back pain with right-sided sciatica M54.41, G89.29    Status post total right knee replacement Z96.651    Malignant hypertension I10    Mild single current episode of major depressive disorder ([facility] [address] F32.0    Pain management contract signed Z02.89    Chronic pain of left knee M25.562, G89.29    Moderate episode of recurrent major depressive disorder (HCC) F33.1    Psychophysiological insomnia F51.04    Severe obesity (BMI 35.0-39. 9) with comorbidity ([facility]) E66.01    Post-tussive vomiting R11.10    Chronic use of opiate for therapeutic purpose Z79.891    Obesity, Class II, BMI 35-39.9 E66.9    Left wrist pain M25.532    Chronic left shoulder pain M25.512, G89.29    Osteoarthritis of spine with radiculopathy, cervical region M47.22    Primary osteoarthritis of left shoulder M19.012       3. Applicable records from prior treatment providers are apart of [facility] under the media/encounters tab. 4. Diagnostic, therapeutic and laboratory results are available in the [facility] chart. 5. Consultation notes are available for review in the media/encounters tab of the [facility] chart. 6. Treatment goals include: pain control, improve activity level and function in regards to activities of daily living, and improved comfort level. Previously pt has been limited by pain in all these aspects. 7. The risks and benefits of treatment have been discussed at this office visit with the pt.  she understands that the medication has addictive potential.  Additionally the pt has been advised that narcotic pain medication may impair mental and/or physical ability required for performance of tasks such as driving or operating any other machinery. 8. Pt has an updated signed pain contract on file and can be found under the media section of the [facility] chart. 9. The pain contract is reviewed. Pain medication will be continued at the same dosage. Pill count: 18, last fill 11/18/18. Urine drug screening ordered/collected today. Diagnostic studies are not indicated at this time. Interventional procedure are not indicated at this time. Narcan order sent in past.       10. Medication prescibed is oxycodone every 24 hours PRN #30  with TWO refills for a 3 month supply.  was reviewed while planning for pain/anxiety management, no indications of drug diversion suspected. Prescription history is NOT suspicious for controlled substance overuse.     11. Patient instructions have been reviewed in detail as outlined above and in the pain contract. 12. Re-evaluation is planned for 3 months. Current Outpatient Medications   Medication Sig Dispense Refill    [START ON 2/18/2019] oxyCODONE-acetaminophen (PERCOCET 10)  mg per tablet Take 1 Tab by mouth daily as needed for Pain. May take 1 tab ONCE DAILY as needed for pain. 30 Tab 0    pantoprazole (PROTONIX) 40 mg tablet Take 1 Tab by mouth daily. Take in 2 week intervals for GERD Flares 30 Tab 1    sertraline (ZOLOFT) 100 mg tablet Please take one and half tablet daily for 7 days and then take 2 tabs daily 60 Tab 0    metoprolol succinate (TOPROL-XL) 25 mg XL tablet TAKE 1 TABLET BY MOUTH DAILY. 90 Tab 3    gabapentin (NEURONTIN) 800 mg tablet TAKE 1 TAB BY MOUTH THREE (3) TIMES DAILY. 90 Tab 5    meloxicam (MOBIC) 15 mg tablet Take 1 Tab by mouth daily (with breakfast). 30 Tab 11    amLODIPine (NORVASC) 10 mg tablet TAKE 1 TABLET BY MOUTH EVERY DAY FOR HYPERTENSION 90 Tab 3    hydroCHLOROthiazide (HYDRODIURIL) 25 mg tablet TAKE 1 TAB BY MOUTH DAILY FOR HYPERTENSION 30 Tab 6    levothyroxine (SYNTHROID) 125 mcg tablet TAKE 1 TABLET BY MOUTH EVERY DAY BEFORE BREAKFAST 90 Tab 1    methocarbamol (ROBAXIN) 750 mg tablet TAKE 1 TAB BY MOUTH FOUR (4) TIMES DAILY AS NEEDED FOR PAIN (SPASMS). 60 Tab 1    diphenhydrAMINE (BENADRYL) 25 mg capsule Take 1 Cap by mouth every six (6) hours as needed. 20 Cap 0    ondansetron (ZOFRAN ODT) 4 mg disintegrating tablet Take 1 Tab by mouth every eight (8) hours as needed for Nausea. 20 Tab 1    naloxone (NARCAN) 4 mg/actuation nasal spray Use 1 spray into 1 nostril for OVERDOSE. Call 911. For subsequent doses, give in alternating nostrils. May repeat every 2 to 3 min. 2 Each 0    traZODone (DESYREL) 50 mg tablet Take 1-2 tabs every night for sleep. 60 Tab 11    montelukast (SINGULAIR) 10 mg tablet Take 1 Tab by mouth daily.  30 Tab 6    albuterol (PROVENTIL VENTOLIN) 2.5 mg /3 mL (0.083 %) nebulizer solution 3 mL by Nebulization route every four (4) hours as needed for Wheezing. 24 Each 2    diclofenac (VOLTAREN) 1 % gel Apply 2 g to affected area every six (6) hours. 100 g 5    lidocaine (LIDODERM) 5 % Apply patch to the affected area for 12 hours a day and remove for 12 hours a day. 30 Each 11    fluticasone (FLONASE) 50 mcg/actuation nasal spray 2 Sprays by Both Nostrils route daily. 1 Bottle 11    miscellaneous medical supply misc Shower seat for chronic knee pain, pt planning to have right TKR in June due to condition. Very limited range of motion. 1 Each 0    loratadine (CLARITIN) 10 mg tablet Take 1 Tab by mouth daily. 30 Tab 5    budesonide-formoterol (SYMBICORT) 160-4.5 mcg/actuation HFA inhaler Take 2 Puffs by inhalation two (2) times a day.  albuterol (PROAIR HFA) 90 mcg/actuation inhaler Take 2 Puffs by inhalation every four (4) hours as needed.  acetaminophen (TYLENOL) 650 mg TbER TAKE 1 TAB BY MOUTH THREE (3) TIMES DAILY AS NEEDED FOR PAIN. 90 Tab 0     Allergies   Allergen Reactions    Hydrocodone Itching    Mold Shortness of Breath and Itching    Ibuprofen Nausea Only       Objective:  Visit Vitals  /66 (BP 1 Location: Left arm, BP Patient Position: Sitting)   Pulse 96   Temp 96.2 °F (35.7 °C) (Oral)   Resp 16   Ht 5' 1\" (1.549 m)   Wt 200 lb (90.7 kg)   SpO2 100%   BMI 37.79 kg/m²     Wt Readings from Last 3 Encounters:   12/14/18 200 lb (90.7 kg)   11/16/18 200 lb (90.7 kg)   11/07/18 196 lb (88.9 kg)     Physical Exam:   General appearance - alert, well appearing, and in mild distress. Mental status - A/O x 4, sad mood and affect. Chest - CTA. Symmetric chest rise. No wheezing, rales or rhonchi. Heart - Normal rate, regular rhythm. Normal S1, S2. No MGR or clicks. Abd- soft, obese,distended. Hypoactive BS. Epigastric TTP, no masses. Ext- Radial, DP pulses, 2+ bilaterally. No edema, clubbing or cyanosis. Skin-Warm and dry.  No hyperpigmentation, ulcerations, or suspicious lesions. Neuro - normal speech, no focal findings or movement disorder. Normal strength and muscle tone. Limping, antalgic gait using stroller. Back- alignment midline. Thoracic spinal and right lumbar paraspinal tenderness. No CVAT. LROM, +SLR. Assessment/Plan:  Protonix resumed. Medication Side Effects and Warnings were discussed with patient: yes   Patient Labs were reviewed: yes  Patient Past Records were reviewed: yes    See below for other orders   Follow-up Disposition: Not on File      ICD-10-CM ICD-9-CM    1. Chronic use of opiate for therapeutic purpose Z79.891 V58.69 TOXASSURE SELECT 13 (MW)      REFERRAL TO PHYSICAL THERAPY      oxyCODONE-acetaminophen (PERCOCET 10)  mg per tablet      DISCONTINUED: oxyCODONE-acetaminophen (PERCOCET 10)  mg per tablet   2. Primary osteoarthritis of right knee M17.11 715.16 TOXASSURE SELECT 13 (MW)      REFERRAL TO PHYSICAL THERAPY      oxyCODONE-acetaminophen (PERCOCET 10)  mg per tablet      DISCONTINUED: oxyCODONE-acetaminophen (PERCOCET 10)  mg per tablet   3. Status post total right knee replacement Z96.651 V43.65 TOXASSURE SELECT 13 (MW)      REFERRAL TO PHYSICAL THERAPY      oxyCODONE-acetaminophen (PERCOCET 10)  mg per tablet      DISCONTINUED: oxyCODONE-acetaminophen (PERCOCET 10)  mg per tablet   4. Primary osteoarthritis of left shoulder M19.012 715.11 TOXASSURE SELECT 13 (MW)      REFERRAL TO PHYSICAL THERAPY      oxyCODONE-acetaminophen (PERCOCET 10)  mg per tablet      DISCONTINUED: oxyCODONE-acetaminophen (PERCOCET 10)  mg per tablet   5. Obesity, Class II, BMI 35-39.9 E66.9 278.00    6. Chronic right shoulder pain M25.511 719.41 REFERRAL TO PHYSICAL THERAPY    G89.29 338.29    7.  Midline low back pain with right-sided sciatica, unspecified chronicity M54.41 724.3 REFERRAL TO PHYSICAL THERAPY     Orders Placed This Encounter    [address] (MW)    REFERRAL TO PHYSICAL THERAPY     Referral Priority:   Routine     Referral Type:   PT/OT/ST     Referral Reason:   Specialty Services Required     Number of Visits Requested:   1    DISCONTD: oxyCODONE-acetaminophen (PERCOCET 10)  mg per tablet     Sig: Take 1 Tab by mouth daily as needed for Pain. May take 1 tab ONCE DAILY as needed for pain. Dispense:  30 Tab     Refill:  0    oxyCODONE-acetaminophen (PERCOCET 10)  mg per tablet     Sig: Take 1 Tab by mouth daily as needed for Pain. May take 1 tab ONCE DAILY as needed for pain. Dispense:  30 Tab     Refill:  0    pantoprazole (PROTONIX) 40 mg tablet     Sig: Take 1 Tab by mouth daily. Take in 2 week intervals for GERD Flares     Dispense:  30 Tab     Refill:  1       Liliya Winslow expressed understanding of plan. An After Visit Summary was offered/printed and given to the patient.

## 2018-12-19 ENCOUNTER — HOSPITAL ENCOUNTER (OUTPATIENT)
Dept: DIABETES SERVICES | Age: 53
Discharge: HOME | End: 2018-12-19
Payer: SUBSIDIZED

## 2018-12-19 PROCEDURE — 97802 MEDICAL NUTRITION INDIV IN: CPT | Performed by: DIETITIAN, REGISTERED

## 2018-12-21 LAB — DRUGS UR: NORMAL

## 2018-12-23 DIAGNOSIS — M54.41 MIDLINE LOW BACK PAIN WITH RIGHT-SIDED SCIATICA, UNSPECIFIED CHRONICITY: ICD-10-CM

## 2018-12-23 DIAGNOSIS — R11.10 POST-TUSSIVE VOMITING: ICD-10-CM

## 2018-12-23 DIAGNOSIS — M17.11 PRIMARY OSTEOARTHRITIS OF RIGHT KNEE: ICD-10-CM

## 2018-12-23 RX ORDER — METHOCARBAMOL 750 MG/1
TABLET, FILM COATED ORAL
Qty: 60 TAB | Refills: 1 | Status: SHIPPED | OUTPATIENT
Start: 2018-12-23 | End: 2019-05-09

## 2018-12-23 RX ORDER — DEXTROMETHORPHAN HYDROBROMIDE, GUAIFENESIN 5; 100 MG/5ML; MG/5ML
LIQUID ORAL
Qty: 90 TAB | Refills: 0 | Status: SHIPPED | OUTPATIENT
Start: 2018-12-23 | End: 2019-06-11 | Stop reason: SDUPTHER

## 2018-12-23 RX ORDER — ONDANSETRON 4 MG/1
TABLET, ORALLY DISINTEGRATING ORAL
Qty: 20 TAB | Refills: 1 | Status: SHIPPED | OUTPATIENT
Start: 2018-12-23 | End: 2019-05-09

## 2019-01-04 DIAGNOSIS — M54.41 MIDLINE LOW BACK PAIN WITH RIGHT-SIDED SCIATICA, UNSPECIFIED CHRONICITY: ICD-10-CM

## 2019-01-04 DIAGNOSIS — M17.11 PRIMARY OSTEOARTHRITIS OF RIGHT KNEE: ICD-10-CM

## 2019-01-08 RX ORDER — ALBUTEROL SULFATE 90 UG/1
2 AEROSOL, METERED RESPIRATORY (INHALATION)
Qty: 1 INHALER | Refills: 0 | Status: SHIPPED | OUTPATIENT
Start: 2019-01-08 | End: 2019-03-09 | Stop reason: SDUPTHER

## 2019-01-08 RX ORDER — LIDOCAINE 50 MG/G
PATCH TOPICAL
Qty: 30 EACH | Refills: 11 | Status: SHIPPED | OUTPATIENT
Start: 2019-01-08 | End: 2021-10-28 | Stop reason: SDUPTHER

## 2019-01-17 ENCOUNTER — TELEPHONE (OUTPATIENT)
Dept: INTERNAL MEDICINE CLINIC | Age: 54
End: 2019-01-17

## 2019-01-17 NOTE — TELEPHONE ENCOUNTER
Feliberto/Rx   Received: Today   Message Contents   Whit, [address] [facility] Office Pool             Pt is requesting a prior authorization for Oxycodone called to [facility].Pts number is [phone]. She did run out of the medication.

## 2019-01-21 ENCOUNTER — TELEPHONE (OUTPATIENT)
Dept: INTERNAL MEDICINE CLINIC | Age: 54
End: 2019-01-21

## 2019-01-21 NOTE — TELEPHONE ENCOUNTER
form does not say anything about an action plan to be in place. I placed the form on NP desk to review.

## 2019-01-21 NOTE — TELEPHONE ENCOUNTER
Pt called stating ins co told her you did not put an action plan on the form for her percocet.  Pt # [phone]

## 2019-01-21 NOTE — TELEPHONE ENCOUNTER
I added \"action plan\", but med was denied BEFORE paperwork for PA completed. Appears it was denied since they didn't have proof that other meds were tried and failed, which documentation proves.

## 2019-01-25 DIAGNOSIS — F43.23 ADJUSTMENT DISORDER WITH MIXED ANXIETY AND DEPRESSED MOOD: ICD-10-CM

## 2019-01-28 RX ORDER — SERTRALINE HYDROCHLORIDE 100 MG/1
100 TABLET, FILM COATED ORAL DAILY
Qty: 30 TAB | Refills: 0 | Status: SHIPPED | OUTPATIENT
Start: 2019-01-28 | End: 2019-02-12 | Stop reason: SDUPTHER

## 2019-02-01 ENCOUNTER — HOSPITAL ENCOUNTER (OUTPATIENT)
Dept: DIABETES SERVICES | Age: 54
Discharge: HOME | End: 2019-02-01
Payer: MEDICAID

## 2019-02-01 DIAGNOSIS — E66.9 OBESITY, UNSPECIFIED CLASSIFICATION, UNSPECIFIED OBESITY TYPE, UNSPECIFIED WHETHER SERIOUS COMORBIDITY PRESENT: ICD-10-CM

## 2019-02-01 PROCEDURE — 97802 MEDICAL NUTRITION INDIV IN: CPT | Performed by: DIETITIAN, REGISTERED

## 2019-02-01 NOTE — DIABETES MGMT
Diabetes [facility]  [facility] - Nutrition Evaluation Follow up       DATE: 2019      REFERRING PHYSICIAN:SULTAN KEO BOYKIN  NAME: Donell Valladares : 1965 AGE: 52 y.o. GENDER: female  REASON FOR VISIT: weight loss    ASSESSMENT:  Per patient- chronic pain, asthma, COPD, htn, thyroid disorder, arthritis. LABS:   Lab Results   Component Value Date/Time    Hemoglobin A1c 5.4 2018 01:04 PM     Lab Results   Component Value Date/Time    Creatinine 0.85 2018 01:04 PM     CrCl cannot be calculated (Unknown ideal weight.). Lab Results   Component Value Date/Time    Cholesterol, total 179 2018 01:04 PM    HDL Cholesterol 51 2018 01:04 PM    LDL, calculated 108 (H) 2018 01:04 PM    Triglyceride 100 2018 01:04 PM       MEDICATIONS/SUPPLEMENTS:   [unfilled]  Prior to Admission medications    Medication Sig Start Date End Date Taking? Authorizing Provider   sertraline (ZOLOFT) 100 mg tablet Take 1 Tab by mouth daily. 19   Juany Hugo NP   budesonide-formoterol (SYMBICORT) 160-4.5 mcg/actuation HFAA Take 2 Puffs by inhalation two (2) times a day. 19   Kenn Lorenzana NP   albuterol (PROAIR HFA) 90 mcg/actuation inhaler Take 2 Puffs by inhalation every four (4) hours as needed. 19   Kenn Lorenzana NP   lidocaine (LIDODERM) 5 % Apply patch to the affected area for 12 hours a day and remove for 12 hours a day. 19   Kenn Lorenzana NP   methocarbamol (ROBAXIN) 750 mg tablet TAKE 1 TAB BY MOUTH FOUR (4) TIMES DAILY AS NEEDED FOR PAIN (SPASMS). 18   Kenn Lorenzana NP   ondansetron (ZOFRAN ODT) 4 mg disintegrating tablet DISSOLVE 1 TABLET BY MOUTH EVERY 8 HOURS AS NEEDED FOR NAUSEA 18   Kenn Lorenzana NP   acetaminophen (TYLENOL) 650 mg TbER TAKE 1 TAB BY MOUTH THREE (3) TIMES DAILY AS NEEDED FOR PAIN. 18   Kenn Lorenzana NP   pantoprazole (PROTONIX) 40 mg tablet Take 1 Tab by mouth daily.  Take in 2 week intervals for GERD Flares 18   Feliberto, Jeffrey Edwards NP   oxyCODONE-acetaminophen (PERCOCET 10)  mg per tablet Take 1 Tab by mouth daily as needed for Pain. May take 1 tab ONCE DAILY as needed for pain. 1/18/19   Yosef Brandon NP   metoprolol succinate (TOPROL-XL) 25 mg XL tablet TAKE 1 TABLET BY MOUTH DAILY. 9/8/18   Yosef Brandon NP   gabapentin (NEURONTIN) 800 mg tablet TAKE 1 TAB BY MOUTH THREE (3) TIMES DAILY. 9/8/18   Yosef Brandon NP   meloxicam (MOBIC) 15 mg tablet Take 1 Tab by mouth daily (with breakfast). 8/28/18   Yosef Brandon NP   amLODIPine (NORVASC) 10 mg tablet TAKE 1 TABLET BY MOUTH EVERY DAY FOR HYPERTENSION 6/15/18   Yosef Brandon NP   hydroCHLOROthiazide (HYDRODIURIL) 25 mg tablet TAKE 1 TAB BY MOUTH DAILY FOR HYPERTENSION 5/28/18   Yosef Brandon NP   levothyroxine (SYNTHROID) 125 mcg tablet TAKE 1 TABLET BY MOUTH EVERY DAY BEFORE BREAKFAST 5/10/18   Yosef Brandon NP   diphenhydrAMINE (BENADRYL) 25 mg capsule Take 1 Cap by mouth every six (6) hours as needed. 4/16/18   PAULINA Dodd   naloxone [facility]) 4 mg/actuation nasal spray Use 1 spray into 1 nostril for OVERDOSE. Call 911. For subsequent doses, give in alternating nostrils. May repeat every 2 to 3 min. 3/28/18   Yosef Brandon NP   traZODone (DESYREL) 50 mg tablet Take 1-2 tabs every night for sleep. 2/28/18   Yosef Brandon NP   montelukast (SINGULAIR) 10 mg tablet Take 1 Tab by mouth daily. 2/28/18   Yosef Brandon NP   albuterol (PROVENTIL VENTOLIN) 2.5 mg /3 mL (0.083 %) nebulizer solution 3 mL by Nebulization route every four (4) hours as needed for Wheezing. 1/31/18   Yosef Brandon NP   diclofenac (VOLTAREN) 1 % gel Apply 2 g to affected area every six (6) hours. 11/3/17   Yosef Brandon NP   fluticasone (FLONASE) 50 mcg/actuation nasal spray 2 Sprays by Both Nostrils route daily. 12/9/16   Yosef Brandon, SHAGGY   miscellaneous medical supply misc Shower seat for chronic knee pain, pt planning to have right TKR in June due to condition.  Very limited range of motion. 5/16/16   Marshall Done, NP   loratadine (CLARITIN) 10 mg tablet Take 1 Tab by mouth daily. 2/5/16   Marshall Done, NP       FOOD ALLERGIES/INTOLERANCES: none    ANTHROPOMETRICS:    Ht Readings from Last 1 Encounters:   12/14/18 5' 1\" (1.549 m)      Wt Readings from Last 1 Encounters:   12/14/18 90.7 kg (200 lb)     Weight : 02/01/19 207.3 lbs  12/19/18 203.7#     IBW:105 # +/- 10% BMI: 02/01/19 38.48 kg/m2      Reported Wt Hx: yo-yo  States she was 150# 5 months ago. Current weight is her highest  Estimated Nutritional Needs:02/01/19 1750 kcal/day for weight maintenance; discussed aiming for about 1500 calories daily for weight loss of 0.5 lbs weekly      Previous Reported Diet Hx: yo-yo dieter. Reports losing weight and will gain it right back. Been doing this her entire life     Previous 24 Hour Diet Recall  Breakfast  skips   Lunch  skips   Dinner  1 large meal daily- salad with meat- \"lots\" of salad dressing   Snacks  Nuts- 4 ounces daily   Beverages  4-- 32 ounce regular sodas daily       Diet Recall today:   Breakfast 8:30 - 9:00 am 2 eggs, 1/2 cup apple sauce, 2 slices of toast with 1 tbls of butter total on toast, may also have 2 slices of morejon   Lunch 1-2:00 pm [place] ([facility]) or [facility] sandwich with 1 tbls of burton with bag of potato chips  OR   2 slices of toast   Dinner 6-6:30 pm Baked bbq chicken (4oz), fried cabbage (made with 2 tbls oil), 2/3 c rice, large biscuit   Snacks  Pt reports snacking more often now on regular yogurt (reports snacking on eating 4-5 yogurts daily), but snacking less on grapes   Beverages   pt has discontinued sodas; however has increased her intake of juices  - drinking ~ 2-32 ounces of juices and sweet tea daily      Pt forget food logs today as she was having car trouble and didn't want to miss appointment (and therefore forgot logs). Pt's diet remains high in excess kcal from sugary beverages as well as overall excess kcal from unnecessary and excess snacking.  Pt shared that she feels that food is comforting to you     Exercise/Physical Activity: every other day, she reports waking 4 blocks. Limited 2' chronic pain    Environmental/Social: custody of her granddaughter- reports continued stress at home - pt reported that she thinks she snacks more when stressed as food is comforting to her; pt shared that \"there is just so much going on, I do so much for everyone\"; pt reported that she sees physiatrist and is working on coping with stress, and learning to set limits          NUTRITION DIAGNOSIS: 12/19/18 Overweight likely, in part, due to numerous sodas daily, as well as large quantities of high caloric foods. NUTRITION INTERVENTION:   Food Recall discussed and reviewed with patient. Discussed kcal intake and weight loss vs weight gain vs weight management. Discussed importance in assessing behaviors that lead to high intake of foods (and therefore prevent weight loss); discussed importance in continuing to set limits to reduce overall stress to help limit snacking when stressed. Discussed importance of good sleep (as pt reports getting 4 hours nightly but feels well-rested , although napping 3-4x weekly for 3-4 hours); discussed role of sleep on weight loss as well as poor sleep on weight loss. Educator discussed how increasing awareness of kcals in foods can help with weight loss - discussed how replacing soda with juice/sweet tea has limited effect on weight loss due to both products having similar calorie content; use \"money/budgeting\" as example to help reinforce understanding of this concept and raise awareness in pt's true intake; discussed how this same concept applies with reducing grape intake but then snacking more on yogurts. Discussed MyPlate method today and naturally low kcal foods such as nonstarchy vegetables - discussed how snacking on these (with ultimate goal to avoid snacking when stressed in mind) can help with weight loss.      Reviewed nutrition label and how to use to determine kcal content of foods. PATIENT GOALS:  1- eat 3 meals per day, continued 02/01/19  2- eat every 4-5 hours, continued 02/01/19  3- include bedtime snack- discontinued  4- decrease salad dressing- try dipping fork in dressing before picking up her salad, continued 02/01/19  5- omit regular sodas completely, modified 02/01/19 to limiting sugar beverage intake of all sugary beverages by trying other diet drinks (that are taste acceptable to pt) and/or using water to dilute juice and tea  6- Walk 10 minutes daily, continued 02/01/19  7 - new goal 02/01/19, work to limit excessive high kcal snacking by snacking on low calorie foods such as cabbage, broccoli (as pt was willing to snack more on these)  8 - new goal 02/01/19 - begin using nutrition labels to document calories from food consumed as well as intake to help increase awareness on excessive intake     Complete \"barriers of achieving weight loss\" handout, Food journal and food records and bring to next appointment for review. - 02/01/19 - pt began working on this; forgot to bring to appointment, to bring to next appointment on 03/04/19. Specific tips and techniques to facilitate compliance with above recommendations were provided and discussed.   Pt was strongly encourage to begin making necessary changes now and follow as scheduled       If you have any questions please feel free to contact me at [place], [address]

## 2019-02-04 NOTE — PROGRESS NOTES
[facility]  [address], [address] [facility]    OUTPATIENT physical Therapy discharge note      2/4/2019:  Patient will be discharged from physical therapy at this time. Criteria for termination of care:    []           Patient has plateaued  []           Patient has not returned to therapy  []           Patient has missed three or more visits without prior notification  [x]           Other: suggested patient may benefit from referral to orthopedics or MRI of shoulder    Patient was seen for 10 visits from 10/3/18 to 11/13/18. Please refer to the most recent progress note for the latest PT info available. If you need anything further faxed to you, please contact us at [phone].     Thank you for this referral.  Josy Tong, PT

## 2019-02-05 ENCOUNTER — TELEPHONE (OUTPATIENT)
Dept: INTERNAL MEDICINE CLINIC | Age: 54
End: 2019-02-05

## 2019-02-05 NOTE — TELEPHONE ENCOUNTER
FYI - Pt called to let you know that you should be receiving paperwork from [address] delivered for panty liners for her. She states she mentioned to you that when she coughs she tinkles and that is why she needs them.   Pt # [phone]

## 2019-02-08 ENCOUNTER — HOSPITAL ENCOUNTER (OUTPATIENT)
Dept: NUCLEAR MEDICINE | Age: 54
Discharge: HOME | End: 2019-02-08
Attending: ORTHOPAEDIC SURGERY
Payer: MEDICAID

## 2019-02-08 DIAGNOSIS — M25.561 RIGHT KNEE PAIN: ICD-10-CM

## 2019-02-08 PROCEDURE — 78315 BONE IMAGING 3 PHASE: CPT

## 2019-02-12 ENCOUNTER — OFFICE VISIT (OUTPATIENT)
Dept: BEHAVIORAL/MENTAL HEALTH CLINIC | Age: 54
End: 2019-02-12

## 2019-02-12 VITALS
BODY MASS INDEX: 39.08 KG/M2 | HEIGHT: 61 IN | WEIGHT: 207 LBS | SYSTOLIC BLOOD PRESSURE: 118 MMHG | DIASTOLIC BLOOD PRESSURE: 81 MMHG | HEART RATE: 76 BPM

## 2019-02-12 DIAGNOSIS — F41.9 ANXIETY: ICD-10-CM

## 2019-02-12 DIAGNOSIS — F51.04 PSYCHOPHYSIOLOGICAL INSOMNIA: ICD-10-CM

## 2019-02-12 DIAGNOSIS — F43.23 ADJUSTMENT DISORDER WITH MIXED ANXIETY AND DEPRESSED MOOD: Primary | ICD-10-CM

## 2019-02-12 RX ORDER — HYDROXYZINE 25 MG/1
25 TABLET, FILM COATED ORAL
Qty: 60 TAB | Refills: 1 | Status: SHIPPED | OUTPATIENT
Start: 2019-02-12 | End: 2019-02-22

## 2019-02-12 RX ORDER — SERTRALINE HYDROCHLORIDE 100 MG/1
200 TABLET, FILM COATED ORAL DAILY
Qty: 60 TAB | Refills: 1 | Status: SHIPPED | OUTPATIENT
Start: 2019-02-12 | End: 2019-04-12 | Stop reason: SDUPTHER

## 2019-02-12 NOTE — PROGRESS NOTES
Psychiatric Outpatient Progress Note    Account Number:  [id number]  Name: Karine Brady    SUBJECTIVE:   CHIEF COMPLAINT:  Karine Brady is a 52 y.o. female and was seen today for follow-up of psychiatric condition and therapy/ psychotropic medication management. Last office visit was Nov 2018. HPI:    Gina Low reports the following psychiatric symptoms: Clinet denied any past h/o depression. reported h/o cocaine and alcohol use and was in [place] and reported sober for 19 years. Client reported had break up with partner last year, has medical issues- asthma, COPD, HT, Goiter,sciatica,  chronic pain. And stressors- financial, raising niece's daughter and she is asking for custody. Reported symptoms of depression began last years and received AD from PCP in dec 2017. Reported had benefits initially. Reported has decreased interest, decreased energy, irritability,  appetite is good, able to focus and concentrate. denied any hopelessness or helpelessnes or passive suicide thoughts. Denied any symptoms of psychosis or irma. Additional symptomatology include anxiety. The above symptoms have been present for a   1 year. The patient reports onset of symptoms last year. These symptoms are of high severity as per patient's report. Pt denied any flashbacks, hypervigilance or avoidance or reexperience or night graham.  Pt denied any h/o seizures or head trauma or neurological problems. Patient denies SI/HI/SIB. Stressors/life events: breakup with partner, medical co morbidities, HT, chronic pain.   Side Effects:  none      Fam/Soc Hx (from [facility] with updates):    Family History   Problem Relation Age of Onset    Cancer Father         lung cancer    Heart Disease Mother     Hypertension Mother     Diabetes Mother     Anesth Problems Neg Hx       Social History     Tobacco Use    Smoking status: Current Every Day Smoker     Packs/day: 0.50     Years: 1.50     Pack years: 0.75     Types: Cigarettes Last attempt to quit: 10/1/2015     Years since quitting: 3.3    Smokeless tobacco: Never Used    Tobacco comment: patient quit smoking for 10 years, began smoking again and then quit again in 2015   Substance Use Topics    Alcohol use: No     Alcohol/week: 0.6 oz     Types: 1 Glasses of wine per week    Drug use: No        Ethnic:   Relationship Status: single-   Kids: 2 - ages 45, 24  Living Situation: With family   Born: [place] [address]  Raised by: parents  Siblings: 29 - step siblings  Education: associate degree  Employment: unemployed  Tobacco:  tobacco use: smoked 1 packs per day(s) for 3 years  Caffeine: no caffeine use  Alcohol: alcohol intake:history of alcohol abuse sober for 19 years, was in [place]   Illicit Drug Social History:  H/o  smoked cocaine, sober for 19 years  Hobbies:  music   Abuse: denied  Sexual:  homosexual  Support: family  Legal: incarcerated for malicious wounding, denied any charges pending       Family Psychiatric history: Her sister is on antidepressants. There is no history of suicide attempt in the family.     Scales:    REVIEW OF SYSTEMS:  Psychiatric:  depression, anxiety.   Appetite:no change from normal   Sleep: improved   Neuro: none reported   JOHNNY:                 Mental Status exam: WNL except for      Sensorium  oriented to time, place and person   Relations cooperative    Eye Contact    appropriate   Appearance:  age appropriate, casually dressed and within normal Limits   Motor Behavior/Gait:  gait stable and within normal limits   Speech:  normal pitch and normal volume   Thought Process: goal directed, logical and within normal limits   Thought Content free of delusions and free of hallucinations   Suicidal ideations none   Homicidal ideations none   Mood:  anxious   Affect:  anxious and mood-congruent   Memory recent  adequate   Memory remote:  adequate   Concentration:  adequate   Abstraction:  abstract   Insight:  fair   Reliability fair   Judgment: fair       MEDICAL DECISION MAKING  Data: pertinent labs, imaging, medical records and diagnostic tests reviewed and incorporated in diagnosis and treatment plan    Allergies   Allergen Reactions    Hydrocodone Itching    Mold Shortness of Breath and Itching    Ibuprofen Nausea Only        Current Outpatient Medications   Medication Sig Dispense Refill    sertraline (ZOLOFT) 100 mg tablet Take 2 Tabs by mouth daily. 60 Tab 1    hydrOXYzine HCl (ATARAX) 25 mg tablet Take 1 Tab by mouth two (2) times daily as needed for Itching for up to 10 days. 60 Tab 1    budesonide-formoterol (SYMBICORT) 160-4.5 mcg/actuation HFAA Take 2 Puffs by inhalation two (2) times a day. 3 Inhaler 3    albuterol (PROAIR HFA) 90 mcg/actuation inhaler Take 2 Puffs by inhalation every four (4) hours as needed. 1 Inhaler 0    lidocaine (LIDODERM) 5 % Apply patch to the affected area for 12 hours a day and remove for 12 hours a day. 30 Each 11    methocarbamol (ROBAXIN) 750 mg tablet TAKE 1 TAB BY MOUTH FOUR (4) TIMES DAILY AS NEEDED FOR PAIN (SPASMS). 60 Tab 1    ondansetron (ZOFRAN ODT) 4 mg disintegrating tablet DISSOLVE 1 TABLET BY MOUTH EVERY 8 HOURS AS NEEDED FOR NAUSEA 20 Tab 1    acetaminophen (TYLENOL) 650 mg TbER TAKE 1 TAB BY MOUTH THREE (3) TIMES DAILY AS NEEDED FOR PAIN. 90 Tab 0    pantoprazole (PROTONIX) 40 mg tablet Take 1 Tab by mouth daily. Take in 2 week intervals for GERD Flares 30 Tab 1    oxyCODONE-acetaminophen (PERCOCET 10)  mg per tablet Take 1 Tab by mouth daily as needed for Pain. May take 1 tab ONCE DAILY as needed for pain. 30 Tab 0    metoprolol succinate (TOPROL-XL) 25 mg XL tablet TAKE 1 TABLET BY MOUTH DAILY. 90 Tab 3    gabapentin (NEURONTIN) 800 mg tablet TAKE 1 TAB BY MOUTH THREE (3) TIMES DAILY. 90 Tab 5    meloxicam (MOBIC) 15 mg tablet Take 1 Tab by mouth daily (with breakfast).  30 Tab 11    amLODIPine (NORVASC) 10 mg tablet TAKE 1 TABLET BY MOUTH EVERY DAY FOR HYPERTENSION 90 Tab 3  hydroCHLOROthiazide (HYDRODIURIL) 25 mg tablet TAKE 1 TAB BY MOUTH DAILY FOR HYPERTENSION 30 Tab 6    levothyroxine (SYNTHROID) 125 mcg tablet TAKE 1 TABLET BY MOUTH EVERY DAY BEFORE BREAKFAST 90 Tab 1    naloxone (NARCAN) 4 mg/actuation nasal spray Use 1 spray into 1 nostril for OVERDOSE. Call 911. For subsequent doses, give in alternating nostrils. May repeat every 2 to 3 min. 2 Each 0    traZODone (DESYREL) 50 mg tablet Take 1-2 tabs every night for sleep. 60 Tab 11    montelukast (SINGULAIR) 10 mg tablet Take 1 Tab by mouth daily. 30 Tab 6    albuterol (PROVENTIL VENTOLIN) 2.5 mg /3 mL (0.083 %) nebulizer solution 3 mL by Nebulization route every four (4) hours as needed for Wheezing. 24 Each 2    diclofenac (VOLTAREN) 1 % gel Apply 2 g to affected area every six (6) hours. 100 g 5    fluticasone (FLONASE) 50 mcg/actuation nasal spray 2 Sprays by Both Nostrils route daily. 1 Bottle 11    miscellaneous medical supply misc Shower seat for chronic knee pain, pt planning to have right TKR in June due to condition. Very limited range of motion. 1 Each 0    loratadine (CLARITIN) 10 mg tablet Take 1 Tab by mouth daily. 30 Tab 5        Visit Vitals  /81   Pulse 76   Ht 5' 1\" (1.549 m)   Wt 93.9 kg (207 lb)   BMI 39.11 kg/m²         Problems addressed today:   adjustment disorder with anxiety and depression, insomnia nos, mood disorder dperessive features due to medical condition , h/o cocaine use, h/o alcohol use    Assessment:   Ivory Lyons  is a 52 y.o.  female  is not responding to treatment. Patient reports no changes to her medical conditions presents with adjustment disorder with depression and anxiety. reported symptoms of depression and anxiety worsened related to increased stressors- car broke down, she was in hospital for knee pain, family issues with ex- gf. Reported has anxiety  Related to situation.  Reported sleeping for 4 -5 hrs and has interest, has energy and motivation and able to focus and concentrate. Denied any hopelessness, or helplessness or any passive suicide thoughts. She reported symptoms of depression and anxiety related to situation. Client is responding to treatment but has anxiety related to situation. Plan to begin hydroxyzine prn for anxiety. Client is not willing to begin Buspar to target anxiety. Plan to increase the dose of sertraline to target depression and anxiety and trazodone to target insomnia. Plan to adjust the medications as per the response and tolerability. Discussed importance of psychotherapy in her treatment plan and gave resources. Reviewed labs and records. Patient denies SI/HI/SIB. No evidence of AH/VH or delusions. Client is not responding to treatment and is tolerating treatment well. Psychoeducation, medication teaching, co-morbid illness and pertinent health factors to manage care were discussed. Overall, patient is unstable at this time and will require ongoing medication management. Reviewed medical admissions and discussed with the patient. Client is medically stable. Vitals stable    Possible organic causes contributing are: asthma, HT, goiter, obesity. . Risk Scoring- chronic illnesses and prescription drug management    Treatment Plan:  1. Medications:          Medication Changes/Adjustments: Continue sertraline 200 MG DAILY                                                                              Continue trazodone 50 mg hs prn-                                                               Begin hydroxyzine 25 mg BID prn                                                                Referral to psychotherapy Edu Warren LCSW    Current Outpatient Medications   Medication Sig Dispense Refill    sertraline (ZOLOFT) 100 mg tablet Take 2 Tabs by mouth daily. 60 Tab 1    hydrOXYzine HCl (ATARAX) 25 mg tablet Take 1 Tab by mouth two (2) times daily as needed for Itching for up to 10 days.  60 Tab 1    budesonide-formoterol (SYMBICORT) 160-4.5 mcg/actuation HFAA Take 2 Puffs by inhalation two (2) times a day. 3 Inhaler 3    albuterol (PROAIR HFA) 90 mcg/actuation inhaler Take 2 Puffs by inhalation every four (4) hours as needed. 1 Inhaler 0    lidocaine (LIDODERM) 5 % Apply patch to the affected area for 12 hours a day and remove for 12 hours a day. 30 Each 11    methocarbamol (ROBAXIN) 750 mg tablet TAKE 1 TAB BY MOUTH FOUR (4) TIMES DAILY AS NEEDED FOR PAIN (SPASMS). 60 Tab 1    ondansetron (ZOFRAN ODT) 4 mg disintegrating tablet DISSOLVE 1 TABLET BY MOUTH EVERY 8 HOURS AS NEEDED FOR NAUSEA 20 Tab 1    acetaminophen (TYLENOL) 650 mg TbER TAKE 1 TAB BY MOUTH THREE (3) TIMES DAILY AS NEEDED FOR PAIN. 90 Tab 0    pantoprazole (PROTONIX) 40 mg tablet Take 1 Tab by mouth daily. Take in 2 week intervals for GERD Flares 30 Tab 1    oxyCODONE-acetaminophen (PERCOCET 10)  mg per tablet Take 1 Tab by mouth daily as needed for Pain. May take 1 tab ONCE DAILY as needed for pain. 30 Tab 0    metoprolol succinate (TOPROL-XL) 25 mg XL tablet TAKE 1 TABLET BY MOUTH DAILY. 90 Tab 3    gabapentin (NEURONTIN) 800 mg tablet TAKE 1 TAB BY MOUTH THREE (3) TIMES DAILY. 90 Tab 5    meloxicam (MOBIC) 15 mg tablet Take 1 Tab by mouth daily (with breakfast). 30 Tab 11    amLODIPine (NORVASC) 10 mg tablet TAKE 1 TABLET BY MOUTH EVERY DAY FOR HYPERTENSION 90 Tab 3    hydroCHLOROthiazide (HYDRODIURIL) 25 mg tablet TAKE 1 TAB BY MOUTH DAILY FOR HYPERTENSION 30 Tab 6    levothyroxine (SYNTHROID) 125 mcg tablet TAKE 1 TABLET BY MOUTH EVERY DAY BEFORE BREAKFAST 90 Tab 1    naloxone (NARCAN) 4 mg/actuation nasal spray Use 1 spray into 1 nostril for OVERDOSE. Call 911. For subsequent doses, give in alternating nostrils. May repeat every 2 to 3 min. 2 Each 0    traZODone (DESYREL) 50 mg tablet Take 1-2 tabs every night for sleep. 60 Tab 11    montelukast (SINGULAIR) 10 mg tablet Take 1 Tab by mouth daily.  30 Tab 6    albuterol (PROVENTIL VENTOLIN) 2.5 mg /3 mL (0.083 %) nebulizer solution 3 mL by Nebulization route every four (4) hours as needed for Wheezing. 24 Each 2    diclofenac (VOLTAREN) 1 % gel Apply 2 g to affected area every six (6) hours. 100 g 5    fluticasone (FLONASE) 50 mcg/actuation nasal spray 2 Sprays by Both Nostrils route daily. 1 Bottle 11    miscellaneous medical supply misc Shower seat for chronic knee pain, pt planning to have right TKR in June due to condition. Very limited range of motion. 1 Each 0    loratadine (CLARITIN) 10 mg tablet Take 1 Tab by mouth daily. 30 Tab 5                  The following regarding medications was addressed:    (The risks and benefits of the proposed medication; the potential medication side effects ie    dry mouth, weight gain, confusion,  GI upset, headache; patient given opportunity to ask questions)       2. Counseling and coordination of care including instructions for treatment, risks/benefits, risk factor reduction and patient/family education. She agrees with the plan. Patient instructed to call with any side effects, questions or issues. Instructed patient to call the clinic, and if after hours call the provider on call ifclient experiences any suicidal thought or ideas to hurt self or other. Also instructed to call 911 or go to the ED. Patient verbalized understanding and agreed to call    3. Follow-up Disposition:  Return in about 2 months (around 4/12/2019) for med check and follow up. 4. Other: Nutritional/health counseling on diet and exercise. For reliable dietary information, go to www. [employer].org. PSYCHOTHERAPY:  approx 16 minutes  Type:  Supportive/Cognitive Behavioral psychotherapy provided  Focus:     Current problems- stressors in family    Medical issues-asthma, HT, goiter, obesity   Interpersonal conflicts- ex-gf                   . Psychoeducation provided  Treatment plan reviewed with patient-including diagnosis and medications    Elsie Aguilar is not progressing.     Juany Bertram Jefferson NP  2/12/2019

## 2019-02-15 ENCOUNTER — HOSPITAL ENCOUNTER (OUTPATIENT)
Age: 54
Setting detail: OUTPATIENT SURGERY
Discharge: HOME | End: 2019-02-15
Attending: SPECIALIST | Admitting: SPECIALIST
Payer: MEDICAID

## 2019-02-15 ENCOUNTER — ANESTHESIA (OUTPATIENT)
Dept: ENDOSCOPY | Age: 54
End: 2019-02-15
Payer: MEDICAID

## 2019-02-15 ENCOUNTER — ANESTHESIA EVENT (OUTPATIENT)
Dept: ENDOSCOPY | Age: 54
End: 2019-02-15
Payer: MEDICAID

## 2019-02-15 VITALS
SYSTOLIC BLOOD PRESSURE: 104 MMHG | HEART RATE: 68 BPM | BODY MASS INDEX: 38.55 KG/M2 | OXYGEN SATURATION: 97 % | DIASTOLIC BLOOD PRESSURE: 71 MMHG | WEIGHT: 204 LBS | TEMPERATURE: 97.8 F | RESPIRATION RATE: 16 BRPM

## 2019-02-15 LAB
H PYLORI FROM TISSUE: NEGATIVE
KIT LOT NO., HCLOLOT: NORMAL
NEGATIVE CONTROL: NEGATIVE
POSITIVE CONTROL: POSITIVE

## 2019-02-15 PROCEDURE — 76060000031 HC ANESTHESIA FIRST 0.5 HR: Performed by: SPECIALIST

## 2019-02-15 PROCEDURE — 88305 TISSUE EXAM BY PATHOLOGIST: CPT

## 2019-02-15 PROCEDURE — 74011250637 HC RX REV CODE- 250/637: Performed by: SPECIALIST

## 2019-02-15 PROCEDURE — 87077 CULTURE AEROBIC IDENTIFY: CPT | Performed by: SPECIALIST

## 2019-02-15 PROCEDURE — 74011250636 HC RX REV CODE- 250/636

## 2019-02-15 PROCEDURE — 76040000019: Performed by: SPECIALIST

## 2019-02-15 PROCEDURE — 77030009426 HC FCPS BIOP ENDOSC BSC -B: Performed by: SPECIALIST

## 2019-02-15 RX ORDER — SODIUM CHLORIDE 9 MG/ML
50 INJECTION, SOLUTION INTRAVENOUS CONTINUOUS
Status: DISCONTINUED | OUTPATIENT
Start: 2019-02-15 | End: 2019-02-15 | Stop reason: HOSPADM

## 2019-02-15 RX ORDER — DEXTROMETHORPHAN/PSEUDOEPHED 2.5-7.5/.8
1.2 DROPS ORAL
Status: DISCONTINUED | OUTPATIENT
Start: 2019-02-15 | End: 2019-02-15 | Stop reason: HOSPADM

## 2019-02-15 RX ORDER — FLUMAZENIL 0.1 MG/ML
0.2 INJECTION INTRAVENOUS
Status: DISCONTINUED | OUTPATIENT
Start: 2019-02-15 | End: 2019-02-15 | Stop reason: HOSPADM

## 2019-02-15 RX ORDER — SODIUM CHLORIDE 9 MG/ML
INJECTION, SOLUTION INTRAVENOUS
Status: DISCONTINUED | OUTPATIENT
Start: 2019-02-15 | End: 2019-02-15 | Stop reason: HOSPADM

## 2019-02-15 RX ORDER — FENTANYL CITRATE 50 UG/ML
200 INJECTION, SOLUTION INTRAMUSCULAR; INTRAVENOUS
Status: DISCONTINUED | OUTPATIENT
Start: 2019-02-15 | End: 2019-02-15 | Stop reason: HOSPADM

## 2019-02-15 RX ORDER — PROPOFOL 10 MG/ML
INJECTION, EMULSION INTRAVENOUS AS NEEDED
Status: DISCONTINUED | OUTPATIENT
Start: 2019-02-15 | End: 2019-02-15 | Stop reason: HOSPADM

## 2019-02-15 RX ORDER — MIDAZOLAM HYDROCHLORIDE 1 MG/ML
.25-1 INJECTION, SOLUTION INTRAMUSCULAR; INTRAVENOUS
Status: DISCONTINUED | OUTPATIENT
Start: 2019-02-15 | End: 2019-02-15 | Stop reason: HOSPADM

## 2019-02-15 RX ORDER — EPINEPHRINE 0.1 MG/ML
1 INJECTION INTRACARDIAC; INTRAVENOUS
Status: DISCONTINUED | OUTPATIENT
Start: 2019-02-15 | End: 2019-02-15 | Stop reason: HOSPADM

## 2019-02-15 RX ORDER — SODIUM CHLORIDE 0.9 % (FLUSH) 0.9 %
5-40 SYRINGE (ML) INJECTION EVERY 8 HOURS
Status: DISCONTINUED | OUTPATIENT
Start: 2019-02-15 | End: 2019-02-15 | Stop reason: HOSPADM

## 2019-02-15 RX ORDER — LIDOCAINE HYDROCHLORIDE 20 MG/ML
INJECTION, SOLUTION EPIDURAL; INFILTRATION; INTRACAUDAL; PERINEURAL AS NEEDED
Status: DISCONTINUED | OUTPATIENT
Start: 2019-02-15 | End: 2019-02-15 | Stop reason: HOSPADM

## 2019-02-15 RX ORDER — ATROPINE SULFATE 0.1 MG/ML
0.5 INJECTION INTRAVENOUS
Status: DISCONTINUED | OUTPATIENT
Start: 2019-02-15 | End: 2019-02-15 | Stop reason: HOSPADM

## 2019-02-15 RX ORDER — NALOXONE HYDROCHLORIDE 0.4 MG/ML
0.4 INJECTION, SOLUTION INTRAMUSCULAR; INTRAVENOUS; SUBCUTANEOUS
Status: DISCONTINUED | OUTPATIENT
Start: 2019-02-15 | End: 2019-02-15 | Stop reason: HOSPADM

## 2019-02-15 RX ORDER — SODIUM CHLORIDE 0.9 % (FLUSH) 0.9 %
5-40 SYRINGE (ML) INJECTION AS NEEDED
Status: DISCONTINUED | OUTPATIENT
Start: 2019-02-15 | End: 2019-02-15 | Stop reason: HOSPADM

## 2019-02-15 RX ADMIN — PROPOFOL 50 MG: 10 INJECTION, EMULSION INTRAVENOUS at 13:26

## 2019-02-15 RX ADMIN — LIDOCAINE HYDROCHLORIDE 100 MG: 20 INJECTION, SOLUTION EPIDURAL; INFILTRATION; INTRACAUDAL; PERINEURAL at 13:27

## 2019-02-15 RX ADMIN — PROPOFOL 50 MG: 10 INJECTION, EMULSION INTRAVENOUS at 13:35

## 2019-02-15 RX ADMIN — PROPOFOL 50 MG: 10 INJECTION, EMULSION INTRAVENOUS at 13:30

## 2019-02-15 RX ADMIN — SIMETHICONE 80 MG: 20 SUSPENSION/ DROPS ORAL at 13:33

## 2019-02-15 RX ADMIN — SODIUM CHLORIDE: 9 INJECTION, SOLUTION INTRAVENOUS at 13:18

## 2019-02-15 RX ADMIN — PROPOFOL 50 MG: 10 INJECTION, EMULSION INTRAVENOUS at 13:27

## 2019-02-15 RX ADMIN — PROPOFOL 50 MG: 10 INJECTION, EMULSION INTRAVENOUS at 13:33

## 2019-02-15 RX ADMIN — PROPOFOL 50 MG: 10 INJECTION, EMULSION INTRAVENOUS at 13:32

## 2019-02-15 NOTE — ANESTHESIA POSTPROCEDURE EVALUATION
Procedure(s):  ESOPHAGOGASTRODUODENOSCOPY (EGD)  ESOPHAGOGASTRODUODENAL (EGD) BIOPSY. Anesthesia Post Evaluation        Comments: Post-Anesthesia Evaluation and Assessment    I have evaluated the patient and they are ready for PACU discharge. Patient: Ernestina Rosales MRN: [id number]  SSN: [id number]   YOB: 1965  Age: 52 y.o. Sex: female      Cardiovascular Function/Vital Signs  /82   Pulse 85   Temp 36.6 °C (97.8 °F)   Resp 23   Wt 92.5 kg (204 lb)   SpO2 90%   BMI 38.55 kg/m²     Patient is status post MAC anesthesia for Procedure(s):  ESOPHAGOGASTRODUODENOSCOPY (EGD)  ESOPHAGOGASTRODUODENAL (EGD) BIOPSY. Nausea/Vomiting: None    Postoperative hydration reviewed and adequate. Pain:  Pain Scale 1: Numeric (0 - 10) (02/15/19 1250)  Pain Intensity 1: 7 (02/15/19 1250)   Managed    Neurological Status: At baseline    Mental Status, Level of Consciousness: Alert and  oriented to person, place, and time    Pulmonary Status:   O2 Device: CO2 nasal cannula (02/15/19 1337)   Adequate oxygenation and airway patent    Complications related to anesthesia: None    Post-anesthesia assessment completed.  No concerns    Signed By: Neo Santiago MD    February 15, 2019                   Visit Vitals  /82   Pulse 85   Temp 36.6 °C (97.8 °F)   Resp 23   Wt 92.5 kg (204 lb)   SpO2 90%   BMI 38.55 kg/m²

## 2019-02-15 NOTE — ROUTINE PROCESS
Spencer Jarquinuber  1965  [id number]    Situation:  Verbal report received from: Carrol Ji RN  Procedure: Procedure(s):  ESOPHAGOGASTRODUODENOSCOPY (EGD)  ESOPHAGOGASTRODUODENAL (EGD) BIOPSY    Background:    Preoperative diagnosis: ABDOMINAL PAIN, EPIGASTRIC PAIN, GERD, WEIGHT LOSS, VOMITING  Postoperative diagnosis: 1. gastritis  2. deformed antrum     :  Dr. Nichole Rojas  Assistant(s): Endoscopy Technician-1: Shalini Myrick  Endoscopy RN-1: Luca Wooten RN    Specimens:   ID Type Source Tests Collected by Time Destination   1 : antrum biopsy Preservative   Luca Moody MD 2/15/2019 1336 Pathology     H. Pylori  yes    Assessment:  Intra-procedure medications     Anesthesia gave intra-procedure sedation and medications, see anesthesia flow sheet yes    Intravenous fluids: NS@ KVO     Vital signs stable     Abdominal assessment: round and soft     Recommendation:  Discharge patient per MD order.     Family or Friend   Permission to share finding with family or friend yes

## 2019-02-15 NOTE — PROGRESS NOTES

## 2019-02-15 NOTE — PROCEDURES
[address]  [address] [facility]                 NAME:  Ori Ochoa   :   1965   MRN:   [id number]     Date/Time:  2/15/2019 1:38 PM    Esophagogastroduodenoscopy (EGD) Procedure Note    :  Igor Merchant MD    Referring Provider:  Marshall Smith NP    Anethesia/Sedation:  MAC anesthesia Propofol    Preoperative diagnosis: ABDOMINAL PAIN, EPIGASTRIC PAIN, GERD, WEIGHT LOSS, VOMITING    Postoperative diagnosis: 1. gastritis  2. deformed antrum     Procedure Details     After infom consent was obtained for the procedure, with all risks and benefits of procedure explained the patient was taken to the endoscopy suite and placed in the left lateral decubitus position. Following sequential administration of sedation as per above, the [id number] gastroscope was inserted into the mouth and advanced under direct vision to second portion of the duodenum. A careful inspection was made as the gastroscope was withdrawn, including a retroflexed view of the proximal stomach; findings and interventions are described below. Findings:  Esophagus:normal  Stomach:Erosive gastritis in body and antrum, GABRIELLE and biopsies done. Antral appeared deformed from previous ulcer disease. Evidence of previous fundoplication noted. Duodenum/jejunum:normal      Therapies:  none    Specimens: GABRIELLE test, antral biopsies           EBL: None    Complications:   None; patient tolerated the procedure well. Impression:    See Postoperative diagnosis above    Recommendations:  -Acid suppression with a proton pump inhibitor. , -Await pathology. , -Await GABRIELLE test result and treat for Helicobacter pylori if positive. Avoid NSAIDS, stop smoking.     Discharge disposition:  Home in the company of  when able to ambulate    Igor Merchant MD

## 2019-02-15 NOTE — DISCHARGE INSTRUCTIONS
Varun Felton  [id number]  1965    EGD DISCHARGE INSTRUCTIONS  Discomfort:  Sore throat-  warm salt water gargle  redness at IV site- apply warm compress to area; if redness or soreness persist- contact your physician  Gaseous discomfort- walking, belching will help relieve any discomfort  You may not operate a vehicle for 12 hours  You may not engage in an occupation involving machinery or appliances for rest of today. You may not drink alcoholic beverages for at least 12 hours  Avoid making any critical decisions for at least 24 hour  DIET  You may resume your regular diet - however -  remember your colon is empty and a heavy meal will produce gas. Avoid these foods:  vegetables, fried / greasy foods, carbonated drinks  MEDICATIONS   Regarding Aspirin or Nonsteroidal medications specifically, please see below. ACTIVITY  You may resume your normal daily activities. Spend the remainder of the day resting -  avoid any strenuous activity. CALL M.D. ANY SIGN OF   Increasing pain, nausea, vomiting  Abdominal distension (swelling)  New increased bleeding (oral or rectal)  Fever (chills)  Pain in chest area  Bloody discharge from nose or mouth  Shortness of breath    You may not  take any Advil, Aspirin, Ibuprofen, Motrin, Aleve, or Goodys , ONLY  Tylenol as needed for pain. Follow-up Instructions:   Call Dr. Jonas Gaston  Results of procedure / biopsy in 10 days. Take Pantoprazole twice daily as directed  Telephone #  [phone]        Danni Mathias from Nurse    The following personal items collected during your admission are returned to you:   Dental Appliance: Dental Appliances: None  Vision: Visual Aid: Glasses  Hearing Aid:    Jewelry:    Clothing:    Other Valuables:    Valuables sent to safe:      Patient Education        Gastritis: Care Instructions  Your Care Instructions    Gastritis is a sore and upset stomach. It happens when something irritates the stomach lining.  Many things can cause it. These include an infection such as the flu or something you ate or drank. Medicines or a sore on the lining of the stomach (ulcer) also can cause it. Your belly may bloat and ache. You may belch, vomit, and feel sick to your stomach. You should be able to relieve the problem by taking medicine. And it may help to change your diet. If gastritis lasts, your doctor may prescribe medicine. Follow-up care is a key part of your treatment and safety. Be sure to make and go to all appointments, and call your doctor if you are having problems. It's also a good idea to know your test results and keep a list of the medicines you take. How can you care for yourself at home? · If your doctor prescribed antibiotics, take them as directed. Do not stop taking them just because you feel better. You need to take the full course of antibiotics. · Be safe with medicines. If your doctor prescribed medicine to decrease stomach acid, take it as directed. Call your doctor if you think you are having a problem with your medicine. · Do not take any other medicine, including over-the-counter pain relievers, without talking to your doctor first.  · If your doctor recommends over-the-counter medicine to reduce stomach acid, such as Pepcid AC, Prilosec, Tagamet HB, or Zantac 75, follow the directions on the label. · Drink plenty of fluids (enough so that your urine is light yellow or clear like water) to prevent dehydration. Choose water and other caffeine-free clear liquids. If you have kidney, heart, or liver disease and have to limit fluids, talk with your doctor before you increase the amount of fluids you drink. · Limit how much alcohol you drink. · Avoid coffee, tea, cola drinks, chocolate, and other foods with caffeine. They increase stomach acid. When should you call for help? Call 911 anytime you think you may need emergency care.  For example, call if:    · You vomit blood or what looks like coffee grounds.     · You pass maroon or very bloody stools.    Call your doctor now or seek immediate medical care if:    · You start breathing fast and have not produced urine in the last 8 hours.     · You cannot keep fluids down.    Watch closely for changes in your health, and be sure to contact your doctor if:    · You do not get better as expected. Where can you learn more? Go to http://ofelia-mindy.info/. Enter [id number] in the search box to learn more about \"Gastritis: Care Instructions. \"  Current as of: March 27, 2018  Content Version: 11.9  © 1972-8732 [employer]. Care instructions adapted under license by [employer] (which disclaims liability or warranty for this information). If you have questions about a medical condition or this instruction, always ask your healthcare professional. [address] any warranty or liability for your use of this information.

## 2019-02-15 NOTE — ANESTHESIA PREPROCEDURE EVALUATION
Anesthetic History   No history of anesthetic complications  PONV          Review of Systems / Medical History  Patient summary reviewed, nursing notes reviewed and pertinent labs reviewed    Pulmonary  Within defined limits  COPD        Asthma        Neuro/Psych   Within defined limits      Psychiatric history     Cardiovascular  Within defined limits  Hypertension                   GI/Hepatic/Renal  Within defined limits   GERD           Endo/Other  Within defined limits    Hypothyroidism  Morbid obesity and arthritis     Other Findings              Physical Exam    Airway  Mallampati: II  TM Distance: > 6 cm  Neck ROM: normal range of motion   Mouth opening: Normal     Cardiovascular  Regular rate and rhythm,  S1 and S2 normal,  no murmur, click, rub, or gallop             Dental    Dentition: Poor dentition     Pulmonary  Breath sounds clear to auscultation               Abdominal  GI exam deferred       Other Findings   Comments: Pt has multiple tongue rings and other rings that she reports can not be taken off. I explained that there is a risk with not removing them and that I wouldn't be accepting any liability if something happened with them.   She agreed to proceed with out removing them         Anesthetic Plan    ASA: 3  Anesthesia type: MAC            Anesthetic plan and risks discussed with: Patient

## 2019-02-15 NOTE — H&P
Pre-endoscopy H and P     The patient was seen and examined in the endoscopy suite. The airway was assessed and docuemented. The problem list and medications were reviewed. Patient Active Problem List   Diagnosis Code    Ventral hernia K43.9    Chronic pain of right knee M25.561, G89.29    Chronic right shoulder pain M25.511, G89.29    Environmental and seasonal allergies J30.89    Ventral hernia without obstruction or gangrene K43.9    Primary osteoarthritis of right knee M17.11    Mixed simple and mucopurulent chronic bronchitis (HCC) J41.8    Acquired hypothyroidism E03.9    Chronic bilateral low back pain with right-sided sciatica M54.41, G89.29    Status post total right knee replacement Z96.651    Malignant hypertension I10    Mild single current episode of major depressive disorder ([facility] [address] F32.0    Pain management contract signed Z02.89    Chronic pain of left knee M25.562, G89.29    Moderate episode of recurrent major depressive disorder ([facility]) F33.1    Psychophysiological insomnia F51.04    Severe obesity (BMI 35.0-39. 9) with comorbidity ([facility]) E66.01    Post-tussive vomiting R11.10    Chronic use of opiate for therapeutic purpose Z79.891    Obesity, Class II, BMI 35-39.9 E66.9    Left wrist pain M25.532    Chronic left shoulder pain M25.512, G89.29    Osteoarthritis of spine with radiculopathy, cervical region M47.22    Primary osteoarthritis of left shoulder M19.012     Social History     Socioeconomic History    Marital status: SINGLE     Spouse name: Not on file    Number of children: Not on file    Years of education: Not on file    Highest education level: Not on file   Social Needs    Financial resource strain: Not on file    Food insecurity - worry: Not on file    Food insecurity - inability: Not on file    Transportation needs - medical: Not on file   Simpli[employer]ity needs - non-medical: Not on file   Occupational History    Not on file   Tobacco Use    Smoking status: Current Every Day Smoker     Packs/day: 0.50     Years: 1.50     Pack years: 0.75     Types: Cigarettes     Last attempt to quit: 10/1/2015     Years since quitting: 3.3    Smokeless tobacco: Never Used    Tobacco comment: patient quit smoking for 10 years, began smoking again and then quit again in 2015   Substance and Sexual Activity    Alcohol use: No     Alcohol/week: 0.6 oz     Types: 1 Glasses of wine per week    Drug use: No    Sexual activity: Yes     Partners: Female   Other Topics Concern    Not on file   Social History Narrative    Not on file     Past Medical History:   Diagnosis Date    Asthma     uses inhalers    Chronic obstructive pulmonary disease (HCC)     bronchitis    GERD (gastroesophageal reflux disease)     Hypertension     Ill-defined condition     environmental allergies     Ill-defined condition     Multiple body piercings; unable to remove tongue and lip piercings    Thyroid disease     hypo    Ventral hernia 12/31/2013         Prior to Admission Medications   Prescriptions Last Dose Informant Patient Reported? Taking?   acetaminophen (TYLENOL) 650 mg TbER 2/15/2019 at Unknown time  No Yes   Sig: TAKE 1 TAB BY MOUTH THREE (3) TIMES DAILY AS NEEDED FOR PAIN. albuterol (PROAIR HFA) 90 mcg/actuation inhaler 2/15/2019 at Unknown time  No Yes   Sig: Take 2 Puffs by inhalation every four (4) hours as needed. albuterol (PROVENTIL VENTOLIN) 2.5 mg /3 mL (0.083 %) nebulizer solution 2/15/2019 at Unknown time  No Yes   Sig: 3 mL by Nebulization route every four (4) hours as needed for Wheezing. amLODIPine (NORVASC) 10 mg tablet 2/15/2019 at Unknown time  No Yes   Sig: TAKE 1 TABLET BY MOUTH EVERY DAY FOR HYPERTENSION   budesonide-formoterol (SYMBICORT) 160-4.5 mcg/actuation HFAA 2/15/2019 at Unknown time  No Yes   Sig: Take 2 Puffs by inhalation two (2) times a day.    diclofenac (VOLTAREN) 1 % gel Not Taking at Unknown time  No No   Sig: Apply 2 g to affected area every six (6) hours. fluticasone (FLONASE) 50 mcg/actuation nasal spray 2/15/2019 at Unknown time  No Yes   Si Sprays by Both Nostrils route daily. gabapentin (NEURONTIN) 800 mg tablet 2/15/2019 at Unknown time  No Yes   Sig: TAKE 1 TAB BY MOUTH THREE (3) TIMES DAILY. hydrOXYzine HCl (ATARAX) 25 mg tablet Not Taking at Unknown time  No No   Sig: Take 1 Tab by mouth two (2) times daily as needed for Itching for up to 10 days. hydroCHLOROthiazide (HYDRODIURIL) 25 mg tablet 2/15/2019 at Unknown time  No Yes   Sig: TAKE 1 TAB BY MOUTH DAILY FOR HYPERTENSION   levothyroxine (SYNTHROID) 125 mcg tablet 2/15/2019 at Unknown time  No Yes   Sig: TAKE 1 TABLET BY MOUTH EVERY DAY BEFORE BREAKFAST   lidocaine (LIDODERM) 5 % 2019 at Unknown time  No Yes   Sig: Apply patch to the affected area for 12 hours a day and remove for 12 hours a day. loratadine (CLARITIN) 10 mg tablet 2/15/2019 at Unknown time  No Yes   Sig: Take 1 Tab by mouth daily. meloxicam (MOBIC) 15 mg tablet 2/15/2019 at Unknown time  No Yes   Sig: Take 1 Tab by mouth daily (with breakfast). methocarbamol (ROBAXIN) 750 mg tablet 2/15/2019 at Unknown time  No Yes   Sig: TAKE 1 TAB BY MOUTH FOUR (4) TIMES DAILY AS NEEDED FOR PAIN (SPASMS). metoprolol succinate (TOPROL-XL) 25 mg XL tablet 2/15/2019 at Unknown time  No Yes   Sig: TAKE 1 TABLET BY MOUTH DAILY. miscellaneous medical supply misc   No No   Sig: Shower seat for chronic knee pain, pt planning to have right TKR in  due to condition. Very limited range of motion. montelukast (SINGULAIR) 10 mg tablet 2/15/2019 at Unknown time  No Yes   Sig: Take 1 Tab by mouth daily. naloxone (NARCAN) 4 mg/actuation nasal spray Not Taking at Unknown time  No No   Sig: Use 1 spray into 1 nostril for OVERDOSE. Call 911. For subsequent doses, give in alternating nostrils. May repeat every 2 to 3 min.    ondansetron (ZOFRAN ODT) 4 mg disintegrating tablet 1/15/2019 at Unknown time  No Yes   Sig: DISSOLVE 1 TABLET BY MOUTH EVERY 8 HOURS AS NEEDED FOR NAUSEA   oxyCODONE-acetaminophen (PERCOCET 10)  mg per tablet 2/15/2019 at Unknown time  No Yes   Sig: Take 1 Tab by mouth daily as needed for Pain. May take 1 tab ONCE DAILY as needed for pain. pantoprazole (PROTONIX) 40 mg tablet 2/15/2019 at Unknown time  No Yes   Sig: Take 1 Tab by mouth daily. Take in 2 week intervals for GERD Flares   sertraline (ZOLOFT) 100 mg tablet 2/15/2019 at Unknown time  No Yes   Sig: Take 2 Tabs by mouth daily. traZODone (DESYREL) 50 mg tablet 2/14/2019 at Unknown time  No Yes   Sig: Take 1-2 tabs every night for sleep. Facility-Administered Medications: None       Chief complaint, history of present illness, and review of systems and Past medical History are positive for: Epigastric pain, hypothyroidism and constipation. The heart, lungs and mental status were satisfactory for the administration of sedation and for the procedure. I discussed with the patient the objectives, risks, consequences and alternatives to the procedure.      Plan: Endoscopic procedure with sedation     eLeann Fuentes MD   2/15/2019  1:23 PM

## 2019-02-22 ENCOUNTER — HOSPITAL ENCOUNTER (EMERGENCY)
Age: 54
Discharge: HOME | End: 2019-02-22
Attending: EMERGENCY MEDICINE
Payer: MEDICAID

## 2019-02-22 VITALS
RESPIRATION RATE: 16 BRPM | BODY MASS INDEX: 38.33 KG/M2 | TEMPERATURE: 98.7 F | HEART RATE: 82 BPM | DIASTOLIC BLOOD PRESSURE: 86 MMHG | HEIGHT: 61 IN | OXYGEN SATURATION: 100 % | WEIGHT: 203 LBS | SYSTOLIC BLOOD PRESSURE: 142 MMHG

## 2019-02-22 DIAGNOSIS — K04.7 DENTAL ABSCESS: Primary | ICD-10-CM

## 2019-02-22 DIAGNOSIS — S02.5XXA CLOSED FRACTURE OF TOOTH, INITIAL ENCOUNTER: ICD-10-CM

## 2019-02-22 PROCEDURE — 99283 EMERGENCY DEPT VISIT LOW MDM: CPT

## 2019-02-22 PROCEDURE — 74011250637 HC RX REV CODE- 250/637: Performed by: EMERGENCY MEDICINE

## 2019-02-22 RX ORDER — PENICILLIN V POTASSIUM 250 MG/1
500 TABLET, FILM COATED ORAL
Status: COMPLETED | OUTPATIENT
Start: 2019-02-22 | End: 2019-02-22

## 2019-02-22 RX ORDER — TRAMADOL HYDROCHLORIDE 50 MG/1
50 TABLET ORAL
Qty: 20 TAB | Refills: 0 | Status: SHIPPED | OUTPATIENT
Start: 2019-02-22 | End: 2019-03-05

## 2019-02-22 RX ORDER — PENICILLIN V POTASSIUM 500 MG/1
500 TABLET, FILM COATED ORAL 4 TIMES DAILY
Qty: 40 TAB | Refills: 0 | Status: SHIPPED | OUTPATIENT
Start: 2019-02-22 | End: 2019-03-28

## 2019-02-22 RX ADMIN — PENICILLIN V POTASSIUM 500 MG: 250 TABLET, FILM COATED ORAL at 09:24

## 2019-02-22 NOTE — ED PROVIDER NOTES
EMERGENCY DEPARTMENT HISTORY AND PHYSICAL EXAM      Date: 2/22/2019  Patient Name: Katia Pemberton    History of Presenting Illness     Chief Complaint   Patient presents with    Dental Pain       History Provided By: Patient    HPI: Katia Pemberton, 52 y.o. female with PMHx significant for HTN, GERD, asthma, COPD, presents ambulatory to the ED with cc of a L sided facial swelling s/p a fractured tooth x 2 days ago. Pt reports an associated symptom of dental pain. She states she had bit into a pear when she had fractured her tooth. Pt denies any CP or SOB. Social hx: (+) tobacco, (-) alcohol    There are no other complaints, changes, or physical findings at this time. PCP: Glenny Chino NP    No current facility-administered medications on file prior to encounter. Current Outpatient Medications on File Prior to Encounter   Medication Sig Dispense Refill    sertraline (ZOLOFT) 100 mg tablet Take 2 Tabs by mouth daily. 60 Tab 1    hydrOXYzine HCl (ATARAX) 25 mg tablet Take 1 Tab by mouth two (2) times daily as needed for Itching for up to 10 days. 60 Tab 1    budesonide-formoterol (SYMBICORT) 160-4.5 mcg/actuation HFAA Take 2 Puffs by inhalation two (2) times a day. 3 Inhaler 3    methocarbamol (ROBAXIN) 750 mg tablet TAKE 1 TAB BY MOUTH FOUR (4) TIMES DAILY AS NEEDED FOR PAIN (SPASMS). 60 Tab 1    pantoprazole (PROTONIX) 40 mg tablet Take 1 Tab by mouth daily. Take in 2 week intervals for GERD Flares 30 Tab 1    metoprolol succinate (TOPROL-XL) 25 mg XL tablet TAKE 1 TABLET BY MOUTH DAILY. 90 Tab 3    gabapentin (NEURONTIN) 800 mg tablet TAKE 1 TAB BY MOUTH THREE (3) TIMES DAILY. 90 Tab 5    meloxicam (MOBIC) 15 mg tablet Take 1 Tab by mouth daily (with breakfast).  30 Tab 11    amLODIPine (NORVASC) 10 mg tablet TAKE 1 TABLET BY MOUTH EVERY DAY FOR HYPERTENSION 90 Tab 3    hydroCHLOROthiazide (HYDRODIURIL) 25 mg tablet TAKE 1 TAB BY MOUTH DAILY FOR HYPERTENSION 30 Tab 6    levothyroxine (SYNTHROID) 125 mcg tablet TAKE 1 TABLET BY MOUTH EVERY DAY BEFORE BREAKFAST 90 Tab 1    traZODone (DESYREL) 50 mg tablet Take 1-2 tabs every night for sleep. 60 Tab 11    montelukast (SINGULAIR) 10 mg tablet Take 1 Tab by mouth daily. 30 Tab 6    albuterol (PROAIR HFA) 90 mcg/actuation inhaler Take 2 Puffs by inhalation every four (4) hours as needed. 1 Inhaler 0    lidocaine (LIDODERM) 5 % Apply patch to the affected area for 12 hours a day and remove for 12 hours a day. 30 Each 11    ondansetron (ZOFRAN ODT) 4 mg disintegrating tablet DISSOLVE 1 TABLET BY MOUTH EVERY 8 HOURS AS NEEDED FOR NAUSEA 20 Tab 1    acetaminophen (TYLENOL) 650 mg TbER TAKE 1 TAB BY MOUTH THREE (3) TIMES DAILY AS NEEDED FOR PAIN. 90 Tab 0    oxyCODONE-acetaminophen (PERCOCET 10)  mg per tablet Take 1 Tab by mouth daily as needed for Pain. May take 1 tab ONCE DAILY as needed for pain. 30 Tab 0    naloxone (NARCAN) 4 mg/actuation nasal spray Use 1 spray into 1 nostril for OVERDOSE. Call 911. For subsequent doses, give in alternating nostrils. May repeat every 2 to 3 min. 2 Each 0    albuterol (PROVENTIL VENTOLIN) 2.5 mg /3 mL (0.083 %) nebulizer solution 3 mL by Nebulization route every four (4) hours as needed for Wheezing. 24 Each 2    diclofenac (VOLTAREN) 1 % gel Apply 2 g to affected area every six (6) hours. 100 g 5    fluticasone (FLONASE) 50 mcg/actuation nasal spray 2 Sprays by Both Nostrils route daily. 1 Bottle 11    miscellaneous medical supply misc Shower seat for chronic knee pain, pt planning to have right TKR in June due to condition. Very limited range of motion. 1 Each 0    loratadine (CLARITIN) 10 mg tablet Take 1 Tab by mouth daily.  30 Tab 5       Past History     Past Medical History:  Past Medical History:   Diagnosis Date    Asthma     uses inhalers    Chronic obstructive pulmonary disease (HCC)     bronchitis    GERD (gastroesophageal reflux disease)     Hypertension     Ill-defined condition environmental allergies     Ill-defined condition     Multiple body piercings; unable to remove tongue and lip piercings    Thyroid disease     hypo    Ventral hernia 12/31/2013       Past Surgical History:  Past Surgical History:   Procedure Laterality Date    HX HEENT  2009    thyroidectomy    HX KNEE REPLACEMENT      R    HX MOHS PROCEDURES Right 3/8/11    HX OTHER SURGICAL      hiatal hernia repair    HX TUBAL LIGATION         Family History:  Family History   Problem Relation Age of Onset    Cancer Father         lung cancer    Heart Disease Mother     Hypertension Mother     Diabetes Mother     Anesth Problems Neg Hx        Social History:  Social History     Tobacco Use    Smoking status: Current Every Day Smoker     Packs/day: 0.50     Years: 1.50     Pack years: 0.75     Types: Cigarettes     Last attempt to quit: 10/1/2015     Years since quitting: 3.3    Smokeless tobacco: Never Used    Tobacco comment: patient quit smoking for 10 years, began smoking again and then quit again in 2015   Substance Use Topics    Alcohol use: No     Alcohol/week: 0.6 oz     Types: 1 Glasses of wine per week    Drug use: No       Allergies: Allergies   Allergen Reactions    Hydrocodone Itching    Mold Shortness of Breath and Itching    Ibuprofen Nausea Only         Review of Systems   Review of Systems   Constitutional: Negative for fever. HENT: Positive for dental problem and facial swelling. Negative for sore throat. Eyes: Negative for photophobia and redness. Respiratory: Negative for shortness of breath and wheezing. Cardiovascular: Negative for chest pain and leg swelling. Gastrointestinal: Negative for abdominal pain, blood in stool, nausea and vomiting. Genitourinary: Negative for difficulty urinating, dysuria, hematuria, menstrual problem and vaginal bleeding. Musculoskeletal: Negative for back pain and joint swelling.    Neurological: Negative for dizziness, seizures, syncope, speech difficulty, weakness, numbness and headaches. Hematological: Negative for adenopathy. Psychiatric/Behavioral: Negative for agitation, confusion and suicidal ideas. The patient is not nervous/anxious. Physical Exam   Physical Exam   Constitutional: She is oriented to person, place, and time. She appears well-developed and well-nourished. No distress. HENT:   Head: Normocephalic and atraumatic. Mouth/Throat: Oropharynx is clear and moist. No oropharyngeal exudate. Dental abscess, L upper canine with surrounding abscess, multiple dental caries, extensive tooth decay, enlarged inferior turbinates BL, oropharynx is clear, mild swelling around the nasal labial fold on the L side   Eyes: Conjunctivae and EOM are normal. Pupils are equal, round, and reactive to light. Left eye exhibits no discharge. Neck: Normal range of motion. Neck supple. No JVD present. Cardiovascular: Normal rate, regular rhythm, normal heart sounds and intact distal pulses. Pulmonary/Chest: Effort normal and breath sounds normal. No respiratory distress. She has no wheezes. Abdominal: Soft. Bowel sounds are normal. She exhibits no distension. There is no tenderness. There is no rebound and no guarding. Musculoskeletal: Normal range of motion. She exhibits no edema or tenderness. Lymphadenopathy:     She has no cervical adenopathy. Neurological: She is alert and oriented to person, place, and time. She has normal reflexes. No cranial nerve deficit. Skin: Skin is warm and dry. No rash noted. Psychiatric: She has a normal mood and affect. Her behavior is normal.   Nursing note and vitals reviewed. Diagnostic Study Results     Labs -   No results found for this or any previous visit (from the past 12 hour(s)). Radiologic Studies -   No orders to display     Medical Decision Making   I am the first provider for this patient.     I reviewed the vital signs, available nursing notes, past medical history, past surgical history, family history and social history. Vital Signs-Reviewed the patient's vital signs. Patient Vitals for the past 12 hrs:   Temp Pulse Resp BP SpO2   02/22/19 0901 98.7 °F (37.1 °C) 87 16 (!) 149/91 100 %     Records Reviewed: Nursing Notes, Old Medical Records, Previous Radiology Studies and Previous Laboratory Studies    Provider Notes (Medical Decision Making):   DDx: dental abscess, fractured tooth, rhinitis    ED Course:   Initial assessment performed. The patients presenting problems have been discussed, and they are in agreement with the care plan formulated and outlined with them. I have encouraged them to ask questions as they arise throughout their visit. Critical Care Time:   none    Disposition:  DISCHARGE NOTE  9:18 AM  The patient has been re-evaluated and is ready for discharge. Reviewed available results with patient. Counseled patient on diagnosis and care plan. Patient has expressed understanding, and all questions have been answered. Patient agrees with plan and agrees to follow up as recommended, or return to the ED if their symptoms worsen. Discharge instructions have been provided and explained to the patient, along with reasons to return to the ED. PLAN:  1. Discharge  Current Discharge Medication List      START taking these medications    Details   penicillin v potassium (VEETID) 500 mg tablet Take 1 Tab by mouth four (4) times daily. Qty: 40 Tab, Refills: 0      traMADol (ULTRAM) 50 mg tablet Take 1 Tab by mouth every six (6) hours as needed for Pain for up to 10 days. Max Daily Amount: 200 mg. Qty: 20 Tab, Refills: 0    Associated Diagnoses: Closed fracture of tooth, initial encounter; Dental abscess           2. Follow-up Information     Follow up With Specialties Details Why Contact Info    [address]  In 3 days  [address] [place]  [phone]        Return to ED if worse     Diagnosis     Clinical Impression: 1. Dental abscess    2. Closed fracture of tooth, initial encounter        Attestations: This note is prepared by Avery Diaz, acting as Scribe for Mike Alcantar MD.    Mike Alcantar MD: The scribe's documentation has been prepared under my direction and personally reviewed by me in its entirety. I confirm that the note above accurately reflects all work, treatment, procedures, and medical decision making performed by me. This note will not be viewable in [address]

## 2019-02-22 NOTE — ED NOTES
Pt ambulatory in ER with c/o lt side facial swelling since this morning,reported her psychiatrist started new meds so she took yesterday and she woke up in this morning with this swelling. Emergency Department Nursing Plan of Care       The Nursing Plan of Care is developed from the Nursing assessment and Emergency Department Attending provider initial evaluation. The plan of care may be reviewed in the ED Provider note.     The Plan of Care was developed with the following considerations:   Patient / Family readiness to learn indicated by:verbalized understanding  Persons(s) to be included in education: patient  Barriers to Learning/Limitations:No    Signed     Tien Tadeo RN    2/22/2019   9:05 AM

## 2019-02-22 NOTE — DISCHARGE INSTRUCTIONS
Patient Education        Mouth Injury: Care Instructions  Your Care Instructions    Mouth injuries are common. They may involve the teeth, jaw, lips, tongue, inner cheeks, or gums. A mouth injury can also affect the roof of the mouth, your neck, or your tonsils. You may injure your teeth during a fall or while playing sports. An injury can crack, chip, or break a tooth or make a tooth change color. A tooth also may be knocked out, loosened, moved, or jammed into the gum. An injury to the roof of your mouth, the back of your throat, or a tonsil can injure deeper tissues in your head or neck. These injuries can happen when you fall with a pointed object, such as a pencil, in your mouth. Sometimes you may bite the inside of your cheek several times while chewing, causing a sore. Or you may bite your tongue while playing sports or because of a seizure, a car or bicycle crash, an assault, or another injury. Braces or mouth jewelry can also poke or cause sores on mouth tissues. Sometimes the piece of skin between your lips and gums or under your tongue may tear or rip. A cut or tear to the tongue can bleed a lot. Small injuries may often heal on their own. If the injury is long or deep, it may need stitches that dissolve over time. Follow-up care is a key part of your treatment and safety. Be sure to make and go to all appointments, and call your doctor if you are having problems. It's also a good idea to know your test results and keep a list of the medicines you take. How can you care for yourself at home? · Apply a cold compress to the injured area. Or suck on a piece of ice or a flavored ice pop. · Rinse your wound with warm salt water right after meals. Saltwater rinses may relieve some pain. To make a saltwater solution for rinsing the mouth, mix 1 tsp of salt in 1 cup of warm water. · Eat soft foods that are easy to swallow. · Avoid foods that might sting.  These include salty or spicy foods, citrus fruits or juices, and tomatoes. · Be safe with medicines. Read and follow all instructions on the label. ? If the doctor gave you a prescription medicine for pain, take it as prescribed. ? If you are not taking a prescription pain medicine, ask your doctor if you can take an over-the-counter medicine. · If your doctor prescribed antibiotics, take them as directed. Do not stop taking them just because you feel better. You need to take the full course of antibiotics. · Try using a topical medicine, such as Orabase, to reduce mouth pain. When should you call for help? Call 911 anytime you think you may need emergency care. For example, call if:    · You have trouble breathing.    Call your doctor now or seek immediate medical care if:    · You have new or worse bleeding.     · You have signs of infection, such as:  ? Increased pain, swelling, warmth, or redness. ? Red streaks leading from the injured area. ? Pus draining from the injured area. ? A fever.    Watch closely for changes in your health, and be sure to contact your doctor if:    · You do not get better as expected. Where can you learn more? Go to http://ofelia-mindy.info/. Enter M950 in the search box to learn more about \"Mouth Injury: Care Instructions. \"  Current as of: September 23, 2018  Content Version: 11.9  © 8237-7563 [employer]. Care instructions adapted under license by [employer] (which disclaims liability or warranty for this information). If you have questions about a medical condition or this instruction, always ask your healthcare professional. [place] [address] any warranty or liability for your use of this information.

## 2019-02-22 NOTE — ED TRIAGE NOTES
Anterior upper dental pain x 2 days after tooth broke. Moderate upper lip swelling starting this morning.

## 2019-03-05 ENCOUNTER — HOSPITAL ENCOUNTER (OUTPATIENT)
Dept: PREADMISSION TESTING | Age: 54
Discharge: HOME | End: 2019-03-05
Payer: MEDICAID

## 2019-03-05 VITALS
BODY MASS INDEX: 39.27 KG/M2 | SYSTOLIC BLOOD PRESSURE: 93 MMHG | WEIGHT: 208 LBS | HEIGHT: 61 IN | DIASTOLIC BLOOD PRESSURE: 54 MMHG | TEMPERATURE: 97.8 F | HEART RATE: 73 BPM

## 2019-03-05 LAB
ABO + RH BLD: NORMAL
ANION GAP SERPL CALC-SCNC: 6 MMOL/L (ref 5–15)
APPEARANCE UR: CLEAR
ATRIAL RATE: 55 BPM
BACTERIA URNS QL MICRO: NEGATIVE /HPF
BILIRUB UR QL: NEGATIVE
BLOOD GROUP ANTIBODIES SERPL: NORMAL
BUN SERPL-MCNC: 19 MG/DL (ref 6–20)
BUN/CREAT SERPL: 19 (ref 12–20)
CALCIUM SERPL-MCNC: 8.6 MG/DL (ref 8.5–10.1)
CALCULATED P AXIS, ECG09: 30 DEGREES
CALCULATED R AXIS, ECG10: 30 DEGREES
CALCULATED T AXIS, ECG11: 24 DEGREES
CHLORIDE SERPL-SCNC: 108 MMOL/L (ref 97–108)
CO2 SERPL-SCNC: 27 MMOL/L (ref 21–32)
COLOR UR: NORMAL
CREAT SERPL-MCNC: 0.98 MG/DL (ref 0.55–1.02)
DIAGNOSIS, 93000: NORMAL
EPITH CASTS URNS QL MICRO: NORMAL /LPF
ERYTHROCYTE [DISTWIDTH] IN BLOOD BY AUTOMATED COUNT: 14.2 % (ref 11.5–14.5)
EST. AVERAGE GLUCOSE BLD GHB EST-MCNC: 120 MG/DL
GLUCOSE SERPL-MCNC: 91 MG/DL (ref 65–100)
GLUCOSE UR STRIP.AUTO-MCNC: NEGATIVE MG/DL
HBA1C MFR BLD: 5.8 % (ref 4.2–6.3)
HCT VFR BLD AUTO: 40.4 % (ref 35–47)
HGB BLD-MCNC: 12.9 G/DL (ref 11.5–16)
HGB UR QL STRIP: NEGATIVE
HYALINE CASTS URNS QL MICRO: NORMAL /LPF (ref 0–5)
INR PPP: 1 (ref 0.9–1.1)
KETONES UR QL STRIP.AUTO: NEGATIVE MG/DL
LEUKOCYTE ESTERASE UR QL STRIP.AUTO: NEGATIVE
MCH RBC QN AUTO: 28.2 PG (ref 26–34)
MCHC RBC AUTO-ENTMCNC: 31.9 G/DL (ref 30–36.5)
MCV RBC AUTO: 88.2 FL (ref 80–99)
NITRITE UR QL STRIP.AUTO: NEGATIVE
NRBC # BLD: 0 K/UL (ref 0–0.01)
NRBC BLD-RTO: 0 PER 100 WBC
P-R INTERVAL, ECG05: 198 MS
PH UR STRIP: 6 [PH] (ref 5–8)
PLATELET # BLD AUTO: 275 K/UL (ref 150–400)
PMV BLD AUTO: 10.3 FL (ref 8.9–12.9)
POTASSIUM SERPL-SCNC: 4.1 MMOL/L (ref 3.5–5.1)
PROT UR STRIP-MCNC: NEGATIVE MG/DL
PROTHROMBIN TIME: 10.1 SEC (ref 9–11.1)
Q-T INTERVAL, ECG07: 442 MS
QRS DURATION, ECG06: 86 MS
QTC CALCULATION (BEZET), ECG08: 422 MS
RBC # BLD AUTO: 4.58 M/UL (ref 3.8–5.2)
RBC #/AREA URNS HPF: NORMAL /HPF (ref 0–5)
SODIUM SERPL-SCNC: 141 MMOL/L (ref 136–145)
SP GR UR REFRACTOMETRY: 1.02 (ref 1–1.03)
SPECIMEN EXP DATE BLD: NORMAL
UA: UC IF INDICATED,UAUC: NORMAL
UROBILINOGEN UR QL STRIP.AUTO: 0.2 EU/DL (ref 0.2–1)
VENTRICULAR RATE, ECG03: 55 BPM
WBC # BLD AUTO: 5.2 K/UL (ref 3.6–11)
WBC URNS QL MICRO: NORMAL /HPF (ref 0–4)

## 2019-03-05 PROCEDURE — 93005 ELECTROCARDIOGRAM TRACING: CPT

## 2019-03-05 PROCEDURE — 86900 BLOOD TYPING SEROLOGIC ABO: CPT

## 2019-03-05 PROCEDURE — 85610 PROTHROMBIN TIME: CPT

## 2019-03-05 PROCEDURE — 85027 COMPLETE CBC AUTOMATED: CPT

## 2019-03-05 PROCEDURE — 83036 HEMOGLOBIN GLYCOSYLATED A1C: CPT

## 2019-03-05 PROCEDURE — 80048 BASIC METABOLIC PNL TOTAL CA: CPT

## 2019-03-05 PROCEDURE — 36415 COLL VENOUS BLD VENIPUNCTURE: CPT

## 2019-03-05 PROCEDURE — 81001 URINALYSIS AUTO W/SCOPE: CPT

## 2019-03-05 NOTE — PERIOP NOTES
Preoperative instructions reviewed with patient. Patient given six packs of CHG wipes. Instructions reviewed on use of CHG wipes. Patient given SSI infection FAQS sheet, as well as a  MRSA/MSSA treatment instruction sheet  With an explanation to patient that they will be notified if treatment instructions need to be initiated. Patient was given the opportunity to ask questions on the information provided. DR. ESPINOSA'S OFFICE FAXED AND NOTIFIED BY PHONE OF POSITIVE MRSA IN [place] FROM 3/5/19.

## 2019-03-06 PROBLEM — Z22.322 MRSA CARRIER: Status: ACTIVE | Noted: 2019-03-06

## 2019-03-06 LAB
BACTERIA SPEC CULT: ABNORMAL
BACTERIA SPEC CULT: ABNORMAL
SERVICE CMNT-IMP: ABNORMAL

## 2019-03-06 RX ORDER — MUPIROCIN 20 MG/G
OINTMENT TOPICAL 2 TIMES DAILY
Qty: 22 G | Refills: 0 | Status: SHIPPED | OUTPATIENT
Start: 2019-03-19 | End: 2019-03-28

## 2019-03-09 RX ORDER — ALBUTEROL SULFATE 90 UG/1
AEROSOL, METERED RESPIRATORY (INHALATION)
Qty: 8.5 INHALER | Refills: 0 | Status: SHIPPED | OUTPATIENT
Start: 2019-03-09 | End: 2021-07-16 | Stop reason: SDUPTHER

## 2019-03-13 DIAGNOSIS — F43.23 ADJUSTMENT DISORDER WITH MIXED ANXIETY AND DEPRESSED MOOD: ICD-10-CM

## 2019-03-13 RX ORDER — SERTRALINE HYDROCHLORIDE 100 MG/1
200 TABLET, FILM COATED ORAL DAILY
Qty: 60 TAB | Refills: 1 | OUTPATIENT
Start: 2019-03-13

## 2019-03-14 ENCOUNTER — OFFICE VISIT (OUTPATIENT)
Dept: INTERNAL MEDICINE CLINIC | Age: 54
End: 2019-03-14

## 2019-03-14 VITALS
BODY MASS INDEX: 39.25 KG/M2 | RESPIRATION RATE: 17 BRPM | WEIGHT: 207.9 LBS | SYSTOLIC BLOOD PRESSURE: 100 MMHG | TEMPERATURE: 96.6 F | DIASTOLIC BLOOD PRESSURE: 75 MMHG | HEART RATE: 67 BPM | OXYGEN SATURATION: 99 % | HEIGHT: 61 IN

## 2019-03-14 DIAGNOSIS — M17.11 PRIMARY OSTEOARTHRITIS OF RIGHT KNEE: ICD-10-CM

## 2019-03-14 DIAGNOSIS — Z96.651 STATUS POST TOTAL RIGHT KNEE REPLACEMENT: ICD-10-CM

## 2019-03-14 DIAGNOSIS — Z79.891 CHRONIC USE OF OPIATE FOR THERAPEUTIC PURPOSE: ICD-10-CM

## 2019-03-14 DIAGNOSIS — M19.012 PRIMARY OSTEOARTHRITIS OF LEFT SHOULDER: ICD-10-CM

## 2019-03-14 RX ORDER — DULOXETIN HYDROCHLORIDE 60 MG/1
60 CAPSULE, DELAYED RELEASE ORAL
COMMUNITY
Start: 2017-03-03 | End: 2019-04-12

## 2019-03-14 RX ORDER — OXYCODONE AND ACETAMINOPHEN 10; 325 MG/1; MG/1
1 TABLET ORAL
Qty: 30 TAB | Refills: 0 | Status: SHIPPED | OUTPATIENT
Start: 2019-04-18 | End: 2019-05-09

## 2019-03-14 RX ORDER — OXYCODONE AND ACETAMINOPHEN 10; 325 MG/1; MG/1
1 TABLET ORAL
Qty: 30 TAB | Refills: 0 | Status: SHIPPED | OUTPATIENT
Start: 2019-03-18 | End: 2019-03-14 | Stop reason: SDUPTHER

## 2019-03-14 NOTE — PROGRESS NOTES
Pt states she has MRSA in nose. Pt states pain level is a 10/10 left shoulder and right knee    1. Have you been to the ER, urgent care clinic since your last visit? Hospitalized since your last visit? No    2. Have you seen or consulted any other health care providers outside of the [address] [place] [address] since your last visit? Include any pap smears or colon screening.  No    Pt is here for   Chief Complaint   Patient presents with    Medication Refill     orders pending      Pt states last had something for pain this morning

## 2019-03-14 NOTE — PATIENT INSTRUCTIONS
Good luck on your surgery! EXERCISING DAILY for AT LEAST 10 minutes IS extremely IMPORTANT to help manage your pain. Think of it as taking one of your meds for pain. Continuous movement for up to 1 hour is most helpful on DAILY basis. This has to be a NON-NEGOTIABLE. Whether you feel like it or not. This will help with the FATIGUE and overall PAIN. Please give it a try. Also change up your routine, walking/jogging one day, take an online class, go cycling, go to the gym for a class or get on a machine, go to [facility]. The DAILY MOVEMENT is PARAMOUNT to help LOWER your overall pain level.

## 2019-03-14 NOTE — PROGRESS NOTES
Encounter for pain management. Chronic Pain:  Patient has chronic BL knee pain for years, had right TKR last year (2016). Will have repeat surgery to right knee 3/26 with Dr. John Jackson. Feels right knee pain is worsening, but left shoulder pain improving with PT. Attends twice weekly. Pain Scale: 10 - Worst pain ever/10. Had IMAGING for lumbar spine and right knee and has been seeing/seen by ORTHO. Pain in the left shoulder, wrist, knees, legs, and lower back is still limiting walking, sitting, reaching, lifting, and standing, exacerbated by forementioned acitivities to include stair climbing. Has tried prednisone, tramadol, injections, PT, gabapentin, cymbalta, and surgery in past with minimal relief. Pain has been controlled with Oxycodone, last taken yesterday. The medication is kept safe by staying with her. Has NOT seen any other providers since last OV for pain medication. No significant changes to pain presentation since last OV. she is  able to do her normal daily activities. she reports the following adverse side effects: none. Averaging 4-5 BMs per week. Least pain over the last week has been 7/10. Worst pain over the last week has been 10/10. Aberrant behaviors: None         Symptoms onset: problem is longstanding. Rheumatological ROS: ongoing significant pain which is stable and controlled by pain med. Response to treatment plan: waxing and waning. Review of Systems  Constitutional: +MRSA in nose. negative for fevers, chills, anorexia and weight loss  Eyes:   negative for visual disturbance, drainage, and irritation  ENT:   +AR and environmental allergies. negative for tinnitus,sore throat,ear pain,and hoarseness  Respiratory:  + asthma/COPD. negative for hemoptysis  CV:   negative for chest pain, palpitations, and lower extremity edema  GI:   + GERD.  negative for nausea, vomiting, diarrhea, abdominal pain, and melena  Endo:               negative for polyuria,polydipsia,polyphagia, and heat intolerance  Genitourinary: negative for frequency, urgency, dysuria, retention, and hematuria  Integument:  negative for rash, ulcerations, and pruritus  Hematologic:  negative for easy bruising and bleeding  Musculoskel: negative for  muscle weakness  Neurological:  negative for headaches, dizziness, vertigo,and memory problems  Behavl/Psych: +depression, on cymbalta and zoloft. negative for feelings of  suicide    1./2. Medical history/Past medical History   Past Medical History:   Diagnosis Date    Arthritis     Asthma     uses inhalers    Chronic obstructive pulmonary disease (HCC)     bronchitis    Chronic pain     GERD (gastroesophageal reflux disease)     Hypertension     Ill-defined condition     environmental allergies     Ill-defined condition     Multiple body piercings; unable to remove tongue and lip piercings    Ill-defined condition 2018    GASTRITIS PER ENDOSCOPY    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    Psychiatric disorder     DEPRESSION    Thyroid disease     hypo    Ventral hernia 12/31/2013     Past Surgical History:   Procedure Laterality Date    HX HEENT  2009    thyroidectomy    HX KNEE REPLACEMENT      R    HX MOHS PROCEDURES Right 3/8/11    HX OTHER SURGICAL      hiatal hernia repair    HX TUBAL LIGATION       Patient Active Problem List   Diagnosis Code    Ventral hernia K43.9    Chronic pain of right knee M25.561, G89.29    Chronic right shoulder pain M25.511, G89.29    Environmental and seasonal allergies J30.89    Ventral hernia without obstruction or gangrene K43.9    Primary osteoarthritis of right knee M17.11    Mixed simple and mucopurulent chronic bronchitis (HCC) J41.8    Acquired hypothyroidism E03.9    Chronic bilateral low back pain with right-sided sciatica M54.41, G89.29    Status post total right knee replacement Z96.651    Malignant hypertension I10    Mild single current episode of major depressive disorder ([facility] [address] F32.0    Pain management contract signed Z02.89    Chronic pain of left knee M25.562, G89.29    Moderate episode of recurrent major depressive disorder (HCC) F33.1    Psychophysiological insomnia F51.04    Severe obesity (BMI 35.0-39. 9) with comorbidity (HCC) E66.01    Post-tussive vomiting R11.10    Chronic use of opiate for therapeutic purpose Z79.891    Obesity, Class II, BMI 35-39.9 E66.9    Left wrist pain M25.532    Chronic left shoulder pain M25.512, G89.29    Osteoarthritis of spine with radiculopathy, cervical region M47.22    Primary osteoarthritis of left shoulder M19.012    MRSA carrier Z22.322       3. Applicable records from prior treatment providers are apart of [facility] under the media/encounters tab. 4. Diagnostic, therapeutic and laboratory results are available in the [address] [place] [address] chart. 5. Consultation notes are available for review in the media/encounters tab of the [address] [place] [address] chart. 6. Treatment goals include: pain control, improve activity level and function in regards to activities of daily living, and improved comfort level. Previously pt has been limited by pain in all these aspects. 7. The risks and benefits of treatment have been discussed at this office visit with the pt.  she understands that the medication has addictive potential.  Additionally the pt has been advised that narcotic pain medication may impair mental and/or physical ability required for performance of tasks such as driving or operating any other machinery. 8. Pt has an updated signed pain contract on file and can be found under the media section of the [facility] chart. 9. The pain contract is reviewed. Pain medication will be continued at the same dosage. Pill count: 9, last fill 2/19/19. Urine drug screening ordered/collected today. Diagnostic studies are not indicated at this time. Interventional procedure are not indicated at this time.  Narcan order sent in past.       10. Medication prescibed is oxycodone every 24 hours PRN #30  with 1 refill for a 2 month supply.  was reviewed while planning for pain/anxiety management, no indications of drug diversion suspected. Prescription history is NOT suspicious for controlled substance overuse. 11. Patient instructions have been reviewed in detail as outlined above and in the pain contract. 12. Re-evaluation is planned for 2 months. Current Outpatient Medications   Medication Sig Dispense Refill    DULoxetine (CYMBALTA) 60 mg capsule Take 60 mg by mouth.  [START ON 3/18/2019] oxyCODONE-acetaminophen (PERCOCET 10)  mg per tablet Take 1 Tab by mouth daily as needed for Pain for up to 30 days. May take 1 tab ONCE DAILY as needed for pain. 30 Tab 0    PROAIR HFA 90 mcg/actuation inhaler TAKE 2 PUFFS BY INHALATION EVERY FOUR (4) HOURS AS NEEDED. 8.5 Inhaler 0    [START ON 3/19/2019] mupirocin (BACTROBAN) 2 % ointment by Both Nostrils route two (2) times a day for 7 days. 22 g 0    penicillin v potassium (VEETID) 500 mg tablet Take 1 Tab by mouth four (4) times daily. 40 Tab 0    sertraline (ZOLOFT) 100 mg tablet Take 2 Tabs by mouth daily. 60 Tab 1    budesonide-formoterol (SYMBICORT) 160-4.5 mcg/actuation HFAA Take 2 Puffs by inhalation two (2) times a day. 3 Inhaler 3    lidocaine (LIDODERM) 5 % Apply patch to the affected area for 12 hours a day and remove for 12 hours a day. 30 Each 11    methocarbamol (ROBAXIN) 750 mg tablet TAKE 1 TAB BY MOUTH FOUR (4) TIMES DAILY AS NEEDED FOR PAIN (SPASMS). 60 Tab 1    ondansetron (ZOFRAN ODT) 4 mg disintegrating tablet DISSOLVE 1 TABLET BY MOUTH EVERY 8 HOURS AS NEEDED FOR NAUSEA 20 Tab 1    acetaminophen (TYLENOL) 650 mg TbER TAKE 1 TAB BY MOUTH THREE (3) TIMES DAILY AS NEEDED FOR PAIN. 90 Tab 0    pantoprazole (PROTONIX) 40 mg tablet Take 1 Tab by mouth daily. Take in 2 week intervals for GERD Flares 30 Tab 1    metoprolol succinate (TOPROL-XL) 25 mg XL tablet TAKE 1 TABLET BY MOUTH DAILY.  90 Tab 3  gabapentin (NEURONTIN) 800 mg tablet TAKE 1 TAB BY MOUTH THREE (3) TIMES DAILY. 90 Tab 5    meloxicam (MOBIC) 15 mg tablet Take 1 Tab by mouth daily (with breakfast). 30 Tab 11    amLODIPine (NORVASC) 10 mg tablet TAKE 1 TABLET BY MOUTH EVERY DAY FOR HYPERTENSION 90 Tab 3    hydroCHLOROthiazide (HYDRODIURIL) 25 mg tablet TAKE 1 TAB BY MOUTH DAILY FOR HYPERTENSION 30 Tab 6    levothyroxine (SYNTHROID) 125 mcg tablet TAKE 1 TABLET BY MOUTH EVERY DAY BEFORE BREAKFAST 90 Tab 1    naloxone (NARCAN) 4 mg/actuation nasal spray Use 1 spray into 1 nostril for OVERDOSE. Call 911. For subsequent doses, give in alternating nostrils. May repeat every 2 to 3 min. 2 Each 0    traZODone (DESYREL) 50 mg tablet Take 1-2 tabs every night for sleep. 60 Tab 11    montelukast (SINGULAIR) 10 mg tablet Take 1 Tab by mouth daily. 30 Tab 6    albuterol (PROVENTIL VENTOLIN) 2.5 mg /3 mL (0.083 %) nebulizer solution 3 mL by Nebulization route every four (4) hours as needed for Wheezing. 24 Each 2    diclofenac (VOLTAREN) 1 % gel Apply 2 g to affected area every six (6) hours. 100 g 5    fluticasone (FLONASE) 50 mcg/actuation nasal spray 2 Sprays by Both Nostrils route daily. 1 Bottle 11    miscellaneous medical supply misc Shower seat for chronic knee pain, pt planning to have right TKR in June due to condition. Very limited range of motion. 1 Each 0    loratadine (CLARITIN) 10 mg tablet Take 1 Tab by mouth daily.  30 Tab 5     Allergies   Allergen Reactions    Hydrocodone Itching    Mold Shortness of Breath and Itching    Ibuprofen Nausea Only       Objective:  Visit Vitals  /75 (BP 1 Location: Left arm, BP Patient Position: Sitting)   Pulse 67   Temp 96.6 °F (35.9 °C) (Oral)   Resp 17   Ht 5' 1\" (1.549 m)   Wt 207 lb 14.4 oz (94.3 kg)   LMP 12/29/2016 (Exact Date)   SpO2 99%   BMI 39.28 kg/m²     Wt Readings from Last 3 Encounters:   03/14/19 207 lb 14.4 oz (94.3 kg)   03/05/19 208 lb (94.3 kg)   02/22/19 203 lb (92.1 kg)     Physical Exam:   General appearance - alert, well appearing, and in mild distress. Mental status - A/O x 4, sad mood and affect. Chest - CTA. Symmetric chest rise. No wheezing, rales or rhonchi. Heart - Normal rate, regular rhythm. Normal S1, S2. No MGR or clicks. Abd- soft, obese,distended. Hyperactive BS. Epigastric TTP, no masses. Ext- Radial, DP pulses, 2+ bilaterally. No edema, clubbing or cyanosis. Right knee edematous. Skin-Warm and dry. No hyperpigmentation, ulcerations, or suspicious lesions. Neuro - normal speech, no focal findings or movement disorder. Normal strength and muscle tone. Limping, antalgic gait using cane. Back- alignment midline. Thoracic spinal and right lumbar paraspinal tenderness. No CVAT. LROM, no SLR. Assessment/Plan:  Exercise daily advised. Discussed the patient's BMI with her. The BMI follow up plan is as follows: daily exercise    I have reviewed/discussed the above normal BMI (Body mass index is 39.28 kg/m².) with the patient. I have recommended the following interventions: encourage exercise, monitor weight, and dietary management education, guidance, and counseling, . Medication Side Effects and Warnings were discussed with patient: yes   Patient Labs were reviewed: yes  Patient Past Records were reviewed: yes    See below for other orders   Follow-up Disposition: Not on File      ICD-10-CM ICD-9-CM    1. Primary osteoarthritis of right knee M17.11 715.16 oxyCODONE-acetaminophen (PERCOCET 10)  mg per tablet   2. Status post total right knee replacement Z96.651 V43.65 oxyCODONE-acetaminophen (PERCOCET 10)  mg per tablet   3. Chronic use of opiate for therapeutic purpose Z79.891 V58.69 oxyCODONE-acetaminophen (PERCOCET 10)  mg per tablet   4.  Primary osteoarthritis of left shoulder M19.012 715.11 oxyCODONE-acetaminophen (PERCOCET 10)  mg per tablet     Orders Placed This Encounter    DULoxetine (CYMBALTA) 60 mg capsule     Sig: Take 60 mg by mouth.  oxyCODONE-acetaminophen (PERCOCET 10)  mg per tablet     Sig: Take 1 Tab by mouth daily as needed for Pain for up to 30 days. May take 1 tab ONCE DAILY as needed for pain. Dispense:  30 Tab     Refill:  0       Liliya Sykes expressed understanding of plan. An After Visit Summary was offered/printed and given to the patient.

## 2019-03-19 LAB — DRUGS UR: NORMAL

## 2019-03-20 NOTE — H&P
PCP: Vaibhav Navarro NP      Problem List      None        Subjective:          Patient Active Problem List   Diagnosis    Acquired hypothyroidism    Malignant hypertension    Mild single current episode of major depressive disorder    Mixed simple and mucopurulent chronic bronchitis    Pain management contract signed    Primary osteoarthritis of right knee    Status post total right knee replacement      Chief Complaint: Follow-up of the Right Knee     HPI: Edward Contreras is a 52 y.o. female who returns for follow-up of her right knee pain. I treated her for right sided sciatica approximately 2 years and recommended PT at that time. She is s/p a right total knee replacement done 2016. She complained of pain in her right knee during her last visit. We ordered a bone scan to rule-out loosening of the tibial component. She returns today to discuss the results of her bone scan which showed some increased tracer activity corresponding to the right tibial component compatible with loosening of the prosthesis. She continues to complain of pain in her right knee. She is ambulating with a cane today.      Objective:      There were no vitals filed for this visit. Height: 5' 1\"       Wt Readings from Last 3 Encounters:   19 203 lb   19 207 lb   17 140 lb      Body mass index is 38.36 kg/m².     Musculoskeletal : Old right knee incision is well-healed, c/d/i. No evidence for infection. No cellulitis. Normal ROM of bilateral hips with no pain on motion. ROM right knee 0-90 degrees. Moderate effusion. Leg lengths appear equal. Strong pedal pulses. No pedal edema. No apparent atrophy. Skin healthy and intact.  Neurovascularly intact.      Radiographs:             Nuclear Medicine Bone Scan 3 Phase (08094)     Result Date: 2019  [facility] PH[facility] Patient: Ivy Cantu : 1965 Date of Service: 2019 2:23:08 PM Reason For Exam: Right knee pain Ordering Provider: Deonte Rossi Physician: Nelda Vargas Signing Date: 2/8/2019 3:58:08 PM EXAM: NM BONE SCAN 3 PHASE INDICATION: Right knee pain. COMPARISON: None. CORRELATIVE IMAGING STUDIES: None TRACER: 20.7 mCi Tc 99m MDP. TECHNIQUE: Imaging of the knees was performed in the blood flow, blood pool, and delayed phases following the uneventful intravenous administration of Tc 99m MDP. FINDINGS: Phase 1 (blood flow): physiologic tracer uptake with no focal abnormal tracer activity. Phase 2 (blood pool/soft tissue): Mild soft tissue uptake is noted in the right knee. .  Phase 3 (delayed bone): The patient is status post right total knee replacement. There is increased tracer activity corresponding to the tibial component. IMPRESSION: Mild soft tissue uptake on the blood pool imaging. Increased tracer activity corresponding to the right tibial component greater than expected for the age of the component and concerning for loosening. Signing date/time: 2/8/2019  3:58 PM Signed by: Luis Hicks           Assessment:      1. Aftercare following right knee joint replacement surgery       Plan:      Reviewed the results of the bone scan with the patient. Discussed treatment options including surgery vs conservative management. Pt has failed conservative management. Discussed revision of the tibial component with the pt including expected outcomes and post-op recovery as well as risks and complications, specifically DVT, PE, infection, and nerve injury. I have told her that she has an increased risk of all these factors with a revision of her total knee replacement. After much discussion, pt desires to proceed with surgery. We will schedule surgery at pt's convenience. F/u for scheduled surgery.          Orders Placed This Encounter    BMI >=25 PATIENT INSTRUCTIONS & EDUCATION      Return for Scheduled surgery.      Medical documentation was entered by Tania shultz, Medical Scribe for Dr. Charles Talley.    2/12/2019     Zach CHERY Reid Reardon MD, personally, performed the services described in this documentation, as scribed in my presence, and it is both accurate and complete. Date of Surgery Update:  Donell Valladares was seen and examined. Past Medical History:   Diagnosis Date    Arthritis     Asthma     uses inhalers    Chronic obstructive pulmonary disease (HCC)     bronchitis    Chronic pain     GERD (gastroesophageal reflux disease)     Hypertension     Ill-defined condition     environmental allergies     Ill-defined condition     Multiple body piercings; unable to remove tongue and lip piercings    Ill-defined condition 2018    GASTRITIS PER ENDOSCOPY    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    Psychiatric disorder     DEPRESSION    Thyroid disease     hypo    Ventral hernia 2013     Prior to Admission Medications   Prescriptions Last Dose Informant Patient Reported? Taking? DULoxetine (CYMBALTA) 60 mg capsule 3/25/2019 at Unknown time  Yes Yes   Sig: Take 60 mg by mouth. PROAIR HFA 90 mcg/actuation inhaler 3/19/2019 at Unknown time  No Yes   Sig: TAKE 2 PUFFS BY INHALATION EVERY FOUR (4) HOURS AS NEEDED. acetaminophen (TYLENOL) 650 mg TbER 3/19/2019 at Unknown time  No Yes   Sig: TAKE 1 TAB BY MOUTH THREE (3) TIMES DAILY AS NEEDED FOR PAIN. albuterol (PROVENTIL VENTOLIN) 2.5 mg /3 mL (0.083 %) nebulizer solution   No No   Sig: 3 mL by Nebulization route every four (4) hours as needed for Wheezing. amLODIPine (NORVASC) 10 mg tablet 3/26/2019 at 6a  No Yes   Sig: TAKE 1 TABLET BY MOUTH EVERY DAY FOR HYPERTENSION   budesonide-formoterol (SYMBICORT) 160-4.5 mcg/actuation HFAA 3/19/2019 at Unknown time  No Yes   Sig: Take 2 Puffs by inhalation two (2) times a day. diclofenac (VOLTAREN) 1 % gel 3/19/2019 at Unknown time  No Yes   Sig: Apply 2 g to affected area every six (6) hours. fluticasone (FLONASE) 50 mcg/actuation nasal spray 3/25/2019 at Unknown time  No Yes   Si Sprays by Both Nostrils route daily. gabapentin (NEURONTIN) 800 mg tablet 3/26/2019 at 6a  No Yes   Sig: TAKE 1 TAB BY MOUTH THREE (3) TIMES DAILY. hydroCHLOROthiazide (HYDRODIURIL) 25 mg tablet 3/25/2019 at 6a  No Yes   Sig: TAKE 1 TAB BY MOUTH DAILY FOR HYPERTENSION   levothyroxine (SYNTHROID) 125 mcg tablet 3/26/2019 at 6a  No Yes   Sig: TAKE 1 TABLET BY MOUTH EVERY DAY BEFORE BREAKFAST   lidocaine (LIDODERM) 5 % 3/19/2019 at Unknown time  No Yes   Sig: Apply patch to the affected area for 12 hours a day and remove for 12 hours a day. loratadine (CLARITIN) 10 mg tablet 3/25/2019 at Unknown time  No Yes   Sig: Take 1 Tab by mouth daily. meloxicam (MOBIC) 15 mg tablet 3/19/2019 at Unknown time  No Yes   Sig: Take 1 Tab by mouth daily (with breakfast). methocarbamol (ROBAXIN) 750 mg tablet 3/25/2019 at Unknown time  No Yes   Sig: TAKE 1 TAB BY MOUTH FOUR (4) TIMES DAILY AS NEEDED FOR PAIN (SPASMS). metoprolol succinate (TOPROL-XL) 25 mg XL tablet 3/24/2019  No No   Sig: TAKE 1 TABLET BY MOUTH DAILY. miscellaneous medical supply misc   No No   Sig: Shower seat for chronic knee pain, pt planning to have right TKR in June due to condition. Very limited range of motion. montelukast (SINGULAIR) 10 mg tablet 3/26/2019 at 6a  No Yes   Sig: Take 1 Tab by mouth daily. mupirocin (BACTROBAN) 2 % ointment   No No   Sig: by Both Nostrils route two (2) times a day for 7 days. naloxone (NARCAN) 4 mg/actuation nasal spray   No No   Sig: Use 1 spray into 1 nostril for OVERDOSE. Call 911. For subsequent doses, give in alternating nostrils. May repeat every 2 to 3 min. ondansetron (ZOFRAN ODT) 4 mg disintegrating tablet   No No   Sig: DISSOLVE 1 TABLET BY MOUTH EVERY 8 HOURS AS NEEDED FOR NAUSEA   oxyCODONE-acetaminophen (PERCOCET 10)  mg per tablet   No No   Sig: Take 1 Tab by mouth daily as needed for Pain for up to 30 days. May take 1 tab ONCE DAILY as needed for pain.    pantoprazole (PROTONIX) 40 mg tablet 3/26/2019 at 6a  No Yes   Sig: Take 1 Tab by mouth daily. Take in 2 week intervals for GERD Flares   penicillin v potassium (VEETID) 500 mg tablet   No No   Sig: Take 1 Tab by mouth four (4) times daily. sertraline (ZOLOFT) 100 mg tablet 3/26/2019 at 6a  No Yes   Sig: Take 2 Tabs by mouth daily. traZODone (DESYREL) 50 mg tablet 3/19/2019 at Unknown time  No Yes   Sig: Take 1-2 tabs every night for sleep. Facility-Administered Medications: None      Allergy to:Hydrocodone; Mold; and Ibuprofen  Physical Examination: General appearance - alert, well appearing, and in no distress  Chest - clear to auscultation, no wheezes, rales or rhonchi, symmetric air entry  Heart - normal rate and regular rhythm  Abdomen - soft, nontender, nondistended, no masses or organomegaly  History and physical has been reviewed. The patient has been examined.  There have been no significant clinical changes since the completion of the originally dated History and Physical.    Signed By: Dewey Catherine PA-C     March 26, 2019 9:08 AM

## 2019-03-26 ENCOUNTER — HOSPITAL ENCOUNTER (INPATIENT)
Age: 54
LOS: 2 days | Discharge: HOME HEALTH | DRG: 302 | End: 2019-03-28
Attending: ORTHOPAEDIC SURGERY | Admitting: ORTHOPAEDIC SURGERY
Payer: MEDICAID

## 2019-03-26 ENCOUNTER — ANESTHESIA EVENT (OUTPATIENT)
Dept: SURGERY | Age: 54
DRG: 302 | End: 2019-03-26
Payer: MEDICAID

## 2019-03-26 ENCOUNTER — ANESTHESIA (OUTPATIENT)
Dept: SURGERY | Age: 54
DRG: 302 | End: 2019-03-26
Payer: MEDICAID

## 2019-03-26 DIAGNOSIS — T84.038D LOOSENING OF KNEE JOINT PROSTHESIS, SUBSEQUENT ENCOUNTER: Primary | ICD-10-CM

## 2019-03-26 DIAGNOSIS — Z96.659 LOOSENING OF KNEE JOINT PROSTHESIS, SUBSEQUENT ENCOUNTER: Primary | ICD-10-CM

## 2019-03-26 PROBLEM — Z96.651 S/P TOTAL KNEE ARTHROPLASTY, RIGHT: Status: ACTIVE | Noted: 2019-03-26

## 2019-03-26 PROBLEM — T84.038A LOOSENING OF KNEE JOINT PROSTHESIS (HCC): Status: ACTIVE | Noted: 2019-03-26

## 2019-03-26 LAB
GLUCOSE BLD STRIP.AUTO-MCNC: 101 MG/DL (ref 65–100)
SERVICE CMNT-IMP: ABNORMAL

## 2019-03-26 PROCEDURE — 77030011640 HC PAD GRND REM COVD -A: Performed by: ORTHOPAEDIC SURGERY

## 2019-03-26 PROCEDURE — 76010000162 HC OR TIME 1.5 TO 2 HR INTENSV-TIER 1: Performed by: ORTHOPAEDIC SURGERY

## 2019-03-26 PROCEDURE — 74011250636 HC RX REV CODE- 250/636

## 2019-03-26 PROCEDURE — 77030028907 HC WRP KNEE WO BGS SOLM -B

## 2019-03-26 PROCEDURE — C1776 JOINT DEVICE (IMPLANTABLE): HCPCS | Performed by: ORTHOPAEDIC SURGERY

## 2019-03-26 PROCEDURE — 77030034850: Performed by: ORTHOPAEDIC SURGERY

## 2019-03-26 PROCEDURE — 0SRV0J9 REPLACEMENT OF RIGHT KNEE JOINT, TIBIAL SURFACE WITH SYNTHETIC SUBSTITUTE, CEMENTED, OPEN APPROACH: ICD-10-PCS | Performed by: ORTHOPAEDIC SURGERY

## 2019-03-26 PROCEDURE — 77030031139 HC SUT VCRL2 J&J -A: Performed by: ORTHOPAEDIC SURGERY

## 2019-03-26 PROCEDURE — 77030014077 HC TOWER MX CEM J&J -C: Performed by: ORTHOPAEDIC SURGERY

## 2019-03-26 PROCEDURE — 94664 DEMO&/EVAL PT USE INHALER: CPT

## 2019-03-26 PROCEDURE — 74011250636 HC RX REV CODE- 250/636: Performed by: PHYSICIAN ASSISTANT

## 2019-03-26 PROCEDURE — 77030008467 HC STPLR SKN COVD -B: Performed by: ORTHOPAEDIC SURGERY

## 2019-03-26 PROCEDURE — 74011000258 HC RX REV CODE- 258

## 2019-03-26 PROCEDURE — 74011250636 HC RX REV CODE- 250/636: Performed by: ANESTHESIOLOGY

## 2019-03-26 PROCEDURE — 77030029684 HC NEB SM VOL KT MONA -A

## 2019-03-26 PROCEDURE — 65270000029 HC RM PRIVATE

## 2019-03-26 PROCEDURE — 74011000250 HC RX REV CODE- 250: Performed by: ANESTHESIOLOGY

## 2019-03-26 PROCEDURE — 97530 THERAPEUTIC ACTIVITIES: CPT

## 2019-03-26 PROCEDURE — 74011000250 HC RX REV CODE- 250

## 2019-03-26 PROCEDURE — 97161 PT EVAL LOW COMPLEX 20 MIN: CPT

## 2019-03-26 PROCEDURE — 94640 AIRWAY INHALATION TREATMENT: CPT

## 2019-03-26 PROCEDURE — 3E0T3BZ INTRODUCTION OF ANESTHETIC AGENT INTO PERIPHERAL NERVES AND PLEXI, PERCUTANEOUS APPROACH: ICD-10-PCS | Performed by: ANESTHESIOLOGY

## 2019-03-26 PROCEDURE — 0SPV0JZ REMOVAL OF SYNTHETIC SUBSTITUTE FROM RIGHT KNEE JOINT, TIBIAL SURFACE, OPEN APPROACH: ICD-10-PCS | Performed by: ORTHOPAEDIC SURGERY

## 2019-03-26 PROCEDURE — 97116 GAIT TRAINING THERAPY: CPT

## 2019-03-26 PROCEDURE — 77030003601 HC NDL NRV BLK BBMI -A

## 2019-03-26 PROCEDURE — 77030020782 HC GWN BAIR PAWS FLX 3M -B

## 2019-03-26 PROCEDURE — 77030027138 HC INCENT SPIROMETER -A

## 2019-03-26 PROCEDURE — 76060000034 HC ANESTHESIA 1.5 TO 2 HR: Performed by: ORTHOPAEDIC SURGERY

## 2019-03-26 PROCEDURE — 76210000016 HC OR PH I REC 1 TO 1.5 HR: Performed by: ORTHOPAEDIC SURGERY

## 2019-03-26 PROCEDURE — 77030018846 HC SOL IRR STRL H20 ICUM -A: Performed by: ORTHOPAEDIC SURGERY

## 2019-03-26 PROCEDURE — 77030039497 HC CST PAD STERILE CHCS -A: Performed by: ORTHOPAEDIC SURGERY

## 2019-03-26 PROCEDURE — C1713 ANCHOR/SCREW BN/BN,TIS/BN: HCPCS | Performed by: ORTHOPAEDIC SURGERY

## 2019-03-26 PROCEDURE — 74011250637 HC RX REV CODE- 250/637: Performed by: PHYSICIAN ASSISTANT

## 2019-03-26 PROCEDURE — 64450 NJX AA&/STRD OTHER PN/BRANCH: CPT

## 2019-03-26 PROCEDURE — 74011000250 HC RX REV CODE- 250: Performed by: PHYSICIAN ASSISTANT

## 2019-03-26 PROCEDURE — 77030035236 HC SUT PDS STRATFX BARB J&J -B: Performed by: ORTHOPAEDIC SURGERY

## 2019-03-26 PROCEDURE — 77030006822 HC BLD SAW SAG BRSM -B: Performed by: ORTHOPAEDIC SURGERY

## 2019-03-26 PROCEDURE — 77030018836 HC SOL IRR NACL ICUM -A: Performed by: ORTHOPAEDIC SURGERY

## 2019-03-26 PROCEDURE — 82962 GLUCOSE BLOOD TEST: CPT

## 2019-03-26 DEVICE — ATTUNE KNEE SYSTEM REVISION PRESSFIT STEM 16X60MM
Type: IMPLANTABLE DEVICE | Site: KNEE | Status: FUNCTIONAL
Brand: ATTUNE

## 2019-03-26 DEVICE — ATTUNE KNEE SYSTEM TIBIAL INSERT ROTATING PLATFORM POSTERIOR STABILIZED 5 14MM AOX
Type: IMPLANTABLE DEVICE | Site: KNEE | Status: FUNCTIONAL
Brand: ATTUNE

## 2019-03-26 DEVICE — ATTUNE KNEE SYSTEM REVISION TIBIAL SLEEVE POROCOAT FULLY COATED 29MM
Type: IMPLANTABLE DEVICE | Site: KNEE | Status: FUNCTIONAL
Brand: ATTUNE

## 2019-03-26 DEVICE — SMARTSET GHV GENTAMICIN HIGH VISCOSITY BONE CEMENT 40G
Type: IMPLANTABLE DEVICE | Site: KNEE | Status: FUNCTIONAL
Brand: SMARTSET

## 2019-03-26 DEVICE — ATTUNE KNEE SYSTEM REVISION ROTATING PLATFORM TIBIAL BASE CEMENTED SIZE 4
Type: IMPLANTABLE DEVICE | Site: KNEE | Status: FUNCTIONAL
Brand: ATTUNE

## 2019-03-26 RX ORDER — FAMOTIDINE 20 MG/1
20 TABLET, FILM COATED ORAL 2 TIMES DAILY
Status: DISCONTINUED | OUTPATIENT
Start: 2019-03-26 | End: 2019-03-28 | Stop reason: HOSPADM

## 2019-03-26 RX ORDER — FENTANYL CITRATE 50 UG/ML
50 INJECTION, SOLUTION INTRAMUSCULAR; INTRAVENOUS AS NEEDED
Status: DISCONTINUED | OUTPATIENT
Start: 2019-03-26 | End: 2019-03-26 | Stop reason: HOSPADM

## 2019-03-26 RX ORDER — SODIUM CHLORIDE 9 MG/ML
25 INJECTION, SOLUTION INTRAVENOUS CONTINUOUS
Status: DISCONTINUED | OUTPATIENT
Start: 2019-03-26 | End: 2019-03-26 | Stop reason: HOSPADM

## 2019-03-26 RX ORDER — KETAMINE HYDROCHLORIDE 10 MG/ML
INJECTION, SOLUTION INTRAMUSCULAR; INTRAVENOUS AS NEEDED
Status: DISCONTINUED | OUTPATIENT
Start: 2019-03-26 | End: 2019-03-26 | Stop reason: HOSPADM

## 2019-03-26 RX ORDER — EPHEDRINE SULFATE/0.9% NACL/PF 50 MG/5 ML
15 SYRINGE (ML) INTRAVENOUS ONCE
Status: COMPLETED | OUTPATIENT
Start: 2019-03-26 | End: 2019-03-26

## 2019-03-26 RX ORDER — MONTELUKAST SODIUM 10 MG/1
10 TABLET ORAL DAILY
Status: DISCONTINUED | OUTPATIENT
Start: 2019-03-27 | End: 2019-03-28 | Stop reason: HOSPADM

## 2019-03-26 RX ORDER — HYDROXYZINE HYDROCHLORIDE 10 MG/1
10 TABLET, FILM COATED ORAL
Status: DISCONTINUED | OUTPATIENT
Start: 2019-03-26 | End: 2019-03-28 | Stop reason: HOSPADM

## 2019-03-26 RX ORDER — VANCOMYCIN/0.9 % SOD CHLORIDE 1.5G/250ML
1500 PLASTIC BAG, INJECTION (ML) INTRAVENOUS ONCE
Status: COMPLETED | OUTPATIENT
Start: 2019-03-26 | End: 2019-03-26

## 2019-03-26 RX ORDER — CEFAZOLIN SODIUM/WATER 2 G/20 ML
2 SYRINGE (ML) INTRAVENOUS EVERY 8 HOURS
Status: COMPLETED | OUTPATIENT
Start: 2019-03-26 | End: 2019-03-27

## 2019-03-26 RX ORDER — ALBUTEROL SULFATE 90 UG/1
2 AEROSOL, METERED RESPIRATORY (INHALATION)
Status: DISCONTINUED | OUTPATIENT
Start: 2019-03-26 | End: 2019-03-26 | Stop reason: SDUPTHER

## 2019-03-26 RX ORDER — SODIUM CHLORIDE, SODIUM LACTATE, POTASSIUM CHLORIDE, CALCIUM CHLORIDE 600; 310; 30; 20 MG/100ML; MG/100ML; MG/100ML; MG/100ML
125 INJECTION, SOLUTION INTRAVENOUS CONTINUOUS
Status: DISCONTINUED | OUTPATIENT
Start: 2019-03-26 | End: 2019-03-26 | Stop reason: HOSPADM

## 2019-03-26 RX ORDER — PREGABALIN 75 MG/1
75 CAPSULE ORAL ONCE
Status: COMPLETED | OUTPATIENT
Start: 2019-03-26 | End: 2019-03-26

## 2019-03-26 RX ORDER — MORPHINE SULFATE 10 MG/ML
2 INJECTION, SOLUTION INTRAMUSCULAR; INTRAVENOUS
Status: DISCONTINUED | OUTPATIENT
Start: 2019-03-26 | End: 2019-03-26 | Stop reason: HOSPADM

## 2019-03-26 RX ORDER — SODIUM CHLORIDE 0.9 % (FLUSH) 0.9 %
5-40 SYRINGE (ML) INJECTION AS NEEDED
Status: DISCONTINUED | OUTPATIENT
Start: 2019-03-26 | End: 2019-03-28 | Stop reason: HOSPADM

## 2019-03-26 RX ORDER — HYDROMORPHONE HYDROCHLORIDE 2 MG/1
2 TABLET ORAL
Status: DISCONTINUED | OUTPATIENT
Start: 2019-03-26 | End: 2019-03-28 | Stop reason: HOSPADM

## 2019-03-26 RX ORDER — SODIUM CHLORIDE 0.9 % (FLUSH) 0.9 %
5-40 SYRINGE (ML) INJECTION EVERY 8 HOURS
Status: DISCONTINUED | OUTPATIENT
Start: 2019-03-26 | End: 2019-03-28 | Stop reason: HOSPADM

## 2019-03-26 RX ORDER — HYDROMORPHONE HYDROCHLORIDE 1 MG/ML
0.5 INJECTION, SOLUTION INTRAMUSCULAR; INTRAVENOUS; SUBCUTANEOUS
Status: ACTIVE | OUTPATIENT
Start: 2019-03-26 | End: 2019-03-27

## 2019-03-26 RX ORDER — MELOXICAM 7.5 MG/1
15 TABLET ORAL
Status: DISCONTINUED | OUTPATIENT
Start: 2019-03-27 | End: 2019-03-28 | Stop reason: HOSPADM

## 2019-03-26 RX ORDER — ONDANSETRON 2 MG/ML
4 INJECTION INTRAMUSCULAR; INTRAVENOUS AS NEEDED
Status: DISCONTINUED | OUTPATIENT
Start: 2019-03-26 | End: 2019-03-26 | Stop reason: HOSPADM

## 2019-03-26 RX ORDER — ACETAMINOPHEN 500 MG
1000 TABLET ORAL ONCE
Status: COMPLETED | OUTPATIENT
Start: 2019-03-26 | End: 2019-03-26

## 2019-03-26 RX ORDER — FENTANYL CITRATE 50 UG/ML
INJECTION, SOLUTION INTRAMUSCULAR; INTRAVENOUS AS NEEDED
Status: DISCONTINUED | OUTPATIENT
Start: 2019-03-26 | End: 2019-03-26 | Stop reason: HOSPADM

## 2019-03-26 RX ORDER — BUDESONIDE 0.5 MG/2ML
500 INHALANT ORAL
Status: DISCONTINUED | OUTPATIENT
Start: 2019-03-26 | End: 2019-03-28 | Stop reason: HOSPADM

## 2019-03-26 RX ORDER — ARFORMOTEROL TARTRATE 15 UG/2ML
15 SOLUTION RESPIRATORY (INHALATION)
Status: DISCONTINUED | OUTPATIENT
Start: 2019-03-26 | End: 2019-03-28 | Stop reason: HOSPADM

## 2019-03-26 RX ORDER — KETOROLAC TROMETHAMINE 30 MG/ML
30 INJECTION, SOLUTION INTRAMUSCULAR; INTRAVENOUS
Status: ACTIVE | OUTPATIENT
Start: 2019-03-26 | End: 2019-03-27

## 2019-03-26 RX ORDER — LEVOTHYROXINE SODIUM 125 UG/1
125 TABLET ORAL
Status: DISCONTINUED | OUTPATIENT
Start: 2019-03-27 | End: 2019-03-28 | Stop reason: HOSPADM

## 2019-03-26 RX ORDER — DULOXETIN HYDROCHLORIDE 60 MG/1
60 CAPSULE, DELAYED RELEASE ORAL DAILY
Status: DISCONTINUED | OUTPATIENT
Start: 2019-03-27 | End: 2019-03-28 | Stop reason: HOSPADM

## 2019-03-26 RX ORDER — LIDOCAINE HYDROCHLORIDE 10 MG/ML
0.1 INJECTION, SOLUTION EPIDURAL; INFILTRATION; INTRACAUDAL; PERINEURAL AS NEEDED
Status: DISCONTINUED | OUTPATIENT
Start: 2019-03-26 | End: 2019-03-26 | Stop reason: HOSPADM

## 2019-03-26 RX ORDER — MIDAZOLAM HYDROCHLORIDE 1 MG/ML
1 INJECTION, SOLUTION INTRAMUSCULAR; INTRAVENOUS AS NEEDED
Status: DISCONTINUED | OUTPATIENT
Start: 2019-03-26 | End: 2019-03-26 | Stop reason: HOSPADM

## 2019-03-26 RX ORDER — PROPOFOL 10 MG/ML
INJECTION, EMULSION INTRAVENOUS
Status: DISCONTINUED | OUTPATIENT
Start: 2019-03-26 | End: 2019-03-26 | Stop reason: HOSPADM

## 2019-03-26 RX ORDER — AMOXICILLIN 250 MG
1 CAPSULE ORAL 2 TIMES DAILY
Status: DISCONTINUED | OUTPATIENT
Start: 2019-03-26 | End: 2019-03-28 | Stop reason: HOSPADM

## 2019-03-26 RX ORDER — GABAPENTIN 400 MG/1
800 CAPSULE ORAL 3 TIMES DAILY
Status: DISCONTINUED | OUTPATIENT
Start: 2019-03-26 | End: 2019-03-28 | Stop reason: HOSPADM

## 2019-03-26 RX ORDER — LORATADINE 10 MG/1
10 TABLET ORAL DAILY
Status: DISCONTINUED | OUTPATIENT
Start: 2019-03-27 | End: 2019-03-28 | Stop reason: HOSPADM

## 2019-03-26 RX ORDER — OXYCODONE AND ACETAMINOPHEN 5; 325 MG/1; MG/1
1 TABLET ORAL AS NEEDED
Status: DISCONTINUED | OUTPATIENT
Start: 2019-03-26 | End: 2019-03-26 | Stop reason: HOSPADM

## 2019-03-26 RX ORDER — HYDROMORPHONE HYDROCHLORIDE 1 MG/ML
0.2 INJECTION, SOLUTION INTRAMUSCULAR; INTRAVENOUS; SUBCUTANEOUS
Status: DISCONTINUED | OUTPATIENT
Start: 2019-03-26 | End: 2019-03-26 | Stop reason: HOSPADM

## 2019-03-26 RX ORDER — ALBUTEROL SULFATE 0.83 MG/ML
2.5 SOLUTION RESPIRATORY (INHALATION)
Status: DISCONTINUED | OUTPATIENT
Start: 2019-03-26 | End: 2019-03-28 | Stop reason: HOSPADM

## 2019-03-26 RX ORDER — SODIUM CHLORIDE 0.9 % (FLUSH) 0.9 %
5-40 SYRINGE (ML) INJECTION AS NEEDED
Status: DISCONTINUED | OUTPATIENT
Start: 2019-03-26 | End: 2019-03-26 | Stop reason: HOSPADM

## 2019-03-26 RX ORDER — SODIUM CHLORIDE 9 MG/ML
125 INJECTION, SOLUTION INTRAVENOUS CONTINUOUS
Status: DISPENSED | OUTPATIENT
Start: 2019-03-26 | End: 2019-03-27

## 2019-03-26 RX ORDER — FENTANYL CITRATE 50 UG/ML
25 INJECTION, SOLUTION INTRAMUSCULAR; INTRAVENOUS
Status: DISCONTINUED | OUTPATIENT
Start: 2019-03-26 | End: 2019-03-26 | Stop reason: HOSPADM

## 2019-03-26 RX ORDER — HYDROCHLOROTHIAZIDE 25 MG/1
25 TABLET ORAL DAILY
Status: DISCONTINUED | OUTPATIENT
Start: 2019-03-27 | End: 2019-03-28 | Stop reason: HOSPADM

## 2019-03-26 RX ORDER — DIPHENHYDRAMINE HYDROCHLORIDE 50 MG/ML
12.5 INJECTION, SOLUTION INTRAMUSCULAR; INTRAVENOUS AS NEEDED
Status: DISCONTINUED | OUTPATIENT
Start: 2019-03-26 | End: 2019-03-26 | Stop reason: HOSPADM

## 2019-03-26 RX ORDER — ONDANSETRON 2 MG/ML
4 INJECTION INTRAMUSCULAR; INTRAVENOUS
Status: ACTIVE | OUTPATIENT
Start: 2019-03-26 | End: 2019-03-27

## 2019-03-26 RX ORDER — FLUTICASONE PROPIONATE 50 MCG
2 SPRAY, SUSPENSION (ML) NASAL DAILY
Status: DISCONTINUED | OUTPATIENT
Start: 2019-03-27 | End: 2019-03-28 | Stop reason: HOSPADM

## 2019-03-26 RX ORDER — SODIUM CHLORIDE 0.9 % (FLUSH) 0.9 %
5-40 SYRINGE (ML) INJECTION EVERY 8 HOURS
Status: DISCONTINUED | OUTPATIENT
Start: 2019-03-26 | End: 2019-03-26 | Stop reason: HOSPADM

## 2019-03-26 RX ORDER — NALOXONE HYDROCHLORIDE 0.4 MG/ML
0.4 INJECTION, SOLUTION INTRAMUSCULAR; INTRAVENOUS; SUBCUTANEOUS AS NEEDED
Status: DISCONTINUED | OUTPATIENT
Start: 2019-03-26 | End: 2019-03-28 | Stop reason: HOSPADM

## 2019-03-26 RX ORDER — METOPROLOL SUCCINATE 25 MG/1
25 TABLET, EXTENDED RELEASE ORAL DAILY
Status: DISCONTINUED | OUTPATIENT
Start: 2019-03-27 | End: 2019-03-28 | Stop reason: HOSPADM

## 2019-03-26 RX ORDER — ROPIVACAINE HYDROCHLORIDE 5 MG/ML
INJECTION, SOLUTION EPIDURAL; INFILTRATION; PERINEURAL
Status: COMPLETED | OUTPATIENT
Start: 2019-03-26 | End: 2019-03-26

## 2019-03-26 RX ORDER — VANCOMYCIN/0.9 % SOD CHLORIDE 1.5G/250ML
1500 PLASTIC BAG, INJECTION (ML) INTRAVENOUS EVERY 12 HOURS
Status: COMPLETED | OUTPATIENT
Start: 2019-03-26 | End: 2019-03-27

## 2019-03-26 RX ORDER — FACIAL-BODY WIPES
10 EACH TOPICAL DAILY PRN
Status: DISCONTINUED | OUTPATIENT
Start: 2019-03-28 | End: 2019-03-28 | Stop reason: HOSPADM

## 2019-03-26 RX ORDER — ACETAMINOPHEN 500 MG
1000 TABLET ORAL EVERY 6 HOURS
Status: DISCONTINUED | OUTPATIENT
Start: 2019-03-26 | End: 2019-03-28 | Stop reason: HOSPADM

## 2019-03-26 RX ORDER — ONDANSETRON 2 MG/ML
INJECTION INTRAMUSCULAR; INTRAVENOUS AS NEEDED
Status: DISCONTINUED | OUTPATIENT
Start: 2019-03-26 | End: 2019-03-26 | Stop reason: HOSPADM

## 2019-03-26 RX ORDER — POLYETHYLENE GLYCOL 3350 17 G/17G
17 POWDER, FOR SOLUTION ORAL DAILY
Status: DISCONTINUED | OUTPATIENT
Start: 2019-03-27 | End: 2019-03-28 | Stop reason: HOSPADM

## 2019-03-26 RX ORDER — HYDROMORPHONE HYDROCHLORIDE 4 MG/1
4 TABLET ORAL
Status: DISCONTINUED | OUTPATIENT
Start: 2019-03-26 | End: 2019-03-28 | Stop reason: HOSPADM

## 2019-03-26 RX ORDER — SERTRALINE HYDROCHLORIDE 50 MG/1
200 TABLET, FILM COATED ORAL DAILY
Status: DISCONTINUED | OUTPATIENT
Start: 2019-03-27 | End: 2019-03-28 | Stop reason: HOSPADM

## 2019-03-26 RX ORDER — METHOCARBAMOL 500 MG/1
750 TABLET, FILM COATED ORAL
Status: DISCONTINUED | OUTPATIENT
Start: 2019-03-26 | End: 2019-03-28 | Stop reason: HOSPADM

## 2019-03-26 RX ORDER — TRAZODONE HYDROCHLORIDE 50 MG/1
50 TABLET ORAL
Status: DISCONTINUED | OUTPATIENT
Start: 2019-03-26 | End: 2019-03-28 | Stop reason: HOSPADM

## 2019-03-26 RX ORDER — BUPIVACAINE HYDROCHLORIDE 5 MG/ML
INJECTION, SOLUTION EPIDURAL; INTRACAUDAL AS NEEDED
Status: DISCONTINUED | OUTPATIENT
Start: 2019-03-26 | End: 2019-03-26 | Stop reason: HOSPADM

## 2019-03-26 RX ORDER — AMLODIPINE BESYLATE 5 MG/1
10 TABLET ORAL DAILY
Status: DISCONTINUED | OUTPATIENT
Start: 2019-03-27 | End: 2019-03-28 | Stop reason: HOSPADM

## 2019-03-26 RX ORDER — MIDAZOLAM HYDROCHLORIDE 1 MG/ML
0.5 INJECTION, SOLUTION INTRAMUSCULAR; INTRAVENOUS
Status: DISCONTINUED | OUTPATIENT
Start: 2019-03-26 | End: 2019-03-26 | Stop reason: HOSPADM

## 2019-03-26 RX ORDER — MIDAZOLAM HYDROCHLORIDE 1 MG/ML
INJECTION, SOLUTION INTRAMUSCULAR; INTRAVENOUS AS NEEDED
Status: DISCONTINUED | OUTPATIENT
Start: 2019-03-26 | End: 2019-03-26 | Stop reason: HOSPADM

## 2019-03-26 RX ORDER — CEFAZOLIN SODIUM/WATER 2 G/20 ML
2 SYRINGE (ML) INTRAVENOUS ONCE
Status: COMPLETED | OUTPATIENT
Start: 2019-03-26 | End: 2019-03-26

## 2019-03-26 RX ORDER — ONDANSETRON 4 MG/1
4 TABLET, ORALLY DISINTEGRATING ORAL
Status: DISCONTINUED | OUTPATIENT
Start: 2019-03-26 | End: 2019-03-28 | Stop reason: HOSPADM

## 2019-03-26 RX ORDER — PHENYLEPHRINE HCL IN 0.9% NACL 0.4MG/10ML
SYRINGE (ML) INTRAVENOUS AS NEEDED
Status: DISCONTINUED | OUTPATIENT
Start: 2019-03-26 | End: 2019-03-26 | Stop reason: HOSPADM

## 2019-03-26 RX ORDER — CELECOXIB 200 MG/1
200 CAPSULE ORAL ONCE
Status: COMPLETED | OUTPATIENT
Start: 2019-03-26 | End: 2019-03-26

## 2019-03-26 RX ORDER — SODIUM CHLORIDE, SODIUM LACTATE, POTASSIUM CHLORIDE, CALCIUM CHLORIDE 600; 310; 30; 20 MG/100ML; MG/100ML; MG/100ML; MG/100ML
INJECTION, SOLUTION INTRAVENOUS
Status: DISCONTINUED | OUTPATIENT
Start: 2019-03-26 | End: 2019-03-26 | Stop reason: HOSPADM

## 2019-03-26 RX ORDER — DEXAMETHASONE SODIUM PHOSPHATE 4 MG/ML
INJECTION, SOLUTION INTRA-ARTICULAR; INTRALESIONAL; INTRAMUSCULAR; INTRAVENOUS; SOFT TISSUE AS NEEDED
Status: DISCONTINUED | OUTPATIENT
Start: 2019-03-26 | End: 2019-03-26 | Stop reason: HOSPADM

## 2019-03-26 RX ADMIN — RIVAROXABAN 10 MG: 10 TABLET, FILM COATED ORAL at 21:00

## 2019-03-26 RX ADMIN — KETAMINE HYDROCHLORIDE 20 MG: 10 INJECTION, SOLUTION INTRAMUSCULAR; INTRAVENOUS at 10:42

## 2019-03-26 RX ADMIN — Medication 2 G: at 10:33

## 2019-03-26 RX ADMIN — ACETAMINOPHEN 1000 MG: 500 TABLET ORAL at 20:59

## 2019-03-26 RX ADMIN — PREGABALIN 75 MG: 75 CAPSULE ORAL at 08:48

## 2019-03-26 RX ADMIN — MIDAZOLAM HYDROCHLORIDE 1 MG: 1 INJECTION, SOLUTION INTRAMUSCULAR; INTRAVENOUS at 10:20

## 2019-03-26 RX ADMIN — Medication 80 MCG: at 10:31

## 2019-03-26 RX ADMIN — HYDROMORPHONE HYDROCHLORIDE 2 MG: 2 TABLET ORAL at 14:38

## 2019-03-26 RX ADMIN — HYDROMORPHONE HYDROCHLORIDE 4 MG: 4 TABLET ORAL at 20:00

## 2019-03-26 RX ADMIN — Medication 15 MG: at 12:53

## 2019-03-26 RX ADMIN — SODIUM CHLORIDE, SODIUM LACTATE, POTASSIUM CHLORIDE, CALCIUM CHLORIDE: 600; 310; 30; 20 INJECTION, SOLUTION INTRAVENOUS at 10:20

## 2019-03-26 RX ADMIN — ACETAMINOPHEN 1000 MG: 500 TABLET ORAL at 14:37

## 2019-03-26 RX ADMIN — FENTANYL CITRATE 100 MCG: 50 INJECTION, SOLUTION INTRAMUSCULAR; INTRAVENOUS at 10:05

## 2019-03-26 RX ADMIN — DEXAMETHASONE SODIUM PHOSPHATE 4 MG: 4 INJECTION, SOLUTION INTRA-ARTICULAR; INTRALESIONAL; INTRAMUSCULAR; INTRAVENOUS; SOFT TISSUE at 10:24

## 2019-03-26 RX ADMIN — BUDESONIDE 500 MCG: 0.5 INHALANT RESPIRATORY (INHALATION) at 21:30

## 2019-03-26 RX ADMIN — GABAPENTIN 800 MG: 400 CAPSULE ORAL at 15:14

## 2019-03-26 RX ADMIN — SODIUM CHLORIDE 125 ML/HR: 900 INJECTION, SOLUTION INTRAVENOUS at 15:15

## 2019-03-26 RX ADMIN — SENNOSIDES AND DOCUSATE SODIUM 1 TABLET: 8.6; 5 TABLET ORAL at 17:00

## 2019-03-26 RX ADMIN — MIDAZOLAM 2 MG: 1 INJECTION INTRAMUSCULAR; INTRAVENOUS at 10:05

## 2019-03-26 RX ADMIN — FAMOTIDINE 20 MG: 20 TABLET ORAL at 17:00

## 2019-03-26 RX ADMIN — FENTANYL CITRATE 50 MCG: 50 INJECTION, SOLUTION INTRAMUSCULAR; INTRAVENOUS at 10:20

## 2019-03-26 RX ADMIN — SODIUM CHLORIDE, SODIUM LACTATE, POTASSIUM CHLORIDE, CALCIUM CHLORIDE: 600; 310; 30; 20 INJECTION, SOLUTION INTRAVENOUS at 11:52

## 2019-03-26 RX ADMIN — KETAMINE HYDROCHLORIDE 20 MG: 10 INJECTION, SOLUTION INTRAMUSCULAR; INTRAVENOUS at 11:20

## 2019-03-26 RX ADMIN — BUPIVACAINE HYDROCHLORIDE 10 MG: 5 INJECTION, SOLUTION EPIDURAL; INTRACAUDAL at 10:28

## 2019-03-26 RX ADMIN — Medication 80 MCG: at 10:35

## 2019-03-26 RX ADMIN — PROPOFOL 125 MCG/KG/MIN: 10 INJECTION, EMULSION INTRAVENOUS at 10:24

## 2019-03-26 RX ADMIN — Medication 80 MCG: at 10:33

## 2019-03-26 RX ADMIN — ONDANSETRON 4 MG: 2 INJECTION INTRAMUSCULAR; INTRAVENOUS at 11:49

## 2019-03-26 RX ADMIN — Medication 2 G: at 18:38

## 2019-03-26 RX ADMIN — HYDROMORPHONE HYDROCHLORIDE 2 MG: 2 TABLET ORAL at 16:04

## 2019-03-26 RX ADMIN — SODIUM CHLORIDE 125 ML/HR: 900 INJECTION, SOLUTION INTRAVENOUS at 13:00

## 2019-03-26 RX ADMIN — ACETAMINOPHEN 1000 MG: 500 TABLET ORAL at 08:48

## 2019-03-26 RX ADMIN — VANCOMYCIN HYDROCHLORIDE 1500 MG: 10 INJECTION, POWDER, LYOPHILIZED, FOR SOLUTION INTRAVENOUS at 22:31

## 2019-03-26 RX ADMIN — VANCOMYCIN HYDROCHLORIDE 1500 MG: 10 INJECTION, POWDER, LYOPHILIZED, FOR SOLUTION INTRAVENOUS at 09:00

## 2019-03-26 RX ADMIN — CELECOXIB 200 MG: 200 CAPSULE ORAL at 08:48

## 2019-03-26 RX ADMIN — ARFORMOTEROL TARTRATE 15 MCG: 15 SOLUTION RESPIRATORY (INHALATION) at 21:30

## 2019-03-26 RX ADMIN — KETAMINE HYDROCHLORIDE 10 MG: 10 INJECTION, SOLUTION INTRAMUSCULAR; INTRAVENOUS at 10:54

## 2019-03-26 RX ADMIN — VANCOMYCIN HYDROCHLORIDE 1.5 G: 10 INJECTION, POWDER, LYOPHILIZED, FOR SOLUTION INTRAVENOUS at 10:33

## 2019-03-26 RX ADMIN — ROPIVACAINE HYDROCHLORIDE 20 ML: 5 INJECTION, SOLUTION EPIDURAL; INFILTRATION; PERINEURAL at 10:02

## 2019-03-26 RX ADMIN — GABAPENTIN 800 MG: 400 CAPSULE ORAL at 21:00

## 2019-03-26 NOTE — ANESTHESIA POSTPROCEDURE EVALUATION
Procedure(s):  REVISION TIBIA COMPONENT RIGHT TOTAL KNEE REPLACEMENT.    spinal    Anesthesia Post Evaluation        Patient location during evaluation: PACU  Patient participation: complete - patient participated  Level of consciousness: awake  Pain management: adequate  Airway patency: patent  Anesthetic complications: no  Cardiovascular status: hemodynamically stable  Respiratory status: acceptable  Hydration status: acceptable  Comments: I have seen and evaluated the patient. The patient is ready for PACU discharge. [address], DO                         Vitals Value Taken Time   BP 90/54 3/26/2019 12:45 PM   Temp 37 °C (98.6 °F) 3/26/2019 12:17 PM   Pulse 63 3/26/2019  1:00 PM   Resp 17 3/26/2019  1:00 PM   SpO2 96 % 3/26/2019  1:00 PM   Vitals shown include unvalidated device data.

## 2019-03-26 NOTE — ANESTHESIA PREPROCEDURE EVALUATION
Anesthetic History   No history of anesthetic complications  PONV          Review of Systems / Medical History  Patient summary reviewed, nursing notes reviewed and pertinent labs reviewed    Pulmonary  Within defined limits  COPD        Asthma        Neuro/Psych   Within defined limits      Psychiatric history     Cardiovascular  Within defined limits  Hypertension                   GI/Hepatic/Renal  Within defined limits   GERD           Endo/Other  Within defined limits    Hypothyroidism  Morbid obesity and arthritis     Other Findings              Physical Exam    Airway  Mallampati: II  TM Distance: > 6 cm  Neck ROM: normal range of motion   Mouth opening: Normal     Cardiovascular  Regular rate and rhythm,  S1 and S2 normal,  no murmur, click, rub, or gallop             Dental    Dentition: Poor dentition     Pulmonary  Breath sounds clear to auscultation               Abdominal  GI exam deferred       Other Findings   Comments: Pt has multiple tongue rings and other rings that she reports can not be taken off. I explained that there is a risk with not removing them and that I wouldn't be accepting any liability if something happened with them.   She agreed to proceed with out removing them         Anesthetic Plan    ASA: 3  Anesthesia type: spinal      Post-op pain plan if not by surgeon: peripheral nerve block single      Anesthetic plan and risks discussed with: Patient

## 2019-03-26 NOTE — PROGRESS NOTES
Ortho Post-Op Note    3/26/2019  2:45 PM    POD:  Day of Surgery  S/P:  Procedure(s):  REVISION TIBIA COMPONENT RIGHT TOTAL KNEE REPLACEMENT    Afebrile/VSS, A&O x3, says \"my pain 2000 x 10. \"  Doing OK without complaints of nausea  Calves soft/NTTP Bilaterally  Compartments soft  Dressing clean and dry  Moving lower extremities well. Able to SLR  Neurocirculatory exam intact and within normal range. Lab Results   Component Value Date/Time    HGB 12.9 03/05/2019 10:02 AM    INR 1.0 03/05/2019 10:02 AM     Recent Labs     03/05/19  1002 09/25/18  1304   CREA 0.98 0.85   BUN 19 15     Estimated Creatinine Clearance: 67.8 mL/min (based on SCr of 0.98 mg/dL).     PLAN:  DVT prophylaxis: Xarelto  WBAT with PT-mobilization  Pain Control- Ice, toradol (tonight), dilaudid, mobic(tomorrow)  Plan to D/C home with HH/PT in 1-2 days      PAULINA Martinez

## 2019-03-26 NOTE — ROUTINE PROCESS
TRANSFER - OUT REPORT:    Verbal report given to RN(name) on Luke Perez  being transferred to [address] [place] [address](unit) for routine post - op       Report consisted of patients Situation, Background, Assessment and   Recommendations(SBAR). Time Pre op antibiotic given:[facility] AND [facility]   Anesthesia Stop time: 6780  Bautista Present on Transfer to floor:YES  Order for Bautista on Chart:YES  Discharge Prescriptions with Chart:NO    Information from the following report(s) SBAR, Kardex, OR Summary, Procedure Summary and Cardiac Rhythm NSR was reviewed with the receiving nurse. Opportunity for questions and clarification was provided. Is the patient on 02? YES       L/Min 2    Is the patient on a monitor? NO    Is the nurse transporting with the patient? NO    Surgical Waiting Area notified of patient's transfer from PACU?  YES      The following personal items collected during your admission accompanied patient upon transfer: VALUABLES BAG, CANE, AND GLASSES WITH PATIENT ON TRANSFER TO [address] [place] [address]  Dental Appliance:    Vision:    Hearing Aid:    Jewelry:    Clothing:    Other Valuables:    Valuables sent to safe:

## 2019-03-26 NOTE — ANESTHESIA PROCEDURE NOTES
Peripheral Block    Performed by: Kristine Robb DO  Authorized by: Kristine Robb DO       Pre-procedure: Indications: at surgeon's request and post-op pain management    Preanesthetic Checklist: patient identified, risks and benefits discussed, site marked, timeout performed, anesthesia consent given and patient being monitored      Block Type:   Block Type:   Adductor canal  Laterality:  Right  Monitoring:  Standard ASA monitoring, responsive to questions, continuous pulse ox, oxygen, frequent vital sign checks and heart rate  Injection Technique:  Single shot  Procedures: ultrasound guided    Patient Position: supine  Prep: chlorhexidine    Location:  Mid thigh  Needle Type:  Stimuplex  Needle Gauge:  22 G  Needle Localization:  Ultrasound guidance    Assessment:  Number of attempts:  1  Injection Assessment:  Incremental injection every 5 mL, no paresthesia, no intravascular symptoms, negative aspiration for blood and local visualized surrounding nerve on ultrasound  Patient tolerance:  Patient tolerated the procedure well with no immediate complications

## 2019-03-26 NOTE — PROGRESS NOTES
Problem: Mobility Impaired (Adult and Pediatric)  Goal: *Acute Goals and Plan of Care (Insert Text)  Description  Physical Therapy Goals  Initiated 3/26/2019    1. Patient will move from supine to sit and sit to supine , scoot up and down and roll side to side in bed with modified independence within 4 days. 2. Patient will perform sit to stand with modified independence within 4 days. 3. Patient will ambulate with modified independence for 150 feet with the least restrictive device within 4 days. 4. Patient will ascend/descend 20 stairs with one handrail and cane with modified independence within 4 days. 5. Patient will perform home exercise program per protocol with independence within 4 days. 6. Patient will demonstrate AROM 0-90 degrees in operative joint within 4 days. Outcome: Progressing Towards Goal   PHYSICAL THERAPY KNEE EVALUATION  Patient: Nicanor Kellogg (22 y.o. female)  Date: 3/26/2019  Primary Diagnosis: Loosening of knee joint prosthesis, sequela [T84.038S, Z96.659]  S/P total knee arthroplasty, right [Z96.651]  Procedure(s) (LRB):  REVISION TIBIA COMPONENT RIGHT TOTAL KNEE REPLACEMENT (Right) Day of Surgery   Precautions: FALL      ASSESSMENT :  Based on the objective data described below, the patient presents with  impairment in functional mobility, activity tolerance and balance s/p R TKA revision, POD#0. PLOF: Independent with ADLs and IADLs. Patient performed bed mobility with minimal assistance, transfers with contact guard assistance. Ambulated with RW, gait belt and contact guard assistance 35 ft. Patient did well and mobilized POD#0. She will benefit from [facility]iel [facility]on PT in order to achieve maximum level of safe, functional mobility, balance and return to independent PLOF. Patient will benefit from skilled intervention to address the above impairments.   Patient?s rehabilitation potential is considered to be Good  Factors which may influence rehabilitation potential include:   ? None noted  ? Mental ability/status  ? Medical condition  ? Home/family situation and support systems  ? Safety awareness  ? Pain tolerance/management  ? Other:      PLAN :  Recommendations and Planned Interventions:  ?           Bed Mobility Training             ? Neuromuscular Re-Education  ? Transfer Training                   ? Orthotic/Prosthetic Training  ? Gait Training                         ? Modalities  ? Therapeutic Exercises           ? Edema Management/Control  ? Therapeutic Activities            ? Patient and Family Training/Education  ? Other (comment):    Frequency/Duration: Patient will be followed by physical therapy twice daily to address goals. Discharge Recommendations: Home Health  Further Equipment Recommendations for Discharge: rolling walker ordered in chart-tech to call equipment company tomorrow (closed at this time). SUBJECTIVE:   Patient stated ? I am having a lot of pain. ?    OBJECTIVE DATA SUMMARY:   HISTORY:    Past Medical History:   Diagnosis Date    Arthritis     Asthma     uses inhalers    Chronic obstructive pulmonary disease (HCC)     bronchitis    Chronic pain     GERD (gastroesophageal reflux disease)     Hypertension     Ill-defined condition     environmental allergies     Ill-defined condition     Multiple body piercings; unable to remove tongue and lip piercings    Ill-defined condition 2018    GASTRITIS PER ENDOSCOPY    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    Psychiatric disorder     DEPRESSION    Thyroid disease     hypo    Ventral hernia 12/31/2013     Past Surgical History:   Procedure Laterality Date    HX HEENT  2009    thyroidectomy    HX KNEE REPLACEMENT      R    HX MOHS PROCEDURES Right 3/8/11    HX OTHER SURGICAL      hiatal hernia repair    HX TUBAL LIGATION       Prior Level of Function/Home Situation: Independent with ADLs and IADLs. Personal factors and/or comorbidities impacting plan of care: Motivated/A & O x 4/Supportive Family    Home Situation  Home Environment: Private residence  One/Two Story Residence: Two story  Living Alone: No  Support Systems: Family member(s)  Patient Expects to be Discharged to[de-identified] Private residence  Current DME Used/Available at Home: lula Walker    EXAMINATION/PRESENTATION/DECISION MAKING:   Critical Behavior:  Neurologic State: Alert  Orientation Level: Oriented X4  Cognition: Appropriate for age attention/concentration     Hearing: Auditory  Auditory Impairment: None    Range Of Motion:  AROM: Generally decreased, functional(AROM R knee -10 to 75)           PROM: Generally decreased, functional           Strength:    Strength: Generally decreased, functional                    Tone & Sensation:   Tone: Normal              Sensation: Intact               Coordination:  Coordination: Within functional limits  Vision:      Functional Mobility:  Bed Mobility:     Supine to Sit: Minimum assistance  Sit to Supine: Minimum assistance  Scooting: Contact guard assistance  Transfers:  Sit to Stand: Contact guard assistance  Stand to Sit: Contact guard assistance                       Balance:   Sitting: Intact  Standing: Intact; With support  Standing - Static: Good;Constant support  Standing - Dynamic : Fair;Constant support  Ambulation/Gait Training:  Distance (ft): 35 Feet (ft)  Assistive Device: Walker, rolling;Gait belt  Ambulation - Level of Assistance: Contact guard assistance        Gait Abnormalities: Antalgic  Right Side Weight Bearing: As tolerated  Left Side Weight Bearing: Full  Base of Support: Widened  Stance: Right decreased  Speed/Ruthann: Slow  Step Length: Right lengthened;Left shortened  Swing Pattern: Right asymmetrical           Functional Measure:  Barthel Index:    Bathin  Bladder: 0  Bowels: 0  Groomin  Dressin  Feeding: 10  Mobility: 0  Stairs: 0  Toilet Use: 0  Transfer (Bed to Chair and Back): 0  Total: 15/100       Percentage of impairment   0%   1-19%   20-39%   40-59%   60-79%   80-99%   100%   Barthel Score 0-100 100 99-80 79-60 59-40 20-39 1-19   0     The Barthel ADL Index: Guidelines  1. The index should be used as a record of what a patient does, not as a record of what a patient could do. 2. The main aim is to establish degree of independence from any help, physical or verbal, however minor and for whatever reason. 3. The need for supervision renders the patient not independent. 4. A patient's performance should be established using the best available evidence. Asking the patient, friends/relatives and nurses are the usual sources, but direct observation and common sense are also important. However direct testing is not needed. 5. Usually the patient's performance over the preceding 24-48 hours is important, but occasionally longer periods will be relevant. 6. Middle categories imply that the patient supplies over 50 per cent of the effort. 7. Use of aids to be independent is allowed. Tiera Coley., Barthel, D.W. (3989). Functional evaluation: the Barthel Index. [address] [facility] (14)2. Jodie Mercer keith EDSON Torres, Jennie Gray., Debbie Felix., [place], [address] [place] [address] (1999). Measuring the change indisability after inpatient rehabilitation; comparison of the responsiveness of the Barthel Index and Functional [place] Measure. Journal of Neurology, Neurosurgery, and Psychiatry, 66(4), 523-373. Yohan Almanzar, [place].KEO, ANGELES Welsh.PHILLIP, & Max Santos MInezA. (2004.) Assessment of post-stroke quality of life in cost-effectiveness studies: The usefulness of the Barthel Index and the EuroQoL-5D.  Quality of Life Research, 15, 131-55         Physical Therapy Evaluation Charge Determination   History Examination Presentation Decision-Making   LOW Complexity : Zero comorbidities / personal factors that will impact the outcome / POC LOW Complexity : 1-2 Standardized tests and measures addressing body structure, function, activity limitation and / or participation in recreation  LOW Complexity : Stable, uncomplicated  LOW Complexity : FOTO score of       Based on the above components, the patient evaluation is determined to be of the following complexity level: LOW     Pain:  Pain Scale 1: Numeric (0 - 10)  Pain Intensity 1: 10  Pain Location 1: Knee  Pain Orientation 1: Right  Pain Description 1: Aching;Constant  Pain Intervention(s) 1: Medication (see MAR)  Activity Tolerance:   Good  Vitals:    03/26/19 1515 03/26/19 1535 03/26/19 1545 03/26/19 1603   BP: 99/50 106/67 101/62 97/55   BP 1 Location:  Right arm Right arm Right arm   BP Patient Position:  Sitting Standing At rest   Pulse: 74   68   Resp: 16   14   Temp: 98.1 °F (36.7 °C)   97.4 °F (36.3 °C)   SpO2: 100%   97%   Weight:       Height:          Please refer to the flowsheet for vital signs taken during this treatment. After treatment:   ?         Patient left in no apparent distress sitting up in chair  ? Patient left in no apparent distress in bed  ? Call bell left within reach  ? Nursing notified  ? Caregiver present  ? Bed alarm activated    COMMUNICATION/EDUCATION:   The patient?s plan of care was discussed with: Registered Nurse. ?         Fall prevention education was provided and the patient/caregiver indicated understanding. ? Patient/family have participated as able in goal setting and plan of care. ?         Patient/family agree to work toward stated goals and plan of care. ?         Patient understands intent and goals of therapy, but is neutral about his/her participation. ? Patient is unable to participate in goal setting and plan of care.     Thank you for this referral.  Rboe Keys   Time Calculation: 30 mins

## 2019-03-26 NOTE — PROGRESS NOTES
TRANSFER - IN REPORT:    Verbal report received from ALEXANDRA Landeros(name) on Felicia Hinds  being received from "[employer]") for routine post - op      Report consisted of patients Situation, Background, Assessment and   Recommendations(SBAR). Information from the following report(s) SBAR, OR Summary and MAR was reviewed with the receiving nurse. Opportunity for questions and clarification was provided. Assessment completed upon patients arrival to unit and care assumed.      Primary Nurse Gulshan Alaniz and Silvestre Rodrigues RN performed a dual skin assessment on this patient No impairment noted  Hector score is 20

## 2019-03-26 NOTE — BRIEF OP NOTE
BRIEF OPERATIVE NOTE    Date of Procedure: 3/26/2019   Preoperative Diagnosis: LOOSING OF THE TIBIAL COMPONENT  Postoperative Diagnosis: same   Procedure(s):  REVISION TIBIA COMPONENT RIGHT TOTAL KNEE REPLACEMENT  (CHOICE)  Surgeon(s) and Role:     * Francisco Javier Lerma MD - Primary         Surgical Assistant: Assistant: Miladis Méndez, [facility]    Surgical Staff:  Leyla Day  Circ-Relief: Rico Iglesias RN  Physician Assistant: Jalen Barros PA-C  Scrub RN-1: Radha Curtis RN  Surg Asst-1: Bradley Gonzalez  Event Time In Time Out   Incision Start 1050    Incision Close       Anesthesia: spinal   Estimated Blood Loss: 100cc  Specimens: * No specimens in log *   Findings: grossly loose tibial component   Complications: none  Implants:   Implant Name Type Inv.  Item Serial No.  Lot No. LRB No. Used Action   CEMENT BNE GENTAMC GHV 40GM -- SMARTSET - SNA  CEMENT BNE GENTAMC GHV 40GM -- SMARTSET NA [facility] [id number] Right 1 Implanted   IMPLANT RECORD       1 Implanted

## 2019-03-27 LAB
ANION GAP SERPL CALC-SCNC: 6 MMOL/L (ref 5–15)
BUN SERPL-MCNC: 10 MG/DL (ref 6–20)
BUN/CREAT SERPL: 10 (ref 12–20)
CALCIUM SERPL-MCNC: 8.4 MG/DL (ref 8.5–10.1)
CHLORIDE SERPL-SCNC: 109 MMOL/L (ref 97–108)
CO2 SERPL-SCNC: 24 MMOL/L (ref 21–32)
CREAT SERPL-MCNC: 0.96 MG/DL (ref 0.55–1.02)
GLUCOSE SERPL-MCNC: 115 MG/DL (ref 65–100)
HGB BLD-MCNC: 11.2 G/DL (ref 11.5–16)
POTASSIUM SERPL-SCNC: 3.9 MMOL/L (ref 3.5–5.1)
SODIUM SERPL-SCNC: 139 MMOL/L (ref 136–145)

## 2019-03-27 PROCEDURE — 94664 DEMO&/EVAL PT USE INHALER: CPT

## 2019-03-27 PROCEDURE — 65270000029 HC RM PRIVATE

## 2019-03-27 PROCEDURE — 80048 BASIC METABOLIC PNL TOTAL CA: CPT

## 2019-03-27 PROCEDURE — 94640 AIRWAY INHALATION TREATMENT: CPT

## 2019-03-27 PROCEDURE — 97116 GAIT TRAINING THERAPY: CPT

## 2019-03-27 PROCEDURE — 74011000250 HC RX REV CODE- 250: Performed by: PHYSICIAN ASSISTANT

## 2019-03-27 PROCEDURE — 85018 HEMOGLOBIN: CPT

## 2019-03-27 PROCEDURE — 74011250636 HC RX REV CODE- 250/636: Performed by: PHYSICIAN ASSISTANT

## 2019-03-27 PROCEDURE — 36415 COLL VENOUS BLD VENIPUNCTURE: CPT

## 2019-03-27 PROCEDURE — 97535 SELF CARE MNGMENT TRAINING: CPT

## 2019-03-27 PROCEDURE — 97165 OT EVAL LOW COMPLEX 30 MIN: CPT

## 2019-03-27 PROCEDURE — 97530 THERAPEUTIC ACTIVITIES: CPT

## 2019-03-27 PROCEDURE — 74011250637 HC RX REV CODE- 250/637: Performed by: PHYSICIAN ASSISTANT

## 2019-03-27 RX ORDER — HYDROMORPHONE HYDROCHLORIDE 4 MG/1
4 TABLET ORAL
Qty: 60 TAB | Refills: 0 | Status: SHIPPED | OUTPATIENT
Start: 2019-03-27 | End: 2019-04-06

## 2019-03-27 RX ADMIN — HYDROMORPHONE HYDROCHLORIDE 4 MG: 4 TABLET ORAL at 04:43

## 2019-03-27 RX ADMIN — RIVAROXABAN 10 MG: 10 TABLET, FILM COATED ORAL at 13:34

## 2019-03-27 RX ADMIN — Medication 2 G: at 03:23

## 2019-03-27 RX ADMIN — ACETAMINOPHEN 1000 MG: 500 TABLET ORAL at 18:23

## 2019-03-27 RX ADMIN — LEVOTHYROXINE SODIUM 125 MCG: 125 TABLET ORAL at 08:55

## 2019-03-27 RX ADMIN — Medication 10 ML: at 21:43

## 2019-03-27 RX ADMIN — ARFORMOTEROL TARTRATE 15 MCG: 15 SOLUTION RESPIRATORY (INHALATION) at 21:53

## 2019-03-27 RX ADMIN — ACETAMINOPHEN 1000 MG: 500 TABLET ORAL at 11:10

## 2019-03-27 RX ADMIN — MONTELUKAST SODIUM 10 MG: 10 TABLET, FILM COATED ORAL at 11:10

## 2019-03-27 RX ADMIN — MELOXICAM 15 MG: 7.5 TABLET ORAL at 11:09

## 2019-03-27 RX ADMIN — FAMOTIDINE 20 MG: 20 TABLET ORAL at 11:10

## 2019-03-27 RX ADMIN — GABAPENTIN 800 MG: 400 CAPSULE ORAL at 21:43

## 2019-03-27 RX ADMIN — SODIUM CHLORIDE 125 ML/HR: 900 INJECTION, SOLUTION INTRAVENOUS at 08:42

## 2019-03-27 RX ADMIN — SENNOSIDES AND DOCUSATE SODIUM 1 TABLET: 8.6; 5 TABLET ORAL at 11:09

## 2019-03-27 RX ADMIN — SODIUM CHLORIDE 125 ML/HR: 900 INJECTION, SOLUTION INTRAVENOUS at 00:39

## 2019-03-27 RX ADMIN — LORATADINE 10 MG: 10 TABLET ORAL at 11:09

## 2019-03-27 RX ADMIN — ACETAMINOPHEN 1000 MG: 500 TABLET ORAL at 03:23

## 2019-03-27 RX ADMIN — SENNOSIDES AND DOCUSATE SODIUM 1 TABLET: 8.6; 5 TABLET ORAL at 18:23

## 2019-03-27 RX ADMIN — POLYETHYLENE GLYCOL 3350 17 G: 17 POWDER, FOR SOLUTION ORAL at 11:09

## 2019-03-27 RX ADMIN — DULOXETINE HYDROCHLORIDE 60 MG: 60 CAPSULE, DELAYED RELEASE ORAL at 11:10

## 2019-03-27 RX ADMIN — FAMOTIDINE 20 MG: 20 TABLET ORAL at 18:23

## 2019-03-27 RX ADMIN — METOPROLOL SUCCINATE 25 MG: 25 TABLET, EXTENDED RELEASE ORAL at 11:10

## 2019-03-27 RX ADMIN — HYDROMORPHONE HYDROCHLORIDE 4 MG: 4 TABLET ORAL at 08:43

## 2019-03-27 RX ADMIN — SERTRALINE HYDROCHLORIDE 200 MG: 50 TABLET ORAL at 11:08

## 2019-03-27 RX ADMIN — ARFORMOTEROL TARTRATE 15 MCG: 15 SOLUTION RESPIRATORY (INHALATION) at 07:48

## 2019-03-27 RX ADMIN — GABAPENTIN 800 MG: 400 CAPSULE ORAL at 18:28

## 2019-03-27 RX ADMIN — BUDESONIDE 500 MCG: 0.5 INHALANT RESPIRATORY (INHALATION) at 21:53

## 2019-03-27 RX ADMIN — GABAPENTIN 800 MG: 400 CAPSULE ORAL at 11:08

## 2019-03-27 RX ADMIN — BUDESONIDE 500 MCG: 0.5 INHALANT RESPIRATORY (INHALATION) at 07:47

## 2019-03-27 RX ADMIN — HYDROMORPHONE HYDROCHLORIDE 4 MG: 4 TABLET ORAL at 00:37

## 2019-03-27 NOTE — PROGRESS NOTES
Ortho Daily Progress Note    3/27/2019  9:47 AM    POD:  1 Day Post-Op  S/P:  Procedure(s):  REVISION TIBIA COMPONENT RIGHT TOTAL KNEE REPLACEMENT    Afebrile/VSS, NAD, A&O x 3  Doing OK without complaints of nausea  Pain well controlled  Calves soft/NTTP Bilaterally  Dressing clean and dry  Moving lower extremities well. Neurocirculatory exam intact and within normal range. Lab Results   Component Value Date/Time    HGB 11.2 (L) 03/27/2019 03:42 AM    INR 1.0 03/05/2019 10:02 AM     Recent Labs     03/27/19  0342 03/05/19  1002 09/25/18  1304   CREA 0.96 0.98 0.85   BUN 10 19 15     Estimated Creatinine Clearance: 69.2 mL/min (based on SCr of 0.96 mg/dL).     PLAN:  DVT prophylaxis: Xarelto 10 mg daily  WBAT with PT-mobilization  Pain Control- tylenol, mobic, and dilaudid  Plan to D/C home with HH/PT in 1-2 days      PAULINA Barlow

## 2019-03-27 NOTE — ROUTINE PROCESS
Bedside shift change report given to Soraya (oncoming nurse) by Kota Hodgson (offgoing nurse). Report included the following information SBAR.

## 2019-03-27 NOTE — PROGRESS NOTES
Problem: Mobility Impaired (Adult and Pediatric)  Goal: *Acute Goals and Plan of Care (Insert Text)  Description  Physical Therapy Goals  Initiated 3/26/2019    1. Patient will move from supine to sit and sit to supine , scoot up and down and roll side to side in bed with modified independence within 4 days. 2. Patient will perform sit to stand with modified independence within 4 days. 3. Patient will ambulate with modified independence for 150 feet with the least restrictive device within 4 days. 4. Patient will ascend/descend 20 stairs with one handrail and cane with modified independence within 4 days. 5. Patient will perform home exercise program per protocol with independence within 4 days. 6. Patient will demonstrate AROM 0-90 degrees in operative joint within 4 days. Outcome: Progressing Towards Goal  PHYSICAL THERAPY TREATMENT  Patient: Padma Dietrich (10 y.o. female)  Date: 3/27/2019  Diagnosis: Loosening of knee joint prosthesis, sequela [T84.038S, Z96.659]  S/P total knee arthroplasty, right [Z96.651] Loosening of knee joint prosthesis (HCC)  Procedure(s) (LRB):  REVISION TIBIA COMPONENT RIGHT TOTAL KNEE REPLACEMENT (Right) 1 Day Post-Op  Precautions: Fall, WBAT  Chart, physical therapy assessment, plan of care and goals were reviewed. ASSESSMENT:  Pt received supine in bed, agreeable to PT. Required Min-CGA for functional transfers and CGA for gait training. Amb approx 30 Ft total w/ RW+ gait belt while demonstrating step to, antalgic gait pattern. Pt c/o RLE feeling \"wobbly\" yet no LOB noted. Pt O2 remained above 90's during gait on RA w/ pulse ox sensor placed on index finger vs on thumb; SOB noted w/ activity. (OT reported O2 sats decreased when pt head was down during OT session). Pt returned to bed to rest, pt feeling \"foggy and cloudy\" (VSS). Will plan to see pt this afternoon for further assessment and potential stair training as able and appropriate.      Progression toward goals:  ?      Improving appropriately and progressing toward goals  ? Improving slowly and progressing toward goals  ? Not making progress toward goals and plan of care will be adjusted     PLAN:  Patient continues to benefit from skilled intervention to address the above impairments. Continue treatment per established plan of care. Discharge Recommendations:  Home Health  Further Equipment Recommendations for Discharge: RW (pt informed by Rehab Tech that RW's available at hospital pharmacy downstairs. Pt had received a RW within last 5yrs and is not eligible to receive walker through insurance). SUBJECTIVE:   Patient stated ? I feel foggy. . cloudy? OBJECTIVE DATA SUMMARY:   Critical Behavior:  Neurologic State: Appropriate for age  Orientation Level: Appropriate for age, Oriented X4  Cognition: Appropriate decision making, Appropriate for age attention/concentration, Appropriate safety awareness, Follows commands  Safety/Judgement: Insight into deficits  Range of Motion:  AROM: Generally decreased, functional(L shoulder limited to 90 degrees)  PROM: Generally decreased, functional(L shoulder limited to 90 degrees)                    Functional Mobility Training:  Bed Mobility:     Supine to Sit: Minimum assistance(RLE in/OON)  Sit to Supine: Minimum assistance           Transfers:  Sit to Stand: Contact guard assistance  Stand to Sit: Contact guard assistance                             Balance:  Sitting: Intact; Without support  Standing: Intact; With support(using walker)  Standing - Static: Good  Standing - Dynamic : Good  Ambulation/Gait Training:  Distance (ft): 30 Feet (ft)  Assistive Device: Gait belt;Walker, rolling  Ambulation - Level of Assistance: Contact guard assistance; Additional time;Assist x1        Gait Abnormalities: Antalgic;Decreased step clearance; Step to gait;Trunk sway increased        Base of Support: Widened  Stance: Right decreased  Speed/Ruthann: Shuffled; Slow  Step Length: Left shortened;Right shortened  Swing Pattern: Right asymmetrical                          Pain:  Pain Scale 1: Numeric (0 - 10)     Pain Location 1: Knee  Pain Orientation 1: Right     Pain Intervention(s) 1: Medication (see MAR)  Activity Tolerance:   VS WNL. O2 stable on RA during gait. Rest break x1 required. Please refer to the flowsheet for vital signs taken during this treatment. After treatment:   ? Patient left in no apparent distress sitting up in chair  ? Patient left in no apparent distress in bed  ? Call bell left within reach  ? Nursing notified  ? Caregiver present  ?  Bed alarm activated    COMMUNICATION/COLLABORATION:   The patient?s plan of care was discussed with: Registered Nurse    Silvana CROWLEY Means,PTA   Time Calculation: 37 mins

## 2019-03-27 NOTE — PROGRESS NOTES
Problem: Falls - Risk of  Goal: *Absence of Falls  Description  Document Arva Nam Fall Risk and appropriate interventions in the flowsheet.   Outcome: Progressing Towards Goal

## 2019-03-27 NOTE — PROGRESS NOTES
Orders received, chart reviewed and patient evaluated by occupational therapy. Recommend patient to discharge with [address] [place] [address] [place]. Recommend with nursing patient to complete as able in order to maintain strength, endurance and independence: OOB to chair 3x/day, ADLs with supervision/setup and mobilizing to the bathroom for toileting with 1 assist. Thank you for your assistance. Full evaluation to follow.

## 2019-03-27 NOTE — PROGRESS NOTES
Bedside shift change report given to Jesus Guerra RN (oncoming nurse) by AVA Whitney RN (offgoing nurse). Report included the following information SBAR, Kardex and Recent Results.

## 2019-03-27 NOTE — OP NOTES
[address]  OPERATIVE REPORT    Name:  Steven Benton  MR#:  [id number]  :  1965  ACCOUNT #:  [de-identified]  DATE OF SERVICE:  2019    PREOPERATIVE DIAGNOSIS:  Aseptic loosening tibial component, right knee. POSTOPERATIVE DIAGNOSIS:  Aseptic loosening tibial component, right knee. PROCEDURE PERFORMED:  Revision of tibial component, right total knee replacement. SURGEON:  Evette Devine MD    ASSISTANT:  Iqra Maguire    ANESTHESIA:  Spinal.    COMPLICATIONS:  None. SPECIMENS REMOVED:  Old tibial component. IMPLANTS:  Revision Attune tibial component. ESTIMATED BLOOD LOSS:  100 mL. INDICATIONS:  A 75-year-old woman who is approximately 2 years out from a right total knee replacement, who has never had complete pain relief. She presents with progressive increasing pain in the knee all localized to the tibia. X-rays show gross loosening of the tibial component. Bone scan confirms this with no apparent activity in the femoral component. Clinically, she had no symptoms related to the patellofemoral joint or the femoral component. Therefore, I elected to proceed with revision of the tibial component alone. OPERATION:  The patient was taken to the operating room and underwent placement of spinal anesthesia. The right knee and leg were prepped and draped in usual fashion. The leg was exsanguinated with the Esmarch bandage and tourniquet inflated to 300 mmHg. The original incision was utilized and taken down through the skin and subcutaneous tissue. A medial parapatellar arthrotomy was performed and extended proximally along the medial border of the quadriceps tendon, distally along the medial border of the insertion of the patellar tendon. Medial release was performed. Scar tissue was excised. Retropatellar scar was excised. Soft tissue releases were performed circumferentially around the tibial component.   The tibia was subluxated anteriorly and the polyethylene liner was removed with a small osteotome. Osteotome was used to go around the tibial component. This was grossly loose and came out with ease. There was no cement whatsoever attached to the tibial component. Osteotomes were used to remove all the remaining cement attached to the bone. This was cleansed using the pulsatile lavage system with antibiotic solution. The canal was then reamed up to a size 16 reamer. The proximal tibia was broached up to a size 29 sleeve. With the trial stem and sleeve in place, I then sized the tibia to a size 4. The size 4 tibial component was attached to remaining trial components. Trial reduction was performed and we chose a size 14 poly. This provided excellent stability and range of motion. Flexion and extension gaps appeared equal.  I then everted the patella and removed all scar tissue from around the patellar component as well as some bony overgrowth. The patella was closely evaluated and appeared to be intact with no loosening. Femoral component was also evaluated and no apparent loosening was noted. All trial components were removed and all fibrous tissue and remaining cement was removed from the tibial surface. The surfaces were cleansed using pulsatile lavage system with antibiotic solution. The tibial components with the stem and sleeve were assembled on the back table and impacted. The cement was mixed and applied to the tibial component, taking care not to allow any cement to touch the porus-coated surface. The tibial component was then impacted and placed with excellent fixation. All remaining excess cement was removed. The size 14 polyethylene insert was placed and the knee was reduced. Excellent range of motion and stability was noted. The patella tracked normally. The joint was extensively irrigated with antibiotic solution. Tourniquet was let down after a total tourniquet time of 46 minutes.   Hemostasis was obtained using Bovie apparatus. The arthrotomy was repaired using #1 Stratafix suture in a running fashion. Subcutaneous tissue was approximated using 2-0 Vicryl in interrupted fashion. Skin edges were approximated using stapling apparatus. Bulky sterile dressing was applied and the patient was transferred to recovery room in stable condition. There were no complications.         Carla Obregon MD      [id number]  D:  03/26/2019 11:57  T:  03/26/2019 19:46  JOB #:  [id number]

## 2019-03-27 NOTE — PROGRESS NOTES
Care Management Interventions  PCP Verified by CM: Yes  Mode of Transport at Discharge: Other (see comment)(family)  Transition of Care Consult (CM Consult): Home Health, Discharge Trev Joseph [phone] [address]: No  Reason Outside [place]: Unable to staff case(referral sent to [facility])  [address] Gabrielmitceline [place]: No  Discharge Durable Medical Equipment: No  Physical Therapy Consult: Yes  Occupational Therapy Consult: Yes  Speech Therapy Consult: No  Current Support Network: Own Home, Family Lives Nearby(Lives with her son. daughter lives close by)  Confirm Follow Up Transport: Family  Plan discussed with Pt/Family/Caregiver: Yes  Freedom of Choice Offered: Yes  [address] Provided?: No  Discharge Location  Discharge Placement: Home with home health    Reason for Admission: [address]                     RRAT Score:  9                   Plan for utilizing home health:   Patient prefers [facility]. Referral sent. Likelihood of Readmission:  low                         Transition of Care Plan:  Home with home health. [facility] unable to accept patient. CM sent referral to [facility] via [employer]. [facility]erson has accepted patient.     Jez Nobles, BSW/CRM

## 2019-03-27 NOTE — PROGRESS NOTES
Occupational Therapy EVALUATION/discharge  Patient: Jonathon Boogie (1 y.o. female)  Date: 3/27/2019  Primary Diagnosis: Loosening of knee joint prosthesis, sequela [T84.038S, Z96.659]  S/P total knee arthroplasty, right [Z96.651]  Procedure(s) (LRB):  REVISION TIBIA COMPONENT RIGHT TOTAL KNEE REPLACEMENT (Right) 1 Day Post-Op   Precautions:  Fall, WBAT    ASSESSMENT:   Based on the objective data described below, the patient presents with largely SBA for ADL transfers and tasks s/p R TKR POD 1 with decreased R knee ROM and using walker for support. PTA, patient was largely IND for ADL transfers and tasks. Patient demonstrated toileting and LB dressing x supervision with increased time however w/o need for AE. Patient c/o lightheadness when bending forward, however VSS. Recommend patient discharge home with family support and [address] [place] [address] services to maximize patient safety and independence with ADL tasks. Further skilled acute occupational therapy is not indicated at this time. Discharge Recommendations: Home Health  Further Equipment Recommendations for Discharge: TBD      SUBJECTIVE:   Patient stated I have to take 3 medications for my mold allergies.     OBJECTIVE DATA SUMMARY:   HISTORY:   Past Medical History:   Diagnosis Date    Arthritis     Asthma     uses inhalers    Chronic obstructive pulmonary disease (HCC)     bronchitis    Chronic pain     GERD (gastroesophageal reflux disease)     Hypertension     Ill-defined condition     environmental allergies     Ill-defined condition     Multiple body piercings; unable to remove tongue and lip piercings    Ill-defined condition 2018    GASTRITIS PER ENDOSCOPY    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    Psychiatric disorder     DEPRESSION    Thyroid disease     hypo    Ventral hernia 12/31/2013     Past Surgical History:   Procedure Laterality Date    HX HEENT  2009    thyroidectomy    HX KNEE REPLACEMENT      R    HX MOHS PROCEDURES Right 3/8/11    HX OTHER SURGICAL      hiatal hernia repair    HX TUBAL LIGATION         Prior Level of Function/Environment/Context: PTA, patient was largely IND for ADL transfers and tasks. Expanded or extensive additional review of patient history:     Home Situation  Home Environment: Private residence  # Steps to Enter: 8  Rails to Enter: Yes  Hand Rails : Bilateral  One/Two Story Residence: Two story  # of Interior Steps: 20  Living Alone: No  Support Systems: Family member(s), Child(matty)  Patient Expects to be Discharged to[de-identified] Private residence  Current DME Used/Available at Home: Rhonda Spark, straight, Walker, rolling, Shower chair  Tub or Shower Type: Tub/Shower combination    Hand dominance: Right    EXAMINATION OF PERFORMANCE DEFICITS:  Cognitive/Behavioral Status:  Neurologic State: Appropriate for age  Orientation Level: Appropriate for age;Oriented X4  Cognition: Appropriate decision making; Appropriate for age attention/concentration; Appropriate safety awareness; Follows commands  Perception: Appears intact  Perseveration: No perseveration noted  Safety/Judgement: Insight into deficits    Skin: R knee bandage intact; IV; all other exposed areas grossly intact    Edema: RLE    Hearing: Auditory  Auditory Impairment: None    Vision/Perceptual:                                Corrective Lenses: Glasses    Range of Motion:  BUE  AROM: Generally decreased, functional(L shoulder limited to 90 degrees)  PROM: Generally decreased, functional(L shoulder limited to 90 degrees)                      Strength:  BUE  Strength: Generally decreased, functional                Coordination:  Coordination: Generally decreased, functional  Fine Motor Skills-Upper: Left Intact; Right Intact    Gross Motor Skills-Upper: Left Intact; Right Intact    Tone & Sensation:  BUE  Tone: Normal  Sensation: Intact                      Balance:  Sitting: Intact; Without support  Standing: Intact; With support(using walker)  Standing - Static: Good  Standing - Dynamic : Good    Functional Mobility and Transfers for ADLs:  Bed Mobility:  Sit to Supine: Minimum assistance;Assist x1;Additional time    Transfers:  Sit to Stand: Stand-by assistance  Stand to Sit: Stand-by assistance  Toilet Transfer : Stand-by assistance    ADL Assessment:  Feeding: Independent    Oral Facial Hygiene/Grooming: Supervision(infer 2* static standing balance)    Bathing: Stand-by assistance(infer 2* dynamic standing balance)    Upper Body Dressing: Stand-by assistance(infer for item retrieval)    Lower Body Dressing: Stand-by assistance(increased time)    Toileting: Stand by assistance                ADL Intervention and task modifications:                           Lower Body Dressing Assistance  Socks: Supervision/set-up(seated EOB; increased time bending forward)    Toileting  Toileting Assistance: Stand-by assistance    Cognitive Retraining  Safety/Judgement: Insight into deficits    Dressing joint: Patient instructed and demonstrated understanding to don/doff Right LE first/last with Supervision. Patient instructed and demonstrated to don all clothing while sitting prior to standing, doff all clothing to knees while standing, then sit to doff clothing off from knees to feet in order to facilitate fall prevention, pain management, and energy conservation with Supervision. Home safety: Patient instructed and indicated understanding on home modifications and safety (raise height of ADL objects, appropriate height of chair surfaces, recliner safety, change of floor surfaces, clear pathways) to increase independence and fall prevention. Standing: Patient instructed and demonstrated during ADLs to walk up to sink/counter top/surfaces, step into walker to increase safety of joint and fall prevention with Supervision. Patient educated about knee anatomy verbally and with pictures and educated to avoid rotation of Right LE.   Instructed to apply concept to ADLs within the home (no twisting of knee during reaching across body, square off while using objects, slide objects along surfaces). Patient instructed and indicated understanding to increase amount of time standing, observe standing position during ADLs in order to increase even weight bearing through bilateral LEs in order to increase independence with ADLs. Goal to be reached 30 days post - op, per orthopedic surgeon or per PT. Functional Measure:  Barthel Index:    Bathin  Bladder: 10  Bowels: 10  Groomin  Dressing: 10  Feeding: 10  Mobility: 10  Stairs: 5  Toilet Use: 5  Transfer (Bed to Chair and Back): 10  Total: 75/100        Percentage of impairment   0%   1-19%   20-39%   40-59%   60-79%   80-99%   100%   Barthel Score 0-100 100 99-80 79-60 59-40 20-39 1-19   0     The Barthel ADL Index: Guidelines  1. The index should be used as a record of what a patient does, not as a record of what a patient could do. 2. The main aim is to establish degree of independence from any help, physical or verbal, however minor and for whatever reason. 3. The need for supervision renders the patient not independent. 4. A patient's performance should be established using the best available evidence. Asking the patient, friends/relatives and nurses are the usual sources, but direct observation and common sense are also important. However direct testing is not needed. 5. Usually the patient's performance over the preceding 24-48 hours is important, but occasionally longer periods will be relevant. 6. Middle categories imply that the patient supplies over 50 per cent of the effort. 7. Use of aids to be independent is allowed. Katy Domingo., Barthel, D.W. (4387). Functional evaluation: the Barthel Index. [facility] (14)2. EDSON Bedoya, Zia Deleon., Joana Osler., [place]lyn, [facility] ().  Measuring the change indisability after inpatient rehabilitation; comparison of the responsiveness of the Barthel Index and Functional [place] Measure. Journal of Neurology, Neurosurgery, and Psychiatry, 66(4), 193-333. MENA Betancourt, GUANAKO Welsh, & Yfn Griffin M.A. (2004.) Assessment of post-stroke quality of life in cost-effectiveness studies: The usefulness of the Barthel Index and the EuroQoL-5D. Quality of Life Research, 15, 593-05        Occupational Therapy Evaluation Charge Determination   History Examination Decision-Making   LOW Complexity : Brief history review  LOW Complexity : 1-3 performance deficits relating to physical, cognitive , or psychosocial skils that result in activity limitations and / or participation restrictions  LOW Complexity : No comorbidities that affect functional and no verbal or physical assistance needed to complete eval tasks       Based on the above components, the patient evaluation is determined to be of the following complexity level: LOW   Pain:  Pain Scale 1: Numeric (0 - 10)     Pain Location 1: Knee  Pain Orientation 1: Right     Pain Intervention(s) 1: Medication (see MAR)  Activity Tolerance:   VSS (90% O2 on room air with activity)  Please refer to the flowsheet for vital signs taken during this treatment. After treatment:   []  Patient left in no apparent distress sitting up in chair  [x]  Patient left in no apparent distress in bed  [x]  Call bell left within reach  [x]  Nursing notified  []  Caregiver present  []  Bed alarm activated    COMMUNICATION/EDUCATION:   Communication/Collaboration:  [x]      Home safety education was provided and the patient/caregiver indicated understanding. [x]      Patient/family have participated as able and agree with findings and recommendations. []      Patient is unable to participate in plan of care at this time.   Findings and recommendations were discussed with: Physical Therapist and Registered Nurse    Jennifer Ochoa  Time Calculation: 35 mins

## 2019-03-27 NOTE — ROUTINE PROCESS
Patient received a walker through her insurance within the last 5 years, so is not eligible to get a walker through her insurance. I educated the patient to get one through the pharmacy downstairs, or through an outside vendor for d/c home.

## 2019-03-27 NOTE — PROGRESS NOTES
Problem: Mobility Impaired (Adult and Pediatric)  Goal: *Acute Goals and Plan of Care (Insert Text)  Description  Physical Therapy Goals  Initiated 3/26/2019    1. Patient will move from supine to sit and sit to supine , scoot up and down and roll side to side in bed with modified independence within 4 days. 2. Patient will perform sit to stand with modified independence within 4 days. 3. Patient will ambulate with modified independence for 150 feet with the least restrictive device within 4 days. 4. Patient will ascend/descend 20 stairs with one handrail and cane with modified independence within 4 days. 5. Patient will perform home exercise program per protocol with independence within 4 days. 6. Patient will demonstrate AROM 0-90 degrees in operative joint within 4 days. 3/27/2019 1205 by Marita Gomez  Outcome: Progressing Towards Goal  PHYSICAL THERAPY TREATMENT  Patient: Padma Dietrich (47 y.o. female)  Date: 3/27/2019  Diagnosis: Loosening of knee joint prosthesis, sequela [T84.038S, Z96.659]  S/P total knee arthroplasty, right [Z96.651] Loosening of knee joint prosthesis (HCC)  Procedure(s) (LRB):  REVISION TIBIA COMPONENT RIGHT TOTAL KNEE REPLACEMENT (Right) 1 Day Post-Op  Precautions: Fall, WBAT  Chart, physical therapy assessment, plan of care and goals were reviewed. ASSESSMENT:  Pt received in bed; agreeable to PT. C/o 7/10 pain this afternoon. Able to amb approx 90 Ft w/ RW in [place]. Continues to demonstrate antalgic, step to  gait. No LOB or c/o knee feeling \"wobbly\" this afternoon. Pt returned to bed w/ all items in reach per request to rest. Encouraged pt to sit up in chair for dinner. Pt agreeable. Progression toward goals:  ?      Improving appropriately and progressing toward goals  ? Improving slowly and progressing toward goals  ?       Not making progress toward goals and plan of care will be adjusted     PLAN:  Patient continues to benefit from skilled intervention to address the above impairments. Continue treatment per established plan of care. Discharge Recommendations:  Home Health  Further Equipment Recommendations for Discharge:  rolling walker      SUBJECTIVE:   \"I slept 3/4 of the way. \"    OBJECTIVE DATA SUMMARY:   Range of Motion:  AROM: Generally decreased, functional(L shoulder limited to 90 degrees)  PROM: Generally decreased, functional(L shoulder limited to 90 degrees)                    Functional Mobility Training:  Bed Mobility:     Supine to Sit: Minimum assistance(RLE in/OON)  Sit to Supine: Minimum assistance           Transfers:  Sit to Stand: Contact guard assistance  Stand to Sit: Contact guard assistance                             Ambulation/Gait Training:  Distance (ft): 90 Feet (ft)  Assistive Device: Gait belt;Walker, rolling  Ambulation - Level of Assistance: Contact guard assistance; Additional time;Assist x1        Gait Abnormalities: Antalgic;Decreased step clearance; Step to gait        Base of Support: Widened  Stance: Right decreased  Speed/Ruthann: Shuffled; Slow  Step Length: Left shortened;Right shortened  Swing Pattern: Right asymmetrical                         Therapeutic Exercises:   Exercises performed per protocol. See morning treatment note for description. Pain:  Pain Scale 1: Numeric (0 - 10)     Pain Location 1: Knee  Pain Orientation 1: Right     Pain Intervention(s) 1: Medication (see MAR)  Activity Tolerance:   VS WNL   Please refer to the flowsheet for vital signs taken during this treatment. After treatment:   ? Patient left in no apparent distress sitting up in chair  ? Patient left in no apparent distress in bed  ? Call bell left within reach  ? Nursing notified  ? Caregiver present  ?  Bed alarm activated    COMMUNICATION/COLLABORATION:   The patient?s plan of care was discussed with: Registered Nurse    Stephanie Gomez,PTA   Time Calculation: 33 mins

## 2019-03-28 VITALS
HEART RATE: 65 BPM | WEIGHT: 207 LBS | SYSTOLIC BLOOD PRESSURE: 135 MMHG | DIASTOLIC BLOOD PRESSURE: 85 MMHG | RESPIRATION RATE: 16 BRPM | BODY MASS INDEX: 39.08 KG/M2 | TEMPERATURE: 98 F | HEIGHT: 61 IN | OXYGEN SATURATION: 97 %

## 2019-03-28 PROCEDURE — 74011000250 HC RX REV CODE- 250: Performed by: PHYSICIAN ASSISTANT

## 2019-03-28 PROCEDURE — 74011250637 HC RX REV CODE- 250/637: Performed by: PHYSICIAN ASSISTANT

## 2019-03-28 PROCEDURE — 97530 THERAPEUTIC ACTIVITIES: CPT

## 2019-03-28 PROCEDURE — 94640 AIRWAY INHALATION TREATMENT: CPT

## 2019-03-28 PROCEDURE — 97116 GAIT TRAINING THERAPY: CPT

## 2019-03-28 RX ADMIN — GABAPENTIN 800 MG: 400 CAPSULE ORAL at 09:32

## 2019-03-28 RX ADMIN — FAMOTIDINE 20 MG: 20 TABLET ORAL at 09:32

## 2019-03-28 RX ADMIN — HYDROCHLOROTHIAZIDE 25 MG: 25 TABLET ORAL at 09:31

## 2019-03-28 RX ADMIN — LORATADINE 10 MG: 10 TABLET ORAL at 09:32

## 2019-03-28 RX ADMIN — METOPROLOL SUCCINATE 25 MG: 25 TABLET, EXTENDED RELEASE ORAL at 09:31

## 2019-03-28 RX ADMIN — DULOXETINE HYDROCHLORIDE 60 MG: 60 CAPSULE, DELAYED RELEASE ORAL at 09:32

## 2019-03-28 RX ADMIN — HYDROMORPHONE HYDROCHLORIDE 2 MG: 2 TABLET ORAL at 11:15

## 2019-03-28 RX ADMIN — Medication 10 ML: at 06:38

## 2019-03-28 RX ADMIN — ACETAMINOPHEN 1000 MG: 500 TABLET ORAL at 11:15

## 2019-03-28 RX ADMIN — ARFORMOTEROL TARTRATE 15 MCG: 15 SOLUTION RESPIRATORY (INHALATION) at 08:02

## 2019-03-28 RX ADMIN — MONTELUKAST SODIUM 10 MG: 10 TABLET, FILM COATED ORAL at 09:31

## 2019-03-28 RX ADMIN — ACETAMINOPHEN 1000 MG: 500 TABLET ORAL at 06:37

## 2019-03-28 RX ADMIN — FLUTICASONE PROPIONATE 2 SPRAY: 50 SPRAY, METERED NASAL at 09:36

## 2019-03-28 RX ADMIN — HYDROMORPHONE HYDROCHLORIDE 2 MG: 2 TABLET ORAL at 06:39

## 2019-03-28 RX ADMIN — SENNOSIDES AND DOCUSATE SODIUM 1 TABLET: 8.6; 5 TABLET ORAL at 09:31

## 2019-03-28 RX ADMIN — RIVAROXABAN 10 MG: 10 TABLET, FILM COATED ORAL at 11:15

## 2019-03-28 RX ADMIN — MELOXICAM 15 MG: 7.5 TABLET ORAL at 09:32

## 2019-03-28 RX ADMIN — SERTRALINE HYDROCHLORIDE 200 MG: 50 TABLET ORAL at 09:32

## 2019-03-28 RX ADMIN — POLYETHYLENE GLYCOL 3350 17 G: 17 POWDER, FOR SOLUTION ORAL at 09:31

## 2019-03-28 RX ADMIN — LEVOTHYROXINE SODIUM 125 MCG: 125 TABLET ORAL at 06:37

## 2019-03-28 RX ADMIN — AMLODIPINE BESYLATE 10 MG: 5 TABLET ORAL at 09:31

## 2019-03-28 RX ADMIN — BUDESONIDE 500 MCG: 0.5 INHALANT RESPIRATORY (INHALATION) at 08:02

## 2019-03-28 NOTE — DISCHARGE SUMMARY
Orthopedic Service Discharge Summary    Patient ID:  Jonathon Boogie  [id number]  female  52 y.o.  1965    Admit date: 3/26/2019    Discharge date and time: 3/28/2019  2:40 PM     Admitting Physician: Eb Pittman MD     Discharge Physician: Eb Pittman MD    Consulting Physician(s): Treatment Team: Care Manager: Yuli Stinson    Date of Surgery: 3/26/2019     Preoperative Diagnosis:  LOOSING OF THE RIGHT TIBIAL COMPONENT    Postoperative Diagnosis: LOOSING OF THE RIGHT TIBIAL COMPONENT    Procedure(s): Procedure(s):  REVISION TIBIA COMPONENT RIGHT TOTAL KNEE REPLACEMENT    Surgeon: Surgeon(s) and Role:     Katia Camarena MD - Primary      Anesthesia:  Spinal    Preoperative Medical Clearance:                           HPI:  Pt is a 52 y.o. female who has a history of Loosening of knee joint prosthesis, sequela [T84.038S, Z96.659]  S/P total knee arthroplasty, right [Z96.651]  with pain and limitations of activities of daily living who presents at this time for a right revision TKR following the failure of conservative management. PMH:   Past Medical History:   Diagnosis Date    Arthritis     Asthma     uses inhalers    Chronic obstructive pulmonary disease (HCC)     bronchitis    Chronic pain     GERD (gastroesophageal reflux disease)     Hypertension     Ill-defined condition     environmental allergies     Ill-defined condition     Multiple body piercings; unable to remove tongue and lip piercings    Ill-defined condition 2018    GASTRITIS PER ENDOSCOPY    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    Psychiatric disorder     DEPRESSION    Thyroid disease     hypo    Ventral hernia 12/31/2013       Medications upon admission :   Prior to Admission Medications   Prescriptions Last Dose Informant Patient Reported? Taking? DULoxetine (CYMBALTA) 60 mg capsule 3/25/2019 at Unknown time  Yes Yes   Sig: Take 60 mg by mouth.    PROAIR HFA 90 mcg/actuation inhaler 3/19/2019 at Unknown time  No Yes Sig: TAKE 2 PUFFS BY INHALATION EVERY FOUR (4) HOURS AS NEEDED. acetaminophen (TYLENOL) 650 mg TbER 3/19/2019 at Unknown time  No Yes   Sig: TAKE 1 TAB BY MOUTH THREE (3) TIMES DAILY AS NEEDED FOR PAIN. albuterol (PROVENTIL VENTOLIN) 2.5 mg /3 mL (0.083 %) nebulizer solution   No No   Sig: 3 mL by Nebulization route every four (4) hours as needed for Wheezing. amLODIPine (NORVASC) 10 mg tablet 3/26/2019 at 6a  No Yes   Sig: TAKE 1 TABLET BY MOUTH EVERY DAY FOR HYPERTENSION   budesonide-formoterol (SYMBICORT) 160-4.5 mcg/actuation HFAA 3/19/2019 at Unknown time  No Yes   Sig: Take 2 Puffs by inhalation two (2) times a day. diclofenac (VOLTAREN) 1 % gel 3/19/2019 at Unknown time  No Yes   Sig: Apply 2 g to affected area every six (6) hours. fluticasone (FLONASE) 50 mcg/actuation nasal spray 3/25/2019 at Unknown time  No Yes   Si Sprays by Both Nostrils route daily. gabapentin (NEURONTIN) 800 mg tablet 3/26/2019 at 6a  No Yes   Sig: TAKE 1 TAB BY MOUTH THREE (3) TIMES DAILY. hydroCHLOROthiazide (HYDRODIURIL) 25 mg tablet 3/25/2019 at 6a  No Yes   Sig: TAKE 1 TAB BY MOUTH DAILY FOR HYPERTENSION   levothyroxine (SYNTHROID) 125 mcg tablet 3/26/2019 at 6a  No Yes   Sig: TAKE 1 TABLET BY MOUTH EVERY DAY BEFORE BREAKFAST   lidocaine (LIDODERM) 5 % 3/19/2019 at Unknown time  No Yes   Sig: Apply patch to the affected area for 12 hours a day and remove for 12 hours a day. loratadine (CLARITIN) 10 mg tablet 3/25/2019 at Unknown time  No Yes   Sig: Take 1 Tab by mouth daily. meloxicam (MOBIC) 15 mg tablet 3/19/2019 at Unknown time  No Yes   Sig: Take 1 Tab by mouth daily (with breakfast). methocarbamol (ROBAXIN) 750 mg tablet 3/25/2019 at Unknown time  No Yes   Sig: TAKE 1 TAB BY MOUTH FOUR (4) TIMES DAILY AS NEEDED FOR PAIN (SPASMS). metoprolol succinate (TOPROL-XL) 25 mg XL tablet 3/24/2019  No No   Sig: TAKE 1 TABLET BY MOUTH DAILY.    miscellaneous medical supply misc   No No   Sig: Shower seat for chronic knee pain, pt planning to have right TKR in June due to condition. Very limited range of motion. montelukast (SINGULAIR) 10 mg tablet 3/26/2019 at 6a  No Yes   Sig: Take 1 Tab by mouth daily. mupirocin (BACTROBAN) 2 % ointment   No No   Sig: by Both Nostrils route two (2) times a day for 7 days. naloxone (NARCAN) 4 mg/actuation nasal spray   No No   Sig: Use 1 spray into 1 nostril for OVERDOSE. Call 911. For subsequent doses, give in alternating nostrils. May repeat every 2 to 3 min. ondansetron (ZOFRAN ODT) 4 mg disintegrating tablet   No No   Sig: DISSOLVE 1 TABLET BY MOUTH EVERY 8 HOURS AS NEEDED FOR NAUSEA   oxyCODONE-acetaminophen (PERCOCET 10)  mg per tablet   No No   Sig: Take 1 Tab by mouth daily as needed for Pain for up to 30 days. May take 1 tab ONCE DAILY as needed for pain. pantoprazole (PROTONIX) 40 mg tablet 3/26/2019 at 6a  No Yes   Sig: Take 1 Tab by mouth daily. Take in 2 week intervals for GERD Flares   penicillin v potassium (VEETID) 500 mg tablet   No No   Sig: Take 1 Tab by mouth four (4) times daily. sertraline (ZOLOFT) 100 mg tablet 3/26/2019 at 6a  No Yes   Sig: Take 2 Tabs by mouth daily. traZODone (DESYREL) 50 mg tablet 3/19/2019 at Unknown time  No Yes   Sig: Take 1-2 tabs every night for sleep. Facility-Administered Medications: None        Allergies: Allergies   Allergen Reactions    Hydrocodone Itching    Mold Shortness of Breath and Itching    Ibuprofen Nausea Only        Hospital Course: The patient underwent surgery. Complications:  None; patient tolerated the procedure well. Was taken to the PACU in stable condition and then transferred to the Orthopedics floor.       Condition on discharge: good    Perioperative Antibiotics:  Ancef and vancomycin     Postoperative Pain Management:  Acetaminophen, mobic, oxycodone    DVT Prophylaxis: Xarelto, SCDs    Postoperative transfusions:     none Banked PRBCs     Post Op complications: none    Hemoglobin at discharge:    Lab Results   Component Value Date/Time    HGB 11.2 (L) 03/27/2019 03:42 AM    INR 1.0 03/05/2019 10:02 AM       Dressing was changed on POD # 2. Incision - clean, dry and intact. No significant erythema or swelling. Neurovascular exam within normal limits. Wound appears to be healing without any evidence of infection. Physical Therapy started on the day following surgery and progressed to ambulation with the aid of a rolling walker for distances of 250 feet with supervision. Range of motion  limited by pain. At the time of discharge, able to go up and down stairs and had understanding of precautions needed following surgery. Discharged to: Home with Home Health. Discharge instructions:  -See Full Summary of discharge instructions attached  -Anticoagulate with Xarelto  -Resume pre hospital diet            -Resume home medications   Discharge Medication List as of 3/28/2019 11:42 AM      START taking these medications    Details   HYDROmorphone (DILAUDID) 4 mg tablet Take 1 Tab by mouth every four (4) hours as needed for Pain for up to 10 days. Max Daily Amount: 24 mg. Indications: Severe Pain, Print, Disp-60 Tab, R-0      rivaroxaban (XARELTO) 10 mg tablet Take 1 Tab by mouth daily (with lunch). Indications: Deep Vein Thrombosis Prevention in Knee Replacement, Print, Disp-14 Tab, R-0         CONTINUE these medications which have NOT CHANGED    Details   DULoxetine (CYMBALTA) 60 mg capsule Take 60 mg by mouth., Historical Med      PROAIR HFA 90 mcg/actuation inhaler TAKE 2 PUFFS BY INHALATION EVERY FOUR (4) HOURS AS NEEDED., Normal, Disp-8.5 Inhaler, R-0      sertraline (ZOLOFT) 100 mg tablet Take 2 Tabs by mouth daily. , Normal, Disp-60 Tab, R-1      budesonide-formoterol (SYMBICORT) 160-4.5 mcg/actuation HFAA Take 2 Puffs by inhalation two (2) times a day., Normal, Disp-3 Inhaler, R-3      lidocaine (LIDODERM) 5 % Apply patch to the affected area for 12 hours a day and remove for 12 hours a day., Normal, Disp-30 Each, R-11      methocarbamol (ROBAXIN) 750 mg tablet TAKE 1 TAB BY MOUTH FOUR (4) TIMES DAILY AS NEEDED FOR PAIN (SPASMS). , Normal, Disp-60 Tab, R-1      acetaminophen (TYLENOL) 650 mg TbER TAKE 1 TAB BY MOUTH THREE (3) TIMES DAILY AS NEEDED FOR PAIN., Normal, Disp-90 Tab, R-0      pantoprazole (PROTONIX) 40 mg tablet Take 1 Tab by mouth daily. Take in 2 week intervals for GERD Flares, Normal, Disp-30 Tab, R-1      gabapentin (NEURONTIN) 800 mg tablet TAKE 1 TAB BY MOUTH THREE (3) TIMES DAILY., Normal, Disp-90 Tab, R-5      meloxicam (MOBIC) 15 mg tablet Take 1 Tab by mouth daily (with breakfast). , Normal, Disp-30 Tab, R-11      amLODIPine (NORVASC) 10 mg tablet TAKE 1 TABLET BY MOUTH EVERY DAY FOR HYPERTENSION, Normal, Disp-90 Tab, R-3      hydroCHLOROthiazide (HYDRODIURIL) 25 mg tablet TAKE 1 TAB BY MOUTH DAILY FOR HYPERTENSION, Normal, Disp-30 Tab, R-6      levothyroxine (SYNTHROID) 125 mcg tablet TAKE 1 TABLET BY MOUTH EVERY DAY BEFORE BREAKFAST, Normal, Disp-90 Tab, R-1      traZODone (DESYREL) 50 mg tablet Take 1-2 tabs every night for sleep., Normal, Disp-60 Tab, R-11      montelukast (SINGULAIR) 10 mg tablet Take 1 Tab by mouth daily. , Normal, Disp-30 Tab, R-6      diclofenac (VOLTAREN) 1 % gel Apply 2 g to affected area every six (6) hours. , Normal, Disp-100 g, R-5      fluticasone (FLONASE) 50 mcg/actuation nasal spray 2 Sprays by Both Nostrils route daily. , Normal, Disp-1 Bottle, R-11      loratadine (CLARITIN) 10 mg tablet Take 1 Tab by mouth daily. , Normal, Disp-30 Tab, R-5      oxyCODONE-acetaminophen (PERCOCET 10)  mg per tablet Take 1 Tab by mouth daily as needed for Pain for up to 30 days. May take 1 tab ONCE DAILY as needed for pain. , Print, Disp-30 Tab, R-0      ondansetron (ZOFRAN ODT) 4 mg disintegrating tablet DISSOLVE 1 TABLET BY MOUTH EVERY 8 HOURS AS NEEDED FOR NAUSEA, Normal, Disp-20 Tab, R-1      metoprolol succinate (TOPROL-XL) 25 mg XL tablet TAKE 1 TABLET BY MOUTH DAILY., Normal, Disp-90 Tab, R-3      naloxone (NARCAN) 4 mg/actuation nasal spray Use 1 spray into 1 nostril for OVERDOSE. Call 911. For subsequent doses, give in alternating nostrils. May repeat every 2 to 3 min., Normal, Disp-2 Each, R-0      albuterol (PROVENTIL VENTOLIN) 2.5 mg /3 mL (0.083 %) nebulizer solution 3 mL by Nebulization route every four (4) hours as needed for Wheezing., Normal, Disp-24 Each, R-2      miscellaneous medical supply misc Shower seat for chronic knee pain, pt planning to have right TKR in June due to condition. Very limited range of motion. , Print, Disp-1 Each, R-0         STOP taking these medications       mupirocin (BACTROBAN) 2 % ointment Comments:   Reason for Stopping:         penicillin v potassium (VEETID) 500 mg tablet Comments:   Reason for Stopping:            per medical continuation form  -Discharge activity: Activity as tolerated  -Ambulate with Walkers, Type: Rolling Walker, appropriate total joint protocol  -Wound Care Keep wound clean and dry, Reinforce dressing PRN, Ice to area for comfort and As directed  -Follow up in office in 2 weeks      Signed:  Oliver Mcgowan PA-C  3/28/2019  3:16 PM        No att. providers found

## 2019-03-28 NOTE — PROGRESS NOTES
Bedside and Verbal shift change report given to Luis Campbell RN (oncoming nurse) by Ada Souza RN (offgoing nurse). Report included the following information SBAR, Kardex, OR Summary, Procedure Summary, Intake/Output, MAR and Recent Results.

## 2019-03-28 NOTE — DISCHARGE INSTRUCTIONS
Discharge Instructions Revision Knee Replacement  Dr. Caitie Valerio    Patient Name  Varun Felton  Date of procedure  3/26/2019    Procedure  Procedure(s):  REVISION TIBIA COMPONENT RIGHT TOTAL KNEE REPLACEMENT  Surgeon  Surgeon(s) and Role:     Donnie Chaves MD - Primary  Date of discharge: 3/28/19  PCP: Sonia Bills NP    Follow up appointments   Follow up with Dr. Ciatie Valerio in 2 weeks. Call [phone] to make an appointment.  If home health has been arranged for you the agency will contact you to arrange dates/times for visits. Please call them if you do not hear from them within 24 hours after you are discharged    When to call your Orthopaedic Surgeon: Call [phone]. If you call after 5pm or on a weekend, the on call physician will be contacted   Unrelieved pain   Signs of infection-if your incision is red; continues to have drainage; drainage has a foul odor or if you have a persistent fever over 101 degrees   Signs of a blood clot in your leg-calf pain, tenderness, redness, swelling of lower leg    When to call your Primary Care Physician:   Concerns about medical conditions such as diabetes, high blood pressure, asthma, congestive heart failure   Call if blood sugars are elevated, persistent headache or dizziness, coughing or congestion, constipation or diarrhea, burning with urination, abnormal heart rate    When to call [phone] go to the nearest emergency room   Acute onset of chest pain, shortness of breath, difficulty breathing      Activity   Weight bearing as tolerated with walker or crutches.  Refer to your patient handbook for instructions and pictures   Complete your Home Exercise Program daily as instructed by your therapist.  Refer to your patient handbook for instructions and pictures   Get up every one hour and walk (except at night when sleeping)   Do not drive or operate heavy machinery      Incision Care   You will have staples in your knee incision; they will be removed at the surgeons office visit in 2 weeks   Keep a dressing (ABD and paper tape) on your incision and change daily. Wash your hands thoroughly before changing the dressing. Once you incision is not draining, you may leave it open to air   You may shower and get your incision wet but do not submerge your incision under water in a bath tub, hot tub or swimming pool for 6 weeks after surgery. Preventing blood clots   Take Xarelto as prescribed    Pain management   Keep ice wrap in place except when walking; changing gel packs every 4 hours   Lie down and elevate your leg on pillows for about 30 minutes after walking to decrease swelling and pain   Do ankle pumps (10 repetitions) every hour while awake and get up frequently to walk   Take Tylenol 1000 mg every 6 hours for 2 weeks    Take narcotic pain pill as prescribed if needed. Take with food; avoid alcohol while taking pain medication.  Decrease the amount of narcotic pain medication as your pain lessens     Diet   Resume usual diet; drink plenty of fluids; eat foods high in fiber   Take over-the-counter stool softeners and laxatives to prevent constipation

## 2019-03-28 NOTE — PROGRESS NOTES
Ortho Daily Progress Note    3/28/2019  8:34 AM    POD:  2 Days Post-Op  S/P:  Procedure(s):  REVISION TIBIA COMPONENT RIGHT TOTAL KNEE REPLACEMENT    Afebrile/VSS  Doing well without complaints of nausea  Pain well controlled  Calves soft/NTTP Bilaterally  Incision OK, no drainage or dehiscence. Dressing clean and dry  Moving lower extremities well. Neurocirculatory exam intact and within normal range. Breathing treatment this morning  Lab Results   Component Value Date/Time    HGB 11.2 (L) 03/27/2019 03:42 AM    INR 1.0 03/05/2019 10:02 AM     Recent Labs     03/27/19  0342 03/05/19  1002 09/25/18  1304   CREA 0.96 0.98 0.85   BUN 10 19 15     Estimated Creatinine Clearance: 69.2 mL/min (based on SCr of 0.96 mg/dL).     PLAN:  DVT prophylaxis: Xarelto  WBAT with PT-mobilization  Pain Control- Dilaudid, Neurontin, mobic  Plan to D/C home today after PT      PAULINA Britton

## 2019-03-28 NOTE — PROGRESS NOTES
Problem: Mobility Impaired (Adult and Pediatric)  Goal: *Acute Goals and Plan of Care (Insert Text)  Description  Physical Therapy Goals  Initiated 3/26/2019    1. Patient will move from supine to sit and sit to supine , scoot up and down and roll side to side in bed with modified independence within 4 days. 2. Patient will perform sit to stand with modified independence within 4 days. 3. Patient will ambulate with modified independence for 150 feet with the least restrictive device within 4 days. 4. Patient will ascend/descend 20 stairs with one handrail and cane with modified independence within 4 days. 5. Patient will perform home exercise program per protocol with independence within 4 days. 6. Patient will demonstrate AROM 0-90 degrees in operative joint within 4 days. 3/28/2019 1244 by Marita Gomez  Outcome: Progressing Towards Goal  PHYSICAL THERAPY TREATMENT  Patient: Varun Felton (31 y.o. female)  Date: 3/28/2019  Diagnosis: Loosening of knee joint prosthesis, sequela [T84.038S, Z96.659]  S/P total knee arthroplasty, right [Z96.651] Loosening of knee joint prosthesis (HCC)  Procedure(s) (LRB):  REVISION TIBIA COMPONENT RIGHT TOTAL KNEE REPLACEMENT (Right) 2 Days Post-Op  Precautions: Fall, WBAT  Chart, physical therapy assessment, plan of care and goals were reviewed. ASSESSMENT:  Pt received standing at bedside and making her way to window seat to get dressed w/ RW. Pt agreeable  to PT at this time and reports she is ready to go home. Pt Mod I-supervision for functional transfers and gait training w/ RW. No LOB during gait or knee buckling noted. Completed stair training x8 steps using both rails w/no safety concerns. Pt O2 stable on RA but did note SOB. Pt ready for d/c home w/ HHPT (RN aware). Reviewed HEP, icing, activity pacing, 45 minute-hourly position changes. Pt verbalized understanding.        Progression toward goals:  ?      Improving appropriately and progressing toward goals  ? Improving slowly and progressing toward goals  ? Not making progress toward goals and plan of care will be adjusted     PLAN:  Patient continues to benefit from skilled intervention to address the above impairments. Continue treatment per established plan of care. Discharge Recommendations:  Home Health  Further Equipment Recommendations for Discharge:  RW to be purchased from hospital pharmacy. Pt reported she may still have hers (she thinks she placed it by her washing machine ?); is to have her son double check. SUBJECTIVE:   \" I'm getting dressed now so I can leave when you say go. \"    OBJECTIVE DATA SUMMARY:   Range of Motion:  AROM: Generally decreased, functional  PROM: Generally decreased, functional                    Functional Mobility Training:  Bed Mobility:     Supine to Sit: (pt received standing at bedside w/ RW)  Sit to Supine: Supervision           Transfers:  Sit to Stand: Modified independent  Stand to Sit: Supervision                             Ambulation/Gait Training:  Distance (ft): 250 Feet (ft)  Assistive Device: Gait belt;Walker, rolling  Ambulation - Level of Assistance: Stand-by assistance        Gait Abnormalities: Antalgic;Decreased step clearance        Base of Support: Shift to left; Widened  Stance: Right decreased  Speed/Ruthann: Pace decreased (<100 feet/min)  Step Length: Left shortened;Right shortened  Swing Pattern: Right asymmetrical                 Stairs:  Number of Stairs Trained: 8  Stairs - Level of Assistance: Contact guard assistance  Rail Use: Both  Therapeutic Exercises:   Exercises performed per protocol. Pain:  Pain Scale 1: Numeric (0 - 10)  Pain Intensity 1: 7  Pain Location 1: Leg  Pain Orientation 1: Right  Pain Description 1: Sore     Activity Tolerance:   SOB w/ during gait. O2 stable on RA  Please refer to the flowsheet for vital signs taken during this treatment.   After treatment:   ? Patient left in no apparent distress sitting up in chair  ? Patient left in no apparent distress in bed  ? Call bell left within reach  ? Nursing notified  ? Caregiver present  ?  Bed alarm activated    COMMUNICATION/COLLABORATION:   The patient?s plan of care was discussed with: Registered Nurse    Casa Gomez PTA   Time Calculation: 28 mins

## 2019-04-11 ENCOUNTER — TELEPHONE (OUTPATIENT)
Dept: INTERNAL MEDICINE CLINIC | Age: 54
End: 2019-04-11

## 2019-04-11 ENCOUNTER — OFFICE VISIT (OUTPATIENT)
Dept: BEHAVIORAL/MENTAL HEALTH CLINIC | Age: 54
End: 2019-04-11

## 2019-04-11 DIAGNOSIS — F32.0 MILD SINGLE CURRENT EPISODE OF MAJOR DEPRESSIVE DISORDER (HCC): Primary | ICD-10-CM

## 2019-04-11 NOTE — TELEPHONE ENCOUNTER
Looks like they are managing her pain post op. She should have an appt to be seen soon, plus she should technically have some of her March pills left as they are still intended for one time a day dosing. She was given Dilaudid on the 28th for her surgery. She should NOT have also take her oxycodones with those, thereby leaving her more of the medication I prescribed. Lastly, her pain agreement states, she is responsible for her medications, this includes her prescriptions. If she lost it, this is her loss and she will have to await her next appt and fill date for already written Rx. I will not prescribe any additional medication.
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
## 2019-04-11 NOTE — TELEPHONE ENCOUNTER
Pt states while she was hospitalized her rx that was to be filled on 4/19/19 was taken from under her sun visor.   Pt # [phone]

## 2019-04-12 ENCOUNTER — OFFICE VISIT (OUTPATIENT)
Dept: BEHAVIORAL/MENTAL HEALTH CLINIC | Age: 54
End: 2019-04-12

## 2019-04-12 VITALS
SYSTOLIC BLOOD PRESSURE: 122 MMHG | WEIGHT: 207.8 LBS | HEART RATE: 85 BPM | DIASTOLIC BLOOD PRESSURE: 88 MMHG | BODY MASS INDEX: 39.26 KG/M2

## 2019-04-12 DIAGNOSIS — F43.23 ADJUSTMENT DISORDER WITH MIXED ANXIETY AND DEPRESSED MOOD: ICD-10-CM

## 2019-04-12 DIAGNOSIS — F51.04 PSYCHOPHYSIOLOGICAL INSOMNIA: ICD-10-CM

## 2019-04-12 RX ORDER — HYDROXYZINE 25 MG/1
25 TABLET, FILM COATED ORAL
Qty: 60 TAB | Refills: 1 | Status: SHIPPED | OUTPATIENT
Start: 2019-04-12 | End: 2019-06-04 | Stop reason: SDUPTHER

## 2019-04-12 RX ORDER — SERTRALINE HYDROCHLORIDE 100 MG/1
TABLET, FILM COATED ORAL
Qty: 30 TAB | Refills: 1 | Status: SHIPPED | OUTPATIENT
Start: 2019-04-12 | End: 2019-11-06 | Stop reason: ALTCHOICE

## 2019-04-15 ENCOUNTER — HOSPITAL ENCOUNTER (EMERGENCY)
Age: 54
Discharge: HOME | End: 2019-04-15
Attending: EMERGENCY MEDICINE
Payer: MEDICAID

## 2019-04-15 ENCOUNTER — APPOINTMENT (OUTPATIENT)
Dept: GENERAL RADIOLOGY | Age: 54
End: 2019-04-15
Attending: EMERGENCY MEDICINE
Payer: MEDICAID

## 2019-04-15 VITALS
DIASTOLIC BLOOD PRESSURE: 67 MMHG | HEART RATE: 84 BPM | SYSTOLIC BLOOD PRESSURE: 119 MMHG | RESPIRATION RATE: 18 BRPM | BODY MASS INDEX: 38.33 KG/M2 | OXYGEN SATURATION: 98 % | WEIGHT: 203 LBS | TEMPERATURE: 98.2 F | HEIGHT: 61 IN

## 2019-04-15 DIAGNOSIS — S80.01XA CONTUSION OF RIGHT KNEE, INITIAL ENCOUNTER: Primary | ICD-10-CM

## 2019-04-15 PROCEDURE — 99282 EMERGENCY DEPT VISIT SF MDM: CPT

## 2019-04-15 PROCEDURE — 73562 X-RAY EXAM OF KNEE 3: CPT

## 2019-04-15 RX ORDER — TRAMADOL HYDROCHLORIDE 50 MG/1
50 TABLET ORAL
Qty: 10 TAB | Refills: 0 | Status: SHIPPED | OUTPATIENT
Start: 2019-04-15 | End: 2019-04-18

## 2019-04-15 NOTE — LETTER
[facility] EMERGENCY DEPT 
[address] [id number] [phone] Work/School Note Date: 4/15/2019 To Whom It May concern: 
 
Julianne Martinez was seen and treated today in the emergency room by the following provider(s): 
Attending Provider: Ivonne Kern MD. Julianne Martinez should use cane for walking for the next 2 weeks  
 
sincerely, Rolando Cassidy MD

## 2019-04-15 NOTE — DISCHARGE INSTRUCTIONS
Patient Education        Bruises: Care Instructions  Your Care Instructions    Bruises occur when small blood vessels under the skin tear or rupture, most often from a twist, bump, or fall. Blood leaks into tissues under the skin and causes a black-and-blue spot that often turns colors, including purplish black, reddish blue, or yellowish green, as the bruise heals. Bruises hurt, but most are not serious and will go away on their own within 2 to 4 weeks. Sometimes, gravity causes them to spread down the body. A leg bruise usually will take longer to heal than a bruise on the face or arms. Follow-up care is a key part of your treatment and safety. Be sure to make and go to all appointments, and call your doctor if you are having problems. It's also a good idea to know your test results and keep a list of the medicines you take. How can you care for yourself at home? · Take pain medicines exactly as directed. ? If the doctor gave you a prescription medicine for pain, take it as prescribed. ? If you are not taking a prescription pain medicine, ask your doctor if you can take an over-the-counter medicine. · Put ice or a cold pack on the area for 10 to 20 minutes at a time. Put a thin cloth between the ice and your skin. · If you can, prop up the bruised area on pillows as much as possible for the next few days. Try to keep the bruise above the level of your heart. When should you call for help? Call your doctor now or seek immediate medical care if:    · You have signs of infection, such as:  ? Increased pain, swelling, warmth, or redness. ? Red streaks leading from the bruise. ? Pus draining from the bruise. ? A fever.     · You have a bruise on your leg and signs of a blood clot, such as:  ? Increasing redness and swelling along with warmth, tenderness, and pain in the bruised area. ? Pain in your calf, back of the knee, thigh, or groin. ?  Redness and swelling in your leg or groin.     · Your pain gets worse.    Watch closely for changes in your health, and be sure to contact your doctor if:    · You do not get better as expected. Where can you learn more? Go to http://ofelia-mindy.info/. Enter [phone] in the search box to learn more about \"Bruises: Care Instructions. \"  Current as of: September 23, 2018  Content Version: 11.9  © 1034-5173 [employer]. Care instructions adapted under license by [employer] (which disclaims liability or warranty for this information). If you have questions about a medical condition or this instruction, always ask your healthcare professional. [address] any warranty or liability for your use of this information.

## 2019-04-15 NOTE — ED NOTES
Discharge instructions were given to the patient by Malissa Weldon RN. The patient left the Emergency Department ambulatory with assistive device, alert and oriented and in no acute distress with 1 prescription. The patient was encouraged to call or return to the ED for worsening issues or problems and was encouraged to schedule a follow up appointment for continuing care. The patient verbalized understanding of discharge instructions and prescriptions, all questions were answered. The patient has no further concerns at this time.

## 2019-04-15 NOTE — ED NOTES
Pt presents to ED complaining of right knee pain x 2 days. Pt reports recent knee replacement last month. Pt reports her grandchildren accidentally bumped her knee 2 days ago. Pulses and perfusion intact in affected extremity. Pt is alert and oriented x 4, RR even and unlabored, skin is warm and dry. Assessment completed and pt updated on plan of care. Emergency Department Nursing Plan of Care       The Nursing Plan of Care is developed from the Nursing assessment and Emergency Department Attending provider initial evaluation. The plan of care may be reviewed in the ED Provider note.     The Plan of Care was developed with the following considerations:   Patient / Family readiness to learn indicated by:verbalized understanding  Persons(s) to be included in education: patient  Barriers to Learning/Limitations:No    Signed     Evangelist Bain    4/15/2019   1:02 AM

## 2019-04-15 NOTE — ED TRIAGE NOTES
Pt reports getting a right knee replacement last month.  Reports two days ago, grandchildren were playing and accidentally hit RLE

## 2019-04-15 NOTE — ED PROVIDER NOTES
EMERGENCY DEPARTMENT HISTORY AND PHYSICAL EXAM      Date: 4/15/2019  Patient Name: Guero Ellsworth    History of Presenting Illness     Chief Complaint   Patient presents with    Leg Pain       History Provided By: Patient    HPI: Guero Ellsworth, 52 y.o. female with PMHx significant for right knee replacement 1 month ago, presents by private vehicle to the ED with cc of right knee pain. This is a 40-year-old female who was watching her children and grandchildren wrestle about on the floor when they tumbled into her and kicked her just below her right knee. This patient had a right knee replacement done 1 month ago and is concerned there may be disruption to the surgical site. She has no laceration or bleeding injury. There are no other complaints, changes, or physical findings at this time. PCP: Negar Shelley NP    Current Outpatient Medications   Medication Sig Dispense Refill    traMADol (ULTRAM) 50 mg tablet Take 1 Tab by mouth every six (6) hours as needed for Pain for up to 3 days. Max Daily Amount: 200 mg. Indications: Pain 10 Tab 0    sertraline (ZOLOFT) 100 mg tablet Take 1/2 tablet daily for 10 days and then take 1 tablet daily 30 Tab 1    hydrOXYzine HCl (ATARAX) 25 mg tablet Take 1 Tab by mouth three (3) times daily as needed for Anxiety. 60 Tab 1    rivaroxaban (XARELTO) 10 mg tablet Take 1 Tab by mouth daily (with lunch). Indications: Deep Vein Thrombosis Prevention in Knee Replacement 14 Tab 0    [START ON 4/18/2019] oxyCODONE-acetaminophen (PERCOCET 10)  mg per tablet Take 1 Tab by mouth daily as needed for Pain for up to 30 days. May take 1 tab ONCE DAILY as needed for pain. 30 Tab 0    PROAIR HFA 90 mcg/actuation inhaler TAKE 2 PUFFS BY INHALATION EVERY FOUR (4) HOURS AS NEEDED. 8.5 Inhaler 0    budesonide-formoterol (SYMBICORT) 160-4.5 mcg/actuation HFAA Take 2 Puffs by inhalation two (2) times a day.  3 Inhaler 3    lidocaine (LIDODERM) 5 % Apply patch to the affected area for 12 hours a day and remove for 12 hours a day. 30 Each 11    methocarbamol (ROBAXIN) 750 mg tablet TAKE 1 TAB BY MOUTH FOUR (4) TIMES DAILY AS NEEDED FOR PAIN (SPASMS). 60 Tab 1    ondansetron (ZOFRAN ODT) 4 mg disintegrating tablet DISSOLVE 1 TABLET BY MOUTH EVERY 8 HOURS AS NEEDED FOR NAUSEA 20 Tab 1    acetaminophen (TYLENOL) 650 mg TbER TAKE 1 TAB BY MOUTH THREE (3) TIMES DAILY AS NEEDED FOR PAIN. 90 Tab 0    pantoprazole (PROTONIX) 40 mg tablet Take 1 Tab by mouth daily. Take in 2 week intervals for GERD Flares 30 Tab 1    metoprolol succinate (TOPROL-XL) 25 mg XL tablet TAKE 1 TABLET BY MOUTH DAILY. 90 Tab 3    gabapentin (NEURONTIN) 800 mg tablet TAKE 1 TAB BY MOUTH THREE (3) TIMES DAILY. 90 Tab 5    meloxicam (MOBIC) 15 mg tablet Take 1 Tab by mouth daily (with breakfast). 30 Tab 11    amLODIPine (NORVASC) 10 mg tablet TAKE 1 TABLET BY MOUTH EVERY DAY FOR HYPERTENSION 90 Tab 3    hydroCHLOROthiazide (HYDRODIURIL) 25 mg tablet TAKE 1 TAB BY MOUTH DAILY FOR HYPERTENSION 30 Tab 6    levothyroxine (SYNTHROID) 125 mcg tablet TAKE 1 TABLET BY MOUTH EVERY DAY BEFORE BREAKFAST 90 Tab 1    naloxone (NARCAN) 4 mg/actuation nasal spray Use 1 spray into 1 nostril for OVERDOSE. Call 911. For subsequent doses, give in alternating nostrils. May repeat every 2 to 3 min. 2 Each 0    traZODone (DESYREL) 50 mg tablet Take 1-2 tabs every night for sleep. 60 Tab 11    montelukast (SINGULAIR) 10 mg tablet Take 1 Tab by mouth daily. 30 Tab 6    albuterol (PROVENTIL VENTOLIN) 2.5 mg /3 mL (0.083 %) nebulizer solution 3 mL by Nebulization route every four (4) hours as needed for Wheezing. 24 Each 2    diclofenac (VOLTAREN) 1 % gel Apply 2 g to affected area every six (6) hours. 100 g 5    fluticasone (FLONASE) 50 mcg/actuation nasal spray 2 Sprays by Both Nostrils route daily.  1 Bottle 11    miscellaneous medical supply misc Shower seat for chronic knee pain, pt planning to have right TKR in June due to condition. Very limited range of motion. 1 Each 0    loratadine (CLARITIN) 10 mg tablet Take 1 Tab by mouth daily. 27 Tab 5       Past History     Past Medical History:  Past Medical History:   Diagnosis Date    Arthritis     Asthma     uses inhalers    Chronic obstructive pulmonary disease (HCC)     bronchitis    Chronic pain     GERD (gastroesophageal reflux disease)     Hypertension     Ill-defined condition     environmental allergies     Ill-defined condition     Multiple body piercings; unable to remove tongue and lip piercings    Ill-defined condition 2018    GASTRITIS PER ENDOSCOPY    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    Psychiatric disorder     DEPRESSION    Thyroid disease     hypo    Ventral hernia 12/31/2013       Past Surgical History:  Past Surgical History:   Procedure Laterality Date    HX HEENT  2009    thyroidectomy    HX KNEE REPLACEMENT      R    HX MOHS PROCEDURES Right 3/8/11    HX OTHER SURGICAL      hiatal hernia repair    HX TUBAL LIGATION         Family History:  Family History   Problem Relation Age of Onset    Cancer Father         lung cancer    Heart Disease Mother     Hypertension Mother     Diabetes Mother     Anesth Problems Neg Hx        Social History:  Social History     Tobacco Use    Smoking status: Current Every Day Smoker     Packs/day: 0.25     Years: 1.50     Pack years: 0.37     Types: Cigarettes     Last attempt to quit: 10/1/2015     Years since quitting: 3.5    Smokeless tobacco: Never Used    Tobacco comment: patient quit smoking for 10 years, began smoking again and then quit again in 2015   Substance Use Topics    Alcohol use: No    Drug use: No       Allergies: Allergies   Allergen Reactions    Hydrocodone Itching    Mold Shortness of Breath and Itching    Ibuprofen Nausea Only         Review of Systems   Review of Systems   Constitutional: Negative for chills and fever.    HENT: Negative for congestion, rhinorrhea, sneezing and sore throat. Respiratory: Negative for shortness of breath. Cardiovascular: Negative for chest pain. Gastrointestinal: Negative for abdominal pain, nausea and vomiting. Musculoskeletal: Negative for back pain, myalgias and neck stiffness. Skin: Negative for rash. Neurological: Negative for dizziness, weakness and headaches. All other systems reviewed and are negative. Physical Exam   Physical Exam   Constitutional: She is oriented to person, place, and time. She appears well-developed and well-nourished. HENT:   Head: Normocephalic and atraumatic. Mouth/Throat: Oropharynx is clear and moist.   Eyes: Conjunctivae and EOM are normal.   Neck: Normal range of motion and full passive range of motion without pain. Neck supple. Cardiovascular: Normal rate, regular rhythm, S1 normal, S2 normal, normal heart sounds, intact distal pulses and normal pulses. No murmur heard. Pulmonary/Chest: Effort normal and breath sounds normal. No respiratory distress. She has no wheezes. Abdominal: Soft. Normal appearance and bowel sounds are normal. She exhibits no distension. There is no tenderness. There is no rebound. Musculoskeletal: Normal range of motion. Legs:  Neurological: She is alert and oriented to person, place, and time. She has normal strength. Skin: Skin is warm, dry and intact. No rash noted. Psychiatric: She has a normal mood and affect. Her speech is normal and behavior is normal. Judgment and thought content normal.   Nursing note and vitals reviewed. Diagnostic Study Results     Labs -   No results found for this or any previous visit (from the past 12 hour(s)). Radiologic Studies -   XR KNEE RT 3 V   Final Result   IMPRESSION: No acute bony abnormality. Moderate joint effusion and diffuse soft   tissue swelling. Satisfactory prosthesis alignment.             CT Results  (Last 48 hours)    None        CXR Results  (Last 48 hours)    None            Medical Decision Making I am the first provider for this patient. I reviewed the vital signs, available nursing notes, past medical history, past surgical history, family history and social history. Vital Signs-Reviewed the patient's vital signs. Patient Vitals for the past 12 hrs:   Temp Pulse Resp BP SpO2   04/15/19 0021 98.2 °F (36.8 °C) 84 18 119/67 98 %         Records Reviewed: Nursing Notes    Provider Notes (Medical Decision Making):   Disruption of the right knee replacement versus tibial fracture. ED Course:   Initial assessment performed. The patients presenting problems have been discussed, and they are in agreement with the care plan formulated and outlined with them. I have encouraged them to ask questions as they arise throughout their visit. X-rays were negative of the right knee and show no fracture of the proximal tibia. Patient is happy to hear this news and is deemed suitable for discharge to home. She declines crutches. Disposition:  Patient informed of results of workup and is comfortable with discharge to home to follow up with PCP. They are instructed to return as needed for worsening condition. PLAN:  1. Discharge Medication List as of 4/15/2019  1:06 AM      START taking these medications    Details   traMADol (ULTRAM) 50 mg tablet Take 1 Tab by mouth every six (6) hours as needed for Pain for up to 3 days. Max Daily Amount: 200 mg. Indications: Pain, Print, Disp-10 Tab, R-0         CONTINUE these medications which have NOT CHANGED    Details   sertraline (ZOLOFT) 100 mg tablet Take 1/2 tablet daily for 10 days and then take 1 tablet daily, Normal, Disp-30 Tab, R-1      hydrOXYzine HCl (ATARAX) 25 mg tablet Take 1 Tab by mouth three (3) times daily as needed for Anxiety., Normal, Disp-60 Tab, R-1      rivaroxaban (XARELTO) 10 mg tablet Take 1 Tab by mouth daily (with lunch).  Indications: Deep Vein Thrombosis Prevention in Knee Replacement, Print, Disp-14 Tab, R-0 oxyCODONE-acetaminophen (PERCOCET 10)  mg per tablet Take 1 Tab by mouth daily as needed for Pain for up to 30 days. May take 1 tab ONCE DAILY as needed for pain. , Print, Disp-30 Tab, R-0      PROAIR HFA 90 mcg/actuation inhaler TAKE 2 PUFFS BY INHALATION EVERY FOUR (4) HOURS AS NEEDED., Normal, Disp-8.5 Inhaler, R-0      budesonide-formoterol (SYMBICORT) 160-4.5 mcg/actuation HFAA Take 2 Puffs by inhalation two (2) times a day., Normal, Disp-3 Inhaler, R-3      lidocaine (LIDODERM) 5 % Apply patch to the affected area for 12 hours a day and remove for 12 hours a day., Normal, Disp-30 Each, R-11      methocarbamol (ROBAXIN) 750 mg tablet TAKE 1 TAB BY MOUTH FOUR (4) TIMES DAILY AS NEEDED FOR PAIN (SPASMS). , Normal, Disp-60 Tab, R-1      ondansetron (ZOFRAN ODT) 4 mg disintegrating tablet DISSOLVE 1 TABLET BY MOUTH EVERY 8 HOURS AS NEEDED FOR NAUSEA, Normal, Disp-20 Tab, R-1      acetaminophen (TYLENOL) 650 mg TbER TAKE 1 TAB BY MOUTH THREE (3) TIMES DAILY AS NEEDED FOR PAIN., Normal, Disp-90 Tab, R-0      pantoprazole (PROTONIX) 40 mg tablet Take 1 Tab by mouth daily. Take in 2 week intervals for GERD Flares, Normal, Disp-30 Tab, R-1      metoprolol succinate (TOPROL-XL) 25 mg XL tablet TAKE 1 TABLET BY MOUTH DAILY., Normal, Disp-90 Tab, R-3      gabapentin (NEURONTIN) 800 mg tablet TAKE 1 TAB BY MOUTH THREE (3) TIMES DAILY., Normal, Disp-90 Tab, R-5      meloxicam (MOBIC) 15 mg tablet Take 1 Tab by mouth daily (with breakfast). , Normal, Disp-30 Tab, R-11      amLODIPine (NORVASC) 10 mg tablet TAKE 1 TABLET BY MOUTH EVERY DAY FOR HYPERTENSION, Normal, Disp-90 Tab, R-3      hydroCHLOROthiazide (HYDRODIURIL) 25 mg tablet TAKE 1 TAB BY MOUTH DAILY FOR HYPERTENSION, Normal, Disp-30 Tab, R-6      levothyroxine (SYNTHROID) 125 mcg tablet TAKE 1 TABLET BY MOUTH EVERY DAY BEFORE BREAKFAST, Normal, Disp-90 Tab, R-1      naloxone (NARCAN) 4 mg/actuation nasal spray Use 1 spray into 1 nostril for OVERDOSE. Call 911. For subsequent doses, give in alternating nostrils. May repeat every 2 to 3 min., Normal, Disp-2 Each, R-0      traZODone (DESYREL) 50 mg tablet Take 1-2 tabs every night for sleep., Normal, Disp-60 Tab, R-11      montelukast (SINGULAIR) 10 mg tablet Take 1 Tab by mouth daily. , Normal, Disp-30 Tab, R-6      albuterol (PROVENTIL VENTOLIN) 2.5 mg /3 mL (0.083 %) nebulizer solution 3 mL by Nebulization route every four (4) hours as needed for Wheezing., Normal, Disp-24 Each, R-2      diclofenac (VOLTAREN) 1 % gel Apply 2 g to affected area every six (6) hours. , Normal, Disp-100 g, R-5      fluticasone (FLONASE) 50 mcg/actuation nasal spray 2 Sprays by Both Nostrils route daily. , Normal, Disp-1 Bottle, R-11      miscellaneous medical supply misc Shower seat for chronic knee pain, pt planning to have right TKR in June due to condition. Very limited range of motion. , Print, Disp-1 Each, R-0      loratadine (CLARITIN) 10 mg tablet Take 1 Tab by mouth daily. , Normal, Disp-30 Tab, R-5           2. Follow-up Information     Follow up With Specialties Details Why Contact Info    Merlene Stuart NP Nurse Practitioner Call  [address] [address] [phone]      [facility] EMERGENCY DEPT Emergency Medicine  As needed, If symptoms worsen [address]        Return to ED if worse     Diagnosis     Clinical Impression:   1.  Contusion of right knee, initial encounter

## 2019-04-24 ENCOUNTER — HOSPITAL ENCOUNTER (OUTPATIENT)
Dept: DIABETES SERVICES | Age: 54
Discharge: HOME | End: 2019-04-24
Attending: PSYCHIATRY & NEUROLOGY
Payer: MEDICAID

## 2019-04-24 DIAGNOSIS — E66.9 OBESITY, UNSPECIFIED CLASSIFICATION, UNSPECIFIED OBESITY TYPE, UNSPECIFIED WHETHER SERIOUS COMORBIDITY PRESENT: ICD-10-CM

## 2019-04-24 PROCEDURE — 97802 MEDICAL NUTRITION INDIV IN: CPT | Performed by: DIETITIAN, REGISTERED

## 2019-04-24 NOTE — DIABETES MGMT
Diabetes Treatment  Center - Nutrition Evaluation       DATE: 2019      REFERRING PHYSICIAN: Kristina CROWLEY    NAME: Dorina Kanner : 1965 AGE: 52 y.o. GENDER: female  REASON FOR VISIT: weight loss     ASSESSMENT:  Per patient- chronic pain, asthma, COPD, htn, thyroid disorder, arthritis.     Pt shared that she has not been on track for the past month as she has undergone knee replacement surgery (revision) from 3/26-. She shared that she has been struggling with her meal planning and stomach. She shared that she has been experiencing increased diarrhea, abdominal issues and pain. Encouraged her to talk w/ her PCP if this continues. Pt did not breing her barriers to achieving weight loss nor her food journal/records for appointment. LABS:   Lab Results   Component Value Date/Time    Hemoglobin A1c 5.8 2019 10:02 AM     Lab Results   Component Value Date/Time    Creatinine 0.96 2019 03:42 AM     CrCl cannot be calculated (Unknown ideal weight.). Lab Results   Component Value Date/Time    Cholesterol, total 179 2018 01:04 PM    HDL Cholesterol 51 2018 01:04 PM    LDL, calculated 108 (H) 2018 01:04 PM    Triglyceride 100 2018 01:04 PM       MEDICATIONS/SUPPLEMENTS:  Pt did not bring an updated list with her today. ANTHROPOMETRICS:    Ht Readings from Last 1 Encounters:   04/15/19 5' 1\" (1.549 m)      Wt Readings from Last 1 Encounters:   04/15/19 92.1 kg (203 lb)                IBW:105 # +/- 10%  BMI:  38.46 kg/m2    Reported Diet Hx:  Pt shared that she has been eating at most 12/21 meals per week due to the above noted symptoms. Provided suggestions for a bland diet with minimum fiber for now and instructed to start Culturelle/probiotic daily along w/ yogurt daily to assist w/ tolerance for food. Pt shared that since her surgery she has not been able to eat nor has she been keeping her food logs.     Exercise/Activity:  Pt is currently limited with her exercise post op. NUTRITION DIAGNOSIS:Overweight likely, in part, due to numerous sodas daily, as well as large quantities of high caloric foods. NUTRITION INTERVENTION:  1) continue to avoid sweet beverages. 2) consider using [employer] Instant Breakfast for meal replacement due to poor appetite and po tolerance. 3) increase consistency to include bland and soft diet and eventually increase fiber/texture consistency as tolerance improves. PATIENT GOALS:  1) immediate goal is to be able to return to 3 meals daily using plate method for portion control. 2) revisit goals for weight loss from her 2/1/19 visit as follows:    - work to limit excessive high kcal snacking by snacking on low calorie foods such as cabbage, broccoli (as pt was willing to snack more on these). - begin using nutrition labels to document calories from food consumed as well as intake to help increase awareness on excessive intake     Specific tips and techniques to facilitate compliance with above recommendations were provided and discussed. Pt was strongly encourage to begin making necessary changes now and follow as scheduled. Pt is due to see RD 5/17/19 for follow up and I will reach out to pt by phone next week for assessing tolerance to above suggestions. If you have any questions please feel free to contact me at [phone].        Judson Dong, RD, CDE

## 2019-05-01 ENCOUNTER — DOCUMENTATION ONLY (OUTPATIENT)
Dept: BEHAVIORAL/MENTAL HEALTH CLINIC | Age: 54
End: 2019-05-01

## 2019-05-09 ENCOUNTER — HOSPITAL ENCOUNTER (EMERGENCY)
Age: 54
Discharge: HOME | End: 2019-05-09
Attending: EMERGENCY MEDICINE
Payer: MEDICAID

## 2019-05-09 VITALS
SYSTOLIC BLOOD PRESSURE: 121 MMHG | OXYGEN SATURATION: 100 % | HEART RATE: 71 BPM | HEIGHT: 61 IN | BODY MASS INDEX: 38.33 KG/M2 | RESPIRATION RATE: 16 BRPM | DIASTOLIC BLOOD PRESSURE: 69 MMHG | WEIGHT: 203 LBS | TEMPERATURE: 97.5 F
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
## 2019-05-09 DIAGNOSIS — S46.912A STRAIN OF LEFT SHOULDER, INITIAL ENCOUNTER: ICD-10-CM

## 2019-05-09 DIAGNOSIS — S16.1XXA STRAIN OF NECK MUSCLE, INITIAL ENCOUNTER: ICD-10-CM

## 2019-05-09 PROCEDURE — 99283 EMERGENCY DEPT VISIT LOW MDM: CPT

## 2019-05-09 PROCEDURE — 74011250637 HC RX REV CODE- 250/637: Performed by: PHYSICIAN ASSISTANT

## 2019-05-09 RX ORDER — METHOCARBAMOL 500 MG/1
500 TABLET, FILM COATED ORAL
Qty: 15 TAB | Refills: 0 | Status: SHIPPED | OUTPATIENT
Start: 2019-05-09 | End: 2019-05-14 | Stop reason: SDUPTHER

## 2019-05-09 RX ORDER — TRAMADOL HYDROCHLORIDE 50 MG/1
50 TABLET ORAL
Qty: 10 TAB | Refills: 0 | Status: SHIPPED | OUTPATIENT
Start: 2019-05-09 | End: 2019-05-12

## 2019-05-09 RX ORDER — METHOCARBAMOL 500 MG/1
500 TABLET, FILM COATED ORAL
Status: COMPLETED | OUTPATIENT
Start: 2019-05-09 | End: 2019-05-09

## 2019-05-09 RX ADMIN — METHOCARBAMOL TABLETS 500 MG: 500 TABLET, COATED ORAL at 20:48
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
## 2019-05-12 ENCOUNTER — APPOINTMENT (OUTPATIENT)
Dept: GENERAL RADIOLOGY | Age: 54
End: 2019-05-12
Attending: PHYSICIAN ASSISTANT
Payer: MEDICAID

## 2019-05-12 ENCOUNTER — HOSPITAL ENCOUNTER (EMERGENCY)
Age: 54
Discharge: HOME | End: 2019-05-12
Attending: EMERGENCY MEDICINE
Payer: MEDICAID

## 2019-05-12 VITALS
OXYGEN SATURATION: 100 % | RESPIRATION RATE: 14 BRPM | HEART RATE: 73 BPM | DIASTOLIC BLOOD PRESSURE: 84 MMHG | BODY MASS INDEX: 38.33 KG/M2 | TEMPERATURE: 98.1 F | SYSTOLIC BLOOD PRESSURE: 118 MMHG | HEIGHT: 61 IN | WEIGHT: 203 LBS

## 2019-05-12 DIAGNOSIS — S16.1XXA STRAIN OF NECK MUSCLE, INITIAL ENCOUNTER: Primary | ICD-10-CM

## 2019-05-12 DIAGNOSIS — M54.2 CERVICALGIA: ICD-10-CM

## 2019-05-12 PROCEDURE — 72050 X-RAY EXAM NECK SPINE 4/5VWS: CPT

## 2019-05-12 PROCEDURE — 99282 EMERGENCY DEPT VISIT SF MDM: CPT

## 2019-05-12 RX ORDER — CYCLOBENZAPRINE HCL 10 MG
10 TABLET ORAL
Qty: 15 TAB | Refills: 0 | Status: SHIPPED | OUTPATIENT
Start: 2019-05-12 | End: 2019-05-14 | Stop reason: ALTCHOICE

## 2019-05-12 RX ORDER — LIDOCAINE 4 G/100G
PATCH TOPICAL
Qty: 20 PATCH | Refills: 0 | Status: ON HOLD | OUTPATIENT
Start: 2019-05-12 | End: 2021-01-22

## 2019-05-12 NOTE — DISCHARGE INSTRUCTIONS
Patient Education        Neck: Exercises  Your Care Instructions  Here are some examples of typical rehabilitation exercises for your condition. Start each exercise slowly. Ease off the exercise if you start to have pain. Your doctor or physical therapist will tell you when you can start these exercises and which ones will work best for you. How to do the exercises  Neck stretch    1. This stretch works best if you keep your shoulder down as you lean away from it. To help you remember to do this, start by relaxing your shoulders and lightly holding on to your thighs or your chair. 2. Tilt your head toward your shoulder and hold for 15 to 30 seconds. Let the weight of your head stretch your muscles. 3. If you would like a little added stretch, use your hand to gently and steadily pull your head toward your shoulder. For example, keeping your right shoulder down, lean your head to the left. 4. Repeat 2 to 4 times toward each shoulder. Diagonal neck stretch    1. Turn your head slightly toward the direction you will be stretching, and tilt your head diagonally toward your chest and hold for 15 to 30 seconds. 2. If you would like a little added stretch, use your hand to gently and steadily pull your head forward on the diagonal.  3. Repeat 2 to 4 times toward each side. Dorsal glide stretch    1. Sit or stand tall and look straight ahead. 2. Slowly tuck your chin as you glide your head backward over your body  3. Hold for a count of 6, and then relax for up to 10 seconds. 4. Repeat 8 to 12 times. Chest and shoulder stretch    1. Sit or stand tall and glide your head backward as in the dorsal glide stretch. 2. Raise both arms so that your hands are next to your ears. 3. Take a deep breath, and as you breathe out, lower your elbows down and behind your back.  You will feel your shoulder blades slide down and together, and at the same time you will feel a stretch across your chest and the front of your shoulders. 4. Hold for about 6 seconds, and then relax for up to 10 seconds. 5. Repeat 8 to 12 times. Strengthening: Hands on head    1. Move your head backward, forward, and side to side against gentle pressure from your hands, holding each position for about 6 seconds. 2. Repeat 8 to 12 times. Follow-up care is a key part of your treatment and safety. Be sure to make and go to all appointments, and call your doctor if you are having problems. It's also a good idea to know your test results and keep a list of the medicines you take. Where can you learn more? Go to http://ofelia-mindy.info/. Enter P975 in the search box to learn more about \"Neck: Exercises. \"  Current as of: September 20, 2018  Content Version: 11.9  © 2927-5691 [employer]. Care instructions adapted under license by [employer] (which disclaims liability or warranty for this information). If you have questions about a medical condition or this instruction, always ask your healthcare professional. [place] [address] any warranty or liability for your use of this information. Patient Education        Neck Strain in Children: Care Instructions  Your Care Instructions    Your child has strained the muscles and ligaments in his or her neck. A sudden, awkward movement can strain the neck. This often occurs with falls or car accidents or during certain sports. Everyday activities like using a computer or sleeping can also cause neck strain if they force the neck to be in an awkward position for a long time. It is common for neck pain to get worse for a day or two after an injury, but it should start to feel better after that. Your child may have more pain and stiffness for several days before it gets better. This is expected. It may take a few weeks or longer for it to heal completely.  Good home treatment can help your child get better faster and avoid future neck problems. Follow-up care is a key part of your child's treatment and safety. Be sure to make and go to all appointments, and call your doctor if your child is having problems. It's also a good idea to know your child's test results and keep a list of the medicines your child takes. How can you care for your child at home? · If your child was given a neck brace (cervical collar) to limit neck motion, make sure your child wears it as instructed for as many days as your doctor says to. Do not have your child wear it longer than you were told to. Wearing a brace for too long can make neck stiffness worse and weaken the neck muscles. · You can try using heat or ice to see if it helps. ? Try using a hot water bottle for 15 to 20 minutes every 2 to 3 hours. Keep a cloth between the hot water bottle and your child's skin. Try a warm shower in place of one session with the hot water bottle. ? You can also try an ice pack on your child's neck for 10 to 15 minutes every 2 to 3 hours. · Give pain medicines exactly as directed. ? If the doctor gave your child a prescription medicine for pain, give it as prescribed. ? If your child is not taking a prescription pain medicine, ask your doctor if your child can take an over-the-counter medicine. · Gently rub the area to relieve pain and help with blood flow. Do not massage the area if your child says that it hurts to do so. · Help your child to not do anything that makes the pain worse. Have him or her take it easy for a couple of days. Your child can do usual activities if they do not hurt his or her neck or put it at risk for more stress or injury. · Have your child try sleeping on a special neck pillow. Place it under the neck, not under the head. Placing a tightly rolled towel under your child's neck while he or she sleeps will also work. If your child uses a neck pillow or rolled towel, do not let him or her use another pillow at the same time.   · To prevent future neck pain, have your child do exercises to stretch and strengthen the neck and back. Teach your child to use a good posture, safe lifting techniques, and proper body mechanics. When should you call for help? Call 911 anytime you think your child may need emergency care. For example, call if:    · Your child is unable to move an arm or a leg at all.   [facility] your doctor now or seek immediate medical care if:    · Your child has new or worse symptoms in his or her arms, legs, chest, belly, or buttocks. Symptoms may include:  ? Numbness or tingling. ? Weakness. ? Pain.     · Your child loses bladder or bowel control.    Watch closely for changes in your child's health, and be sure to contact your doctor if:    · Your child is not getting better as expected. Where can you learn more? Go to http://ofelia-mindy.info/. Enter 21 [id number] in the search box to learn more about \"Neck Strain in Children: Care Instructions. \"  Current as of: September 20, 2018  Content Version: 11.9  © 5812-0648 [employer]. Care instructions adapted under license by [employer] (which disclaims liability or warranty for this information). If you have questions about a medical condition or this instruction, always ask your healthcare professional. [address] any warranty or liability for your use of this information.
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
## 2019-05-14 ENCOUNTER — OFFICE VISIT (OUTPATIENT)
Dept: INTERNAL MEDICINE CLINIC | Age: 54
End: 2019-05-14

## 2019-05-14 VITALS
BODY MASS INDEX: 38.71 KG/M2 | HEART RATE: 67 BPM | WEIGHT: 205 LBS | SYSTOLIC BLOOD PRESSURE: 124 MMHG | DIASTOLIC BLOOD PRESSURE: 75 MMHG | RESPIRATION RATE: 17 BRPM | HEIGHT: 61 IN | TEMPERATURE: 97.1 F | OXYGEN SATURATION: 99 %

## 2019-05-14 DIAGNOSIS — Z79.891 CHRONIC USE OF OPIATE FOR THERAPEUTIC PURPOSE: ICD-10-CM

## 2019-05-14 DIAGNOSIS — M19.012 PRIMARY OSTEOARTHRITIS OF LEFT SHOULDER: ICD-10-CM

## 2019-05-14 DIAGNOSIS — M17.11 PRIMARY OSTEOARTHRITIS OF RIGHT KNEE: Primary | ICD-10-CM

## 2019-05-14 DIAGNOSIS — J41.8 MIXED SIMPLE AND MUCOPURULENT CHRONIC BRONCHITIS (HCC): ICD-10-CM

## 2019-05-14 DIAGNOSIS — Z96.651 STATUS POST TOTAL RIGHT KNEE REPLACEMENT: ICD-10-CM

## 2019-05-14 DIAGNOSIS — E66.01 SEVERE OBESITY (BMI 35.0-39.9) WITH COMORBIDITY (HCC): ICD-10-CM

## 2019-05-14 PROBLEM — F32.0 MILD SINGLE CURRENT EPISODE OF MAJOR DEPRESSIVE DISORDER (HCC): Status: RESOLVED | Noted: 2017-05-08 | Resolved: 2019-05-14

## 2019-05-14 RX ORDER — OXYCODONE AND ACETAMINOPHEN 10; 325 MG/1; MG/1
TABLET ORAL
Refills: 0 | COMMUNITY
Start: 2019-03-19 | End: 2019-05-14 | Stop reason: ALTCHOICE

## 2019-05-14 RX ORDER — METHOCARBAMOL 500 MG/1
500 TABLET, FILM COATED ORAL
Qty: 60 TAB | Refills: 2 | Status: SHIPPED | OUTPATIENT
Start: 2019-05-14 | End: 2019-07-09 | Stop reason: SDUPTHER

## 2019-05-14 RX ORDER — IPRATROPIUM BROMIDE AND ALBUTEROL 20; 100 UG/1; UG/1
SPRAY, METERED RESPIRATORY (INHALATION)
Refills: 0 | COMMUNITY
Start: 2019-03-18 | End: 2020-03-04 | Stop reason: SDUPTHER

## 2019-05-14 RX ORDER — HYDROMORPHONE HYDROCHLORIDE 4 MG/1
4 TABLET ORAL
COMMUNITY
Start: 2019-04-26 | End: 2019-05-14 | Stop reason: ALTCHOICE

## 2019-05-14 RX ORDER — OXYCODONE AND ACETAMINOPHEN 10; 325 MG/1; MG/1
TABLET ORAL
Refills: 0 | Status: CANCELLED | OUTPATIENT
Start: 2019-05-14

## 2019-05-14 RX ORDER — OXYCODONE AND ACETAMINOPHEN 10; 325 MG/1; MG/1
1 TABLET ORAL
Qty: 60 TAB | Refills: 0 | Status: SHIPPED | OUTPATIENT
Start: 2019-05-14 | End: 2019-06-11 | Stop reason: SDUPTHER
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
## 2019-05-14 NOTE — PATIENT INSTRUCTIONS
Knee Arthritis: Exercises  Your Care Instructions  Here are some examples of exercises for knee arthritis. Start each exercise slowly. Ease off the exercise if you start to have pain. Your doctor or physical therapist will tell you when you can start these exercises and which ones will work best for you. How to do the exercises  Knee flexion with heel slide    1. Lie on your back with your knees bent. 2. Slide your heel back by bending your affected knee as far as you can. Then hook your other foot around your ankle to help pull your heel even farther back. 3. Hold for about 6 seconds, then rest for up to 10 seconds. 4. Repeat 8 to 12 times. 5. Switch legs and repeat steps 1 through 4, even if only one knee is sore. Quad sets    1. Sit with your affected leg straight and supported on the floor or a firm bed. Place a small, rolled-up towel under your knee. Your other leg should be bent, with that foot flat on the floor. 2. Tighten the thigh muscles of your affected leg by pressing the back of your knee down into the towel. 3. Hold for about 6 seconds, then rest for up to 10 seconds. 4. Repeat 8 to 12 times. 5. Switch legs and repeat steps 1 through 4, even if only one knee is sore. Straight-leg raises to the front    1. Lie on your back with your good knee bent so that your foot rests flat on the floor. Your affected leg should be straight. Make sure that your low back has a normal curve. You should be able to slip your hand in between the floor and the small of your back, with your palm touching the floor and your back touching the back of your hand. 2. Tighten the thigh muscles in your affected leg by pressing the back of your knee flat down to the floor. Hold your knee straight. 3. Keeping the thigh muscles tight and your leg straight, lift your affected leg up so that your heel is about 12 inches off the floor. Hold for about 6 seconds, then lower slowly.   4. Relax for up to 10 seconds between repetitions. 5. Repeat 8 to 12 times. 6. Switch legs and repeat steps 1 through 5, even if only one knee is sore. Active knee flexion    1. Lie on your stomach with your knees straight. If your kneecap is uncomfortable, roll up a washcloth and put it under your leg just above your kneecap. 2. Lift the foot of your affected leg by bending the knee so that you bring the foot up toward your buttock. If this motion hurts, try it without bending your knee quite as far. This may help you avoid any painful motion. 3. Slowly move your leg up and down. 4. Repeat 8 to 12 times. 5. Switch legs and repeat steps 1 through 4, even if only one knee is sore. Quadriceps stretch (facedown)    1. Lie flat on your stomach, and rest your face on the floor. 2. Wrap a towel or belt strap around the lower part of your affected leg. Then use the towel or belt strap to slowly pull your heel toward your buttock until you feel a stretch. 3. Hold for about 15 to 30 seconds, then relax your leg against the towel or belt strap. 4. Repeat 2 to 4 times. 5. Switch legs and repeat steps 1 through 4, even if only one knee is sore. Stationary exercise bike    1. If you do not have a stationary exercise bike at home, you can find one to ride at your local health club or community center. 2. Adjust the height of the bike seat so that your knee is slightly bent when your leg is extended downward. If your knee hurts when the pedal reaches the top, you can raise the seat so that your knee does not bend as much. 3. Start slowly. At first, try to do 5 to 10 minutes of cycling with little to no resistance. Then increase your time and the resistance bit by bit until you can do 20 to 30 minutes without pain. 4. If you start to have pain, rest your knee until your pain gets back to the level that is normal for you. Or cycle for less time or with less effort. Follow-up care is a key part of your treatment and safety.  Be sure to make and go to all appointments, and call your doctor if you are having problems. It's also a good idea to know your test results and keep a list of the medicines you take. Where can you learn more? Go to http://ofelia-mindy.info/. Enter C159 in the search box to learn more about \"Knee Arthritis: Exercises. \"  Current as of: September 20, 2018  Content Version: 11.9  © 5329-0039 [employer]. Care instructions adapted under license by [employer] (which disclaims liability or warranty for this information). If you have questions about a medical condition or this instruction, always ask your healthcare professional. [address] any warranty or liability for your use of this information.
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
## 2019-05-17 ENCOUNTER — HOSPITAL ENCOUNTER (OUTPATIENT)
Dept: DIABETES SERVICES | Age: 54
Discharge: HOME | End: 2019-05-17
Payer: MEDICAID

## 2019-05-17 DIAGNOSIS — E66.9 OBESITY, UNSPECIFIED CLASSIFICATION, UNSPECIFIED OBESITY TYPE, UNSPECIFIED WHETHER SERIOUS COMORBIDITY PRESENT: ICD-10-CM

## 2019-05-17 PROCEDURE — 97802 MEDICAL NUTRITION INDIV IN: CPT | Performed by: DIETITIAN, REGISTERED

## 2019-05-17 NOTE — DIABETES MGMT
Diabetes Treatment  Center - Nutrition Progress Note        DATE: 2019      REFERRING PHYSICIAN: Wes Harada A    NAME: Ngoc Clements : 1965 AGE: 52 y.o. GENDER: female  REASON FOR VISIT: weight loss     ASSESSMENT:  Per patient- chronic pain, asthma, COPD, htn, thyroid disorder, arthritis.     Pt see for f/u today for weight loss management. Pt shared that she has continued to [place] a lot going on\". Pt did not complete behavior or barrier checklist at home. Reports that she feels like her clothes are looser on her body despite small decrease in weight. LABS:   Lab Results   Component Value Date/Time    Hemoglobin A1c 5.8 2019 10:02 AM     Lab Results   Component Value Date/Time    Creatinine 0.96 2019 03:42 AM     CrCl cannot be calculated (Unknown ideal weight.). Lab Results   Component Value Date/Time    Cholesterol, total 179 2018 01:04 PM    HDL Cholesterol 51 2018 01:04 PM    LDL, calculated 108 (H) 2018 01:04 PM    Triglyceride 100 2018 01:04 PM       MEDICATIONS/SUPPLEMENTS:  Pt did not bring an updated list with her today. ANTHROPOMETRICS:    Ht Readings from Last 1 Encounters:   19 5' 1\" (1.549 m)      Wt Readings from Last 1 Encounters:   19 93 kg (205 lb)                IBW:105 # +/- 10%  BMI:  38.46 kg/m2   Wt 19: 202.6 lbs       Reported Diet Hx/dietary changes/food intake:  Pt has not been keeping food logs. Pt shared that she has worked to avoid skipped meals by making and consuming smoothies (using greek yogurt, carnation instant breakfast packet, milk, ice, fruit). Pt is sometimes having smoothie for breakfast and lunch as she is having difficulty tolerating foods often due to GI upset (\"things just go straight through me). Pt contacted the MD and was referred to speak with her GI MD - reports having upcoming appointment soon. Pt did not bring food records today. Breakfast and lunch may include smoothie. Dinner may include ~1-2 cup spaghetti shared with tatyana per pt report today. Pt continues to drink sugary drinks by consuming lemonade: drinks  ~ 64 oz daily. Pt tired diet surjitsi has suggested last visit but reported that she did not like the taste of this. Pt has been taking Cultrelle probiotic to help with GI symptoms (taking for last 2 weeks, reports no improvement in diarrhea). Pt reports hx of lactose intolerance but continues to drink milk and consume dairy. Exercise/Activity:  Pt is currently limited with her exercise post op. Pt has been completing physical therapy 2x/weekly for knee surgery - reports still experiencing a lot of pain in knee which impacts her quality and duration of sleep. Pt may sleep 4 hours nightly. Social/Environmental: Pt continues to see psychiatrist - reports starting atarax for anxiety approximately 1 month or so ago (around the same time GI symptoms began occurring). NUTRITION DIAGNOSIS:Overweight likely, in part, due to numerous sodas daily, as well as large quantities of high caloric foods. NUTRITION INTERVENTION:  Educator provided pt with behavior checklist and helped pt complete - pt chose 2 behaviors to work on which included limiting second helpings by serving her grand-daughter first and then eating her own meal ( to avoid eating from same plate and getting additional servings) and by packing meal or snack when traveling (as pt is consuming fast foods 3-4x/weekly) because she is running errands and hungry. Educator, again, stressed that sugary beverages are likely impeding her weight loss - discussed not all artifical sweeteners taste the same and to try different artificially sweetened beverages if unable to only consume water. Suggested pt try crystal light lemonade or other drinks which include 0 Total Carbohydrate.      Educator re-reviewed nutrition labels and discussed how calories from different foods will go toward daily total needs for weight loss - stressed that making behavior changes helps to limit some focus \"on the math of calorie counting\" but inevitably lead to reducing total intake. 1) continue to avoid sweet beverages. - modified to ; try crystal light or other beverages that have ) total carbohydrates  2) consider using [place] Instant Breakfast for meal replacement due to poor appetite and po tolerance. (goal met)  3) increase consistency to include bland and soft diet and eventually increase fiber/texture consistency as tolerance improves. - goal ongoing   4. Call GI MD to ensure appointment is set to discuss experienced symptoms  5. Try lactose free milk to see if some GI distress resolves       PATIENT GOALS:  1) immediate goal is to be able to return to 3 meals daily using plate method for portion control. 2) revisit goals for weight loss from her 2/1/19  visit as follows:    - work to limit excessive high kcal snacking by snacking on low calorie foods such as cabbage if appropriate (as pt reports snacking less frequently now due to GI distress)   - begin using nutrition labels to document calories from food consumed as well as intake to help increase awareness on excessive intake , ongoing  (reinforced at least documenting food intake to help build awareness in current eating habits)     Specific tips and techniques to facilitate compliance with above recommendations were provided and discussed. Pt was strongly encourage to begin making necessary changes now and follow as scheduled. Pt is due to see RD 5/17/19 for follow up and I will reach out to pt by phone next week for assessing tolerance to above suggestions. If you have any questions please feel free to contact me at [phone].        Asha Mckeon RD

## 2019-05-19 LAB — DRUGS UR: NORMAL

## 2019-05-30 VITALS
DIASTOLIC BLOOD PRESSURE: 86 MMHG | BODY MASS INDEX: 38.33 KG/M2 | OXYGEN SATURATION: 100 % | WEIGHT: 203 LBS | SYSTOLIC BLOOD PRESSURE: 124 MMHG | HEIGHT: 61 IN | TEMPERATURE: 98.6 F | HEART RATE: 73 BPM | RESPIRATION RATE: 18 BRPM

## 2019-05-30 PROCEDURE — 99283 EMERGENCY DEPT VISIT LOW MDM: CPT

## 2019-05-31 ENCOUNTER — APPOINTMENT (OUTPATIENT)
Dept: GENERAL RADIOLOGY | Age: 54
End: 2019-05-31
Attending: EMERGENCY MEDICINE
Payer: MEDICAID

## 2019-05-31 ENCOUNTER — HOSPITAL ENCOUNTER (EMERGENCY)
Age: 54
Discharge: HOME | End: 2019-05-31
Attending: EMERGENCY MEDICINE
Payer: MEDICAID

## 2019-05-31 ENCOUNTER — APPOINTMENT (OUTPATIENT)
Dept: GENERAL RADIOLOGY | Age: 54
End: 2019-05-31
Attending: PHYSICIAN ASSISTANT
Payer: MEDICAID

## 2019-05-31 DIAGNOSIS — S93.409A SPRAIN OF ANKLE, UNSPECIFIED LATERALITY, UNSPECIFIED LIGAMENT, INITIAL ENCOUNTER: Primary | ICD-10-CM

## 2019-05-31 PROCEDURE — 73610 X-RAY EXAM OF ANKLE: CPT

## 2019-05-31 PROCEDURE — 73562 X-RAY EXAM OF KNEE 3: CPT

## 2019-05-31 RX ORDER — OXYCODONE AND ACETAMINOPHEN 5; 325 MG/1; MG/1
2 TABLET ORAL
Status: DISCONTINUED | OUTPATIENT
Start: 2019-05-31 | End: 2019-05-31
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
## 2019-05-31 NOTE — ED NOTES
Discharge instructions were given to the patient by Sultana Delcid RN. The patient left the Emergency Department ambulatory, alert and oriented and in no acute distress with 0 prescriptions. The patient was encouraged to call or return to the ED for worsening issues or problems and was encouraged to schedule a follow up appointment for continuing care. The patient verbalized understanding of discharge instructions and prescriptions, all questions were answered. The patient has no further concerns at this time.

## 2019-05-31 NOTE — DISCHARGE INSTRUCTIONS
Patient Education      CONTINUE TO AMBULATE WITH CANE UNTIL FOLLOW-UP WITH PCP AND OR ORTHO. Ankle Sprain: Care Instructions  Your Care Instructions    An ankle sprain can happen when you twist your ankle. The ligaments that support the ankle can get stretched and torn. Often the ankle is swollen and painful. Ankle sprains may take from several weeks to several months to heal. Usually, the more pain and swelling you have, the more severe your ankle sprain is and the longer it will take to heal. You can heal faster and regain strength in your ankle with good home treatment. It is very important to give your ankle time to heal completely, so that you do not easily hurt your ankle again. Follow-up care is a key part of your treatment and safety. Be sure to make and go to all appointments, and call your doctor if you are having problems. It's also a good idea to know your test results and keep a list of the medicines you take. How can you care for yourself at home? · Prop up your foot on pillows as much as possible for the next 3 days. Try to keep your ankle above the level of your heart. This will help reduce the swelling. · Follow your doctor's directions for wearing a splint or elastic bandage. Wrapping the ankle may help reduce or prevent swelling. · Your doctor may give you a splint, a brace, an air stirrup, or another form of ankle support to protect your ankle until it is healed. Wear it as directed while your ankle is healing. Do not remove it unless your doctor tells you to. After your ankle has healed, ask your doctor whether you should wear the brace when you exercise. · Put ice or cold packs on your injured ankle for 10 to 20 minutes at a time. Try to do this every 1 to 2 hours for the next 3 days (when you are awake) or until the swelling goes down. Put a thin cloth between the ice and your skin. · You may need to use crutches until you can walk without pain.  If you do use crutches, try to bear some weight on your injured ankle if you can do so without pain. This helps the ankle heal.  · Take pain medicines exactly as directed. ? If the doctor gave you a prescription medicine for pain, take it as prescribed. ? If you are not taking a prescription pain medicine, ask your doctor if you can take an over-the-counter medicine. · If you have been given ankle exercises to do at home, do them exactly as instructed. These can promote healing and help prevent lasting weakness. When should you call for help? Call your doctor now or seek immediate medical care if:    · Your pain is getting worse.     · Your swelling is getting worse.     · Your splint feels too tight or you are unable to loosen it.    Watch closely for changes in your health, and be sure to contact your doctor if:    · You are not getting better after 1 week. Where can you learn more? Go to http://ofeliaDe Novomindy.info/. Enter Z550 in the search box to learn more about \"Ankle Sprain: Care Instructions. \"  Current as of: September 20, 2018  Content Version: 11.9  © 9889-5400 [employer]. Care instructions adapted under license by [employer] (which disclaims liability or warranty for this information). If you have questions about a medical condition or this instruction, always ask your healthcare professional. [address] any warranty or liability for your use of this information. Patient Education        Learning About RICE (Rest, Ice, Compression, and Elevation)  What is RICE? RICE is a way to care for an injury. RICE helps relieve pain and swelling. It may also help with healing and flexibility. RICE stands for:  · Rest and protect the injured or sore area. · Ice or a cold pack used as soon as possible. · Compression, or wrapping the injured or sore area with an elastic bandage. · Elevation (propping up) the injured or sore area. How do you do RICE?   You can use RICE for home treatment when you have general aches and pains or after an injury or surgery. Rest  · Do not put weight on the injury for at least 24 to 48 hours. · Use crutches for a badly sprained knee or ankle. · Support a sprained wrist, elbow, or shoulder with a sling. Ice  · Put ice or a cold pack on the injury right away to reduce pain and swelling. Frozen vegetables will also work as an ice pack. Put a thin cloth between the ice or cold pack and your skin. The cloth protects the injured area from getting too cold. · Use ice for 10 to 15 minutes at a time for the first 48 to 72 hours. Compression  · Use compression for sprains, strains, and surgeries of the arms and legs. · Wrap the injured area with an elastic bandage or compression sleeve to reduce swelling. · Don't wrap it too tightly. If the area below it feels numb, tingles, or feels cool, loosen the wrap. Elevation  · Use elevation for areas of the body that can be propped up, such as arms and legs. · Prop up the injured area on pillows whenever you use ice. Keep it propped up anytime you sit or lie down. · Try to keep the injured area at or above the level of your heart. This will help reduce swelling and bruising. Where can you learn more? Go to http://ofelia-mindy.info/. Enter O830 in the search box to learn more about \"Learning About RICE (Rest, Ice, Compression, and Elevation). \"  Current as of: September 20, 2018  Content Version: 11.9  © 6961-0164 [employer]. Care instructions adapted under license by [employer] (which disclaims liability or warranty for this information). If you have questions about a medical condition or this instruction, always ask your healthcare professional. [place] [address] any warranty or liability for your use of this information.

## 2019-05-31 NOTE — ED NOTES
This RN received verbal orders from MD Cordell to discontinue percocet for pt due to pt being her own ride home.

## 2019-05-31 NOTE — ED PROVIDER NOTES
Right ankle pain status post slipping in the rain and falling 2 days ago. Also some right knee pain. Ankle pain is 10 out of 10. She states it is worse with weightbearing. Knee pain is 8 out of 10. She did have knee surgery on that side 2 months ago.            Past Medical History:   Diagnosis Date    Arthritis     Asthma     uses inhalers    Chronic obstructive pulmonary disease (HCC)     bronchitis    Chronic pain     GERD (gastroesophageal reflux disease)     Hypertension     Ill-defined condition     environmental allergies     Ill-defined condition     Multiple body piercings; unable to remove tongue and lip piercings    Ill-defined condition 2018    GASTRITIS PER ENDOSCOPY    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    Psychiatric disorder     DEPRESSION    Thyroid disease     hypo    Ventral hernia 12/31/2013       Past Surgical History:   Procedure Laterality Date    HX HEENT  2009    thyroidectomy    HX KNEE REPLACEMENT      R    HX MOHS PROCEDURES Right 3/8/11    HX OTHER SURGICAL      hiatal hernia repair    HX TUBAL LIGATION           Family History:   Problem Relation Age of Onset    Cancer Father         lung cancer    Heart Disease Mother     Hypertension Mother    [facility] Diabetes Mother     Anesth Problems Neg Hx        Social History     Socioeconomic History    Marital status: SINGLE     Spouse name: Not on file    Number of children: Not on file    Years of education: Not on file    Highest education level: Not on file   Occupational History    Not on file   Social Needs    Financial resource strain: Not on file    Food insecurity:     Worry: Not on file     Inability: Not on file    Transportation needs:     Medical: Not on file     Non-medical: Not on file   Tobacco Use    Smoking status: Current Every Day Smoker     Packs/day: 0.25     Years: 1.50     Pack years: 0.37     Types: Cigarettes     Last attempt to quit: 10/1/2015     Years since quitting: 3.6    Smokeless tobacco: Never Used    Tobacco comment: patient quit smoking for 10 years, began smoking again and then quit again in 2015   Substance and Sexual Activity    Alcohol use: No    Drug use: No    Sexual activity: Yes     Partners: Female   Lifestyle    Physical activity:     Days per week: Not on file     Minutes per session: Not on file    Stress: Not on file   Relationships    Social connections:     Talks on phone: Not on file     Gets together: Not on file     Attends [employer] service: Not on file     Active member of club or organization: Not on file     Attends meetings of clubs or organizations: Not on file     Relationship status: Not on file    Intimate partner violence:     Fear of current or ex partner: Not on file     Emotionally abused: Not on file     Physically abused: Not on file     Forced sexual activity: Not on file   Other Topics Concern    Not on file   Social History Narrative    Not on file         ALLERGIES: Hydrocodone; Mold; and Ibuprofen    Review of Systems   Constitutional: Negative. Negative for chills, fever and unexpected weight change. HENT: Negative. Negative for congestion and trouble swallowing. Eyes: Negative for discharge. Respiratory: Negative. Negative for cough, chest tightness and shortness of breath. Cardiovascular: Negative. Negative for chest pain. Gastrointestinal: Negative. Negative for abdominal distention, abdominal pain, constipation, diarrhea and nausea. Endocrine: Negative. Genitourinary: Negative. Negative for difficulty urinating, dysuria, frequency and urgency. Musculoskeletal: Positive for arthralgias. Negative for myalgias. Skin: Negative. Negative for color change. Allergic/Immunologic: Negative. Neurological: Negative. Negative for dizziness, speech difficulty and headaches. Hematological: Negative. Psychiatric/Behavioral: Negative. Negative for agitation and confusion.    All other systems reviewed and are negative. Vitals:    05/30/19 2245   BP: 124/86   Pulse: 73   Resp: 18   Temp: 98.6 °F (37 °C)   SpO2: 100%   Weight: 92.1 kg (203 lb)   Height: 5' 1\" (1.549 m)            Physical Exam   Constitutional: She is oriented to person, place, and time. She appears well-developed and well-nourished. HENT:   Head: Normocephalic and atraumatic. Eyes: Conjunctivae and EOM are normal.   Neck: Neck supple. Cardiovascular: Normal rate, regular rhythm and intact distal pulses. Pulmonary/Chest: Effort normal. No respiratory distress. Abdominal: Soft. There is no tenderness. Musculoskeletal: Normal range of motion. She exhibits no deformity. Tenderness of right ankle in distribution of anterior talofibular ligament. No deformity. Mild edema. No localized bony tenderness. Mild swelling right knee with diffuse tenderness. Anterior midline scar noted to right knee. Neurological: She is alert and oriented to person, place, and time. Skin: Skin is warm and dry. Psychiatric: She has a normal mood and affect. Her behavior is normal. Thought content normal.   Vitals reviewed. MDM  Number of Diagnoses or Management Options  Sprain of ankle, unspecified laterality, unspecified ligament, initial encounter:   Diagnosis management comments: Sprain, strain, fracture, contustion         Procedures               LABORATORY TESTS:  No results found for this or any previous visit (from the past 12 hour(s)). IMAGING RESULTS:  XR ANKLE RT MIN 3 V   Final Result   IMPRESSION: No acute abnormality. XR KNEE RT 3 V   Final Result   IMPRESSION: Anterior soft tissue swelling. Unconstrained osteoplasty prosthesis   appears intact. MEDICATIONS GIVEN:  Medications - No data to display    IMPRESSION:  1. Sprain of ankle, unspecified laterality, unspecified ligament, initial encounter        PLAN:  1. Discharge Medication List as of 5/31/2019  1:48 AM        2.    Follow-up Information     Follow up With Specialties Details Why Contact Info    Anju Llanos NP Nurse Practitioner Schedule an appointment as soon as possible for a visit  [facility]  [address] [place] [id number] [phone]      [facility] EM[facility]GENCY DEPT Emergency Medicine  As needed, If symptoms worsen [place]  [phone]        Return to ED if worse

## 2019-06-11 ENCOUNTER — OFFICE VISIT (OUTPATIENT)
Dept: INTERNAL MEDICINE CLINIC | Age: 54
End: 2019-06-11

## 2019-06-11 VITALS
WEIGHT: 211 LBS | BODY MASS INDEX: 39.84 KG/M2 | HEIGHT: 61 IN | RESPIRATION RATE: 17 BRPM | OXYGEN SATURATION: 99 % | TEMPERATURE: 98 F | SYSTOLIC BLOOD PRESSURE: 140 MMHG | DIASTOLIC BLOOD PRESSURE: 88 MMHG | HEART RATE: 83 BPM

## 2019-06-11 DIAGNOSIS — D50.8 IRON DEFICIENCY ANEMIA SECONDARY TO INADEQUATE DIETARY IRON INTAKE: Primary | ICD-10-CM

## 2019-06-11 DIAGNOSIS — I10 MALIGNANT HYPERTENSION: ICD-10-CM

## 2019-06-11 DIAGNOSIS — Z79.891 CHRONIC USE OF OPIATE FOR THERAPEUTIC PURPOSE: ICD-10-CM

## 2019-06-11 DIAGNOSIS — Z96.651 STATUS POST TOTAL RIGHT KNEE REPLACEMENT: ICD-10-CM

## 2019-06-11 DIAGNOSIS — M54.41 MIDLINE LOW BACK PAIN WITH RIGHT-SIDED SCIATICA, UNSPECIFIED CHRONICITY: ICD-10-CM

## 2019-06-11 DIAGNOSIS — M19.012 PRIMARY OSTEOARTHRITIS OF LEFT SHOULDER: ICD-10-CM

## 2019-06-11 DIAGNOSIS — E03.9 HYPOTHYROIDISM, UNSPECIFIED TYPE: ICD-10-CM

## 2019-06-11 DIAGNOSIS — M17.11 PRIMARY OSTEOARTHRITIS OF RIGHT KNEE: ICD-10-CM

## 2019-06-11 RX ORDER — HYDROCHLOROTHIAZIDE 25 MG/1
TABLET ORAL
Qty: 90 TAB | Refills: 3 | Status: SHIPPED | OUTPATIENT
Start: 2019-06-11 | End: 2020-06-19

## 2019-06-11 RX ORDER — OXYCODONE AND ACETAMINOPHEN 10; 325 MG/1; MG/1
1 TABLET ORAL
Qty: 45 TAB | Refills: 0 | Status: SHIPPED | OUTPATIENT
Start: 2019-06-11 | End: 2019-07-09 | Stop reason: SDUPTHER

## 2019-06-11 RX ORDER — DEXTROMETHORPHAN HYDROBROMIDE, GUAIFENESIN 5; 100 MG/5ML; MG/5ML
650 LIQUID ORAL EVERY 8 HOURS
Qty: 270 TAB | Refills: 3 | Status: SHIPPED | OUTPATIENT
Start: 2019-06-11 | End: 2020-07-02 | Stop reason: SDUPTHER

## 2019-06-11 RX ORDER — LEVOTHYROXINE SODIUM 125 UG/1
TABLET ORAL
Qty: 90 TAB | Refills: 3 | Status: SHIPPED | OUTPATIENT
Start: 2019-06-11 | End: 2020-06-19

## 2019-06-11 NOTE — PROGRESS NOTES
Encounter for pain management. Chronic Pain:  Patient has chronic BL knee pain for years, had right TKR last year (2016). Had repeat surgery to right knee 3/26 with Dr. Yohana Paige, still in PT. Having FOOT swelling x 1 week. Pain Scale: 9/10. Had IMAGING for lumbar spine and right knee and has been seeing/seen by ORTHO. Pain in the left shoulder, wrist, knees, legs, and lower back is still limiting walking, sitting, reaching, lifting, and standing, exacerbated by forementioned acitivities to include stair climbing. Has tried prednisone, tramadol, injections, PT, gabapentin, cymbalta, and surgery in past with minimal relief. Pain has been controlled with Oxycodone, last taken yesterday. The medication is kept safe by staying with her. Has NOT seen any other providers since last OV for pain medication. No significant changes to pain presentation since last OV. she is  able to do her normal daily activities. she reports the following adverse side effects: none. Averaging 7 BMs per week, some loose stools. Least pain over the last week has been 6/10. Worst pain over the last week has been 10/10. Aberrant behaviors: None         Symptoms onset: problem is longstanding. Rheumatological ROS: ongoing significant pain which is stable and controlled by pain med. Response to treatment plan: waxing and waning. Review of Systems  Constitutional: +MRSA in nose. negative for fevers, chills, anorexia and weight loss  Eyes:   negative for visual disturbance, drainage, and irritation  ENT:   +AR and environmental allergies. negative for tinnitus,sore throat,ear pain,and hoarseness  Respiratory:  + asthma/COPD; stable. negative for hemoptysis  CV:   negative for chest pain, palpitations, and lower extremity edema  GI:   + GERD.  negative for nausea, vomiting, diarrhea, abdominal pain, and melena  Endo:               negative for polyuria,polydipsia,polyphagia, and heat intolerance  Genitourinary: negative for frequency, urgency, dysuria, retention, and hematuria  Integument:  negative for rash, ulcerations, and pruritus  Hematologic:  negative for easy bruising and bleeding  Musculoskel: negative for  muscle weakness  Neurological:  negative for headaches, dizziness, vertigo,and memory problems  Behavl/Psych: +depression, on cymbalta and zoloft. negative for feelings of  suicide    1./2. Medical history/Past medical History   Past Medical History:   Diagnosis Date    Arthritis     Asthma     uses inhalers    Chronic obstructive pulmonary disease (HCC)     bronchitis    Chronic pain     GERD (gastroesophageal reflux disease)     Hypertension     Ill-defined condition     environmental allergies     Ill-defined condition     Multiple body piercings; unable to remove tongue and lip piercings    Ill-defined condition 2018    GASTRITIS PER ENDOSCOPY    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    Psychiatric disorder     DEPRESSION    Thyroid disease     hypo    Ventral hernia 12/31/2013     Past Surgical History:   Procedure Laterality Date    HX HEENT  2009    thyroidectomy    HX KNEE REPLACEMENT      R    HX MOHS PROCEDURES Right 3/8/11    HX OTHER SURGICAL      hiatal hernia repair    HX TUBAL LIGATION       Patient Active Problem List   Diagnosis Code    Ventral hernia K43.9    Chronic pain of right knee M25.561, G89.29    Chronic right shoulder pain M25.511, G89.29    Environmental and seasonal allergies J30.89    Ventral hernia without obstruction or gangrene K43.9    Primary osteoarthritis of right knee M17.11    Mixed simple and mucopurulent chronic bronchitis (HCC) J41.8    Acquired hypothyroidism E03.9    Chronic bilateral low back pain with right-sided sciatica M54.41, G89.29    Status post total right knee replacement Z96.651    Malignant hypertension I10    Pain management contract signed Z02.89    Chronic pain of left knee M25.562, G89.29    Moderate episode of recurrent major depressive disorder ([facility] F33.1    Psychophysiological insomnia F51.04    Severe obesity (BMI 35.0-39. 9) with comorbidity (HCC) E66.01    Post-tussive vomiting R11.10    Chronic use of opiate for therapeutic purpose Z79.891    Obesity, Class II, BMI 35-39.9 E66.9    Left wrist pain M25.532    Chronic left shoulder pain M25.512, G89.29    Osteoarthritis of spine with radiculopathy, cervical region M47.22    Primary osteoarthritis of left shoulder M19.012    MRSA carrier Z22.322    S/P total knee arthroplasty, right Z96.651    Loosening of knee joint prosthesis ([facility] T84.038A, Z96.659       3. Applicable records from prior treatment providers are apart of [facility] under the media/encounters tab. 4. Diagnostic, therapeutic and laboratory results are available in the [address] [place] [address] chart. 5. Consultation notes are available for review in the media/encounters tab of the [address] [place] [address] chart. 6. Treatment goals include: pain control, improve activity level and function in regards to activities of daily living, and improved comfort level. Previously pt has been limited by pain in all these aspects. 7. The risks and benefits of treatment have been discussed at this office visit with the pt.  she understands that the medication has addictive potential.  Additionally the pt has been advised that narcotic pain medication may impair mental and/or physical ability required for performance of tasks such as driving or operating any other machinery. 8. Pt has an updated signed pain contract on file and can be found under the media section of the [facility] chart. 9. The pain contract is reviewed. Pain medication will be continued at the same dosage. Pill count: 6, expected. Last fill 5/14/19. Urine drug screening ordered/collected today. Diagnostic studies are not indicated at this time. Interventional procedure are not indicated at this time.  Narcan order sent in past.       10. Medication prescibed is oxycodone every 24 hours PRN #45 with 0 refill for a 1 month supply.  was reviewed while planning for pain/anxiety management, no indications of drug diversion suspected. Prescription history is NOT suspicious for controlled substance overuse. 11. Patient instructions have been reviewed in detail as outlined above and in the pain contract. 12. Re-evaluation is planned for 1 month. Current Outpatient Medications   Medication Sig Dispense Refill    hydrOXYzine HCl (ATARAX) 25 mg tablet TAKE 1 TAB BY MOUTH THREE (3) TIMES DAILY AS NEEDED FOR ANXIETY. 60 Tab 0    tiotropium (SPIRIVA WITH HANDIHALER) 18 mcg inhalation capsule Take 1 Cap by inhalation daily.  COMBIVENT RESPIMAT  mcg/actuation inhaler INHALE 1 PUFF BY MOUTH DAILY  0    oxyCODONE-acetaminophen (PERCOCET 10)  mg per tablet Take 1 Tab by mouth two (2) times daily as needed for Pain for up to 30 days. Max Daily Amount: 2 Tabs. May take 1 tab ONCE DAILY as needed for pain. Indications: Pain 60 Tab 0    methocarbamol (ROBAXIN) 500 mg tablet Take 1 Tab by mouth every six (6) hours as needed (mm spasm). 60 Tab 2    lidocaine 4 % patch Every 12 hours as needed for pain 20 Patch 0    sertraline (ZOLOFT) 100 mg tablet Take 1/2 tablet daily for 10 days and then take 1 tablet daily 30 Tab 1    PROAIR HFA 90 mcg/actuation inhaler TAKE 2 PUFFS BY INHALATION EVERY FOUR (4) HOURS AS NEEDED. 8.5 Inhaler 0    budesonide-formoterol (SYMBICORT) 160-4.5 mcg/actuation HFAA Take 2 Puffs by inhalation two (2) times a day. 3 Inhaler 3    lidocaine (LIDODERM) 5 % Apply patch to the affected area for 12 hours a day and remove for 12 hours a day. 30 Each 11    acetaminophen (TYLENOL) 650 mg TbER TAKE 1 TAB BY MOUTH THREE (3) TIMES DAILY AS NEEDED FOR PAIN. 90 Tab 0    pantoprazole (PROTONIX) 40 mg tablet Take 1 Tab by mouth daily.  Take in 2 week intervals for GERD Flares 30 Tab 1    metoprolol succinate (TOPROL-XL) 25 mg XL tablet TAKE 1 TABLET BY MOUTH DAILY. 90 Tab 3    gabapentin (NEURONTIN) 800 mg tablet TAKE 1 TAB BY MOUTH THREE (3) TIMES DAILY. 90 Tab 5    amLODIPine (NORVASC) 10 mg tablet TAKE 1 TABLET BY MOUTH EVERY DAY FOR HYPERTENSION 90 Tab 3    hydroCHLOROthiazide (HYDRODIURIL) 25 mg tablet TAKE 1 TAB BY MOUTH DAILY FOR HYPERTENSION 30 Tab 6    levothyroxine (SYNTHROID) 125 mcg tablet TAKE 1 TABLET BY MOUTH EVERY DAY BEFORE BREAKFAST 90 Tab 1    naloxone (NARCAN) 4 mg/actuation nasal spray Use 1 spray into 1 nostril for OVERDOSE. Call 911. For subsequent doses, give in alternating nostrils. May repeat every 2 to 3 min. 2 Each 0    traZODone (DESYREL) 50 mg tablet Take 1-2 tabs every night for sleep. 60 Tab 11    montelukast (SINGULAIR) 10 mg tablet Take 1 Tab by mouth daily. 30 Tab 6    albuterol (PROVENTIL VENTOLIN) 2.5 mg /3 mL (0.083 %) nebulizer solution 3 mL by Nebulization route every four (4) hours as needed for Wheezing. 24 Each 2    diclofenac (VOLTAREN) 1 % gel Apply 2 g to affected area every six (6) hours. 100 g 5    fluticasone (FLONASE) 50 mcg/actuation nasal spray 2 Sprays by Both Nostrils route daily. 1 Bottle 11    miscellaneous medical supply misc Shower seat for chronic knee pain, pt planning to have right TKR in June due to condition. Very limited range of motion. 1 Each 0    loratadine (CLARITIN) 10 mg tablet Take 1 Tab by mouth daily. 30 Tab 5    rivaroxaban (XARELTO) 10 mg tablet Take 1 Tab by mouth daily (with lunch).  Indications: Deep Vein Thrombosis Prevention in Knee Replacement 14 Tab 0     Allergies   Allergen Reactions    Hydrocodone Itching    Mold Shortness of Breath and Itching    Ibuprofen Nausea Only       Objective:  Visit Vitals  /88 (BP 1 Location: Right arm, BP Patient Position: Sitting)   Pulse 83   Temp 98 °F (36.7 °C) (Oral)   Resp 17   Ht 5' 1\" (1.549 m)   Wt 211 lb (95.7 kg)   LMP 12/29/2016 (Exact Date)   SpO2 99%   BMI 39.87 kg/m²     Wt Readings from Last 3 Encounters:   06/11/19 211 lb (95.7 kg)   05/30/19 203 lb (92.1 kg)   05/14/19 205 lb (93 kg)     Physical Exam:   General appearance - alert, well appearing, and in mild distress. Mental status - A/O x 4, sad mood and affect. Chest - CTA. Symmetric chest rise. No wheezing, rales or rhonchi. Heart - Normal rate, regular rhythm. Normal S1, S2. No MGR or clicks. Abd- soft, obese,distended. Hyperactive BS. Epigastric TTP, no masses. Ext- Radial, DP pulses, 2+ bilaterally. No clubbing or cyanosis. NP ankle edema, CRISTIANE. Skin-Warm and dry. No hyperpigmentation, ulcerations, or suspicious lesions. Neuro - normal speech, no focal findings or movement disorder. Normal strength and muscle tone. Slow, limping gait using cane. Back- alignment midline. Thoracic spinal and right lumbar paraspinal tenderness. No CVAT. LROM, no SLR. Assessment/Plan:  Decreased OXYCODONE to #45 tabs to continue taper down following Diluadid use, will resume 30 tabs next OV. Elevation and compression stocking use advised with modified diet for leg swelling reported. Discussed the patient's BMI with her. The BMI follow up plan is as follows: daily exercise    I have reviewed/discussed the above normal BMI (Body mass index is 39.87 kg/m².) with the patient. I have recommended the following interventions: encourage exercise, monitor weight, and dietary management education, guidance, and counseling, . Medication Side Effects and Warnings were discussed with patient: yes   Patient Labs were reviewed: yes  Patient Past Records were reviewed: yes    See below for other orders         ICD-10-CM ICD-9-CM    1. Primary osteoarthritis of right knee M17.11 715.16 acetaminophen (TYLENOL) 650 mg TbER      oxyCODONE-acetaminophen (PERCOCET 10)  mg per tablet   2. Midline low back pain with right-sided sciatica, unspecified chronicity M54.41 724.3 acetaminophen (TYLENOL) 650 mg TbER   3.  Hypothyroidism, unspecified type E03.9 244.9 levothyroxine (SYNTHROID) 125 mcg tablet   4. Malignant hypertension I10 401.0 hydroCHLOROthiazide (HYDRODIURIL) 25 mg tablet   5. Status post total right knee replacement Z96.651 V43.65 oxyCODONE-acetaminophen (PERCOCET 10)  mg per tablet   6. Chronic use of opiate for therapeutic purpose Z79.891 V58.69 oxyCODONE-acetaminophen (PERCOCET 10)  mg per tablet   7. Primary osteoarthritis of left shoulder M19.012 715.11 oxyCODONE-acetaminophen (PERCOCET 10)  mg per tablet     No orders of the defined types were placed in this encounter. Kiley Cantor expressed understanding of plan. An After Visit Summary was offered/printed and given to the patient.

## 2019-06-11 NOTE — PATIENT INSTRUCTIONS
Elevate legs above the level of your heart while at rest to help reduce leg swelling. Also use compression stockings, available in medical supply and drug stores, to reduce swelling. Leg and Ankle Edema: Care Instructions  Your Care Instructions  Swelling in the legs, ankles, and feet is called edema. It is common after you sit or stand for a while. Long plane flights or car rides often cause swelling in the legs and feet. You may also have swelling if you have to stand for long periods of time at your job. Problems with the veins in the legs (varicose veins) and changes in hormones can also cause swelling. Sometimes the swelling in the ankles and feet is caused by a more serious problem, such as heart failure, infection, blood clots, or liver or kidney disease. Follow-up care is a key part of your treatment and safety. Be sure to make and go to all appointments, and call your doctor if you are having problems. It's also a good idea to know your test results and keep a list of the medicines you take. How can you care for yourself at home? · If your doctor gave you medicine, take it as prescribed. Call your doctor if you think you are having a problem with your medicine. · Whenever you are resting, raise your legs up. Try to keep the swollen area higher than the level of your heart. · Take breaks from standing or sitting in one position. ? Walk around to increase the blood flow in your lower legs. ? Move your feet and ankles often while you stand, or tighten and relax your leg muscles. · Wear support stockings. Put them on in the morning, before swelling gets worse. · Eat a balanced diet. Lose weight if you need to. · Limit the amount of salt (sodium) in your diet. Salt holds fluid in the body and may increase swelling. When should you call for help? Call 911 anytime you think you may need emergency care.  For example, call if:    · You have symptoms of a blood clot in your lung (called a pulmonary embolism). These may include:  ? Sudden chest pain. ? Trouble breathing. ? Coughing up blood.    Call your doctor now or seek immediate medical care if:    · You have signs of a blood clot, such as:  ? Pain in your calf, back of the knee, thigh, or groin. ? Redness and swelling in your leg or groin.     · You have symptoms of infection, such as:  ? Increased pain, swelling, warmth, or redness. ? Red streaks or pus. ? A fever.    Watch closely for changes in your health, and be sure to contact your doctor if:    · Your swelling is getting worse.     · You have new or worsening pain in your legs.     · You do not get better as expected. Where can you learn more? Go to http://ofelia-mindy.info/. Enter A234 in the search box to learn more about \"Leg and Ankle Edema: Care Instructions. \"  Current as of: September 23, 2018  Content Version: 11.9  © 5841-1441 [employer]. Care instructions adapted under license by [employer] (which disclaims liability or warranty for this information). If you have questions about a medical condition or this instruction, always ask your healthcare professional. [address] any warranty or liability for your use of this information.

## 2019-06-11 NOTE — PROGRESS NOTES
Pt Is here for   Chief Complaint   Patient presents with    Medication Refill     orders pending     Swelling     pt states that she's been having swelling in her feet     Pt states pain level is a 9/10 knees and neck  Pt states last had something for pain last night. 1. Have you been to the ER, urgent care clinic since your last visit? Hospitalized since your last visit? No    2. Have you seen or consulted any other health care providers outside of the [address] [place] [address] since your last visit? Include any pap smears or colon screening.  No

## 2019-06-16 LAB
DRUGS UR: NORMAL
HCT VFR BLD AUTO: NORMAL %
HGB BLD-MCNC: NORMAL G/DL

## 2019-06-17 ENCOUNTER — HOSPITAL ENCOUNTER (OUTPATIENT)
Dept: DIABETES SERVICES | Age: 54
Discharge: HOME | End: 2019-06-17
Attending: PSYCHIATRY & NEUROLOGY
Payer: MEDICAID

## 2019-06-17 ENCOUNTER — OFFICE VISIT (OUTPATIENT)
Dept: INTERNAL MEDICINE CLINIC | Age: 54
End: 2019-06-17

## 2019-06-17 VITALS
HEART RATE: 66 BPM | SYSTOLIC BLOOD PRESSURE: 123 MMHG | DIASTOLIC BLOOD PRESSURE: 77 MMHG | WEIGHT: 207 LBS | RESPIRATION RATE: 17 BRPM | BODY MASS INDEX: 39.08 KG/M2 | TEMPERATURE: 96.4 F | HEIGHT: 61 IN | OXYGEN SATURATION: 99 %

## 2019-06-17 DIAGNOSIS — E03.9 ACQUIRED HYPOTHYROIDISM: ICD-10-CM

## 2019-06-17 DIAGNOSIS — S40.262A INSECT BITE OF LEFT SHOULDER, INITIAL ENCOUNTER: Primary | ICD-10-CM

## 2019-06-17 DIAGNOSIS — E66.9 OBESITY, UNSPECIFIED CLASSIFICATION, UNSPECIFIED OBESITY TYPE, UNSPECIFIED WHETHER SERIOUS COMORBIDITY PRESENT: ICD-10-CM
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
## 2019-06-17 DIAGNOSIS — L29.9 PRURITUS: ICD-10-CM

## 2019-06-17 PROCEDURE — 97802 MEDICAL NUTRITION INDIV IN: CPT | Performed by: DIETITIAN, REGISTERED

## 2019-06-17 RX ORDER — HYDROXYZINE 25 MG/1
25 TABLET, FILM COATED ORAL
Qty: 60 TAB | Refills: 0 | Status: SHIPPED | OUTPATIENT
Start: 2019-06-17 | End: 2019-08-17 | Stop reason: SDUPTHER

## 2019-06-17 RX ORDER — PREDNISONE 20 MG/1
40 TABLET ORAL
Qty: 6 TAB | Refills: 0 | Status: SHIPPED | OUTPATIENT
Start: 2019-06-17 | End: 2019-08-06 | Stop reason: ALTCHOICE

## 2019-06-17 NOTE — PROGRESS NOTES
HISTORY OF PRESENT ILLNESS  Brie Manley is a 52 y.o. female. Bitten by something and having swelling with itching since. Unsure, but recalls having allergy to bugs on her allergy testing done years ago. Used to take allergy shots. Tried allergy cream without relief. Still taking flonase, claritin, and singulair. Insect Bite   The history is provided by the patient. This is a new problem. The current episode started 2 days ago. The problem occurs constantly. The problem has been gradually worsening. Pertinent negatives include no chest pain, no abdominal pain, no headaches and no shortness of breath. Nothing relieves the symptoms. Patient Active Problem List   Diagnosis Code    Ventral hernia K43.9    Chronic pain of right knee M25.561, G89.29    Chronic right shoulder pain M25.511, G89.29    Environmental and seasonal allergies J30.89    Ventral hernia without obstruction or gangrene K43.9    Primary osteoarthritis of right knee M17.11    Mixed simple and mucopurulent chronic bronchitis (HCC) J41.8    Acquired hypothyroidism E03.9    Chronic bilateral low back pain with right-sided sciatica M54.41, G89.29    Status post total right knee replacement Z96.651    Malignant hypertension I10    Pain management contract signed Z02.89    Chronic pain of left knee M25.562, G89.29    Moderate episode of recurrent major depressive disorder (HCC) F33.1    Psychophysiological insomnia F51.04    Severe obesity (BMI 35.0-39. 9) with comorbidity ([facility]) E66.01    Post-tussive vomiting R11.10    Chronic use of opiate for therapeutic purpose Z79.891    Obesity, Class II, BMI 35-39.9 E66.9    Left wrist pain M25.532    Chronic left shoulder pain M25.512, G89.29    Osteoarthritis of spine with radiculopathy, cervical region M47.22    Primary osteoarthritis of left shoulder M19.012    MRSA carrier Z22.322    S/P total knee arthroplasty, right Z96.651    Loosening of knee joint prosthesis ([address] [id number], [id number]     Patient Active Problem List    Diagnosis Date Noted    S/P total knee arthroplasty, right 03/26/2019    Loosening of knee joint prosthesis ([facility] 03/26/2019    MRSA carrier 03/06/2019    Osteoarthritis of spine with radiculopathy, cervical region 09/26/2018    Primary osteoarthritis of left shoulder 09/26/2018    Obesity, Class II, BMI 35-39.9 09/25/2018    Left wrist pain 09/25/2018    Chronic left shoulder pain 09/25/2018    Chronic use of opiate for therapeutic purpose 05/24/2018    Severe obesity (BMI 35.0-39. 9) with comorbidity ([facility] [address] 03/28/2018    Post-tussive vomiting 03/28/2018    Moderate episode of recurrent major depressive disorder ([facility] [address] 02/28/2018    Psychophysiological insomnia 02/28/2018    Chronic pain of left knee 06/15/2017    Chronic bilateral low back pain with right-sided sciatica 05/08/2017    Status post total right knee replacement 05/08/2017    Malignant hypertension 05/08/2017    Pain management contract signed 05/08/2017    Acquired hypothyroidism 01/06/2017    Mixed simple and mucopurulent chronic bronchitis ([facility] [address] 09/08/2016    Primary osteoarthritis of right knee 06/06/2016    Ventral hernia without obstruction or gangrene 05/26/2016    Chronic pain of right knee 02/05/2016    Chronic right shoulder pain 02/05/2016    Environmental and seasonal allergies 02/05/2016    Ventral hernia 12/31/2013     Current Outpatient Medications   Medication Sig Dispense Refill    hydrOXYzine HCl (ATARAX) 25 mg tablet Take 1 Tab by mouth three (3) times daily as needed for Itching or Anxiety. 60 Tab 0    predniSONE (DELTASONE) 20 mg tablet Take 40 mg by mouth daily (with breakfast). 6 Tab 0    acetaminophen (TYLENOL) 650 mg TbER Take 1 Tab by mouth every eight (8) hours.  270 Tab 3    levothyroxine (SYNTHROID) 125 mcg tablet TAKE 1 TABLET BY MOUTH EVERY DAY BEFORE BREAKFAST  Indications: hypothyroidism 90 Tab 3    hydroCHLOROthiazide (HYDRODIURIL) 25 mg tablet TAKE 1 TAB BY MOUTH DAILY FOR HYPERTENSION 90 Tab 3    oxyCODONE-acetaminophen (PERCOCET 10)  mg per tablet Take 1 Tab by mouth two (2) times daily as needed for Pain for up to 30 days. Max Daily Amount: 2 Tabs. May take 1 tab ONCE DAILY as needed for pain. Indications: Pain 45 Tab 0    tiotropium (SPIRIVA WITH HANDIHALER) 18 mcg inhalation capsule Take 1 Cap by inhalation daily.  COMBIVENT RESPIMAT  mcg/actuation inhaler INHALE 1 PUFF BY MOUTH DAILY  0    methocarbamol (ROBAXIN) 500 mg tablet Take 1 Tab by mouth every six (6) hours as needed (mm spasm). 60 Tab 2    lidocaine 4 % patch Every 12 hours as needed for pain 20 Patch 0    sertraline (ZOLOFT) 100 mg tablet Take 1/2 tablet daily for 10 days and then take 1 tablet daily 30 Tab 1    PROAIR HFA 90 mcg/actuation inhaler TAKE 2 PUFFS BY INHALATION EVERY FOUR (4) HOURS AS NEEDED. 8.5 Inhaler 0    budesonide-formoterol (SYMBICORT) 160-4.5 mcg/actuation HFAA Take 2 Puffs by inhalation two (2) times a day. 3 Inhaler 3    pantoprazole (PROTONIX) 40 mg tablet Take 1 Tab by mouth daily. Take in 2 week intervals for GERD Flares 30 Tab 1    metoprolol succinate (TOPROL-XL) 25 mg XL tablet TAKE 1 TABLET BY MOUTH DAILY. 90 Tab 3    gabapentin (NEURONTIN) 800 mg tablet TAKE 1 TAB BY MOUTH THREE (3) TIMES DAILY. 90 Tab 5    amLODIPine (NORVASC) 10 mg tablet TAKE 1 TABLET BY MOUTH EVERY DAY FOR HYPERTENSION 90 Tab 3    naloxone (NARCAN) 4 mg/actuation nasal spray Use 1 spray into 1 nostril for OVERDOSE. Call 911. For subsequent doses, give in alternating nostrils. May repeat every 2 to 3 min. 2 Each 0    traZODone (DESYREL) 50 mg tablet Take 1-2 tabs every night for sleep. 60 Tab 11    montelukast (SINGULAIR) 10 mg tablet Take 1 Tab by mouth daily. 30 Tab 6    albuterol (PROVENTIL VENTOLIN) 2.5 mg /3 mL (0.083 %) nebulizer solution 3 mL by Nebulization route every four (4) hours as needed for Wheezing.  24 Each 2    diclofenac (VOLTAREN) 1 % gel Apply 2 g to affected area every six (6) hours. 100 g 5    fluticasone (FLONASE) 50 mcg/actuation nasal spray 2 Sprays by Both Nostrils route daily. 1 Bottle 11    miscellaneous medical supply misc Shower seat for chronic knee pain, pt planning to have right TKR in June due to condition. Very limited range of motion. 1 Each 0    loratadine (CLARITIN) 10 mg tablet Take 1 Tab by mouth daily. 30 Tab 5    rivaroxaban (XARELTO) 10 mg tablet Take 1 Tab by mouth daily (with lunch). Indications: Deep Vein Thrombosis Prevention in Knee Replacement 14 Tab 0    lidocaine (LIDODERM) 5 % Apply patch to the affected area for 12 hours a day and remove for 12 hours a day.  30 Each 11     Allergies   Allergen Reactions    Hydrocodone Itching    Mold Shortness of Breath and Itching    Ibuprofen Nausea Only     Past Medical History:   Diagnosis Date    Arthritis     Asthma     uses inhalers    Chronic obstructive pulmonary disease (HCC)     bronchitis    Chronic pain     GERD (gastroesophageal reflux disease)     Hypertension     Ill-defined condition     environmental allergies     Ill-defined condition     Multiple body piercings; unable to remove tongue and lip piercings    Ill-defined condition 2018    GASTRITIS PER ENDOSCOPY    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    Psychiatric disorder     DEPRESSION    Thyroid disease     hypo    Ventral hernia 12/31/2013     Past Surgical History:   Procedure Laterality Date    HX HEENT  2009    thyroidectomy    HX KNEE REPLACEMENT      R    HX MOHS PROCEDURES Right 3/8/11    HX OTHER SURGICAL      hiatal hernia repair    HX TUBAL LIGATION       Family History   Problem Relation Age of Onset    Cancer Father         lung cancer    Heart Disease Mother     Hypertension Mother     Diabetes Mother     Anesth Problems Neg Hx      Social History     Tobacco Use    Smoking status: Current Every Day Smoker     Packs/day: 0.25     Years: 1.50     Pack years: 0.37 Types: Cigarettes     Last attempt to quit: 10/1/2015     Years since quitting: 3.7    Smokeless tobacco: Never Used    Tobacco comment: patient quit smoking for 10 years, began smoking again and then quit again in 2015   Substance Use Topics    Alcohol use: No        Review of Systems   Constitutional: Negative for fever and malaise/fatigue. Respiratory: Negative for cough and shortness of breath. Cardiovascular: Negative for chest pain. Gastrointestinal: Negative for abdominal pain and nausea. Musculoskeletal: Negative for myalgias. +joint swelling     Skin: Positive for itching. Negative for rash. Neurological: Negative for headaches. All other systems reviewed and are negative. Physical Exam   Constitutional: She is oriented to person, place, and time. She appears well-developed and well-nourished. No distress. HENT:   Head: Normocephalic and atraumatic. Right Ear: External ear normal.   Left Ear: External ear normal.   Eyes: Pupils are equal, round, and reactive to light. Cardiovascular: Normal rate and intact distal pulses. Pulmonary/Chest: Effort normal. No respiratory distress. Abdominal: Soft. She exhibits no distension. Musculoskeletal: She exhibits no edema. Neurological: She is alert and oriented to person, place, and time. Skin: Skin is warm and dry. No rash noted. She is not diaphoretic. There is erythema (left shoulder, + insect bite). +itching. Psychiatric: She has a normal mood and affect. Her behavior is normal. Judgment normal.   Nursing note and vitals reviewed. ASSESSMENT and PLAN    ICD-10-CM ICD-9-CM    1. Insect bite of left shoulder, initial encounter S40.262A 912.4 hydrOXYzine HCl (ATARAX) 25 mg tablet    W57. Myrl Mon Q449.0 predniSONE (DELTASONE) 20 mg tablet   2. Pruritus L29.9 698.9 hydrOXYzine HCl (ATARAX) 25 mg tablet      predniSONE (DELTASONE) 20 mg tablet   3.  Acquired hypothyroidism E03.9 244.9 TSH 3RD GENERATION      HGB & HCT

## 2019-06-17 NOTE — PATIENT INSTRUCTIONS
Insect Stings and Bites: Care Instructions  Your Care Instructions  Stings and bites from bees, wasps, ants, and other insects often cause pain, swelling, redness, and itching. In some people, especially children, the redness and swelling may be worse. It may extend several inches beyond the affected area. But in most cases, stings and bites don't cause reactions all over the body. If you have had a reaction to an insect sting or bite, you are at risk for a reaction if you get stung or bitten again. Follow-up care is a key part of your treatment and safety. Be sure to make and go to all appointments, and call your doctor if you are having problems. It's also a good idea to know your test results and keep a list of the medicines you take. How can you care for yourself at home? · Do not scratch or rub the skin where the sting or bite occurred. · Put a cold pack or ice cube on the area. Put a thin cloth between the ice and your skin. For some people, a paste of baking soda mixed with a little water helps relieve pain and decrease the reaction. · Take an over-the-counter antihistamine, such as diphenhydramine (Benadryl) or loratadine (Claritin), to relieve swelling, redness, and itching. Calamine lotion or hydrocortisone cream may also help. Do not give antihistamines to your child unless you have checked with the doctor first.  · Be safe with medicines. If your doctor prescribed medicine for your allergy, take it exactly as prescribed. Call your doctor if you think you are having a problem with your medicine. You will get more details on the specific medicines your doctor prescribes. · Your doctor may prescribe a shot of epinephrine to carry with you in case you have a severe reaction. Learn how and when to give yourself the shot, and keep it with you at all times. Make sure it has not . · Go to the emergency room anytime you have a severe reaction.  Go even if you have given yourself epinephrine and are feeling better. Symptoms can come back. When should you call for help? Call 911 anytime you think you may need emergency care. For example, call if:    · You have symptoms of a severe allergic reaction. These may include:  ? Sudden raised, red areas (hives) all over your body. ? Swelling of the throat, mouth, lips, or tongue. ? Trouble breathing. ? Passing out (losing consciousness). Or you may feel very lightheaded or suddenly feel weak, confused, or restless.    Call your doctor now or seek immediate medical care if:    · You have symptoms of an allergic reaction not right at the sting or bite, such as:  ? A rash or small area of hives (raised, red areas on the skin). ? Itching. ? Swelling. ? Belly pain, nausea, or vomiting.     · You have a lot of swelling around the site (such as your entire arm or leg is swollen).     · You have signs of infection, such as:  ? Increased pain, swelling, redness, or warmth around the sting. ? Red streaks leading from the area. ? Pus draining from the sting. ? A fever.    Watch closely for changes in your health, and be sure to contact your doctor if:    · You do not get better as expected. Where can you learn more? Go to http://ofelia-mindy.info/. Enter P390 in the search box to learn more about \"Insect Stings and Bites: Care Instructions. \"  Current as of: September 23, 2018  Content Version: 11.9  © 3321-0857 [employer]. Care instructions adapted under license by [employer] (which disclaims liability or warranty for this information). If you have questions about a medical condition or this instruction, always ask your healthcare professional. [place] [address] any warranty or liability for your use of this information.

## 2019-06-17 NOTE — PROGRESS NOTES
Pt is here for   Chief Complaint   Patient presents with    Swelling     pt states that she thinks she may have been bitten by something on her shoulder    Request For New Medication     for methocarbamol dose increase to 750mg     Pt states pain level is a 7/10 left shoulder and knee    1. Have you been to the ER, urgent care clinic since your last visit? Hospitalized since your last visit? No    2. Have you seen or consulted any other health care providers outside of the [address] [place] [address] since your last visit? Include any pap smears or colon screening.  No
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
## 2019-06-18 LAB
HCT VFR BLD AUTO: 42.4 % (ref 34–46.6)
HGB BLD-MCNC: 13.7 G/DL (ref 11.1–15.9)
TSH SERPL DL<=0.005 MIU/L-ACNC: 4.74 UIU/ML (ref 0.45–4.5)

## 2019-06-26 NOTE — PROGRESS NOTES
TSH is elevated, but let's repeat this when she returns for pain med refill and see if we need to change her dose then. Ms. Irma Patricia, can you add repeat TSH to her reason for visit for next OV please?

## 2019-07-09 ENCOUNTER — OFFICE VISIT (OUTPATIENT)
Dept: INTERNAL MEDICINE CLINIC | Age: 54
End: 2019-07-09

## 2019-07-09 VITALS
TEMPERATURE: 96.2 F | HEIGHT: 61 IN | DIASTOLIC BLOOD PRESSURE: 73 MMHG | SYSTOLIC BLOOD PRESSURE: 115 MMHG | HEART RATE: 71 BPM | WEIGHT: 210 LBS | OXYGEN SATURATION: 97 % | BODY MASS INDEX: 39.65 KG/M2 | RESPIRATION RATE: 16 BRPM

## 2019-07-09 DIAGNOSIS — E03.9 ACQUIRED HYPOTHYROIDISM: ICD-10-CM

## 2019-07-09 DIAGNOSIS — Z02.89 PAIN MANAGEMENT CONTRACT SIGNED: ICD-10-CM

## 2019-07-09 DIAGNOSIS — M19.012 PRIMARY OSTEOARTHRITIS OF LEFT SHOULDER: ICD-10-CM

## 2019-07-09 DIAGNOSIS — Z96.651 STATUS POST TOTAL RIGHT KNEE REPLACEMENT: ICD-10-CM

## 2019-07-09 DIAGNOSIS — Z79.891 CHRONIC USE OF OPIATE FOR THERAPEUTIC PURPOSE: ICD-10-CM

## 2019-07-09 DIAGNOSIS — M17.11 PRIMARY OSTEOARTHRITIS OF RIGHT KNEE: Primary | ICD-10-CM

## 2019-07-09 DIAGNOSIS — I10 ESSENTIAL HYPERTENSION WITH GOAL BLOOD PRESSURE LESS THAN 140/90: ICD-10-CM

## 2019-07-09 DIAGNOSIS — R60.0 BILATERAL LEG EDEMA: ICD-10-CM

## 2019-07-09 RX ORDER — AMLODIPINE BESYLATE 10 MG/1
5 TABLET ORAL DAILY
Qty: 90 TAB | Refills: 3
Start: 2019-07-09 | End: 2020-05-04

## 2019-07-09 RX ORDER — OXYCODONE AND ACETAMINOPHEN 10; 325 MG/1; MG/1
1 TABLET ORAL
Qty: 45 TAB | Refills: 0 | Status: SHIPPED | OUTPATIENT
Start: 2019-07-09 | End: 2019-08-06 | Stop reason: SDUPTHER

## 2019-07-09 RX ORDER — METHOCARBAMOL 750 MG/1
750 TABLET, FILM COATED ORAL
Qty: 270 TAB | Refills: 3 | Status: SHIPPED | OUTPATIENT
Start: 2019-07-09 | End: 2020-07-17

## 2019-07-09 NOTE — PROGRESS NOTES
Encounter for pain management. Chronic Pain:  Patient has chronic BL knee pain for years, had right TKR last year (2016). Had repeat surgery to right knee 3/26 with Dr. Avinash Spicer, still in PT. Still having FOOT swelling since last OV. Pain Scale: 8/10. Had IMAGING for lumbar spine and right knee and has been seeing/seen by ORTHO. Pain in the left shoulder, wrist, knees, legs, and lower back is still limiting walking, sitting, reaching, lifting, and standing, exacerbated by forementioned acitivities to include stair climbing. Has tried prednisone, tramadol, injections, PT, gabapentin, cymbalta, and surgery in past with minimal relief. Pain has been controlled with Oxycodone, last taken yesterday. The medication is kept safe by staying with her. Has NOT seen any other providers since last OV for pain medication. No significant changes to pain presentation since last OV. she is  able to do her normal daily activities. she reports the following adverse side effects: none. Averaging 7 BMs per week, some loose stools. Least pain over the last week has been 6/10. Worst pain over the last week has been 10/10. Aberrant behaviors: None         Symptoms onset: problem is longstanding. Rheumatological ROS: ongoing significant pain which is stable and controlled by pain med. Response to treatment plan: waxing and waning. Thyroid Review:  Patient is seen for followup of thyroid dysfunction; hypothyroidism with elevated TSH last OV. Thyroid ROS: +  fatigue, weight changes, heat intolerance. denies bowel/skin changes & CVS symptoms. Taking medication as prescribed  Last TSH below  Lab Results   Component Value Date/Time    TSH 4.740 (H) 06/17/2019 11:34 AM         Review of Systems  Constitutional: +MRSA in nose. negative for fevers, chills, anorexia and weight loss  Eyes:   negative for visual disturbance, drainage, and irritation  ENT:   +AR and environmental allergies.  negative for tinnitus,sore throat,ear pain,and hoarseness  Respiratory:  + asthma/COPD; stable. negative for hemoptysis  CV:   negative for chest pain, palpitations, and lower extremity edema  GI:   + GERD.  negative for nausea, vomiting, diarrhea, abdominal pain, and melena  Endo:               negative for polyuria,polydipsia,polyphagia, and heat intolerance  Genitourinary: negative for frequency, urgency, dysuria, retention, and hematuria  Integument:  negative for rash, ulcerations, and pruritus  Hematologic:  negative for easy bruising and bleeding  Musculoskel: negative for  muscle weakness  Neurological:  negative for headaches, dizziness, vertigo,and memory problems  Behavl/Psych: +depression, on cymbalta and zoloft. negative for feelings of  suicide    1./2. Medical history/Past medical History   Past Medical History:   Diagnosis Date    Arthritis     Asthma     uses inhalers    Chronic obstructive pulmonary disease (HCC)     bronchitis    Chronic pain     GERD (gastroesophageal reflux disease)     Hypertension     Ill-defined condition     environmental allergies     Ill-defined condition     Multiple body piercings; unable to remove tongue and lip piercings    Ill-defined condition 2018    GASTRITIS PER ENDOSCOPY    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    Psychiatric disorder     DEPRESSION    Thyroid disease     hypo    Ventral hernia 12/31/2013     Past Surgical History:   Procedure Laterality Date    HX HEENT  2009    thyroidectomy    HX KNEE REPLACEMENT      R    HX MOHS PROCEDURES Right 3/8/11    HX OTHER SURGICAL      hiatal hernia repair    HX TUBAL LIGATION       Patient Active Problem List   Diagnosis Code    Ventral hernia K43.9    Chronic pain of right knee M25.561, G89.29    Chronic right shoulder pain M25.511, G89.29    Environmental and seasonal allergies J30.89    Ventral hernia without obstruction or gangrene K43.9    Primary osteoarthritis of right knee M17.11    Mixed simple and mucopurulent chronic bronchitis ([facility] [address] J41.8    Acquired hypothyroidism E03.9    Chronic bilateral low back pain with right-sided sciatica M54.41, G89.29    Status post total right knee replacement Z96.651    Malignant hypertension I10    Pain management contract signed Z02.89    Chronic pain of left knee M25.562, G89.29    Moderate episode of recurrent major depressive disorder (HCC) F33.1    Psychophysiological insomnia F51.04    Severe obesity (BMI 35.0-39. 9) with comorbidity (HCC) E66.01    Post-tussive vomiting R11.10    Chronic use of opiate for therapeutic purpose Z79.891    Obesity, Class II, BMI 35-39.9 E66.9    Left wrist pain M25.532    Chronic left shoulder pain M25.512, G89.29    Osteoarthritis of spine with radiculopathy, cervical region M47.22    Primary osteoarthritis of left shoulder M19.012    MRSA carrier Z22.322    S/P total knee arthroplasty, right Z96.651    Loosening of knee joint prosthesis ([address] T84.038A, Z96.659       3. Applicable records from prior treatment providers are apart of [facility] under the media/encounters tab. 4. Diagnostic, therapeutic and laboratory results are available in the [facility] chart. 5. Consultation notes are available for review in the media/encounters tab of the [facility] chart. 6. Treatment goals include: pain control, improve activity level and function in regards to activities of daily living, and improved comfort level. Previously pt has been limited by pain in all these aspects. 7. The risks and benefits of treatment have been discussed at this office visit with the pt.  she understands that the medication has addictive potential.  Additionally the pt has been advised that narcotic pain medication may impair mental and/or physical ability required for performance of tasks such as driving or operating any other machinery. 8. Pt has an updated signed pain contract on fileTODAY and can be found under the media section of the [facility] chart.       9. The pain contract is reviewed. Pain medication will be continued at the same dosage. Pill count: 6, expected. Last fill 6/12/19. Urine drug screening ordered/collected today. Diagnostic studies are not indicated at this time. Interventional procedure are not indicated at this time. Narcan order sent in past.       10. Medication prescibed is oxycodone every 24 hours PRN #45 with 0 refill for a 1 month supply.  was reviewed while planning for pain/anxiety management, no indications of drug diversion suspected. Prescription history is NOT suspicious for controlled substance overuse. 11. Patient instructions have been reviewed in detail as outlined above and in the pain contract. 12. Re-evaluation is planned for 1 month. Current Outpatient Medications   Medication Sig Dispense Refill    hydrOXYzine HCl (ATARAX) 25 mg tablet Take 1 Tab by mouth three (3) times daily as needed for Itching or Anxiety. 60 Tab 0    acetaminophen (TYLENOL) 650 mg TbER Take 1 Tab by mouth every eight (8) hours. 270 Tab 3    levothyroxine (SYNTHROID) 125 mcg tablet TAKE 1 TABLET BY MOUTH EVERY DAY BEFORE BREAKFAST  Indications: hypothyroidism 90 Tab 3    hydroCHLOROthiazide (HYDRODIURIL) 25 mg tablet TAKE 1 TAB BY MOUTH DAILY FOR HYPERTENSION 90 Tab 3    oxyCODONE-acetaminophen (PERCOCET 10)  mg per tablet Take 1 Tab by mouth two (2) times daily as needed for Pain for up to 30 days. Max Daily Amount: 2 Tabs. May take 1 tab ONCE DAILY as needed for pain. Indications: Pain 45 Tab 0    tiotropium (SPIRIVA WITH HANDIHALER) 18 mcg inhalation capsule Take 1 Cap by inhalation daily.  COMBIVENT RESPIMAT  mcg/actuation inhaler INHALE 1 PUFF BY MOUTH DAILY  0    methocarbamol (ROBAXIN) 500 mg tablet Take 1 Tab by mouth every six (6) hours as needed (mm spasm).  60 Tab 2    sertraline (ZOLOFT) 100 mg tablet Take 1/2 tablet daily for 10 days and then take 1 tablet daily 30 Tab 1    PROAIR HFA 90 mcg/actuation inhaler TAKE 2 PUFFS BY INHALATION EVERY FOUR (4) HOURS AS NEEDED. 8.5 Inhaler 0    budesonide-formoterol (SYMBICORT) 160-4.5 mcg/actuation HFAA Take 2 Puffs by inhalation two (2) times a day. 3 Inhaler 3    lidocaine (LIDODERM) 5 % Apply patch to the affected area for 12 hours a day and remove for 12 hours a day. 30 Each 11    pantoprazole (PROTONIX) 40 mg tablet Take 1 Tab by mouth daily. Take in 2 week intervals for GERD Flares 30 Tab 1    metoprolol succinate (TOPROL-XL) 25 mg XL tablet TAKE 1 TABLET BY MOUTH DAILY. 90 Tab 3    gabapentin (NEURONTIN) 800 mg tablet TAKE 1 TAB BY MOUTH THREE (3) TIMES DAILY. 90 Tab 5    amLODIPine (NORVASC) 10 mg tablet TAKE 1 TABLET BY MOUTH EVERY DAY FOR HYPERTENSION 90 Tab 3    naloxone (NARCAN) 4 mg/actuation nasal spray Use 1 spray into 1 nostril for OVERDOSE. Call 911. For subsequent doses, give in alternating nostrils. May repeat every 2 to 3 min. 2 Each 0    traZODone (DESYREL) 50 mg tablet Take 1-2 tabs every night for sleep. 60 Tab 11    montelukast (SINGULAIR) 10 mg tablet Take 1 Tab by mouth daily. 30 Tab 6    albuterol (PROVENTIL VENTOLIN) 2.5 mg /3 mL (0.083 %) nebulizer solution 3 mL by Nebulization route every four (4) hours as needed for Wheezing. 24 Each 2    diclofenac (VOLTAREN) 1 % gel Apply 2 g to affected area every six (6) hours. 100 g 5    fluticasone (FLONASE) 50 mcg/actuation nasal spray 2 Sprays by Both Nostrils route daily. 1 Bottle 11    miscellaneous medical supply misc Shower seat for chronic knee pain, pt planning to have right TKR in June due to condition. Very limited range of motion. 1 Each 0    loratadine (CLARITIN) 10 mg tablet Take 1 Tab by mouth daily. 30 Tab 5    predniSONE (DELTASONE) 20 mg tablet Take 40 mg by mouth daily (with breakfast). 6 Tab 0    lidocaine 4 % patch Every 12 hours as needed for pain 20 Patch 0    rivaroxaban (XARELTO) 10 mg tablet Take 1 Tab by mouth daily (with lunch).  Indications: Deep Vein Thrombosis Prevention in Knee Replacement 14 Tab 0     Allergies   Allergen Reactions    Hydrocodone Itching    Mold Shortness of Breath and Itching    Ibuprofen Nausea Only       Objective:  Visit Vitals  /73 (BP 1 Location: Right arm, BP Patient Position: Sitting)   Pulse 71   Temp 96.2 °F (35.7 °C) (Oral)   Resp 16   Ht 5' 1\" (1.549 m)   Wt 210 lb (95.3 kg)   LMP 12/29/2016 (Exact Date)   SpO2 97%   BMI 39.68 kg/m²     Wt Readings from Last 3 Encounters:   07/09/19 210 lb (95.3 kg)   06/17/19 207 lb (93.9 kg)   06/11/19 211 lb (95.7 kg)     Physical Exam:   General appearance - alert, well appearing, and in mild distress. Mental status - A/O x 4, sad mood and affect. Chest - CTA. Symmetric chest rise. No wheezing, rales or rhonchi. Heart - Normal rate, regular rhythm. Normal S1, S2. No MGR or clicks. Abd- soft, obese,distended. Hyperactive BS. Epigastric TTP, no masses. Ext- Radial, DP pulses, 2+ bilaterally. No clubbing or cyanosis. NP ankle edema, CRISTIANE. Skin-Warm and dry. No hyperpigmentation, ulcerations, or suspicious lesions. Neuro - normal speech, no focal findings or movement disorder. Normal strength and muscle tone. Slow, limping gait using cane. Back- alignment midline. Thoracic spinal and right lumbar paraspinal tenderness. No CVAT. LROM, no SLR. Assessment/Plan:  maintained OXYCODONE #45 tabs. Increased Robaxin to 750 mg. Elevation and compression stocking use advised with modified diet for leg swelling reported. Discussed the patient's BMI with her. The BMI follow up plan is as follows: daily exercise    I have reviewed/discussed the above normal BMI (Body mass index is 39.68 kg/m².) with the patient. I have recommended the following interventions: encourage exercise, monitor weight, and dietary management education, guidance, and counseling, .    Medication Side Effects and Warnings were discussed with patient: yes   Patient Labs were reviewed: yes  Patient Past Records were reviewed: yes    See below for other orders         ICD-10-CM ICD-9-CM    1. Primary osteoarthritis of right knee M17.11 715.16 oxyCODONE-acetaminophen (PERCOCET 10)  mg per tablet   2. Status post total right knee replacement Z96.651 V43.65 oxyCODONE-acetaminophen (PERCOCET 10)  mg per tablet   3. Chronic use of opiate for therapeutic purpose Z79.891 V58.69 oxyCODONE-acetaminophen (PERCOCET 10)  mg per tablet   4. Primary osteoarthritis of left shoulder M19.012 715.11 oxyCODONE-acetaminophen (PERCOCET 10)  mg per tablet     No orders of the defined types were placed in this encounter. Wilder Cason expressed understanding of plan. An After Visit Summary was offered/printed and given to the patient.

## 2019-07-09 NOTE — LETTER
Alyson Kaur :1965 MR #:[id number] Provider Name:Feliberto Peoriastacy Velazquez, SHAGGY  
*DCVO-177* BSMG-491 () Page 1 of 5 Initial [employer] CONTROLLED SUBSTANCE AGREEMENT I may be prescribed medications that are controlled substances as part  of my treatment plan for management of my medical condition(s). The goal of my treatment plan is to maintain and/or improve my health and wellbeing. Because controlled substances have an increased risk of abuse or harm, continual re-evaluation is needed determine if the goals of my treatment plan are being met for my safety and the safety of others. Brady Garcia  am entering into this Controlled Substance Agreement with my provider, Rosi Cardona NP at [address] [facility] . I understand that successful treatment requires mutual trust and honesty between me and my provider. I understand that there are state and federal laws and regulations which apply to the medications that my provider may prescribe that must be followed. I understand there are risks and benefits ts of taking the medicines that my provider may prescribe. I understand and agree that following this Agreement is necessary in continuing my provider-patient relationship and success of my treatment plan. As a part of my treatment plan, I agree to the following: COMMUNICATION: 
 
1. I will communicate fully with my provider about my medical condition(s), including the effect on my daily life and how well my medications are helping. I will tell my provider all of the medications that I take for any reason, including medications I receive from another health care provider, and will notify my provider about all issues, problems or concerns, including any side effects, which may be related to my medications. I understand that this information allows my provider to adjust my treatment plan to help manage my medical condition.  I understand that this information will become part of my permanent medical record. 2. I will notify my provider if I have a history of alcohol/drug misuse/addiction or if I have had treatment for alcohol/drug addiction in the past, or if I have a new problem with or concern about alcohol/drug use/addiction, because this increases the likelihood of high risk behaviors and may lead to serious medical conditions. 3. Females Only: I will notify my provider if I am or become pregnant, or if I intend to become pregnant, or if I intend to breastfeed. I understand that communication of these issues with my provider is important, due to possible effects my medication could have on an unborn fetus or breastfeeding child. Name:Inez Edwards :1965 MR #:[id number] Provider Name:Sheldon Dao NP  
*UHIP-713* BSMG-491 () Page 2 of 5 Initial [employer] MISUSE OF MEDICATIONS / DRUGS: 
 
1. I agree to take all controlled substances as prescribed, and will not misuse or abuse any controlled substances prescribed by my provider. For my safety, I will not increase the amount of medicine I take without first talking with and getting permission from my provider. 2. If I have a medical emergency, another health care provider may prescribe me medication. If I seek emergency treatment, I will notify my provider within seventy-two (72) hours. 3. I understand that my provider may discuss my use and/or possible misuse/abuse of controlled substances and alcohol, as appropriate, with any health care provider involved in my care, pharmacist or legal authority. ILLEGAL DRUGS: 
 
1. I will not use illegal drugs of any kind, including but not limited to marijuana, heroin, cocaine, or any prescription drug which is not prescribed to me. DRUG DIVERSION / PRESCRIPTION FRAUD: 
 
1. I will not share, sell, trade, give away, or otherwise misuse my prescriptions or medications. 2. I will not alter any prescriptions provided to me by my provider. SINGLE PROVIDER: 
 
1. I agree that all controlled substances that I take will be prescribed only by my provider (or his/her covering provider) under this Agreement. This agreement does not prevent me from seeking emergency medical treatment or receiving pain management related to a surgery. PROTECTING MEDICATIONS: 
 
1. I am responsible for keeping my prescriptions and medications in a safe and secure place including safeguarding them from loss or theft. I understand that lost, stolen or damaged/destroyed prescriptions or medications will not be replaced. Name:. Felisa Jenkins :1965 MR #:[id number] Provider Name:Caro Dao, NP  
*CZLY-245* BSMG-491 () Page 3 of 5 Initial [employer] PRESCRIPTION RENEWALS/REFILLS: 
 
1. I will follow my controlled substance medication schedule as prescribed by my provider. 2. I understand and agree that I will make any requests for renewals or refills of my prescriptions only at the time of an office visit or during my providers regular office hours subject to the prescription refill requirements of the individual practice. 3. I understand that my provider may not call in prescriptions for controlled substances to my pharmacy. 4. I understand that my provider may adjust or discontinue these medications as deemed appropriate for my medical treatment plan. This Agreement does not guarantee the prescription of controlled medications. 5. I agree that if my medications are adjusted or discontinued, I will properly dispose of any remaining medications. I understand that I will be required to dispose of any remaining controlled medications prior to being provided with any prescriptions for other controlled medications.  
 
 
1. I authorize my provider and my pharmacy to cooperate fully with any local, state, or federal law enforcement agency in the investigation of any possible misuse, sale, or other diversion of my controlled substance prescriptions or medications. RISKS: 
 
 
1. I understand that if I do not adhere to this Agreement in any way, my provider may change my prescriptions, stop prescribing controlled substances or end our provider-patient relationship. 2. If my provider decides to stop prescribing medication, or decides to end our provider-patient relationship,my provider may require that I taper my medications slowly. If necessary, my provider may also provide a prescription for other medications to treat my withdrawal symptoms. UNDERSTANDING THIS AGREEMENT: 
 
I understand that my provider may adjust or stop my prescriptions for controlled substances based on my medical condition and my treatment plan. I understand that this Agreement does not guarantee that I will be prescribed medications or controlled substances. I understand that controlled substances may be just one part 
of my treatment plan.  
 
My initial on each page and my signature below shows that I have read each page of this Agreement, I have had an opportunity to ask questions, and all of my questions have been answered to my satisfaction by my provider. By signing below, I agree to comply with this Agreement, and I understand that if I do not follow the Agreements listed above, my provider may stop 
 
 
 
_________________________________________  Date/Time 7/9/2019 10:04 AM   
             (Patient Signature)

## 2019-07-09 NOTE — PATIENT INSTRUCTIONS
Elevate legs above the level of your heart while at rest to help reduce leg swelling. Also use compression stockings, available in medical supply and drug stores, to reduce swelling. Leg and Ankle Edema: Care Instructions  Your Care Instructions  Swelling in the legs, ankles, and feet is called edema. It is common after you sit or stand for a while. Long plane flights or car rides often cause swelling in the legs and feet. You may also have swelling if you have to stand for long periods of time at your job. Problems with the veins in the legs (varicose veins) and changes in hormones can also cause swelling. Sometimes the swelling in the ankles and feet is caused by a more serious problem, such as heart failure, infection, blood clots, or liver or kidney disease. Follow-up care is a key part of your treatment and safety. Be sure to make and go to all appointments, and call your doctor if you are having problems. It's also a good idea to know your test results and keep a list of the medicines you take. How can you care for yourself at home? · If your doctor gave you medicine, take it as prescribed. Call your doctor if you think you are having a problem with your medicine. · Whenever you are resting, raise your legs up. Try to keep the swollen area higher than the level of your heart. · Take breaks from standing or sitting in one position. ? Walk around to increase the blood flow in your lower legs. ? Move your feet and ankles often while you stand, or tighten and relax your leg muscles. · Wear support stockings. Put them on in the morning, before swelling gets worse. · Eat a balanced diet. Lose weight if you need to. · Limit the amount of salt (sodium) in your diet. Salt holds fluid in the body and may increase swelling. When should you call for help? Call 911 anytime you think you may need emergency care.  For example, call if:    · You have symptoms of a blood clot in your lung (called a pulmonary embolism). These may include:  ? Sudden chest pain. ? Trouble breathing. ? Coughing up blood.    Call your doctor now or seek immediate medical care if:    · You have signs of a blood clot, such as:  ? Pain in your calf, back of the knee, thigh, or groin. ? Redness and swelling in your leg or groin.     · You have symptoms of infection, such as:  ? Increased pain, swelling, warmth, or redness. ? Red streaks or pus. ? A fever.    Watch closely for changes in your health, and be sure to contact your doctor if:    · Your swelling is getting worse.     · You have new or worsening pain in your legs.     · You do not get better as expected. Where can you learn more? Go to http://ofelia-mindy.info/. Enter P578 in the search box to learn more about \"Leg and Ankle Edema: Care Instructions. \"  Current as of: September 23, 2018  Content Version: 11.9  © 8430-2239 [employer]. Care instructions adapted under license by [employer] (which disclaims liability or warranty for this information). If you have questions about a medical condition or this instruction, always ask your healthcare professional. [place] [address] any warranty or liability for your use of this information. Low Sodium Diet (2,000 Milligram): Care Instructions  Your Care Instructions    Too much sodium causes your body to hold on to extra water. This can raise your blood pressure and force your heart and kidneys to work harder. In very serious cases, this could cause you to be put in the hospital. It might even be life-threatening. By limiting sodium, you will feel better and lower your risk of serious problems. The most common source of sodium is salt. People get most of the salt in their diet from canned, prepared, and packaged foods. Fast food and restaurant meals also are very high in sodium.  Your doctor will probably limit your sodium to less than 2,000 milligrams (mg) a day. This limit counts all the sodium in prepared and packaged foods and any salt you add to your food. Follow-up care is a key part of your treatment and safety. Be sure to make and go to all appointments, and call your doctor if you are having problems. It's also a good idea to know your test results and keep a list of the medicines you take. How can you care for yourself at home? Read food labels  · Read labels on cans and food packages. The labels tell you how much sodium is in each serving. Make sure that you look at the serving size. If you eat more than the serving size, you have eaten more sodium. · Food labels also tell you the Percent Daily Value for sodium. Choose products with low Percent Daily Values for sodium. · Be aware that sodium can come in forms other than salt, including monosodium glutamate (MSG), sodium citrate, and sodium bicarbonate (baking soda). MSG is often added to Asian food. When you eat out, you can sometimes ask for food without MSG or added salt. Buy low-sodium foods  · Buy foods that are labeled \"unsalted\" (no salt added), \"sodium-free\" (less than 5 mg of sodium per serving), or \"low-sodium\" (less than 140 mg of sodium per serving). Foods labeled \"reduced-sodium\" and \"light sodium\" may still have too much sodium. Be sure to read the label to see how much sodium you are getting. · Buy fresh vegetables, or frozen vegetables without added sauces. Buy low-sodium versions of canned vegetables, soups, and other canned goods. Prepare low-sodium meals  · Cut back on the amount of salt you use in cooking. This will help you adjust to the taste. Do not add salt after cooking. One teaspoon of salt has about 2,300 mg of sodium. · Take the salt shaker off the table. · Flavor your food with garlic, lemon juice, onion, vinegar, herbs, and spices. Do not use soy sauce, lite soy sauce, steak sauce, onion salt, garlic salt, celery salt, mustard, or ketchup on your food.   · Use low-sodium salad dressings, sauces, and ketchup. Or make your own salad dressings and sauces without adding salt. · Use less salt (or none) when recipes call for it. You can often use half the salt a recipe calls for without losing flavor. Other foods such as rice, pasta, and grains do not need added salt. · Rinse canned vegetables, and cook them in fresh water. This removes some--but not all--of the salt. · Avoid water that is naturally high in sodium or that has been treated with water softeners, which add sodium. Call your local water company to find out the sodium content of your water supply. If you buy bottled water, read the label and choose a sodium-free brand. Avoid high-sodium foods  · Avoid eating:  ? Smoked, cured, salted, and canned meat, fish, and poultry. ? Ham, morejon, hot dogs, and luncheon meats. ? Regular, hard, and processed cheese and regular peanut butter. ? Crackers with salted tops, and other salted snack foods such as pretzels, chips, and salted popcorn. ? Frozen prepared meals, unless labeled low-sodium. ? Canned and dried soups, broths, and bouillon, unless labeled sodium-free or low-sodium. ? Canned vegetables, unless labeled sodium-free or low-sodium. ? Western [place] fries, pizza, tacos, and other fast foods. ? Pickles, olives, ketchup, and other condiments, especially soy sauce, unless labeled sodium-free or low-sodium. Where can you learn more? Go to http://ofelia-mindy.info/. Enter E773 in the search box to learn more about \"Low Sodium Diet (2,000 Milligram): Care Instructions. \"  Current as of: March 28, 2018  Content Version: 11.9  © 6792-5725 [employer]. Care instructions adapted under license by [employer] (which disclaims liability or warranty for this information).  If you have questions about a medical condition or this instruction, always ask your healthcare professional. [place] [address] any warranty or liability for your use of this information.

## 2019-07-09 NOTE — PROGRESS NOTES
Pt states last had something for pain yesterday. Pt is here for   Chief Complaint   Patient presents with    Follow-up     Edema    Medication Refill     order pending. .. pt states that she would like an increase to 750 of the methocarbamol     Pt states pain level is a 8/10 knee, shoulder, and shin    1. Have you been to the ER, urgent care clinic since your last visit? Hospitalized since your last visit? No    2. Have you seen or consulted any other health care providers outside of the [address] [place] [address] since your last visit? Include any pap smears or colon screening.  No

## 2019-07-10 ENCOUNTER — HOSPITAL ENCOUNTER (EMERGENCY)
Age: 54
Discharge: HOME | End: 2019-07-10
Attending: EMERGENCY MEDICINE
Payer: MEDICAID

## 2019-07-10 ENCOUNTER — APPOINTMENT (OUTPATIENT)
Dept: GENERAL RADIOLOGY | Age: 54
End: 2019-07-10
Attending: PHYSICIAN ASSISTANT
Payer: MEDICAID

## 2019-07-10 VITALS
WEIGHT: 210 LBS | SYSTOLIC BLOOD PRESSURE: 131 MMHG | TEMPERATURE: 98.1 F | RESPIRATION RATE: 18 BRPM | HEART RATE: 76 BPM | BODY MASS INDEX: 39.65 KG/M2 | OXYGEN SATURATION: 96 % | HEIGHT: 61 IN | DIASTOLIC BLOOD PRESSURE: 61 MMHG

## 2019-07-10 DIAGNOSIS — S62.650A CLOSED NONDISPLACED FRACTURE OF MIDDLE PHALANX OF RIGHT INDEX FINGER, INITIAL ENCOUNTER: Primary | ICD-10-CM

## 2019-07-10 LAB
BNP SERPL-MCNC: 23.8 PG/ML (ref 0–100)
ERYTHROCYTE [SEDIMENTATION RATE] IN BLOOD BY WESTERGREN METHOD: 26 MM/HR (ref 0–40)
TSH SERPL DL<=0.005 MIU/L-ACNC: 3.2 UIU/ML (ref 0.45–4.5)
URATE SERPL-MCNC: 4.5 MG/DL (ref 2.5–7.1)

## 2019-07-10 PROCEDURE — 77030008323 HC SPLNT FNGR GTR DJOR -A

## 2019-07-10 PROCEDURE — 99282 EMERGENCY DEPT VISIT SF MDM: CPT

## 2019-07-10 PROCEDURE — 73140 X-RAY EXAM OF FINGER(S): CPT

## 2019-07-10 NOTE — ED PROVIDER NOTES
EMERGENCY DEPARTMENT HISTORY AND PHYSICAL EXAM      Date: 7/10/2019  Patient Name: Maritza Shah    History of Presenting Illness     Chief Complaint   Patient presents with    Finger Pain       History Provided By: Patient    HPI: Maritza Shah, 62 y.o. female with PMHx significant for ventral hernia, hypertension, GERD, hypothyroidism, COPD, arthritis, chronic pain recent total right knee replacement, gastritis, MRSA, tubal ligation, presents ambulatory to the ED with cc of acute moderate aching right second finger pain secondary to excellently jamming it on car seat this morning. Patient takes daily oxycodone for knee pain. Pain exacerbated with movement and palpation. No wounds, bleeding, numbness, tingling, other injuries. There are no other complaints, changes, or physical findings at this time. PCP: Felix Nina NP    No current facility-administered medications on file prior to encounter. Current Outpatient Medications on File Prior to Encounter   Medication Sig Dispense Refill    oxyCODONE-acetaminophen (PERCOCET 10)  mg per tablet Take 1 Tab by mouth two (2) times daily as needed for Pain for up to 30 days. Max Daily Amount: 2 Tabs. May take 1 tab ONCE DAILY as needed for pain. Indications: Pain 45 Tab 0    methocarbamol (ROBAXIN) 750 mg tablet Take 1 Tab by mouth every six (6) hours as needed (mm spasm). 270 Tab 3    amLODIPine (NORVASC) 10 mg tablet Take 0.5 Tabs by mouth daily. 90 Tab 3    hydrOXYzine HCl (ATARAX) 25 mg tablet Take 1 Tab by mouth three (3) times daily as needed for Itching or Anxiety. 60 Tab 0    predniSONE (DELTASONE) 20 mg tablet Take 40 mg by mouth daily (with breakfast). 6 Tab 0    acetaminophen (TYLENOL) 650 mg TbER Take 1 Tab by mouth every eight (8) hours.  270 Tab 3    levothyroxine (SYNTHROID) 125 mcg tablet TAKE 1 TABLET BY MOUTH EVERY DAY BEFORE BREAKFAST  Indications: hypothyroidism 90 Tab 3    hydroCHLOROthiazide (HYDRODIURIL) 25 mg tablet TAKE 1 TAB BY MOUTH DAILY FOR HYPERTENSION 90 Tab 3    tiotropium (SPIRIVA WITH HANDIHALER) 18 mcg inhalation capsule Take 1 Cap by inhalation daily.  COMBIVENT RESPIMAT  mcg/actuation inhaler INHALE 1 PUFF BY MOUTH DAILY  0    lidocaine 4 % patch Every 12 hours as needed for pain 20 Patch 0    sertraline (ZOLOFT) 100 mg tablet Take 1/2 tablet daily for 10 days and then take 1 tablet daily 30 Tab 1    rivaroxaban (XARELTO) 10 mg tablet Take 1 Tab by mouth daily (with lunch). Indications: Deep Vein Thrombosis Prevention in Knee Replacement 14 Tab 0    PROAIR HFA 90 mcg/actuation inhaler TAKE 2 PUFFS BY INHALATION EVERY FOUR (4) HOURS AS NEEDED. 8.5 Inhaler 0    budesonide-formoterol (SYMBICORT) 160-4.5 mcg/actuation HFAA Take 2 Puffs by inhalation two (2) times a day. 3 Inhaler 3    lidocaine (LIDODERM) 5 % Apply patch to the affected area for 12 hours a day and remove for 12 hours a day. 30 Each 11    pantoprazole (PROTONIX) 40 mg tablet Take 1 Tab by mouth daily. Take in 2 week intervals for GERD Flares 30 Tab 1    metoprolol succinate (TOPROL-XL) 25 mg XL tablet TAKE 1 TABLET BY MOUTH DAILY. 90 Tab 3    gabapentin (NEURONTIN) 800 mg tablet TAKE 1 TAB BY MOUTH THREE (3) TIMES DAILY. 90 Tab 5    naloxone (NARCAN) 4 mg/actuation nasal spray Use 1 spray into 1 nostril for OVERDOSE. Call 911. For subsequent doses, give in alternating nostrils. May repeat every 2 to 3 min. 2 Each 0    traZODone (DESYREL) 50 mg tablet Take 1-2 tabs every night for sleep. 60 Tab 11    montelukast (SINGULAIR) 10 mg tablet Take 1 Tab by mouth daily. 30 Tab 6    albuterol (PROVENTIL VENTOLIN) 2.5 mg /3 mL (0.083 %) nebulizer solution 3 mL by Nebulization route every four (4) hours as needed for Wheezing. 24 Each 2    diclofenac (VOLTAREN) 1 % gel Apply 2 g to affected area every six (6) hours. 100 g 5    fluticasone (FLONASE) 50 mcg/actuation nasal spray 2 Sprays by Both Nostrils route daily.  1 Bottle 11    miscellaneous medical supply [facility] Shower seat for chronic knee pain, pt planning to have right TKR in June due to condition. Very limited range of motion. 1 Each 0    loratadine (CLARITIN) 10 mg tablet Take 1 Tab by mouth daily. 27 Tab 5       Past History     Past Medical History:  Past Medical History:   Diagnosis Date    Arthritis     Asthma     uses inhalers    Chronic obstructive pulmonary disease (HCC)     bronchitis    Chronic pain     GERD (gastroesophageal reflux disease)     Hypertension     Ill-defined condition     environmental allergies     Ill-defined condition     Multiple body piercings; unable to remove tongue and lip piercings    Ill-defined condition 2018    GASTRITIS PER ENDOSCOPY    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    Psychiatric disorder     DEPRESSION    Thyroid disease     hypo    Ventral hernia 12/31/2013       Past Surgical History:  Past Surgical History:   Procedure Laterality Date    HX HEENT  2009    thyroidectomy    HX KNEE REPLACEMENT      R    HX MOHS PROCEDURES Right 3/8/11    HX OTHER SURGICAL      hiatal hernia repair    HX TUBAL LIGATION         Family History:  Family History   Problem Relation Age of Onset    Cancer Father         lung cancer    Heart Disease Mother     Hypertension Mother     Diabetes Mother     Anesth Problems Neg Hx        Social History:  Social History     Tobacco Use    Smoking status: Current Every Day Smoker     Packs/day: 0.25     Years: 1.50     Pack years: 0.37     Types: Cigarettes     Last attempt to quit: 10/1/2015     Years since quitting: 3.7    Smokeless tobacco: Never Used    Tobacco comment: patient quit smoking for 10 years, began smoking again and then quit again in 2015   Substance Use Topics    Alcohol use: No    Drug use: No       Allergies:   Allergies   Allergen Reactions    Hydrocodone Itching    Mold Shortness of Breath and Itching    Ibuprofen Nausea Only         Review of Systems   Review of Systems Constitutional: Negative for activity change, appetite change, chills, diaphoresis, fatigue and fever. HENT: Negative. Eyes: Negative. Respiratory: Negative. Negative for cough and shortness of breath. Cardiovascular: Negative. Negative for chest pain and leg swelling. Gastrointestinal: Negative. Negative for abdominal pain, diarrhea, nausea and vomiting. Genitourinary: Negative. Musculoskeletal: Positive for arthralgias and joint swelling. Negative for back pain and neck pain. Skin: Negative. Negative for color change, pallor and wound. Neurological: Negative. Negative for numbness. Psychiatric/Behavioral: Negative. Physical Exam   Physical Exam   Constitutional: She is oriented to person, place, and time. She appears well-developed and well-nourished. No distress. HENT:   Head: Normocephalic and atraumatic. Right Ear: Hearing and external ear normal.   Left Ear: Hearing and external ear normal.   Nose: Nose normal.   Eyes: Pupils are equal, round, and reactive to light. Conjunctivae and EOM are normal.   Neck: Normal range of motion. Cardiovascular:   Pulses:       Radial pulses are 2+ on the right side, and 2+ on the left side. Pulmonary/Chest: Effort normal. No respiratory distress. Musculoskeletal:        Right wrist: Normal.        Right hand: She exhibits decreased range of motion (Dec AROM d/t pain. Full PROM.), tenderness, bony tenderness and swelling (Rt 2nd PIP.). She exhibits normal two-point discrimination, normal capillary refill, no deformity and no laceration. Normal sensation noted. Normal strength noted. Left hand: Normal.   Neurological: She is alert and oriented to person, place, and time. Skin: Skin is warm and dry. No bruising, no ecchymosis and no laceration noted. She is not diaphoretic. Psychiatric: She has a normal mood and affect.  Her behavior is normal. Judgment and thought content normal.   Nursing note and vitals reviewed. Diagnostic Study Results     Labs -   No results found for this or any previous visit (from the past 12 hour(s)). Radiologic Studies -   XR 2ND FINGER RT MIN 2 V   Final Result   IMPRESSION: There is a tiny bony density base of the right second middle phalanx   could represent volar plate avulsion injury age indeterminant. .. CT Results  (Last 48 hours)    None        CXR Results  (Last 48 hours)    None            Medical Decision Making   I am the first provider for this patient. I reviewed the vital signs, available nursing notes, past medical history, past surgical history, family history and social history. Vital Signs-Reviewed the patient's vital signs. Patient Vitals for the past 12 hrs:   Temp Pulse Resp BP SpO2   07/10/19 1927 98.1 °F (36.7 °C) 76 18 131/61 96 %       Pulse Oximetry Analysis - 96% on RA    Records Reviewed: Nursing Notes, Old Medical Records, Previous Radiology Studies and Previous Laboratory Studies    Provider Notes (Medical Decision Making):   Patient presents with Rt 2nd finger pain after trauma. DDx: dislocation, fracture, contusion. Will get analgesics and xrays. Neurovasularly intact. ED Course:   Initial assessment performed. The patients presenting problems have been discussed, and they are in agreement with the care plan formulated and outlined with them. I have encouraged them to ask questions as they arise throughout their visit. Procedure Note - Splint Placement:  8:04 PM  Performed by: PAULINA Solorzano  Neurovascularly intact prior to tx. An aluminum finger splint was placed on pt's right 2nd finger. Joint was placed in neutral position. Neurovascularly intact after tx. The procedure took 1-15 minutes, and pt tolerated well. Critical Care Time:   0    Disposition:  8:04 PM  I have discussed with patient their diagnosis, treatment, and follow up plan.  The patient agrees to follow up as outlined in discharge paperwork and also to return to the ED with any worsening. Isabell Garcia PA-C    PLAN:  1. Current Discharge Medication List      CONTINUE these medications which have NOT CHANGED    Details   oxyCODONE-acetaminophen (PERCOCET 10)  mg per tablet Take 1 Tab by mouth two (2) times daily as needed for Pain for up to 30 days. Max Daily Amount: 2 Tabs. May take 1 tab ONCE DAILY as needed for pain. Indications: Pain  Qty: 45 Tab, Refills: 0    Associated Diagnoses: Primary osteoarthritis of right knee; Status post total right knee replacement; Chronic use of opiate for therapeutic purpose; Primary osteoarthritis of left shoulder; Pain management contract signed      methocarbamol (ROBAXIN) 750 mg tablet Take 1 Tab by mouth every six (6) hours as needed (mm spasm). Qty: 270 Tab, Refills: 3    Associated Diagnoses: Primary osteoarthritis of right knee; Chronic use of opiate for therapeutic purpose; Primary osteoarthritis of left shoulder      amLODIPine (NORVASC) 10 mg tablet Take 0.5 Tabs by mouth daily. Qty: 90 Tab, Refills: 3    Associated Diagnoses: Essential hypertension with goal blood pressure less than 140/90      hydrOXYzine HCl (ATARAX) 25 mg tablet Take 1 Tab by mouth three (3) times daily as needed for Itching or Anxiety. Qty: 60 Tab, Refills: 0    Associated Diagnoses: Insect bite of left shoulder, initial encounter; Pruritus      predniSONE (DELTASONE) 20 mg tablet Take 40 mg by mouth daily (with breakfast). Qty: 6 Tab, Refills: 0    Associated Diagnoses: Insect bite of left shoulder, initial encounter; Pruritus      acetaminophen (TYLENOL) 650 mg TbER Take 1 Tab by mouth every eight (8) hours.   Qty: 270 Tab, Refills: 3    Associated Diagnoses: Primary osteoarthritis of right knee; Midline low back pain with right-sided sciatica, unspecified chronicity      levothyroxine (SYNTHROID) 125 mcg tablet TAKE 1 TABLET BY MOUTH EVERY DAY BEFORE BREAKFAST  Indications: hypothyroidism  Qty: 90 Tab, Refills: 3 Associated Diagnoses: Hypothyroidism, unspecified type      hydroCHLOROthiazide (HYDRODIURIL) 25 mg tablet TAKE 1 TAB BY MOUTH DAILY FOR HYPERTENSION  Qty: 90 Tab, Refills: 3    Associated Diagnoses: Malignant hypertension      tiotropium (SPIRIVA WITH HANDIHALER) 18 mcg inhalation capsule Take 1 Cap by inhalation daily. COMBIVENT RESPIMAT  mcg/actuation inhaler INHALE 1 PUFF BY MOUTH DAILY  Refills: 0      lidocaine 4 % patch Every 12 hours as needed for pain  Qty: 20 Patch, Refills: 0      sertraline (ZOLOFT) 100 mg tablet Take 1/2 tablet daily for 10 days and then take 1 tablet daily  Qty: 30 Tab, Refills: 1    Associated Diagnoses: Adjustment disorder with mixed anxiety and depressed mood      rivaroxaban (XARELTO) 10 mg tablet Take 1 Tab by mouth daily (with lunch). Indications: Deep Vein Thrombosis Prevention in Knee Replacement  Qty: 14 Tab, Refills: 0      PROAIR HFA 90 mcg/actuation inhaler TAKE 2 PUFFS BY INHALATION EVERY FOUR (4) HOURS AS NEEDED. Qty: 8.5 Inhaler, Refills: 0      budesonide-formoterol (SYMBICORT) 160-4.5 mcg/actuation HFAA Take 2 Puffs by inhalation two (2) times a day. Qty: 3 Inhaler, Refills: 3      lidocaine (LIDODERM) 5 % Apply patch to the affected area for 12 hours a day and remove for 12 hours a day. Qty: 30 Each, Refills: 11    Associated Diagnoses: Primary osteoarthritis of right knee; Midline low back pain with right-sided sciatica, unspecified chronicity      pantoprazole (PROTONIX) 40 mg tablet Take 1 Tab by mouth daily. Take in 2 week intervals for GERD Flares  Qty: 30 Tab, Refills: 1      metoprolol succinate (TOPROL-XL) 25 mg XL tablet TAKE 1 TABLET BY MOUTH DAILY. Qty: 90 Tab, Refills: 3    Associated Diagnoses: Malignant hypertension      gabapentin (NEURONTIN) 800 mg tablet TAKE 1 TAB BY MOUTH THREE (3) TIMES DAILY.   Qty: 90 Tab, Refills: 5    Associated Diagnoses: Chronic bilateral low back pain with right-sided sciatica      naloxone (NARCAN) 4 mg/actuation nasal spray Use 1 spray into 1 nostril for OVERDOSE. Call 911. For subsequent doses, give in alternating nostrils. May repeat every 2 to 3 min. Qty: 2 Each, Refills: 0      traZODone (DESYREL) 50 mg tablet Take 1-2 tabs every night for sleep. Qty: 60 Tab, Refills: 11    Associated Diagnoses: Psychophysiological insomnia      montelukast (SINGULAIR) 10 mg tablet Take 1 Tab by mouth daily. Qty: 30 Tab, Refills: 6    Associated Diagnoses: Mixed simple and mucopurulent chronic bronchitis (HCC)      albuterol (PROVENTIL VENTOLIN) 2.5 mg /3 mL (0.083 %) nebulizer solution 3 mL by Nebulization route every four (4) hours as needed for Wheezing. Qty: 24 Each, Refills: 2    Associated Diagnoses: Flu-like symptoms      diclofenac (VOLTAREN) 1 % gel Apply 2 g to affected area every six (6) hours. Qty: 100 g, Refills: 5    Associated Diagnoses: Primary osteoarthritis of right knee; Status post total right knee replacement; Chronic right shoulder pain; Chronic pain of right knee      fluticasone (FLONASE) 50 mcg/actuation nasal spray 2 Sprays by Both Nostrils route daily. Qty: 1 Bottle, Refills: 11      miscellaneous medical supply misc Shower seat for chronic knee pain, pt planning to have right TKR in June due to condition. Very limited range of motion. Qty: 1 Each, Refills: 0    Associated Diagnoses: Chronic pain of right knee      loratadine (CLARITIN) 10 mg tablet Take 1 Tab by mouth daily. Qty: 30 Tab, Refills: 5    Associated Diagnoses: Environmental and seasonal allergies           2. Follow-up Information     Follow up With Specialties Details Why Contact Joseph Monk MD Hand Surgery, General Surgery Schedule an appointment as soon as possible for a visit in 1 week As needed [address] [address] [phone]          Return to ED if worse     Diagnosis     Clinical Impression:   1.  Closed nondisplaced fracture of middle phalanx of right index finger, initial encounter        Attestations:    Please note that this dictation was completed with Dragon, computer voice recognition software. Quite often unanticipated grammatical, syntax, homophones, and other interpretive errors are inadvertently transcribed by the computer software. Please disregard these errors. Additionally, please excuse any errors that have escaped final proofreading.

## 2019-07-11 NOTE — DISCHARGE INSTRUCTIONS
Patient Education        Finger Fracture: Care Instructions  Your Care Instructions    Breaks in the bones of the finger usually heal well in about 3 to 4 weeks. The pain and swelling from a broken finger can last for weeks. But it should steadily improve, starting a few days after you break it. It is very important that you wear and take care of the cast or splint exactly as your doctor tells you to so that your finger heals properly and does not end up crooked. Wearing a splint may interfere with your normal activities. Ask for help with daily tasks if you need it. You heal best when you take good care of yourself. Eat a variety of healthy foods, and don't smoke. Follow-up care is a key part of your treatment and safety. Be sure to make and go to all appointments, and call your doctor if you are having problems. It's also a good idea to know your test results and keep a list of the medicines you take. How can you care for yourself at home? · If your doctor put a splint on your finger, wear the splint exactly as directed. Do not remove it until your doctor says that you can. · Keep your hand raised above the level of your heart as much as you can. This will help reduce swelling. · Put ice or a cold pack on your finger for 10 to 20 minutes at a time. Try to do this every 1 to 2 hours for the next 3 days (when you are awake) or until the swelling goes down. Put a thin cloth between the ice and your skin. Keep the splint dry. · Be safe with medicines. Take pain medicines exactly as directed. ? If the doctor gave you a prescription medicine for pain, take it as prescribed. ? If you are not taking a prescription pain medicine, ask your doctor if you can take an over-the-counter medicine. When should you call for help? Call 911 anytime you think you may need emergency care.  For example, call if:    · Your finger is cool or pale or changes color.    Call your doctor now or seek immediate medical care if:    · Your pain gets much worse.     · You have tingling, weakness, or numbness in your finger.     · You have signs of infection, such as:  ? Increased pain, swelling, warmth, or redness. ? Red streaks leading from the area. ? Pus draining from the area. ? Swollen lymph nodes in your neck, armpits, or groin. ? A fever.    Watch closely for changes in your health, and be sure to contact your doctor if:    · Your finger is not steadily improving. Where can you learn more? Go to http://ofelia-mindy.info/. Enter S989 in the search box to learn more about \"Finger Fracture: Care Instructions. \"  Current as of: September 20, 2018  Content Version: 11.9  © 5807-6613 [employer]. Care instructions adapted under license by [employer] (which disclaims liability or warranty for this information). If you have questions about a medical condition or this instruction, always ask your healthcare professional. [place] [address] any warranty or liability for your use of this information.

## 2019-07-15 LAB — DRUGS UR: NORMAL

## 2019-07-16 ENCOUNTER — HOSPITAL ENCOUNTER (OUTPATIENT)
Dept: VASCULAR SURGERY | Age: 54
Discharge: HOME | End: 2019-07-16
Attending: NURSE PRACTITIONER
Payer: MEDICAID

## 2019-07-16 DIAGNOSIS — R60.0 BILATERAL LEG EDEMA: ICD-10-CM

## 2019-07-16 PROCEDURE — 93970 EXTREMITY STUDY: CPT

## 2019-07-18 ENCOUNTER — TELEPHONE (OUTPATIENT)
Dept: INTERNAL MEDICINE CLINIC | Age: 54
End: 2019-07-18

## 2019-07-24 DIAGNOSIS — J41.8 MIXED SIMPLE AND MUCOPURULENT CHRONIC BRONCHITIS (HCC): ICD-10-CM

## 2019-07-25 RX ORDER — MONTELUKAST SODIUM 10 MG/1
10 TABLET ORAL DAILY
Qty: 30 TAB | Refills: 6 | Status: SHIPPED | OUTPATIENT
Start: 2019-07-25 | End: 2022-05-09 | Stop reason: SDUPTHER

## 2019-07-29 ENCOUNTER — TELEPHONE (OUTPATIENT)
Dept: INTERNAL MEDICINE CLINIC | Age: 54
End: 2019-07-29

## 2019-07-29 DIAGNOSIS — R60.0 BILATERAL LEG EDEMA: Primary | ICD-10-CM

## 2019-07-29 RX ORDER — FUROSEMIDE 20 MG/1
TABLET ORAL
Qty: 15 TAB | Refills: 0 | Status: SHIPPED | OUTPATIENT
Start: 2019-07-29 | End: 2019-08-17 | Stop reason: SDUPTHER

## 2019-07-29 NOTE — TELEPHONE ENCOUNTER
Sent Rx for Lasix. If this is not helpful or symptoms return, she may need to see the specialist: [facility].

## 2019-07-29 NOTE — TELEPHONE ENCOUNTER
Pt called today ref to test results. She was advised she had no blood cots and a letter has been sent to her ref to tis. She states she is still retaining fluid.   Pt # [phone]

## 2019-07-30 ENCOUNTER — TELEPHONE (OUTPATIENT)
Dept: INTERNAL MEDICINE CLINIC | Age: 54
End: 2019-07-30

## 2019-07-30 NOTE — TELEPHONE ENCOUNTER
Pt wants rx for claritin sent to the pharmacy. She starts shots for asthma on Thursday and specialist told her sh needs to start taking the claritin. She will get allergy shots after she starts the shots for allergy.   Pt states she does not have money to pay for it out of pocket and that is why she wants rx sent to pharmacy

## 2019-07-31 DIAGNOSIS — J30.89 ENVIRONMENTAL AND SEASONAL ALLERGIES: ICD-10-CM

## 2019-07-31 RX ORDER — LORATADINE 10 MG/1
10 TABLET ORAL DAILY
Qty: 90 TAB | Refills: 3 | Status: SHIPPED | OUTPATIENT
Start: 2019-07-31 | End: 2020-09-17

## 2019-08-06 ENCOUNTER — OFFICE VISIT (OUTPATIENT)
Dept: INTERNAL MEDICINE CLINIC | Age: 54
End: 2019-08-06

## 2019-08-06 VITALS
TEMPERATURE: 97.9 F | WEIGHT: 214.3 LBS | DIASTOLIC BLOOD PRESSURE: 74 MMHG | BODY MASS INDEX: 40.46 KG/M2 | RESPIRATION RATE: 18 BRPM | HEART RATE: 71 BPM | SYSTOLIC BLOOD PRESSURE: 112 MMHG | HEIGHT: 61 IN

## 2019-08-06 DIAGNOSIS — Z02.89 PAIN MANAGEMENT CONTRACT SIGNED: ICD-10-CM

## 2019-08-06 DIAGNOSIS — M17.11 PRIMARY OSTEOARTHRITIS OF RIGHT KNEE: Primary | ICD-10-CM

## 2019-08-06 DIAGNOSIS — G89.29 CHRONIC BILATERAL LOW BACK PAIN WITH RIGHT-SIDED SCIATICA: ICD-10-CM

## 2019-08-06 DIAGNOSIS — E66.01 SEVERE OBESITY (BMI >= 40) (HCC): ICD-10-CM

## 2019-08-06 DIAGNOSIS — Z96.651 STATUS POST TOTAL RIGHT KNEE REPLACEMENT: ICD-10-CM

## 2019-08-06 DIAGNOSIS — J41.8 MIXED SIMPLE AND MUCOPURULENT CHRONIC BRONCHITIS (HCC): ICD-10-CM

## 2019-08-06 DIAGNOSIS — Z79.891 CHRONIC USE OF OPIATE FOR THERAPEUTIC PURPOSE: ICD-10-CM

## 2019-08-06 DIAGNOSIS — M54.41 CHRONIC BILATERAL LOW BACK PAIN WITH RIGHT-SIDED SCIATICA: ICD-10-CM

## 2019-08-06 DIAGNOSIS — F33.1 MODERATE EPISODE OF RECURRENT MAJOR DEPRESSIVE DISORDER (HCC): ICD-10-CM

## 2019-08-06 DIAGNOSIS — R60.0 BILATERAL LEG EDEMA: ICD-10-CM

## 2019-08-06 DIAGNOSIS — M19.012 PRIMARY OSTEOARTHRITIS OF LEFT SHOULDER: ICD-10-CM

## 2019-08-06 DIAGNOSIS — J30.89 ENVIRONMENTAL AND SEASONAL ALLERGIES: ICD-10-CM

## 2019-08-06 RX ORDER — OXYCODONE AND ACETAMINOPHEN 10; 325 MG/1; MG/1
1 TABLET ORAL
Qty: 30 TAB | Refills: 0 | Status: SHIPPED | OUTPATIENT
Start: 2019-09-06 | End: 2019-11-06 | Stop reason: SDUPTHER

## 2019-08-06 RX ORDER — OXYCODONE AND ACETAMINOPHEN 10; 325 MG/1; MG/1
1 TABLET ORAL
Qty: 45 TAB | Refills: 0 | Status: SHIPPED | OUTPATIENT
Start: 2019-08-06 | End: 2019-08-06 | Stop reason: SDUPTHER

## 2019-08-06 RX ORDER — OXYCODONE AND ACETAMINOPHEN 10; 325 MG/1; MG/1
1 TABLET ORAL
Qty: 30 TAB | Refills: 0 | Status: SHIPPED | OUTPATIENT
Start: 2019-10-06 | End: 2019-11-06 | Stop reason: SDUPTHER

## 2019-08-06 RX ORDER — OXYCODONE AND ACETAMINOPHEN 10; 325 MG/1; MG/1
1 TABLET ORAL
Qty: 30 TAB | Refills: 0 | Status: SHIPPED | OUTPATIENT
Start: 2019-08-06 | End: 2019-08-06 | Stop reason: SDUPTHER

## 2019-08-06 NOTE — PROGRESS NOTES
Encounter for pain management. Chronic Pain:  Patient has chronic BL knee pain for years and lower back pain. Had right TKR (2016) and repeat surgery to right knee 3/26/19 with Dr. Jacky Olivares. FOOT swelling RESOLVED since last OV with use of diuretics. Pain Scale: 7/10. Had IMAGING for lumbar spine and right knee and has been seeing/seen by ORTHO. Pain in the left shoulder, wrist, knees, legs, and lower back is still limiting walking, sitting, reaching, lifting, and standing, exacerbated by forementioned acitivities to include stair climbing. Has tried prednisone, tramadol, injections, PT, gabapentin, cymbalta, and surgery in past with minimal relief. Pain has been controlled with Oxycodone 10-325mg, last taken yesterday. The medication is kept safe by staying with her. Has NOT seen any other providers since last OV for pain medication. No significant changes to pain presentation since last OV, but pain very severe limiting activity. Stayed in bed and only up to urinate yesterday due to discomfort. Otherwise more active with care of 1yr old grandniece she has custody of.  she is  able to do her normal daily activities. she reports the following adverse side effects: none. Averaging 7 BMs per week. Least pain over the last week has been 6/10. Worst pain over the last week has been 10/10. Aberrant behaviors: None         Symptoms onset: problem is longstanding. Rheumatological ROS: ongoing significant pain which is stable and controlled by pain med. Response to treatment plan: waxing and waning. Started xolair and will start immunotherapy for allergies soon too. Otherwise breathing and allergies improved. Review of Systems  Constitutional: +MRSA in nose. negative for fevers, chills, anorexia and weight loss  Eyes:   negative for visual disturbance, drainage, and irritation  ENT:   +AR and environmental allergies.  negative for tinnitus,sore throat,ear pain,and hoarseness  Respiratory:  + asthma/COPD; stable. negative for hemoptysis  CV:   negative for chest pain, palpitations, and lower extremity edema  GI:   + GERD.  negative for nausea, vomiting, diarrhea, abdominal pain, and melena  Endo:               negative for polyuria,polydipsia,polyphagia, and heat intolerance  Genitourinary: negative for frequency, urgency, dysuria, retention, and hematuria  Integument:  negative for rash, ulcerations, and pruritus  Hematologic:  negative for easy bruising and bleeding  Musculoskel: negative for  muscle weakness  Neurological:  negative for headaches, dizziness, vertigo,and memory problems  Behavl/Psych: +depression, on cymbalta and zoloft. negative for feelings of  suicide    1./2. Medical history/Past medical History   Past Medical History:   Diagnosis Date    Arthritis     Asthma     uses inhalers    Chronic obstructive pulmonary disease (HCC)     bronchitis    Chronic pain     GERD (gastroesophageal reflux disease)     Hypertension     Ill-defined condition     environmental allergies     Ill-defined condition     Multiple body piercings; unable to remove tongue and lip piercings    Ill-defined condition 2018    GASTRITIS PER ENDOSCOPY    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    Psychiatric disorder     DEPRESSION    Thyroid disease     hypo    Ventral hernia 12/31/2013     Past Surgical History:   Procedure Laterality Date    HX HEENT  2009    thyroidectomy    HX KNEE REPLACEMENT      R    HX MOHS PROCEDURES Right 3/8/11    HX OTHER SURGICAL      hiatal hernia repair    HX TUBAL LIGATION       Patient Active Problem List   Diagnosis Code    Ventral hernia K43.9    Chronic pain of right knee M25.561, G89.29    Chronic right shoulder pain M25.511, G89.29    Environmental and seasonal allergies J30.89    Ventral hernia without obstruction or gangrene K43.9    Primary osteoarthritis of right knee M17.11    Mixed simple and mucopurulent chronic bronchitis ([facility] [address] J41.8    Acquired hypothyroidism E03.9    Chronic bilateral low back pain with right-sided sciatica M54.41, G89.29    Status post total right knee replacement Z96.651    Malignant hypertension I10    Pain management contract signed Z02.89    Chronic pain of left knee M25.562, G89.29    Moderate episode of recurrent major depressive disorder (HCC) F33.1    Psychophysiological insomnia F51.04    Severe obesity (BMI 35.0-39. 9) with comorbidity (HCC) E66.01    Post-tussive vomiting R11.10    Chronic use of opiate for therapeutic purpose Z79.891    Obesity, Class II, BMI 35-39.9 E66.9    Left wrist pain M25.532    Chronic left shoulder pain M25.512, G89.29    Osteoarthritis of spine with radiculopathy, cervical region M47.22    Primary osteoarthritis of left shoulder M19.012    MRSA carrier Z22.322    S/P total knee arthroplasty, right Z96.651    Loosening of knee joint prosthesis ([address] T84.038A, Z96.659       3. Applicable records from prior treatment providers are apart of [facility] under the media/encounters tab. 4. Diagnostic, therapeutic and laboratory results are available in the [facility] chart. 5. Consultation notes are available for review in the media/encounters tab of the [facility] chart. 6. Treatment goals include: pain control, improve activity level and function in regards to activities of daily living, and improved comfort level. Previously pt has been limited by pain in all these aspects. 7. The risks and benefits of treatment have been discussed at this office visit with the pt.  she understands that the medication has addictive potential.  Additionally the pt has been advised that narcotic pain medication may impair mental and/or physical ability required for performance of tasks such as driving or operating any other machinery. 8. Pt has an updated signed pain contract on file and can be found under the media section of the [facility] chart. 9. The pain contract is reviewed.   Pain medication will be continued at the same dosage. Pill count: 7, expected. Last fill 7/9/19. Urine drug screening ordered/collected today. Diagnostic studies are not indicated at this time. Interventional procedure are not indicated at this time. Narcan order sent in past.       10. Medication prescibed is oxycodone every 24 hours PRN #45 with 0 refills, but 2 printed refills for #30 tabs for a total 3 month supply.  was reviewed while planning for pain/anxiety management, no indications of drug diversion suspected. Prescription history is NOT suspicious for controlled substance overuse. 11. Patient instructions have been reviewed in detail as outlined above and in the pain contract. 12. Re-evaluation is planned for 3 months. Current Outpatient Medications   Medication Sig Dispense Refill    [START ON 10/6/2019] oxyCODONE-acetaminophen (PERCOCET 10)  mg per tablet Take 1 Tab by mouth daily as needed for Pain for up to 30 days. May take 1 tab ONCE DAILY as needed for pain. Indications: pain 30 Tab 0    [START ON 9/6/2019] oxyCODONE-acetaminophen (PERCOCET 10)  mg per tablet Take 1 Tab by mouth daily as needed for Pain for up to 30 days. May take 1 tab ONCE DAILY as needed for pain. Indications: pain 30 Tab 0    loratadine (CLARITIN) 10 mg tablet Take 1 Tab by mouth daily. 90 Tab 3    furosemide (LASIX) 20 mg tablet Take 1 tab daily x 3-5 days for leg swelling  Indications: visible water retention 15 Tab 0    montelukast (SINGULAIR) 10 mg tablet TAKE 1 TAB BY MOUTH DAILY. 30 Tab 6    methocarbamol (ROBAXIN) 750 mg tablet Take 1 Tab by mouth every six (6) hours as needed (mm spasm). 270 Tab 3    amLODIPine (NORVASC) 10 mg tablet Take 0.5 Tabs by mouth daily. 90 Tab 3    hydrOXYzine HCl (ATARAX) 25 mg tablet Take 1 Tab by mouth three (3) times daily as needed for Itching or Anxiety. 60 Tab 0    acetaminophen (TYLENOL) 650 mg TbER Take 1 Tab by mouth every eight (8) hours.  270 Tab 3    levothyroxine (SYNTHROID) 125 mcg tablet TAKE 1 TABLET BY MOUTH EVERY DAY BEFORE BREAKFAST  Indications: hypothyroidism 90 Tab 3    hydroCHLOROthiazide (HYDRODIURIL) 25 mg tablet TAKE 1 TAB BY MOUTH DAILY FOR HYPERTENSION 90 Tab 3    tiotropium (SPIRIVA WITH HANDIHALER) 18 mcg inhalation capsule Take 1 Cap by inhalation daily.  COMBIVENT RESPIMAT  mcg/actuation inhaler INHALE 1 PUFF BY MOUTH DAILY  0    lidocaine 4 % patch Every 12 hours as needed for pain 20 Patch 0    sertraline (ZOLOFT) 100 mg tablet Take 1/2 tablet daily for 10 days and then take 1 tablet daily 30 Tab 1    PROAIR HFA 90 mcg/actuation inhaler TAKE 2 PUFFS BY INHALATION EVERY FOUR (4) HOURS AS NEEDED. 8.5 Inhaler 0    budesonide-formoterol (SYMBICORT) 160-4.5 mcg/actuation HFAA Take 2 Puffs by inhalation two (2) times a day. 3 Inhaler 3    lidocaine (LIDODERM) 5 % Apply patch to the affected area for 12 hours a day and remove for 12 hours a day. 30 Each 11    pantoprazole (PROTONIX) 40 mg tablet Take 1 Tab by mouth daily. Take in 2 week intervals for GERD Flares 30 Tab 1    metoprolol succinate (TOPROL-XL) 25 mg XL tablet TAKE 1 TABLET BY MOUTH DAILY. 90 Tab 3    gabapentin (NEURONTIN) 800 mg tablet TAKE 1 TAB BY MOUTH THREE (3) TIMES DAILY. 90 Tab 5    traZODone (DESYREL) 50 mg tablet Take 1-2 tabs every night for sleep. 60 Tab 11    albuterol (PROVENTIL VENTOLIN) 2.5 mg /3 mL (0.083 %) nebulizer solution 3 mL by Nebulization route every four (4) hours as needed for Wheezing. 24 Each 2    diclofenac (VOLTAREN) 1 % gel Apply 2 g to affected area every six (6) hours. 100 g 5    fluticasone (FLONASE) 50 mcg/actuation nasal spray 2 Sprays by Both Nostrils route daily. 1 Bottle 11    miscellaneous medical supply misc Shower seat for chronic knee pain, pt planning to have right TKR in June due to condition. Very limited range of motion.  1 Each 0    naloxone (NARCAN) 4 mg/actuation nasal spray Use 1 spray into 1 nostril for OVERDOSE. Call 911. For subsequent doses, give in alternating nostrils. May repeat every 2 to 3 min. 2 Each 0     Allergies   Allergen Reactions    Hydrocodone Itching    Mold Shortness of Breath and Itching    Ibuprofen Nausea Only       Objective:  Visit Vitals  /74 (BP 1 Location: Left arm, BP Patient Position: Sitting)   Pulse 71   Temp 97.9 °F (36.6 °C) (Oral)   Resp 18   Ht 5' 1\" (1.549 m)   Wt 214 lb 4.8 oz (97.2 kg)   LMP 12/29/2016 (Exact Date)   BMI 40.49 kg/m²     Wt Readings from Last 3 Encounters:   08/06/19 214 lb 4.8 oz (97.2 kg)   07/10/19 210 lb (95.3 kg)   07/09/19 210 lb (95.3 kg)     Physical Exam:   General appearance - alert, well appearing, and in mild distress. Mental status - A/O x 4, normal mood and flat affect. Chest - CTA. Symmetric chest rise. No wheezing, rales or rhonchi. Heart - Normal rate, regular rhythm. Normal S1, S2. No MGR or clicks. Abd- soft, obese,distended. NT, no masses. Ext- Radial, DP pulses, 2+ bilaterally. No clubbing or cyanosis. No pedal edema, CRISTIANE. Skin-Warm and dry. No hyperpigmentation, ulcerations, or suspicious lesions. Neuro - normal speech, no focal findings or movement disorder. Normal strength and muscle tone. Slow gait using cane. Assessment/Plan:  NO additional therapy for leg swelling needed at this time. Extended 45 tab quantity for 1 additional month. DAILY EXERCISE encouraged. INI may need to adjust for MDD involvement. Discussed the patient's BMI with her. The BMI follow up plan is as follows: daily exercise    I have reviewed/discussed the above normal BMI (Body mass index is 40.49 kg/m².) with the patient. I have recommended the following interventions: encourage exercise, monitor weight, and dietary management education, guidance, and counseling, .    Medication Side Effects and Warnings were discussed with patient: yes   Patient Labs were reviewed: yes  Patient Past Records were reviewed: yes    See below for other orders   Follow-up and Dispositions    · Return in about 3 months (around 11/6/2019) for pain med refill. ICD-10-CM ICD-9-CM    1. Primary osteoarthritis of right knee M17.11 715.16 oxyCODONE-acetaminophen (PERCOCET 10)  mg per tablet      oxyCODONE-acetaminophen (PERCOCET 10)  mg per tablet      DISCONTINUED: oxyCODONE-acetaminophen (PERCOCET 10)  mg per tablet      DISCONTINUED: oxyCODONE-acetaminophen (PERCOCET 10)  mg per tablet   2. Chronic use of opiate for therapeutic purpose Z79.891 V58.69 TOXASSURE SELECT 13 (MW)      oxyCODONE-acetaminophen (PERCOCET 10)  mg per tablet      oxyCODONE-acetaminophen (PERCOCET 10)  mg per tablet      DISCONTINUED: oxyCODONE-acetaminophen (PERCOCET 10)  mg per tablet      DISCONTINUED: oxyCODONE-acetaminophen (PERCOCET 10)  mg per tablet   3. Status post total right knee replacement Z96.651 V43.65 oxyCODONE-acetaminophen (PERCOCET 10)  mg per tablet      oxyCODONE-acetaminophen (PERCOCET 10)  mg per tablet      DISCONTINUED: oxyCODONE-acetaminophen (PERCOCET 10)  mg per tablet      DISCONTINUED: oxyCODONE-acetaminophen (PERCOCET 10)  mg per tablet   4. Primary osteoarthritis of left shoulder M19.012 715.11 oxyCODONE-acetaminophen (PERCOCET 10)  mg per tablet      oxyCODONE-acetaminophen (PERCOCET 10)  mg per tablet      DISCONTINUED: oxyCODONE-acetaminophen (PERCOCET 10)  mg per tablet      DISCONTINUED: oxyCODONE-acetaminophen (PERCOCET 10)  mg per tablet   5. Pain management contract signed Z02.89 V68.89 oxyCODONE-acetaminophen (PERCOCET 10)  mg per tablet      oxyCODONE-acetaminophen (PERCOCET 10)  mg per tablet      DISCONTINUED: oxyCODONE-acetaminophen (PERCOCET 10)  mg per tablet      DISCONTINUED: oxyCODONE-acetaminophen (PERCOCET 10)  mg per tablet   6.  Chronic bilateral low back pain with right-sided sciatica M54.41 724.2     G89.29 724.3 338.29    7. Moderate episode of recurrent major depressive disorder ([facility]) F33.1 296.32    8. Mixed simple and mucopurulent chronic bronchitis ([facility]) J41.8 491.1    9. Environmental and seasonal allergies J30.89 477.8    10. Severe obesity (BMI >= 40) ([facility]) E66.01 278.01    11. Bilateral leg edema R60.0 782.3      Orders Placed This Encounter    TOXASSURE SELECT 13 (MW)    DISCONTD: oxyCODONE-acetaminophen (PERCOCET 10)  mg per tablet     Sig: Take 1 Tab by mouth two (2) times daily as needed for Pain for up to 30 days. Max Daily Amount: 2 Tabs. May take 1 tab ONCE DAILY as needed for pain. Indications: pain     Dispense:  45 Tab     Refill:  0    DISCONTD: oxyCODONE-acetaminophen (PERCOCET 10)  mg per tablet     Sig: Take 1 Tab by mouth daily as needed for Pain for up to 30 days. May take 1 tab ONCE DAILY as needed for pain. Indications: pain     Dispense:  30 Tab     Refill:  0    oxyCODONE-acetaminophen (PERCOCET 10)  mg per tablet     Sig: Take 1 Tab by mouth daily as needed for Pain for up to 30 days. May take 1 tab ONCE DAILY as needed for pain. Indications: pain     Dispense:  30 Tab     Refill:  0    oxyCODONE-acetaminophen (PERCOCET 10)  mg per tablet     Sig: Take 1 Tab by mouth daily as needed for Pain for up to 30 days. May take 1 tab ONCE DAILY as needed for pain. Indications: pain     Dispense:  30 Tab     Refill:  0       Liliya Sykes expressed understanding of plan. An After Visit Summary was offered/printed and given to the patient.

## 2019-08-06 NOTE — PATIENT INSTRUCTIONS
Medication Refill: Care Instructions  Your Care Instructions    Medicines are a big part of treatment for many health problems. So it can be upsetting to run out of your medicine. It may even be dangerous to stop a medicine suddenly. The doctor may have given you enough medicine to help you until you can see your regular doctor. The doctor has checked you carefully, but problems can develop later. If you notice any problems or new symptoms, get medical treatment right away. Follow-up care is a key part of your treatment and safety. Be sure to make and go to all appointments, and call your doctor if you are having problems. It's also a good idea to know your test results and keep a list of the medicines you take. How can you care for yourself at home? · Be safe with medicines. Take your medicines exactly as prescribed. · Know when you will run out of your medicine. Use a calendar to remind you to get a refill. Don't wait until you have only a few pills left. · Talk with your doctor or pharmacist about your medicine. Find out what to do if you miss a dose. When should you call for help? Call your doctor now or seek immediate medical care if:    · You have problems with your medicine.    Watch closely for changes in your health, and be sure to contact your doctor if you have any problems. Where can you learn more? Go to http://ofelia-mindy.info/. Enter K326 in the search box to learn more about \"Medication Refill: Care Instructions. \"  Current as of: December 13, 2018  Content Version: 12.1  © 1613-5007 [employer]. Care instructions adapted under license by [employer] (which disclaims liability or warranty for this information). If you have questions about a medical condition or this instruction, always ask your healthcare professional. [address] any warranty or liability for your use of this information.

## 2019-08-11 LAB — DRUGS UR: NORMAL

## 2019-08-17 DIAGNOSIS — L29.9 PRURITUS: ICD-10-CM

## 2019-08-17 DIAGNOSIS — R60.0 BILATERAL LEG EDEMA: ICD-10-CM

## 2019-08-17 DIAGNOSIS — S40.262A INSECT BITE OF LEFT SHOULDER, INITIAL ENCOUNTER: ICD-10-CM
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
## 2019-08-21 RX ORDER — FUROSEMIDE 20 MG/1
TABLET ORAL
Qty: 15 TAB | Refills: 0 | Status: ON HOLD | OUTPATIENT
Start: 2019-08-21 | End: 2021-01-22

## 2019-08-21 RX ORDER — HYDROXYZINE 25 MG/1
TABLET, FILM COATED ORAL
Qty: 60 TAB | Refills: 0 | Status: SHIPPED | OUTPATIENT
Start: 2019-08-21

## 2019-08-22 ENCOUNTER — HOSPITAL ENCOUNTER (OUTPATIENT)
Dept: DIABETES SERVICES | Age: 54
Discharge: HOME | End: 2019-08-22
Payer: MEDICAID

## 2019-08-22 PROCEDURE — 97803 MED NUTRITION INDIV SUBSEQ: CPT | Performed by: DIETITIAN, REGISTERED

## 2019-08-22 NOTE — DIABETES MGMT
[facility]        [facility] - Nutrition Progress Note        DATE: 2019      REFERRING PHYSICIAN: Oliva CROWLEY    NAME: Andreea Pérez : 1965 AGE: 52 y.o. GENDER: female  REASON FOR VISIT: weight loss     ASSESSMENT:  Per patient- chronic pain, asthma, COPD, htn, thyroid disorder, arthritis.     Pt see for f/u today for weight loss management. Pt shared that she has continued to [place] a lot going on\". Pt shared that recently her best friend's mother (whom she was close with) passed away. Pt shared that she feels she helps others but is dealing with her own things, shared \" I just want to see a light at the end of the tunnel\". Pt shared that from stress and everything going on , she feels nausea and has no appetite. LABS:   Lab Results   Component Value Date/Time    Hemoglobin A1c 5.8 2019 10:02 AM     Lab Results   Component Value Date/Time    Creatinine 0.96 2019 03:42 AM     CrCl cannot be calculated (Unknown ideal weight.). Lab Results   Component Value Date/Time    Cholesterol, total 179 2018 01:04 PM    HDL Cholesterol 51 2018 01:04 PM    LDL, calculated 108 (H) 2018 01:04 PM    Triglyceride 100 2018 01:04 PM       MEDICATIONS/SUPPLEMENTS:  Pt did not bring an updated list with her today. ANTHROPOMETRICS:    Ht Readings from Last 1 Encounters:   19 5' 1\" (1.549 m)      Wt Readings from Last 1 Encounters:   19 97.2 kg (214 lb 4.8 oz)                IBW:105 # +/- 10%  BMI:  39.2 kg/m2       Wt :  19 211.5 lbs   19 207.9 lbs  19: 202.6 lbs       Reported Diet Hx/dietary changes/food intake:  Pt has not been keeping food logs - reported \" I won't even lie\". Patient shared that she is still having a lot of GI distress, diarrhea often, nausea and this prevents her from eating. Did share that \" I feel like I can't eat anything because it upsets my stomach\".   Pt drinking at least 2 glasses of sugary beverages daily. Pt shared that she can tolerate following foods (not full list):  Grapes in small amounts  Pears  Small amounts of beef  Chicken (plain, no seasoning)  Broccoli  Cereals (milk causes GI distress)  Breads  Ritz crackers  Ray  Eggs  Asparagus  Rice  Peanut butter? Exercise/Activity:  Pt is currently limited with her exercise. Having difficulty in \"getting out\" as she watches her grand-daughter. Social/Environmental: Pt last saw psychiatrist a few months ago- reports that her psychiatrist encouraged her/suggested that she see a talk therapist as well. Pt shared that she had an appointment with a talk therapist but was sick and unable to go and they would not reschedule her. Pt has not seen MD for GI f/u in a few months as well. NUTRITION DIAGNOSIS:Overweight likely, in part, due to numerous sodas daily, as well as large quantities of high caloric foods. NUTRITION INTERVENTION:   Strongly encouraged pt to reach out to a talk therapist - provided phone number for [facility] services; encouraged her if unable to be seen here to call previous therapist and ask for referral or MD and ask for referral - discussed how current emotional distress is significantly impacting food choices and therefore her weight. Discussed importance in f/u with GI MD given pt does not show specific pattern in certain foods she can't tolerate. Discussed speaking with her allergist regarding food allergies. Discussed including 10 minutes a day (when her grand-daughter is sleeping) to take time for herself (as pt shared this helps her with stress). Educator discussed focusing on foods pt can tolerate vs what she can't as this tends to create more feelings of discourage and reduces ability for pt to meet her goals. We created food list together than pt can have with what she can tolerate and how to incorporate meals (for now).     Pt has f/u appointment on 10/03/19. PATIENT GOALS:  1) follow up with PCP for itching and swelling of insect bite. - goal met, discontinued   2) try foods that are low in acid- keep food records to look for possible triggers of diarrhea. - goal not met, discontinued   3) return in 1 month  , goal ongoing   4) Call GI MD to ensure appointment is set to discuss experienced symptoms - goal on going  5) continue to avoid sweet beverages. - modified to ; try crystal light or other beverages that have 0 total carbohydrates and no acid, goal not met, modified to drinking reducing lemonade consumption to 1 glass of lemonade daily  6) not skip meals-3 balanced meals daily - goal modified to incorporate small easily tolerated foods if able - FOCUS ON WHAT YOU CAN HAVE VS WHAT YOU CAN'T    7) If GI symptoms still persist despite mental health visits and no food allergies, consider discussing FODMAP diet to reduce GI symptoms      Specific tips and techniques to facilitate compliance with above recommendations were provided and discussed. Pt was strongly encourage to begin making necessary changes now and follow as scheduled. Pt is due to see RD 7/22/19 for follow up and I will reach out to pt by phone next week for assessing tolerance to above suggestions. If you have any questions please feel free to contact me at [phone].        Duke Ramírez RD

## 2019-08-29 ENCOUNTER — HOSPITAL ENCOUNTER (EMERGENCY)
Age: 54
Discharge: HOME | End: 2019-08-29
Attending: EMERGENCY MEDICINE
Payer: MEDICAID

## 2019-08-29 ENCOUNTER — APPOINTMENT (OUTPATIENT)
Dept: GENERAL RADIOLOGY | Age: 54
End: 2019-08-29
Attending: PHYSICIAN ASSISTANT
Payer: MEDICAID

## 2019-08-29 VITALS
SYSTOLIC BLOOD PRESSURE: 126 MMHG | RESPIRATION RATE: 16 BRPM | OXYGEN SATURATION: 95 % | HEIGHT: 61 IN | HEART RATE: 72 BPM | DIASTOLIC BLOOD PRESSURE: 68 MMHG | WEIGHT: 214 LBS | TEMPERATURE: 98 F | BODY MASS INDEX: 40.4 KG/M2
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
## 2019-08-29 DIAGNOSIS — S46.911A STRAIN OF RIGHT SHOULDER, INITIAL ENCOUNTER: Primary | ICD-10-CM

## 2019-08-29 PROCEDURE — 73030 X-RAY EXAM OF SHOULDER: CPT

## 2019-08-29 PROCEDURE — 99282 EMERGENCY DEPT VISIT SF MDM: CPT
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
## 2019-09-15 DIAGNOSIS — G89.29 CHRONIC PAIN OF RIGHT KNEE: ICD-10-CM

## 2019-09-15 DIAGNOSIS — M25.561 CHRONIC PAIN OF RIGHT KNEE: ICD-10-CM

## 2019-09-15 DIAGNOSIS — G89.29 CHRONIC RIGHT SHOULDER PAIN: ICD-10-CM

## 2019-09-15 DIAGNOSIS — Z91.81 HISTORY OF RECENT FALL: ICD-10-CM

## 2019-09-15 DIAGNOSIS — Z96.651 STATUS POST TOTAL RIGHT KNEE REPLACEMENT: ICD-10-CM

## 2019-09-15 DIAGNOSIS — M17.11 PRIMARY OSTEOARTHRITIS OF RIGHT KNEE: ICD-10-CM

## 2019-09-15 DIAGNOSIS — M25.562 CHRONIC PAIN OF LEFT KNEE: ICD-10-CM

## 2019-09-15 DIAGNOSIS — G89.29 CHRONIC PAIN OF LEFT KNEE: ICD-10-CM

## 2019-09-15 DIAGNOSIS — M25.511 CHRONIC RIGHT SHOULDER PAIN: ICD-10-CM

## 2019-09-15 DIAGNOSIS — Z79.891 CHRONIC USE OF OPIATE FOR THERAPEUTIC PURPOSE: ICD-10-CM

## 2019-09-16 RX ORDER — MELOXICAM 15 MG/1
TABLET ORAL
Qty: 30 TAB | Refills: 11 | Status: SHIPPED | OUTPATIENT
Start: 2019-09-16 | End: 2020-10-21 | Stop reason: ALTCHOICE

## 2019-10-02 DIAGNOSIS — I10 MALIGNANT HYPERTENSION: ICD-10-CM

## 2019-10-02 RX ORDER — METOPROLOL SUCCINATE 25 MG/1
TABLET, EXTENDED RELEASE ORAL
Qty: 90 TAB | Refills: 3 | Status: SHIPPED | OUTPATIENT
Start: 2019-10-02 | End: 2020-10-15

## 2019-11-06 ENCOUNTER — TELEPHONE (OUTPATIENT)
Dept: INTERNAL MEDICINE CLINIC | Age: 54
End: 2019-11-06

## 2019-11-06 ENCOUNTER — OFFICE VISIT (OUTPATIENT)
Dept: INTERNAL MEDICINE CLINIC | Age: 54
End: 2019-11-06

## 2019-11-06 VITALS
HEIGHT: 61 IN | OXYGEN SATURATION: 98 % | BODY MASS INDEX: 40.22 KG/M2 | HEART RATE: 73 BPM | RESPIRATION RATE: 17 BRPM | TEMPERATURE: 97.6 F | DIASTOLIC BLOOD PRESSURE: 68 MMHG | WEIGHT: 213 LBS | SYSTOLIC BLOOD PRESSURE: 133 MMHG
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
## 2019-11-06 DIAGNOSIS — M19.012 PRIMARY OSTEOARTHRITIS OF LEFT SHOULDER: ICD-10-CM

## 2019-11-06 DIAGNOSIS — F51.04 PSYCHOPHYSIOLOGICAL INSOMNIA: ICD-10-CM

## 2019-11-06 DIAGNOSIS — F33.1 MODERATE EPISODE OF RECURRENT MAJOR DEPRESSIVE DISORDER (HCC): ICD-10-CM

## 2019-11-06 DIAGNOSIS — M54.41 CHRONIC BILATERAL LOW BACK PAIN WITH RIGHT-SIDED SCIATICA: ICD-10-CM

## 2019-11-06 DIAGNOSIS — Z23 ENCOUNTER FOR IMMUNIZATION: Primary | ICD-10-CM

## 2019-11-06 DIAGNOSIS — Z79.891 CHRONIC USE OF OPIATE FOR THERAPEUTIC PURPOSE: ICD-10-CM

## 2019-11-06 DIAGNOSIS — M17.11 PRIMARY OSTEOARTHRITIS OF RIGHT KNEE: ICD-10-CM

## 2019-11-06 DIAGNOSIS — Z02.89 PAIN MANAGEMENT CONTRACT SIGNED: ICD-10-CM

## 2019-11-06 DIAGNOSIS — Z96.651 STATUS POST TOTAL RIGHT KNEE REPLACEMENT: ICD-10-CM

## 2019-11-06 DIAGNOSIS — L29.9 PRURITUS: ICD-10-CM

## 2019-11-06 DIAGNOSIS — S40.262A INSECT BITE OF LEFT SHOULDER, INITIAL ENCOUNTER: ICD-10-CM

## 2019-11-06 DIAGNOSIS — G89.29 CHRONIC BILATERAL LOW BACK PAIN WITH RIGHT-SIDED SCIATICA: ICD-10-CM

## 2019-11-06 RX ORDER — OXYCODONE AND ACETAMINOPHEN 10; 325 MG/1; MG/1
1 TABLET ORAL
Qty: 45 TAB | Refills: 0 | Status: SHIPPED | OUTPATIENT
Start: 2019-11-06 | End: 2019-11-20 | Stop reason: SDUPTHER

## 2019-11-06 RX ORDER — OXYCODONE AND ACETAMINOPHEN 10; 325 MG/1; MG/1
1 TABLET ORAL
Qty: 45 TAB | Refills: 0 | Status: SHIPPED | OUTPATIENT
Start: 2019-11-06 | End: 2019-11-06 | Stop reason: SDUPTHER

## 2019-11-06 RX ORDER — OXYCODONE AND ACETAMINOPHEN 10; 325 MG/1; MG/1
1 TABLET ORAL
Qty: 30 TAB | Refills: 0 | Status: SHIPPED | OUTPATIENT
Start: 2019-12-06 | End: 2019-11-20 | Stop reason: SDUPTHER

## 2019-11-06 RX ORDER — OMALIZUMAB 202.5 MG/1.4ML
INJECTION, SOLUTION SUBCUTANEOUS
COMMUNITY
Start: 2019-08-20

## 2019-11-06 RX ORDER — TRAZODONE HYDROCHLORIDE 100 MG/1
100 TABLET ORAL
Qty: 90 TAB | Refills: 3 | Status: SHIPPED | OUTPATIENT
Start: 2019-11-06 | End: 2019-11-08

## 2019-11-06 RX ORDER — DULOXETIN HYDROCHLORIDE 60 MG/1
60 CAPSULE, DELAYED RELEASE ORAL DAILY
Qty: 90 CAP | Refills: 3 | Status: SHIPPED | OUTPATIENT
Start: 2019-11-06 | End: 2019-11-08

## 2019-11-06 RX ORDER — GABAPENTIN 800 MG/1
800 TABLET ORAL 3 TIMES DAILY
Qty: 90 TAB | Refills: 5 | Status: SHIPPED | OUTPATIENT
Start: 2019-11-06 | End: 2020-07-02 | Stop reason: SDUPTHER

## 2019-11-06 RX ORDER — OXYCODONE AND ACETAMINOPHEN 10; 325 MG/1; MG/1
1 TABLET ORAL
Qty: 30 TAB | Refills: 0 | Status: SHIPPED | OUTPATIENT
Start: 2020-01-06 | End: 2020-02-06 | Stop reason: SDUPTHER
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
## 2019-11-06 NOTE — PATIENT INSTRUCTIONS
Chronic Pain: Care Instructions  Your Care Instructions    Chronic pain is pain that lasts a long time (months or even years) and may or may not have a clear cause. It is different from acute pain, which usually does have a clear cause--like an injury or illness--and gets better over time. Chronic pain:  · Lasts over time but may vary from day to day. · Does not go away despite efforts to end it. · May disrupt your sleep and lead to fatigue. · May cause depression or anxiety. · May make your muscles tense, causing more pain. · Can disrupt your work, hobbies, home life, and relationships with friends and family. Chronic pain is a very real condition. It is not just in your head. Treatment can help and usually includes several methods used together, such as medicines, physical therapy, exercise, and other treatments. Learning how to relax and changing negative thought patterns can also help you cope. Chronic pain is complex. Taking an active role in your treatment will help you better manage your pain. Tell your doctor if you have trouble dealing with your pain. You may have to try several things before you find what works best for you. Follow-up care is a key part of your treatment and safety. Be sure to make and go to all appointments, and call your doctor if you are having problems. It's also a good idea to know your test results and keep a list of the medicines you take. How can you care for yourself at home? · Pace yourself. Break up large jobs into smaller tasks. Save harder tasks for days when you have less pain, or go back and forth between hard tasks and easier ones. Take rest breaks. · Relax, and reduce stress. Relaxation techniques such as deep breathing or meditation can help. · Keep moving. Gentle, daily exercise can help reduce pain over the long run. Try low- or no-impact exercises such as walking, swimming, and stationary biking. Do stretches to stay flexible.   · Try heat, cold packs, and massage. · Get enough sleep. Chronic pain can make you tired and drain your energy. Talk with your doctor if you have trouble sleeping because of pain. · Think positive. Your thoughts can affect your pain level. Do things that you enjoy to distract yourself when you have pain instead of focusing on the pain. See a movie, read a book, listen to music, or spend time with a friend. · If you think you are depressed, talk to your doctor about treatment. · Keep a daily pain diary. Record how your moods, thoughts, sleep patterns, activities, and medicine affect your pain. You may find that your pain is worse during or after certain activities or when you are feeling a certain emotion. Having a record of your pain can help you and your doctor find the best ways to treat your pain. · Take pain medicines exactly as directed. ? If the doctor gave you a prescription medicine for pain, take it as prescribed. ? If you are not taking a prescription pain medicine, ask your doctor if you can take an over-the-counter medicine. Reducing constipation caused by pain medicine  · Include fruits, vegetables, beans, and whole grains in your diet each day. These foods are high in fiber. · Drink plenty of fluids, enough so that your urine is light yellow or clear like water. If you have kidney, heart, or liver disease and have to limit fluids, talk with your doctor before you increase the amount of fluids you drink. · If your doctor recommends it, get more exercise. Walking is a good choice. Bit by bit, increase the amount you walk every day. Try for at least 30 minutes on most days of the week. · Schedule time each day for a bowel movement. A daily routine may help. Take your time and do not strain when having a bowel movement. When should you call for help?   Call your doctor now or seek immediate medical care if:    · Your pain gets worse or is out of control.     · You feel down or blue, or you do not enjoy things like you once did. You may be depressed, which is common in people with chronic pain. Depression can be treated.     · You have vomiting or cramps for more than 2 hours.    Watch closely for changes in your health, and be sure to contact your doctor if:    · You cannot sleep because of pain.     · You are very worried or anxious about your pain.     · You have trouble taking your pain medicine.     · You have any concerns about your pain medicine.     · You have trouble with bowel movements, such as:  ? No bowel movement in 3 days. ? Blood in the anal area, in your stool, or on the toilet paper. ? Diarrhea for more than 24 hours. Where can you learn more? Go to http://ofelia-mindy.info/. Enter N004 in the search box to learn more about \"Chronic Pain: Care Instructions. \"  Current as of: March 28, 2019  Content Version: 12.2  © 1933-7990 [employer]. Care instructions adapted under license by [employer] (which disclaims liability or warranty for this information). If you have questions about a medical condition or this instruction, always ask your healthcare professional. [place] [address] any warranty or liability for your use of this information. Duloxetine (By mouth)   Duloxetine (doo-LOX-[id number]-teen)  Treats depression, anxiety, diabetic peripheral neuropathy, fibromyalgia, and chronic muscle or bone pain. This medicine is an SSNRI. Brand Name(s): Cymbalta, DermacinRx DPN Murtaza Childers   There may be other brand names for this medicine. When This Medicine Should Not Be Used: This medicine is not right for everyone. Do not use it if you had an allergic reaction to duloxetine. How to Use This Medicine:   Capsule, Delayed Release Capsule  · Take your medicine as directed. Your dose may need to be changed several times to find what works best for you. · Delayed-release capsule: Swallow the capsule whole. Do not crush, chew, break, or open it.   · This medicine should come with a Medication Guide. Ask your pharmacist for a copy if you do not have one. · Missed dose: Take a dose as soon as you remember. If it is almost time for your next dose, wait until then and take a regular dose. Do not take extra medicine to make up for a missed dose. · Store the medicine in a closed container at room temperature, away from heat, moisture, and direct light. Drugs and Foods to Avoid:   Ask your doctor or pharmacist before using any other medicine, including over-the-counter medicines, vitamins, and herbal products. · Do not take duloxetine if you have used an MAO inhibitor (MAOI) within the past 14 days. Do not start taking an MAO inhibitor within 5 days of stopping duloxetine. · Some medicines can affect how duloxetine works. Tell your doctor if you are using any of the following:  ¨ Buspirone, cimetidine, ciprofloxacin, enoxacin, fentanyl, lithium, Karie's wort, theophylline, tramadol, tryptophan, or warfarin  ¨ Amphetamines  ¨ Blood pressure medicine  ¨ Diuretic (water pill)  ¨ Medicine for heart rhythm problems (including flecainide, propafenone, quinidine)  ¨ Medicine to treat migraine headaches (including triptans)  ¨ NSAID pain or arthritis medicine (including aspirin, celecoxib, diclofenac, ibuprofen, naproxen)  ¨ Other medicine to treat depression or mood disorders (including amitriptyline, desipramine, fluoxetine, imipramine, nortriptyline, paroxetine)  ¨ Phenothiazine medicine (including thioridazine)  · Tell your doctor if you use anything else that makes you sleepy. Some examples are allergy medicine, narcotic pain medicine, and alcohol. · Do not drink alcohol while you are using this medicine. Warnings While Using This Medicine:   · Tell your doctor if you are pregnant or breastfeeding, or if you have kidney disease, liver disease, diabetes, digestion problems, glaucoma, heart disease, high or low blood pressure, or problems with urination.  Tell your doctor if you smoke or you have a history of seizures, or drug or alcohol addiction. · This medicine may cause the following problems:   ¨ Serious liver problems  ¨ Serotonin syndrome (more likely when used with certain other medicines)  ¨ Increased risk of bleeding problems  ¨ Serious skin reactions  ¨ Low sodium levels in the blood  · This medicine can increase thoughts of suicide. Tell your doctor right away if you start to feel depressed and have thoughts about hurting yourself. · This medicine can cause changes in your blood pressure. This may make you dizzy or drowsy. Do not drive or do anything that could be dangerous until you know how this medicine affects you. Stand up slowly to avoid falls. · Do not stop using this medicine suddenly. Your doctor will need to slowly decrease your dose before you stop it completely. · Your doctor will check your progress and the effects of this medicine at regular visits. Keep all appointments. · Keep all medicine out of the reach of children. Never share your medicine with anyone.   Possible Side Effects While Using This Medicine:   Call your doctor right away if you notice any of these side effects:  · Allergic reaction: Itching or hives, swelling in your face or hands, swelling or tingling in your mouth or throat, chest tightness, trouble breathing  · Anxiety, restlessness, fever, fast heartbeat, sweating, muscle spasms, diarrhea, seeing or hearing things that are not there  · Blistering, peeling, red skin rash  · Confusion, weakness, muscle twitching  · Dark urine or pale stools, nausea, vomiting, loss of appetite, stomach pain, yellow skin or eyes  · Decrease in how much or how often you urinate  · Eye pain, vision changes, seeing halos around lights  · Feeling more energetic than usual  · Lightheadedness, dizziness, or fainting  · Unusual moods or behaviors, worsening depression, thoughts about hurting yourself, trouble sleeping  · Unusual bleeding or bruising  If you notice these less serious side effects, talk with your doctor:   · Decrease in appetite or weight  · Dry mouth, constipation, mild nausea  · Unusual drowsiness, sleepiness, or tiredness  If you notice other side effects that you think are caused by this medicine, tell your doctor. Call your doctor for medical advice about side effects. You may report side effects to FDA at 8-754-GWT-2659  © 2017 [facility] Information is for End User's use only and may not be sold, redistributed or otherwise used for commercial purposes. The above information is an  only. It is not intended as medical advice for individual conditions or treatments. Talk to your doctor, nurse or pharmacist before following any medical regimen to see if it is safe and effective for you.

## 2019-11-06 NOTE — PROGRESS NOTES
Pt states last had something for pain yesterday   Pt states pain level is a 10/10 Arm, back legs and neck     1. Have you been to the ER, urgent care clinic since your last visit? Hospitalized since your last visit? No    2. Have you seen or consulted any other health care providers outside of the [address] [place] [address] since your last visit? Include any pap smears or colon screening. No    Pt Is here for   Chief Complaint   Patient presents with    Medication Refill     pain meds     Tammie Bosworth is a 62 y.o. female who presents for routine immunizations. She denies any symptoms , reactions or allergies that would exclude them from being immunized today. Risks and adverse reactions were discussed and the VIS was given to them. All questions were addressed. She was observed for 15 min post injection. There were no reactions observed. Jayy Medina LPN

## 2019-11-06 NOTE — TELEPHONE ENCOUNTER
----- Message from Lamar Ron sent at 11/6/2019  2:49 PM EST -----  Regarding: Np.  Feliberto/ telephone   General Message/Vendor Calls    Caller's first and last name:      Reason for call: Pt needs a prior authorization for her oxycodone       Callback required yes/no and why: yes     Best contact number(s): [phone]      Details to clarify the request:      Lamar Ron

## 2019-11-08 ENCOUNTER — OFFICE VISIT (OUTPATIENT)
Dept: BEHAVIORAL/MENTAL HEALTH CLINIC | Age: 54
End: 2019-11-08

## 2019-11-08 ENCOUNTER — DOCUMENTATION ONLY (OUTPATIENT)
Dept: INTERNAL MEDICINE CLINIC | Age: 54
End: 2019-11-08

## 2019-11-08 VITALS
HEART RATE: 65 BPM | SYSTOLIC BLOOD PRESSURE: 126 MMHG | DIASTOLIC BLOOD PRESSURE: 97 MMHG | HEIGHT: 61 IN | WEIGHT: 211.2 LBS | BODY MASS INDEX: 39.88 KG/M2

## 2019-11-08 DIAGNOSIS — F33.1 MODERATE EPISODE OF RECURRENT MAJOR DEPRESSIVE DISORDER (HCC): ICD-10-CM

## 2019-11-08 DIAGNOSIS — F43.23 ADJUSTMENT DISORDER WITH MIXED ANXIETY AND DEPRESSED MOOD: Primary | ICD-10-CM

## 2019-11-08 DIAGNOSIS — F51.04 PSYCHOPHYSIOLOGICAL INSOMNIA: ICD-10-CM

## 2019-11-08 RX ORDER — TRAZODONE HYDROCHLORIDE 50 MG/1
50 TABLET ORAL
Qty: 30 TAB | Refills: 0 | Status: SHIPPED | OUTPATIENT
Start: 2019-11-08 | End: 2019-12-03 | Stop reason: SDUPTHER

## 2019-11-08 RX ORDER — DULOXETIN HYDROCHLORIDE 30 MG/1
60 CAPSULE, DELAYED RELEASE ORAL 2 TIMES DAILY
Qty: 60 CAP | Refills: 1 | Status: SHIPPED | OUTPATIENT
Start: 2019-11-08 | End: 2019-12-03 | Stop reason: SDUPTHER

## 2019-11-08 NOTE — PROGRESS NOTES
Psychiatric Outpatient Progress Note    Account Number:  [id number]  Name: Caryle Deck    SUBJECTIVE:   CHIEF COMPLAINT:  Caryle Deck is a 62 y.o. female and was seen today for follow-up of psychiatric condition and therapy/ psychotropic medication management. Last office visit was April 2019. She mised her appointment and has stopped taking her medications. HPI:    Willie Sharma reports the following psychiatric symptoms: Clinet denied any past h/o depression. reported h/o cocaine and alcohol use and was in [facility] and reported sober for 19 years. Client reported had break up with partner last year, has medical issues- asthma, COPD, HT, Goiter,sciatica,  chronic pain. And stressors- financial, raising niece's daughter and she is asking for custody. Reported symptoms of depression began last years and received AD from PCP in dec 2017. Reported had benefits initially. Reported has decreased interest, decreased energy, irritability,  appetite is good, able to focus and concentrate. denied any hopelessness or helpelessnes or passive suicide thoughts. Denied any symptoms of psychosis or irma. Additional symptomatology include anxiety. The above symptoms have been present for a   1 year. The patient reports onset of symptoms last year. These symptoms are of high severity as per patient's report. Pt denied any flashbacks, hypervigilance or avoidance or reexperience or night graham.  Pt denied any h/o seizures or head trauma or neurological problems. Patient denies SI/HI/SIB. Stressors/life events: breakup with partner, medical co morbidities, HT, chronic pain.   Side Effects:  none      Fam/Soc Hx (from [facility] with updates):    Family History   Problem Relation Age of Onset    Cancer Father         lung cancer    Heart Disease Mother     Hypertension Mother    [facility] Diabetes Mother     Anesth Problems Neg Hx       Social History     Tobacco Use    Smoking status: Current Every Day Smoker     Packs/day: 0.25     Years: 1.50     Pack years: 0.37     Types: Cigarettes     Last attempt to quit: 10/1/2015     Years since quittin.1    Smokeless tobacco: Never Used    Tobacco comment: patient quit smoking for 10 years, began smoking again and then quit again in    Substance Use Topics    Alcohol use: No    Drug use: No        Ethnic:   Relationship Status: single-   Kids: 2 - ages 45, 24  Living Situation: With family   Born: [place] [id number]  Raised by: parents  Siblings: 29 - step siblings  Education: associate degree  Employment: unemployed  Tobacco:  tobacco use: smoked 1 packs per day(s) for 3 years  Caffeine: no caffeine use  Alcohol: alcohol intake:history of alcohol abuse sober for 19 years, was in [facility]   Illicit Drug Social History:  H/o  smoked cocaine, sober for 19 years  Hobbies:  music   Abuse: denied  Sexual:  homosexual  Support: family  Legal: incarcerated for malicious wounding, denied any charges pending       Family Psychiatric history: Her sister is on antidepressants. There is no history of suicide attempt in the family.     Scales:-   PHQ 9 score: 14- moderate depression  HAM:20- mild to moderate anxiety      REVIEW OF SYSTEMS:  Psychiatric:  depression, anxiety.   Appetite:no change from normal   Sleep: improved   Neuro: none reported   JOHNNY:                 Mental Status exam: WNL except for      Sensorium  oriented to time, place and person   Relations cooperative    Eye Contact    appropriate   Appearance:  age appropriate, casually dressed and within normal Limits   Motor Behavior/Gait:  gait stable and within normal limits   Speech:  normal pitch and normal volume   Thought Process: goal directed, logical and within normal limits   Thought Content free of delusions and free of hallucinations   Suicidal ideations none   Homicidal ideations none   Mood:  anxious   Affect:  anxious and mood-congruent   Memory recent  adequate   Memory remote:  adequate Concentration:  adequate   Abstraction:  abstract   Insight:  fair   Reliability fair   Judgment:  fair       MEDICAL DECISION MAKING  Data: pertinent labs, imaging, medical records and diagnostic tests reviewed and incorporated in diagnosis and treatment plan    Allergies   Allergen Reactions    Hydrocodone Itching    Mold Shortness of Breath and Itching    Ibuprofen Nausea Only        Current Outpatient Medications   Medication Sig Dispense Refill    traZODone (DESYREL) 50 mg tablet Take 1 Tab by mouth nightly. 30 Tab 0    XOLAIR 150 mg solr Indications: injection      gabapentin (NEURONTIN) 800 mg tablet Take 1 Tab by mouth three (3) times daily. Max Daily Amount: 2,400 mg. 90 Tab 5    DULoxetine (CYMBALTA) 60 mg capsule Take 1 Cap by mouth daily. 90 Cap 3    [START ON 1/6/2020] oxyCODONE-acetaminophen (PERCOCET 10)  mg per tablet Take 1 Tab by mouth daily as needed for Pain for up to 30 days. May take 1 tab ONCE DAILY as needed for pain. Indications: pain 30 Tab 0    [START ON 12/6/2019] oxyCODONE-acetaminophen (PERCOCET 10)  mg per tablet Take 1 Tab by mouth daily as needed for Pain for up to 30 days. May take 1 tab ONCE DAILY as needed for pain. Indications: pain 30 Tab 0    oxyCODONE-acetaminophen (PERCOCET 10)  mg per tablet Take 1 Tab by mouth two (2) times daily as needed for Pain for up to 30 days. Max Daily Amount: 2 Tabs. Indications: pain 45 Tab 0    metoprolol succinate (TOPROL-XL) 25 mg XL tablet TAKE 1 TABLET BY MOUTH EVERY DAY 90 Tab 3    meloxicam (MOBIC) 15 mg tablet TAKE 1 TABLET BY MOUTH EVERY DAY IN THE MORNING WITH BREAKFAST 30 Tab 11    hydrOXYzine HCl (ATARAX) 25 mg tablet TAKE 1 TABLET BY MOUTH 3 TIMES A DAY AS NEEDED FOR ITCHING FOR ANXIETY 60 Tab 0    furosemide (LASIX) 20 mg tablet TAKE 1 TABLET BY MOUTH DAILY X 3-5 DAYS FOR LEG SWELLING INDICATIONS: VISIBLE WATER RETENTION 15 Tab 0    loratadine (CLARITIN) 10 mg tablet Take 1 Tab by mouth daily.  80 Tab 3    montelukast (SINGULAIR) 10 mg tablet TAKE 1 TAB BY MOUTH DAILY. 30 Tab 6    methocarbamol (ROBAXIN) 750 mg tablet Take 1 Tab by mouth every six (6) hours as needed (mm spasm). 270 Tab 3    amLODIPine (NORVASC) 10 mg tablet Take 0.5 Tabs by mouth daily. 90 Tab 3    acetaminophen (TYLENOL) 650 mg TbER Take 1 Tab by mouth every eight (8) hours. 270 Tab 3    levothyroxine (SYNTHROID) 125 mcg tablet TAKE 1 TABLET BY MOUTH EVERY DAY BEFORE BREAKFAST  Indications: hypothyroidism 90 Tab 3    hydroCHLOROthiazide (HYDRODIURIL) 25 mg tablet TAKE 1 TAB BY MOUTH DAILY FOR HYPERTENSION 90 Tab 3    tiotropium (SPIRIVA WITH HANDIHALER) 18 mcg inhalation capsule Take 1 Cap by inhalation daily.  COMBIVENT RESPIMAT  mcg/actuation inhaler INHALE 1 PUFF BY MOUTH DAILY  0    lidocaine 4 % patch Every 12 hours as needed for pain 20 Patch 0    PROAIR HFA 90 mcg/actuation inhaler TAKE 2 PUFFS BY INHALATION EVERY FOUR (4) HOURS AS NEEDED. 8.5 Inhaler 0    budesonide-formoterol (SYMBICORT) 160-4.5 mcg/actuation HFAA Take 2 Puffs by inhalation two (2) times a day. 3 Inhaler 3    lidocaine (LIDODERM) 5 % Apply patch to the affected area for 12 hours a day and remove for 12 hours a day. 30 Each 11    pantoprazole (PROTONIX) 40 mg tablet Take 1 Tab by mouth daily. Take in 2 week intervals for GERD Flares 30 Tab 1    naloxone (NARCAN) 4 mg/actuation nasal spray Use 1 spray into 1 nostril for OVERDOSE. Call 911. For subsequent doses, give in alternating nostrils. May repeat every 2 to 3 min. 2 Each 0    albuterol (PROVENTIL VENTOLIN) 2.5 mg /3 mL (0.083 %) nebulizer solution 3 mL by Nebulization route every four (4) hours as needed for Wheezing. 24 Each 2    diclofenac (VOLTAREN) 1 % gel Apply 2 g to affected area every six (6) hours. 100 g 5    fluticasone (FLONASE) 50 mcg/actuation nasal spray 2 Sprays by Both Nostrils route daily.  1 Bottle 11    miscellaneous medical supply misc Shower seat for chronic knee pain, pt planning to have right TKR in June due to condition. Very limited range of motion. 1 Each 0        Visit Vitals  BP (!) 126/97 (BP 1 Location: Left arm, BP Patient Position: Sitting)   Pulse 65   Ht 5' 1\" (1.549 m)   Wt 95.8 kg (211 lb 3.2 oz)   LMP 12/29/2016 (Exact Date)   BMI 39.91 kg/m²         Problems addressed today:   adjustment disorder with anxiety and depression, insomnia nos, mood disorder deressive features due to medical condition , h/o cocaine use, h/o alcohol use    Assessment:   Chelsea Manley  is a 62 y.o.  female  is not responding to treatment. Patient reports  changes to her medical conditions- has knee replacement surgery on March 26. Reported she has not taken her meds. She presents with adjustment disorder with depression and anxiety. Reported symptoms of depression and anxiety worsened related to increased stressors-had knee surgery, family issues with ex- gf. She has full custody of her grand niece. Reported has relationship issues with her niece. Reported she is feeling depressed and anxious related to stressors and medical issues. Reported sleeping for 6 hrs and has difficulty maintaining sleep. She is receiving Trazodone 100 mg and duloxetine. From her PCP , Imani jenkins. Reported has interest, has decreased  energy and motivation due to pain. Reported she has  Few crying spells and feels depressed due to stressors. She is  able to focus and concentrate. Denied any hopelessness, or helplessness or any passive suicide thoughts. She reported symptoms of depression and anxiety related to situation. Reported sertraline benefited. Plan to restart to target anxiety and depression. Reported began taking duloxetine 60 mg  Yesterday . Client was began on comparatively higher dose of duloxetine by her PCP  and it benefits if taken BID. Plan to adjust the medications as per the response and tolerability.  Discussed importance of psychotherapy in her treatment plan and has stopped seeing Kayli Mcfarland. Reviewed labs and records. Patient denies SI/HI/SIB. No evidence of AH/VH or delusions. Client is not responding to treatment and is tolerating treatment well. Psychoeducation, medication teaching, co-morbid illness and pertinent health factors to manage care were discussed. Overall, patient is unstable at this time and will require ongoing medication management. Reviewed medical admissions and discussed with the patient. Client is medically stable. Vitals stable    Possible organic causes contributing are: asthma, HT, goiter, obesity. , knee pain - had surgery      . Risk Scoring- chronic illnesses and prescription drug management    Treatment Plan:  1. Medications:          Medication Changes/Adjustments: discontinue  sertraline 50- non compliance                                                             Continue Duloxetine 30 mg BID                                                                Decrease trazodone 50 mg hs prn-                                                              Discontinue hydroxyzine 25 mg BID prn                                                                Gave therapy resources                                                                  Current Outpatient Medications   Medication Sig Dispense Refill    traZODone (DESYREL) 50 mg tablet Take 1 Tab by mouth nightly. 30 Tab 0    XOLAIR 150 mg solr Indications: injection      gabapentin (NEURONTIN) 800 mg tablet Take 1 Tab by mouth three (3) times daily. Max Daily Amount: 2,400 mg. 90 Tab 5    DULoxetine (CYMBALTA) 60 mg capsule Take 1 Cap by mouth daily. 90 Cap 3    [START ON 1/6/2020] oxyCODONE-acetaminophen (PERCOCET 10)  mg per tablet Take 1 Tab by mouth daily as needed for Pain for up to 30 days. May take 1 tab ONCE DAILY as needed for pain.   Indications: pain 30 Tab 0    [START ON 12/6/2019] oxyCODONE-acetaminophen (PERCOCET 10)  mg per tablet Take 1 Tab by mouth daily as needed for Pain for up to 30 days. May take 1 tab ONCE DAILY as needed for pain. Indications: pain 30 Tab 0    oxyCODONE-acetaminophen (PERCOCET 10)  mg per tablet Take 1 Tab by mouth two (2) times daily as needed for Pain for up to 30 days. Max Daily Amount: 2 Tabs. Indications: pain 45 Tab 0    metoprolol succinate (TOPROL-XL) 25 mg XL tablet TAKE 1 TABLET BY MOUTH EVERY DAY 90 Tab 3    meloxicam (MOBIC) 15 mg tablet TAKE 1 TABLET BY MOUTH EVERY DAY IN THE MORNING WITH BREAKFAST 30 Tab 11    hydrOXYzine HCl (ATARAX) 25 mg tablet TAKE 1 TABLET BY MOUTH 3 TIMES A DAY AS NEEDED FOR ITCHING FOR ANXIETY 60 Tab 0    furosemide (LASIX) 20 mg tablet TAKE 1 TABLET BY MOUTH DAILY X 3-5 DAYS FOR LEG SWELLING INDICATIONS: VISIBLE WATER RETENTION 15 Tab 0    loratadine (CLARITIN) 10 mg tablet Take 1 Tab by mouth daily. 90 Tab 3    montelukast (SINGULAIR) 10 mg tablet TAKE 1 TAB BY MOUTH DAILY. 30 Tab 6    methocarbamol (ROBAXIN) 750 mg tablet Take 1 Tab by mouth every six (6) hours as needed (mm spasm). 270 Tab 3    amLODIPine (NORVASC) 10 mg tablet Take 0.5 Tabs by mouth daily. 90 Tab 3    acetaminophen (TYLENOL) 650 mg TbER Take 1 Tab by mouth every eight (8) hours. 270 Tab 3    levothyroxine (SYNTHROID) 125 mcg tablet TAKE 1 TABLET BY MOUTH EVERY DAY BEFORE BREAKFAST  Indications: hypothyroidism 90 Tab 3    hydroCHLOROthiazide (HYDRODIURIL) 25 mg tablet TAKE 1 TAB BY MOUTH DAILY FOR HYPERTENSION 90 Tab 3    tiotropium (SPIRIVA WITH HANDIHALER) 18 mcg inhalation capsule Take 1 Cap by inhalation daily.  COMBIVENT RESPIMAT  mcg/actuation inhaler INHALE 1 PUFF BY MOUTH DAILY  0    lidocaine 4 % patch Every 12 hours as needed for pain 20 Patch 0    PROAIR HFA 90 mcg/actuation inhaler TAKE 2 PUFFS BY INHALATION EVERY FOUR (4) HOURS AS NEEDED. 8.5 Inhaler 0    budesonide-formoterol (SYMBICORT) 160-4.5 mcg/actuation HFAA Take 2 Puffs by inhalation two (2) times a day.  3 Inhaler 3    lidocaine (LIDODERM) 5 % Apply patch to the affected area for 12 hours a day and remove for 12 hours a day. 30 Each 11    pantoprazole (PROTONIX) 40 mg tablet Take 1 Tab by mouth daily. Take in 2 week intervals for GERD Flares 30 Tab 1    naloxone (NARCAN) 4 mg/actuation nasal spray Use 1 spray into 1 nostril for OVERDOSE. Call 911. For subsequent doses, give in alternating nostrils. May repeat every 2 to 3 min. 2 Each 0    albuterol (PROVENTIL VENTOLIN) 2.5 mg /3 mL (0.083 %) nebulizer solution 3 mL by Nebulization route every four (4) hours as needed for Wheezing. 24 Each 2    diclofenac (VOLTAREN) 1 % gel Apply 2 g to affected area every six (6) hours. 100 g 5    fluticasone (FLONASE) 50 mcg/actuation nasal spray 2 Sprays by Both Nostrils route daily. 1 Bottle 11    miscellaneous medical supply misc Shower seat for chronic knee pain, pt planning to have right TKR in June due to condition. Very limited range of motion. 1 Each 0                  The following regarding medications was addressed:    (The risks and benefits of the proposed medication; the potential medication side effects ie dry mouth, weight gain, confusion,  GI upset, headache; patient given opportunity to ask questions)       2. Counseling and coordination of care including instructions for treatment, risks/benefits, risk factor reduction and patient/family education. She agrees with the plan. Patient instructed to call with any side effects, questions or issues. Instructed patient to call the clinic, and if after hours call the provider on call ifclient experiences any suicidal thought or ideas to hurt self or other. Also instructed to call 911 or go to the ED. Patient verbalized understanding and agreed to call    3. Follow-up and Dispositions    · Return in about 3 months (around 2/8/2020) for med check and follow up. 4. Other: Nutritional/health counseling on diet and exercise. For reliable dietary information, go to www. [employer].org.     PSYCHOTHERAPY: approx 16 minutes  Type:  Supportive/Cognitive Behavioral psychotherapy provided  Focus:     Current problems- stressors in family- niece has behavioral issues    Medical issues-asthma, HT, goiter, obesity, knee pain   Interpersonal conflicts- ex-gf                   . Psychoeducation provided  Treatment plan reviewed with patient-including diagnosis and medications    Tabitha Cornelius is not progressing.     Milla Mckeon NP  11/8/2019

## 2019-11-12 LAB — DRUGS UR: NORMAL

## 2019-11-15 ENCOUNTER — DOCUMENTATION ONLY (OUTPATIENT)
Dept: INTERNAL MEDICINE CLINIC | Age: 54
End: 2019-11-15

## 2019-11-20 ENCOUNTER — APPOINTMENT (OUTPATIENT)
Dept: GENERAL RADIOLOGY | Age: 54
End: 2019-11-20
Attending: NURSE PRACTITIONER
Payer: MEDICAID

## 2019-11-20 ENCOUNTER — HOSPITAL ENCOUNTER (EMERGENCY)
Age: 54
Discharge: HOME | End: 2019-11-20
Attending: EMERGENCY MEDICINE
Payer: MEDICAID

## 2019-11-20 VITALS
HEIGHT: 61 IN | TEMPERATURE: 97.9 F | BODY MASS INDEX: 40.55 KG/M2 | RESPIRATION RATE: 20 BRPM | OXYGEN SATURATION: 99 % | DIASTOLIC BLOOD PRESSURE: 72 MMHG | WEIGHT: 214.8 LBS | HEART RATE: 68 BPM | SYSTOLIC BLOOD PRESSURE: 128 MMHG

## 2019-11-20 DIAGNOSIS — M25.561 ACUTE PAIN OF RIGHT KNEE: Primary | ICD-10-CM

## 2019-11-20 PROCEDURE — 73562 X-RAY EXAM OF KNEE 3: CPT

## 2019-11-20 PROCEDURE — 99285 EMERGENCY DEPT VISIT HI MDM: CPT

## 2019-11-20 NOTE — ED NOTES
Patient (s)  given copy of dc instructions and 0 script(s). Patient (s)  verbalized understanding of instructions and script (s). Patient given a current medication reconciliation form and verbalized understanding of their medications. Patient (s) verbalized understanding of the importance of discussing medications with  his or her physician or clinic they will be following up with. Patient alert and oriented and in no acute distress. Patient discharged home ambulatory with crutches/self.

## 2019-11-20 NOTE — ED NOTES
Pt arrived to ED via ambulatory with c/o left knee pain r/t fall. Pt. States she was walking upstairs, which were wet, when she slipped and fell forward. Pt. Denies hitting head and LOC. Pt. States he was trying to protect her right knee which she had a prosthetic knee placed in March of last year. Lungs clear. Pt is in no acute distress. Will continue to monitor. See nursing assessment. Safety precautions in place; call light within reach. Emergency Department Nursing Plan of Care       The Nursing Plan of Care is developed from the Nursing assessment and Emergency Department Attending provider initial evaluation. The plan of care may be reviewed in the ED Provider note.     The Plan of Care was developed with the following considerations:   Patient / Family readiness to learn indicated by:verbalized understanding  Persons(s) to be included in education: patient  Barriers to Learning/Limitations:No    Signed     Colton Inman RN    11/20/2019   7:09 AM

## 2019-11-20 NOTE — ED TRIAGE NOTES
Pt. States fell yesterday evening while going up the stairs, which were wet and fell forward. Pt. Denies hitting head/LOC.

## 2019-11-20 NOTE — ED PROVIDER NOTES
EMERGENCY DEPARTMENT HISTORY AND PHYSICAL EXAM    Date: 11/20/2019  Patient Name: Amalia Whitney    History of Presenting Illness     Chief Complaint   Patient presents with    Knee Pain    Fall         History Provided By: Patient      HPI: Amalia Whitney is a 62 y.o. female with a PMH of Arthritis, asthma, bronchitis, GERD, depression who presents with fall. Onset yesterday. States she was trying to get in the house to avoid the rain and fell up steps. States she was trying to avoid hitting her right knee due to recent hitting her left knee. Associated with swelling and limited range of motion. Already on chronic pain medication including Percocet that she has taken for pain relief. States she is able to walk but uses a cane. PCP: Abbey Hermosillo NP    Current Outpatient Medications   Medication Sig Dispense Refill    traZODone (DESYREL) 50 mg tablet Take 1 Tab by mouth nightly. 30 Tab 0    DULoxetine (CYMBALTA) 30 mg capsule Take 2 Caps by mouth two (2) times a day. 60 Cap 1    XOLAIR 150 mg solr Indications: injection      gabapentin (NEURONTIN) 800 mg tablet Take 1 Tab by mouth three (3) times daily. Max Daily Amount: 2,400 mg. 90 Tab 5    [START ON 1/6/2020] oxyCODONE-acetaminophen (PERCOCET 10)  mg per tablet Take 1 Tab by mouth daily as needed for Pain for up to 30 days. May take 1 tab ONCE DAILY as needed for pain. Indications: pain 30 Tab 0    metoprolol succinate (TOPROL-XL) 25 mg XL tablet TAKE 1 TABLET BY MOUTH EVERY DAY 90 Tab 3    meloxicam (MOBIC) 15 mg tablet TAKE 1 TABLET BY MOUTH EVERY DAY IN THE MORNING WITH BREAKFAST 30 Tab 11    hydrOXYzine HCl (ATARAX) 25 mg tablet TAKE 1 TABLET BY MOUTH 3 TIMES A DAY AS NEEDED FOR ITCHING FOR ANXIETY 60 Tab 0    furosemide (LASIX) 20 mg tablet TAKE 1 TABLET BY MOUTH DAILY X 3-5 DAYS FOR LEG SWELLING INDICATIONS: VISIBLE WATER RETENTION 15 Tab 0    loratadine (CLARITIN) 10 mg tablet Take 1 Tab by mouth daily.  90 Tab 3    montelukast (SINGULAIR) 10 mg tablet TAKE 1 TAB BY MOUTH DAILY. 30 Tab 6    methocarbamol (ROBAXIN) 750 mg tablet Take 1 Tab by mouth every six (6) hours as needed (mm spasm). 270 Tab 3    amLODIPine (NORVASC) 10 mg tablet Take 0.5 Tabs by mouth daily. 90 Tab 3    acetaminophen (TYLENOL) 650 mg TbER Take 1 Tab by mouth every eight (8) hours. 270 Tab 3    levothyroxine (SYNTHROID) 125 mcg tablet TAKE 1 TABLET BY MOUTH EVERY DAY BEFORE BREAKFAST  Indications: hypothyroidism 90 Tab 3    hydroCHLOROthiazide (HYDRODIURIL) 25 mg tablet TAKE 1 TAB BY MOUTH DAILY FOR HYPERTENSION 90 Tab 3    tiotropium (SPIRIVA WITH HANDIHALER) 18 mcg inhalation capsule Take 1 Cap by inhalation daily.  COMBIVENT RESPIMAT  mcg/actuation inhaler INHALE 1 PUFF BY MOUTH DAILY  0    PROAIR HFA 90 mcg/actuation inhaler TAKE 2 PUFFS BY INHALATION EVERY FOUR (4) HOURS AS NEEDED. 8.5 Inhaler 0    budesonide-formoterol (SYMBICORT) 160-4.5 mcg/actuation HFAA Take 2 Puffs by inhalation two (2) times a day. 3 Inhaler 3    lidocaine (LIDODERM) 5 % Apply patch to the affected area for 12 hours a day and remove for 12 hours a day. 30 Each 11    pantoprazole (PROTONIX) 40 mg tablet Take 1 Tab by mouth daily. Take in 2 week intervals for GERD Flares 30 Tab 1    albuterol (PROVENTIL VENTOLIN) 2.5 mg /3 mL (0.083 %) nebulizer solution 3 mL by Nebulization route every four (4) hours as needed for Wheezing. 24 Each 2    diclofenac (VOLTAREN) 1 % gel Apply 2 g to affected area every six (6) hours. 100 g 5    fluticasone (FLONASE) 50 mcg/actuation nasal spray 2 Sprays by Both Nostrils route daily. 1 Bottle 11    miscellaneous medical supply misc Shower seat for chronic knee pain, pt planning to have right TKR in June due to condition. Very limited range of motion. 1 Each 0    lidocaine 4 % patch Every 12 hours as needed for pain 20 Patch 0    naloxone (NARCAN) 4 mg/actuation nasal spray Use 1 spray into 1 nostril for OVERDOSE. Call 911. For subsequent doses, give in alternating nostrils. May repeat every 2 to 3 min. 2 Each 0       Past History     Past Medical History:  Past Medical History:   Diagnosis Date    Arthritis     Asthma     uses inhalers    Chronic obstructive pulmonary disease (HCC)     bronchitis    Chronic pain     GERD (gastroesophageal reflux disease)     Hypertension     Ill-defined condition     environmental allergies     Ill-defined condition     Multiple body piercings; unable to remove tongue and lip piercings    Ill-defined condition 2018    GASTRITIS PER ENDOSCOPY    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    Psychiatric disorder     DEPRESSION    Thyroid disease     hypo    Ventral hernia 2013       Past Surgical History:  Past Surgical History:   Procedure Laterality Date    HX HEENT  2009    thyroidectomy    HX KNEE REPLACEMENT      R    HX KNEE REPLACEMENT  2019    HX MOHS PROCEDURES Right 3/8/11    HX OTHER SURGICAL      hiatal hernia repair    HX TUBAL LIGATION         Family History:  Family History   Problem Relation Age of Onset    Cancer Father         lung cancer    Heart Disease Mother     Hypertension Mother     Diabetes Mother     Anesth Problems Neg Hx        Social History:  Social History     Tobacco Use    Smoking status: Current Every Day Smoker     Packs/day: 0.25     Years: 1.50     Pack years: 0.37     Types: Cigarettes     Last attempt to quit: 10/1/2015     Years since quittin.1    Smokeless tobacco: Never Used   Substance Use Topics    Alcohol use: No    Drug use: No       Allergies: Allergies   Allergen Reactions    Hydrocodone Itching    Mold Shortness of Breath and Itching    Ibuprofen Nausea Only         Review of Systems   Review of Systems   Constitutional: Negative for chills and fever. Musculoskeletal: Positive for arthralgias (Knee). Negative for joint swelling. Skin: Negative for wound. Neurological: Negative for numbness.    All other systems reviewed and are negative. Physical Exam     Vitals:    11/20/19 0100 11/20/19 0115 11/20/19 0130 11/20/19 0200   BP: 125/75 130/75 103/69 128/72   Pulse: 72 72 72 68   Resp: 20 20 20 20   Temp:    97.9 °F (36.6 °C)   SpO2: 99% 96% 97% 99%   Weight:       Height:         Physical Exam   Constitutional: She is oriented to person, place, and time. She appears well-developed and well-nourished. No distress. HENT:   Head: Normocephalic and atraumatic. Neck: Normal range of motion. Neck supple. Cardiovascular: Normal rate, regular rhythm and normal heart sounds. Pulmonary/Chest: Effort normal and breath sounds normal.   Musculoskeletal: Normal range of motion. Left knee with tenderness to lateral aspect with mild swelling. Positive Live's and valgus test.  Limited range of motion with flexion   Neurological: She is alert and oriented to person, place, and time. She has normal reflexes. Skin: Skin is warm and dry. Psychiatric: She has a normal mood and affect. Nursing note and vitals reviewed. Diagnostic Study Results     Labs -   No results found for this or any previous visit (from the past 12 hour(s)). Radiologic Studies -   XR KNEE LT 3 V   Final Result   IMPRESSION:       Normal left knee views. CT Results  (Last 48 hours)    None        CXR Results  (Last 48 hours)    None            Medical Decision Making   I am the first provider for this patient. I reviewed the vital signs, available nursing notes, past medical history, past surgical history, family history and social history. Vital Signs-Reviewed the patient's vital signs. Records Reviewed: Nursing Notes, Old Medical Records and Previous Radiology Studies    ED Course as of Nov 22 2135   Wed Nov 20, 2019   0050 Care given to Dr. Elvia barron who will provide further disposition after imaging results are available for patient.     [NA]      ED Course User Index  [NA] Declan Sapp NP Disposition:  Discharge    DISCHARGE NOTE: Left knee x-ray was normal and patient was deemed suitable for discharge to home with Ace wrap and crutches. Follow-up Information    None         Discharge Medication List as of 11/21/2019  2:23 PM          Provider Notes (Medical Decision Making):   DDX: Knee sprain versus fracture versus dislocation versus contusion versus arthritis  Procedures:  Procedures    Please note that this dictation was completed with Dragon, computer voice recognition software. Quite often unanticipated grammatical, syntax, homophones, and other interpretive errors are inadvertently transcribed by the computer software. Please disregard these errors. Additionally, please excuse any errors that have escaped final proofreading. Diagnosis     Clinical Impression:   1.  Acute pain of right knee

## 2019-11-20 NOTE — ED NOTES
The documentation for this period is being entered following the guidelines as defined in the [facility] policy by Cristal Herman RN.

## 2019-12-02 ENCOUNTER — TELEPHONE (OUTPATIENT)
Dept: INTERNAL MEDICINE CLINIC | Age: 54
End: 2019-12-02

## 2019-12-02 DIAGNOSIS — R11.10 POST-TUSSIVE VOMITING: ICD-10-CM

## 2019-12-02 DIAGNOSIS — Z12.39 ENCOUNTER FOR SCREENING FOR MALIGNANT NEOPLASM OF BREAST: Primary | ICD-10-CM

## 2019-12-02 DIAGNOSIS — Z87.2 H/O CYST OF BREAST: ICD-10-CM

## 2019-12-02 RX ORDER — ONDANSETRON 4 MG/1
TABLET, ORALLY DISINTEGRATING ORAL
Qty: 20 TAB | Refills: 1 | Status: SHIPPED | OUTPATIENT
Start: 2019-12-02 | End: 2020-10-05 | Stop reason: SDUPTHER

## 2019-12-02 NOTE — TELEPHONE ENCOUNTER
Pt states she needs rx for zofran sent to the pharmacy please.  And she needs an order for mammogram with ulstrasound due to cysts in her breast

## 2019-12-03 ENCOUNTER — HOSPITAL ENCOUNTER (OUTPATIENT)
Dept: MAMMOGRAPHY | Age: 54
Discharge: HOME | End: 2019-12-03
Attending: NURSE PRACTITIONER
Payer: MEDICAID

## 2019-12-03 DIAGNOSIS — Z12.39 ENCOUNTER FOR SCREENING FOR MALIGNANT NEOPLASM OF BREAST: ICD-10-CM

## 2019-12-03 DIAGNOSIS — F43.23 ADJUSTMENT DISORDER WITH MIXED ANXIETY AND DEPRESSED MOOD: ICD-10-CM

## 2019-12-03 DIAGNOSIS — Z87.2 H/O CYST OF BREAST: ICD-10-CM

## 2019-12-03 DIAGNOSIS — F51.04 PSYCHOPHYSIOLOGICAL INSOMNIA: ICD-10-CM

## 2019-12-03 DIAGNOSIS — F33.1 MODERATE EPISODE OF RECURRENT MAJOR DEPRESSIVE DISORDER (HCC): ICD-10-CM

## 2019-12-03 PROCEDURE — 77067 SCR MAMMO BI INCL CAD: CPT

## 2019-12-03 RX ORDER — TRAZODONE HYDROCHLORIDE 50 MG/1
50 TABLET ORAL
Qty: 90 TAB | Refills: 0 | Status: SHIPPED | OUTPATIENT
Start: 2019-12-03 | End: 2020-11-13 | Stop reason: SDUPTHER

## 2019-12-03 RX ORDER — DULOXETIN HYDROCHLORIDE 30 MG/1
60 CAPSULE, DELAYED RELEASE ORAL 2 TIMES DAILY
Qty: 180 CAP | Refills: 0 | Status: SHIPPED | OUTPATIENT
Start: 2019-12-03 | End: 2020-11-13 | Stop reason: SDUPTHER

## 2020-01-14 ENCOUNTER — HOSPITAL ENCOUNTER (OUTPATIENT)
Dept: GENERAL RADIOLOGY | Age: 55
Discharge: HOME | End: 2020-01-14
Payer: MEDICAID

## 2020-01-14 ENCOUNTER — OFFICE VISIT (OUTPATIENT)
Dept: INTERNAL MEDICINE CLINIC | Age: 55
End: 2020-01-14

## 2020-01-14 VITALS
TEMPERATURE: 97.7 F | SYSTOLIC BLOOD PRESSURE: 134 MMHG | BODY MASS INDEX: 40.4 KG/M2 | OXYGEN SATURATION: 94 % | WEIGHT: 214 LBS | HEART RATE: 68 BPM | HEIGHT: 61 IN | DIASTOLIC BLOOD PRESSURE: 72 MMHG | RESPIRATION RATE: 17 BRPM

## 2020-01-14 DIAGNOSIS — J44.9 ASTHMA WITH COPD (HCC): ICD-10-CM

## 2020-01-14 DIAGNOSIS — Z01.818 PRE-OP EXAM: ICD-10-CM

## 2020-01-14 DIAGNOSIS — Z01.818 PRE-OP EXAM: Primary | ICD-10-CM

## 2020-01-14 DIAGNOSIS — J32.9 CHRONIC RECURRENT SINUSITIS: ICD-10-CM

## 2020-01-14 DIAGNOSIS — J34.2 DEVIATED NASAL SEPTUM: ICD-10-CM

## 2020-01-14 PROCEDURE — 71046 X-RAY EXAM CHEST 2 VIEWS: CPT

## 2020-01-14 RX ORDER — AZELASTINE 1 MG/ML
1 SPRAY, METERED NASAL AS NEEDED
COMMUNITY
Start: 2019-12-02 | End: 2022-05-10 | Stop reason: SDUPTHER

## 2020-01-14 NOTE — PATIENT INSTRUCTIONS
Nasal Septum Repair: Before Your Surgery  What is nasal septum repair? Nasal septum repair is a type of nose surgery. It is sometimes called septoplasty. It fixes the wall of cartilage between the nostrils. This is called the septum. Doctors usually do this surgery through the inside of the nose. In most cases, no cut is made on the outside of the nose. To do the surgery, the doctor cuts the outer membrane that lines your nose. Then he or she lifts the membrane away from the cartilage wall. Next, the cartilage is reshaped or moved, or both. Then the doctor puts the membrane back. After the surgery, you may have splints and packing inside and outside your nose. These help hold the cartilage in place while it heals. Most people go home a few hours after surgery. You can probably go back to work or school in a few days and your normal routine in about 3 weeks. But this depends on your job and how the surgery went. It may take 1 to 2 months before you feel completely normal again. After this surgery, your doctor will want to see you again to check how well your nose is healing. Follow-up care is a key part of your treatment and safety. Be sure to make and go to all appointments, and call your doctor if you are having problems. It's also a good idea to know your test results and keep a list of the medicines you take. What happens before surgery?   Surgery can be stressful. This information will help you understand what you can expect. And it will help you safely prepare for surgery.   Preparing for surgery    · Understand exactly what surgery is planned, along with the risks, benefits, and other options. · Tell your doctors ALL the medicines, vitamins, supplements, and herbal remedies you take. Some of these can increase the risk of bleeding or interact with anesthesia.     · If you take blood thinners, such as warfarin (Coumadin), clopidogrel (Plavix), or aspirin, be sure to talk to your doctor.  He or she will tell you if you should stop taking these medicines before your surgery. Make sure that you understand exactly what your doctor wants you to do.     · Your doctor will tell you which medicines to take or stop before your surgery. You may need to stop taking certain medicines a week or more before surgery. So talk to your doctor as soon as you can.     · If you have an advance directive, let your doctor know. It may include a living will and a durable power of  for health care. Bring a copy to the hospital. If you don't have one, you may want to prepare one. It lets your doctor and loved ones know your health care wishes. Doctors advise that everyone prepare these papers before any type of surgery or procedure. What happens on the day of surgery? · Follow the instructions exactly about when to stop eating and drinking. If you don't, your surgery may be canceled. If your doctor told you to take your medicines on the day of surgery, take them with only a sip of water.     · Take a bath or shower before you come in for your surgery. Do not apply lotions, perfumes, deodorants, or nail polish.     · Take off all jewelry and piercings. And take out contact lenses, if you wear them.    At the hospital or surgery center   · Bring a picture ID.     · The area for surgery is often marked to make sure there are no errors.     · You will be kept comfortable and safe by your anesthesia provider. The anesthesia may make you sleep. Or it may just numb the area being worked on.     · The surgery will take 1 to 2 hours. Going home   · Be sure you have someone to drive you home. Anesthesia and pain medicine make it unsafe for you to drive.     · You will be given more specific instructions about recovering from your surgery. They will cover things like diet, wound care, follow-up care, driving, and getting back to your normal routine. When should you call your doctor?    · You have questions or concerns.     · You don't understand how to prepare for your surgery.     · You become ill before the surgery (such as fever, flu, or a cold).     · You need to reschedule or have changed your mind about having the surgery. Where can you learn more? Go to http://ofelia-mindy.info/. Enter O562 in the search box to learn more about \"Nasal Septum Repair: Before Your Surgery. \"  Current as of: October 21, 2018  Content Version: 12.2  © 8782-3199 "[employer]. Care instructions adapted under license by [employer] (which disclaims liability or warranty for this information). If you have questions about a medical condition or this instruction, always ask your healthcare professional. [address] any warranty or liability for your use of this information.

## 2020-01-14 NOTE — PROGRESS NOTES
Jackie Kimble is a 62 y.o. female and presents with Pre-op Exam  .  Subjective: The patient presents for a pre-op evaluation to have turbinate SMR & septoplasty. Planning to have surgery on 1/21/20. Continues to have deviated septum, but denies acute symptoms. Asthma/COPD Review:  The patient is being seen for follow up of asthma & COPD,  currently stable. Asthma/COPD symptoms occur: infrequently. Wheezing when present is described as mild and easily relieved with rescue bronchodilator, used every morning for COPD. The patient reports use of a maintanence inhaler: spiriva and symbicort. Frequency of use of quick-relief meds: rarely. Regimen compliance: The patient reports adherence to this regimen. Review of Systems  Constitutional: negative for fevers, chills, anorexia and weight loss  ENT:   + recurrent sinusitis and deviated septum. negative for tinnitus,sore throat,nasal congestion,ear pains. hoarseness  Respiratory:  negative for cough, hemoptysis, dyspnea,wheezing  CV:   negative for chest pain, palpitations, lower extremity edema  GI:   negative for nausea, vomiting, diarrhea, abdominal pain,melena  Endo:               negative for polyuria,polydipsia,polyphagia,heat intolerance  Genitourinary: negative for frequency, dysuria and hematuria  Integument:  negative for rash and pruritus  Hematologic:  negative for easy bruising and gum/nose bleeding  Musculoskel: negative for back pain, muscle weakness  Neurological:  negative for headaches, dizziness, vertigo, memory problems and gait   Behavl/Psych: negative for feelings of anxiety, depression, mood changes    Past Medical History:   Diagnosis Date    Arthritis     Asthma     uses inhalers    Chronic obstructive pulmonary disease (HCC)     bronchitis    Chronic pain     GERD (gastroesophageal reflux disease)     Hypertension     Ill-defined condition     environmental allergies     Ill-defined condition     Multiple body piercings; unable to remove tongue and lip piercings    Ill-defined condition 2018    GASTRITIS PER ENDOSCOPY    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    Psychiatric disorder     DEPRESSION    Thyroid disease     hypo    Ventral hernia 2013     Past Surgical History:   Procedure Laterality Date    HX HEENT  2009    thyroidectomy    HX KNEE REPLACEMENT      R    HX KNEE REPLACEMENT  2019    HX MOHS PROCEDURES Right 3/8/11    HX OTHER SURGICAL      hiatal hernia repair    HX TUBAL LIGATION       Social History     Socioeconomic History    Marital status: SINGLE     Spouse name: Not on file    Number of children: Not on file    Years of education: Not on file    Highest education level: Not on file   Tobacco Use    Smoking status: Current Every Day Smoker     Packs/day: 0.25     Years: 1.50     Pack years: 0.37     Types: Cigarettes     Last attempt to quit: 10/1/2015     Years since quittin.2    Smokeless tobacco: Never Used   Substance and Sexual Activity    Alcohol use: No    Drug use: No    Sexual activity: Yes     Partners: Female     Birth control/protection: Surgical     Comment: Post menopausal/partial hysterectomy     Family History   Problem Relation Age of Onset    Cancer Father         lung cancer    Heart Disease Mother     Hypertension Mother     Diabetes Mother     Anesth Problems Neg Hx      Current Outpatient Medications   Medication Sig Dispense Refill    azelastine (ASTELIN) 137 mcg (0.1 %) nasal spray       traZODone (DESYREL) 50 mg tablet Take 1 Tab by mouth nightly. 90 Tab 0    DULoxetine (CYMBALTA) 30 mg capsule Take 2 Caps by mouth two (2) times a day. 180 Cap 0    ondansetron (ZOFRAN ODT) 4 mg disintegrating tablet DISSOLVE 1 TABLET BY MOUTH EVERY 8 HOURS AS NEEDED FOR NAUSEA 20 Tab 1    XOLAIR 150 mg solr Indications: injection      gabapentin (NEURONTIN) 800 mg tablet Take 1 Tab by mouth three (3) times daily.  Max Daily Amount: 2,400 mg. 90 Tab 5    oxyCODONE-acetaminophen (PERCOCET 10)  mg per tablet Take 1 Tab by mouth daily as needed for Pain for up to 30 days. May take 1 tab ONCE DAILY as needed for pain. Indications: pain 30 Tab 0    metoprolol succinate (TOPROL-XL) 25 mg XL tablet TAKE 1 TABLET BY MOUTH EVERY DAY 90 Tab 3    meloxicam (MOBIC) 15 mg tablet TAKE 1 TABLET BY MOUTH EVERY DAY IN THE MORNING WITH BREAKFAST 30 Tab 11    hydrOXYzine HCl (ATARAX) 25 mg tablet TAKE 1 TABLET BY MOUTH 3 TIMES A DAY AS NEEDED FOR ITCHING FOR ANXIETY 60 Tab 0    loratadine (CLARITIN) 10 mg tablet Take 1 Tab by mouth daily. 90 Tab 3    montelukast (SINGULAIR) 10 mg tablet TAKE 1 TAB BY MOUTH DAILY. 30 Tab 6    methocarbamol (ROBAXIN) 750 mg tablet Take 1 Tab by mouth every six (6) hours as needed (mm spasm). 270 Tab 3    amLODIPine (NORVASC) 10 mg tablet Take 0.5 Tabs by mouth daily. 90 Tab 3    acetaminophen (TYLENOL) 650 mg TbER Take 1 Tab by mouth every eight (8) hours. 270 Tab 3    levothyroxine (SYNTHROID) 125 mcg tablet TAKE 1 TABLET BY MOUTH EVERY DAY BEFORE BREAKFAST  Indications: hypothyroidism 90 Tab 3    hydroCHLOROthiazide (HYDRODIURIL) 25 mg tablet TAKE 1 TAB BY MOUTH DAILY FOR HYPERTENSION 90 Tab 3    tiotropium (SPIRIVA WITH HANDIHALER) 18 mcg inhalation capsule Take 1 Cap by inhalation daily.  COMBIVENT RESPIMAT  mcg/actuation inhaler INHALE 1 PUFF BY MOUTH DAILY  0    PROAIR HFA 90 mcg/actuation inhaler TAKE 2 PUFFS BY INHALATION EVERY FOUR (4) HOURS AS NEEDED. 8.5 Inhaler 0    budesonide-formoterol (SYMBICORT) 160-4.5 mcg/actuation HFAA Take 2 Puffs by inhalation two (2) times a day. 3 Inhaler 3    lidocaine (LIDODERM) 5 % Apply patch to the affected area for 12 hours a day and remove for 12 hours a day. 30 Each 11    pantoprazole (PROTONIX) 40 mg tablet Take 1 Tab by mouth daily. Take in 2 week intervals for GERD Flares 30 Tab 1    naloxone (NARCAN) 4 mg/actuation nasal spray Use 1 spray into 1 nostril for OVERDOSE. Call 911. For subsequent doses, give in alternating nostrils. May repeat every 2 to 3 min. 2 Each 0    albuterol (PROVENTIL VENTOLIN) 2.5 mg /3 mL (0.083 %) nebulizer solution 3 mL by Nebulization route every four (4) hours as needed for Wheezing. 24 Each 2    diclofenac (VOLTAREN) 1 % gel Apply 2 g to affected area every six (6) hours. 100 g 5    fluticasone (FLONASE) 50 mcg/actuation nasal spray 2 Sprays by Both Nostrils route daily. 1 Bottle 11    furosemide (LASIX) 20 mg tablet TAKE 1 TABLET BY MOUTH DAILY X 3-5 DAYS FOR LEG SWELLING INDICATIONS: VISIBLE WATER RETENTION 15 Tab 0    lidocaine 4 % patch Every 12 hours as needed for pain 20 Patch 0    miscellaneous medical supply misc Shower seat for chronic knee pain, pt planning to have right TKR in June due to condition. Very limited range of motion. 1 Each 0     Allergies   Allergen Reactions    Hydrocodone Itching    Mold Shortness of Breath and Itching    Ibuprofen Nausea Only       Objective:  Visit Vitals  /72 (BP 1 Location: Left arm, BP Patient Position: Sitting)   Pulse 68   Temp 97.7 °F (36.5 °C) (Oral)   Resp 17   Ht 5' 1\" (1.549 m)   Wt 214 lb (97.1 kg)   LMP 12/29/2016 (Exact Date) Comment: partial hysterectomy   SpO2 94%   BMI 40.43 kg/m²     Physical Exam:   General appearance - alert, well appearing, and in no distress  Mental status - alert, oriented to person, place, and time  EYE-SHIN, EOMI, corneas normal, no foreign bodies  ENT-ENT exam normal, no neck nodes or sinus tenderness  Nose - normal and patent, no erythema, discharge or polyps. Septum deviation not observed externally.   Mouth - mucous membranes moist, pharynx normal without lesions  Neck - supple, no significant adenopathy   Chest - clear to auscultation, no wheezes, rales or rhonchi, symmetric air entry   Heart - normal rate, regular rhythm, normal S1, S2, no murmurs, rubs, clicks or gallops   Abdomen - soft, nontender, nondistended, no masses or organomegaly  Lymph- no adenopathy palpable  Ext-peripheral pulses normal, no pedal edema, no clubbing or cyanosis  Skin-Warm and dry. no hyperpigmentation, vitiligo, or suspicious lesions  Neuro -alert, oriented, normal speech, no focal findings or movement disorder noted  Neck-normal C-spine, no tenderness, full ROM without pain      Results for orders placed or performed in visit on 11/06/19   Kee Pedraza (AMISH)   Result Value Ref Range    Summary FINAL        Assessment/Plan:  CXR, EKG, and labs ordered per request. EKG, NSR. MEDICALLY STABLE FOR SEPTOPLASTY AND TURBINATE SURGERY  Follow-up and Dispositions    · Return for keep pain med appt as scheduled. .         ICD-10-CM ICD-9-CM    1. Pre-op exam Z01.818 V72.84 AMB POC EKG ROUTINE W/ 12 LEADS, INTER & REP      XR CHEST PA LAT      CBC WITH AUTOMATED DIFF      METABOLIC PANEL, COMPREHENSIVE   2. Deviated nasal septum J34.2 470    3. Chronic recurrent sinusitis J32.9 473.9    4. [facility] J44.9 493.20 XR CHEST PA LAT     Orders Placed This Encounter    XR CHEST PA LAT     Standing Status:   Future     Standing Expiration Date:   2/14/2021     Order Specific Question:   Reason for Exam     Answer:   pre-op, h/o asthma     Order Specific Question:   Is Patient Allergic to Contrast Dye?      Answer:   No    CBC WITH AUTOMATED DIFF    METABOLIC PANEL, COMPREHENSIVE    AMB POC EKG ROUTINE W/ 12 LEADS, INTER & REP     Order Specific Question:   Reason for Exam:     Answer:   elevated BP    azelastine (ASTELIN) 137 mcg (0.1 %) nasal spray

## 2020-01-14 NOTE — PROGRESS NOTES
Pt is here for   Chief Complaint   Patient presents with    Pre-op Exam     Pt states pain level is a 8/10 back and knee    1. Have you been to the ER, urgent care clinic since your last visit? Hospitalized since your last visit? No    2. Have you seen or consulted any other health care providers outside of the [address] [place] [address] since your last visit? Include any pap smears or colon screening.  No

## 2020-01-15 LAB
ALBUMIN SERPL-MCNC: 3.8 G/DL (ref 3.5–5.5)
ALBUMIN/GLOB SERPL: 1.2 {RATIO} (ref 1.2–2.2)
ALP SERPL-CCNC: 132 IU/L (ref 39–117)
ALT SERPL-CCNC: 10 IU/L (ref 0–32)
AST SERPL-CCNC: 12 IU/L (ref 0–40)
BASOPHILS # BLD AUTO: 0 X10E3/UL (ref 0–0.2)
BASOPHILS NFR BLD AUTO: 1 %
BILIRUB SERPL-MCNC: 0.5 MG/DL (ref 0–1.2)
BUN SERPL-MCNC: 11 MG/DL (ref 6–24)
BUN/CREAT SERPL: 12 (ref 9–23)
CALCIUM SERPL-MCNC: 9.4 MG/DL (ref 8.7–10.2)
CHLORIDE SERPL-SCNC: 106 MMOL/L (ref 96–106)
CO2 SERPL-SCNC: 24 MMOL/L (ref 20–29)
CREAT SERPL-MCNC: 0.91 MG/DL (ref 0.57–1)
EOSINOPHIL # BLD AUTO: 0.2 X10E3/UL (ref 0–0.4)
EOSINOPHIL NFR BLD AUTO: 4 %
ERYTHROCYTE [DISTWIDTH] IN BLOOD BY AUTOMATED COUNT: 14 % (ref 11.7–15.4)
GLOBULIN SER CALC-MCNC: 3.3 G/DL (ref 1.5–4.5)
GLUCOSE SERPL-MCNC: 72 MG/DL (ref 65–99)
HCT VFR BLD AUTO: 46.2 % (ref 34–46.6)
HGB BLD-MCNC: 15.2 G/DL (ref 11.1–15.9)
IMM GRANULOCYTES # BLD AUTO: 0 X10E3/UL (ref 0–0.1)
IMM GRANULOCYTES NFR BLD AUTO: 0 %
LYMPHOCYTES # BLD AUTO: 2.5 X10E3/UL (ref 0.7–3.1)
LYMPHOCYTES NFR BLD AUTO: 44 %
MCH RBC QN AUTO: 27.8 PG (ref 26.6–33)
MCHC RBC AUTO-ENTMCNC: 32.9 G/DL (ref 31.5–35.7)
MCV RBC AUTO: 85 FL (ref 79–97)
MONOCYTES # BLD AUTO: 0.6 X10E3/UL (ref 0.1–0.9)
MONOCYTES NFR BLD AUTO: 11 %
NEUTROPHILS # BLD AUTO: 2.2 X10E3/UL (ref 1.4–7)
NEUTROPHILS NFR BLD AUTO: 40 %
PLATELET # BLD AUTO: 232 X10E3/UL (ref 150–450)
POTASSIUM SERPL-SCNC: 4.4 MMOL/L (ref 3.5–5.2)
PROT SERPL-MCNC: 7.1 G/DL (ref 6–8.5)
RBC # BLD AUTO: 5.46 X10E6/UL (ref 3.77–5.28)
SODIUM SERPL-SCNC: 143 MMOL/L (ref 134–144)
WBC # BLD AUTO: 5.5 X10E3/UL (ref 3.4–10.8)

## 2020-01-15 NOTE — PROGRESS NOTES
Elevated liver enzyme, will f/u at next OV. Medically cleared for surgery. Printed lab letter to send to surgeon's office.

## 2020-01-24 ENCOUNTER — HOSPITAL ENCOUNTER (OUTPATIENT)
Dept: CT IMAGING | Age: 55
Discharge: HOME | End: 2020-01-24
Attending: SPECIALIST
Payer: MEDICAID

## 2020-01-24 DIAGNOSIS — R10.13 EPIGASTRIC PAIN: ICD-10-CM

## 2020-01-24 DIAGNOSIS — R10.11 RIGHT UPPER QUADRANT PAIN: ICD-10-CM

## 2020-01-24 PROCEDURE — 74011000258 HC RX REV CODE- 258: Performed by: RADIOLOGY

## 2020-01-24 PROCEDURE — 74177 CT ABD & PELVIS W/CONTRAST: CPT

## 2020-01-24 PROCEDURE — 74011636320 HC RX REV CODE- 636/320: Performed by: RADIOLOGY

## 2020-01-24 RX ORDER — SODIUM CHLORIDE 0.9 % (FLUSH) 0.9 %
10 SYRINGE (ML) INJECTION
Status: COMPLETED | OUTPATIENT
Start: 2020-01-24 | End: 2020-01-24

## 2020-01-24 RX ADMIN — SODIUM CHLORIDE 100 ML: 900 INJECTION, SOLUTION INTRAVENOUS at 10:27

## 2020-01-24 RX ADMIN — Medication 10 ML: at 10:27

## 2020-01-24 RX ADMIN — IOPAMIDOL 100 ML: 755 INJECTION, SOLUTION INTRAVENOUS at 10:27

## 2020-02-06 ENCOUNTER — OFFICE VISIT (OUTPATIENT)
Dept: INTERNAL MEDICINE CLINIC | Age: 55
End: 2020-02-06

## 2020-02-06 VITALS
BODY MASS INDEX: 40.22 KG/M2 | SYSTOLIC BLOOD PRESSURE: 127 MMHG | HEIGHT: 61 IN | TEMPERATURE: 97 F | DIASTOLIC BLOOD PRESSURE: 76 MMHG | RESPIRATION RATE: 17 BRPM | OXYGEN SATURATION: 97 % | HEART RATE: 83 BPM | WEIGHT: 213 LBS

## 2020-02-06 DIAGNOSIS — Z96.651 STATUS POST TOTAL RIGHT KNEE REPLACEMENT: ICD-10-CM

## 2020-02-06 DIAGNOSIS — Z98.890 S/P HERNIA REPAIR: ICD-10-CM

## 2020-02-06 DIAGNOSIS — R10.84 GENERALIZED ABDOMINAL PAIN: ICD-10-CM

## 2020-02-06 DIAGNOSIS — M17.11 PRIMARY OSTEOARTHRITIS OF RIGHT KNEE: ICD-10-CM

## 2020-02-06 DIAGNOSIS — M19.012 PRIMARY OSTEOARTHRITIS OF LEFT SHOULDER: ICD-10-CM

## 2020-02-06 DIAGNOSIS — M54.41 CHRONIC BILATERAL LOW BACK PAIN WITH RIGHT-SIDED SCIATICA: ICD-10-CM

## 2020-02-06 DIAGNOSIS — E66.01 SEVERE OBESITY (BMI 35.0-39.9) WITH COMORBIDITY (HCC): ICD-10-CM

## 2020-02-06 DIAGNOSIS — G89.29 CHRONIC BILATERAL LOW BACK PAIN WITH RIGHT-SIDED SCIATICA: ICD-10-CM

## 2020-02-06 DIAGNOSIS — Z79.891 CHRONIC USE OF OPIATE FOR THERAPEUTIC PURPOSE: ICD-10-CM

## 2020-02-06 DIAGNOSIS — Z87.19 S/P HERNIA REPAIR: ICD-10-CM

## 2020-02-06 DIAGNOSIS — J34.2 DEVIATED NASAL SEPTUM: ICD-10-CM

## 2020-02-06 DIAGNOSIS — R39.81 FUNCTIONAL URINARY INCONTINENCE: Primary | ICD-10-CM

## 2020-02-06 RX ORDER — OXYCODONE AND ACETAMINOPHEN 10; 325 MG/1; MG/1
1 TABLET ORAL
Qty: 60 TAB | Refills: 0 | Status: SHIPPED | OUTPATIENT
Start: 2020-02-06 | End: 2020-03-04 | Stop reason: SDUPTHER

## 2020-02-06 NOTE — PATIENT INSTRUCTIONS
Hernia Repair: Before Your Surgery  What is hernia repair surgery? A hernia occurs when a weak spot in your belly muscles allows a piece of your intestines or the tissue around them to poke through. This can cause a bulge in the area. It can also cause pain. But you may not feel anything. The hernia may be in your groin. Or it may be near your belly button. In some cases, it's in a scar from an earlier surgery. A doctor can fix a hernia through a cut (incision) made near it. This is called open surgery. Or the doctor may make some very small cuts and use a thin, lighted scope and small tools. This is laparoscopic surgery. If your hernia is bulging, the bulge is pushed back into place. The doctor then sews the healthy tissue back together. Often a piece of material is used to patch the weak spot. Open surgery will leave a longer scar. Laparoscopic surgery leaves a few small scars. The scars will fade with time. You may need to take 1 to 2 weeks off from work. This depends on the type of work you do and how you feel. Follow-up care is a key part of your treatment and safety. Be sure to make and go to all appointments, and call your doctor if you are having problems. It's also a good idea to know your test results and keep a list of the medicines you take. What happens before surgery?   Surgery can be stressful. This information will help you understand what you can expect. And it will help you safely prepare for surgery.   Preparing for surgery    · Understand exactly what surgery is planned, along with the risks, benefits, and other options. · Tell your doctors ALL the medicines, vitamins, supplements, and herbal remedies you take. Some of these can increase the risk of bleeding or interact with anesthesia.     · If you take blood thinners, such as warfarin (Coumadin), clopidogrel (Plavix), or aspirin, be sure to talk to your doctor.  He or she will tell you if you should stop taking these medicines before your surgery. Make sure that you understand exactly what your doctor wants you to do.     · Your doctor will tell you which medicines to take or stop before your surgery. You may need to stop taking certain medicines a week or more before surgery. So talk to your doctor as soon as you can.     · If you have an advance directive, let your doctor know. It may include a living will and a durable power of  for health care. Bring a copy to the hospital. If you don't have one, you may want to prepare one. It lets your doctor and loved ones know your health care wishes. Doctors advise that everyone prepare these papers before any type of surgery or procedure. What happens on the day of surgery? · Follow the instructions exactly about when to stop eating and drinking. If you don't, your surgery may be canceled. If your doctor told you to take your medicines on the day of surgery, take them with only a sip of water.     · Take a bath or shower before you come in for your surgery. Do not apply lotions, perfumes, deodorants, or nail polish.     · Do not shave the surgical site yourself.     · Take off all jewelry and piercings. And take out contact lenses, if you wear them.    At the hospital or surgery center   · Bring a picture ID.     · The area for surgery is often marked to make sure there are no errors.     · You will be kept comfortable and safe by your anesthesia provider. You will be asleep during the surgery.     · The surgery will take about 30 minutes to 2 hours. This depends on the size of the hernia and where it is. Going home   · Be sure you have someone to drive you home. Anesthesia and pain medicine make it unsafe for you to drive.     · You will be given more specific instructions about recovering from your surgery. They will cover things like diet, wound care, follow-up care, driving, and getting back to your normal routine. When should you call your doctor?    · You have questions or concerns.     · You don't understand how to prepare for your surgery.     · You become ill before the surgery (such as fever, flu, or a cold).     · You need to reschedule or have changed your mind about having the surgery. Where can you learn more? Go to http://ofelia-mindy.info/. Enter L499 in the search box to learn more about \"Hernia Repair: Before Your Surgery. \"  Current as of: November 7, 2018  Content Version: 12.2  © 9253-6542 [employer]. Care instructions adapted under license by [employer] (which disclaims liability or warranty for this information). If you have questions about a medical condition or this instruction, always ask your healthcare professional. [address] any warranty or liability for your use of this information. Lumbar Epidural Injection: Before Your Procedure  What is lumbar epidural injection? A lumbar epidural injection is a shot of steroid medicine into your back. The injection goes into the space around your spinal cord. A doctor may do this to help with pain, tingling, or numbness in your back or down your leg. These symptoms might be caused by a back or disc problem. Steroids don't always work. And when they do, it takes a few days. But the pain relief can last for several days to a few months or longer. Your injection may also include a numbing medicine that will work right away for a short time. Some people get a series of injections over weeks or months. Follow-up care is a key part of your treatment and safety. Be sure to make and go to all appointments, and call your doctor if you are having problems. It's also a good idea to know your test results and keep a list of the medicines you take. What happens before the procedure?   Preparing for the procedure    · Understand exactly what procedure is planned, along with the risks, benefits, and other options.     · Tell your doctors ALL the medicines, vitamins, supplements, and herbal remedies you take. Some of these can increase the risk of bleeding or interact with anesthesia.     · If you take blood thinners, such as warfarin (Coumadin), clopidogrel (Plavix), or aspirin, be sure to talk to your doctor. He or she will tell you if you should stop taking these medicines before your procedure. Make sure that you understand exactly what your doctor wants you to do.     · Your doctor will tell you which medicines to take or stop before your procedure. You may need to stop taking certain medicines a week or more before the procedure. So talk to your doctor as soon as you can.     · If you have an advance directive, let your doctor know. It may include a living will and a durable power of  for health care. Bring a copy to the hospital. If you don't have one, you may want to prepare one. It lets your doctor and loved ones know your health care wishes. Doctors advise that everyone prepare these papers before any type of surgery or procedure. Procedures can be stressful. This information will help you understand what you can expect. And it will help you safely prepare for your procedure. What happens on the day of the procedure?    · Your doctor may tell you not to eat or drink for a certain amount of time before the procedure. Follow these instructions carefully.     · Take a bath or shower before you come in for your procedure. Do not apply lotions, perfumes, deodorants, or nail polish.    At the hospital or surgery center   · Bring a picture ID.     · You may get medicine that relaxes you or puts you in a light sleep. The area being worked on will be numb.     · The procedure will take 5 to 15 minutes. You will go home about an hour later. Going home   · Be sure you have someone to drive you home.  Anesthesia and pain medicine make it unsafe for you to drive.     · You will be given more specific instructions about recovering from your procedure. They will cover things like diet, wound care, follow-up care, driving, and getting back to your normal routine. When should you call your doctor? · You have questions or concerns.     · You don't understand how to prepare for your procedure.     · You become ill before the procedure (such as fever, flu, or a cold).     · You need to reschedule or have changed your mind about having the procedure. Where can you learn more? Go to http://ofelia-mindy.info/. Enter  in the search box to learn more about \"Lumbar Epidural Injection: Before Your Procedure. \"  Current as of: June 26, 2019  Content Version: 12.2  © 0437-3898 [employer]. Care instructions adapted under license by [employer] (which disclaims liability or warranty for this information). If you have questions about a medical condition or this instruction, always ask your healthcare professional. [address] any warranty or liability for your use of this information.

## 2020-02-06 NOTE — PROGRESS NOTES
Encounter for pain management. Chronic Pain:  Patient has chronic BL knee pain for years and lower back pain. Had right TKR (2016) and repeat surgery to right knee 3/26/19 with Dr. Faye Sadler. However, having increasing leg heaviness, lower back pain, and right sciatic pain. Pain Scale: 10 - Worst pain ever/10. In water therapy, but not able to see Dr. Faye Sadler and spinal surgeon due to high bill owed, unsure why since she had insurance all along. Had IMAGING for  right shoulder 9/2019 and has been seeing/seen by [facility]. Pain in the shoulders, knees, right neck and lower back is still limiting walking, sitting, reaching, lifting, and standing, exacerbated by forementioned acitivities to include stair climbing. Has tried prednisone, tramadol, injections, PT, gabapentin, cymbalta, and surgery in past with minimal relief. Pain has been controlled with Oxycodone 10-325mg, last taken 2 days ago. The medication is kept safe by staying with her. Has NOT seen any other providers since last OV for pain medication. No significant changes to pain presentation since last OV. she is  able to do her normal daily activities. she reports the following adverse side effects: none. Averaging 7 BMs per week. Least pain over the last week has been 7/10. Worst pain over the last week has been 10/10. Aberrant behaviors: None         Symptoms onset: problem is longstanding. Rheumatological ROS: ongoing significant pain which is stable and controlled by pain med. Response to treatment plan: waxing and waning. Had septum surgery done, breathing much better, nose is straighter. Done by Dr. Bard Hopper, prescribed oxycodone, out of all meds now. Ran out 2 days ago of all opiates. Needs MORE incontinence pads and LESS diapers. Uses about 6 pads daily now and 4 diapers daily when having stomach isues, whish is often. Seeing GI with recent CT scan for hernia concern.      Review of Systems  Constitutional: +MRSA in nose. negative for fevers, chills, anorexia and weight loss  Eyes:   negative for visual disturbance, drainage, and irritation  ENT:   +AR and environmental allergies. negative for tinnitus,sore throat,ear pain,and hoarseness  Respiratory:  + asthma/COPD; stable. negative for hemoptysis  CV:   negative for chest pain, palpitations, and lower extremity edema  GI:   + GERD.  negative for nausea, vomiting, diarrhea, abdominal pain, and melena  Endo:               negative for polyuria,polydipsia,polyphagia, and heat intolerance  Genitourinary: negative for frequency, urgency, dysuria, retention, and hematuria  Integument:  negative for rash, ulcerations, and pruritus  Hematologic:  negative for easy bruising and bleeding  Musculoskel: negative for  muscle weakness  Neurological:  negative for headaches, dizziness, vertigo,and memory problems  Behavl/Psych: +depression, on cymbalta and zoloft. negative for feelings of  suicide    1./2. Medical history/Past medical History   Past Medical History:   Diagnosis Date    Arthritis     Asthma     uses inhalers    Chronic obstructive pulmonary disease (HCC)     bronchitis    Chronic pain     GERD (gastroesophageal reflux disease)     Hypertension     Ill-defined condition     environmental allergies     Ill-defined condition     Multiple body piercings; unable to remove tongue and lip piercings    Ill-defined condition 2018    GASTRITIS PER ENDOSCOPY    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    Psychiatric disorder     DEPRESSION    Thyroid disease     hypo    Ventral hernia 12/31/2013     Past Surgical History:   Procedure Laterality Date    HX HEENT  2009    thyroidectomy    HX KNEE REPLACEMENT      R    HX KNEE REPLACEMENT  03/26/2019    HX MOHS PROCEDURES Right 3/8/11    HX OTHER SURGICAL      hiatal hernia repair    HX TUBAL LIGATION       Patient Active Problem List   Diagnosis Code    Ventral hernia K43.9    Chronic pain of right knee M25.561, G89.29    Chronic right shoulder pain M25.511, G89.29    Environmental and seasonal allergies J30.89    Ventral hernia without obstruction or gangrene K43.9    Primary osteoarthritis of right knee M17.11    Mixed simple and mucopurulent chronic bronchitis (HCC) J41.8    Acquired hypothyroidism E03.9    Chronic bilateral low back pain with right-sided sciatica M54.41, G89.29    Status post total right knee replacement Z96.651    Malignant hypertension I10    Pain management contract signed Z02.89    Chronic pain of left knee M25.562, G89.29    Moderate episode of recurrent major depressive disorder ([facility]) F33.1    Psychophysiological insomnia F51.04    Severe obesity (BMI 35.0-39. 9) with comorbidity ([facility]) E66.01    Post-tussive vomiting R11.10    Chronic use of opiate for therapeutic purpose Z79.891    Obesity, Class II, BMI 35-39.9 E66.9    Left wrist pain M25.532    Chronic left shoulder pain M25.512, G89.29    Osteoarthritis of spine with radiculopathy, cervical region M47.22    Primary osteoarthritis of left shoulder M19.012    MRSA carrier Z22.322    S/P total knee arthroplasty, right Z96.651    Loosening of knee joint prosthesis ([address] T84.038A, Z96.659       3. Applicable records from prior treatment providers are apart of [facility] under the media/encounters tab. 4. Diagnostic, therapeutic and laboratory results are available in the [address] [place] [address] chart. 5. Consultation notes are available for review in the media/encounters tab of the [address] [place] [address] chart. 6. Treatment goals include: pain control, improve activity level and function in regards to activities of daily living, and improved comfort level. Previously pt has been limited by pain in all these aspects.     7. The risks and benefits of treatment have been discussed at this office visit with the pt.  she understands that the medication has addictive potential.  Additionally the pt has been advised that narcotic pain medication may impair mental and/or physical ability required for performance of tasks such as driving or operating any other machinery. 8. Pt has an updated signed pain contract on file and can be found under the media section [facility]. 9. The pain contract is reviewed. Pain medication will be continued at the same dosage. Pill count: 0, expected. Last fill 1/2/20. Urine drug screening ordered/collected today. Diagnostic studies are not indicated at this time. Interventional procedure are not indicated at this time. Narcan order sent in past.       10. Medication prescibed is oxycodone every 24 hours PRN #60 with 0 refills for a total 1 month supply.  was reviewed while planning for pain/anxiety management, no indications of drug diversion suspected. Prescription history is NOT suspicious for controlled substance overuse. 11. Patient instructions have been reviewed in detail as outlined above and in the pain contract. 12. Re-evaluation is planned for 1 month. Current Outpatient Medications   Medication Sig Dispense Refill    oxyCODONE-acetaminophen (PERCOCET 10)  mg per tablet Take 1 Tab by mouth every twelve (12) hours as needed for Pain for up to 30 days. Max Daily Amount: 2 Tabs. Indications: pain 60 Tab 0    azelastine (ASTELIN) 137 mcg (0.1 %) nasal spray       traZODone (DESYREL) 50 mg tablet Take 1 Tab by mouth nightly. 90 Tab 0    DULoxetine (CYMBALTA) 30 mg capsule Take 2 Caps by mouth two (2) times a day. 180 Cap 0    ondansetron (ZOFRAN ODT) 4 mg disintegrating tablet DISSOLVE 1 TABLET BY MOUTH EVERY 8 HOURS AS NEEDED FOR NAUSEA 20 Tab 1    XOLAIR 150 mg solr Indications: injection      gabapentin (NEURONTIN) 800 mg tablet Take 1 Tab by mouth three (3) times daily.  Max Daily Amount: 2,400 mg. 90 Tab 5    metoprolol succinate (TOPROL-XL) 25 mg XL tablet TAKE 1 TABLET BY MOUTH EVERY DAY 90 Tab 3    meloxicam (MOBIC) 15 mg tablet TAKE 1 TABLET BY MOUTH EVERY DAY IN THE MORNING WITH BREAKFAST 30 Tab 11    hydrOXYzine HCl (ATARAX) 25 mg tablet TAKE 1 TABLET BY MOUTH 3 TIMES A DAY AS NEEDED FOR ITCHING FOR ANXIETY 60 Tab 0    loratadine (CLARITIN) 10 mg tablet Take 1 Tab by mouth daily. 90 Tab 3    montelukast (SINGULAIR) 10 mg tablet TAKE 1 TAB BY MOUTH DAILY. 30 Tab 6    methocarbamol (ROBAXIN) 750 mg tablet Take 1 Tab by mouth every six (6) hours as needed (mm spasm). 270 Tab 3    amLODIPine (NORVASC) 10 mg tablet Take 0.5 Tabs by mouth daily. 90 Tab 3    acetaminophen (TYLENOL) 650 mg TbER Take 1 Tab by mouth every eight (8) hours. 270 Tab 3    levothyroxine (SYNTHROID) 125 mcg tablet TAKE 1 TABLET BY MOUTH EVERY DAY BEFORE BREAKFAST  Indications: hypothyroidism 90 Tab 3    hydroCHLOROthiazide (HYDRODIURIL) 25 mg tablet TAKE 1 TAB BY MOUTH DAILY FOR HYPERTENSION 90 Tab 3    tiotropium (SPIRIVA WITH HANDIHALER) 18 mcg inhalation capsule Take 1 Cap by inhalation daily.  COMBIVENT RESPIMAT  mcg/actuation inhaler INHALE 1 PUFF BY MOUTH DAILY  0    PROAIR HFA 90 mcg/actuation inhaler TAKE 2 PUFFS BY INHALATION EVERY FOUR (4) HOURS AS NEEDED. 8.5 Inhaler 0    budesonide-formoterol (SYMBICORT) 160-4.5 mcg/actuation HFAA Take 2 Puffs by inhalation two (2) times a day. 3 Inhaler 3    lidocaine (LIDODERM) 5 % Apply patch to the affected area for 12 hours a day and remove for 12 hours a day. 30 Each 11    pantoprazole (PROTONIX) 40 mg tablet Take 1 Tab by mouth daily. Take in 2 week intervals for GERD Flares 30 Tab 1    naloxone (NARCAN) 4 mg/actuation nasal spray Use 1 spray into 1 nostril for OVERDOSE. Call 911. For subsequent doses, give in alternating nostrils. May repeat every 2 to 3 min. 2 Each 0    albuterol (PROVENTIL VENTOLIN) 2.5 mg /3 mL (0.083 %) nebulizer solution 3 mL by Nebulization route every four (4) hours as needed for Wheezing. 24 Each 2    diclofenac (VOLTAREN) 1 % gel Apply 2 g to affected area every six (6) hours.  100 g 5    fluticasone (FLONASE) 50 mcg/actuation nasal spray 2 Sprays by Both Nostrils route daily. 1 Bottle 11    furosemide (LASIX) 20 mg tablet TAKE 1 TABLET BY MOUTH DAILY X 3-5 DAYS FOR LEG SWELLING INDICATIONS: VISIBLE WATER RETENTION 15 Tab 0    lidocaine 4 % patch Every 12 hours as needed for pain 20 Patch 0    miscellaneous medical supply misc Shower seat for chronic knee pain, pt planning to have right TKR in June due to condition. Very limited range of motion. 1 Each 0     Allergies   Allergen Reactions    Hydrocodone Itching    Mold Shortness of Breath and Itching    Ibuprofen Nausea Only       Objective:  Visit Vitals  /76 (BP 1 Location: Left arm, BP Patient Position: Sitting)   Pulse 83   Temp 97 °F (36.1 °C) (Oral)   Resp 17   Ht 5' 1\" (1.549 m)   Wt 213 lb (96.6 kg)   LMP 12/29/2016 (Exact Date) Comment: partial hysterectomy   SpO2 97%   BMI 40.25 kg/m²     Wt Readings from Last 3 Encounters:   02/06/20 213 lb (96.6 kg)   01/14/20 214 lb (97.1 kg)   11/20/19 214 lb 12.8 oz (97.4 kg)     Physical Exam:   General appearance - alert, well appearing, and in mild distress. Mental status - A/O x 4, normal mood and flat affect. Chest - CTA. Symmetric chest rise. No wheezing, rales or rhonchi. Heart - Normal rate, regular rhythm. Normal S1, S2. No MGR or clicks. Abd- soft, obese,distended. NT, no masses. Ext- Radial, DP pulses, 2+ bilaterally. No clubbing or cyanosis. No pedal edema, CRISTIANE. Skin-Warm and dry. No hyperpigmentation, ulcerations, or suspicious lesions. Neuro - normal speech, no focal findings or movement disorder. Normal strength and muscle tone. Slow gait using cane. Assessment/Plan:  Discussed the patient's BMI with her. The BMI follow up plan is as follows: daily exercise    I have reviewed/discussed the above normal BMI (Body mass index is 40.25 kg/m².) with the patient.   I have recommended the following interventions: encourage exercise, monitor weight, and dietary management education, guidance, and counseling, . Medication Side Effects and Warnings were discussed with patient: yes   Patient Labs were reviewed: yes  Patient Past Records were reviewed: yes    See below for other orders   Follow-up and Dispositions    · Return in about 4 weeks (around 3/5/2020) for incr low back pain, med refill. ICD-10-CM ICD-9-CM    1. Functional urinary incontinence R39.81 788.91    2. Primary osteoarthritis of right knee M17.11 715.16 oxyCODONE-acetaminophen (PERCOCET 10)  mg per tablet      TOXASSURE SELECT 13 (MW)   3. Status post total right knee replacement Z96.651 V43.65 oxyCODONE-acetaminophen (PERCOCET 10)  mg per tablet      TOXASSURE SELECT 13 (MW)   4. Chronic use of opiate for therapeutic purpose Z79.891 V58.69 oxyCODONE-acetaminophen (PERCOCET 10)  mg per tablet      TOXASSURE SELECT 13 (MW)   5. Primary osteoarthritis of left shoulder M19.012 715.11 oxyCODONE-acetaminophen (PERCOCET 10)  mg per tablet      TOXASSURE SELECT 13 (MW)   6. Deviated nasal septum J34.2 470    7. Severe obesity (BMI 35.0-39. 9) with comorbidity ([address] E66.01 278.01    8. Generalized abdominal pain R10.84 789.07    9. S/P hernia repair Z98.890 V45.89     Z87.19     10. Chronic bilateral low back pain with right-sided sciatica M54.41 724.2     G89.29 724.3      338.29      Orders Placed This Encounter    TOXASSURE SELECT 13 (MW)    oxyCODONE-acetaminophen (PERCOCET 10)  mg per tablet     Sig: Take 1 Tab by mouth every twelve (12) hours as needed for Pain for up to 30 days. Max Daily Amount: 2 Tabs. Indications: pain     Dispense:  60 Tab     Refill:  0       Liliya Sykes expressed understanding of plan. An After Visit Summary was offered/printed and given to the patient.

## 2020-02-06 NOTE — PROGRESS NOTES
Pt is here for   Chief Complaint   Patient presents with    Follow-up     TSH, CHR    Medication Refill     pain medication     New Order     for panty liners, pt states that she will need an order for this to be sent to [address] [facility] pain level is a 10/10 back and legs     Pt states last had something for pain 2 days ago    1. Have you been to the ER, urgent care clinic since your last visit? Hospitalized since your last visit? No    2. Have you seen or consulted any other health care providers outside of the [address] [place] [address] since your last visit? Include any pap smears or colon screening.  No

## 2020-02-11 LAB — DRUGS UR: NORMAL

## 2020-03-04 ENCOUNTER — OFFICE VISIT (OUTPATIENT)
Dept: INTERNAL MEDICINE CLINIC | Age: 55
End: 2020-03-04

## 2020-03-04 VITALS
HEIGHT: 61 IN | SYSTOLIC BLOOD PRESSURE: 118 MMHG | WEIGHT: 210 LBS | OXYGEN SATURATION: 97 % | RESPIRATION RATE: 18 BRPM | BODY MASS INDEX: 39.65 KG/M2 | HEART RATE: 75 BPM | TEMPERATURE: 96.4 F | DIASTOLIC BLOOD PRESSURE: 60 MMHG

## 2020-03-04 DIAGNOSIS — Z79.891 CHRONIC USE OF OPIATE FOR THERAPEUTIC PURPOSE: ICD-10-CM

## 2020-03-04 DIAGNOSIS — M17.11 PRIMARY OSTEOARTHRITIS OF RIGHT KNEE: ICD-10-CM

## 2020-03-04 DIAGNOSIS — M19.012 PRIMARY OSTEOARTHRITIS OF LEFT SHOULDER: ICD-10-CM

## 2020-03-04 DIAGNOSIS — J44.9 ASTHMA WITH COPD (HCC): ICD-10-CM

## 2020-03-04 DIAGNOSIS — G89.29 CHRONIC BILATERAL LOW BACK PAIN WITH RIGHT-SIDED SCIATICA: Primary | ICD-10-CM

## 2020-03-04 DIAGNOSIS — M54.41 CHRONIC BILATERAL LOW BACK PAIN WITH RIGHT-SIDED SCIATICA: Primary | ICD-10-CM

## 2020-03-04 DIAGNOSIS — Z96.651 STATUS POST TOTAL RIGHT KNEE REPLACEMENT: ICD-10-CM

## 2020-03-04 RX ORDER — IPRATROPIUM BROMIDE AND ALBUTEROL 20; 100 UG/1; UG/1
1 SPRAY, METERED RESPIRATORY (INHALATION)
Qty: 1 INHALER | Refills: 0 | Status: SHIPPED | OUTPATIENT
Start: 2020-03-04 | End: 2020-04-13

## 2020-03-04 RX ORDER — OXYCODONE AND ACETAMINOPHEN 10; 325 MG/1; MG/1
1 TABLET ORAL
Qty: 60 TAB | Refills: 0 | Status: SHIPPED | OUTPATIENT
Start: 2020-03-04 | End: 2020-04-01 | Stop reason: SDUPTHER

## 2020-03-04 NOTE — PROGRESS NOTES
Pt is here for   Chief Complaint   Patient presents with    Medication Refill     pain meds      Pt states pain level is a 9/10 back and knees. Pt states last had something for pain yesterday     1. Have you been to the ER, urgent care clinic since your last visit? Hospitalized since your last visit? No    2. Have you seen or consulted any other health care providers outside of the [address] [place] [address] since your last visit? Include any pap smears or colon screening.  No

## 2020-03-04 NOTE — PATIENT INSTRUCTIONS
Osteoarthritis: Care Instructions  Your Care Instructions    Arthritis is a common health problem in which the joints are inflamed. There are several kinds of arthritis. Osteoarthritis is caused by a breakdown of cartilage, the hard, thick tissue that cushions the joints. It causes pain, stiffness, and swelling, often in the spine, fingers, hips, and knees. Osteoarthritis can happen at any age, but it is most common in older people. Osteoarthritis never goes away completely, but it can be controlled. Medicine and home treatment can reduce the pain and prevent the arthritis from getting worse. Follow-up care is a key part of your treatment and safety. Be sure to make and go to all appointments, and call your doctor if you are having problems. It's also a good idea to know your test results and keep a list of the medicines you take. How can you care for yourself at home? · Take a warm shower or bath in the morning to relieve stiffness. Avoid sitting still afterwards. · If the joint is not swollen, use moist heat, like a warm, damp towel, for 20 to 30 minutes, 2 or 3 times a day. Do not use heat on a swollen joint. · If the joint is swollen, use ice or cold packs for 10 to 20 minutes, once an hour. Cold will help relieve pain and reduce inflammation. Put a thin cloth between the ice and your skin. · To prevent stiffness, gently move the joint through its full range of motion several times a day. · If the joint hurts, avoid activities that put a strain on it for a few days. Take rest breaks throughout the day. · Get regular exercise. Walking, swimming, yoga, biking, mikhail chi, and water aerobics are good exercises that are gentle on the joints. · Reach and stay at a healthy weight. If you need to lose or maintain weight, regular exercise and a healthy diet will help. Extra weight can strain the joints, especially the knees and hips, and make the pain worse. Losing even a few pounds may help.   · Take pain medicines exactly as directed. ? If the doctor gave you a prescription medicine for pain, take it as prescribed. ? If you are not taking a prescription pain medicine, ask your doctor if you can take an over-the-counter medicine. When should you call for help? Call your doctor now or seek immediate medical care if:    · The pain is so bad that you cannot use the joint.     · You have sudden back pain with weakness in your legs or loss of bowel or bladder control.     · Your stools are black and tarlike or have streaks of blood.     · You have severe pain and swelling in more than one joint.    Watch closely for changes in your health, and be sure to contact your doctor if:    · You have side effects from the medicines, like belly pain, ongoing heartburn, or nausea.     · Joint pain continues for more than 6 weeks, and home treatment is not helping. Where can you learn more? Go to http://ofelia-mindy.info/. Enter J923 in the search box to learn more about \"Osteoarthritis: Care Instructions. \"  Current as of: April 1, 2019  Content Version: 12.2  © 2063-9274 [employer]. Care instructions adapted under license by [employer] (which disclaims liability or warranty for this information). If you have questions about a medical condition or this instruction, always ask your healthcare professional. [address] any warranty or liability for your use of this information.

## 2020-03-04 NOTE — PROGRESS NOTES
Encounter for pain management. Chronic Pain:  Patient has chronic BL knee pain for years and lower back pain. Had right TKR (2016) and repeat surgery to right knee 3/26/19 with Dr. Everett Begum. Continue to have increasing leg heaviness, lower back pain, and right sciatic pain. Pain Scale: 9/10. In water therapy, but feels it is making pain worse, switched to land therapy this week. May have dry needling. Had IMAGING for  right shoulder 9/2019 and has been seeing/seen by [facility]. Pain in the shoulders, knees, right neck and lower back is still limiting walking, sitting, reaching, lifting, and standing, exacerbated by forementioned acitivities to include stair climbing. Has tried prednisone, tramadol, injections, PT, gabapentin, cymbalta, and surgery in past with minimal relief. Pain has been controlled with Oxycodone 10-325mg, last taken yesterday. The medication is kept safe by staying with her. Has NOT seen any other providers since last OV for pain medication. No significant changes to pain presentation since last OV. she is  able to do her normal daily activities. she reports the following adverse side effects: none. Averaging 7 BMs per week. Least pain over the last week has been 6/10. Worst pain over the last week has been 10/10. Aberrant behaviors: None         Symptoms onset: problem is longstanding. Rheumatological ROS: ongoing significant pain which is stable and controlled by pain med. Response to treatment plan: waxing and waning. Having a cold for a few days so unable to get allergy shots, but hoping to resume next week. Told to use her Combivent daily when seen at [facility] last week, but no refill sent. Needs sent today. Review of Systems  Constitutional: +MRSA in nose. negative for fevers, chills, anorexia and weight loss  Eyes:   negative for visual disturbance, drainage, and irritation  ENT:   +AR and environmental allergies.  negative for tinnitus,sore throat,ear pain,and hoarseness  Respiratory:  + asthma/COPD; stable. negative for hemoptysis  CV:   negative for chest pain, palpitations, and lower extremity edema  GI:   + GERD.  negative for nausea, vomiting, diarrhea, abdominal pain, and melena  Endo:               negative for polyuria,polydipsia,polyphagia, and heat intolerance  Genitourinary: negative for frequency, urgency, dysuria, retention, and hematuria  Integument:  negative for rash, ulcerations, and pruritus  Hematologic:  negative for easy bruising and bleeding  Musculoskel: negative for  muscle weakness  Neurological:  negative for headaches, dizziness, vertigo,and memory problems  Behavl/Psych: +depression, on cymbalta and zoloft. negative for feelings of  suicide    1./2. Medical history/Past medical History   Past Medical History:   Diagnosis Date    Arthritis     Asthma     uses inhalers    Chronic obstructive pulmonary disease (HCC)     bronchitis    Chronic pain     GERD (gastroesophageal reflux disease)     Hypertension     Ill-defined condition     environmental allergies     Ill-defined condition     Multiple body piercings; unable to remove tongue and lip piercings    Ill-defined condition 2018    GASTRITIS PER ENDOSCOPY    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    Psychiatric disorder     DEPRESSION    Thyroid disease     hypo    Ventral hernia 12/31/2013     Past Surgical History:   Procedure Laterality Date    HX HEENT  2009    thyroidectomy    HX KNEE REPLACEMENT      R    HX KNEE REPLACEMENT  03/26/2019    HX MOHS PROCEDURES Right 3/8/11    HX OTHER SURGICAL      hiatal hernia repair    HX TUBAL LIGATION       Patient Active Problem List   Diagnosis Code    Ventral hernia K43.9    Chronic pain of right knee M25.561, G89.29    Chronic right shoulder pain M25.511, G89.29    Environmental and seasonal allergies J30.89    Ventral hernia without obstruction or gangrene K43.9    Primary osteoarthritis of right knee M17.11    Mixed simple and mucopurulent chronic bronchitis ([facility]) J41.8    Acquired hypothyroidism E03.9    Chronic bilateral low back pain with right-sided sciatica M54.41, G89.29    Status post total right knee replacement Z96.651    Malignant hypertension I10    Pain management contract signed Z02.89    Chronic pain of left knee M25.562, G89.29    Moderate episode of recurrent major depressive disorder ([facility]) F33.1    Psychophysiological insomnia F51.04    Severe obesity (BMI 35.0-39. 9) with comorbidity ([facility]) E66.01    Post-tussive vomiting R11.10    Chronic use of opiate for therapeutic purpose Z79.891    Obesity, Class II, BMI 35-39.9 E66.9    Left wrist pain M25.532    Chronic left shoulder pain M25.512, G89.29    Osteoarthritis of spine with radiculopathy, cervical region M47.22    Primary osteoarthritis of left shoulder M19.012    MRSA carrier Z22.322    S/P total knee arthroplasty, right Z96.651    Loosening of knee joint prosthesis ([address] T84.038A, Z96.659       3. Applicable records from prior treatment providers are apart of [facility] under the media/encounters tab. 4. Diagnostic, therapeutic and laboratory results are available in the [address] [place] [address] chart. 5. Consultation notes are available for review in the media/encounters tab of the [address] [place] [address] chart. 6. Treatment goals include: pain control, improve activity level and function in regards to activities of daily living, and improved comfort level. Previously pt has been limited by pain in all these aspects. 7. The risks and benefits of treatment have been discussed at this office visit with the pt.  she understands that the medication has addictive potential.  Additionally the pt has been advised that narcotic pain medication may impair mental and/or physical ability required for performance of tasks such as driving or operating any other machinery.      8. Pt has an updated signed pain contract on file and can be found under the media section of the [facility] chart. 9. The pain contract is reviewed. Pain medication will be continued at the same dosage. Pill count: 2, expected 4-6. Last fill 3/4/20. Urine drug screening ordered/collected today. Diagnostic studies are not indicated at this time. Interventional procedure are not indicated at this time. Narcan order sent in past.       10. Medication prescibed is oxycodone every 24 hours PRN #60 with 0 refills for a total 1 month supply.  was reviewed while planning for pain/anxiety management, no indications of drug diversion suspected. Prescription history is NOT suspicious for controlled substance overuse. 11. Patient instructions have been reviewed in detail as outlined above and in the pain contract. 12. Re-evaluation is planned for 1 month. Current Outpatient Medications   Medication Sig Dispense Refill    COMBIVENT RESPIMAT  mcg/actuation inhaler Take 1 Puff by inhalation every six (6) hours as needed for Wheezing. 1 Inhaler 0    oxyCODONE-acetaminophen (PERCOCET 10)  mg per tablet Take 1 Tab by mouth every twelve (12) hours as needed for Pain for up to 30 days. Max Daily Amount: 2 Tabs. Indications: pain 60 Tab 0    azelastine (ASTELIN) 137 mcg (0.1 %) nasal spray       traZODone (DESYREL) 50 mg tablet Take 1 Tab by mouth nightly. 90 Tab 0    DULoxetine (CYMBALTA) 30 mg capsule Take 2 Caps by mouth two (2) times a day. 180 Cap 0    ondansetron (ZOFRAN ODT) 4 mg disintegrating tablet DISSOLVE 1 TABLET BY MOUTH EVERY 8 HOURS AS NEEDED FOR NAUSEA 20 Tab 1    XOLAIR 150 mg solr Indications: injection      gabapentin (NEURONTIN) 800 mg tablet Take 1 Tab by mouth three (3) times daily.  Max Daily Amount: 2,400 mg. 90 Tab 5    metoprolol succinate (TOPROL-XL) 25 mg XL tablet TAKE 1 TABLET BY MOUTH EVERY DAY 90 Tab 3    meloxicam (MOBIC) 15 mg tablet TAKE 1 TABLET BY MOUTH EVERY DAY IN THE MORNING WITH BREAKFAST 30 Tab 11    hydrOXYzine HCl (ATARAX) 25 mg tablet TAKE 1 TABLET BY MOUTH 3 TIMES A DAY AS NEEDED FOR ITCHING FOR ANXIETY 60 Tab 0    furosemide (LASIX) 20 mg tablet TAKE 1 TABLET BY MOUTH DAILY X 3-5 DAYS FOR LEG SWELLING INDICATIONS: VISIBLE WATER RETENTION 15 Tab 0    loratadine (CLARITIN) 10 mg tablet Take 1 Tab by mouth daily. 90 Tab 3    montelukast (SINGULAIR) 10 mg tablet TAKE 1 TAB BY MOUTH DAILY. 30 Tab 6    methocarbamol (ROBAXIN) 750 mg tablet Take 1 Tab by mouth every six (6) hours as needed (mm spasm). 270 Tab 3    amLODIPine (NORVASC) 10 mg tablet Take 0.5 Tabs by mouth daily. 90 Tab 3    acetaminophen (TYLENOL) 650 mg TbER Take 1 Tab by mouth every eight (8) hours. 270 Tab 3    levothyroxine (SYNTHROID) 125 mcg tablet TAKE 1 TABLET BY MOUTH EVERY DAY BEFORE BREAKFAST  Indications: hypothyroidism 90 Tab 3    hydroCHLOROthiazide (HYDRODIURIL) 25 mg tablet TAKE 1 TAB BY MOUTH DAILY FOR HYPERTENSION 90 Tab 3    tiotropium (SPIRIVA WITH HANDIHALER) 18 mcg inhalation capsule Take 1 Cap by inhalation daily.  PROAIR HFA 90 mcg/actuation inhaler TAKE 2 PUFFS BY INHALATION EVERY FOUR (4) HOURS AS NEEDED. 8.5 Inhaler 0    budesonide-formoterol (SYMBICORT) 160-4.5 mcg/actuation HFAA Take 2 Puffs by inhalation two (2) times a day. 3 Inhaler 3    lidocaine (LIDODERM) 5 % Apply patch to the affected area for 12 hours a day and remove for 12 hours a day. 30 Each 11    pantoprazole (PROTONIX) 40 mg tablet Take 1 Tab by mouth daily. Take in 2 week intervals for GERD Flares 30 Tab 1    naloxone (NARCAN) 4 mg/actuation nasal spray Use 1 spray into 1 nostril for OVERDOSE. Call 911. For subsequent doses, give in alternating nostrils. May repeat every 2 to 3 min. 2 Each 0    albuterol (PROVENTIL VENTOLIN) 2.5 mg /3 mL (0.083 %) nebulizer solution 3 mL by Nebulization route every four (4) hours as needed for Wheezing. 24 Each 2    diclofenac (VOLTAREN) 1 % gel Apply 2 g to affected area every six (6) hours.  100 g 5    fluticasone (FLONASE) 50 mcg/actuation nasal spray 2 Sprays by Both Nostrils route daily. 1 Bottle 11    lidocaine 4 % patch Every 12 hours as needed for pain 20 Patch 0    miscellaneous medical supply misc Shower seat for chronic knee pain, pt planning to have right TKR in June due to condition. Very limited range of motion. 1 Each 0     Allergies   Allergen Reactions    Hydrocodone Itching    Mold Shortness of Breath and Itching    Ibuprofen Nausea Only       Objective:  Visit Vitals  /60 (BP Patient Position: Sitting)   Pulse 75   Temp 96.4 °F (35.8 °C) (Oral)   Resp 18   Ht 5' 1\" (1.549 m)   Wt 210 lb (95.3 kg)   LMP 12/29/2016 (Exact Date) Comment: partial hysterectomy   SpO2 97%   BMI 39.68 kg/m²     Wt Readings from Last 3 Encounters:   03/04/20 210 lb (95.3 kg)   02/06/20 213 lb (96.6 kg)   01/14/20 214 lb (97.1 kg)     Physical Exam:   General appearance - alert, well appearing, and in mild distress. Mental status - A/O x 4, normal mood and flat affect. Chest - CTA. Symmetric chest rise. No wheezing, rales or rhonchi. Heart - Normal rate, regular rhythm. Normal S1, S2. No MGR or clicks. Abd- soft, obese,distended. NT, no masses. Ext- Radial, DP pulses, 2+ bilaterally. No clubbing or cyanosis. No pedal edema, CRISTIANE. Skin-Warm and dry. No hyperpigmentation, ulcerations, or suspicious lesions. Neuro - normal speech, no focal findings or movement disorder. Normal strength and muscle tone. Slow, limping gait using cane. Right knee edematous with patellar tenderness and LROM to full extension. Right leg appears slightly shorter than left. Back- alignment midline. Thoracolumbar spinal and paraspinal tenderness. LROM, no SLR. Assessment/Plan:  Refilled meds. Continued increased quantity for one more month while transitioning to land therapy.    Medication Side Effects and Warnings were discussed with patient: yes   Patient Labs were reviewed: yes  Patient Past Records were reviewed: yes    See below for other orders   Follow-up and Dispositions    · Return in about 4 weeks (around 4/1/2020) for pain f/u, dry needling start. ICD-10-CM ICD-9-CM    1. Chronic bilateral low back pain with right-sided sciatica M54.41 724.2     G89.29 724.3      338.29    2. Primary osteoarthritis of right knee M17.11 715.16 oxyCODONE-acetaminophen (PERCOCET 10)  mg per tablet      TOXASSURE SELECT 13 (MW)   3. Status post total right knee replacement Z96.651 V43.65 oxyCODONE-acetaminophen (PERCOCET 10)  mg per tablet      TOXASSURE SELECT 13 (MW)   4. Chronic use of opiate for therapeutic purpose Z79.891 V58.69 oxyCODONE-acetaminophen (PERCOCET 10)  mg per tablet      TOXASSURE SELECT 13 (MW)   5. Primary osteoarthritis of left shoulder M19.012 715.11 oxyCODONE-acetaminophen (PERCOCET 10)  mg per tablet      TOXASSURE SELECT 13 (MW)   6. Ast[facility] with COPD ([facility] J44.9 493.20 COMBIVENT RESPIMAT  mcg/actuation inhaler     Orders Placed This Encounter    TOXASSURE SELECT 13 (MW)    COMBIVENT RESPIMAT  mcg/actuation inhaler     Sig: Take 1 Puff by inhalation every six (6) hours as needed for Wheezing. Dispense:  1 Inhaler     Refill:  0    oxyCODONE-acetaminophen (PERCOCET 10)  mg per tablet     Sig: Take 1 Tab by mouth every twelve (12) hours as needed for Pain for up to 30 days. Max Daily Amount: 2 Tabs. Indications: pain     Dispense:  60 Tab     Refill:  0       Liliya Sykes expressed understanding of plan. An After Visit Summary was offered/printed and given to the patient.

## 2020-03-10 LAB — DRUGS UR: NORMAL

## 2020-03-20 RX ORDER — BUDESONIDE AND FORMOTEROL FUMARATE DIHYDRATE 160; 4.5 UG/1; UG/1
AEROSOL RESPIRATORY (INHALATION)
Qty: 30.6 INHALER | Refills: 3 | Status: SHIPPED | OUTPATIENT
Start: 2020-03-20 | End: 2021-04-02

## 2020-04-01 ENCOUNTER — VIRTUAL VISIT (OUTPATIENT)
Dept: INTERNAL MEDICINE CLINIC | Age: 55
End: 2020-04-01

## 2020-04-01 DIAGNOSIS — F41.8 ANXIETY ABOUT HEALTH: ICD-10-CM

## 2020-04-01 DIAGNOSIS — G89.29 CHRONIC BILATERAL LOW BACK PAIN WITH RIGHT-SIDED SCIATICA: Primary | ICD-10-CM

## 2020-04-01 DIAGNOSIS — M19.012 PRIMARY OSTEOARTHRITIS OF LEFT SHOULDER: ICD-10-CM

## 2020-04-01 DIAGNOSIS — M17.11 PRIMARY OSTEOARTHRITIS OF RIGHT KNEE: ICD-10-CM

## 2020-04-01 DIAGNOSIS — J44.9 ASTHMA WITH COPD (HCC): ICD-10-CM

## 2020-04-01 DIAGNOSIS — Z79.891 CHRONIC USE OF OPIATE FOR THERAPEUTIC PURPOSE: ICD-10-CM

## 2020-04-01 DIAGNOSIS — Z96.651 STATUS POST TOTAL RIGHT KNEE REPLACEMENT: ICD-10-CM

## 2020-04-01 DIAGNOSIS — M54.41 CHRONIC BILATERAL LOW BACK PAIN WITH RIGHT-SIDED SCIATICA: Primary | ICD-10-CM

## 2020-04-01 RX ORDER — OXYCODONE AND ACETAMINOPHEN 10; 325 MG/1; MG/1
1 TABLET ORAL
Qty: 45 TAB | Refills: 0 | Status: SHIPPED | OUTPATIENT
Start: 2020-05-01 | End: 2020-07-02 | Stop reason: SDUPTHER

## 2020-04-01 RX ORDER — OXYCODONE AND ACETAMINOPHEN 10; 325 MG/1; MG/1
1 TABLET ORAL
Qty: 45 TAB | Refills: 0 | Status: SHIPPED | OUTPATIENT
Start: 2020-06-01 | End: 2020-07-02 | Stop reason: SDUPTHER

## 2020-04-01 RX ORDER — OXYCODONE AND ACETAMINOPHEN 10; 325 MG/1; MG/1
1 TABLET ORAL
Qty: 45 TAB | Refills: 0 | Status: SHIPPED | OUTPATIENT
Start: 2020-04-01 | End: 2020-07-02 | Stop reason: SDUPTHER

## 2020-04-01 NOTE — PROGRESS NOTES
Sheree Riley is a 62 y.o. female who was seen by synchronous (real-time) audio-video technology on 4/1/2020. Consent:  She and/or her healthcare decision maker is aware that this patient-initiated Telehealth encounter is a billable service, with coverage as determined by her insurance carrier. She is aware that she may receive a bill and has provided verbal consent to proceed: Yes    I was in the office while conducting this encounter. Assessment & Plan:   Diagnoses and all orders for this visit:    1. Chronic bilateral low back pain with right-sided sciatica    2. Primary osteoarthritis of right knee  -     oxyCODONE-acetaminophen (PERCOCET 10)  mg per tablet; Take 1 Tab by mouth every twelve (12) hours as needed for Pain for up to 30 days. Max Daily Amount: 2 Tabs. Indications: pain  -     oxyCODONE-acetaminophen (PERCOCET 10)  mg per tablet; Take 1 Tab by mouth two (2) times daily as needed for Pain for up to 30 days. Max Daily Amount: 2 Tabs. May take 1 tab ONCE DAILY as needed for pain. Indications: pain  -     oxyCODONE-acetaminophen (PERCOCET 10)  mg per tablet; Take 1 Tab by mouth two (2) times daily as needed for Pain for up to 30 days. Max Daily Amount: 2 Tabs. Indications: pain    3. Status post total right knee replacement  -     oxyCODONE-acetaminophen (PERCOCET 10)  mg per tablet; Take 1 Tab by mouth every twelve (12) hours as needed for Pain for up to 30 days. Max Daily Amount: 2 Tabs. Indications: pain  -     oxyCODONE-acetaminophen (PERCOCET 10)  mg per tablet; Take 1 Tab by mouth two (2) times daily as needed for Pain for up to 30 days. Max Daily Amount: 2 Tabs. May take 1 tab ONCE DAILY as needed for pain. Indications: pain  -     oxyCODONE-acetaminophen (PERCOCET 10)  mg per tablet; Take 1 Tab by mouth two (2) times daily as needed for Pain for up to 30 days. Max Daily Amount: 2 Tabs. Indications: pain    4.  Chronic use of opiate for therapeutic purpose  -     oxyCODONE-acetaminophen (PERCOCET 10)  mg per tablet; Take 1 Tab by mouth every twelve (12) hours as needed for Pain for up to 30 days. Max Daily Amount: 2 Tabs. Indications: pain  -     oxyCODONE-acetaminophen (PERCOCET 10)  mg per tablet; Take 1 Tab by mouth two (2) times daily as needed for Pain for up to 30 days. Max Daily Amount: 2 Tabs. May take 1 tab ONCE DAILY as needed for pain. Indications: pain  -     oxyCODONE-acetaminophen (PERCOCET 10)  mg per tablet; Take 1 Tab by mouth two (2) times daily as needed for Pain for up to 30 days. Max Daily Amount: 2 Tabs. Indications: pain    5. Primary osteoarthritis of left shoulder  -     oxyCODONE-acetaminophen (PERCOCET 10)  mg per tablet; Take 1 Tab by mouth every twelve (12) hours as needed for Pain for up to 30 days. Max Daily Amount: 2 Tabs. Indications: pain  -     oxyCODONE-acetaminophen (PERCOCET 10)  mg per tablet; Take 1 Tab by mouth two (2) times daily as needed for Pain for up to 30 days. Max Daily Amount: 2 Tabs. May take 1 tab ONCE DAILY as needed for pain. Indications: pain  -     oxyCODONE-acetaminophen (PERCOCET 10)  mg per tablet; Take 1 Tab by mouth two (2) times daily as needed for Pain for up to 30 days. Max Daily Amount: 2 Tabs. Indications: pain    6. Anxiety about health    7. Asthma with COPD ([facility] [address]          Follow-up and Dispositions    · Return in about 3 months (around 7/1/2020) for pain med refill. Coding Help - Use CPT Codes 74165-99396, [id number] for Established and New Patients respectively, either employing EM elements or Time rules. Other codes (example consult codes) may also apply.   I spent at least 25 minutes with this established patient, and >50% of the time was spent counseling and/or coordinating care regarding pain medication management, COVID precautions with chronic lung condition, symptoms to report worsening of until f/u visit  712  Subjective: Sylvie Nichols was seen for Follow Up Chronic Condition (pain med refill)    Chronic Pain:  Patient has chronic BL knee pain for years and lower back pain. Had right TKR (2016) and repeat surgery to right knee 3/26/19 with Dr. Darrick Guy. Improved since last OV, no falls reported. Pain Scale: 9/10. Land therapy postponed due to COVID, as well as immunotherapy for allergies. Concerned for health while grocery shopping due to asthma and COPD hx. Staying inside as much as possible and sanitizing often. Recently broke broom while sweeping carpet, but cleansed right arm and no pain or drainage reported. Last IMAGING to  right shoulder 9/2019 and has been seeing/seen by [facility], Dr. Darrick Guy. Pain in the shoulders, knees, right neck and lower back is still limiting walking, sitting, reaching, lifting, and standing, exacerbated by forementioned acitivities to include stair climbing. Has tried prednisone, tramadol, injections, PT, gabapentin, cymbalta, and surgery in past with minimal relief. Pain has been controlled with Oxycodone 10-325mg, last taken yesterday. The medication is kept safe by staying with her. Has NOT seen any other providers since last OV for pain medication. No significant changes to pain presentation since last OV. she is  able to do her normal daily activities. she reports the following adverse side effects: none. Averaging 7 BMs per week. Least pain over the last week has been 4/10. Worst pain over the last week has been 9/10, worse yesterday with some leg weakness after grocery shopping after all her cleaning at home due to [employer]. Prior to Admission medications    Medication Sig Start Date End Date Taking? Authorizing Provider   oxyCODONE-acetaminophen (PERCOCET 10)  mg per tablet Take 1 Tab by mouth every twelve (12) hours as needed for Pain for up to 30 days. Max Daily Amount: 2 Tabs.  Indications: pain 4/1/20 5/1/20 Yes Abigail Dao NP   oxyCODONE-acetaminophen (PERCOCET 10)  mg per tablet Take 1 Tab by mouth two (2) times daily as needed for Pain for up to 30 days. Max Daily Amount: 2 Tabs. May take 1 tab ONCE DAILY as needed for pain. Indications: pain 6/1/20 7/1/20 Yes Ricarda Dao NP   oxyCODONE-acetaminophen (PERCOCET 10)  mg per tablet Take 1 Tab by mouth two (2) times daily as needed for Pain for up to 30 days. Max Daily Amount: 2 Tabs. Indications: pain 5/1/20 5/31/20 Yes Ricarda Dao, NP   Symbicort 160-4.5 mcg/actuation HFAA TAKE 2 PUFFS BY INHALATION TWO (2) TIMES A DAY. 3/20/20   Ricarda Dao NP   COMBIVENT RESPIMAT  mcg/actuation inhaler Take 1 Puff by inhalation every six (6) hours as needed for Wheezing. 3/4/20   Davidson Sanchez NP   oxyCODONE-acetaminophen (PERCOCET 10)  mg per tablet Take 1 Tab by mouth every twelve (12) hours as needed for Pain for up to 30 days. Max Daily Amount: 2 Tabs. Indications: pain 3/4/20 4/1/20  Davidson Sanchez NP   azelastine (ASTELIN) 137 mcg (0.1 %) nasal spray  12/2/19   Provider, Historical   traZODone (DESYREL) 50 mg tablet Take 1 Tab by mouth nightly. 12/3/19   Juany Hugo NP   DULoxetine (CYMBALTA) 30 mg capsule Take 2 Caps by mouth two (2) times a day. 12/3/19   Juany Hugo NP   ondansetron (ZOFRAN ODT) 4 mg disintegrating tablet DISSOLVE 1 TABLET BY MOUTH EVERY 8 HOURS AS NEEDED FOR NAUSEA 12/2/19   Davidson Sanchez NP   Lanie Mercy 150 mg solr Indications: injection 8/20/19   Provider, Historical   gabapentin (NEURONTIN) 800 mg tablet Take 1 Tab by mouth three (3) times daily.  Max Daily Amount: 2,400 mg. 11/6/19   Davidson Sanchez NP   metoprolol succinate (TOPROL-XL) 25 mg XL tablet TAKE 1 TABLET BY MOUTH EVERY DAY 10/2/19   Kyleighe Comas, SHAGGY   meloxicam (MOBIC) 15 mg tablet TAKE 1 TABLET BY MOUTH EVERY DAY IN THE MORNING WITH BREAKFAST 9/16/19   Davidson Sacnhez, SHAGGY   hydrOXYzine HCl (ATARAX) 25 mg tablet TAKE 1 TABLET BY MOUTH 3 TIMES A DAY AS NEEDED FOR ITCHING FOR ANXIETY 8/21/19   Ying Naylor MD KAREN   furosemide (LASIX) 20 mg tablet TAKE 1 TABLET BY MOUTH DAILY X 3-5 DAYS FOR LEG SWELLING INDICATIONS: VISIBLE WATER RETENTION 8/21/19   Kristina Olsen MD   loratadine (CLARITIN) 10 mg tablet Take 1 Tab by mouth daily. 7/31/19   Liya Regan NP   montelukast (SINGULAIR) 10 mg tablet TAKE 1 TAB BY MOUTH DAILY. 7/25/19   Liya Regan NP   methocarbamol (ROBAXIN) 750 mg tablet Take 1 Tab by mouth every six (6) hours as needed (mm spasm). 7/9/19   Liya Regan NP   amLODIPine (NORVASC) 10 mg tablet Take 0.5 Tabs by mouth daily. 7/9/19   Liya Regan NP   acetaminophen (TYLENOL) 650 mg TbER Take 1 Tab by mouth every eight (8) hours. 6/11/19   Liya Regan NP   levothyroxine (SYNTHROID) 125 mcg tablet TAKE 1 TABLET BY MOUTH EVERY DAY BEFORE BREAKFAST  Indications: hypothyroidism 6/11/19   Liya Regan NP   hydroCHLOROthiazide (HYDRODIURIL) 25 mg tablet TAKE 1 TAB BY MOUTH DAILY FOR HYPERTENSION 6/11/19   Liya Regan NP   tiotropium (SPIRIVA WITH HANDIHALER) 18 mcg inhalation capsule Take 1 Cap by inhalation daily. Other, MD Salazar   lidocaine 4 % patch Every 12 hours as needed for pain 5/12/19   Ronald Harris, PAULINA Dozier   PROAIR HFA 90 mcg/actuation inhaler TAKE 2 PUFFS BY INHALATION EVERY FOUR (4) HOURS AS NEEDED. 3/9/19   Liya Regan NP   lidocaine (LIDODERM) 5 % Apply patch to the affected area for 12 hours a day and remove for 12 hours a day. 1/8/19   Liya Regan NP   pantoprazole (PROTONIX) 40 mg tablet Take 1 Tab by mouth daily. Take in 2 week intervals for GERD Flares 12/14/18   Liya Regan NP   naloxone [facility]) 4 mg/actuation nasal spray Use 1 spray into 1 nostril for OVERDOSE. Call 911. For subsequent doses, give in alternating nostrils. May repeat every 2 to 3 min.  3/28/18   Liya Regna NP   albuterol (PROVENTIL VENTOLIN) 2.5 mg /3 mL (0.083 %) nebulizer solution 3 mL by Nebulization route every four (4) hours as needed for Wheezing. 1/31/18   Liya Regan NP   diclofenac (VOLTAREN) 1 % gel Apply 2 g to affected area every six (6) hours. 11/3/17   Gi Williamson NP   fluticasone (FLONASE) 50 mcg/actuation nasal spray 2 Sprays by Both Nostrils route daily. 12/9/16   Gi Williamson NP   miscellaneous medical supply misc Shower seat for chronic knee pain, pt planning to have right TKR in June due to condition. Very limited range of motion. 5/16/16   Gi Williamson NP     Allergies   Allergen Reactions    Hydrocodone Itching    Mold Shortness of Breath and Itching    Ibuprofen Nausea Only       Patient Active Problem List    Diagnosis Date Noted    S/P total knee arthroplasty, right 03/26/2019    Loosening of knee joint prosthesis ([facility] [address] 03/26/2019    MRSA carrier 03/06/2019    Osteoarthritis of spine with radiculopathy, cervical region 09/26/2018    Primary osteoarthritis of left shoulder 09/26/2018    Obesity, Class II, BMI 35-39.9 09/25/2018    Left wrist pain 09/25/2018    Chronic left shoulder pain 09/25/2018    Chronic use of opiate for therapeutic purpose 05/24/2018    Severe obesity (BMI 35.0-39. 9) with comorbidity ([address] 03/28/2018    Post-tussive vomiting 03/28/2018    Moderate episode of recurrent major depressive disorder ([address] 02/28/2018    Psychophysiological insomnia 02/28/2018    Chronic pain of left knee 06/15/2017    Chronic bilateral low back pain with right-sided sciatica 05/08/2017    Status post total right knee replacement 05/08/2017    Malignant hypertension 05/08/2017    Pain management contract signed 05/08/2017    Acquired hypothyroidism 01/06/2017    Mixed simple and mucopurulent chronic bronchitis ([address] 09/08/2016    Primary osteoarthritis of right knee 06/06/2016    Ventral hernia without obstruction or gangrene 05/26/2016    Chronic pain of right knee 02/05/2016    Chronic right shoulder pain 02/05/2016    Environmental and seasonal allergies 02/05/2016    Ventral hernia 12/31/2013     Current Outpatient Medications   Medication Sig Dispense Refill    oxyCODONE-acetaminophen (PERCOCET 10)  mg per tablet Take 1 Tab by mouth every twelve (12) hours as needed for Pain for up to 30 days. Max Daily Amount: 2 Tabs. Indications: pain 45 Tab 0    [START ON 6/1/2020] oxyCODONE-acetaminophen (PERCOCET 10)  mg per tablet Take 1 Tab by mouth two (2) times daily as needed for Pain for up to 30 days. Max Daily Amount: 2 Tabs. May take 1 tab ONCE DAILY as needed for pain. Indications: pain 45 Tab 0    [START ON 5/1/2020] oxyCODONE-acetaminophen (PERCOCET 10)  mg per tablet Take 1 Tab by mouth two (2) times daily as needed for Pain for up to 30 days. Max Daily Amount: 2 Tabs. Indications: pain 45 Tab 0    Symbicort 160-4.5 mcg/actuation HFAA TAKE 2 PUFFS BY INHALATION TWO (2) TIMES A DAY. 30.6 Inhaler 3    COMBIVENT RESPIMAT  mcg/actuation inhaler Take 1 Puff by inhalation every six (6) hours as needed for Wheezing. 1 Inhaler 0    azelastine (ASTELIN) 137 mcg (0.1 %) nasal spray       traZODone (DESYREL) 50 mg tablet Take 1 Tab by mouth nightly. 90 Tab 0    DULoxetine (CYMBALTA) 30 mg capsule Take 2 Caps by mouth two (2) times a day. 180 Cap 0    ondansetron (ZOFRAN ODT) 4 mg disintegrating tablet DISSOLVE 1 TABLET BY MOUTH EVERY 8 HOURS AS NEEDED FOR NAUSEA 20 Tab 1    XOLAIR 150 mg solr Indications: injection      gabapentin (NEURONTIN) 800 mg tablet Take 1 Tab by mouth three (3) times daily.  Max Daily Amount: 2,400 mg. 90 Tab 5    metoprolol succinate (TOPROL-XL) 25 mg XL tablet TAKE 1 TABLET BY MOUTH EVERY DAY 90 Tab 3    meloxicam (MOBIC) 15 mg tablet TAKE 1 TABLET BY MOUTH EVERY DAY IN THE MORNING WITH BREAKFAST 30 Tab 11    hydrOXYzine HCl (ATARAX) 25 mg tablet TAKE 1 TABLET BY MOUTH 3 TIMES A DAY AS NEEDED FOR ITCHING FOR ANXIETY 60 Tab 0    furosemide (LASIX) 20 mg tablet TAKE 1 TABLET BY MOUTH DAILY X 3-5 DAYS FOR LEG SWELLING INDICATIONS: VISIBLE WATER RETENTION 15 Tab 0    loratadine (CLARITIN) 10 mg tablet Take 1 Tab by mouth daily. 90 Tab 3    montelukast (SINGULAIR) 10 mg tablet TAKE 1 TAB BY MOUTH DAILY. 30 Tab 6    methocarbamol (ROBAXIN) 750 mg tablet Take 1 Tab by mouth every six (6) hours as needed (mm spasm). 270 Tab 3    amLODIPine (NORVASC) 10 mg tablet Take 0.5 Tabs by mouth daily. 90 Tab 3    acetaminophen (TYLENOL) 650 mg TbER Take 1 Tab by mouth every eight (8) hours. 270 Tab 3    levothyroxine (SYNTHROID) 125 mcg tablet TAKE 1 TABLET BY MOUTH EVERY DAY BEFORE BREAKFAST  Indications: hypothyroidism 90 Tab 3    hydroCHLOROthiazide (HYDRODIURIL) 25 mg tablet TAKE 1 TAB BY MOUTH DAILY FOR HYPERTENSION 90 Tab 3    tiotropium (SPIRIVA WITH HANDIHALER) 18 mcg inhalation capsule Take 1 Cap by inhalation daily.  lidocaine 4 % patch Every 12 hours as needed for pain 20 Patch 0    PROAIR HFA 90 mcg/actuation inhaler TAKE 2 PUFFS BY INHALATION EVERY FOUR (4) HOURS AS NEEDED. 8.5 Inhaler 0    lidocaine (LIDODERM) 5 % Apply patch to the affected area for 12 hours a day and remove for 12 hours a day. 30 Each 11    pantoprazole (PROTONIX) 40 mg tablet Take 1 Tab by mouth daily. Take in 2 week intervals for GERD Flares 30 Tab 1    naloxone (NARCAN) 4 mg/actuation nasal spray Use 1 spray into 1 nostril for OVERDOSE. Call 911. For subsequent doses, give in alternating nostrils. May repeat every 2 to 3 min. 2 Each 0    albuterol (PROVENTIL VENTOLIN) 2.5 mg /3 mL (0.083 %) nebulizer solution 3 mL by Nebulization route every four (4) hours as needed for Wheezing. 24 Each 2    diclofenac (VOLTAREN) 1 % gel Apply 2 g to affected area every six (6) hours. 100 g 5    fluticasone (FLONASE) 50 mcg/actuation nasal spray 2 Sprays by Both Nostrils route daily. 1 Bottle 11    miscellaneous medical supply misc Shower seat for chronic knee pain, pt planning to have right TKR in June due to condition. Very limited range of motion.  1 Each 0     Allergies   Allergen Reactions    Hydrocodone Itching    Mold Shortness of Breath and Itching    Ibuprofen Nausea Only     Past Medical History:   Diagnosis Date    Arthritis     Asthma     uses inhalers    Chronic obstructive pulmonary disease (HCC)     bronchitis    Chronic pain     GERD (gastroesophageal reflux disease)     Hypertension     Ill-defined condition     environmental allergies     Ill-defined condition     Multiple body piercings; unable to remove tongue and lip piercings    Ill-defined condition 2018    GASTRITIS PER ENDOSCOPY    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    Psychiatric disorder     DEPRESSION    Thyroid disease     hypo    Ventral hernia 2013     Past Surgical History:   Procedure Laterality Date    HX HEENT  2009    thyroidectomy    HX KNEE REPLACEMENT      R    HX KNEE REPLACEMENT  2019    HX MOHS PROCEDURES Right 3/8/11    HX OTHER SURGICAL      hiatal hernia repair    HX TUBAL LIGATION       Family History   Problem Relation Age of Onset    Cancer Father         lung cancer    Heart Disease Mother     Hypertension Mother     Diabetes Mother     Anesth Problems Neg Hx      Social History     Tobacco Use    Smoking status: Current Every Day Smoker     Packs/day: 0.25     Years: 1.50     Pack years: 0.37     Types: Cigarettes     Last attempt to quit: 10/1/2015     Years since quittin.5    Smokeless tobacco: Never Used   Substance Use Topics    Alcohol use: No       Review of Systems   Constitutional: Negative for fever and malaise/fatigue. Respiratory: Negative for cough. Cardiovascular: Negative for chest pain. Gastrointestinal: Negative for nausea. Musculoskeletal: Positive for back pain. Negative for myalgias. Neurological: Negative for headaches. All other systems reviewed and are negative. PHYSICAL EXAMINATION:  General appearance - alert, well appearing, and in mild distress. Mental status - A/O x 4,normal mood and affect. Eyes- trace periorbital edema, drainage, or irritation noted.    Nose- no obvious drainage or swelling. Throat- no obvious swelling, goiter, or notable lymphadenopathy  Chest - Symmetric chest rise. No wheezing or coughing. No distress. Skin- normal skin tone noted. No hyperpigmentation or obvious deformities. No diaphoresis noted. No flushing. Neuro - Normal speech, no focal findings or movement disorder. Other pertinent observable physical exam findings:-   was reviewed while planning for pain/anxiety management, no indications of drug diversion suspected. Prescription history is not suspicious for controlled substance overuse. LOWERED quantity back to #45 tabs, pt with #14 tabs left, expected about 6. Last filled 3/4/20. We discussed the expected course, resolution and complications of the diagnosis(es) in detail. Medication risks, benefits, costs, interactions, and alternatives were discussed as indicated. I advised her to contact the office if her condition worsens, changes or fails to improve as anticipated. She expressed understanding with the diagnosis(es) and plan. Pursuant to the emergency declaration under the [facility] waiver authority and the [employer] Act, this Virtual  Visit was conducted, with patient's consent, to reduce the patient's risk of exposure to COVID-19 and provide continuity of care for an established patient. Services were provided through a video synchronous discussion virtually to substitute for in-person clinic visit.     Avinash Gaspar NP

## 2020-04-01 NOTE — PATIENT INSTRUCTIONS
Good job with your pain medication management. Learning About Coronavirus [phone]) Coronavirus (BZODU-61): Overview What is coronavirus (VIHUZ-39)? The coronavirus disease (COVID-19) is caused by a virus. It is an illness that was first found in [place], [place], in December 2019. It has since spread worldwide. The virus can cause fever, cough, and trouble breathing. In severe cases, it can cause pneumonia and make it hard to breathe without help. It can cause death. Coronaviruses are a large group of viruses. They cause the common cold. They also cause more serious illnesses like Middle East respiratory syndrome (MERS) and severe acute respiratory syndrome (SARS). COVID-19 is caused by a novel coronavirus. That means it's a new type that has not been seen in people before. This virus spreads person-to-person through droplets from coughing and sneezing. It can also spread when you are close to someone who is infected. And it can spread when you touch something that has the virus on it, such as a doorknob or a tabletop. What can you do to protect yourself from coronavirus (COVID-19)? The best way to protect yourself from getting sick is to: · Avoid areas where there is an outbreak. · Avoid contact with people who may be infected. · Wash your hands often with soap or alcohol-based hand sanitizers. · Avoid crowds and try to stay at least 6 feet away from other people. · Wash your hands often, especially after you cough or sneeze. Use soap and water, and scrub for at least 20 seconds. If soap and water aren't available, use an alcohol-based hand . · Avoid touching your mouth, nose, and eyes with unwashed hands. What can you do to avoid spreading the virus to others? To help avoid spreading the virus to others: 
· Cover your mouth with a tissue when you cough or sneeze. Then throw the tissue in the trash. · Use a disinfectant to clean things that you touch often. · Stay home if you are sick or have been exposed to the virus. Don't go to school, work, or public areas. And don't use public transportation. · If you are sick: 
? Leave your home only if you need to get medical care. But call the doctor's office first so they know you're coming, and wear a face mask when you go. ? Wear a face mask around other people. It can help stop the spread of the virus when you cough or sneeze. ? Clean and disinfect your home every day. Use household  and disinfectant wipes or sprays. Take special care to clean things that you grab with your hands. These include doorknobs, remote controls, phones, and handles on your refrigerator and microwave. And don't forget countertops, tabletops, bathrooms, and computer keyboards. When to call for help Call 911 anytime you think you may need emergency care. For example, call if: 
· You have severe trouble breathing. · You have severe dehydration. Symptoms of dehydration include: ? Dry eyes and a dry mouth. ? Passing only a little urine. ? Feeling thirstier than usual. 
· You are extremely confused or not thinking clearly. · You pass out (lose consciousness). Call your doctor now if you develop symptoms such as: · Shortness of breath. · Fever. · Cough. If you need to get care, call ahead to the doctor's office for instructions before you go. Make sure you wear a face mask when you go there to prevent exposing other people to the virus. Where can you get the latest information? The following health organizations are tracking and studying this virus. Their websites contain the most up-to-date information. Terrance Mcclellan also learn what to do if you think you may have been exposed to the virus. · U.S. Centers for Disease Control and Prevention (CDC): The CDC provides updated news about the disease and travel advice. The website also tells you how to prevent the spread of infection.  www.cdc.gov 
 · World Health Organization [facility]): WHO offers information about the virus outbreaks. WHO also has travel advice. www.who.int 
Current as of: March 20, 2020                Content Version: 12.4 © 4655-2086 [employer]. Care instructions adapted under license by your healthcare professional. If you have questions about a medical condition or this instruction, always ask your healthcare professional. [address] any warranty or liability for your use of this information.

## 2020-04-12 DIAGNOSIS — J44.9 ASTHMA WITH COPD (HCC): ICD-10-CM

## 2020-04-13 RX ORDER — IPRATROPIUM BROMIDE AND ALBUTEROL 20; 100 UG/1; UG/1
1 SPRAY, METERED RESPIRATORY (INHALATION)
Qty: 1 INHALER | Refills: 0 | Status: SHIPPED | OUTPATIENT
Start: 2020-04-13 | End: 2021-07-16 | Stop reason: SDUPTHER

## 2020-05-04 DIAGNOSIS — I10 ESSENTIAL HYPERTENSION WITH GOAL BLOOD PRESSURE LESS THAN 140/90: ICD-10-CM

## 2020-05-04 RX ORDER — AMLODIPINE BESYLATE 10 MG/1
TABLET ORAL
Qty: 90 TAB | Refills: 3 | Status: SHIPPED | OUTPATIENT
Start: 2020-05-04 | End: 2020-07-02

## 2020-07-02 ENCOUNTER — OFFICE VISIT (OUTPATIENT)
Dept: INTERNAL MEDICINE CLINIC | Age: 55
End: 2020-07-02

## 2020-07-02 VITALS
TEMPERATURE: 98.8 F | DIASTOLIC BLOOD PRESSURE: 78 MMHG | HEART RATE: 77 BPM | SYSTOLIC BLOOD PRESSURE: 122 MMHG | OXYGEN SATURATION: 97 % | RESPIRATION RATE: 18 BRPM | WEIGHT: 213 LBS | HEIGHT: 61 IN | BODY MASS INDEX: 40.22 KG/M2

## 2020-07-02 DIAGNOSIS — Z13.0 SCREENING FOR ENDOCRINE, NUTRITIONAL, METABOLIC AND IMMUNITY DISORDER: ICD-10-CM

## 2020-07-02 DIAGNOSIS — Z13.29 SCREENING FOR ENDOCRINE, NUTRITIONAL, METABOLIC AND IMMUNITY DISORDER: ICD-10-CM

## 2020-07-02 DIAGNOSIS — I10 ESSENTIAL HYPERTENSION WITH GOAL BLOOD PRESSURE LESS THAN 140/90: ICD-10-CM

## 2020-07-02 DIAGNOSIS — M54.41 CHRONIC BILATERAL LOW BACK PAIN WITH RIGHT-SIDED SCIATICA: Primary | ICD-10-CM

## 2020-07-02 DIAGNOSIS — M17.11 PRIMARY OSTEOARTHRITIS OF RIGHT KNEE: ICD-10-CM

## 2020-07-02 DIAGNOSIS — Z79.891 CHRONIC USE OF OPIATE FOR THERAPEUTIC PURPOSE: ICD-10-CM

## 2020-07-02 DIAGNOSIS — Z96.651 STATUS POST TOTAL RIGHT KNEE REPLACEMENT: ICD-10-CM

## 2020-07-02 DIAGNOSIS — M19.012 PRIMARY OSTEOARTHRITIS OF LEFT SHOULDER: ICD-10-CM

## 2020-07-02 DIAGNOSIS — K21.9 GERD WITHOUT ESOPHAGITIS: ICD-10-CM

## 2020-07-02 DIAGNOSIS — Z13.220 SCREENING FOR LIPID DISORDERS: ICD-10-CM

## 2020-07-02 DIAGNOSIS — Z13.21 SCREENING FOR ENDOCRINE, NUTRITIONAL, METABOLIC AND IMMUNITY DISORDER: ICD-10-CM

## 2020-07-02 DIAGNOSIS — E03.9 HYPOTHYROIDISM, UNSPECIFIED TYPE: ICD-10-CM

## 2020-07-02 DIAGNOSIS — G89.29 CHRONIC BILATERAL LOW BACK PAIN WITH RIGHT-SIDED SCIATICA: Primary | ICD-10-CM

## 2020-07-02 DIAGNOSIS — Z13.228 SCREENING FOR ENDOCRINE, NUTRITIONAL, METABOLIC AND IMMUNITY DISORDER: ICD-10-CM

## 2020-07-02 DIAGNOSIS — M54.41 MIDLINE LOW BACK PAIN WITH RIGHT-SIDED SCIATICA, UNSPECIFIED CHRONICITY: ICD-10-CM

## 2020-07-02 RX ORDER — OXYCODONE AND ACETAMINOPHEN 10; 325 MG/1; MG/1
1 TABLET ORAL
Qty: 45 TAB | Refills: 0 | Status: SHIPPED | OUTPATIENT
Start: 2020-09-02 | End: 2020-10-05 | Stop reason: SDUPTHER

## 2020-07-02 RX ORDER — GABAPENTIN 800 MG/1
800 TABLET ORAL 3 TIMES DAILY
Qty: 90 TAB | Refills: 5 | Status: SHIPPED | OUTPATIENT
Start: 2020-07-02 | End: 2021-04-02 | Stop reason: ALTCHOICE

## 2020-07-02 RX ORDER — PANTOPRAZOLE SODIUM 40 MG/1
40 TABLET, DELAYED RELEASE ORAL DAILY
Qty: 30 TAB | Refills: 1 | Status: SHIPPED | OUTPATIENT
Start: 2020-07-02 | End: 2022-04-21

## 2020-07-02 RX ORDER — DEXTROMETHORPHAN HYDROBROMIDE, GUAIFENESIN 5; 100 MG/5ML; MG/5ML
650 LIQUID ORAL EVERY 8 HOURS
Qty: 270 TAB | Refills: 3 | OUTPATIENT
Start: 2020-07-02 | End: 2020-10-17

## 2020-07-02 RX ORDER — OXYCODONE AND ACETAMINOPHEN 10; 325 MG/1; MG/1
1 TABLET ORAL
Qty: 45 TAB | Refills: 0 | Status: SHIPPED | OUTPATIENT
Start: 2020-07-02 | End: 2020-10-05 | Stop reason: SDUPTHER

## 2020-07-02 RX ORDER — AMLODIPINE BESYLATE 5 MG/1
5 TABLET ORAL DAILY
Qty: 90 TAB | Refills: 3 | Status: SHIPPED | OUTPATIENT
Start: 2020-07-02 | End: 2021-06-18

## 2020-07-02 RX ORDER — OXYCODONE AND ACETAMINOPHEN 10; 325 MG/1; MG/1
1 TABLET ORAL
Qty: 45 TAB | Refills: 0 | Status: SHIPPED | OUTPATIENT
Start: 2020-08-02 | End: 2020-10-05 | Stop reason: SDUPTHER

## 2020-07-02 RX ORDER — LEVOTHYROXINE SODIUM 125 UG/1
TABLET ORAL
Qty: 90 TAB | Refills: 3 | Status: SHIPPED | OUTPATIENT
Start: 2020-07-02 | End: 2021-06-18

## 2020-07-02 NOTE — LETTER
Suzan Herreralorri :1965 MR #:[id number] Provider Name:Vinod Daolinnea Salazar NP  
*[facility]* [facility] [place] () Page 1 of 5 Initial [employer] CONTROLLED SUBSTANCE AGREEMENT I may be prescribed medications that are controlled substances as part  of my treatment plan for management of my medical condition(s). The goal of my treatment plan is to maintain and/or improve my health and wellbeing. Because controlled substances have an increased risk of abuse or harm, continual re-evaluation is needed determine if the goals of my treatment plan are being met for my safety and the safety of others. Digna Omalley  am entering into this Controlled Substance Agreement with my provider, Dickson Palencia NP at [place] [address] . I understand that successful treatment requires mutual trust and honesty between me and my provider. I understand that there are state and federal laws and regulations which apply to the medications that my provider may prescribe that must be followed. I understand there are risks and benefits ts of taking the medicines that my provider may prescribe. I understand and agree that following this Agreement is necessary in continuing my provider-patient relationship and success of my treatment plan. As a part of my treatment plan, I agree to the following: COMMUNICATION: 
 
1. I will communicate fully with my provider about my medical condition(s), including the effect on my daily life and how well my medications are helping. I will tell my provider all of the medications that I take for any reason, including medications I receive from another health care provider, and will notify my provider about all issues, problems or concerns, including any side effects, which may be related to my medications. I understand that this information allows my provider to adjust my treatment plan to help manage my medical condition.  I understand that this information will become part of my permanent medical record. 2. I will notify my provider if I have a history of alcohol/drug misuse/addiction or if I have had treatment for alcohol/drug addiction in the past, or if I have a new problem with or concern about alcohol/drug use/addiction, because this increases the likelihood of high risk behaviors and may lead to serious medical conditions. 3. Females Only: I will notify my provider if I am or become pregnant, or if I intend to become pregnant, or if I intend to breastfeed. I understand that communication of these issues with my provider is important, due to possible effects my medication could have on an unborn fetus or breastfeeding child. Name:Inez Armando :1965 MR #:[id number] Provider Name:[place]ao, SHAGGY  
*[facility]* [facility] () Page 2 of 5 Initial [employer] MISUSE OF MEDICATIONS / DRUGS: 
 
1. I agree to take all controlled substances as prescribed, and will not misuse or abuse any controlled substances prescribed by my provider. For my safety, I will not increase the amount of medicine I take without first talking with and getting permission from my provider. 2. If I have a medical emergency, another health care provider may prescribe me medication. If I seek emergency treatment, I will notify my provider within seventy-two (72) hours. 3. I understand that my provider may discuss my use and/or possible misuse/abuse of controlled substances and alcohol, as appropriate, with any health care provider involved in my care, pharmacist or legal authority. ILLEGAL DRUGS: 
 
1. I will not use illegal drugs of any kind, including but not limited to marijuana, heroin, cocaine, or any prescription drug which is not prescribed to me. DRUG DIVERSION / PRESCRIPTION FRAUD: 
 
1. I will not share, sell, trade, give away, or otherwise misuse my prescriptions or medications. 2. I will not alter any prescriptions provided to me by my provider. SINGLE PROVIDER: 
 
1. I agree that all controlled substances that I take will be prescribed only by my provider (or his/her covering provider) under this Agreement. This agreement does not prevent me from seeking emergency medical treatment or receiving pain management related to a surgery. PROTECTING MEDICATIONS: 
 
1. I am responsible for keeping my prescriptions and medications in a safe and secure place including safeguarding them from loss or theft. I understand that lost, stolen or damaged/destroyed prescriptions or medications will not be replaced. Name:Inez Hsieh :1965 MR #:[id number] Provider Name:Carole Dao, NP  
*[facility]-663* [facility]-491 () Page 3 of 5 Initial [employer] PRESCRIPTION RENEWALS/REFILLS: 
 
1. I will follow my controlled substance medication schedule as prescribed by my provider. 2. I understand and agree that I will make any requests for renewals or refills of my prescriptions only at the time of an office visit or during my providers regular office hours subject to the prescription refill requirements of the individual practice. 3. I understand that my provider may not call in prescriptions for controlled substances to my pharmacy. 4. I understand that my provider may adjust or discontinue these medications as deemed appropriate for my medical treatment plan. This Agreement does not guarantee the prescription of controlled medications. 5. I agree that if my medications are adjusted or discontinued, I will properly dispose of any remaining medications. I understand that I will be required to dispose of any remaining controlled medications prior to being provided with any prescriptions for other controlled medications.  
 
 
1. I authorize my provider and my pharmacy to cooperate fully with any local, state, or federal law enforcement agency in the investigation of any possible misuse, sale, or other diversion of my controlled substance prescriptions or medications. RISKS: 
 
 
1. I understand that if I do not adhere to this Agreement in any way, my provider may change my prescriptions, stop prescribing controlled substances or end our provider-patient relationship. 2. If my provider decides to stop prescribing medication, or decides to end our provider-patient relationship,my provider may require that I taper my medications slowly. If necessary, my provider may also provide a prescription for other medications to treat my withdrawal symptoms. UNDERSTANDING THIS AGREEMENT: 
 
I understand that my provider may adjust or stop my prescriptions for controlled substances based on my medical condition and my treatment plan. I understand that this Agreement does not guarantee that I will be prescribed medications or controlled substances. I understand that controlled substances may be just one part 
of my treatment plan.  
 
My initial on each page and my signature below shows that I have read each page of this Agreement, I have had an opportunity to ask questions, and all of my questions have been answered to my satisfaction by my provider. By signing below, I agree to comply with this Agreement, and I understand that if I do not follow the Agreements listed above, my provider may stop 
 
 
 
_________________________________________  Date/Time 7/2/2020 11:47 AM   
             (Patient Signature)

## 2020-07-02 NOTE — PATIENT INSTRUCTIONS
Joint Aspiration: Care Instructions  Your Care Instructions     During a joint aspiration, a doctor uses a needle to take fluid out of your joint. This might be done to test the fluid for infection or to find a cause for a joint problem. These problems may include bleeding, infection, gout, or pseudogout. Sometimes fluid is taken out to relieve pressure and pain from too much fluid in the joint. The area where the needle is inserted may be numbed before the needle is put in. Then the needle is slowly put into the joint. A syringe attached to the needle is used to remove fluid. The fluid may be put in tubes or containers and sent to the lab. Sometimes a shot of steroid medicine is also given into the joint. This can help relieve inflammation and pain. It can also help prevent the fluid from building up again. Follow-up care is a key part of your treatment and safety. Be sure to make and go to all appointments, and call your doctor if you are having problems. It's also a good idea to know your test results and keep a list of the medicines you take. How can you care for yourself at home? · If you have bandages over the area, keep them clean and dry. You may remove them when your doctor tells you to. · Put ice or a cold pack on the area for 10 to 20 minutes at a time. Put a thin cloth between the ice and your skin. · Ask your doctor if you can take an over-the-counter pain medicine, such as acetaminophen (Tylenol), ibuprofen (Advil, Motrin), or naproxen (Aleve). Be safe with medicines. Read and follow all instructions on the label. · Avoid strenuous activities for several days, especially those that put stress on the area where the needle was put in. When should you call for help? Call your doctor now or seek immediate medical care if:  · You have symptoms of infection, such as:  ? Increased pain, swelling, warmth, or redness around the area. ? Red streaks leading from the area.   ? Pus draining from the area.  ? A fever. Watch closely for changes in your health, and be sure to contact your doctor if you have any problems. Where can you learn more? Go to http://ofelia-mindy.info/  Enter A013 in the search box to learn more about \"Joint Aspiration: Care Instructions. \"  Current as of: March 2, 2020               Content Version: 12.5  © 2006-2020 [employer]. Care instructions adapted under license by [employer] (which disclaims liability or warranty for this information). If you have questions about a medical condition or this instruction, always ask your healthcare professional. [place] [address] any warranty or liability for your use of this information. Learning About Joint Effusion  What is it? Fluid is normally found in joints such as knees, hips, and elbows. When too much fluid builds up around a joint in your body, it's called joint effusion. When you have this problem, your joint may look swollen. What causes it? Many things can cause fluid buildup in a joint. It may be caused by a condition like osteoarthritis, rheumatoid arthritis, or gout. It may also happen because of an infection. Or it can happen because of an injury, like a twisting fall. What are the symptoms? You might feel pain when you try to straighten a joint where you have fluid buildup. Your joint may be stiff or swollen. How is it diagnosed? Your doctor will do a physical exam. You may need an X-ray. You may need other imaging tests, like an MRI or a CT scan. Your doctor may remove some fluid from your joint to learn more. This is called aspiration. It's done by using a needle to drain fluid from your joint. How is it treated? Your doctor may suggest rest, ice, and raising the joint (elevation) to help with pain and swelling. The fluid might be drained from the area.  Your doctor may suggest using nonprescription anti-inflammatory drugs (NSAIDs) or getting a steroid shot. Or you may need surgery to repair damage. Follow-up care is a key part of your treatment and safety. Be sure to make and go to all appointments, and call your doctor if you are having problems. It's also a good idea to know your test results and keep a list of the medicines you take. Current as of: January 15, 2020               Content Version: 12.5  © 2006-2020 [employer]. Care instructions adapted under license by [employer] (which disclaims liability or warranty for this information). If you have questions about a medical condition or this instruction, always ask your healthcare professional. [place] [address] any warranty or liability for your use of this information.

## 2020-07-02 NOTE — PROGRESS NOTES
Pt is here for   Chief Complaint   Patient presents with    Medication Refill     pain medication    Swelling     bilateral leg and ankle swelling         Pt states pain level is a 9/10 knees  Pt states last had something for pain yesterday     1. Have you been to the ER, urgent care clinic since your last visit? Hospitalized since your last visit? No    2. Have you seen or consulted any other health care providers outside of the [address] [place] [address] since your last visit? Include any pap smears or colon screening.  No

## 2020-07-02 NOTE — PROGRESS NOTES
Encounter for pain management. Chronic Pain:  Patient has chronic BL knee pain for years and lower back pain. Had right TKR (2016) and repeat surgery to right knee 3/26/19 with Dr. Amanda Vora. However, having increasing leg swelling x 5 days and sciatica. Pain Scale: 9/10. Had IMAGING for right shoulder 9/2019 and has been seeing/seen by [facility]. Pain in the shoulders, knees, right neck and lower back is still limiting walking, sitting, reaching, lifting, and standing, exacerbated by forementioned acitivities to include stair climbing. Has tried prednisone, tramadol, injections, PT, gabapentin, cymbalta, and surgery in past with minimal relief. Pain has been controlled with Oxycodone 10-325mg, last taken yesterday. The medication is kept safe by staying with her. Has NOT seen any other providers since last OV for pain medication. No significant changes to pain presentation since last OV. she is  able to do her normal daily activities. she reports the following adverse side effects: none. Averaging 7 BMs per week. Least pain over the last week has been 6/10. Worst pain over the last week has been 10/10. Aberrant behaviors: None         Symptoms onset: problem is longstanding. Rheumatological ROS: ongoing significant pain which is stable and controlled by pain med. Response to treatment plan: waxing and waning. GERD Review:   Patient has a symptoms of gastroesophageal reflux, currently  unstable. Symptoms have been present for a few weeks and include cramping abd pain, bloating, and nausea. Took protonix in the past, but out. Denies dysphagia, weight loss, melena, hematochezia, hematemesis, and coffee ground emesis. Thyroid Review:  Patient is seen for followup of thyroid dysfunction; hypothyroidism. Thyroid ROS: denies fatigue, weight changes, heat/cold intolerance, bowel/skin changes or CVS symptoms.   Taking medication as prescribed  Last TSH below  Lab Results   Component Value Date/Time TSH 3.200 07/09/2019 01:21 PM         Hypertension Review:  The patient has hypertension  Diet and Lifestyle: generally nelson try to follow a  low sodium diet, exercises sporadically   Home BP Monitoring: is not measured at home. Pertinent ROS: taking medications as instructed, no medication side effects noted. No TIA's, chest pain on exertion, dyspnea on exertion, or swelling of ankles. BP Readings from Last 3 Encounters:   07/02/20 122/78   03/04/20 118/60   02/06/20 127/76     Review of Systems  Constitutional: +MRSA in nose. negative for fevers, chills, anorexia and weight loss  Eyes:   negative for visual disturbance, drainage, and irritation  ENT:   +AR and environmental allergies. negative for tinnitus,sore throat,ear pain,and hoarseness  Respiratory:  + asthma/COPD; stable. negative for hemoptysis  CV:   negative for chest pain, palpitations, and lower extremity edema  GI:   + GERD.  negative for nausea, vomiting, diarrhea, abdominal pain, and melena  Endo:               negative for polyuria,polydipsia,polyphagia, and heat intolerance  Genitourinary: negative for frequency, urgency, dysuria, retention, and hematuria  Integument:  negative for rash, ulcerations, and pruritus  Hematologic:  negative for easy bruising and bleeding  Musculoskel: negative for  muscle weakness  Neurological:  negative for headaches, dizziness, vertigo,and memory problems  Behavl/Psych: +depression, on cymbalta and zoloft. negative for feelings of  suicide    1./2. Medical history/Past medical History   Past Medical History:   Diagnosis Date    Arthritis     Asthma     uses inhalers    Chronic obstructive pulmonary disease (HCC)     bronchitis    Chronic pain     GERD (gastroesophageal reflux disease)     Hypertension     Ill-defined condition     environmental allergies     Ill-defined condition     Multiple body piercings; unable to remove tongue and lip piercings    Ill-defined condition 2018    GASTRITIS PER ENDOSCOPY  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    Psychiatric disorder     DEPRESSION    Thyroid disease     hypo    Ventral hernia 12/31/2013     Past Surgical History:   Procedure Laterality Date    HX HEENT  2009    thyroidectomy    HX KNEE REPLACEMENT      R    HX KNEE REPLACEMENT  03/26/2019    HX MOHS PROCEDURES Right 3/8/11    HX OTHER SURGICAL      hiatal hernia repair    HX TUBAL LIGATION       Patient Active Problem List   Diagnosis Code    Ventral hernia K43.9    Chronic pain of right knee M25.561, G89.29    Chronic right shoulder pain M25.511, G89.29    Environmental and seasonal allergies J30.89    Ventral hernia without obstruction or gangrene K43.9    Primary osteoarthritis of right knee M17.11    Mixed simple and mucopurulent chronic bronchitis (HCC) J41.8    Acquired hypothyroidism E03.9    Chronic bilateral low back pain with right-sided sciatica M54.41, G89.29    Status post total right knee replacement Z96.651    Malignant hypertension I10    Pain management contract signed Z02.89    Chronic pain of left knee M25.562, G89.29    Moderate episode of recurrent major depressive disorder (HCC) F33.1    Psychophysiological insomnia F51.04    Severe obesity (BMI 35.0-39. 9) with comorbidity (HCC) E66.01    Post-tussive vomiting R11.10    Chronic use of opiate for therapeutic purpose Z79.891    Obesity, Class II, BMI 35-39.9 E66.9    Left wrist pain M25.532    Chronic left shoulder pain M25.512, G89.29    Osteoarthritis of spine with radiculopathy, cervical region M47.22    Primary osteoarthritis of left shoulder M19.012    MRSA carrier Z22.322    S/P total knee arthroplasty, right Z96.651    Loosening of knee joint prosthesis ([address] T84.038A, Z96.659       3. Applicable records from prior treatment providers are apart of [facility] under the media/encounters tab. 4. Diagnostic, therapeutic and laboratory results are available in the [facility] chart.       5. Consultation notes are available for review in the media/encounters tab of the [address] [place] [address] chart. 6. Treatment goals include: pain control, improve activity level and function in regards to activities of daily living, and improved comfort level. Previously pt has been limited by pain in all these aspects. 7. The risks and benefits of treatment have been discussed at this office visit with the pt.  she understands that the medication has addictive potential.  Additionally the pt has been advised that narcotic pain medication may impair mental and/or physical ability required for performance of tasks such as driving or operating any other machinery. 8. Pt has an updated signed pain contract on file and can be found under the media section of the [facility] chart. 9. The pain contract is reviewed. Pain medication will be continued at the same dosage. Pill count: 13, expected. Last fill 6/1/20. Urine drug screening ordered/collected today. Diagnostic studies are not indicated at this time. Interventional procedure are not indicated at this time. Narcan order sent in past.       10. Medication prescibed is oxycodone  BID PRN every 24 hours PRN #45 with 2 refills for a total 3 month supply.  was reviewed while planning for pain/anxiety management, no indications of drug diversion suspected. Prescription history is NOT suspicious for controlled substance overuse. 11. Patient instructions have been reviewed in detail as outlined above and in the pain contract. 12. Re-evaluation is planned for 3 months. Current Outpatient Medications   Medication Sig Dispense Refill    gabapentin (NEURONTIN) 800 mg tablet Take 1 Tab by mouth three (3) times daily. Max Daily Amount: 2,400 mg. 90 Tab 5    pantoprazole (PROTONIX) 40 mg tablet Take 1 Tab by mouth daily. Take in 2 week intervals for GERD Flares 30 Tab 1    acetaminophen (TYLENOL) 650 mg TbER Take 1 Tab by mouth every eight (8) hours.  270 Tab 3    levothyroxine (SYNTHROID) 125 mcg tablet TAKE 1 TABLET BY MOUTH EVERY DAY BEFORE BREAKFAST INDICATIONS: HYPOTHYROIDISM  Indications: a condition with low thyroid hormone levels 90 Tab 3    [START ON 9/2/2020] oxyCODONE-acetaminophen (PERCOCET 10)  mg per tablet Take 1 Tab by mouth every twelve (12) hours as needed for Pain for up to 30 days. Max Daily Amount: 2 Tabs. Indications: pain 45 Tab 0    [START ON 8/2/2020] oxyCODONE-acetaminophen (PERCOCET 10)  mg per tablet Take 1 Tab by mouth two (2) times daily as needed for Pain for up to 30 days. Max Daily Amount: 2 Tabs. Indications: pain 45 Tab 0    oxyCODONE-acetaminophen (PERCOCET 10)  mg per tablet Take 1 Tab by mouth two (2) times daily as needed for Pain for up to 30 days. Max Daily Amount: 2 Tabs. Indications: pain 45 Tab 0    amLODIPine (NORVASC) 5 mg tablet Take 1 Tab by mouth daily. 90 Tab 3    hydroCHLOROthiazide (HYDRODIURIL) 25 mg tablet TAKE 1 TAB BY MOUTH DAILY FOR HYPERTENSION 30 Tab 0    Combivent Respimat  mcg/actuation inhaler TAKE 1 PUFF BY INHALATION EVERY SIX (6) HOURS AS NEEDED FOR WHEEZING. 1 Inhaler 0    DULoxetine (CYMBALTA) 30 mg capsule Take 2 Caps by mouth two (2) times a day. 180 Cap 0    loratadine (CLARITIN) 10 mg tablet Take 1 Tab by mouth daily. 90 Tab 3    lidocaine 4 % patch Every 12 hours as needed for pain 20 Patch 0    lidocaine (LIDODERM) 5 % Apply patch to the affected area for 12 hours a day and remove for 12 hours a day. 30 Each 11    albuterol (PROVENTIL VENTOLIN) 2.5 mg /3 mL (0.083 %) nebulizer solution 3 mL by Nebulization route every four (4) hours as needed for Wheezing. 24 Each 2    diclofenac (VOLTAREN) 1 % gel Apply 2 g to affected area every six (6) hours. 100 g 5    fluticasone (FLONASE) 50 mcg/actuation nasal spray 2 Sprays by Both Nostrils route daily. 1 Bottle 11    Symbicort 160-4.5 mcg/actuation HFAA TAKE 2 PUFFS BY INHALATION TWO (2) TIMES A DAY.  30.6 Inhaler 3    azelastine (ASTELIN) 137 mcg (0.1 %) nasal spray       traZODone (DESYREL) 50 mg tablet Take 1 Tab by mouth nightly. 90 Tab 0    ondansetron (ZOFRAN ODT) 4 mg disintegrating tablet DISSOLVE 1 TABLET BY MOUTH EVERY 8 HOURS AS NEEDED FOR NAUSEA 20 Tab 1    XOLAIR 150 mg solr Indications: injection      metoprolol succinate (TOPROL-XL) 25 mg XL tablet TAKE 1 TABLET BY MOUTH EVERY DAY 90 Tab 3    meloxicam (MOBIC) 15 mg tablet TAKE 1 TABLET BY MOUTH EVERY DAY IN THE MORNING WITH BREAKFAST 30 Tab 11    hydrOXYzine HCl (ATARAX) 25 mg tablet TAKE 1 TABLET BY MOUTH 3 TIMES A DAY AS NEEDED FOR ITCHING FOR ANXIETY 60 Tab 0    furosemide (LASIX) 20 mg tablet TAKE 1 TABLET BY MOUTH DAILY X 3-5 DAYS FOR LEG SWELLING INDICATIONS: VISIBLE WATER RETENTION 15 Tab 0    montelukast (SINGULAIR) 10 mg tablet TAKE 1 TAB BY MOUTH DAILY. 30 Tab 6    methocarbamol (ROBAXIN) 750 mg tablet Take 1 Tab by mouth every six (6) hours as needed (mm spasm). 270 Tab 3    tiotropium (SPIRIVA WITH HANDIHALER) 18 mcg inhalation capsule Take 1 Cap by inhalation daily.  PROAIR HFA 90 mcg/actuation inhaler TAKE 2 PUFFS BY INHALATION EVERY FOUR (4) HOURS AS NEEDED. 8.5 Inhaler 0    naloxone (NARCAN) 4 mg/actuation nasal spray Use 1 spray into 1 nostril for OVERDOSE. Call 911. For subsequent doses, give in alternating nostrils. May repeat every 2 to 3 min. 2 Each 0    miscellaneous medical supply misc Shower seat for chronic knee pain, pt planning to have right TKR in June due to condition. Very limited range of motion.  1 Each 0     Allergies   Allergen Reactions    Hydrocodone Itching    Mold Shortness of Breath and Itching    Ibuprofen Nausea Only       Objective:  Visit Vitals  /78 (BP 1 Location: Right arm, BP Patient Position: Sitting)   Pulse 77   Temp 98.8 °F (37.1 °C) (Oral)   Resp 18   Ht 5' 1\" (1.549 m)   Wt 213 lb (96.6 kg)   LMP 12/29/2016 (Exact Date) Comment: partial hysterectomy   SpO2 97%   BMI 40.25 kg/m²     Wt Readings from Last 3 Encounters:   07/02/20 213 lb (96.6 kg)   03/04/20 210 lb (95.3 kg)   02/06/20 213 lb (96.6 kg)     Physical Exam:   General appearance - alert, well appearing, and in mild distress. Mental status - A/O x 4, normal mood and flat affect. Chest - CTA. Symmetric chest rise. No wheezing, rales or rhonchi. Heart - Normal rate, regular rhythm. Normal S1, S2. No MGR or clicks. Abd- soft, obese,distended. NT, no masses. Ext- Radial, DP pulses, 2+ bilaterally. No clubbing or cyanosis. No pedal edema, CRISTIANE. Skin-Warm and dry. No hyperpigmentation, ulcerations, or suspicious lesions. Neuro - normal speech, no focal findings or movement disorder. Normal strength and muscle tone. Slow gait using cane. Right knee grossly enlarged, +effusion to lateral femoral epicondyle region. Assessment/Plan:  Modified diet discussed. Otherwise, The current medical regimen is effective;  continue present plan and medications. I spent greater than 50% of 40 minute visit counseling patient about above mentioned topics. Medication Side Effects and Warnings were discussed with patient: yes   Patient Labs were reviewed: yes  Patient Past Records were reviewed: yes    See below for other orders   Follow-up and Dispositions    · Return in about 3 months (around 10/2/2020) for VV- pain med refill. ICD-10-CM ICD-9-CM    1. Chronic bilateral low back pain with right-sided sciatica M54.41 724.2 gabapentin (NEURONTIN) 800 mg tablet    G89.29 724.3 TOXASSURE SELECT 13 (MW)     338.29    2. Essential hypertension with goal blood pressure less than 140/90 I10 401.9 amLODIPine (NORVASC) 5 mg tablet   3. Primary osteoarthritis of right knee M17.11 715.16 acetaminophen (TYLENOL) 650 mg TbER      oxyCODONE-acetaminophen (PERCOCET 10)  mg per tablet      oxyCODONE-acetaminophen (PERCOCET 10)  mg per tablet      oxyCODONE-acetaminophen (PERCOCET 10)  mg per tablet      TOXASSURE SELECT 13 (MW)   4. Midline low back pain with right-sided sciatica, unspecified chronicity M54.41 724.3 acetaminophen (TYLENOL) 650 mg TbER      TOXASSURE SELECT 13 (MW)   5. Hypothyroidism, unspecified type E03.9 244.9 levothyroxine (SYNTHROID) 125 mcg tablet      TSH 3RD GENERATION   6. Screening for lipid disorders Z13.220 V77.91 LIPID PANEL   7. Screening for endocrine, nutritional, metabolic and immunity disorder E57.15 R58.60 METABOLIC PANEL, COMPREHENSIVE    Z13.21  CBC WITH AUTOMATED DIFF    Z13.228  HEMOGLOBIN A1C WITH EAG    Z13.0     8. GERD without esophagitis K21.9 530.81 pantoprazole (PROTONIX) 40 mg tablet   9. Status post total right knee replacement Z96.651 V43.65 oxyCODONE-acetaminophen (PERCOCET 10)  mg per tablet      oxyCODONE-acetaminophen (PERCOCET 10)  mg per tablet      oxyCODONE-acetaminophen (PERCOCET 10)  mg per tablet   10. Chronic use of opiate for therapeutic purpose Z79.891 V58.69 oxyCODONE-acetaminophen (PERCOCET 10)  mg per tablet      oxyCODONE-acetaminophen (PERCOCET 10)  mg per tablet      oxyCODONE-acetaminophen (PERCOCET 10)  mg per tablet      TOXASSURE SELECT 13 (MW)   11. Primary osteoarthritis of left shoulder M19.012 715.11 oxyCODONE-acetaminophen (PERCOCET 10)  mg per tablet      oxyCODONE-acetaminophen (PERCOCET 10)  mg per tablet      oxyCODONE-acetaminophen (PERCOCET 10)  mg per tablet      TOXASSURE SELECT 13 (MW)     Orders Placed This Encounter    METABOLIC PANEL, COMPREHENSIVE    CBC WITH AUTOMATED DIFF    HEMOGLOBIN A1C WITH EAG    LIPID PANEL    TSH 3RD GENERATION    TOXASSURE SELECT 13 (MW)    gabapentin (NEURONTIN) 800 mg tablet     Sig: Take 1 Tab by mouth three (3) times daily. Max Daily Amount: 2,400 mg. Dispense:  90 Tab     Refill:  5    pantoprazole (PROTONIX) 40 mg tablet     Sig: Take 1 Tab by mouth daily.  Take in 2 week intervals for GERD Flares     Dispense:  30 Tab     Refill:  1    acetaminophen (TYLENOL) 650 mg TbER     Sig: Take 1 Tab by mouth every eight (8) hours. Dispense:  270 Tab     Refill:  3    levothyroxine (SYNTHROID) 125 mcg tablet     Sig: TAKE 1 TABLET BY MOUTH EVERY DAY BEFORE BREAKFAST INDICATIONS: HYPOTHYROIDISM  Indications: a condition with low thyroid hormone levels     Dispense:  90 Tab     Refill:  3     DX Code Needed  .  oxyCODONE-acetaminophen (PERCOCET 10)  mg per tablet     Sig: Take 1 Tab by mouth every twelve (12) hours as needed for Pain for up to 30 days. Max Daily Amount: 2 Tabs. Indications: pain     Dispense:  45 Tab     Refill:  0    oxyCODONE-acetaminophen (PERCOCET 10)  mg per tablet     Sig: Take 1 Tab by mouth two (2) times daily as needed for Pain for up to 30 days. Max Daily Amount: 2 Tabs. Indications: pain     Dispense:  45 Tab     Refill:  0    oxyCODONE-acetaminophen (PERCOCET 10)  mg per tablet     Sig: Take 1 Tab by mouth two (2) times daily as needed for Pain for up to 30 days. Max Daily Amount: 2 Tabs. Indications: pain     Dispense:  45 Tab     Refill:  0    amLODIPine (NORVASC) 5 mg tablet     Sig: Take 1 Tab by mouth daily. Dispense:  90 Tab     Refill:  3     RX HAS        Liliya Sykes expressed understanding of plan. An After Visit Summary was offered/printed and given to the patient.

## 2020-07-05 LAB — DRUGS UR: NORMAL

## 2020-07-06 DIAGNOSIS — I10 MALIGNANT HYPERTENSION: ICD-10-CM

## 2020-07-06 RX ORDER — HYDROCHLOROTHIAZIDE 25 MG/1
TABLET ORAL
Qty: 90 TAB | Refills: 3 | Status: SHIPPED | OUTPATIENT
Start: 2020-07-06 | End: 2021-06-18

## 2020-07-17 DIAGNOSIS — Z79.891 CHRONIC USE OF OPIATE FOR THERAPEUTIC PURPOSE: ICD-10-CM

## 2020-07-17 DIAGNOSIS — M17.11 PRIMARY OSTEOARTHRITIS OF RIGHT KNEE: ICD-10-CM

## 2020-07-17 DIAGNOSIS — M19.012 PRIMARY OSTEOARTHRITIS OF LEFT SHOULDER: ICD-10-CM

## 2020-07-17 RX ORDER — METHOCARBAMOL 750 MG/1
TABLET, FILM COATED ORAL
Qty: 120 TAB | Refills: 6 | Status: SHIPPED | OUTPATIENT
Start: 2020-07-17 | End: 2021-02-26 | Stop reason: SDUPTHER

## 2020-07-30 LAB
ALBUMIN SERPL-MCNC: 3.8 G/DL (ref 3.8–4.9)
ALBUMIN/GLOB SERPL: 1.3 {RATIO} (ref 1.2–2.2)
ALP SERPL-CCNC: 136 IU/L (ref 39–117)
ALT SERPL-CCNC: 8 IU/L (ref 0–32)
AST SERPL-CCNC: 13 IU/L (ref 0–40)
BASOPHILS # BLD AUTO: 0 X10E3/UL (ref 0–0.2)
BASOPHILS NFR BLD AUTO: 1 %
BILIRUB SERPL-MCNC: 0.5 MG/DL (ref 0–1.2)
BUN SERPL-MCNC: 13 MG/DL (ref 6–24)
BUN/CREAT SERPL: 13 (ref 9–23)
CALCIUM SERPL-MCNC: 9 MG/DL (ref 8.7–10.2)
CHLORIDE SERPL-SCNC: 103 MMOL/L (ref 96–106)
CHOLEST SERPL-MCNC: 153 MG/DL (ref 100–199)
CO2 SERPL-SCNC: 23 MMOL/L (ref 20–29)
CREAT SERPL-MCNC: 1.01 MG/DL (ref 0.57–1)
EOSINOPHIL # BLD AUTO: 0.3 X10E3/UL (ref 0–0.4)
EOSINOPHIL NFR BLD AUTO: 6 %
ERYTHROCYTE [DISTWIDTH] IN BLOOD BY AUTOMATED COUNT: 13.9 % (ref 11.7–15.4)
EST. AVERAGE GLUCOSE BLD GHB EST-MCNC: 114 MG/DL
GLOBULIN SER CALC-MCNC: 3 G/DL (ref 1.5–4.5)
GLUCOSE SERPL-MCNC: 93 MG/DL (ref 65–99)
HBA1C MFR BLD: 5.6 % (ref 4.8–5.6)
HCT VFR BLD AUTO: 46.3 % (ref 34–46.6)
HDLC SERPL-MCNC: 47 MG/DL
HGB BLD-MCNC: 14.8 G/DL (ref 11.1–15.9)
IMM GRANULOCYTES # BLD AUTO: 0 X10E3/UL (ref 0–0.1)
IMM GRANULOCYTES NFR BLD AUTO: 0 %
INTERPRETATION, 910389: NORMAL
LDLC SERPL CALC-MCNC: 93 MG/DL (ref 0–99)
LYMPHOCYTES # BLD AUTO: 2.1 X10E3/UL (ref 0.7–3.1)
LYMPHOCYTES NFR BLD AUTO: 38 %
MCH RBC QN AUTO: 27.7 PG (ref 26.6–33)
MCHC RBC AUTO-ENTMCNC: 32 G/DL (ref 31.5–35.7)
MCV RBC AUTO: 87 FL (ref 79–97)
MONOCYTES # BLD AUTO: 0.5 X10E3/UL (ref 0.1–0.9)
MONOCYTES NFR BLD AUTO: 9 %
NEUTROPHILS # BLD AUTO: 2.5 X10E3/UL (ref 1.4–7)
NEUTROPHILS NFR BLD AUTO: 46 %
PLATELET # BLD AUTO: 254 X10E3/UL (ref 150–450)
POTASSIUM SERPL-SCNC: 4 MMOL/L (ref 3.5–5.2)
PROT SERPL-MCNC: 6.8 G/DL (ref 6–8.5)
RBC # BLD AUTO: 5.35 X10E6/UL (ref 3.77–5.28)
SODIUM SERPL-SCNC: 140 MMOL/L (ref 134–144)
TRIGL SERPL-MCNC: 64 MG/DL (ref 0–149)
TSH SERPL DL<=0.005 MIU/L-ACNC: 2.26 UIU/ML (ref 0.45–4.5)
VLDLC SERPL CALC-MCNC: 13 MG/DL (ref 5–40)
WBC # BLD AUTO: 5.4 X10E3/UL (ref 3.4–10.8)

## 2020-07-30 NOTE — PROGRESS NOTES
NML/Stable labs, no changes. Pt may schedule visit to review. Otherwise we will discuss at the next OV.      Thanks, RANJEET Virk.

## 2020-09-16 DIAGNOSIS — J30.89 ENVIRONMENTAL AND SEASONAL ALLERGIES: ICD-10-CM

## 2020-09-17 RX ORDER — LORATADINE 10 MG/1
TABLET ORAL
Qty: 30 TAB | Refills: 8 | Status: SHIPPED | OUTPATIENT
Start: 2020-09-17 | End: 2021-06-18

## 2020-10-05 ENCOUNTER — VIRTUAL VISIT (OUTPATIENT)
Dept: INTERNAL MEDICINE CLINIC | Age: 55
End: 2020-10-05
Payer: MEDICAID

## 2020-10-05 DIAGNOSIS — Z79.891 CHRONIC USE OF OPIATE FOR THERAPEUTIC PURPOSE: ICD-10-CM

## 2020-10-05 DIAGNOSIS — M17.11 PRIMARY OSTEOARTHRITIS OF RIGHT KNEE: ICD-10-CM

## 2020-10-05 DIAGNOSIS — M54.41 CHRONIC BILATERAL LOW BACK PAIN WITH RIGHT-SIDED SCIATICA: Primary | ICD-10-CM

## 2020-10-05 DIAGNOSIS — L02.211 ABDOMINAL WALL ABSCESS: ICD-10-CM

## 2020-10-05 DIAGNOSIS — M19.012 PRIMARY OSTEOARTHRITIS OF LEFT SHOULDER: ICD-10-CM

## 2020-10-05 DIAGNOSIS — K21.9 GERD WITHOUT ESOPHAGITIS: ICD-10-CM

## 2020-10-05 DIAGNOSIS — G89.29 CHRONIC BILATERAL LOW BACK PAIN WITH RIGHT-SIDED SCIATICA: Primary | ICD-10-CM

## 2020-10-05 DIAGNOSIS — Z96.651 STATUS POST TOTAL RIGHT KNEE REPLACEMENT: ICD-10-CM

## 2020-10-05 PROCEDURE — 99214 OFFICE O/P EST MOD 30 MIN: CPT | Performed by: NURSE PRACTITIONER

## 2020-10-05 RX ORDER — OXYCODONE AND ACETAMINOPHEN 10; 325 MG/1; MG/1
1 TABLET ORAL
Qty: 45 TAB | Refills: 0 | Status: SHIPPED | OUTPATIENT
Start: 2020-11-04 | End: 2020-12-04

## 2020-10-05 RX ORDER — ONDANSETRON 4 MG/1
TABLET, ORALLY DISINTEGRATING ORAL
Qty: 20 TAB | Refills: 1 | Status: SHIPPED | OUTPATIENT
Start: 2020-10-05 | End: 2021-08-09 | Stop reason: SDUPTHER

## 2020-10-05 RX ORDER — SULFAMETHOXAZOLE AND TRIMETHOPRIM 800; 160 MG/1; MG/1
1 TABLET ORAL 2 TIMES DAILY
Qty: 14 TAB | Refills: 0 | Status: SHIPPED | OUTPATIENT
Start: 2020-10-05 | End: 2020-10-12

## 2020-10-05 RX ORDER — OXYCODONE AND ACETAMINOPHEN 10; 325 MG/1; MG/1
1 TABLET ORAL
Qty: 45 TAB | Refills: 0 | Status: SHIPPED | OUTPATIENT
Start: 2020-12-04 | End: 2021-01-04 | Stop reason: SDUPTHER

## 2020-10-05 RX ORDER — OXYCODONE AND ACETAMINOPHEN 10; 325 MG/1; MG/1
1 TABLET ORAL
Qty: 45 TAB | Refills: 0 | Status: SHIPPED | OUTPATIENT
Start: 2020-10-05 | End: 2021-01-04 | Stop reason: SDUPTHER

## 2020-10-05 NOTE — PATIENT INSTRUCTIONS
Learning About Progressive Muscle Relaxation for Stress What are progressive muscle relaxation and stress? Stress is what you feel when you have to handle more than you are used to. A lot of things can cause stress. You may feel stress when you go on a job interview, take a test, or run a race. This kind of short-term stress is normal and even useful. It can help you if you need to work hard or react quickly. Stress also can last a long time. Long-term stress is caused by stressful situations or events. Examples of long-term stress include long-term health problems, ongoing problems at work, and conflicts in your family. Long-term stress can harm your health. When you have anxiety or stress in your life, one of the ways your body responds is with muscle tension. Progressive muscle relaxation is a method that helps relieve that tension. How does progressive muscle relaxation reduce stress? The body responds to stress with muscle tension, which can cause pain or discomfort. In turn, tense muscles relay to the body that it's stressed. That keeps the cycle of stress and muscle tension going. Progressive muscle relaxation helps break this cycle by reducing muscle tension and general mental anxiety. This method often helps people get to sleep. How do you do progressive muscle relaxation? To do progressive muscle relaxation, first you tense a group of muscles as you breathe in. Then you relax them as you breathe out. Notice how your muscles feel when you relax them. You work on your muscle groups in a certain order. Through practice, you can learn to feel the difference between a tensed muscle and a relaxed muscle. Then you can learn how to turn on this relaxed state at the first sign of a muscle tensing up due to stress. Practicing this method for a few weeks will help you get better at this skill. In time you'll be able to use this method to relieve stress.  When you first start, it may help to use an audio recording until you learn all the muscle groups in order. Check online for these recordings. Choose a place where you won't be interrupted. It should have space for you to lie down on your back and stretch out comfortably, such as on a carpeted floor. Follow-up care is a key part of your treatment and safety. Be sure to make and go to all appointments, and call your doctor if you are having problems. It's also a good idea to know your test results and keep a list of the medicines you take. Where can you learn more? Go to http://www.gray.com/ Enter L116 in the search box to learn more about \"Learning About Progressive Muscle Relaxation for Stress. \" Current as of: December 16, 2019               Content Version: 12.6 © 7727-3228 [employer]. Care instructions adapted under license by [employer] (which disclaims liability or warranty for this information). If you have questions about a medical condition or this instruction, always ask your healthcare professional. [address] any warranty or liability for your use of this information.

## 2020-10-05 NOTE — PROGRESS NOTES
Pt states pain level is a 10/10 sciatica     Pt states last had something for pain 3 days ago     1. Have you been to the ER, urgent care clinic since your last visit? Hospitalized since your last visit? No    2. Have you seen or consulted any other health care providers outside of the [address] [place] [address] since your last visit? Include any pap smears or colon screening.  No       Pt is here for   Chief Complaint   Patient presents with    Medication Refill     pain

## 2020-10-17 ENCOUNTER — APPOINTMENT (OUTPATIENT)
Dept: GENERAL RADIOLOGY | Age: 55
End: 2020-10-17
Attending: NURSE PRACTITIONER
Payer: MEDICAID

## 2020-10-17 ENCOUNTER — HOSPITAL ENCOUNTER (EMERGENCY)
Age: 55
Discharge: HOME | End: 2020-10-17
Attending: EMERGENCY MEDICINE | Admitting: EMERGENCY MEDICINE
Payer: MEDICAID

## 2020-10-17 VITALS
TEMPERATURE: 97.9 F | HEART RATE: 102 BPM | SYSTOLIC BLOOD PRESSURE: 159 MMHG | HEIGHT: 61 IN | WEIGHT: 189 LBS | RESPIRATION RATE: 118 BRPM | BODY MASS INDEX: 35.68 KG/M2 | DIASTOLIC BLOOD PRESSURE: 90 MMHG

## 2020-10-17 DIAGNOSIS — S63.501A SPRAIN OF RIGHT WRIST, INITIAL ENCOUNTER: Primary | ICD-10-CM

## 2020-10-17 PROCEDURE — 73110 X-RAY EXAM OF WRIST: CPT

## 2020-10-17 PROCEDURE — 99283 EMERGENCY DEPT VISIT LOW MDM: CPT

## 2020-10-17 PROCEDURE — 75810000053 HC SPLINT APPLICATION

## 2020-10-17 RX ORDER — ACETAMINOPHEN 500 MG
1000 TABLET ORAL
Qty: 20 TAB | Refills: 0 | Status: SHIPPED | OUTPATIENT
Start: 2020-10-17 | End: 2022-08-21

## 2020-10-17 NOTE — DISCHARGE INSTRUCTIONS
Patient Education        Learning About RICE (Rest, Ice, Compression, and Elevation)  What is RICE? RICE is a way to care for an injury. RICE helps relieve pain and swelling. It may also help with healing and flexibility. RICE stands for:  · R est and protect the injured or sore area. · I ce or a cold pack used as soon as possible. · C ompression, or wrapping the injured or sore area with an elastic bandage. · E levation (propping up) the injured or sore area. How do you do RICE? You can use RICE for home treatment when you have general aches and pains or after an injury or surgery. Rest  · Do not put weight on the injury for at least 24 to 48 hours. · Use crutches for a badly sprained knee or ankle. · Support a sprained wrist, elbow, or shoulder with a sling. Ice  · Put ice or a cold pack on the injury right away to reduce pain and swelling. Frozen vegetables will also work as an ice pack. Put a thin cloth between the ice or cold pack and your skin. The cloth protects the injured area from getting too cold. · Use ice for 10 to 15 minutes at a time for the first 48 to 72 hours. Compression  · Use compression for sprains, strains, and surgeries of the arms and legs. · Wrap the injured area with an elastic bandage or compression sleeve to reduce swelling. · Don't wrap it too tightly. If the area below it feels numb, tingles, or feels cool, loosen the wrap. Elevation  · Use elevation for areas of the body that can be propped up, such as arms and legs. · Prop up the injured area on pillows whenever you use ice. Keep it propped up anytime you sit or lie down. · Try to keep the injured area at or above the level of your heart. This will help reduce swelling and bruising. Where can you learn more? Go to http://www.gray.com/  Enter I463 in the search box to learn more about \"Learning About RICE (Rest, Ice, Compression, and Elevation). \"  Current as of: March 2, 2020               Content Version: 12.6  © 2006-2020 [employer]. Care instructions adapted under license by [employer] (which disclaims liability or warranty for this information). If you have questions about a medical condition or this instruction, always ask your healthcare professional. [address] any warranty or liability for your use of this information. Patient Education        Strain or Sprain: Care Instructions  Your Care Instructions     A strain happens when you overstretch, or pull, a muscle. A sprain occurs when you stretch or tear a ligament, the tough tissue that connects one bone to another. These problems can happen when you exercise or lift something or when you are in an accident. Rest and other home care can help strains and sprains heal.  The doctor has checked you carefully, but problems can develop later. If you notice any problems or new symptoms,  get medical treatment right away. Follow-up care is a key part of your treatment and safety. Be sure to make and go to all appointments, and call your doctor if you are having problems. It's also a good idea to know your test results and keep a list of the medicines you take. How can you care for yourself at home? · If your doctor gave you a sling, splint, brace, or immobilizer, use it exactly as directed. · Rest the strained or sprained area, and follow your doctor's advice about when you can be active again. · Put ice or a cold pack on the sore area for 10 to 20 minutes at a time to stop swelling. Try this every 1 to 2 hours for 3 days (when you are awake) or until the swelling goes down. Put a thin cloth between the ice pack and your skin. Keep your splint or brace dry. · Prop up a sore arm or leg on a pillow when you ice it or anytime you sit or lie down. Try to keep it higher than the level of your heart. This will help reduce swelling.   · Take pain medicines exactly as directed. ? If the doctor gave you a prescription medicine for pain, take it as prescribed. ? If you are not taking a prescription pain medicine, ask your doctor if you can take an over-the-counter medicine. · Do exercises as directed by your doctor or physical therapist.  · Return to your usual level of activity slowly. · Do not do anything that makes the pain worse. When should you call for help? Call your doctor now or seek immediate medical care if:    · You have severe or increasing pain.     · You have tingling, weakness, or numbness in the area.     · The area turns cold or changes color.     · Your cast or splint feels too tight.     · You have symptoms of a blood clot, such as:  ? Pain in your calf, back of the knee, thigh, or groin. ? Redness and swelling in your leg or groin.     · You cannot move the strained part of your body. Watch closely for changes in your health, and be sure to contact your doctor if:    · You do not get better as expected. Where can you learn more? Go to http://www.gray.com/  Enter H024 in the search box to learn more about \"Strain or Sprain: Care Instructions. \"  Current as of: March 2, 2020               Content Version: 12.6  © 1790-8643 [employer]. Care instructions adapted under license by [employer] (which disclaims liability or warranty for this information). If you have questions about a medical condition or this instruction, always ask your healthcare professional. [address] any warranty or liability for your use of this information.

## 2020-10-17 NOTE — ED NOTES
Pt presents ambulatory to ED complaining of 10/10 wrist pain post fall on sidewalk 30 min PTA. Pt states her \"wrist broke the fall but I fell funny on my wrist.\" This SN noticed limited wrist and finger mobility upon inspection. Pt reports she fell because of her limited mobility due to a R knee replacement. Pt reports PMH of Asthma, bronchitis, COPD, GERD, and HTN, and states she takes \"15 pills a day and 4 inhalers. \" Pt is alert and oriented x 4, RR even and unlabored, skin is warm and dry. Assesment completed and pt updated on plan of care. Emergency Department Nursing Plan of Care       The Nursing Plan of Care is developed from the Nursing assessment and Emergency Department Attending provider initial evaluation. The plan of care may be reviewed in the ED Provider note.     The Plan of Care was developed with the following considerations:   Patient / Family readiness to learn indicated by:verbalized understanding  Persons(s) to be included in education: patient  Barriers to Learning/Limitations:No    Signed     Tonio Arango    10/17/2020   5:10 PM

## 2020-10-17 NOTE — ED NOTES
Patient (s) given copy of dc instructions and 0 paper script(s) and 1 electronic scripts. Patient (s)  verbalized understanding of instructions and script (s). Patient given a current medication reconciliation form and verbalized understanding of their medications. Patient (s) verbalized understanding of the importance of discussing medications with  his or her physician or clinic they will be following up with. Patient alert and oriented and in no acute distress. Patient offered wheelchair from treatment area to hospital entrance, patient denied wheelchair.

## 2020-10-19 ENCOUNTER — PATIENT OUTREACH (OUTPATIENT)
Dept: CASE MANAGEMENT | Age: 55
End: 2020-10-19

## 2020-10-19 NOTE — PROGRESS NOTES
Patient contacted regarding recent discharge and COVID-19 risk. Discussed COVID-19 related testing which was not done at this time. Test results were not done. Patient informed of results, if available?       Care Transition Nurse/ Ambulatory Care Manager/ LPN Care Coordinator contacted the patient by telephone to perform post discharge assessment. Verified name and  with patient as identifiers. Patient has following risk factors of: COPD, asthma and ED visit to [facility] on 10/17/2020. CTN/ACM/LPN reviewed discharge instructions, medical action plan and red flags related to discharge diagnosis. Reviewed and educated them on any new and changed medications related to discharge diagnosis. Advised obtaining a 90-day supply of all daily and as-needed medications. Advance Care Planning:   Does patient have an Advance Directive: currently not on file; education provided     Education provided regarding infection prevention, and signs and symptoms of COVID-19 and when to seek medical attention with patient who verbalized understanding. Discussed exposure protocols and quarantine from [address] you at higher risk for severe illness  and given an opportunity for questions and concerns. The patient agrees to contact the COVID-19 hotline [phone] or PCP office for questions related to their healthcare. CTN/ACM/LPN provided contact information for future reference. From CDC: Are you at higher risk for severe illness?  Wash your hands often.  Avoid close contact (6 feet, which is about two arm lengths) with people who are sick.  Put distance between yourself and other people if COVID-19 is spreading in your community.  Clean and disinfect frequently touched surfaces.  Avoid all cruise travel and non-essential air travel.  Call your healthcare professional if you have concerns about COVID-19 and your underlying condition or if you are sick.     For more information on steps you can take to protect yourself, see CDC's How to Protect Yourself      Patient/family/caregiver given information for [employer] and agrees to enroll no    Plan for follow-up call in 7-14 days based on severity of symptoms and risk factors. Pt verified that she is taking tylenol for wrist pain and states that she has maintenance medications. Pt states that it would be easier to send COVID19 information via [employer] as she is right handed. Pt is agreeable to chart being sent to PCP. Pt is agreeable to a follow up call.

## 2020-10-21 ENCOUNTER — VIRTUAL VISIT (OUTPATIENT)
Dept: INTERNAL MEDICINE CLINIC | Age: 55
End: 2020-10-21
Payer: MEDICAID

## 2020-10-21 DIAGNOSIS — S63.501A RIGHT WRIST SPRAIN, INITIAL ENCOUNTER: Primary | ICD-10-CM

## 2020-10-21 DIAGNOSIS — M17.11 PRIMARY OSTEOARTHRITIS OF RIGHT KNEE: ICD-10-CM

## 2020-10-21 DIAGNOSIS — Z91.81 HISTORY OF RECENT FALL: ICD-10-CM

## 2020-10-21 PROCEDURE — 99213 OFFICE O/P EST LOW 20 MIN: CPT | Performed by: NURSE PRACTITIONER

## 2020-10-21 RX ORDER — CELECOXIB 100 MG/1
100 CAPSULE ORAL 2 TIMES DAILY
Qty: 60 CAP | Refills: 2 | Status: SHIPPED | OUTPATIENT
Start: 2020-10-21 | End: 2021-01-19

## 2020-10-21 NOTE — PROGRESS NOTES
Alfonso Robledo is a 60 y.o. female who was seen by synchronous (real-time) audio-video technology on 10/21/2020. Consent: Alfonso Robledo, who was seen by synchronous (real-time) audio-video technology, and/or her healthcare decision maker, is aware that this patient-initiated, Telehealth encounter on 10/21/2020 is a billable service, with coverage as determined by her insurance carrier. She is aware that she may receive a bill and has provided verbal consent to proceed: Yes. Assessment & Plan:   Diagnoses and all orders for this visit:    1. Right wrist sprain, initial encounter  -     celecoxib (CELEBREX) 100 mg capsule; Take 1 Cap by mouth two (2) times a day for 90 days.  -     REFERRAL TO ORTHOPEDICS    2. History of recent fall    3. Primary osteoarthritis of right knee      Follow-up and Dispositions    · Return for keep next appt as scheduled. .         Discussed with pt continue to use splint and elevate. May use Tylenol 1000 mg BID PRN with percocet for pain. INI over next 2-4 weeks call hand surgeon, referral given today. Subjective:   Alfonso Robledo is a 60 y.o. female who was seen for Fall (with injury . .. DOXY. -[phone]) and ED Follow-up (10/17/2020 for fall )      Pt is here for ER/UC follow-up on 10/17 for right wrist pain after GLF when right knee gave. Diagnosed with right wrist sprain. Was given splint and Tylenol. Elevating nightly. Instructed to f/u with PCP/specialty. Reports feeling SAME AS when in ER, had increased swelling. Prior to Admission medications    Medication Sig Start Date End Date Taking? Authorizing Provider   celecoxib (CELEBREX) 100 mg capsule Take 1 Cap by mouth two (2) times a day for 90 days. 10/21/20 1/19/21 Yes Henry Dao, NP   acetaminophen (Tylenol Extra Strength) 500 mg tablet Take 2 Tabs by mouth every six (6) hours as needed for Pain.  10/17/20   Adryan Dawn, SHAGGY   metoprolol succinate (TOPROL-XL) 25 mg XL tablet TAKE 1 TABLET BY MOUTH EVERY DAY 10/15/20   Lam Robb NP   ondansetron (ZOFRAN ODT) 4 mg disintegrating tablet DISSOLVE 1 TABLET BY MOUTH EVERY 8 HOURS AS NEEDED FOR NAUSEA 10/5/20   Lam Robb NP   oxyCODONE-acetaminophen (PERCOCET 10)  mg per tablet Take 1 Tab by mouth every twelve (12) hours as needed for Pain for up to 30 days. Max Daily Amount: 2 Tabs. Indications: pain 12/4/20 1/3/21  Lam Robb NP   oxyCODONE-acetaminophen (PERCOCET 10)  mg per tablet Take 1 Tab by mouth two (2) times daily as needed for Pain for up to 30 days. Max Daily Amount: 2 Tabs. Indications: pain 11/4/20 12/4/20  Lam Robb NP   oxyCODONE-acetaminophen (PERCOCET 10)  mg per tablet Take 1 Tab by mouth two (2) times daily as needed for Pain for up to 30 days. Max Daily Amount: 2 Tabs. Indications: pain 10/5/20 11/4/20  Lam Robb NP   loratadine (CLARITIN) 10 mg tablet TAKE 1 TABLET BY MOUTH EVERY DAY 9/17/20   Lam Robb NP   methocarbamoL (ROBAXIN) 750 mg tablet TAKE 1 TAB BY MOUTH EVERY 6 HOURS AS NEEDED FOR SPASMS 7/17/20   Lam Robb NP   hydroCHLOROthiazide (HYDRODIURIL) 25 mg tablet TAKE 1 TAB BY MOUTH DAILY FOR HYPERTENSION 7/6/20   Lam Robb NP   gabapentin (NEURONTIN) 800 mg tablet Take 1 Tab by mouth three (3) times daily. Max Daily Amount: 2,400 mg. 7/2/20   Lam Robb NP   pantoprazole (PROTONIX) 40 mg tablet Take 1 Tab by mouth daily. Take in 2 week intervals for GERD Flares 7/2/20   Lam Robb NP   levothyroxine (SYNTHROID) 125 mcg tablet TAKE 1 TABLET BY MOUTH EVERY DAY BEFORE BREAKFAST INDICATIONS: HYPOTHYROIDISM  Indications: a condition with low thyroid hormone levels 7/2/20   Lam Robb NP   amLODIPine (NORVASC) 5 mg tablet Take 1 Tab by mouth daily.  7/2/20   Lam Robb NP   Combivent Respimat  mcg/actuation inhaler TAKE 1 PUFF BY INHALATION EVERY SIX (6) HOURS AS NEEDED FOR WHEEZING. 4/13/20   Lam Robb NP   Symbicort 160-4.5 mcg/actuation HFAA TAKE 2 PUFFS BY INHALATION TWO (2) TIMES A DAY. 3/20/20   Lenka Renae NP   azelastine (ASTELIN) 137 mcg (0.1 %) nasal spray  12/2/19   Provider, Historical   traZODone (DESYREL) 50 mg tablet Take 1 Tab by mouth nightly. 12/3/19   Juany Hugo NP   DULoxetine (CYMBALTA) 30 mg capsule Take 2 Caps by mouth two (2) times a day. 12/3/19   Eliana Dunlap NP   XOLAIR 150 mg solr Indications: injection 8/20/19   Provider, Historical   meloxicam (MOBIC) 15 mg tablet TAKE 1 TABLET BY MOUTH EVERY DAY IN THE MORNING WITH BREAKFAST 9/16/19 10/21/20  Lenka Renae NP   hydrOXYzine HCl (ATARAX) 25 mg tablet TAKE 1 TABLET BY MOUTH 3 TIMES A DAY AS NEEDED FOR ITCHING FOR ANXIETY 8/21/19   Anay Bhatti MD   furosemide (LASIX) 20 mg tablet TAKE 1 TABLET BY MOUTH DAILY X 3-5 DAYS FOR LEG SWELLING INDICATIONS: VISIBLE WATER RETENTION 8/21/19   Anay Bhatti MD   montelukast (SINGULAIR) 10 mg tablet TAKE 1 TAB BY MOUTH DAILY. 7/25/19   Lenka Renae NP   tiotropium (SPIRIVA WITH HANDIHALER) 18 mcg inhalation capsule Take 1 Cap by inhalation daily. Salazar Sotelo MD   lidocaine 4 % patch Every 12 hours as needed for pain 5/12/19   Nicolette Denney PA   PROAIR HFA 90 mcg/actuation inhaler TAKE 2 PUFFS BY INHALATION EVERY FOUR (4) HOURS AS NEEDED. 3/9/19   Lenka Renae NP   lidocaine (LIDODERM) 5 % Apply patch to the affected area for 12 hours a day and remove for 12 hours a day. 1/8/19   Lenka Renae NP   naloxone [facility]) 4 mg/actuation nasal spray Use 1 spray into 1 nostril for OVERDOSE. Call 911. For subsequent doses, give in alternating nostrils. May repeat every 2 to 3 min. 3/28/18   Lenka Renae NP   albuterol (PROVENTIL VENTOLIN) 2.5 mg /3 mL (0.083 %) nebulizer solution 3 mL by Nebulization route every four (4) hours as needed for Wheezing. 1/31/18   Sallyanne Comfort, NP   diclofenac (VOLTAREN) 1 % gel Apply 2 g to affected area every six (6) hours.  11/3/17   Sallyanne Comfort, NP   fluticasone (FLONASE) 50 mcg/actuation nasal spray 2 Sprays by Both Nostrils route daily. 12/9/16   Ada Hankins NP   miscellaneous medical supply misc Shower seat for chronic knee pain, pt planning to have right TKR in June due to condition. Very limited range of motion. 5/16/16   Ada Hankins NP     Allergies   Allergen Reactions    Hydrocodone Itching    Mold Shortness of Breath and Itching    Ibuprofen Nausea Only       Patient Active Problem List   Diagnosis Code    Ventral hernia K43.9    Chronic pain of right knee M25.561, G89.29    Chronic right shoulder pain M25.511, G89.29    Environmental and seasonal allergies J30.89    Ventral hernia without obstruction or gangrene K43.9    Primary osteoarthritis of right knee M17.11    Mixed simple and mucopurulent chronic bronchitis (HCC) J41.8    Acquired hypothyroidism E03.9    Chronic bilateral low back pain with right-sided sciatica M54.41, G89.29    Status post total right knee replacement Z96.651    Malignant hypertension I10    Pain management contract signed Z02.89    Chronic pain of left knee M25.562, G89.29    Moderate episode of recurrent major depressive disorder ([facility]) F33.1    Psychophysiological insomnia F51.04    Severe obesity (BMI 35.0-39. 9) with comorbidity ([facility]) E66.01    Post-tussive vomiting R11.10    Chronic use of opiate for therapeutic purpose Z79.891    Obesity, Class II, BMI 35-39.9 E66.9    Left wrist pain M25.532    Chronic left shoulder pain M25.512, G89.29    Osteoarthritis of spine with radiculopathy, cervical region M47.22    Primary osteoarthritis of left shoulder M19.012    MRSA carrier Z22.322    S/P total knee arthroplasty, right Z96.651    Loosening of knee joint prosthesis ([address] T84.038A, Z96.659     Patient Active Problem List    Diagnosis Date Noted    S/P total knee arthroplasty, right 03/26/2019    Loosening of knee joint prosthesis ([address] 03/26/2019    MRSA carrier 03/06/2019    Osteoarthritis of spine with radiculopathy, cervical region 09/26/2018    Primary osteoarthritis of left shoulder 09/26/2018    Obesity, Class II, BMI 35-39.9 09/25/2018    Left wrist pain 09/25/2018    Chronic left shoulder pain 09/25/2018    Chronic use of opiate for therapeutic purpose 05/24/2018    Severe obesity (BMI 35.0-39. 9) with comorbidity ([facility] 03/28/2018    Post-tussive vomiting 03/28/2018    Moderate episode of recurrent major depressive disorder ([facility] 02/28/2018    Psychophysiological insomnia 02/28/2018    Chronic pain of left knee 06/15/2017    Chronic bilateral low back pain with right-sided sciatica 05/08/2017    Status post total right knee replacement 05/08/2017    Malignant hypertension 05/08/2017    Pain management contract signed 05/08/2017    Acquired hypothyroidism 01/06/2017    Mixed simple and mucopurulent chronic bronchitis ([facility] 09/08/2016    Primary osteoarthritis of right knee 06/06/2016    Ventral hernia without obstruction or gangrene 05/26/2016    Chronic pain of right knee 02/05/2016    Chronic right shoulder pain 02/05/2016    Environmental and seasonal allergies 02/05/2016    Ventral hernia 12/31/2013     Current Outpatient Medications   Medication Sig Dispense Refill    celecoxib (CELEBREX) 100 mg capsule Take 1 Cap by mouth two (2) times a day for 90 days. 60 Cap 2    acetaminophen (Tylenol Extra Strength) 500 mg tablet Take 2 Tabs by mouth every six (6) hours as needed for Pain. 20 Tab 0    metoprolol succinate (TOPROL-XL) 25 mg XL tablet TAKE 1 TABLET BY MOUTH EVERY DAY 90 Tab 3    ondansetron (ZOFRAN ODT) 4 mg disintegrating tablet DISSOLVE 1 TABLET BY MOUTH EVERY 8 HOURS AS NEEDED FOR NAUSEA 20 Tab 1    [START ON 12/4/2020] oxyCODONE-acetaminophen (PERCOCET 10)  mg per tablet Take 1 Tab by mouth every twelve (12) hours as needed for Pain for up to 30 days. Max Daily Amount: 2 Tabs.  Indications: pain 45 Tab 0    [START ON 11/4/2020] oxyCODONE-acetaminophen (PERCOCET 10)  mg per tablet Take 1 Tab by mouth two (2) times daily as needed for Pain for up to 30 days. Max Daily Amount: 2 Tabs. Indications: pain 45 Tab 0    oxyCODONE-acetaminophen (PERCOCET 10)  mg per tablet Take 1 Tab by mouth two (2) times daily as needed for Pain for up to 30 days. Max Daily Amount: 2 Tabs. Indications: pain 45 Tab 0    loratadine (CLARITIN) 10 mg tablet TAKE 1 TABLET BY MOUTH EVERY DAY 30 Tab 8    methocarbamoL (ROBAXIN) 750 mg tablet TAKE 1 TAB BY MOUTH EVERY 6 HOURS AS NEEDED FOR SPASMS 120 Tab 6    hydroCHLOROthiazide (HYDRODIURIL) 25 mg tablet TAKE 1 TAB BY MOUTH DAILY FOR HYPERTENSION 90 Tab 3    gabapentin (NEURONTIN) 800 mg tablet Take 1 Tab by mouth three (3) times daily. Max Daily Amount: 2,400 mg. 90 Tab 5    pantoprazole (PROTONIX) 40 mg tablet Take 1 Tab by mouth daily. Take in 2 week intervals for GERD Flares 30 Tab 1    levothyroxine (SYNTHROID) 125 mcg tablet TAKE 1 TABLET BY MOUTH EVERY DAY BEFORE BREAKFAST INDICATIONS: HYPOTHYROIDISM  Indications: a condition with low thyroid hormone levels 90 Tab 3    amLODIPine (NORVASC) 5 mg tablet Take 1 Tab by mouth daily. 90 Tab 3    Combivent Respimat  mcg/actuation inhaler TAKE 1 PUFF BY INHALATION EVERY SIX (6) HOURS AS NEEDED FOR WHEEZING. 1 Inhaler 0    Symbicort 160-4.5 mcg/actuation HFAA TAKE 2 PUFFS BY INHALATION TWO (2) TIMES A DAY. 30.6 Inhaler 3    azelastine (ASTELIN) 137 mcg (0.1 %) nasal spray       traZODone (DESYREL) 50 mg tablet Take 1 Tab by mouth nightly. 90 Tab 0    DULoxetine (CYMBALTA) 30 mg capsule Take 2 Caps by mouth two (2) times a day.  180 Cap 0    XOLAIR 150 mg solr Indications: injection      hydrOXYzine HCl (ATARAX) 25 mg tablet TAKE 1 TABLET BY MOUTH 3 TIMES A DAY AS NEEDED FOR ITCHING FOR ANXIETY 60 Tab 0    furosemide (LASIX) 20 mg tablet TAKE 1 TABLET BY MOUTH DAILY X 3-5 DAYS FOR LEG SWELLING INDICATIONS: VISIBLE WATER RETENTION 15 Tab 0    montelukast (SINGULAIR) 10 mg tablet TAKE 1 TAB BY MOUTH DAILY. 30 Tab 6    tiotropium (SPIRIVA WITH HANDIHALER) 18 mcg inhalation capsule Take 1 Cap by inhalation daily.  lidocaine 4 % patch Every 12 hours as needed for pain 20 Patch 0    PROAIR HFA 90 mcg/actuation inhaler TAKE 2 PUFFS BY INHALATION EVERY FOUR (4) HOURS AS NEEDED. 8.5 Inhaler 0    lidocaine (LIDODERM) 5 % Apply patch to the affected area for 12 hours a day and remove for 12 hours a day. 30 Each 11    naloxone (NARCAN) 4 mg/actuation nasal spray Use 1 spray into 1 nostril for OVERDOSE. Call 911. For subsequent doses, give in alternating nostrils. May repeat every 2 to 3 min. 2 Each 0    albuterol (PROVENTIL VENTOLIN) 2.5 mg /3 mL (0.083 %) nebulizer solution 3 mL by Nebulization route every four (4) hours as needed for Wheezing. 24 Each 2    diclofenac (VOLTAREN) 1 % gel Apply 2 g to affected area every six (6) hours. 100 g 5    fluticasone (FLONASE) 50 mcg/actuation nasal spray 2 Sprays by Both Nostrils route daily. 1 Bottle 11    miscellaneous medical supply misc Shower seat for chronic knee pain, pt planning to have right TKR in June due to condition. Very limited range of motion.  1 Each 0     Allergies   Allergen Reactions    Hydrocodone Itching    Mold Shortness of Breath and Itching    Ibuprofen Nausea Only     Past Medical History:   Diagnosis Date    Arthritis     Asthma     uses inhalers    Chronic obstructive pulmonary disease (HCC)     bronchitis    Chronic pain     GERD (gastroesophageal reflux disease)     Hypertension     Ill-defined condition     environmental allergies     Ill-defined condition     Multiple body piercings; unable to remove tongue and lip piercings    Ill-defined condition 2018    GASTRITIS PER ENDOSCOPY    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    Psychiatric disorder     DEPRESSION    Thyroid disease     hypo    Ventral hernia 12/31/2013     Past Surgical History:   Procedure Laterality Date    HX HEENT  2009 thyroidectomy    HX KNEE REPLACEMENT      R    HX KNEE REPLACEMENT  2019    HX MOHS PROCEDURES Right 3/8/11    HX OTHER SURGICAL      hiatal hernia repair    HX TUBAL LIGATION       Family History   Problem Relation Age of Onset    Cancer Father         lung cancer    Heart Disease Mother     Hypertension Mother     Diabetes Mother     Anesth Problems Neg Hx      Social History     Tobacco Use    Smoking status: Current Every Day Smoker     Packs/day: 0.25     Years: 1.50     Pack years: 0.37     Types: Cigarettes     Last attempt to quit: 10/1/2015     Years since quittin.0    Smokeless tobacco: Never Used   Substance Use Topics    Alcohol use: No       Review of Systems   Constitutional: Negative for fever and malaise/fatigue. Eyes: Negative for blurred vision. Respiratory: Negative for cough and shortness of breath. Cardiovascular: Negative for chest pain and leg swelling. Neurological: Negative for dizziness, weakness and headaches. Objective:   Vital Signs: (As obtained by patient/caregiver at home)  Visit Vitals  LMP 2016 (Exact Date) Comment: partial hysterectomy              Physical Exam:  General appearance - alert, well appearing, and in mild distress. Mental status - A/O x 4,normal mood and affect. Eyes- trace periorbital edema, drainage, or irritation noted. Nose- no obvious drainage or swelling. Throat- no obvious swelling, goiter, or notable lymphadenopathy  Chest - Symmetric chest rise. No wheezing or coughing. No distress. Skin- normal skin tone noted. No hyperpigmentation or obvious deformities. No diaphoresis noted. No flushing. Neuro - Normal speech, no focal findings or movement disorder. Other pertinent observable physical exam findings:-  Right wrist splint in place with trace edema noted to fingers. No discoloration and able to move fingers on video.       We discussed the expected course, resolution and complications of the diagnosis(es) in detail. Medication risks, benefits, costs, interactions, and alternatives were discussed as indicated. I advised her to contact the office if her condition worsens, changes or fails to improve as anticipated. She expressed understanding with the diagnosis(es) and plan. Kari Calderon is a 60 y.o. female who was evaluated by a video visit encounter for concerns as above. Patient identification was verified prior to start of the visit. A caregiver was present when appropriate. Due to this being a TeleHealth encounter (During STOOI-97 public health emergency), evaluation of the following organ systems was limited: Vitals/Constitutional/EENT/Resp/CV/GI//MS/Neuro/Skin/Heme-Lymph-Imm. Pursuant to the emergency declaration under the [facility], [facility] waiver authority and the [employer] and [employer] Act, this Virtual  Visit was conducted, with patient's (and/or legal guardian's) consent, to reduce the patient's risk of exposure to COVID-19 and provide necessary medical care. Services were provided through a video synchronous discussion virtually to substitute for in-person clinic visit. Patient and provider were located at their individual homes.       Fabian Whelan NP

## 2020-10-21 NOTE — PROGRESS NOTES
8/10 wrist     Pt is here for   Chief Complaint   Patient presents with    Fall     with injury . .. DOXY. -[phone]    ED Follow-up     10/17/2020 for fall      1. Have you been to the ER, urgent care clinic since your last visit? Hospitalized since your last visit? Yes When: 10/17/2020 for fall and wrist pain     2. Have you seen or consulted any other health care providers outside of the [address] [place] [address] since your last visit? Include any pap smears or colon screening.  No

## 2020-10-21 NOTE — PATIENT INSTRUCTIONS
Tex Quan  
  
  
     [address] [facility] [address] [facility] [address] Hand surgeon. Call in next 2-4 weeks if wrist pain NOT improving with splint use. Phone: 
   
  [phone] Wrist Sprain: Care Instructions Your Care Instructions Your wrist hurts because you have stretched or torn ligaments, which connect the bones in your wrist. 
Wrist sprains usually take from 2 to 10 weeks to heal, but some take longer. Usually, the more pain you have, the more severe your wrist sprain is and the longer it will take to heal. You can heal faster and regain strength in your wrist with good home treatment. Follow-up care is a key part of your treatment and safety. Be sure to make and go to all appointments, and call your doctor if you are having problems. It's also a good idea to know your test results and keep a list of the medicines you take. How can you care for yourself at home? · Prop up your arm on a pillow when you ice it or anytime you sit or lie down for the next 3 days. Try to keep your wrist above the level of your heart. This will help reduce swelling. · Put ice or cold packs on your wrist for 10 to 20 minutes at a time. Try to do this every 1 to 2 hours for the next 3 days (when you are awake) or until the swelling goes down. Put a thin cloth between the ice pack and your skin. · After 2 or 3 days, if your swelling is gone, apply a heating pad set on low or a warm cloth to your wrist. This helps keep your wrist flexible. Some doctors suggest that you go back and forth between hot and cold. · If you have an elastic bandage, keep it on for the next 24 to 36 hours. The bandage should be snug but not so tight that it causes numbness or tingling. To rewrap the wrist, wrap the bandage around the hand a few times, beginning at the fingers. Then wrap it around the hand between the thumb and index finger, ending by circling the wrist several times. · If your doctor gave you a splint or brace, wear it as directed to protect your wrist until it has healed. · Take pain medicines exactly as directed. ? If the doctor gave you a prescription medicine for pain, take it as prescribed. ? If you are not taking a prescription pain medicine, ask your doctor if you can take an over-the-counter medicine. · Try not to use your injured wrist and hand. When should you call for help? Call your doctor now or seek immediate medical care if: 
  · Your hand or fingers are cool or pale or change color. Watch closely for changes in your health, and be sure to contact your doctor if: 
  · Your pain gets worse.  
  · Your wrist has not improved after 1 week. Where can you learn more? Go to http://www.gray.com/ Enter G541 in the search box to learn more about \"Wrist Sprain: Care Instructions. \" Current as of: March 2, 2020               Content Version: 12.6 © 1106-1167 [employer]. Care instructions adapted under license by [employer] (which disclaims liability or warranty for this information). If you have questions about a medical condition or this instruction, always ask your healthcare professional. [place] [address] any warranty or liability for your use of this information.

## 2020-11-03 ENCOUNTER — PATIENT OUTREACH (OUTPATIENT)
Dept: CASE MANAGEMENT | Age: 55
End: 2020-11-03

## 2020-11-03 NOTE — PROGRESS NOTES
Patient resolved from Transition of Care episode on 11/3/2020  Discussed COVID-19 related testing which was not done at this time. Test results were not done. Patient informed of results, if available? yes     Patient/family has been provided the following resources and education related to COVID-19:                         Signs, symptoms and red flags related to COVID-19            [facility] exposure and quarantine guidelines            [employer] exposure contact - [phone]            Contact for their local Department of Health                 Patient currently reports that the following symptoms have improved:  no new symptoms and no worsening symptoms. No further outreach scheduled with this CTN/ACM/LPN/HC/ MA. Episode of Care resolved. Patient has this CTN/ACM/LPN/HC/MA contact information if future needs arise.

## 2021-01-04 ENCOUNTER — OFFICE VISIT (OUTPATIENT)
Dept: INTERNAL MEDICINE CLINIC | Age: 56
End: 2021-01-04
Payer: MEDICAID

## 2021-01-04 VITALS
RESPIRATION RATE: 18 BRPM | HEIGHT: 61 IN | BODY MASS INDEX: 36.82 KG/M2 | SYSTOLIC BLOOD PRESSURE: 134 MMHG | HEART RATE: 75 BPM | DIASTOLIC BLOOD PRESSURE: 79 MMHG | WEIGHT: 195 LBS | OXYGEN SATURATION: 98 % | TEMPERATURE: 96.9 F

## 2021-01-04 DIAGNOSIS — Z29.8 IMMUNOTHERAPY: ICD-10-CM

## 2021-01-04 DIAGNOSIS — Z96.651 STATUS POST TOTAL RIGHT KNEE REPLACEMENT: ICD-10-CM

## 2021-01-04 DIAGNOSIS — Z91.81 HISTORY OF RECENT FALL: ICD-10-CM

## 2021-01-04 DIAGNOSIS — J44.9 ASTHMA WITH COPD (HCC): ICD-10-CM

## 2021-01-04 DIAGNOSIS — Z79.891 CHRONIC USE OF OPIATE FOR THERAPEUTIC PURPOSE: ICD-10-CM

## 2021-01-04 DIAGNOSIS — S63.501A SPRAIN AND STRAIN OF RIGHT WRIST: ICD-10-CM

## 2021-01-04 DIAGNOSIS — S66.911A SPRAIN AND STRAIN OF RIGHT WRIST: ICD-10-CM

## 2021-01-04 DIAGNOSIS — Z00.00 MEDICARE ANNUAL WELLNESS VISIT, SUBSEQUENT: ICD-10-CM

## 2021-01-04 DIAGNOSIS — Z71.89 ADVANCE CARE PLANNING: ICD-10-CM

## 2021-01-04 DIAGNOSIS — M19.012 PRIMARY OSTEOARTHRITIS OF LEFT SHOULDER: ICD-10-CM

## 2021-01-04 DIAGNOSIS — M17.11 PRIMARY OSTEOARTHRITIS OF RIGHT KNEE: Primary | ICD-10-CM

## 2021-01-04 PROCEDURE — 99396 PREV VISIT EST AGE 40-64: CPT | Performed by: NURSE PRACTITIONER

## 2021-01-04 PROCEDURE — 99214 OFFICE O/P EST MOD 30 MIN: CPT | Performed by: NURSE PRACTITIONER

## 2021-01-04 RX ORDER — OXYCODONE AND ACETAMINOPHEN 10; 325 MG/1; MG/1
1 TABLET ORAL
Qty: 45 TAB | Refills: 0 | Status: ON HOLD | OUTPATIENT
Start: 2021-02-03 | End: 2021-01-22

## 2021-01-04 RX ORDER — OXYCODONE AND ACETAMINOPHEN 10; 325 MG/1; MG/1
1 TABLET ORAL
Qty: 60 TAB | Refills: 0 | Status: SHIPPED | OUTPATIENT
Start: 2021-01-04 | End: 2021-04-02 | Stop reason: SDUPTHER

## 2021-01-04 RX ORDER — OXYCODONE AND ACETAMINOPHEN 10; 325 MG/1; MG/1
1 TABLET ORAL
Qty: 45 TAB | Refills: 0 | Status: ON HOLD | OUTPATIENT
Start: 2021-03-05 | End: 2021-01-22

## 2021-01-04 NOTE — PROGRESS NOTES
Encounter for pain management. Chronic Pain:  Patient has chronic BL knee pain for years, had right TKR last year (2016). Followed by Dr. Bailey Garrett for RIGHT wrist fracture incurred from fall in Oct. Had cast for a few weeks, out since end of Nov, but still with severe pain. Wearing wrist splint since with numbness and tingling. Recent MRI imaging for pain, unsure of results. Will f/u soon. Pain Scale: 10 - Worst pain ever/10. Had IMAGING for lumbar spine and right knee and has been seeing/seen by ORTHO. Pain in the left shoulder, wrist, knees, legs, and lower back is still limiting walking, sitting, reaching, lifting, and standing, exacerbated by forementioned acitivities to include stair climbing. Has tried prednisone, tramadol, injections, PT, gabapentin, cymbalta, and surgery in past with minimal relief. Pain has been controlled with Oxycodone, last taken yesterday. The medication is kept safe by staying with her. Has NOT seen any other providers since last OV for pain medication. No significant changes to pain presentation since last OV. she is  able to do her normal daily activities. she reports the following adverse side effects: none. Averaging 4-5 BMs per week. Least pain over the last week has been 7/10 on rested days, 8-9/10 on non rested days. Worst pain over the last week has been 10/10. Aberrant behaviors: None         Symptoms onset: problem is longstanding. Rheumatological ROS: ongoing significant pain which is stable and controlled by pain med. Response to treatment plan: waxing and waning. Had allergy shots today and take Xolair once monthly. Some wheezing today, but overall improved. Review of Systems  Constitutional: +MRSA in nose. negative for fevers, chills, anorexia and weight loss  Eyes:   negative for visual disturbance, drainage, and irritation  ENT:   +AR and environmental allergies.  negative for tinnitus,sore throat,ear pain,and hoarseness  Respiratory:  + asthma/COPD. negative for hemoptysis  CV:   negative for chest pain, palpitations, and lower extremity edema  GI:   + GERD.  negative for nausea, vomiting, diarrhea, abdominal pain, and melena  Endo:               negative for polyuria,polydipsia,polyphagia, and heat intolerance  Genitourinary: negative for frequency, urgency, dysuria, retention, and hematuria  Integument:  negative for rash, ulcerations, and pruritus  Hematologic:  negative for easy bruising and bleeding  Musculoskel: negative for  muscle weakness  Neurological:  negative for headaches, dizziness, vertigo,and memory problems  Behavl/Psych: +depression, on cymbalta and zoloft. negative for feelings of  suicide    1./2. Medical history/Past medical History   Past Medical History:   Diagnosis Date    Arthritis     Asthma     uses inhalers    Chronic obstructive pulmonary disease (HCC)     bronchitis    Chronic pain     GERD (gastroesophageal reflux disease)     Hypertension     Ill-defined condition     environmental allergies     Ill-defined condition     Multiple body piercings; unable to remove tongue and lip piercings    Ill-defined condition 2018    GASTRITIS PER ENDOSCOPY    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    Psychiatric disorder     DEPRESSION    Thyroid disease     hypo    Ventral hernia 12/31/2013     Past Surgical History:   Procedure Laterality Date    HX HEENT  2009    thyroidectomy    HX KNEE REPLACEMENT      R    HX KNEE REPLACEMENT  03/26/2019    HX MOHS PROCEDURES Right 3/8/11    HX OTHER SURGICAL      hiatal hernia repair    HX TUBAL LIGATION       Patient Active Problem List   Diagnosis Code    Ventral hernia K43.9    Chronic pain of right knee M25.561, G89.29    Chronic right shoulder pain M25.511, G89.29    Environmental and seasonal allergies J30.89    Ventral hernia without obstruction or gangrene K43.9    Primary osteoarthritis of right knee M17.11    Mixed simple and mucopurulent chronic bronchitis (HCC) J41.8  Acquired hypothyroidism E03.9    Chronic bilateral low back pain with right-sided sciatica M54.41, G89.29    Status post total right knee replacement Z96.651    Malignant hypertension I10    Pain management contract signed Z02.89    Chronic pain of left knee M25.562, G89.29    Moderate episode of recurrent major depressive disorder (HCC) F33.1    Psychophysiological insomnia F51.04    Severe obesity (BMI 35.0-39. 9) with comorbidity (HCC) E66.01    Post-tussive vomiting R11.10    Chronic use of opiate for therapeutic purpose Z79.891    Obesity, Class II, BMI 35-39.9 E66.9    Left wrist pain M25.532    Chronic left shoulder pain M25.512, G89.29    Osteoarthritis of spine with radiculopathy, cervical region M47.22    Primary osteoarthritis of left shoulder M19.012    MRSA carrier Z22.322    S/P total knee arthroplasty, right Z96.651    Loosening of knee joint prosthesis (HCC) T84.038A, Z96.659    Sprain and strain of right wrist S63.501A, M99.364V    History of recent fall Z91.81    Immunotherapy Z29.8       3. Applicable records from prior treatment providers are apart of [facility] under the media/encounters tab. 4. Diagnostic, therapeutic and laboratory results are available in the [address] [place] [address] chart. 5. Consultation notes are available for review in the media/encounters tab of the [address] [place] [address] chart. 6. Treatment goals include: pain control, improve activity level and function in regards to activities of daily living, and improved comfort level. Previously pt has been limited by pain in all these aspects. 7. The risks and benefits of treatment have been discussed at this office visit with the pt.  she understands that the medication has addictive potential.  Additionally the pt has been advised that narcotic pain medication may impair mental and/or physical ability required for performance of tasks such as driving or operating any other machinery.      8. Pt has an updated signed pain contract on file and can be found under the media section of the [facility] chart. 9. The pain contract is reviewed. Pain medication will be continued at the same dosage. Pill count: 2, last filled 12/7/20. Urine drug screening ordered/collected today. Diagnostic studies are not indicated at this time. Interventional procedure are not indicated at this time. Narcan order sent in past.       10. Medication prescibed is oxycodone every 24 hours PRN #60 for BANDAR fill  with 2 refill at #45 tabs for a 3 month supply.  was reviewed while planning for pain/anxiety management, no indications of drug diversion suspected. Prescription history is NOT suspicious for controlled substance overuse. 11. Patient instructions have been reviewed in detail as outlined above and in the pain contract. 12. Re-evaluation is planned for 3 months. Current Outpatient Medications   Medication Sig Dispense Refill    [START ON 3/5/2021] oxyCODONE-acetaminophen (PERCOCET 10)  mg per tablet Take 1 Tab by mouth every twelve (12) hours as needed for Pain for up to 30 days. Max Daily Amount: 2 Tabs. Indications: pain 45 Tab 0    [START ON 2/3/2021] oxyCODONE-acetaminophen (PERCOCET 10)  mg per tablet Take 1 Tab by mouth two (2) times daily as needed for Pain for up to 30 days. Max Daily Amount: 2 Tabs. Indications: pain 45 Tab 0    oxyCODONE-acetaminophen (PERCOCET 10)  mg per tablet Take 1 Tab by mouth every twelve (12) hours as needed for Pain for up to 30 days. Max Daily Amount: 2 Tabs. Indications: pain 60 Tab 0    traZODone (DESYREL) 100 mg tablet TAKE 1 TABLET BY MOUTH EVERY DAY AT NIGHT 30 Tab 5    DULoxetine (CYMBALTA) 60 mg capsule TAKE 1 CAPSULE BY MOUTH EVERY DAY 30 Cap 5    celecoxib (CELEBREX) 100 mg capsule Take 1 Cap by mouth two (2) times a day for 90 days.  60 Cap 2    acetaminophen (Tylenol Extra Strength) 500 mg tablet Take 2 Tabs by mouth every six (6) hours as needed for Pain. 20 Tab 0    metoprolol succinate (TOPROL-XL) 25 mg XL tablet TAKE 1 TABLET BY MOUTH EVERY DAY 90 Tab 3    ondansetron (ZOFRAN ODT) 4 mg disintegrating tablet DISSOLVE 1 TABLET BY MOUTH EVERY 8 HOURS AS NEEDED FOR NAUSEA 20 Tab 1    loratadine (CLARITIN) 10 mg tablet TAKE 1 TABLET BY MOUTH EVERY DAY 30 Tab 8    methocarbamoL (ROBAXIN) 750 mg tablet TAKE 1 TAB BY MOUTH EVERY 6 HOURS AS NEEDED FOR SPASMS 120 Tab 6    hydroCHLOROthiazide (HYDRODIURIL) 25 mg tablet TAKE 1 TAB BY MOUTH DAILY FOR HYPERTENSION 90 Tab 3    gabapentin (NEURONTIN) 800 mg tablet Take 1 Tab by mouth three (3) times daily. Max Daily Amount: 2,400 mg. 90 Tab 5    pantoprazole (PROTONIX) 40 mg tablet Take 1 Tab by mouth daily. Take in 2 week intervals for GERD Flares 30 Tab 1    levothyroxine (SYNTHROID) 125 mcg tablet TAKE 1 TABLET BY MOUTH EVERY DAY BEFORE BREAKFAST INDICATIONS: HYPOTHYROIDISM  Indications: a condition with low thyroid hormone levels 90 Tab 3    amLODIPine (NORVASC) 5 mg tablet Take 1 Tab by mouth daily. 90 Tab 3    Combivent Respimat  mcg/actuation inhaler TAKE 1 PUFF BY INHALATION EVERY SIX (6) HOURS AS NEEDED FOR WHEEZING. 1 Inhaler 0    Symbicort 160-4.5 mcg/actuation HFAA TAKE 2 PUFFS BY INHALATION TWO (2) TIMES A DAY. 30.6 Inhaler 3    XOLAIR 150 mg solr Indications: injection      hydrOXYzine HCl (ATARAX) 25 mg tablet TAKE 1 TABLET BY MOUTH 3 TIMES A DAY AS NEEDED FOR ITCHING FOR ANXIETY 60 Tab 0    montelukast (SINGULAIR) 10 mg tablet TAKE 1 TAB BY MOUTH DAILY. 30 Tab 6    tiotropium (SPIRIVA WITH HANDIHALER) 18 mcg inhalation capsule Take 1 Cap by inhalation daily.  PROAIR HFA 90 mcg/actuation inhaler TAKE 2 PUFFS BY INHALATION EVERY FOUR (4) HOURS AS NEEDED. 8.5 Inhaler 0    lidocaine (LIDODERM) 5 % Apply patch to the affected area for 12 hours a day and remove for 12 hours a day. 30 Each 11    naloxone (NARCAN) 4 mg/actuation nasal spray Use 1 spray into 1 nostril for OVERDOSE. Call 911. For subsequent doses, give in alternating nostrils. May repeat every 2 to 3 min. 2 Each 0    albuterol (PROVENTIL VENTOLIN) 2.5 mg /3 mL (0.083 %) nebulizer solution 3 mL by Nebulization route every four (4) hours as needed for Wheezing. 24 Each 2    diclofenac (VOLTAREN) 1 % gel Apply 2 g to affected area every six (6) hours. 100 g 5    fluticasone (FLONASE) 50 mcg/actuation nasal spray 2 Sprays by Both Nostrils route daily. 1 Bottle 11    azelastine (ASTELIN) 137 mcg (0.1 %) nasal spray       furosemide (LASIX) 20 mg tablet TAKE 1 TABLET BY MOUTH DAILY X 3-5 DAYS FOR LEG SWELLING INDICATIONS: VISIBLE WATER RETENTION 15 Tab 0    lidocaine 4 % patch Every 12 hours as needed for pain 20 Patch 0    miscellaneous medical supply misc Shower seat for chronic knee pain, pt planning to have right TKR in June due to condition. Very limited range of motion. 1 Each 0     Allergies   Allergen Reactions    Hydrocodone Itching    Mold Shortness of Breath and Itching    Ibuprofen Nausea Only       Objective:  Visit Vitals  /79 (BP 1 Location: Left arm, BP Patient Position: Sitting)   Pulse 75   Temp 96.9 °F (36.1 °C) (Oral)   Resp 18   Ht 5' 1\" (1.549 m)   Wt 195 lb (88.5 kg)   LMP 12/29/2016 (Exact Date) Comment: partial hysterectomy   SpO2 98%   BMI 36.84 kg/m²     Wt Readings from Last 3 Encounters:   01/04/21 195 lb (88.5 kg)   10/17/20 189 lb (85.7 kg)   07/02/20 213 lb (96.6 kg)     Physical Exam:   General appearance - alert, well appearing, and in mild distress. Mental status - sad mood and affect. Chest - CTA. Symmetric chest rise. No wheezing, rales or rhonchi. Heart - Normal rate, regular rhythm. Normal S1, S2. No MGR or clicks. Ext- Radial, DP pulses, 2+ bilaterally. No edema, clubbing or cyanosis. Right wrist splint in place. Moving fingers, no edema or discoloration noted. Skin-Warm and dry. No hyperpigmentation, ulcerations, or suspicious lesions.   Neuro - normal speech, no focal findings or movement disorder. Normal strength and muscle tone. Limping, antalgic gait using cane. Back- alignment midline. Thoracic spinal and right lumbar paraspinal tenderness. No CVAT. LROM, no SLR. Assessment of cognitive impairment: Alert and oriented x 4    Depression Screen:   3 most recent PHQ Screens 10/21/2020   Little interest or pleasure in doing things Several days   Feeling down, depressed, irritable, or hopeless Several days   Total Score PHQ 2 2   Trouble falling or staying asleep, or sleeping too much -   Feeling tired or having little energy -   Poor appetite, weight loss, or overeating -   Feeling bad about yourself - or that you are a failure or have let yourself or your family down -   Trouble concentrating on things such as school, work, reading, or watching TV -   Moving or speaking so slowly that other people could have noticed; or the opposite being so fidgety that others notice -   Thoughts of being better off dead, or hurting yourself in some way -   PHQ 9 Score -   How difficult have these problems made it for you to do your work, take care of your home and get along with others -       Fall Risk Assessment:    Fall Risk Assessment, last 12 mths 10/21/2020   Able to walk? Yes   Fall in past 12 months? Yes   Number of falls in past 12 months 1   Fall with injury? 1       Abuse Assessment: Patient not domesticly abuse (verbal, physical, and financial)    Current Alcohol use: no   reports no history of alcohol use. Functional Ability:   Does the patient exhibit a steady gait? Yes   How long did it take the patient to get up and walk from a sitting position? 4 seconds   Is the patient self reliant?  (ie can do own laundry, meals, household chores) yes     Does the patient handle his/her own medications? yes     Does the patient handle his/her own money? Yes     Is the patients home safe (ie good lighting, handrails on stairs and bath, etc.)?  Yes   Did you notice or did patient express any hearing difficulties? no   Did you notice of did patient express any vision difficulties? Sees spots, needs new specs. Has upcoming appt. Were distance and reading eye charts used? n/a     Advance Care Planning:   Patient was offered the opportunity to discuss advance care planning:  Yes   Does patient have an Advance Directive: No   If no, did you provide information and forms? yes     Health Maintenance   Topic Date Due    PAP AKA CERVICAL CYTOLOGY  06/23/1984    Shingrix Vaccine Age 50> (1 of 2) 06/23/2013    Colorectal Cancer Screening Combo  04/01/2019    Flu Vaccine (1) 09/01/2020    Breast Cancer Screen Mammogram  12/03/2021    Medicare Yearly Exam  01/05/2022    DTaP/Tdap/Td series (2 - Td) 07/06/2025    Lipid Screen  07/29/2025    Hepatitis C Screening  Completed    Pneumococcal 0-64 years  Completed       Assessment/Plan:  Temporarily increased opiate for worsening pain following fall and severe wrist sprain with plan to return to usual dosing in following months. Deferred flu vaccine today for approval by allergist while on immunotherapy. Medication Side Effects and Warnings were discussed with patient: yes   Patient Labs were reviewed: yes  Patient Past Records were reviewed: yes    See below for other orders   Follow-up and Dispositions    · Return in about 3 months (around 4/4/2021) for OV- pain med refill. ICD-10-CM ICD-9-CM    1. Primary osteoarthritis of right knee  M17.11 715.16 oxyCODONE-acetaminophen (PERCOCET 10)  mg per tablet      TOXASSURE SELECT 13 (MW)      oxyCODONE-acetaminophen (PERCOCET 10)  mg per tablet      oxyCODONE-acetaminophen (PERCOCET 10)  mg per tablet   2.  Status post total right knee replacement  Z96.651 V43.65 oxyCODONE-acetaminophen (PERCOCET 10)  mg per tablet      TOXASSURE SELECT 13 (MW)      oxyCODONE-acetaminophen (PERCOCET 10)  mg per tablet      oxyCODONE-acetaminophen (PERCOCET 10)  mg per tablet   3. Chronic use of opiate for therapeutic purpose  Z79.891 V58.69 oxyCODONE-acetaminophen (PERCOCET 10)  mg per tablet      TOXASSURE SELECT 13 (MW)      oxyCODONE-acetaminophen (PERCOCET 10)  mg per tablet      oxyCODONE-acetaminophen (PERCOCET 10)  mg per tablet   4. Primary osteoarthritis of left shoulder  M19.012 715.11 oxyCODONE-acetaminophen (PERCOCET 10)  mg per tablet      TOXASSURE SELECT 13 (MW)      oxyCODONE-acetaminophen (PERCOCET 10)  mg per tablet      oxyCODONE-acetaminophen (PERCOCET 10)  mg per tablet   5. Advance care planning  Z71.89 V65.49    6. Medicare annual wellness visit, subsequent  Z00.00 V70.0    7. History of recent fall  Z91.81 V15.88    8. Sprain and strain of right wrist  S63.501A 842.00     S66.911A     9. Immunotherapy  Z29.8 V07.2    10. Asthma with COPD ([facility] [address]  J44.9 493.20      Orders Placed This Encounter    TOXASSURE SELECT 13 (MW)    oxyCODONE-acetaminophen (PERCOCET 10)  mg per tablet     Sig: Take 1 Tab by mouth every twelve (12) hours as needed for Pain for up to 30 days. Max Daily Amount: 2 Tabs. Indications: pain     Dispense:  45 Tab     Refill:  0    oxyCODONE-acetaminophen (PERCOCET 10)  mg per tablet     Sig: Take 1 Tab by mouth two (2) times daily as needed for Pain for up to 30 days. Max Daily Amount: 2 Tabs. Indications: pain     Dispense:  45 Tab     Refill:  0    oxyCODONE-acetaminophen (PERCOCET 10)  mg per tablet     Sig: Take 1 Tab by mouth every twelve (12) hours as needed for Pain for up to 30 days. Max Daily Amount: 2 Tabs. Indications: pain     Dispense:  60 Tab     Refill:  0       Liliya Sykes expressed understanding of plan. An After Visit Summary was offered/printed and given to the patient.

## 2021-01-04 NOTE — PROGRESS NOTES
Advanced care planning- discussed Advance directive, Medical POA, and life sustaining options. Advised of free virtual or in-person visit for advance care planning paperwork completion with ACP specialist. Referreal sent. Advance Care Planning (ACP) Provider Conversation Snapshot    Date of ACP Conversation: 01/04/21  Persons included in Conversation:  Patient/family  Length of ACP Conversation in minutes:  5-10 minutes    Authorized Decision Maker (if patient is incapable of making informed decisions): This person is:   Healthcare Agent/Medical Power of  under Advance Directive        For Patients with Decision Making Capacity:   Intubation, CPR, use of IVF/Nutrition, Tube Feedings, and organ donation options reviewed briefly    Conversation Outcomes / Follow-Up Plan:   Recommended completion of Advance Directive form after review of ACP materials and conversation with prospective healthcare agent     Referral made for ACP follow-up assistance to:  ACP facilitator/specialist    ====Advance Care Planning Invitation====    Patient was invited to begin or continue Advance Care Planning on this date and reviewed ACP materials in the office OR discussed in detail during virtual visit. Recommended appointment with a [employer]  facilitator for ACP conversation regarding advance directives. [x] Yes  [] No  Referral sent to [employer] ACP team member or Coordinator for follow-up    [] Yes  [x] No  Patient scheduled an appointment.        Site of Referral: [place] [address]

## 2021-01-04 NOTE — PROGRESS NOTES
Pt is here for   Chief Complaint   Patient presents with    Medication Refill     Pain        Pt states pain level is a 10/10 knees and wrist    1. Have you been to the ER, urgent care clinic since your last visit? Hospitalized since your last visit? No    2. Have you seen or consulted any other health care providers outside of the [address] [place] [address] since your last visit? Include any pap smears or colon screening.  No

## 2021-01-04 NOTE — PATIENT INSTRUCTIONS
Hiatal Hernia: Care Instructions  Your Care Instructions  A hiatal hernia occurs when part of the stomach bulges into the chest cavity. A hiatal hernia may allow stomach acid and juices to back up into the esophagus (acid reflux). This can cause a feeling of burning, warmth, heat, or pain behind the breastbone. This feeling may often occur after you eat, soon after you lie down, or when you bend forward, and it may come and go. You also may have a sour taste in your mouth. These symptoms are commonly known as heartburn or reflux. But not all hiatal hernias cause symptoms. Follow-up care is a key part of your treatment and safety. Be sure to make and go to all appointments, and call your doctor if you are having problems. It's also a good idea to know your test results and keep a list of the medicines you take. How can you care for yourself at home? · Take your medicines exactly as prescribed. Call your doctor if you think you are having a problem with your medicine. · Do not take aspirin or other nonsteroidal anti-inflammatory drugs (NSAIDs), such as ibuprofen (Advil, Motrin) or naproxen (Aleve), unless your doctor says it is okay. Ask your doctor what you can take for pain. · Your doctor may recommend over-the-counter medicine. For mild or occasional indigestion, antacids such as Tums, Gaviscon, Maalox, or Mylanta may help. Your doctor also may recommend over-the-counter acid reducers, such as famotidine (Pepcid AC), cimetidine (Tagamet HB), or omeprazole (Prilosec). Read and follow all instructions on the label. If you use these medicines often, talk with your doctor. · Change your eating habits. ? It's best to eat several small meals instead of two or three large meals. ? After you eat, wait 2 to 3 hours before you lie down. Late-night snacks aren't a good idea. ? Chocolate, mint, and alcohol can make heartburn worse. They relax the valve between the esophagus and the stomach. ?  Spicy foods, foods that have a lot of acid (like tomatoes and oranges), and coffee can make heartburn symptoms worse in some people. If your symptoms are worse after you eat a certain food, you may want to stop eating that food to see if your symptoms get better. · Do not smoke or chew tobacco.  · If you get heartburn at night, raise the head of your bed 6 to 8 inches by putting the frame on blocks or placing a foam wedge under the head of your mattress. (Adding extra pillows does not work.)  · Do not wear tight clothing around your middle. · Lose weight if you need to. Losing just 5 to 10 pounds can help. When should you call for help? Call your doctor now or seek immediate medical care if:    · You have new or worse belly pain.     · You are vomiting. Watch closely for changes in your health, and be sure to contact your doctor if:    · You have new or worse symptoms of indigestion.     · You have trouble or pain swallowing.     · You are losing weight.     · You do not get better as expected. Where can you learn more? Go to http://www.kennedy.com/  Enter T074 in the search box to learn more about \"Hiatal Hernia: Care Instructions. \"  Current as of: April 15, 2020               Content Version: 12.6  © 8719-6730 [employer]. Care instructions adapted under license by [employer] (which disclaims liability or warranty for this information). If you have questions about a medical condition or this instruction, always ask your healthcare professional. [place] [address] any warranty or liability for your use of this information.

## 2021-01-11 LAB — DRUGS UR: NORMAL

## 2021-01-18 ENCOUNTER — HOSPITAL ENCOUNTER (OUTPATIENT)
Dept: PREADMISSION TESTING | Age: 56
Discharge: HOME | End: 2021-01-18
Payer: MEDICAID

## 2021-01-18 ENCOUNTER — TRANSCRIBE ORDER (OUTPATIENT)
Dept: REGISTRATION | Age: 56
End: 2021-01-18

## 2021-01-18 DIAGNOSIS — Z01.812 PRE-PROCEDURE LAB EXAM: Primary | ICD-10-CM

## 2021-01-18 DIAGNOSIS — Z01.812 PRE-PROCEDURE LAB EXAM: ICD-10-CM

## 2021-01-18 PROCEDURE — U0003 INFECTIOUS AGENT DETECTION BY NUCLEIC ACID (DNA OR RNA); SEVERE ACUTE RESPIRATORY SYNDROME CORONAVIRUS 2 (SARS-COV-2) (CORONAVIRUS DISEASE [COVID-19]), AMPLIFIED PROBE TECHNIQUE, MAKING USE OF HIGH THROUGHPUT TECHNOLOGIES AS DESCRIBED BY CMS-2020-01-R: HCPCS

## 2021-01-19 LAB — SARS-COV-2, COV2NT: NOT DETECTED

## 2021-01-22 ENCOUNTER — HOSPITAL ENCOUNTER (OUTPATIENT)
Age: 56
Setting detail: OUTPATIENT SURGERY
Discharge: HOME | End: 2021-01-22
Attending: ORTHOPAEDIC SURGERY | Admitting: ORTHOPAEDIC SURGERY
Payer: MEDICAID

## 2021-01-22 ENCOUNTER — ANESTHESIA (OUTPATIENT)
Dept: MEDSURG UNIT | Age: 56
End: 2021-01-22
Payer: MEDICAID

## 2021-01-22 ENCOUNTER — ANESTHESIA EVENT (OUTPATIENT)
Dept: MEDSURG UNIT | Age: 56
End: 2021-01-22
Payer: MEDICAID

## 2021-01-22 VITALS
OXYGEN SATURATION: 99 % | TEMPERATURE: 97.9 F | BODY MASS INDEX: 37.38 KG/M2 | SYSTOLIC BLOOD PRESSURE: 138 MMHG | RESPIRATION RATE: 20 BRPM | HEIGHT: 61 IN | WEIGHT: 198 LBS | DIASTOLIC BLOOD PRESSURE: 79 MMHG | HEART RATE: 78 BPM

## 2021-01-22 DIAGNOSIS — M25.831 ULNAR IMPACTION SYNDROME, RIGHT: Primary | Chronic | ICD-10-CM

## 2021-01-22 DIAGNOSIS — M24.131 DEGENERATIVE TEAR OF TRIANGULAR FIBROCARTILAGE COMPLEX (TFCC) OF RIGHT WRIST: Chronic | ICD-10-CM

## 2021-01-22 LAB — HGB BLD-MCNC: 15.4 G/DL (ref 11.5–16)

## 2021-01-22 PROCEDURE — 74011250636 HC RX REV CODE- 250/636: Performed by: ANESTHESIOLOGY

## 2021-01-22 PROCEDURE — 77030040361 HC SLV COMPR DVT MDII -B: Performed by: ORTHOPAEDIC SURGERY

## 2021-01-22 PROCEDURE — 76210000035 HC AMBSU PH I REC 1 TO 1.5 HR: Performed by: ORTHOPAEDIC SURGERY

## 2021-01-22 PROCEDURE — 76030000003 HC AMB SURG OR TIME 1.5 TO 2: Performed by: ORTHOPAEDIC SURGERY

## 2021-01-22 PROCEDURE — 77030020754 HC CUF TRNQT 2BLA STRY -B: Performed by: ORTHOPAEDIC SURGERY

## 2021-01-22 PROCEDURE — 74011250636 HC RX REV CODE- 250/636: Performed by: NURSE ANESTHETIST, CERTIFIED REGISTERED

## 2021-01-22 PROCEDURE — 77030018835 HC SOL IRR LR ICUM -A: Performed by: ORTHOPAEDIC SURGERY

## 2021-01-22 PROCEDURE — C1713 ANCHOR/SCREW BN/BN,TIS/BN: HCPCS | Performed by: ORTHOPAEDIC SURGERY

## 2021-01-22 PROCEDURE — 2709999900 HC NON-CHARGEABLE SUPPLY: Performed by: ORTHOPAEDIC SURGERY

## 2021-01-22 PROCEDURE — 77030031189 HC WRE K TRIM -A: Performed by: ORTHOPAEDIC SURGERY

## 2021-01-22 PROCEDURE — 77030031139 HC SUT VCRL2 J&J -A: Performed by: ORTHOPAEDIC SURGERY

## 2021-01-22 PROCEDURE — 74011000258 HC RX REV CODE- 258: Performed by: NURSE ANESTHETIST, CERTIFIED REGISTERED

## 2021-01-22 PROCEDURE — 85018 HEMOGLOBIN: CPT

## 2021-01-22 PROCEDURE — 77030008753 HC TU IRR IN/OUT FLO S&N -B: Performed by: ORTHOPAEDIC SURGERY

## 2021-01-22 PROCEDURE — 64415 NJX AA&/STRD BRCH PLXS IMG: CPT | Performed by: ANESTHESIOLOGY

## 2021-01-22 PROCEDURE — 74011250636 HC RX REV CODE- 250/636: Performed by: ORTHOPAEDIC SURGERY

## 2021-01-22 PROCEDURE — 77030039497 HC CST PAD STERILE CHCS -A: Performed by: ORTHOPAEDIC SURGERY

## 2021-01-22 PROCEDURE — 76060000063 HC AMB SURG ANES 1.5 TO 2 HR: Performed by: ORTHOPAEDIC SURGERY

## 2021-01-22 PROCEDURE — 74011000250 HC RX REV CODE- 250: Performed by: NURSE ANESTHETIST, CERTIFIED REGISTERED

## 2021-01-22 PROCEDURE — 77030006877 HC BLD SHV FLL RAD S&N -B: Performed by: ORTHOPAEDIC SURGERY

## 2021-01-22 PROCEDURE — 77030018836 HC SOL IRR NACL ICUM -A: Performed by: ORTHOPAEDIC SURGERY

## 2021-01-22 PROCEDURE — 77030002933 HC SUT MCRYL J&J -A: Performed by: ORTHOPAEDIC SURGERY

## 2021-01-22 PROCEDURE — 77030006689 HC BLD OPHTH BVR BD -A: Performed by: ORTHOPAEDIC SURGERY

## 2021-01-22 PROCEDURE — 77030003900 HC BIT DRL TWST TRIM -C: Performed by: ORTHOPAEDIC SURGERY

## 2021-01-22 PROCEDURE — 77030040356 HC CORD BPLR FRCP COVD -A: Performed by: ORTHOPAEDIC SURGERY

## 2021-01-22 PROCEDURE — 77030027352: Performed by: ORTHOPAEDIC SURGERY

## 2021-01-22 DEVICE — IMPLANTABLE DEVICE: Type: IMPLANTABLE DEVICE | Site: WRIST | Status: FUNCTIONAL

## 2021-01-22 DEVICE — SCR BNE CORT HEX 3.2X12MM --: Type: IMPLANTABLE DEVICE | Site: WRIST | Status: FUNCTIONAL

## 2021-01-22 RX ORDER — FENTANYL CITRATE 50 UG/ML
50 INJECTION, SOLUTION INTRAMUSCULAR; INTRAVENOUS AS NEEDED
Status: DISCONTINUED | OUTPATIENT
Start: 2021-01-22 | End: 2021-01-22 | Stop reason: HOSPADM

## 2021-01-22 RX ORDER — MIDAZOLAM HYDROCHLORIDE 1 MG/ML
1 INJECTION, SOLUTION INTRAMUSCULAR; INTRAVENOUS AS NEEDED
Status: DISCONTINUED | OUTPATIENT
Start: 2021-01-22 | End: 2021-01-22 | Stop reason: HOSPADM

## 2021-01-22 RX ORDER — DIPHENHYDRAMINE HYDROCHLORIDE 50 MG/ML
12.5 INJECTION, SOLUTION INTRAMUSCULAR; INTRAVENOUS AS NEEDED
Status: DISCONTINUED | OUTPATIENT
Start: 2021-01-22 | End: 2021-01-22 | Stop reason: HOSPADM

## 2021-01-22 RX ORDER — PROPOFOL 10 MG/ML
INJECTION, EMULSION INTRAVENOUS AS NEEDED
Status: DISCONTINUED | OUTPATIENT
Start: 2021-01-22 | End: 2021-01-22 | Stop reason: HOSPADM

## 2021-01-22 RX ORDER — SODIUM CHLORIDE 9 MG/ML
1000 INJECTION, SOLUTION INTRAVENOUS CONTINUOUS
Status: DISCONTINUED | OUTPATIENT
Start: 2021-01-22 | End: 2021-01-22 | Stop reason: HOSPADM

## 2021-01-22 RX ORDER — SODIUM CHLORIDE 0.9 % (FLUSH) 0.9 %
5-40 SYRINGE (ML) INJECTION EVERY 8 HOURS
Status: DISCONTINUED | OUTPATIENT
Start: 2021-01-22 | End: 2021-01-22 | Stop reason: HOSPADM

## 2021-01-22 RX ORDER — FENTANYL CITRATE 50 UG/ML
25 INJECTION, SOLUTION INTRAMUSCULAR; INTRAVENOUS
Status: DISCONTINUED | OUTPATIENT
Start: 2021-01-22 | End: 2021-01-22 | Stop reason: HOSPADM

## 2021-01-22 RX ORDER — ACETAMINOPHEN 325 MG/1
650 TABLET ORAL ONCE
Status: DISCONTINUED | OUTPATIENT
Start: 2021-01-22 | End: 2021-01-22 | Stop reason: HOSPADM

## 2021-01-22 RX ORDER — LIDOCAINE HYDROCHLORIDE 20 MG/ML
INJECTION, SOLUTION EPIDURAL; INFILTRATION; INTRACAUDAL; PERINEURAL AS NEEDED
Status: DISCONTINUED | OUTPATIENT
Start: 2021-01-22 | End: 2021-01-22 | Stop reason: HOSPADM

## 2021-01-22 RX ORDER — ROPIVACAINE HYDROCHLORIDE 5 MG/ML
INJECTION, SOLUTION EPIDURAL; INFILTRATION; PERINEURAL
Status: COMPLETED | OUTPATIENT
Start: 2021-01-22 | End: 2021-01-22

## 2021-01-22 RX ORDER — HYDROMORPHONE HYDROCHLORIDE 1 MG/ML
0.2 INJECTION, SOLUTION INTRAMUSCULAR; INTRAVENOUS; SUBCUTANEOUS
Status: DISCONTINUED | OUTPATIENT
Start: 2021-01-22 | End: 2021-01-22 | Stop reason: HOSPADM

## 2021-01-22 RX ORDER — SODIUM CHLORIDE 9 MG/ML
50 INJECTION, SOLUTION INTRAVENOUS CONTINUOUS
Status: DISCONTINUED | OUTPATIENT
Start: 2021-01-22 | End: 2021-01-22 | Stop reason: HOSPADM

## 2021-01-22 RX ORDER — SODIUM CHLORIDE 0.9 % (FLUSH) 0.9 %
5-40 SYRINGE (ML) INJECTION AS NEEDED
Status: DISCONTINUED | OUTPATIENT
Start: 2021-01-22 | End: 2021-01-22 | Stop reason: HOSPADM

## 2021-01-22 RX ORDER — SODIUM CHLORIDE, SODIUM LACTATE, POTASSIUM CHLORIDE, CALCIUM CHLORIDE 600; 310; 30; 20 MG/100ML; MG/100ML; MG/100ML; MG/100ML
125 INJECTION, SOLUTION INTRAVENOUS CONTINUOUS
Status: DISCONTINUED | OUTPATIENT
Start: 2021-01-22 | End: 2021-01-22 | Stop reason: HOSPADM

## 2021-01-22 RX ORDER — VANCOMYCIN/0.9 % SOD CHLORIDE 1.5G/250ML
1500 PLASTIC BAG, INJECTION (ML) INTRAVENOUS ONCE
Status: COMPLETED | OUTPATIENT
Start: 2021-01-22 | End: 2021-01-22

## 2021-01-22 RX ORDER — EPHEDRINE SULFATE/0.9% NACL/PF 50 MG/5 ML
SYRINGE (ML) INTRAVENOUS AS NEEDED
Status: DISCONTINUED | OUTPATIENT
Start: 2021-01-22 | End: 2021-01-22 | Stop reason: HOSPADM

## 2021-01-22 RX ORDER — OXYCODONE AND ACETAMINOPHEN 5; 325 MG/1; MG/1
1 TABLET ORAL
Qty: 20 TAB | Refills: 0 | Status: SHIPPED | OUTPATIENT
Start: 2021-01-22 | End: 2021-01-27

## 2021-01-22 RX ORDER — ONDANSETRON 2 MG/ML
4 INJECTION INTRAMUSCULAR; INTRAVENOUS AS NEEDED
Status: DISCONTINUED | OUTPATIENT
Start: 2021-01-22 | End: 2021-01-22 | Stop reason: HOSPADM

## 2021-01-22 RX ORDER — MORPHINE SULFATE 10 MG/ML
2 INJECTION, SOLUTION INTRAMUSCULAR; INTRAVENOUS
Status: DISCONTINUED | OUTPATIENT
Start: 2021-01-22 | End: 2021-01-22 | Stop reason: HOSPADM

## 2021-01-22 RX ORDER — MIDAZOLAM HYDROCHLORIDE 1 MG/ML
0.5 INJECTION, SOLUTION INTRAMUSCULAR; INTRAVENOUS
Status: DISCONTINUED | OUTPATIENT
Start: 2021-01-22 | End: 2021-01-22 | Stop reason: HOSPADM

## 2021-01-22 RX ORDER — EPHEDRINE SULFATE/0.9% NACL/PF 50 MG/5 ML
5 SYRINGE (ML) INTRAVENOUS AS NEEDED
Status: DISCONTINUED | OUTPATIENT
Start: 2021-01-22 | End: 2021-01-22 | Stop reason: HOSPADM

## 2021-01-22 RX ORDER — LIDOCAINE HYDROCHLORIDE 10 MG/ML
0.1 INJECTION, SOLUTION EPIDURAL; INFILTRATION; INTRACAUDAL; PERINEURAL AS NEEDED
Status: DISCONTINUED | OUTPATIENT
Start: 2021-01-22 | End: 2021-01-22 | Stop reason: HOSPADM

## 2021-01-22 RX ORDER — OXYCODONE AND ACETAMINOPHEN 5; 325 MG/1; MG/1
1 TABLET ORAL AS NEEDED
Status: DISCONTINUED | OUTPATIENT
Start: 2021-01-22 | End: 2021-01-22 | Stop reason: HOSPADM

## 2021-01-22 RX ORDER — SUCCINYLCHOLINE CHLORIDE 20 MG/ML
INJECTION INTRAMUSCULAR; INTRAVENOUS AS NEEDED
Status: DISCONTINUED | OUTPATIENT
Start: 2021-01-22 | End: 2021-01-22 | Stop reason: HOSPADM

## 2021-01-22 RX ORDER — ONDANSETRON 2 MG/ML
INJECTION INTRAMUSCULAR; INTRAVENOUS AS NEEDED
Status: DISCONTINUED | OUTPATIENT
Start: 2021-01-22 | End: 2021-01-22 | Stop reason: HOSPADM

## 2021-01-22 RX ORDER — DEXAMETHASONE SODIUM PHOSPHATE 4 MG/ML
INJECTION, SOLUTION INTRA-ARTICULAR; INTRALESIONAL; INTRAMUSCULAR; INTRAVENOUS; SOFT TISSUE AS NEEDED
Status: DISCONTINUED | OUTPATIENT
Start: 2021-01-22 | End: 2021-01-22 | Stop reason: HOSPADM

## 2021-01-22 RX ADMIN — SUCCINYLCHOLINE CHLORIDE 200 MG: 20 INJECTION, SOLUTION INTRAMUSCULAR; INTRAVENOUS at 07:39

## 2021-01-22 RX ADMIN — MEPIVACAINE HYDROCHLORIDE 10 ML: 15 INJECTION, SOLUTION EPIDURAL; INFILTRATION at 07:24

## 2021-01-22 RX ADMIN — MIDAZOLAM 2 MG: 1 INJECTION INTRAMUSCULAR; INTRAVENOUS at 07:26

## 2021-01-22 RX ADMIN — PROPOFOL 100 MG: 10 INJECTION, EMULSION INTRAVENOUS at 07:40

## 2021-01-22 RX ADMIN — ROPIVACAINE HYDROCHLORIDE 20 ML: 5 INJECTION, SOLUTION EPIDURAL; INFILTRATION; PERINEURAL at 07:24

## 2021-01-22 RX ADMIN — LIDOCAINE HYDROCHLORIDE 70 MG: 20 INJECTION, SOLUTION EPIDURAL; INFILTRATION; INTRACAUDAL; PERINEURAL at 07:40

## 2021-01-22 RX ADMIN — ONDANSETRON HYDROCHLORIDE 4 MG: 2 INJECTION, SOLUTION INTRAMUSCULAR; INTRAVENOUS at 07:53

## 2021-01-22 RX ADMIN — VANCOMYCIN HYDROCHLORIDE 1500 MG: 10 INJECTION, POWDER, LYOPHILIZED, FOR SOLUTION INTRAVENOUS at 07:40

## 2021-01-22 RX ADMIN — FENTANYL CITRATE 50 MCG: 50 INJECTION, SOLUTION INTRAMUSCULAR; INTRAVENOUS at 07:26

## 2021-01-22 RX ADMIN — PROPOFOL 100 MG: 10 INJECTION, EMULSION INTRAVENOUS at 07:41

## 2021-01-22 RX ADMIN — DEXAMETHASONE SODIUM PHOSPHATE 4 MG: 4 INJECTION, SOLUTION INTRAMUSCULAR; INTRAVENOUS at 07:51

## 2021-01-22 RX ADMIN — PHENYLEPHRINE HYDROCHLORIDE 40 MCG/MIN: 10 INJECTION INTRAVENOUS at 08:13

## 2021-01-22 RX ADMIN — SODIUM CHLORIDE, POTASSIUM CHLORIDE, SODIUM LACTATE AND CALCIUM CHLORIDE 125 ML/HR: 600; 310; 30; 20 INJECTION, SOLUTION INTRAVENOUS at 07:20

## 2021-01-22 RX ADMIN — Medication 10 MG: at 08:39

## 2021-01-22 NOTE — ANESTHESIA POSTPROCEDURE EVALUATION
Procedure(s):  RIGHT WRIST ARTHROSCOPY WITH TFC DEBRIDEMENT;  RIGHT WRIST GANGLION CYST EXCISION AND RIGHT ULNAR SHORTENING OSTEOTOMY. general    Anesthesia Post Evaluation      Multimodal analgesia: multimodal analgesia used between 6 hours prior to anesthesia start to PACU discharge  Patient location during evaluation: PACU  Patient participation: complete - patient participated  Level of consciousness: awake  Pain score: 2  Pain management: adequate  Airway patency: patent  Anesthetic complications: no  Cardiovascular status: acceptable  Respiratory status: acceptable  Hydration status: acceptable  Comments: I have evaluated the patient and meets criteria for discharge from PACU. Azam Lees MD  Post anesthesia nausea and vomiting:  controlled  Final Post Anesthesia Temperature Assessment:  Normothermia (36.0-37.5 degrees C)      INITIAL Post-op Vital signs:   Vitals Value Taken Time   /76 01/22/21 1015   Temp 36.6 °C (97.9 °F) 01/22/21 0930   Pulse 76 01/22/21 1032   Resp 26 01/22/21 1032   SpO2 95 % 01/22/21 1032   Vitals shown include unvalidated device data.

## 2021-01-22 NOTE — H&P
History and Physical    Subjective:   CC:  ULNAR IMPACTION SYNDROME RIGHT,  INJURY OF TFC RIGHT WRIST RIGHT WRIST PAIN    HPI:  Shakira Driscoll is a 60y.o. year old female who presents with ULNAR IMPACTION SYNDROME RIGHT,  INJURY OF TFC RIGHT WRIST RIGHT WRIST PAIN for wrist arthroscopy and ulnar shortening osteotomy. 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    Psychiatric disorder     DEPRESSION    Thyroid disease     hypo    Ventral hernia 2013      Past Surgical History:   Past Surgical History:   Procedure Laterality Date    HX HEENT  2009    thyroidectomy    HX KNEE REPLACEMENT      R    HX KNEE REPLACEMENT  2019    HX MOHS PROCEDURES Right 3/8/11    HX OTHER SURGICAL      hiatal hernia repair    HX PARTIAL HYSTERECTOMY      HX TUBAL LIGATION       Family History:   Family History   Problem Relation Age of Onset    Cancer Father         lung cancer    Heart Disease Mother     Hypertension Mother     Diabetes Mother     Anesth Problems Neg Hx       Social History:   Social History     Tobacco Use    Smoking status: Current Every Day Smoker     Packs/day: 0.50     Years: 1.50     Pack years: 0.75     Types: Cigarettes     Last attempt to quit: 10/1/2015     Years since quittin.3    Smokeless tobacco: Never Used   Substance Use Topics    Alcohol use: No       Home Medications:   Prior to Admission medications    Medication Sig Start Date End Date Taking?  Authorizing Provider   acetaminophen (Tylenol Extra Strength) 500 mg tablet Take 2 Tabs by mouth every six (6) hours as needed for Pain. 10/17/20  Yes Jessi Roland NP   metoprolol succinate (TOPROL-XL) 25 mg XL tablet TAKE 1 TABLET BY MOUTH EVERY DAY 10/15/20  Yes Gary Dao NP   gabapentin (NEURONTIN) 800 mg tablet Take 1 Tab by mouth three (3) times daily. Max Daily Amount: 2,400 mg. 7/2/20  Yes Gary Dao NP   pantoprazole (PROTONIX) 40 mg tablet Take 1 Tab by mouth daily. Take in 2 week intervals for GERD Flares 7/2/20  Yes Gary Dao NP   levothyroxine (SYNTHROID) 125 mcg tablet TAKE 1 TABLET BY MOUTH EVERY DAY BEFORE BREAKFAST INDICATIONS: HYPOTHYROIDISM  Indications: a condition with low thyroid hormone levels 7/2/20  Yes Gary Dao NP   Combivent Respimat  mcg/actuation inhaler TAKE 1 PUFF BY INHALATION EVERY SIX (6) HOURS AS NEEDED FOR WHEEZING. 4/13/20  Yes Gary Dao NP   Symbicort 160-4.5 mcg/actuation HFAA TAKE 2 PUFFS BY INHALATION TWO (2) TIMES A DAY. 3/20/20  Yes Khari Meza NP   Savi Lacrosse 150 mg solr Indications: injection 8/20/19  Yes Provider, Roel   lidocaine (LIDODERM) 5 % Apply patch to the affected area for 12 hours a day and remove for 12 hours a day. 1/8/19  Yes Gary Dao NP   oxyCODONE-acetaminophen (PERCOCET 10)  mg per tablet Take 1 Tab by mouth every twelve (12) hours as needed for Pain for up to 30 days. Max Daily Amount: 2 Tabs.  Indications: pain 1/4/21 2/3/21  Khari Meza NP   DULoxetine (CYMBALTA) 60 mg capsule TAKE 1 CAPSULE BY MOUTH EVERY DAY 11/13/20   Khari Meza NP   ondansetron (ZOFRAN ODT) 4 mg disintegrating tablet DISSOLVE 1 TABLET BY MOUTH EVERY 8 HOURS AS NEEDED FOR NAUSEA 10/5/20   Khari Meza NP   loratadine (CLARITIN) 10 mg tablet TAKE 1 TABLET BY MOUTH EVERY DAY 9/17/20   Khari Meza NP   methocarbamoL (ROBAXIN) 750 mg tablet TAKE 1 TAB BY MOUTH EVERY 6 HOURS AS NEEDED FOR SPASMS 7/17/20   Khari Meza NP   hydroCHLOROthiazide (HYDRODIURIL) 25 mg tablet TAKE 1 TAB BY MOUTH DAILY FOR HYPERTENSION 7/6/20   Zara Coronado NP   amLODIPine (NORVASC) 5 mg tablet Take 1 Tab by mouth daily. 7/2/20   Zara Coronado NP   azelastine (ASTELIN) 137 mcg (0.1 %) nasal spray  12/2/19   Roel Howell   hydrOXYzine HCl (ATARAX) 25 mg tablet TAKE 1 TABLET BY MOUTH 3 TIMES A DAY AS NEEDED FOR ITCHING FOR ANXIETY 8/21/19   Jennifer Jackson MD   montelukast (SINGULAIR) 10 mg tablet TAKE 1 TAB BY MOUTH DAILY. 7/25/19   Zara Coronado NP   tiotropium (SPIRIVA WITH HANDIHALER) 18 mcg inhalation capsule Take 1 Cap by inhalation daily. Salazar Sotelo MD   PROAIR HFA 90 mcg/actuation inhaler TAKE 2 PUFFS BY INHALATION EVERY FOUR (4) HOURS AS NEEDED. 3/9/19   Zara Coronado NP   naloxone [facility]) 4 mg/actuation nasal spray Use 1 spray into 1 nostril for OVERDOSE. Call 911. For subsequent doses, give in alternating nostrils. May repeat every 2 to 3 min. 3/28/18   Zara Coronado NP   albuterol (PROVENTIL VENTOLIN) 2.5 mg /3 mL (0.083 %) nebulizer solution 3 mL by Nebulization route every four (4) hours as needed for Wheezing. 1/31/18   Zara Coronado NP   diclofenac (VOLTAREN) 1 % gel Apply 2 g to affected area every six (6) hours. 11/3/17   Zara Coronado NP   fluticasone (FLONASE) 50 mcg/actuation nasal spray 2 Sprays by Both Nostrils route daily. 12/9/16   Zara Coronado NP   miscellaneous medical supply misc Shower seat for chronic knee pain, pt planning to have right TKR in June due to condition. Very limited range of motion. 5/16/16   Zara Coronado NP     Allergies: Allergies   Allergen Reactions    Codeine Nausea Only    Hydrocodone Itching    Mold Shortness of Breath and Itching    Tramadol Itching    Ibuprofen Nausea Only        Review of Systems:  A comprehensive review of systems was negative except for that written in the History of Present Illness.      Objective:         Physical Exam:     Vitals: Blood pressure 138/79, pulse 79, temperature 99.2 °F (37.3 °C), resp. rate 20, height 5' 1\" (1.549 m), weight 89.8 kg (198 lb), last menstrual period 12/29/2016. General:  Awake and alert  HEENT:  NCAT, neck supple without lymphadenopathy  Lungs:  CTA bilaterally  CV:  RRR  Abd:  Soft, non-tender, non-distended  Ext's:  Tender ulnar side right wrist, pain worsened with ulnocarpal shift testing  Neuro:  A&O x 3      Data Review:   No results found for this or any previous visit (from the past 24 hour(s)). 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     Signed By: Gardenia Griggs MD     January 22, 2021

## 2021-01-22 NOTE — PERIOP NOTES
Discharge instructions reviewed with patient's daughter via telephone. Opportunity for questions and clarification provided. Patient's daughter verbalized understanding of discharge instructions and had no additional questions or concerns.

## 2021-01-22 NOTE — DISCHARGE INSTRUCTIONS
Dr. Jennings Fruit Instructions for Elbow/Wrist/Hand Surgery    Medications/Diet  1. Eat only light, non-greasy foods today  2. Take pain medicine with food  3. While taking pain medicines, you may not operate a vehicle, heavy machinery, or appliances  4. While taking pain medicines, you may not drink alcoholic beverages  5. While taking pain medicines, you may not make critical decisions or sign legal papers  6. If you have any reactions to your medicines, stop taking them and call my office immediately  7. Please keep in mind that constipation is a very common side   effect of taking narcotic pain medication. We recommend that patients take precautions to prevent constipation:   Drink plenty of water (6-8 glasses of 8 oz. a day)   Avoid alcohol, caffeine, and dairy products   Eat plenty of fiber (fruits, vegetables and whole grains)   Take an over the counter stool softener (colace or dulcolax)   Patients that have had upper extremity surgery should take frequent walks      Activity/Exercise    1. Exercises are not necessary at this stage, and you will be given exercises at your first post operative visit  2. You are in a splint and should remain in this until your first postoperative visit  3. Please keep ice applied to the wrist for the first 72 hours or as long as pain or swelling persist. Do not apply ice directly to skin, or allow water to leak on your dressing    Dressings/Shower    1. Please keep dressing dry  2. If you have had arthroscopy, you may expect some drainage  3. Please reinforce your dressing with a dry sterile dressing  4. Your dressing will be removed at your first post operative visit  5. You may not shower until after your first post operative visit    Emergency/Follow-Up    1. Please notify my office at [phone] if you develop any fever (101° or above), unexpected warmth, redness or swelling in your hand.  Please call if your fingers become cold, purple, numb, or there is excessive bleeding  2. Please call the office within 24 hours at [phone] (ext. [phone]) to schedule a follow up appointment next week  3. Please call the office before 3pm on Friday if you do not have enough pain medicine for the weekend. Narcotic pain medication cannot be called into your pharmacy and the prescription must be picked up at our office. DISCHARGE SUMMARY from Nurse    PATIENT INSTRUCTIONS:    After general anesthesia or intravenous sedation, for 24 hours or while taking prescription Narcotics:  · Limit your activities  · Do not drive and operate hazardous machinery  · Do not make important personal or business decisions  · Do  not drink alcoholic beverages  · If you have not urinated within 8 hours after discharge, please contact your surgeon on call. Report the following to your surgeon:  · Excessive pain, swelling, redness or odor of or around the surgical area  · Temperature over 100.5  · Nausea and vomiting lasting longer than 4 hours or if unable to take medications  · Any signs of decreased circulation or nerve impairment to extremity: change in color, persistent  numbness, tingling, coldness or increase pain  · Any questions    What to do at Home:  Recommended Activity: See surgical instructions above from Dr. Irene Boone. Recommended Diet: Resume regular diet as tolerated. Nothing heavy, greasy, fatty, or fried. Please make sure you have food on your stomach prior to taking narcotic pain medication. *  Please give a list of your current medications to your Primary Care Provider. *  Please update this list whenever your medications are discontinued, doses are changed, or new medications (including over-the-counter products) are added. *  Please carry medication information at all times in case of emergency situations.     These are general instructions for a healthy lifestyle:    No smoking/ No tobacco products/ Avoid exposure to second hand smoke  Surgeon General's Warning:  Quitting smoking now greatly reduces serious risk to your health. Obesity, smoking, and sedentary lifestyle greatly increases your risk for illness    A healthy diet, regular physical exercise & weight monitoring are important for maintaining a healthy lifestyle    You may be retaining fluid if you have a history of heart failure or if you experience any of the following symptoms:  Weight gain of 3 pounds or more overnight or 5 pounds in a week, increased swelling in our hands or feet or shortness of breath while lying flat in bed. Please call your doctor as soon as you notice any of these symptoms; do not wait until your next office visit. The discharge information has been reviewed with the patient and caregiver. The patient and caregiver verbalized understanding. Discharge medications reviewed with the patient and caregiver and appropriate educational materials and side effects teaching were provided.   ___________________________________________________________________________________________________________________________________

## 2021-01-22 NOTE — PERIOP NOTES
Patient's vital signs within normal limits. Patient denies post-operative pain. Patient tolerating PO intake, denies nausea. Peripheral IV removed with no signs of infiltration. Patient voiding      Patient discharged by volunteer via wheelchair to daughter's care/car and prior home environment from Phase 1 area.

## 2021-01-22 NOTE — ANESTHESIA PROCEDURE NOTES
Peripheral Block    Start time: 1/22/2021 7:20 AM  End time: 1/22/2021 7:26 AM  Performed by: Linnette Chambers MD  Authorized by: Linnette Chambers MD       Pre-procedure:    Indications: at surgeon's request and post-op pain management    Preanesthetic Checklist: patient identified, risks and benefits discussed, site marked, timeout performed, anesthesia consent given and patient being monitored    Timeout Time: 07:20          Block Type:   Block Type:  Supraclavicular  Laterality:  Right  Monitoring:  Standard ASA monitoring, continuous pulse ox, frequent vital sign checks, heart rate, responsive to questions and oxygen  Injection Technique:  Single shot  Procedures: nerve stimulator    Patient Position: supine  Prep: chlorhexidine    Location:  Supraclavicular  Needle Type:  Stimuplex  Needle Gauge:  22 G  Needle Localization:  Nerve stimulator and ultrasound guidance  Motor Response comment:   Motor Response: minimal motor response >0.4 mA   Medication Injected:  Mepivacaine PF (CARBOCAINE) 1.5 % injection, 10 mL  ropivacaine (PF) (NAROPIN)(0.5%) 5 mg/mL injection, 20 mL  Med Admin Time: 1/22/2021 7:24 AM    Assessment:  Number of attempts:  1  Injection Assessment:  Incremental injection every 5 mL, negative aspiration for blood, no intravascular symptoms, negative aspiration for CSF and no paresthesia  Patient tolerance:  Patient tolerated the procedure well with no immediate complications

## 2021-01-22 NOTE — ANESTHESIA PREPROCEDURE EVALUATION
Relevant Problems   RESPIRATORY SYSTEM   (+) Mixed simple and mucopurulent chronic bronchitis (HCC)      NEUROLOGY   (+) Moderate episode of recurrent major depressive disorder (HCC)      CARDIOVASCULAR   (+) Malignant hypertension      ENDOCRINE   (+) Acquired hypothyroidism   (+) Severe obesity (BMI 35.0-39. 9) with comorbidity ([address]       Anesthetic History   No history of anesthetic complications  PONV          Review of Systems / Medical History  Patient summary reviewed, nursing notes reviewed and pertinent labs reviewed    Pulmonary  Within defined limits  COPD        Asthma        Neuro/Psych   Within defined limits      Psychiatric history     Cardiovascular  Within defined limits  Hypertension                   GI/Hepatic/Renal  Within defined limits   GERD           Endo/Other  Within defined limits    Hypothyroidism  Morbid obesity and arthritis     Other Findings              Physical Exam    Airway  Mallampati: II  TM Distance: > 6 cm  Neck ROM: normal range of motion   Mouth opening: Normal     Cardiovascular  Regular rate and rhythm,  S1 and S2 normal,  no murmur, click, rub, or gallop             Dental    Dentition: Poor dentition     Pulmonary  Breath sounds clear to auscultation               Abdominal  GI exam deferred       Other Findings            Anesthetic Plan    ASA: 3  Anesthesia type: general      Post-op pain plan if not by surgeon: peripheral nerve block single    Induction: Intravenous  Anesthetic plan and risks discussed with: Patient

## 2021-01-22 NOTE — ROUTINE PROCESS
Patient: Gerard Mcfarlane MRN: [id number]  SSN: [id number]   YOB: 1965  Age: 60 y.o. Sex: female     Patient is status post Procedure(s):  RIGHT WRIST ARTHROSCOPY WITH TFC DEBRIDEMENT;  RIGHT WRIST GANGLION CYST EXCISION AND RIGHT ULNAR SHORTENING OSTEOTOMY. Surgeon(s) and Role:     Lissett Sylvester MD - Primary    Local/Dose/Irrigation:  SEE MAR                  Peripheral IV 01/22/21 Left Antecubital (Active)   Site Assessment Clean, dry, & intact 01/22/21 0741   Phlebitis Assessment 0 01/22/21 0741   Infiltration Assessment 0 01/22/21 0741   Dressing Status Clean, dry, & intact 01/22/21 0741   Dressing Type Transparent;Tape 01/22/21 0741   Hub Color/Line Status Blue; Infusing 01/22/21 0741   Alcohol Cap Used Yes 01/22/21 0741                           Dressing/Packing:  Incision 01/22/21 Wrist Right-Dressing/Treatment: ABD pad; Ace wrap; Adhesive bandage; Cast padding;Steri-strips;Xeroform;Splint(MASTISOL) (01/22/21 0788)    Splint/Cast:  ]    Other:

## 2021-01-22 NOTE — BRIEF OP NOTE
Brief Postoperative Note    Patient: Saintclair Seitz  YOB: 1965  MRN: [id number]    Date of Procedure: 1/22/2021     Pre-Op Diagnosis: ULNAR IMPACTION SYNDROME RIGHT,   INJURY OF TFC RIGHT WRIST  RIGHT WRIST PAIN    Post-Op Diagnosis: Same as preoperative diagnosis. Procedure(s):  RIGHT WRIST ARTHROSCOPY WITH TFC DEBRIDEMENT;   RIGHT ULNAR SHORTENING OSTEOTOMY    Surgeon(s):  Ignacio Lorenz MD    Surgical Assistant: Nursing    Anesthesia: Regional     Estimated Blood Loss (mL): Minimal    Complications: None    Specimens: * No specimens in log *     Implants:   Implant Name Type Inv. Item Serial No.  Lot No. LRB No. Used Action   PLATE BNE 7 H COMPR LO PROF FOR ULN OSTEOTMY - SNA  PLATE BNE 7 H COMPR LO PROF FOR ULN OSTEOTMY NA [employer] NA Right 1 Implanted   SCR BNE NAN HEX 3.2X12MM --  - SNA  SCR BNE NAN HEX 3.2X12MM --  NA [employer]IMED NA Right 1 Implanted   SCREW BNE L16MM DIA3. 2MM NAN LAG FOR ULN OSTEOTMY COMPR - SNA  SCREW BNE L16MM DIA3. 2MM NAN LAG FOR ULN OSTEOTMY COMPR NA [employer] NA Right 1 Implanted   SCR BNE NAN LCK 3.2X12MM -- ULNAR OSTEOTOMY SYSTEM - SNA  SCR BNE NAN LCK 3.2X12MM -- ULNAR OSTEOTOMY SYSTEM NA [employer] NA Right 4 Implanted   SCR BNE NAN LCK 3.2X10MM -- ULNAR OSTEOTOMY SYSTEM - SNA  SCR BNE NAN LCK 3.2X10MM -- ULNAR OSTEOTOMY SYSTEM NA [employer] NA Right 1 Implanted       Drains: * No LDAs found *    Findings: TFCC tear with ulnar impaction    Electronically Signed by Dewey Saavedra MD on 1/22/2021 at 9:26 AM

## 2021-01-23 NOTE — OP NOTES
[facility]  OPERATIVE REPORT    Name:  Noemi Greer  MR#:  [id number]  :  1965  ACCOUNT #:  [de-identified]  DATE OF SERVICE:  2021      PREOPERATIVE DIAGNOSIS:  Right wrist ulnar impaction syndrome with triangular fibrocartilage tear. POSTOPERATIVE DIAGNOSIS:  Right wrist ulnar impaction syndrome with triangular fibrocartilage tear. PROCEDURES PERFORMED:  1. Surgical arthroscopy of the right wrist with arthroscopic synovectomy and debridement of centroradial triangular fibrocartilage tear. 2.  Right forearm, wrist, ulnar shortening osteotomy. SURGEON:  Laurie Casiano MD    ASSISTANT:  First surgical assistant, nursing care. ANESTHESIA:  Regional nerve block and a general anesthetic. COMPLICATIONS:  No complications. SPECIMENS REMOVED:  No specimens. IMPLANTS:  Included the TriMed ulnar shortening osteotomy plate and screws. ESTIMATED BLOOD LOSS:  Less than 5 mL. DRAINS:  There were no drains placed. TOURNIQUET TIME:  74 minutes, 250 mmHg in the right arm. INDICATIONS:  The patient is a 78-year-old right-hand dominant woman with chronic right wrist ulnar impaction syndrome and a triangular fibrocartilage tear with persistent ulnar-sided wrist pain despite conservative treatment measures whom elected to be taken to the operating room today for a wrist arthroscopy and ulnar shortening. There had been some concern regarding the ganglion cyst but none is palpable or symptomatic at this time. REPORT OF OPERATION:  The patient was identified, taken into the operating room, placed supine on the operating room table. She received intravenous sedation, a supraclavicular nerve block and then intubation with general anesthesia by the Anesthesia Service. Her right upper extremity was prepped and draped sterilely. After Esmarch exsanguination, the tourniquet was inflated to 250 mmHg.   The patient's right wrist was suspended in a wrist traction tower through tiny finger tops on the index and long fingers. 10-15 pounds of skeletal traction was maintained with the wrist in mild flexion. A standard 3/4 arthroscopic viewing portal was established after injecting the radiocarpal joint space with 5 mL of saline. Initial inspection revealed fairly pristine surfaces of the scaphoid, lunate, and scapholunate fossa in the distal radius. The volar radioscaphocapitate and long radiolunate ligaments were well seen as was the short radiolunate ligaments. The scapholunate interosseous ligament appeared entirely intact. Viewing ulnarly, there was a large triangular fibrocartilage tear and the ulnar head was easily in view centroradially. There was a floating piece of cartilage that had come off the ulna, proximal aspect of the lunate which corresponded with a known impaction syndrome. There was marked ulnar-sided synovitis. A working portal was established in the 6-hour position under direct visualization. A 2.9 mm full-radius resector was utilized to complete an arthroscopic synovectomy extending into the volar ulnar and dorsal ulnar aspect of the wrist.  This also was utilized to debride the TFC tear and the loose cartilage on the proximal ulnar aspect of the lunate. The loose body was also removed. A 2-mm probe was utilized to assess tautness and there was a good remaining trampoline test of the TFC. At this point, essentially the arthroscopic portion of the procedure had concluded and she was taken out of the wrist traction tower. The elbow was flexed. An 8-10 cm skin incision was made to the ulnar border of the forearm. The interval between the extensor and flexor carpi ulnaris was explored down the bone. Subperiosteal dissection was performed where needed. The TriMed plate was applied to the volar surface of the ulna. Three 3.2-mm cortical locking screws were placed distally, predrilled with a 2.3-mm drill bit.   A cortical screw was placed in the proximal oblong hole at the nerve, also predrilled with the same drill bit. The plate was in good position and alignment. Then, the combination drill guide and a short and long pin were placed following the technique. Next, a 4-mm suction bone was selected for transection. So the A4 cutting guide followed by the B cutting guide were utilized with a micro-oscillating saw and the wafer bone was removed. The second drill sleeve was applied and a perpendicular pin was placed just proximal to the 2 prior pins in the combination drill guide. The screw in the oblong hole was loosened a quarter turn. The osteotomy was closed very nicely and anatomically. A standard lag screw was inserted following the technique. The screw in the oblong hole was tightened. Two additional 3.2 mm cortical locking screws were placed proximally to complete the construct. The guide pins were removed along with remaining clamps. There was excellent stability and closure of the osteotomy. This was evaluated with FluoroScan and showed appropriate ulnar shortening and placement of the hardware. There was excellent firm rotation as well. The wound was then thoroughly irrigated with saline. The EC and SC intervals were repaired with a running 2-0 Vicryl suture. The subcutaneous layer was closed with Vicryl and the skin with 4-0 Monocryl buried subcuticular suture. Steri-Strips, soft dressings and resting splint were applied. The tourniquet released. The hand was pink and had good refill. She was transported into the recovery room postoperatively in good condition.         MD ABRAHAM [email or web address]  D:  01/22/2021 9:21  T:  01/22/2021 19:56  JOB #:  [id number]

## 2021-01-26 ENCOUNTER — TELEPHONE (OUTPATIENT)
Dept: INTERNAL MEDICINE CLINIC | Age: 56
End: 2021-01-26

## 2021-01-26 NOTE — TELEPHONE ENCOUNTER
Yes the next two are lower as her pain will NOT remain as bad. She has BOTH extra chronic pain med and opiate prescribed post-surgically, this is enough for breakthrough pain. The pain should IMPROVE and continued use of more opiates is NOT the plan.

## 2021-01-26 NOTE — TELEPHONE ENCOUNTER
Pt wants to know if you decreased her pain medication for next month. She had  Surgery on her wrist and has  Pins and screws now She states she got pills from the surgeon but they are not going to last. And she is in a lot of pain.   Pt [id number]

## 2021-02-04 ENCOUNTER — IMMUNIZATION CLINIC (OUTPATIENT)
Dept: INTERNAL MEDICINE CLINIC | Age: 56
End: 2021-02-04
Payer: MEDICAID

## 2021-02-04 VITALS
BODY MASS INDEX: 37.44 KG/M2 | RESPIRATION RATE: 17 BRPM | DIASTOLIC BLOOD PRESSURE: 89 MMHG | TEMPERATURE: 97.3 F | HEART RATE: 77 BPM | OXYGEN SATURATION: 96 % | SYSTOLIC BLOOD PRESSURE: 133 MMHG | HEIGHT: 61 IN | WEIGHT: 198.3 LBS

## 2021-02-04 DIAGNOSIS — Z23 ENCOUNTER FOR IMMUNIZATION: Primary | ICD-10-CM

## 2021-02-04 PROCEDURE — 90471 IMMUNIZATION ADMIN: CPT | Performed by: NURSE PRACTITIONER

## 2021-02-04 PROCEDURE — 90686 IIV4 VACC NO PRSV 0.5 ML IM: CPT | Performed by: NURSE PRACTITIONER

## 2021-02-04 NOTE — PROGRESS NOTES
Allison Iniguez is a 60 y.o. female who presents for routine immunizations. She denies any symptoms , reactions or allergies that would exclude them from being immunized today. Risks and adverse reactions were discussed and the VIS was given to them. All questions were addressed. She was observed for 15 min post injection. There were no reactions observed. Jayy Medina LPN

## 2021-02-26 DIAGNOSIS — Z79.891 CHRONIC USE OF OPIATE FOR THERAPEUTIC PURPOSE: ICD-10-CM

## 2021-02-26 DIAGNOSIS — M19.012 PRIMARY OSTEOARTHRITIS OF LEFT SHOULDER: ICD-10-CM

## 2021-02-26 DIAGNOSIS — M17.11 PRIMARY OSTEOARTHRITIS OF RIGHT KNEE: ICD-10-CM

## 2021-02-26 RX ORDER — METHOCARBAMOL 750 MG/1
TABLET, FILM COATED ORAL
Qty: 120 TAB | Refills: 6 | Status: SHIPPED | OUTPATIENT
Start: 2021-02-26 | End: 2021-09-17

## 2021-02-26 NOTE — DIABETES MGMT
Diabetes Treatment  Center - Nutrition Evaluation       DATE: 2018      REFERRING PHYSICIAN:SULTAN KEO BOYKIN  NAME: Cristiana Gray : 1965 AGE: 52 y.o. GENDER: female  REASON FOR VISIT: weight loss    ASSESSMENT:  Per patient- chronic pain, asthma, COPD, htn, thyroid disorder, arthritis      LABS:   Lab Results   Component Value Date/Time    Hemoglobin A1c 5.4 2018 01:04 PM     Lab Results   Component Value Date/Time    Creatinine 0.85 2018 01:04 PM     CrCl cannot be calculated (Unknown ideal weight.). Lab Results   Component Value Date/Time    Cholesterol, total 179 2018 01:04 PM    HDL Cholesterol 51 2018 01:04 PM    LDL, calculated 108 (H) 2018 01:04 PM    Triglyceride 100 2018 01:04 PM       MEDICATIONS/SUPPLEMENTS:   [unfilled]  Prior to Admission medications    Medication Sig Start Date End Date Taking? Authorizing Provider   pantoprazole (PROTONIX) 40 mg tablet Take 1 Tab by mouth daily. Take in 2 week intervals for GERD Flares 18   Ladan John NP   oxyCODONE-acetaminophen (PERCOCET 10)  mg per tablet Take 1 Tab by mouth daily as needed for Pain. May take 1 tab ONCE DAILY as needed for pain. 19   Ladan John NP   sertraline (ZOLOFT) 100 mg tablet Please take one and half tablet daily for 7 days and then take 2 tabs daily 18   Juany Hugo NP   metoprolol succinate (TOPROL-XL) 25 mg XL tablet TAKE 1 TABLET BY MOUTH DAILY. 18   Ladan John NP   gabapentin (NEURONTIN) 800 mg tablet TAKE 1 TAB BY MOUTH THREE (3) TIMES DAILY. 18   Ladan John NP   meloxicam (MOBIC) 15 mg tablet Take 1 Tab by mouth daily (with breakfast).  18   Ladan John NP   amLODIPine (NORVASC) 10 mg tablet TAKE 1 TABLET BY MOUTH EVERY DAY FOR HYPERTENSION 6/15/18   Ladan John NP   hydroCHLOROthiazide (HYDRODIURIL) 25 mg tablet TAKE 1 TAB BY MOUTH DAILY FOR HYPERTENSION 18   Ladan John NP   levothyroxine (SYNTHROID) 125 mcg Orbital.../Buccal... tablet TAKE 1 TABLET BY MOUTH EVERY DAY BEFORE BREAKFAST 5/10/18   Ligia Basilio NP   methocarbamol (ROBAXIN) 750 mg tablet TAKE 1 TAB BY MOUTH FOUR (4) TIMES DAILY AS NEEDED FOR PAIN (SPASMS). 4/19/18   Ligia Basilio NP   diphenhydrAMINE (BENADRYL) 25 mg capsule Take 1 Cap by mouth every six (6) hours as needed. 4/16/18   PAULINA Rodriguez   ondansetron (ZOFRAN ODT) 4 mg disintegrating tablet Take 1 Tab by mouth every eight (8) hours as needed for Nausea. 3/28/18   Ligia Basilio NP   naloxone [facility]) 4 mg/actuation nasal spray Use 1 spray into 1 nostril for OVERDOSE. Call 911. For subsequent doses, give in alternating nostrils. May repeat every 2 to 3 min. 3/28/18   Ligia Basilio NP   traZODone (DESYREL) 50 mg tablet Take 1-2 tabs every night for sleep. 2/28/18   Ligia Basilio NP   montelukast (SINGULAIR) 10 mg tablet Take 1 Tab by mouth daily. 2/28/18   Ligia Basilio NP   albuterol (PROVENTIL VENTOLIN) 2.5 mg /3 mL (0.083 %) nebulizer solution 3 mL by Nebulization route every four (4) hours as needed for Wheezing. 1/31/18   Ligia Basilio NP   acetaminophen (TYLENOL) 650 mg TbER TAKE 1 TAB BY MOUTH THREE (3) TIMES DAILY AS NEEDED FOR PAIN. 1/9/18   Ligia Basilio NP   diclofenac (VOLTAREN) 1 % gel Apply 2 g to affected area every six (6) hours. 11/3/17   Ligia Basilio NP   lidocaine (LIDODERM) 5 % Apply patch to the affected area for 12 hours a day and remove for 12 hours a day. 12/9/16   Ligia Basilio NP   fluticasone (FLONASE) 50 mcg/actuation nasal spray 2 Sprays by Both Nostrils route daily. 12/9/16   Ligia Basilio NP   miscellaneous medical supply misc Shower seat for chronic knee pain, pt planning to have right TKR in June due to condition. Very limited range of motion. 5/16/16   Ligia Basilio NP   loratadine (CLARITIN) 10 mg tablet Take 1 Tab by mouth daily.  2/5/16   Ligia Basilio NP   budesonide-formoterol (SYMBICORT) 160-4.5 mcg/actuation HFA inhaler Take 2 Puffs by inhalation two (2) times a day. Other, MD Salazar   albuterol (PROAIR HFA) 90 mcg/actuation inhaler Take 2 Puffs by inhalation every four (4) hours as needed. Other, MD Salazar       FOOD ALLERGIES/INTOLERANCES: none    ANTHROPOMETRICS:    Ht Readings from Last 1 Encounters:   12/14/18 5' 1\" (1.549 m)      Wt Readings from Last 1 Encounters:   12/14/18 90.7 kg (200 lb)     Weight Today- 203.7#     IBW:105 # +/- 10% BMI: 38.4       Reported Wt Hx: yo-yo  States she was 150# 4 months ago. Current weight is her highest  Estimated Nutritional Needs: 1425 calories based on ideal body weight    Reported Diet Hx: yo-yo dieter. Reports losing weight and will gain it right back. Been doing this her entire life     24 Hour Diet Recall  Breakfast  skips   Lunch  skips   Dinner  1 large meal daily- salad with meat- \"lots\" of salad dressing   Snacks  Nuts- 4 ounces daily   Beverages  4-- 32 ounce regular sodas daily     diet high in sugar, fat, cholesterol  not attempting to follow a low fat, low cholesterol diet, exercises sporadically, sedentary, smoker 1.5 ppd, caffeine intake 4 32 ounce sodas daily, skipping meals    Exercise/Physical Activity: every other day, she reports waking 4 blocks. Limited 2' chronic pain    Environmental/Social: custody of her granddaughter- reports stress at home, but does not choose food to help with stress management- reports playing games on her phone to help her relax        NUTRITION DIAGNOSIS: overweight likely, in part, due to numerous sodas daily, as well as large quantities of high caloric foods. NUTRITION INTERVENTION:   Food recall reviewed with patient- patient over consuming soda- drinking ~1800 calories from soda alone during the day. She also has started eating more salad than usual- just \"squeezes\" ranch dressing on top, no idea of how much she is using. States if she squeezes too much, she will just eat it.   Discussed portion sizes of foods, include fruits, nuts, salad dressing, protein. Discussed how cutting back on salad dressing, omitting regular sodas will decrease total calorie intake and will help with weight loss- daily meal plans provided. Also discussed smoking cessation- patient states she would like to ultimately quit smoking. PATIENT GOALS:  1- eat 3 meals per day  2- eat every 4-5 hours  3- include bedtime snack- make sure to have a plan  4- decrease salad dressing- try dipping fork in dressing before picking up her salad  5- omit regular sodas completely  6- Walk 10 minutes daily    Complete \"barriers of achieving weight loss\" handout, Food journal and food records and bring to next appointment for review. Appointment scheduled for 2/1/19. Will continue with meal planning and lifestyle changes      Specific tips and techniques to facilitate compliance with above recommendations were provided and discussed.   Pt was strongly encourage to begin making necessary changes now and follow as scheduled       If you have any questions please feel free to contact me at . Damon Cohen, RD, CDE

## 2021-04-02 ENCOUNTER — OFFICE VISIT (OUTPATIENT)
Dept: INTERNAL MEDICINE CLINIC | Age: 56
End: 2021-04-02
Payer: MEDICARE

## 2021-04-02 VITALS
BODY MASS INDEX: 37.44 KG/M2 | HEART RATE: 80 BPM | HEIGHT: 61 IN | DIASTOLIC BLOOD PRESSURE: 89 MMHG | RESPIRATION RATE: 16 BRPM | TEMPERATURE: 98.3 F | WEIGHT: 198.3 LBS | OXYGEN SATURATION: 96 % | SYSTOLIC BLOOD PRESSURE: 147 MMHG

## 2021-04-02 DIAGNOSIS — Z29.8 IMMUNOTHERAPY: ICD-10-CM

## 2021-04-02 DIAGNOSIS — G89.29 CHRONIC BILATERAL LOW BACK PAIN WITH RIGHT-SIDED SCIATICA: ICD-10-CM

## 2021-04-02 DIAGNOSIS — Z63.79 STRESS DUE TO ILLNESS OF FAMILY MEMBER: ICD-10-CM

## 2021-04-02 DIAGNOSIS — M19.012 PRIMARY OSTEOARTHRITIS OF LEFT SHOULDER: ICD-10-CM

## 2021-04-02 DIAGNOSIS — Z79.891 CHRONIC USE OF OPIATE FOR THERAPEUTIC PURPOSE: ICD-10-CM

## 2021-04-02 DIAGNOSIS — Z96.651 STATUS POST TOTAL RIGHT KNEE REPLACEMENT: ICD-10-CM

## 2021-04-02 DIAGNOSIS — Z98.890 S/P WRIST SURGERY: ICD-10-CM

## 2021-04-02 DIAGNOSIS — M54.41 CHRONIC BILATERAL LOW BACK PAIN WITH RIGHT-SIDED SCIATICA: ICD-10-CM

## 2021-04-02 DIAGNOSIS — Z96.651 S/P TOTAL KNEE ARTHROPLASTY, RIGHT: ICD-10-CM

## 2021-04-02 DIAGNOSIS — F33.1 MODERATE EPISODE OF RECURRENT MAJOR DEPRESSIVE DISORDER (HCC): ICD-10-CM

## 2021-04-02 DIAGNOSIS — M17.11 PRIMARY OSTEOARTHRITIS OF RIGHT KNEE: Primary | ICD-10-CM

## 2021-04-02 PROCEDURE — 99214 OFFICE O/P EST MOD 30 MIN: CPT | Performed by: NURSE PRACTITIONER

## 2021-04-02 RX ORDER — PREGABALIN 100 MG/1
100 CAPSULE ORAL 3 TIMES DAILY
Qty: 90 CAP | Refills: 5 | Status: SHIPPED | OUTPATIENT
Start: 2021-04-02

## 2021-04-02 RX ORDER — OXYCODONE AND ACETAMINOPHEN 10; 325 MG/1; MG/1
TABLET ORAL
Status: CANCELLED | OUTPATIENT
Start: 2021-04-02

## 2021-04-02 RX ORDER — TRAZODONE HYDROCHLORIDE 100 MG/1
TABLET ORAL
COMMUNITY
Start: 2021-03-16 | End: 2021-05-17

## 2021-04-02 RX ORDER — OXYCODONE AND ACETAMINOPHEN 10; 325 MG/1; MG/1
TABLET ORAL
COMMUNITY
Start: 2021-03-05 | End: 2021-06-30 | Stop reason: SDUPTHER

## 2021-04-02 RX ORDER — BUDESONIDE AND FORMOTEROL FUMARATE DIHYDRATE 160; 4.5 UG/1; UG/1
AEROSOL RESPIRATORY (INHALATION)
Qty: 30.6 INHALER | Refills: 3 | Status: SHIPPED | OUTPATIENT
Start: 2021-04-02

## 2021-04-02 RX ORDER — OXYCODONE AND ACETAMINOPHEN 10; 325 MG/1; MG/1
1 TABLET ORAL
Qty: 45 TAB | Refills: 0 | Status: SHIPPED | OUTPATIENT
Start: 2021-05-02 | End: 2021-07-27 | Stop reason: SDUPTHER

## 2021-04-02 RX ORDER — DULOXETIN HYDROCHLORIDE 30 MG/1
30 CAPSULE, DELAYED RELEASE ORAL DAILY
Qty: 90 CAP | Refills: 3 | Status: SHIPPED | OUTPATIENT
Start: 2021-04-02 | End: 2022-03-15

## 2021-04-02 RX ORDER — OXYCODONE AND ACETAMINOPHEN 10; 325 MG/1; MG/1
1 TABLET ORAL
Qty: 60 TAB | Refills: 0 | Status: SHIPPED | OUTPATIENT
Start: 2021-04-02 | End: 2021-06-30 | Stop reason: SDUPTHER

## 2021-04-02 RX ORDER — OXYCODONE AND ACETAMINOPHEN 10; 325 MG/1; MG/1
1 TABLET ORAL
Qty: 45 TAB | Refills: 0 | Status: SHIPPED | OUTPATIENT
Start: 2021-06-01 | End: 2021-06-30 | Stop reason: SDUPTHER

## 2021-04-02 NOTE — PATIENT INSTRUCTIONS
Pregabalin (Lyrica) - (By mouth)   Why this medicine is used:   Treats nerve and muscle pain, including fibromyalgia. Also treats seizures. Contact a nurse or doctor right away if you have:  · Thoughts of hurting yourself  · Muscle pain, tenderness, weakness     Common side effects:  · Rapid weight gain; swelling in your hands, ankles, or feet  · Confusion, trouble concentrating, tiredness  · Constipation, dry mouth  · Headache  © 2017 [facility] Information is for End User's use only and may not be sold, redistributed or otherwise used for commercial purposes. Generalized Anxiety Disorder: Care Instructions  Overview     We all worry. It's a normal part of life. But when you have generalized anxiety disorder, you worry about lots of things. You have a hard time not worrying. This worry or anxiety interferes with your relationships, work or school, and other areas of your life. You may worry most days about things like money, health, work, or friends. That may make you feel tired, tense, or cranky. It can make it hard to think. It may get in the way of healthy sleep. Counseling and medicine can both work to treat anxiety. They are often used together with lifestyle changes, such as getting enough sleep. Treatment can include a type of counseling called cognitive-behavioral therapy, or CBT. It helps you notice and replace thoughts that make you worry. You also might have counseling along with those closest to you so that they can help. Follow-up care is a key part of your treatment and safety. Be sure to make and go to all appointments, and call your doctor if you are having problems. It's also a good idea to know your test results and keep a list of the medicines you take. How can you care for yourself at home? · Get at least 30 minutes of exercise on most days of the week. Walking is a good choice.  You also may want to do other activities, such as running, swimming, cycling, or playing tennis or team sports. · Learn and do relaxation exercises, such as deep breathing. · Go to bed at the same time every night. Try for 8 to 10 hours of sleep a night. · Avoid alcohol, marijuana, and illegal drugs. · Find a counselor who uses cognitive-behavioral therapy (CBT). · Don't isolate yourself. Let those closest to you help you. Find someone you can trust and confide in. Talk to that person. · Be safe with medicines. Take your medicines exactly as prescribed. Call your doctor if you think you are having a problem with your medicine. · Practice healthy thinking. How you think can affect how you feel and act. Ask yourself if your thoughts are helpful or unhelpful. If they are unhelpful, you can learn how to change them. · Recognize and accept your anxiety. When you feel anxious, say to yourself, \"This is not an emergency. I feel uncomfortable, but I am not in danger. I can keep going even if I feel anxious. \"  When should you call for help? Call  911 anytime you think you may need emergency care. For example, call if:    · You feel you can't stop from hurting yourself or someone else. Keep the numbers for these national suicide hotlines: 5-035-118-TALK (3-957.180.8175) and 1-795-HCQSDSX (0-932.560.9814). If you or someone you know talks about suicide or feeling hopeless, get help right away. Call your doctor or counselor now or seek immediate medical care if:    · You have new anxiety, or your anxiety gets worse.     · You have been feeling sad, depressed, or hopeless or have lost interest in things that you usually enjoy.     · You do not get better as expected. Where can you learn more? Go to http://www.gray.com/  Enter G123 in the search box to learn more about \"Generalized Anxiety Disorder: Care Instructions. \"  Current as of: November 3, 2020               Content Version: 12.8  © 1805-6696 [employer], [employer].    Care instructions adapted under license by [employer] (which disclaims liability or warranty for this information). If you have questions about a medical condition or this instruction, always ask your healthcare professional. [address] any warranty or liability for your use of this information.
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
## 2021-04-02 NOTE — PROGRESS NOTES
Pt is here for   Chief Complaint   Patient presents with    Medication Refill     pain meds     Pt states pain level is a 10/10 knees and hands     1. Have you been to the ER, urgent care clinic since your last visit? Hospitalized since your last visit? No    2. Have you seen or consulted any other health care providers outside of the [address] [place] [address] since your last visit? Include any pap smears or colon screening.  No    Pt states last had something for pain last night

## 2021-05-17 RX ORDER — TRAZODONE HYDROCHLORIDE 100 MG/1
TABLET ORAL
Qty: 30 TAB | Refills: 5 | Status: SHIPPED | OUTPATIENT
Start: 2021-05-17 | End: 2021-11-17

## 2021-05-17 RX ORDER — DULOXETIN HYDROCHLORIDE 60 MG/1
CAPSULE, DELAYED RELEASE ORAL
Qty: 30 CAP | Refills: 5 | Status: SHIPPED | OUTPATIENT
Start: 2021-05-17 | End: 2021-11-17

## 2021-06-18 DIAGNOSIS — J30.89 ENVIRONMENTAL AND SEASONAL ALLERGIES: ICD-10-CM

## 2021-06-18 DIAGNOSIS — E03.9 HYPOTHYROIDISM, UNSPECIFIED TYPE: ICD-10-CM

## 2021-06-18 DIAGNOSIS — I10 MALIGNANT HYPERTENSION: ICD-10-CM

## 2021-06-18 DIAGNOSIS — I10 ESSENTIAL HYPERTENSION WITH GOAL BLOOD PRESSURE LESS THAN 140/90: ICD-10-CM

## 2021-06-18 RX ORDER — HYDROCHLOROTHIAZIDE 25 MG/1
TABLET ORAL
Qty: 30 TABLET | Refills: 11 | Status: SHIPPED | OUTPATIENT
Start: 2021-06-18 | End: 2022-06-13

## 2021-06-18 RX ORDER — LORATADINE 10 MG/1
TABLET ORAL
Qty: 30 TABLET | Refills: 8 | Status: SHIPPED | OUTPATIENT
Start: 2021-06-18 | End: 2022-03-15

## 2021-06-18 RX ORDER — LEVOTHYROXINE SODIUM 125 UG/1
TABLET ORAL
Qty: 30 TABLET | Refills: 35 | Status: SHIPPED | OUTPATIENT
Start: 2021-06-18 | End: 2022-06-13

## 2021-06-18 RX ORDER — AMLODIPINE BESYLATE 5 MG/1
TABLET ORAL
Qty: 30 TABLET | Refills: 26 | Status: SHIPPED | OUTPATIENT
Start: 2021-06-18 | End: 2022-06-13

## 2021-06-30 ENCOUNTER — OFFICE VISIT (OUTPATIENT)
Dept: INTERNAL MEDICINE CLINIC | Age: 56
End: 2021-06-30
Payer: MEDICAID

## 2021-06-30 VITALS
TEMPERATURE: 96.9 F | BODY MASS INDEX: 40.02 KG/M2 | HEART RATE: 88 BPM | HEIGHT: 61 IN | DIASTOLIC BLOOD PRESSURE: 89 MMHG | SYSTOLIC BLOOD PRESSURE: 139 MMHG | RESPIRATION RATE: 17 BRPM | WEIGHT: 212 LBS | OXYGEN SATURATION: 99 %

## 2021-06-30 DIAGNOSIS — M54.41 CHRONIC BILATERAL LOW BACK PAIN WITH RIGHT-SIDED SCIATICA: ICD-10-CM

## 2021-06-30 DIAGNOSIS — R60.0 BILATERAL LEG EDEMA: Primary | ICD-10-CM

## 2021-06-30 DIAGNOSIS — Z79.891 CHRONIC USE OF OPIATE FOR THERAPEUTIC PURPOSE: ICD-10-CM

## 2021-06-30 DIAGNOSIS — M17.11 PRIMARY OSTEOARTHRITIS OF RIGHT KNEE: ICD-10-CM

## 2021-06-30 DIAGNOSIS — M19.012 PRIMARY OSTEOARTHRITIS OF LEFT SHOULDER: ICD-10-CM

## 2021-06-30 DIAGNOSIS — G89.29 CHRONIC BILATERAL LOW BACK PAIN WITH RIGHT-SIDED SCIATICA: ICD-10-CM

## 2021-06-30 PROCEDURE — 99215 OFFICE O/P EST HI 40 MIN: CPT | Performed by: NURSE PRACTITIONER

## 2021-06-30 RX ORDER — OXYCODONE AND ACETAMINOPHEN 10; 325 MG/1; MG/1
1 TABLET ORAL
Qty: 60 TABLET | Refills: 0 | Status: SHIPPED | OUTPATIENT
Start: 2021-06-30 | End: 2021-07-27 | Stop reason: SDUPTHER

## 2021-06-30 RX ORDER — FUROSEMIDE 20 MG/1
20 TABLET ORAL DAILY
Qty: 30 TABLET | Refills: 0 | Status: SHIPPED | OUTPATIENT
Start: 2021-06-30 | End: 2021-09-02 | Stop reason: SDUPTHER

## 2021-06-30 RX ORDER — OXYCODONE AND ACETAMINOPHEN 10; 325 MG/1; MG/1
1 TABLET ORAL
Qty: 45 TABLET | Refills: 0 | Status: CANCELLED | OUTPATIENT
Start: 2021-06-30 | End: 2021-07-30

## 2021-06-30 NOTE — PROGRESS NOTES
Zhane Mari (: 1965) is a 60 y.o. female, established patient, here for evaluation of the following chief complaint(s):  Medication Refill (pain meds), Labs (annual), and Swelling (right leg, with pain of the shin area)       ASSESSMENT/PLAN:  Below is the assessment and plan developed based on review of pertinent history, physical exam, labs, studies, and medications. 1. Primary osteoarthritis of right knee  -     oxyCODONE-acetaminophen (PERCOCET 10)  mg per tablet; Take 1 Tablet by mouth two (2) times daily as needed for Pain for up to 30 days. Max Daily Amount: 2 Tablets. Indications: pain, Normal, Disp-45 Tablet, R-0  2. Chronic use of opiate for therapeutic purpose  -     oxyCODONE-acetaminophen (PERCOCET 10)  mg per tablet; Take 1 Tablet by mouth two (2) times daily as needed for Pain for up to 30 days. Max Daily Amount: 2 Tablets. Indications: pain, Normal, Disp-45 Tablet, R-0  3. Primary osteoarthritis of left shoulder  -     oxyCODONE-acetaminophen (PERCOCET 10)  mg per tablet; Take 1 Tablet by mouth two (2) times daily as needed for Pain for up to 30 days. Max Daily Amount: 2 Tablets. Indications: pain, Normal, Disp-45 Tablet, R-0  4. Chronic bilateral low back pain with right-sided sciatica  -     oxyCODONE-acetaminophen (PERCOCET 10)  mg per tablet; Take 1 Tablet by mouth two (2) times daily as needed for Pain for up to 30 days. Max Daily Amount: 2 Tablets. Indications: pain, Normal, Disp-45 Tablet, R-0      No follow-ups on file. Pt asked to complete follow by next visit: lose weight, increase physical activity, report to [facility] for upddated imaging and worsening pain with LROM. Lasix started. May resume amlodipine, if swelling worsens, stop. If symptomatic with med start of additional diuretic or dizziness, HOLD hctz while taking lasix. Nightly use of compression stockings and low salt diet.      SUBJECTIVE/OBJECTIVE:  HPI    Chronic Pain:  Patient has chronic BL knee pain and LBP for years, h/o right TKR (2016) and right wrist surgery (1/2021). Having right knee pain, worse with applying pressure to leg for past 1 month. Nervous it may be an issue with her replacement again. Last revised ~ 2 yrs ago. Has NOT called ORTHO to f/u. Followed by Dr. Kiley Goff for RIGHT wrist fracture incurred from fall in Oct 2020. Pain Scale: 10 - Worst pain ever/10. Had IMAGING 2021 and has been seeing/seen by [facility], but last knee imaging in 2019 (deferring to [facility] to repeat). Pain in the left shoulder, wrist, knees, legs, and lower back is still limiting walking, sitting, reaching, lifting, and standing, exacerbated by forementioned acitivities to include stair climbing. Has tried prednisone, tramadol, injections, PT, gabapentin, cymbalta, and surgery in past with minimal relief. Pain has been controlled with Oxycodone, last taken today. The medication is kept safe by staying with her. Has NOT seen any other providers since last OV for pain medication. No significant changes to pain presentation since last OV. she is  able to do her normal daily activities. she reports the following adverse side effects: none. Averaging 4-5 BMs per week. However noting more leg swelling lately. Has NOT restarted amlodipine yet, out x 2 months. Least pain over the last week has been 8/10  Worst pain over the last week has been 10/10. Aberrant behaviors: None         Symptoms onset: problem is longstanding. Rheumatological ROS: ongoing significant pain which is stable and controlled by pain med. Response to treatment plan: waxing and waning. Review of Systems  Constitutional: +MRSA in nose. negative for fevers, chills, anorexia and weight loss  Eyes:   negative for visual disturbance, drainage, and irritation  ENT:   +AR and environmental allergies.  negative for tinnitus,sore throat,ear pain,and hoarseness  Respiratory:  + asthma/COPD. negative for hemoptysis  CV:   negative for chest pain, palpitations, and lower extremity edema  GI:   + GERD.  negative for nausea, vomiting, diarrhea, abdominal pain, and melena  Endo:               negative for polyuria,polydipsia,polyphagia, and heat intolerance  Genitourinary: negative for frequency, urgency, dysuria, retention, and hematuria  Integument:  negative for rash, ulcerations, and pruritus  Hematologic:  negative for easy bruising and bleeding  Musculoskel: negative for  muscle weakness  Neurological:  negative for headaches, dizziness, vertigo,and memory problems  Behavl/Psych: +depression, on cymbalta and zoloft. negative for feelings of  suicide    1./2. Medical history/Past medical History   Past Medical History:   Diagnosis Date    Arthritis     Asthma     uses inhalers    Chronic obstructive pulmonary disease (HCC)     bronchitis    Chronic pain     GERD (gastroesophageal reflux disease)     History of seasonal allergies     Hypertension     Ill-defined condition     environmental allergies     Ill-defined condition     Multiple body piercings; unable to remove tongue and lip piercings    Ill-defined condition 2018    GASTRITIS PER ENDOSCOPY    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    Psychiatric disorder     DEPRESSION    Thyroid disease     hypo    Ventral hernia 12/31/2013     Past Surgical History:   Procedure Laterality Date    HX HEENT  2009    thyroidectomy    HX KNEE REPLACEMENT      R    HX KNEE REPLACEMENT  03/26/2019    HX MOHS PROCEDURES Right 3/8/11    HX OTHER SURGICAL      hiatal hernia repair    HX PARTIAL HYSTERECTOMY      HX TUBAL LIGATION       Patient Active Problem List   Diagnosis Code    Ventral hernia K43.9    Chronic pain of right knee M25.561, G89.29    Chronic right shoulder pain M25.511, G89.29    Environmental and seasonal allergies J30.89    Ventral hernia without obstruction or gangrene K43.9    Primary osteoarthritis of right knee M17.11    Mixed simple and mucopurulent chronic bronchitis (HCC) J41.8  Acquired hypothyroidism E03.9    Chronic bilateral low back pain with right-sided sciatica M54.41, G89.29    Status post total right knee replacement Z96.651    Malignant hypertension I10    Pain management contract signed Z02.89    Chronic pain of left knee M25.562, G89.29    Moderate episode of recurrent major depressive disorder (HCC) F33.1    Psychophysiological insomnia F51.04    Severe obesity (BMI 35.0-39. 9) with comorbidity (HCC) E66.01    Post-tussive vomiting R11.10    Chronic use of opiate for therapeutic purpose Z79.891    Obesity, Class II, BMI 35-39.9 E66.9    Left wrist pain M25.532    Chronic left shoulder pain M25.512, G89.29    Osteoarthritis of spine with radiculopathy, cervical region M47.22    Primary osteoarthritis of left shoulder M19.012    MRSA carrier Z22.322    S/P total knee arthroplasty, right Z96.651    Loosening of knee joint prosthesis (HCC) T84.038A, Z96.659    Sprain and strain of right wrist S63.501A, V11.020U    History of recent fall Z91.81    Immunotherapy Z29.8    Ulnar impaction syndrome, right M25.831    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 Applicable records from prior treatment providers are apart of [facility] under the media/encounters tab. 4. Diagnostic, therapeutic and laboratory results are available in the [facility] chart. 5. Consultation notes are available for review in the media/encounters tab of the [facility] chart. 6. Treatment goals include: pain control, improve activity level and function in regards to activities of daily living, and improved comfort level. Previously pt has been limited by pain in all these aspects.     7. The risks and benefits of treatment have been discussed at this office visit with the pt.  she understands that the medication has addictive potential.  Additionally the pt has been advised that narcotic pain medication may impair mental and/or physical ability required for performance of tasks such as driving or operating any other machinery. 8. Pt has an updated signed pain contract on file and can be found under the media section of the [facility] chart. 9. The pain contract is reviewed. Pain medication will be continued at the SAME dosage. PILL COUNT: 4, last fill date 6/3/21. Urine drug screening ordered/collected today. Additional diagnostic studies are not indicated at this time. Interventional procedure are not indicated at this time. Narcan prescribed already. 10. Medication prescibed is oxycodone  every 12 PRN # 60 with 0 refills for a 1 month supply.  was reviewed while planning for pain/anxiety management, no indications of drug diversion suspected. Prescription history is no suspicious for controlled substance overuse. 11. Patient instructions have been reviewed in detail as outlined above and in the pain contract. 12. Re-evaluation is planned for 1 month. Current Outpatient Medications   Medication Sig    amLODIPine (NORVASC) 5 mg tablet TAKE 1 TABLET BY MOUTH EVERY DAY    levothyroxine (SYNTHROID) 125 mcg tablet TAKE 1 TABLET BY MOUTH EVERY DAY BEFORE BREAKFAST    hydroCHLOROthiazide (HYDRODIURIL) 25 mg tablet TAKE 1 TAB BY MOUTH DAILY FOR HYPERTENSION    loratadine (CLARITIN) 10 mg tablet TAKE 1 TABLET BY MOUTH EVERY DAY    DULoxetine (CYMBALTA) 60 mg capsule TAKE 1 CAPSULE BY MOUTH EVERY DAY    traZODone (DESYREL) 100 mg tablet TAKE 1 TABLET BY MOUTH EVERY DAY AT NIGHT    Symbicort 160-4.5 mcg/actuation HFAA TAKE 2 PUFFS BY INHALATION TWO (2) TIMES A DAY.  oxyCODONE-acetaminophen (PERCOCET 10)  mg per tablet PLEASE SEE ATTACHED FOR DETAILED DIRECTIONS    DULoxetine (CYMBALTA) 30 mg capsule Take 1 Cap by mouth daily. Take WITH 60 mg CAPSULE    pregabalin (LYRICA) 100 mg capsule Take 1 Cap by mouth three (3) times daily.  Max Daily Amount: 300 mg.    methocarbamoL (ROBAXIN) 750 mg tablet TAKE 1 TAB BY MOUTH EVERY 6 HOURS AS NEEDED FOR SPASMS    acetaminophen (Tylenol Extra Strength) 500 mg tablet Take 2 Tabs by mouth every six (6) hours as needed for Pain.  metoprolol succinate (TOPROL-XL) 25 mg XL tablet TAKE 1 TABLET BY MOUTH EVERY DAY    ondansetron (ZOFRAN ODT) 4 mg disintegrating tablet DISSOLVE 1 TABLET BY MOUTH EVERY 8 HOURS AS NEEDED FOR NAUSEA    pantoprazole (PROTONIX) 40 mg tablet Take 1 Tab by mouth daily. Take in 2 week intervals for GERD Flares    Combivent Respimat  mcg/actuation inhaler TAKE 1 PUFF BY INHALATION EVERY SIX (6) HOURS AS NEEDED FOR WHEEZING.  azelastine (ASTELIN) 137 mcg (0.1 %) nasal spray     XOLAIR 150 mg solr Indications: injection    hydrOXYzine HCl (ATARAX) 25 mg tablet TAKE 1 TABLET BY MOUTH 3 TIMES A DAY AS NEEDED FOR ITCHING FOR ANXIETY    montelukast (SINGULAIR) 10 mg tablet TAKE 1 TAB BY MOUTH DAILY.  tiotropium (SPIRIVA WITH HANDIHALER) 18 mcg inhalation capsule Take 1 Cap by inhalation daily.  PROAIR HFA 90 mcg/actuation inhaler TAKE 2 PUFFS BY INHALATION EVERY FOUR (4) HOURS AS NEEDED.  lidocaine (LIDODERM) 5 % Apply patch to the affected area for 12 hours a day and remove for 12 hours a day.  albuterol (PROVENTIL VENTOLIN) 2.5 mg /3 mL (0.083 %) nebulizer solution 3 mL by Nebulization route every four (4) hours as needed for Wheezing.  diclofenac (VOLTAREN) 1 % gel Apply 2 g to affected area every six (6) hours.  fluticasone (FLONASE) 50 mcg/actuation nasal spray 2 Sprays by Both Nostrils route daily.  oxyCODONE-acetaminophen (PERCOCET 10)  mg per tablet Take 1 Tab by mouth two (2) times daily as needed for Pain for up to 30 days. Max Daily Amount: 2 Tabs. Indications: pain    naloxone (NARCAN) 4 mg/actuation nasal spray Use 1 spray into 1 nostril for OVERDOSE. Call 911. For subsequent doses, give in alternating nostrils. May repeat every 2 to 3 min.  (Patient not taking: Reported on 6/30/2021)    miscellaneous medical supply [facility] Shower seat for chronic knee pain, pt planning to have right TKR in June due to condition. Very limited range of motion. No current facility-administered medications for this visit. Visit Vitals  /89 (BP 1 Location: Right upper arm, BP Patient Position: Sitting, BP Cuff Size: Large adult)   Pulse 88   Temp 96.9 °F (36.1 °C) (Oral)   Resp 17   Ht 5' 1\" (1.549 m)   Wt 212 lb (96.2 kg)   LMP 12/29/2016 (Exact Date) Comment: partial hysterectomy   SpO2 99%   BMI 40.06 kg/m²       Wt Readings from Last 3 Encounters:   06/30/21 212 lb (96.2 kg)   04/02/21 198 lb 4.8 oz (89.9 kg)   02/04/21 198 lb 4.8 oz (89.9 kg)     Physical Exam:   General appearance - alert, well appearing, and in no distress. Mental status - A/O x 4,normal mood and affect. Chest -  Symmetric chest rise. No wheezing. No distress. Heart - Normal rate. Abdomen- Soft, round. Non-distended, NT. No pulsatile masses or hernias. Ext-  No clubbing or cyanosis. 2-3+, BLE  Skin-Warm and dry. No hyperpigmentation, ulcerations, or suspicious lesions. Neuro - Normal speech, no focal findings or movement disorder. Normal strength and muscle tone. linping, antalgic gait,    On this date 06/30/2021 I have spent 40 minutes reviewing previous notes, test results and face to face with the patient discussing the diagnosis and importance of compliance with the treatment plan as well as documenting on the day of the visit. An electronic signature was used to authenticate this note.   -- Shira Callaway NP

## 2021-06-30 NOTE — PATIENT INSTRUCTIONS
Elevate legs above the level of your heart while at rest to help reduce leg swelling. Also use compression stockings, available in medical supply and drug stores, to reduce swelling. Wear when you are standing or on your feet for long periods of time OR overnight if you are NOT active for the day. Low Sodium Diet (2,000 Milligram): Care Instructions  Overview     Limiting sodium can be an important part of managing some health problems. The most common source of sodium is salt. People get most of the salt in their diet from canned, prepared, and packaged foods. Fast food and restaurant meals also are very high in sodium. Your doctor will probably limit your sodium to less than 2,000 milligrams (mg) a day. This limit counts all the sodium in prepared and packaged foods and any salt you add to your food. Follow-up care is a key part of your treatment and safety. Be sure to make and go to all appointments, and call your doctor if you are having problems. It's also a good idea to know your test results and keep a list of the medicines you take. How can you care for yourself at home? Read food labels  · Read labels on cans and food packages. The labels tell you how much sodium is in each serving. Make sure that you look at the serving size. If you eat more than the serving size, you have eaten more sodium. · Food labels also tell you the Percent Daily Value for sodium. Choose products with low Percent Daily Values for sodium. · Be aware that sodium can come in forms other than salt, including monosodium glutamate (MSG), sodium citrate, and sodium bicarbonate (baking soda). MSG is often added to Asian food. When you eat out, you can sometimes ask for food without MSG or added salt. Buy low-sodium foods  · Buy foods that are labeled \"unsalted\" (no salt added), \"sodium-free\" (less than 5 mg of sodium per serving), or \"low-sodium\" (140 mg or less of sodium per serving).  Foods labeled \"reduced-sodium\" and \"light sodium\" may still have too much sodium. Be sure to read the label to see how much sodium you are getting. · Buy fresh vegetables, or frozen vegetables without added sauces. Buy low-sodium versions of canned vegetables, soups, and other canned goods. Prepare low-sodium meals  · Cut back on the amount of salt you use in cooking. This will help you adjust to the taste. Do not add salt after cooking. One teaspoon of salt has about 2,300 mg of sodium. · Take the salt shaker off the table. · Flavor your food with garlic, lemon juice, onion, vinegar, herbs, and spices. Do not use soy sauce, lite soy sauce, steak sauce, onion salt, garlic salt, celery salt, or ketchup on your food. · Use low-sodium salad dressings, sauces, and ketchup. Or make your own salad dressings and sauces without adding salt. · Use less salt (or none) when recipes call for it. You can often use half the salt a recipe calls for without losing flavor. Other foods such as rice, pasta, and grains do not need added salt. · Rinse canned vegetables, and cook them in fresh water. This removes some--but not all--of the salt. · Avoid water that is naturally high in sodium or that has been treated with water softeners, which add sodium. If you buy bottled water, read the label and choose a sodium-free brand. Avoid high-sodium foods  · Avoid eating:  ? Smoked, cured, salted, and canned meat, fish, and poultry. ? Ham, morejon, hot dogs, and luncheon meats. ? Regular, hard, and processed cheese and regular peanut butter. ? Crackers with salted tops, and other salted snack foods such as pretzels, chips, and salted popcorn. ? Frozen prepared meals, unless labeled low-sodium. ? Canned and dried soups, broths, and bouillon, unless labeled sodium-free or low-sodium. ? Canned vegetables, unless labeled sodium-free or low-sodium. ? Western Nicole fries, pizza, tacos, and other fast foods.   ? Pickles, olives, ketchup, and other condiments, especially soy sauce, unless labeled sodium-free or low-sodium. Where can you learn more? Go to http://www.gray.com/  Enter V843 in the search box to learn more about \"Low Sodium Diet (2,000 Milligram): Care Instructions. \"  Current as of: December 17, 2020               Content Version: 12.8  © 9568-1887 [employer] Care instructions adapted under license by [employer] (which disclaims liability or warranty for this information). If you have questions about a medical condition or this instruction, always ask your healthcare professional. [place] [address] any warranty or liability for your use of this information.

## 2021-06-30 NOTE — PROGRESS NOTES
Pt is here for   Chief Complaint   Patient presents with    Medication Refill     pain meds    Labs     annual    Swelling     right leg, with pain of the shin area     1. Have you been to the ER, urgent care clinic since your last visit? Hospitalized since your last visit? No    2. Have you seen or consulted any other health care providers outside of the [address] [place] [address] since your last visit? Include any pap smears or colon screening.  No    States pain level is a 10/10 leg pain     Last had something for pain yesterday

## 2021-07-07 ENCOUNTER — TRANSCRIBE ORDER (OUTPATIENT)
Dept: SCHEDULING | Age: 56
End: 2021-07-07

## 2021-07-07 DIAGNOSIS — M54.31 BILATERAL SCIATICA: Primary | ICD-10-CM

## 2021-07-07 DIAGNOSIS — M54.32 BILATERAL SCIATICA: Primary | ICD-10-CM

## 2021-07-08 LAB — DRUGS UR: NORMAL

## 2021-07-14 ENCOUNTER — HOSPITAL ENCOUNTER (OUTPATIENT)
Dept: MRI IMAGING | Age: 56
Discharge: HOME | End: 2021-07-14
Attending: ORTHOPAEDIC SURGERY
Payer: MEDICAID

## 2021-07-14 DIAGNOSIS — M54.32 BILATERAL SCIATICA: ICD-10-CM

## 2021-07-14 DIAGNOSIS — M54.31 BILATERAL SCIATICA: ICD-10-CM

## 2021-07-14 PROCEDURE — 72148 MRI LUMBAR SPINE W/O DYE: CPT

## 2021-07-16 DIAGNOSIS — J44.9 ASTHMA WITH COPD (HCC): ICD-10-CM

## 2021-07-16 RX ORDER — IPRATROPIUM BROMIDE AND ALBUTEROL 20; 100 UG/1; UG/1
1 SPRAY, METERED RESPIRATORY (INHALATION)
Qty: 1 INHALER | Refills: 0 | Status: SHIPPED | OUTPATIENT
Start: 2021-07-16 | End: 2021-08-16

## 2021-07-22 ENCOUNTER — APPOINTMENT (OUTPATIENT)
Dept: GENERAL RADIOLOGY | Age: 56
End: 2021-07-22
Attending: EMERGENCY MEDICINE
Payer: MEDICARE

## 2021-07-22 ENCOUNTER — HOSPITAL ENCOUNTER (EMERGENCY)
Age: 56
Discharge: HOME | End: 2021-07-22
Attending: EMERGENCY MEDICINE
Payer: MEDICARE

## 2021-07-22 VITALS
BODY MASS INDEX: 40.02 KG/M2 | HEIGHT: 61 IN | HEART RATE: 83 BPM | SYSTOLIC BLOOD PRESSURE: 153 MMHG | DIASTOLIC BLOOD PRESSURE: 97 MMHG | RESPIRATION RATE: 16 BRPM | WEIGHT: 212 LBS | TEMPERATURE: 98.6 F | OXYGEN SATURATION: 100 %

## 2021-07-22 DIAGNOSIS — S63.651A SPRAIN OF METACARPOPHALANGEAL (MCP) JOINT OF LEFT INDEX FINGER, INITIAL ENCOUNTER: Primary | ICD-10-CM

## 2021-07-22 PROCEDURE — 73140 X-RAY EXAM OF FINGER(S): CPT

## 2021-07-22 PROCEDURE — 99283 EMERGENCY DEPT VISIT LOW MDM: CPT

## 2021-07-23 NOTE — ED PROVIDER NOTES
58-year-old female presents with left fourth finger pain after slipping then catching herself on her left hand in the pool today. Patient's fourth finger was hyperflexed when she landed on her hand. Since then she has had swelling and pain in the MCP and PIP area. Denies other injury. Specifically denies wrist, elbow, or shoulder pain on that side. States that she is on Percocet because she had a right knee replacement and a ramesh placed in her right arm and has been taking that without significant pain relief. States she is allergic to Motrin, hydrocodone, tramadol.            Past Medical History:   Diagnosis Date    Arthritis     Asthma     uses inhalers    Chronic obstructive pulmonary disease (HCC)     bronchitis    Chronic pain     GERD (gastroesophageal reflux disease)     History of seasonal allergies     Hypertension     Ill-defined condition     environmental allergies     Ill-defined condition     Multiple body piercings; unable to remove tongue and lip piercings    Ill-defined condition 2018    GASTRITIS PER ENDOSCOPY    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    Psychiatric disorder     DEPRESSION    Thyroid disease     hypo    Ventral hernia 12/31/2013       Past Surgical History:   Procedure Laterality Date    HX HEENT  2009    thyroidectomy    HX KNEE REPLACEMENT      R    HX KNEE REPLACEMENT  03/26/2019    HX MOHS PROCEDURES Right 3/8/11    HX OTHER SURGICAL      hiatal hernia repair    HX PARTIAL HYSTERECTOMY      HX TUBAL LIGATION           Family History:   Problem Relation Age of Onset    Cancer Father         lung cancer    Heart Disease Mother     Hypertension Mother    [facility] Diabetes Mother     Anesth Problems Neg Hx        Social History     Socioeconomic History    Marital status: SINGLE     Spouse name: Not on file    Number of children: Not on file    Years of education: Not on file    Highest education level: Not on file   Occupational History    Not on file   Tobacco Use  Smoking status: Current Every Day Smoker     Packs/day: 0.50     Years: 1.50     Pack years: 0.75     Types: Cigarettes     Last attempt to quit: 10/1/2015     Years since quittin.8    Smokeless tobacco: Never Used   Vaping Use    Vaping Use: Never used   Substance and Sexual Activity    Alcohol use: No    Drug use: No    Sexual activity: Yes     Partners: Female     Birth control/protection: Surgical     Comment: Post menopausal/partial hysterectomy   Other Topics Concern    Not on file   Social History Narrative    Not on file     Social Determinants of Health     Financial Resource Strain:     Difficulty of Paying Living Expenses:    Food Insecurity:     Worried About Running Out of Food in the Last Year:     Ran Out of Food in the Last Year:    Transportation Needs:     Lack of Transportation (Medical):  Lack of Transportation (Non-Medical):    Physical Activity:     Days of Exercise per Week:     Minutes of Exercise per Session:    Stress:     Feeling of Stress :    Social Connections:     Frequency of Communication with Friends and Family:     Frequency of Social Gatherings with Friends and Family:     Attends Protestant Services:     Active Member of Clubs or Organizations:     Attends Club or Organization Meetings:     Marital Status:    Intimate Partner Violence:     Fear of Current or Ex-Partner:     Emotionally Abused:     Physically Abused:     Sexually Abused: ALLERGIES: Codeine, Hydrocodone, Mold, Tramadol, and Ibuprofen    Review of Systems   Constitutional: Negative. Negative for chills, fever and unexpected weight change. HENT: Negative. Negative for congestion and trouble swallowing. Eyes: Negative for discharge. Respiratory: Negative. Negative for cough, chest tightness and shortness of breath. Cardiovascular: Negative. Negative for chest pain. Gastrointestinal: Negative.   Negative for abdominal distention, abdominal pain, constipation, diarrhea and nausea. Endocrine: Negative. Genitourinary: Negative. Negative for difficulty urinating, dysuria, frequency and urgency. Musculoskeletal: Positive for arthralgias and joint swelling. Negative for myalgias. Skin: Negative. Negative for color change. Allergic/Immunologic: Negative. Neurological: Negative. Negative for dizziness, speech difficulty and headaches. Hematological: Negative. Psychiatric/Behavioral: Negative. Negative for agitation and confusion. All other systems reviewed and are negative. Vitals:    07/22/21 2050   BP: (!) 153/97   Pulse: 83   Resp: 16   Temp: 98.6 °F (37 °C)   SpO2: 100%   Weight: 96.2 kg (212 lb)   Height: 5' 1\" (1.549 m)            Physical Exam  Vitals and nursing note reviewed. Constitutional:       Appearance: She is well-developed. HENT:      Head: Normocephalic and atraumatic. Eyes:      Conjunctiva/sclera: Conjunctivae normal.   Cardiovascular:      Rate and Rhythm: Normal rate and regular rhythm. Pulmonary:      Effort: Pulmonary effort is normal. No respiratory distress. Abdominal:      Palpations: Abdomen is soft. Tenderness: There is no abdominal tenderness. Musculoskeletal:         General: No deformity. Normal range of motion. Cervical back: Neck supple. Skin:     General: Skin is warm and dry. Neurological:      Mental Status: She is alert and oriented to person, place, and time. Psychiatric:         Behavior: Behavior normal.         Thought Content: Thought content normal.          MDM  Number of Diagnoses or Management Options  Sprain of metacarpophalangeal (MCP) joint of left index finger, initial encounter  Diagnosis management comments: Sprain vs fx         Procedures      LABORATORY TESTS:  No results found for this or any previous visit (from the past 12 hour(s)). IMAGING RESULTS:  XR 4TH FINGER LT MIN 2 V   Final Result   No acute fracture or dislocation. Xochitl Grumbles            MEDICATIONS GIVEN:  Medications - No data to display    IMPRESSION:  1. Sprain of metacarpophalangeal (MCP) joint of left index finger, initial encounter        PLAN:  1. Discharge Medication List as of 7/22/2021  9:55 PM        2.    Follow-up Information     Follow up With Specialties Details Why Contact Info    Wilfredo George MD Hand Surgery, General Surgery  As needed [id number]  [id number] [facility] [phone]          Return to ED if worse

## 2021-07-23 NOTE — ED TRIAGE NOTES
Pt presents to the ED with c/o playing in the pool with kids and injuring her 4th left digit. Pt takes oxycodone daily for pain but stated it is not helping her finger.

## 2021-07-23 NOTE — ED TRIAGE NOTES
Pt is alert and oriented. Pt to ED today after injuring her 4th digit to the left hand earlier this morning playing in the pool with kids. Pt reports taking oxycodone about an hour after the injury with minimal relief. Emergency Department Nursing Plan of Care       The Nursing Plan of Care is developed from the Nursing assessment and Emergency Department Attending provider initial evaluation. The plan of care may be reviewed in the ED Provider note.     The Plan of Care was developed with the following considerations:   Patient / Family readiness to learn indicated by:verbalized understanding  Persons(s) to be included in education: patient  Barriers to Learning/Limitations:No    Signed     Leonides Hebert RN    7/22/2021   9:08 PM

## 2021-07-27 ENCOUNTER — OFFICE VISIT (OUTPATIENT)
Dept: INTERNAL MEDICINE CLINIC | Age: 56
End: 2021-07-27
Payer: MEDICARE

## 2021-07-27 VITALS
WEIGHT: 213 LBS | TEMPERATURE: 96.9 F | BODY MASS INDEX: 40.22 KG/M2 | RESPIRATION RATE: 18 BRPM | HEART RATE: 78 BPM | HEIGHT: 61 IN | DIASTOLIC BLOOD PRESSURE: 85 MMHG | OXYGEN SATURATION: 96 % | SYSTOLIC BLOOD PRESSURE: 148 MMHG

## 2021-07-27 DIAGNOSIS — M54.41 CHRONIC BILATERAL LOW BACK PAIN WITH RIGHT-SIDED SCIATICA: ICD-10-CM

## 2021-07-27 DIAGNOSIS — F33.1 MODERATE EPISODE OF RECURRENT MAJOR DEPRESSIVE DISORDER (HCC): ICD-10-CM

## 2021-07-27 DIAGNOSIS — J44.9 ASTHMA WITH COPD (HCC): ICD-10-CM

## 2021-07-27 DIAGNOSIS — E03.9 HYPOTHYROIDISM, UNSPECIFIED TYPE: ICD-10-CM

## 2021-07-27 DIAGNOSIS — M19.012 PRIMARY OSTEOARTHRITIS OF LEFT SHOULDER: ICD-10-CM

## 2021-07-27 DIAGNOSIS — Z13.220 SCREENING FOR LIPID DISORDERS: ICD-10-CM

## 2021-07-27 DIAGNOSIS — M17.11 PRIMARY OSTEOARTHRITIS OF RIGHT KNEE: ICD-10-CM

## 2021-07-27 DIAGNOSIS — R60.0 BILATERAL LEG EDEMA: Primary | ICD-10-CM

## 2021-07-27 DIAGNOSIS — G89.29 CHRONIC BILATERAL LOW BACK PAIN WITH RIGHT-SIDED SCIATICA: ICD-10-CM

## 2021-07-27 DIAGNOSIS — Z79.891 CHRONIC USE OF OPIATE FOR THERAPEUTIC PURPOSE: ICD-10-CM

## 2021-07-27 DIAGNOSIS — I10 ESSENTIAL HYPERTENSION WITH GOAL BLOOD PRESSURE LESS THAN 140/90: ICD-10-CM

## 2021-07-27 PROCEDURE — 99214 OFFICE O/P EST MOD 30 MIN: CPT | Performed by: NURSE PRACTITIONER

## 2021-07-27 PROCEDURE — G8754 DIAS BP LESS 90: HCPCS | Performed by: NURSE PRACTITIONER

## 2021-07-27 PROCEDURE — G8417 CALC BMI ABV UP PARAM F/U: HCPCS | Performed by: NURSE PRACTITIONER

## 2021-07-27 PROCEDURE — G8427 DOCREV CUR MEDS BY ELIG CLIN: HCPCS | Performed by: NURSE PRACTITIONER

## 2021-07-27 PROCEDURE — G9899 SCRN MAM PERF RSLTS DOC: HCPCS | Performed by: NURSE PRACTITIONER

## 2021-07-27 PROCEDURE — G8753 SYS BP > OR = 140: HCPCS | Performed by: NURSE PRACTITIONER

## 2021-07-27 PROCEDURE — G9717 DOC PT DX DEP/BP F/U NT REQ: HCPCS | Performed by: NURSE PRACTITIONER

## 2021-07-27 PROCEDURE — 3017F COLORECTAL CA SCREEN DOC REV: CPT | Performed by: NURSE PRACTITIONER

## 2021-07-27 RX ORDER — OXYCODONE AND ACETAMINOPHEN 10; 325 MG/1; MG/1
1 TABLET ORAL
Qty: 45 TABLET | Refills: 0 | Status: SHIPPED | OUTPATIENT
Start: 2021-09-28 | End: 2021-10-28 | Stop reason: SDUPTHER

## 2021-07-27 RX ORDER — OXYCODONE AND ACETAMINOPHEN 10; 325 MG/1; MG/1
1 TABLET ORAL
Qty: 45 TABLET | Refills: 0 | Status: SHIPPED | OUTPATIENT
Start: 2021-07-29 | End: 2021-10-28 | Stop reason: SDUPTHER

## 2021-07-27 RX ORDER — OXYCODONE AND ACETAMINOPHEN 10; 325 MG/1; MG/1
1 TABLET ORAL
Qty: 45 TABLET | Refills: 0 | Status: SHIPPED | OUTPATIENT
Start: 2021-08-28 | End: 2021-10-28 | Stop reason: SDUPTHER

## 2021-07-27 NOTE — LETTER
Name:Louie Salamanca   :1965   MR #:[id number]   Provider Name:Susy Dao NP   *[id number]*  BSMG-491 ()  Page 1 of 5 Initial [employer]    CONTROLLED SUBSTANCE AGREEMENT    I may be prescribed medications that are controlled substances as part  of my treatment plan for management of my medical condition(s). The goal of my treatment plan is to maintain and/or improve my health and wellbeing. Because controlled substances have an increased risk of abuse or harm, continual re-evaluation is needed determine if the goals of my treatment plan are being met for my safety and the safety of others. Jeannine June  am entering into this Controlled Substance Agreement with my provider, Carrie Curran NP at [place] [address] . I understand that successful treatment requires mutual trust and honesty between me and my provider. I understand that there are state and federal laws and regulations which apply to the medications that my provider may prescribe that must be followed. I understand there are risks and benefits ts of taking the medicines that my provider may prescribe. I understand and agree that following this Agreement is necessary in continuing my provider-patient relationship and success of my treatment plan. As a part of my treatment plan, I agree to the following:    COMMUNICATION:    1. I will communicate fully with my provider about my medical condition(s), including the effect on my daily life and how well my medications are helping. I will tell my provider all of the medications that I take for any reason, including medications I receive from another health care provider, and will notify my provider about all issues, problems or concerns, including any side effects, which may be related to my medications. I understand that this information allows my provider to adjust my treatment plan to help manage my medical condition.  I understand that this information will become part of my permanent medical record. 2. I will notify my provider if I have a history of alcohol/drug misuse/addiction or if I have had treatment for alcohol/drug addiction in the past, or if I have a new problem with or concern about alcohol/drug use/addiction, because this increases the likelihood of high risk behaviors and may lead to serious medical conditions. 3. Females Only: I will notify my provider if I am or become pregnant, or if I intend to become pregnant, or if I intend to breastfeed. I understand that communication of these issues with my provider is important, due to possible effects my medication could have on an unborn fetus or breastfeeding child. Name:Inez Kwong   :1965   MR #:[id number]   Provider Name:Nuvia Dao, SHAGGY   *[id number]*  BSMG-491 ()  Page 2 of 5 Initial SMARTworks      MISUSE OF MEDICATIONS / DRUGS:    1. I agree to take all controlled substances as prescribed, and will not misuse or abuse any controlled substances prescribed by my provider. For my safety, I will not increase the amount of medicine I take without first talking with and getting permission from my provider. 2. If I have a medical emergency, another health care provider may prescribe me medication. If I seek emergency treatment, I will notify my provider within seventy-two (72) hours. 3. I understand that my provider may discuss my use and/or possible misuse/abuse of controlled substances and alcohol, as appropriate, with any health care provider involved in my care, pharmacist or legal authority. ILLEGAL DRUGS:    1. I will not use illegal drugs of any kind, including but not limited to marijuana, heroin, cocaine, or any prescription drug which is not prescribed to me. DRUG DIVERSION / PRESCRIPTION FRAUD:    1. I will not share, sell, trade, give away, or otherwise misuse my prescriptions or medications.     2. I will not alter any prescriptions provided to me by my provider. SINGLE PROVIDER:    1. I agree that all controlled substances that I take will be prescribed only by my provider (or his/her covering provider) under this Agreement. This agreement does not prevent me from seeking emergency medical treatment or receiving pain management related to a surgery. PROTECTING MEDICATIONS:    1. I am responsible for keeping my prescriptions and medications in a safe and secure place including safeguarding them from loss or theft. I understand that lost, stolen or damaged/destroyed prescriptions or medications will not be replaced. Name:Inez Cantrell   :1965   MR #:[id number]   Provider Name:Susy Dao, SHAGGY   *[id number]*  BSMG-491 ()  Page 3 of 5 Initial [employer]  PRESCRIPTION RENEWALS/REFILLS:    1. I will follow my controlled substance medication schedule as prescribed by my provider. 2. I understand and agree that I will make any requests for renewals or refills of my prescriptions only at the time of an office visit or during my providers regular office hours subject to the prescription refill requirements of the individual practice. 3. I understand that my provider may not call in prescriptions for controlled substances to my pharmacy. 4. I understand that my provider may adjust or discontinue these medications as deemed appropriate for my medical treatment plan. This Agreement does not guarantee the prescription of controlled medications. 5. I agree that if my medications are adjusted or discontinued, I will properly dispose of any remaining medications. I understand that I will be required to dispose of any remaining controlled medications prior to being provided with any prescriptions for other controlled medications. 6. I understand that the renewal of my prescription depends on my medical condition, my consistent participation, and my adherence with my treatment plan and this Agreement.     7. I understand that if I do not keep an appointment with my provider, I may not receive a renewal or refill for my controlled substance medication. PRESCRIPTION MONITORING / DRUG TESTIN. I understand that my provider may require me to provide urine, saliva or blood for testing at any time. I understand that this testing will be used to monitor for safety and adherence with my treatment plan and this Agreement. 2. I understand that my provider may ask me to provide an observed urine specimen, which means that a nurse or other health care provider may watch me provide urine, and I agree to cooperate if I am asked to provide an observed specimen. 3. I understand that if I do not provide urine, saliva or blood samples within two (2) hours of my providers request, or other timeframe decided by my provider, my treatment plan could be changed, or my prescriptions and medications may be changed or ended. 4. I understand that urine, saliva and blood test results will be a part of my permanent medical record. Name:Inez Ahuja   KSB:[id number]   MR #:[id number]   Provider Name:Charlie Dao NP   *[id number]*  BSMG-491 ()  Page 4 of 5 Initial SMARTworks    5. I understand that my provider is required to obtain a copy of my State Prescription Monitoring Program () Report at any time in order to safely prescribe medications. 6. I will bring all of my prescribed controlled substance medications in their original bottles to all of my scheduled appointments. 7. I understand that my provider may ask me to come to the practice with all of my prescribed medications for a random pill count at any time. I agree to cooperate if I am asked to come in for a random pill count. I understand that if I do not arrive in the timeframe decided by my provider, my treatment plan could be changed, or my prescriptions and medications may be changed or ended.     COOPERATION WITH INVESTIGATIONS:    1. I authorize my provider and my pharmacy to cooperate fully with any local, state, or federal law enforcement agency in the investigation of any possible misuse, sale, or other diversion of my controlled substance prescriptions or medications. RISKS:    1. I understand that my level of consciousness may be affected from the use of controlled substances, and I understand that there are risks, benefits, effects and potential alternatives (including no treatment) to the medications that my provider has prescribed. 2. I understand that I may become drowsy, tired, dizzy, constipated, and sick to my stomach, or have changes in my mood or in my sleep while taking my medications. I have talked with my provider about these possible side effects, risks, benefits, and alternative treatments, and my provider has answered all of my questions. 3. I understand that I should not suddenly stop taking my medications without first speaking with my provider. I understand that if I suddenly stop taking my medications, I may experience nausea, vomiting, sweating,anxiety, sleeplessness, itching or other uncomfortable feelings. 4. I will not take my medications with alcohol of any kind, including beer, wine or liquor. I understand that drinking alcohol with my medications increases the chances of side effects, including breathing problems or even death. 5. I understand that if I have a history of alcoholism or other drug addiction I may be at increased risk of addiction to my medications. Signs of addiction might include craving, compulsive use, and continued use despite harm. Since addiction is a disease, I understand my provider may decide to change my medications and refer me to appropriate treatment services. I understand that this information would become part of my permanent medical record. Name:. Connie Quispewater   FUD:[birth year]   MR #:[id number]   Provider Name:Nikolai Dao, SHAGGY   *[id number]*  BSMG-491 (5/16)  Page 5 of 5 Initial [email or web address]      6. Females only: Children born to women who regularly take controlled substances are likely to have physical problems and suffer withdrawal symptoms at birth. If I am of child-bearing age, I understand that I should use safe and effective birth control while taking any controlled substances to avoid the impact of medications on an unborn fetus or  child. I agree to notify my provider immediately if I should become pregnant so that my treatment plan can be adjusted. 7. Males only: I understand that chronic use of controlled substances has been associated with low testosterone levels in males which may affect my mood, stamina, sexual desire, and general health. I understand that my provider may order the appropriate laboratory test to determine my testosterone level,and I agree to this testing. ADHERENCE:    1. I understand that if I do not adhere to this Agreement in any way, my provider may change my prescriptions, stop prescribing controlled substances or end our provider-patient relationship. 2. If my provider decides to stop prescribing medication, or decides to end our provider-patient relationship,my provider may require that I taper my medications slowly. If necessary, my provider may also provide a prescription for other medications to treat my withdrawal symptoms. UNDERSTANDING THIS AGREEMENT:    I understand that my provider may adjust or stop my prescriptions for controlled substances based on my medical condition and my treatment plan. I understand that this Agreement does not guarantee that I will be prescribed medications or controlled substances. I understand that controlled substances may be just one part  of my treatment plan. My initial on each page and my signature below shows that I have read each page of this Agreement, I have had an opportunity to ask questions, and all of my questions have been answered to my satisfaction by my provider.     By signing below, I agree to comply with this Agreement, and I understand that if I do not follow the Agreements listed above, my provider may stop        _________________________________________  Date/Time 7/27/2021 1:32 PM                 (Patient Signature)

## 2021-07-27 NOTE — PATIENT INSTRUCTIONS
Learning About Lumbar Epidural Steroid Injections  What is a lumbar epidural steroid injection? A lumbar epidural injection is a shot into the epidural space--the area in your back around the spinal cord. The shot may help reduce pain, tingling, or numbness in your back, buttock, or leg. The shot may have a steroid to reduce pain and swelling and a local anesthetic to numb nerves. How is a lumbar epidural steroid injection done? The doctor may use an imaging test before or during your injection. This can be an MRI, a CT scan, or an X-ray. These tests can show where your nerve problems are. After finding the right spot, the doctor may inject a numbing medicine into the skin where you will get the steroid injection. Then he or she puts the needle for the steroid into the numbed area. You may feel some pressure. You could feel some stinging or burning during the injection. How long does an epidural steroid injection take? It takes about 10 to 15 minutes to get this injection. You will probably go home about 20 to 30 minutes after you get it. What can you expect after a lumbar epidural steroid injection? If your injection had local anesthetic and a steroid, your legs may feel heavy or numb right after. You will probably be able to walk. But you may need to be extra careful. Take care not to lose your balance, and be sure to follow your doctor's instructions. If your injection contained local anesthetic, you may feel better right away. But this pain relief will last only a few hours. Your pain will probably return. This is because the steroids have not started working yet. Before the steroids start to work, your back may be sore for a few days. These injections don't always work. When they do, it takes 1 to 5 days. This pain relief can last for several days to a few months or longer. You may want to do less than normal for a few days. But you may also be able to return to your daily routine.   Some people are dizzy or feel sick to their stomach after getting this injection. These symptoms usually don't last very long. If your pain is better, you may be able to keep doing your normal activities or physical therapy. But try not to overdo it, even if your back pain has improved a lot. If your pain is only a little better or if it comes back, your doctor may recommend another injection in a few weeks. If your pain has not changed, talk to your doctor about other treatment choices. Follow-up care is a key part of your treatment and safety. Be sure to make and go to all appointments, and call your doctor if you are having problems. It's also a good idea to know your test results and keep a list of the medicines you take. Where can you learn more? Go to http://www.gray.com/  Enter H162 in the search box to learn more about \"Learning About Lumbar Epidural Steroid Injections. \"  Current as of: November 16, 2020               Content Version: 12.8  © 2006-2021 [employer]. Care instructions adapted under license by [employer] (which disclaims liability or warranty for this information). If you have questions about a medical condition or this instruction, always ask your healthcare professional. [address] any warranty or liability for your use of this information.

## 2021-07-27 NOTE — PROGRESS NOTES
Pt is here for   Chief Complaint   Patient presents with    Medication Refill     pain meds, and pain med agreement    Follow-up     follow up, leg swelling, HTN     1. Have you been to the ER, urgent care clinic since your last visit? Hospitalized since your last visit? No    2. Have you seen or consulted any other health care providers outside of the [address] [place] [address] since your last visit? Include any pap smears or colon screening.  No      Pt states pain level is a 10/10 back and leg  Last had something for pain yesterday

## 2021-07-27 NOTE — PROGRESS NOTES
Michela Christianson (: 1965) is a 60 y.o. female, established patient, here for evaluation of the following chief complaint(s):  Medication Refill (pain meds, and pain med agreement) and Follow-up (follow up, leg swelling, HTN)       ASSESSMENT/PLAN:  Below is the assessment and plan developed based on review of pertinent history, physical exam, labs, studies, and medications. 1. Bilateral leg edema  -     METABOLIC PANEL, COMPREHENSIVE; Future  -     CBC WITH AUTOMATED DIFF; Future  -     LIPID PANEL; Future  -     SED RATE (ESR); Future  -     NT-PRO BNP; Future  2. Primary osteoarthritis of right knee  -     oxyCODONE-acetaminophen (PERCOCET 10)  mg per tablet; Take 1 Tablet by mouth every twelve (12) hours as needed for Pain for up to 30 days. Max Daily Amount: 2 Tablets. Indications: pain, Normal, Disp-45 Tablet, R-0  -     oxyCODONE-acetaminophen (PERCOCET 10)  mg per tablet; Take 1 Tablet by mouth every twelve (12) hours as needed for Pain for up to 30 days. Max Daily Amount: 2 Tablets. Indications: pain, Normal, Disp-45 Tablet, R-0  -     oxyCODONE-acetaminophen (PERCOCET 10)  mg per tablet; Take 1 Tablet by mouth two (2) times daily as needed for Pain for up to 30 days. Max Daily Amount: 2 Tablets. Indications: pain, Normal, Disp-45 Tablet, R-0  -     TOXASSURE SELECT 13 (MW); Future  3. Chronic use of opiate for therapeutic purpose  -     oxyCODONE-acetaminophen (PERCOCET 10)  mg per tablet; Take 1 Tablet by mouth every twelve (12) hours as needed for Pain for up to 30 days. Max Daily Amount: 2 Tablets. Indications: pain, Normal, Disp-45 Tablet, R-0  -     oxyCODONE-acetaminophen (PERCOCET 10)  mg per tablet; Take 1 Tablet by mouth every twelve (12) hours as needed for Pain for up to 30 days. Max Daily Amount: 2 Tablets. Indications: pain, Normal, Disp-45 Tablet, R-0  -     oxyCODONE-acetaminophen (PERCOCET 10)  mg per tablet;  Take 1 Tablet by mouth two (2) times daily as needed for Pain for up to 30 days. Max Daily Amount: 2 Tablets. Indications: pain, Normal, Disp-45 Tablet, R-0  -     TOXASSURE SELECT 13 (MW); Future  -     HEMOGLOBIN A1C WITH EAG; Future  4. Primary osteoarthritis of left shoulder  -     oxyCODONE-acetaminophen (PERCOCET 10)  mg per tablet; Take 1 Tablet by mouth every twelve (12) hours as needed for Pain for up to 30 days. Max Daily Amount: 2 Tablets. Indications: pain, Normal, Disp-45 Tablet, R-0  -     oxyCODONE-acetaminophen (PERCOCET 10)  mg per tablet; Take 1 Tablet by mouth every twelve (12) hours as needed for Pain for up to 30 days. Max Daily Amount: 2 Tablets. Indications: pain, Normal, Disp-45 Tablet, R-0  -     oxyCODONE-acetaminophen (PERCOCET 10)  mg per tablet; Take 1 Tablet by mouth two (2) times daily as needed for Pain for up to 30 days. Max Daily Amount: 2 Tablets. Indications: pain, Normal, Disp-45 Tablet, R-0  -     TOXASSURE SELECT 13 (MW); Future  5. Chronic bilateral low back pain with right-sided sciatica  -     oxyCODONE-acetaminophen (PERCOCET 10)  mg per tablet; Take 1 Tablet by mouth every twelve (12) hours as needed for Pain for up to 30 days. Max Daily Amount: 2 Tablets. Indications: pain, Normal, Disp-45 Tablet, R-0  -     oxyCODONE-acetaminophen (PERCOCET 10)  mg per tablet; Take 1 Tablet by mouth every twelve (12) hours as needed for Pain for up to 30 days. Max Daily Amount: 2 Tablets. Indications: pain, Normal, Disp-45 Tablet, R-0  -     oxyCODONE-acetaminophen (PERCOCET 10)  mg per tablet; Take 1 Tablet by mouth two (2) times daily as needed for Pain for up to 30 days. Max Daily Amount: 2 Tablets. Indications: pain, Normal, Disp-45 Tablet, R-0  -     TOXASSURE SELECT 13 (MW); Future  6. Moderate episode of recurrent major depressive disorder ([facility] [address]  7. Essential hypertension with goal blood pressure less than 140/90  -     CBC WITH AUTOMATED DIFF;  Future  -     HEMOGLOBIN A1C WITH EAG; Future  -     LIPID PANEL; Future  8. Hypothyroidism, unspecified type  -     TSH 3RD GENERATION; Future  9. Screening for lipid disorders  -     LIPID PANEL; Future  10. Asthma with COPD ([address]  -     CBC WITH AUTOMATED DIFF; Future  -     SED RATE (ESR); Future      Return in about 3 months (around 10/27/2021) for OV- pain med refill, HTN, edema. Pt asked to complete follow by next visit: trial OFF Lasix. Eat low salt diet and USE compression stockings. Resumed opiate at normal quantity, since \[facility]\"" SERVICES scheduled for next week. SUBJECTIVE/OBJECTIVE:  HPI    Chronic Pain:  Patient has chronic BL knee pain and LBP for years, h/o right TKR (2016, Last revised ~ 2 yrs ago) and right wrist surgery (1/2021). Having CONTINUED right knee pain, seen by [facility], told her hardware is sticking forward, but deferring further intervention/imaging until LBP addressed. Had MRI done 7/2021, seen by spinal specialist with plan for DANTE on 8/3. Pain Scale: 10 - Worst pain ever/10. Pain in the left shoulder, wrist, knees, legs, and lower back is still limiting walking, sitting, reaching, lifting, and standing, exacerbated by forementioned acitivities to include stair climbing. Has tried prednisone, tramadol, injections, PT, gabapentin, cymbalta, and surgery in past with minimal relief. Pain has been controlled with Oxycodone, last taken today. The medication is kept safe by staying with her. Has NOT seen any other providers since last OV for pain medication. No significant changes to pain presentation since last OV. she is  able to do her normal daily activities. she reports the following adverse side effects: none. Least pain over the last week has been 8/10  Worst pain over the last week has been 10/10. Aberrant behaviors: None         Symptoms onset: problem is longstanding. Rheumatological ROS: ongoing significant pain which is stable and controlled by pain med.    Response to treatment plan: waxing and waning. Leg swelling improved with Lasix  Use. BP monitored at home, hold HCTZ since last visit. Will start home Xolair injections soon for ASTHMA/COPD therapy. Also continues allergy shots. Review of Systems  Constitutional: +MRSA in nose. negative for fevers, chills, anorexia and weight loss  Eyes:   negative for visual disturbance, drainage, and irritation  ENT:   +AR and environmental allergies. negative for tinnitus,sore throat,ear pain,and hoarseness  Respiratory:  + asthma/COPD. negative for hemoptysis  CV:   negative for chest pain, palpitations, and lower extremity edema  GI:   + GERD. negative for nausea, vomiting, diarrhea, abdominal pain, and melena  Endo:              +hypothyroidsim.  negative for polyuria,polydipsia,polyphagia, and heat intolerance  Genitourinary: negative for frequency, urgency, dysuria, retention, and hematuria  Integument:  negative for rash, ulcerations, and pruritus  Hematologic:  negative for easy bruising and bleeding  Musculoskel: negative for  muscle weakness  Neurological:  negative for headaches, dizziness, vertigo,and memory problems  Behavl/Psych: +depression, on cymbalta and zoloft. negative for feelings of  suicide    1./2. Medical history/Past medical History   Past Medical History:   Diagnosis Date    Arthritis     Asthma     uses inhalers    Chronic obstructive pulmonary disease (HCC)     bronchitis    Chronic pain     GERD (gastroesophageal reflux disease)     History of seasonal allergies     Hypertension     Ill-defined condition     environmental allergies     Ill-defined condition     Multiple body piercings; unable to remove tongue and lip piercings    Ill-defined condition 2018    GASTRITIS PER ENDOSCOPY    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    Psychiatric disorder     DEPRESSION    Thyroid disease     hypo    Ventral hernia 12/31/2013     Past Surgical History:   Procedure Laterality Date    HX HEENT  2009    thyroidectomy    HX KNEE REPLACEMENT      R    HX KNEE REPLACEMENT  03/26/2019    HX MOHS PROCEDURES Right 3/8/11    HX OTHER SURGICAL      hiatal hernia repair    HX PARTIAL HYSTERECTOMY      HX TUBAL LIGATION       Patient Active Problem List   Diagnosis Code    Ventral hernia K43.9    Chronic pain of right knee M25.561, G89.29    Chronic right shoulder pain M25.511, G89.29    Environmental and seasonal allergies J30.89    Ventral hernia without obstruction or gangrene K43.9    Primary osteoarthritis of right knee M17.11    Mixed simple and mucopurulent chronic bronchitis (HCC) J41.8    Acquired hypothyroidism E03.9    Chronic bilateral low back pain with right-sided sciatica M54.41, G89.29    Status post total right knee replacement Z96.651    Malignant hypertension I10    Pain management contract signed Z02.89    Chronic pain of left knee M25.562, G89.29    Moderate episode of recurrent major depressive disorder ([facility]) F33.1    Psychophysiological insomnia F51.04    Severe obesity (BMI 35.0-39. 9) with comorbidity ([facility]) E66.01    Post-tussive vomiting R11.10    Chronic use of opiate for therapeutic purpose Z79.891    Obesity, Class II, BMI 35-39.9 E66.9    Left wrist pain M25.532    Chronic left shoulder pain M25.512, G89.29    Osteoarthritis of spine with radiculopathy, cervical region M47.22    Primary osteoarthritis of left shoulder M19.012    MRSA carrier Z22.322    S/P total knee arthroplasty, right Z96.651    Loosening of knee joint prosthesis (HCC) T84.038A, Z96.659    Sprain and strain of right wrist S63.501A, J62.860G    History of recent fall Z91.81    Immunotherapy Z29.8    Ulnar impaction syndrome, right M25.831    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 Applicable records from prior treatment providers are apart of [facility] under the media/encounters tab.     4. Diagnostic, therapeutic and laboratory results are available in the [facility] chart. 5. Consultation notes are available for review in the media/encounters tab of the [address] [place] [address] chart. 6. Treatment goals include: pain control, improve activity level and function in regards to activities of daily living, and improved comfort level. Previously pt has been limited by pain in all these aspects. 7. The risks and benefits of treatment have been discussed at this office visit with the pt.  she understands that the medication has addictive potential.  Additionally the pt has been advised that narcotic pain medication may impair mental and/or physical ability required for performance of tasks such as driving or operating any other machinery. 8. Pt has an updated signed pain contract on file UPDATED TODAY and can be found under the media section of the [address] [place] [address] chart. 9. The pain contract is reviewed. Pain medication will be continued at the SAME dosage. PILL COUNT: 4, last fill date 6/30/21. Urine drug screening ordered/collected today. Additional diagnostic studies are not indicated at this time. Interventional procedure are not indicated at this time. Narcan prescribed already. 10. Medication prescibed is oxycodone  every 12 PRN # 45 with 2 refills for a 3 month supply.  was reviewed while planning for pain/anxiety management, no indications of drug diversion suspected. Prescription history is no suspicious for controlled substance overuse. 11. Patient instructions have been reviewed in detail as outlined above and in the pain contract. 12. Re-evaluation is planned for 3 months. Current Outpatient Medications   Medication Sig    [START ON 9/28/2021] oxyCODONE-acetaminophen (PERCOCET 10)  mg per tablet Take 1 Tablet by mouth every twelve (12) hours as needed for Pain for up to 30 days. Max Daily Amount: 2 Tablets.  Indications: pain    [START ON 8/28/2021] oxyCODONE-acetaminophen (PERCOCET 10)  mg per tablet Take 1 Tablet by mouth every twelve (12) hours as needed for Pain for up to 30 days. Max Daily Amount: 2 Tablets. Indications: pain    [START ON 7/29/2021] oxyCODONE-acetaminophen (PERCOCET 10)  mg per tablet Take 1 Tablet by mouth two (2) times daily as needed for Pain for up to 30 days. Max Daily Amount: 2 Tablets. Indications: pain    furosemide (LASIX) 20 mg tablet Take 1 Tablet by mouth daily.  amLODIPine (NORVASC) 5 mg tablet TAKE 1 TABLET BY MOUTH EVERY DAY    levothyroxine (SYNTHROID) 125 mcg tablet TAKE 1 TABLET BY MOUTH EVERY DAY BEFORE BREAKFAST    hydroCHLOROthiazide (HYDRODIURIL) 25 mg tablet TAKE 1 TAB BY MOUTH DAILY FOR HYPERTENSION    loratadine (CLARITIN) 10 mg tablet TAKE 1 TABLET BY MOUTH EVERY DAY    DULoxetine (CYMBALTA) 60 mg capsule TAKE 1 CAPSULE BY MOUTH EVERY DAY    traZODone (DESYREL) 100 mg tablet TAKE 1 TABLET BY MOUTH EVERY DAY AT NIGHT    Symbicort 160-4.5 mcg/actuation HFAA TAKE 2 PUFFS BY INHALATION TWO (2) TIMES A DAY.  DULoxetine (CYMBALTA) 30 mg capsule Take 1 Cap by mouth daily. Take WITH 60 mg CAPSULE    pregabalin (LYRICA) 100 mg capsule Take 1 Cap by mouth three (3) times daily. Max Daily Amount: 300 mg.    methocarbamoL (ROBAXIN) 750 mg tablet TAKE 1 TAB BY MOUTH EVERY 6 HOURS AS NEEDED FOR SPASMS    acetaminophen (Tylenol Extra Strength) 500 mg tablet Take 2 Tabs by mouth every six (6) hours as needed for Pain.  metoprolol succinate (TOPROL-XL) 25 mg XL tablet TAKE 1 TABLET BY MOUTH EVERY DAY    ondansetron (ZOFRAN ODT) 4 mg disintegrating tablet DISSOLVE 1 TABLET BY MOUTH EVERY 8 HOURS AS NEEDED FOR NAUSEA    pantoprazole (PROTONIX) 40 mg tablet Take 1 Tab by mouth daily. Take in 2 week intervals for GERD Flares    montelukast (SINGULAIR) 10 mg tablet TAKE 1 TAB BY MOUTH DAILY.  tiotropium (SPIRIVA WITH HANDIHALER) 18 mcg inhalation capsule Take 1 Cap by inhalation daily.     lidocaine (LIDODERM) 5 % Apply patch to the affected area for 12 hours a day and remove for 12 hours a day.  albuterol (PROVENTIL VENTOLIN) 2.5 mg /3 mL (0.083 %) nebulizer solution 3 mL by Nebulization route every four (4) hours as needed for Wheezing.  fluticasone (FLONASE) 50 mcg/actuation nasal spray 2 Sprays by Both Nostrils route daily.  Combivent Respimat  mcg/actuation inhaler Take 1 Puff by inhalation every six (6) hours as needed for Wheezing. (Patient not taking: Reported on 7/22/2021)    azelastine (ASTELIN) 137 mcg (0.1 %) nasal spray  (Patient not taking: Reported on 7/22/2021)    XOLAIR 150 mg solr Indications: injection (Patient not taking: Reported on 7/22/2021)    hydrOXYzine HCl (ATARAX) 25 mg tablet TAKE 1 TABLET BY MOUTH 3 TIMES A DAY AS NEEDED FOR ITCHING FOR ANXIETY (Patient not taking: Reported on 7/22/2021)    naloxone (NARCAN) 4 mg/actuation nasal spray Use 1 spray into 1 nostril for OVERDOSE. Call 911. For subsequent doses, give in alternating nostrils. May repeat every 2 to 3 min. (Patient not taking: Reported on 6/30/2021)    diclofenac (VOLTAREN) 1 % gel Apply 2 g to affected area every six (6) hours. (Patient not taking: Reported on 7/22/2021)    miscellaneous medical supply misc Shower seat for chronic knee pain, pt planning to have right TKR in June due to condition. Very limited range of motion. No current facility-administered medications for this visit. Visit Vitals  BP (!) 148/85 (BP 1 Location: Right upper arm, BP Patient Position: Sitting, BP Cuff Size: Large adult)   Pulse 78   Temp 96.9 °F (36.1 °C) (Oral)   Resp 18   Ht 5' 1\" (1.549 m)   Wt 213 lb (96.6 kg)   LMP 12/29/2016 (Exact Date) Comment: partial hysterectomy   SpO2 96%   BMI 40.25 kg/m²       Wt Readings from Last 3 Encounters:   07/27/21 213 lb (96.6 kg)   07/22/21 212 lb (96.2 kg)   06/30/21 212 lb (96.2 kg)     Physical Exam:   General appearance - alert, well appearing, and in no distress. Mental status - A/O x 4,normal mood and affect. Chest -  CTA. Symmetric chest rise. No wheezing. No distress. Heart - Normal rate. Abdomen- Soft, round. Non-distended, NT. No pulsatile masses or hernias. Ext-  No clubbing or cyanosis. No EDEMA today, except right knee grossly enlarged. Skin-Warm and dry. No hyperpigmentation, ulcerations, or suspicious lesions. Neuro - Normal speech, no focal findings or movement disorder. Normal strength and muscle tone. Limping, antalgic gait. An electronic signature was used to authenticate this note.   -- Roel Caldwell, NP

## 2021-07-30 DIAGNOSIS — R74.8 ELEVATED LIVER ENZYMES: Primary | ICD-10-CM

## 2021-08-07 LAB — DRUGS UR: NORMAL

## 2021-08-09 DIAGNOSIS — K21.9 GERD WITHOUT ESOPHAGITIS: ICD-10-CM

## 2021-08-10 RX ORDER — ONDANSETRON 4 MG/1
TABLET, ORALLY DISINTEGRATING ORAL
Qty: 20 TABLET | Refills: 1 | Status: SHIPPED | OUTPATIENT
Start: 2021-08-10 | End: 2021-10-28

## 2021-08-15 DIAGNOSIS — J44.9 ASTHMA WITH COPD (HCC): ICD-10-CM

## 2021-08-16 RX ORDER — IPRATROPIUM BROMIDE AND ALBUTEROL 20; 100 UG/1; UG/1
SPRAY, METERED RESPIRATORY (INHALATION)
Qty: 1 INHALER | Refills: 0 | Status: SHIPPED | OUTPATIENT
Start: 2021-08-16

## 2021-09-02 DIAGNOSIS — R60.0 BILATERAL LEG EDEMA: ICD-10-CM

## 2021-09-02 RX ORDER — FUROSEMIDE 20 MG/1
TABLET ORAL
Qty: 30 TABLET | Refills: 0 | Status: SHIPPED | OUTPATIENT
Start: 2021-09-02

## 2021-09-17 DIAGNOSIS — Z79.891 CHRONIC USE OF OPIATE FOR THERAPEUTIC PURPOSE: ICD-10-CM

## 2021-09-17 DIAGNOSIS — I10 MALIGNANT HYPERTENSION: ICD-10-CM

## 2021-09-17 DIAGNOSIS — M19.012 PRIMARY OSTEOARTHRITIS OF LEFT SHOULDER: ICD-10-CM

## 2021-09-17 DIAGNOSIS — M17.11 PRIMARY OSTEOARTHRITIS OF RIGHT KNEE: ICD-10-CM

## 2021-09-17 RX ORDER — METOPROLOL SUCCINATE 25 MG/1
TABLET, EXTENDED RELEASE ORAL
Qty: 30 TABLET | Refills: 11 | Status: SHIPPED | OUTPATIENT
Start: 2021-09-17 | End: 2022-09-15

## 2021-09-17 RX ORDER — METHOCARBAMOL 750 MG/1
TABLET, FILM COATED ORAL
Qty: 120 TABLET | Refills: 6 | Status: SHIPPED | OUTPATIENT
Start: 2021-09-17 | End: 2022-04-13

## 2021-09-20 ENCOUNTER — OFFICE VISIT (OUTPATIENT)
Dept: HEMATOLOGY | Age: 56
End: 2021-09-20
Payer: MEDICARE

## 2021-09-20 VITALS
DIASTOLIC BLOOD PRESSURE: 97 MMHG | BODY MASS INDEX: 39.08 KG/M2 | SYSTOLIC BLOOD PRESSURE: 137 MMHG | HEART RATE: 74 BPM | HEIGHT: 61 IN | TEMPERATURE: 96.8 F | RESPIRATION RATE: 17 BRPM | WEIGHT: 207 LBS | OXYGEN SATURATION: 100 %

## 2021-09-20 DIAGNOSIS — R74.8 ELEVATED ALKALINE PHOSPHATASE LEVEL: Primary | ICD-10-CM

## 2021-09-20 LAB
ALBUMIN SERPL-MCNC: 3.5 G/DL (ref 3.5–5)
ALBUMIN/GLOB SERPL: 0.8 {RATIO} (ref 1.1–2.2)
ALP SERPL-CCNC: 154 U/L (ref 45–117)
ALT SERPL-CCNC: 17 U/L (ref 12–78)
ANION GAP SERPL CALC-SCNC: 4 MMOL/L (ref 5–15)
AST SERPL-CCNC: 13 U/L (ref 15–37)
BILIRUB DIRECT SERPL-MCNC: 0.2 MG/DL (ref 0–0.2)
BILIRUB SERPL-MCNC: 0.7 MG/DL (ref 0.2–1)
BUN SERPL-MCNC: 14 MG/DL (ref 6–20)
BUN/CREAT SERPL: 14 (ref 12–20)
CALCIUM SERPL-MCNC: 9 MG/DL (ref 8.5–10.1)
CHLORIDE SERPL-SCNC: 106 MMOL/L (ref 97–108)
CO2 SERPL-SCNC: 28 MMOL/L (ref 21–32)
CREAT SERPL-MCNC: 1 MG/DL (ref 0.55–1.02)
ERYTHROCYTE [DISTWIDTH] IN BLOOD BY AUTOMATED COUNT: 14.8 % (ref 11.5–14.5)
GGT SERPL-CCNC: 30 U/L (ref 5–55)
GLOBULIN SER CALC-MCNC: 4.5 G/DL (ref 2–4)
GLUCOSE SERPL-MCNC: 86 MG/DL (ref 65–100)
HCT VFR BLD AUTO: 48.8 % (ref 35–47)
HGB BLD-MCNC: 15.6 G/DL (ref 11.5–16)
MCH RBC QN AUTO: 28.6 PG (ref 26–34)
MCHC RBC AUTO-ENTMCNC: 32 G/DL (ref 30–36.5)
MCV RBC AUTO: 89.4 FL (ref 80–99)
NRBC # BLD: 0 K/UL (ref 0–0.01)
NRBC BLD-RTO: 0 PER 100 WBC
PLATELET # BLD AUTO: 242 K/UL (ref 150–400)
PMV BLD AUTO: 11.2 FL (ref 8.9–12.9)
POTASSIUM SERPL-SCNC: 4.3 MMOL/L (ref 3.5–5.1)
PROT SERPL-MCNC: 8 G/DL (ref 6.4–8.2)
RBC # BLD AUTO: 5.46 M/UL (ref 3.8–5.2)
SODIUM SERPL-SCNC: 138 MMOL/L (ref 136–145)
WBC # BLD AUTO: 7.6 K/UL (ref 3.6–11)

## 2021-09-20 PROCEDURE — G8427 DOCREV CUR MEDS BY ELIG CLIN: HCPCS | Performed by: PHYSICIAN ASSISTANT

## 2021-09-20 PROCEDURE — G8417 CALC BMI ABV UP PARAM F/U: HCPCS | Performed by: PHYSICIAN ASSISTANT

## 2021-09-20 PROCEDURE — G9899 SCRN MAM PERF RSLTS DOC: HCPCS | Performed by: PHYSICIAN ASSISTANT

## 2021-09-20 PROCEDURE — 3017F COLORECTAL CA SCREEN DOC REV: CPT | Performed by: PHYSICIAN ASSISTANT

## 2021-09-20 PROCEDURE — 99203 OFFICE O/P NEW LOW 30 MIN: CPT | Performed by: PHYSICIAN ASSISTANT

## 2021-09-20 PROCEDURE — G8752 SYS BP LESS 140: HCPCS | Performed by: PHYSICIAN ASSISTANT

## 2021-09-20 PROCEDURE — G8755 DIAS BP > OR = 90: HCPCS | Performed by: PHYSICIAN ASSISTANT

## 2021-09-20 PROCEDURE — G9717 DOC PT DX DEP/BP F/U NT REQ: HCPCS | Performed by: PHYSICIAN ASSISTANT

## 2021-09-20 RX ORDER — DIAZEPAM 5 MG/1
TABLET ORAL
COMMUNITY
Start: 2021-07-13 | End: 2021-10-28

## 2021-09-20 RX ORDER — SUCRALFATE 1 G/1
TABLET ORAL
COMMUNITY
Start: 2021-09-09

## 2021-09-20 RX ORDER — EPINEPHRINE 0.3 MG/.3ML
INJECTION SUBCUTANEOUS SEE ADMIN INSTRUCTIONS
COMMUNITY
Start: 2021-09-09 | End: 2022-08-21

## 2021-09-20 NOTE — PROGRESS NOTES
Raven Murphy [address]      Sonal Angel MD, Magali Espino, Chelsea Turpin MD, MPH      GEM Rubio Res, [facility]-BC     Alba Moore, [facility]   Lee Pathak, P-IMTIAZ Lucia, [facility]       Thee Jacobo [facility] 136    at [address], [address] [place] [address], [facility] 22.    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) ICD-10-CM: M24.131  ICD-9-CM: 718.83  1/22/2021        Sprain and strain of right wrist ICD-10-CM: S63.501A, C89.060D  ICD-9-CM: 842.00  1/4/2021        History of recent fall ICD-10-CM: Z91.81  ICD-9-CM: V15.88  1/4/2021        Immunotherapy ICD-10-CM: Z29.8  ICD-9-CM: V07.2  1/4/2021        S/P total knee arthroplasty, right ICD-10-CM: Z96.651  ICD-9-CM: V43.65  3/26/2019        Loos[facility] knee joint [facility] ([facility] ICD-10-CM: B19.587T, Z96.659  ICD-9-CM: 996.41, V43.65  3/26/2019        MRSA carrier ICD-10-CM: N65.399  ICD-9-CM: V02.54  3/6/2019        Osteoarthritis of spine with radiculopathy, cervical region ICD-10-CM: M47.22  ICD-9-CM: 721.0  9/26/2018        Primary osteoarthritis of left shoulder ICD-10-CM: M19.012  ICD-9-CM: 715.11  9/26/2018    Overview Signed 9/26/2018 11:24 AM by Cristian Parker NP     [facility] joint             Obesity, Class II, BMI 35-39.9 ICD-10-CM: E66.9  ICD-9-CM: 278.00  9/25/2018        Left wrist pain ICD-10-CM: M25.532  ICD-9-CM: 719.43  9/25/2018        Chronic left shoulder pain ICD-10-CM: M25.512, G89.29  ICD-9-CM: 719.41, 338.29  9/25/2018        Chronic use of opiate for therapeutic purpose ICD-10-CM: Z79.891  ICD-9-CM: V58.69  5/24/2018        Severe obesity (BMI 35.0-39. 9) with comorbidity ([facility]UST 75.) ICD-10-CM: E66.01  ICD-9-CM: 278.01  3/28/2018        Post-tussive vomiting ICD-10-CM: R11.10  ICD-9-CM: 787.03  3/28/2018        Moderate episode of recurrent major depressive disorder ([facility] 75.) ICD-10-CM: F33.1  ICD-9-CM: 296.32  2/28/2018        Psychophysiological insomnia ICD-10-CM: F51.04  ICD-9-CM: 307.42  2/28/2018        Chronic pain of left knee ICD-10-CM: M25.562, G89.29  ICD-9-CM: 719.46, 338.29  6/15/2017        Chronic bilateral low back pain with right-sided sciatica ICD-10-CM: M54.41, G89.29  ICD-9-CM: 724.2, 724.3, 338.29  5/8/2017        Status post total right knee replacement ICD-10-CM: Z96.651  ICD-9-CM: V43.65  5/8/2017        Malignant hypertension ICD-10-CM: I10  ICD-9-CM: 401.0  5/8/2017        Pain management contract signed ICD-10-CM: Z02.89  ICD-9-CM: V68.89  5/8/2017        Acquired hypothyroidism ICD-10-CM: E03.9  ICD-9-CM: 244.9  1/6/2017        Mixed simple and mucopurulent chronic bronchitis ([facility] 75.) ICD-10-CM: J41.8  ICD-9-CM: 491.1  9/8/2016        Primary osteoarthritis of right knee ICD-10-CM: M17.11  ICD-9-CM: 715.16  6/6/2016        Ventral hernia without obstruction or gangrene ICD-10-CM: K43.9  ICD-9-CM: 553.20  5/26/2016        Chronic pain of right knee ICD-10-CM: M25.561, G89.29  ICD-9-CM: 719.46, 338.29  2/5/2016        Chronic right shoulder pain ICD-10-CM: M25.511, G89.29  ICD-9-CM: 719.41, 338.29  2/5/2016        Environmental and seasonal allergies ICD-10-CM: J30.89  ICD-9-CM: 477.8  2/5/2016        Ventral hernia ICD-10-CM: K43.9  ICD-9-CM: 553.20 12/31/2013              The clinicians listed above have asked me to see Jensen Ahuja in consultation regarding elevated liver enzymes and its management. All medical records sent by the referring physicians were reviewed including imaging studies. The patient is a 60 y.o. Black female who was found to have elevated alkaline phosphatase in 2020 and rising over the course of the past 18 months. Serologic evaluation for markers of chronic liver disease was negative for HCV, HBV   in 2016. CT scan of the liver was performed in 1/2020. The results of the imaging demonstrated a normal appearing liver. An assessment of liver fibrosis with biopsy, Fibroscan or elastography has not been performed. The patient had not started any new medications within 3 months preceding the elevation in liver chemistries. The patient does not have any symptoms which could be attributed to the liver disorder. The patient has severe limitations in functional activities which can be attributed to other medical problems that are not related to the liver disease. She has extensive orthopedic issues with a failed right total knee replacement x 2, sciatica and shoulder arthritis. ASSESSMENT AND PLAN:  Elevated liver enzymes  Persistent elevation in alkaline phosphatase of unclear etiology at this time. Will perform laboratory testing to monitor liver function and degree of liver injury. Liver transaminases are normal.  ALP is elevated. Liver function is normal.  The platelet count is normal.      Serologic testing for causes of chronic liver disease was ordered; ASMA, AMA. Will perform additional serologic tests to screen for other causes of chronic liver disease to include ACE level, GGT, Alk Phos isoenzymes. The most likely causes for the liver chemistry abnormalities were discussed with the patient and include immune liver disorders or a non-liver source of ALP such as bone.   This is more likely given her medical history. I have discussed with patient the intended work-up with labs today and ultrasound to rule out biliary obstruction. I will have her return to the office in 6 weeks for repeat discussion/evaluation. Will perform imaging of the liver with ultrasound to assess for any biliary disease/obstruction. Screening for Hepatocellular Carcinoma  HCC screening is not necessary if the patient has no evidence of cirrhosis. Treatment of other medical problems in patients with chronic liver disease  There are no contraindications for the patient to take most medications that are necessary for treatment of other medical issues. Counseling for alcohol in patients with chronic liver disease  The patient was counseled regarding alcohol consumption and the effect of alcohol on chronic liver disease. The patient consumes alcohol on weekends only and reportedly not in excess. ALLERGIES  Allergies   Allergen Reactions    Codeine Nausea Only    Hydrocodone Itching    Mold Shortness of Breath and Itching    Tramadol Itching    Ibuprofen Nausea Only       MEDICATIONS  Current Outpatient Medications   Medication Sig    EPINEPHrine (EPIPEN) 0.3 mg/0.3 mL injection See Admin Instructions.       sucralfate (CARAFATE) 1 gram tablet TAKE 1 TABLET BY MOUTH FOUR TIMES A DAY    metoprolol succinate (TOPROL-XL) 25 mg XL tablet TAKE 1 TABLET BY MOUTH EVERY DAY    methocarbamoL (ROBAXIN) 750 mg tablet TAKE 1 TAB BY MOUTH EVERY 6 HOURS AS NEEDED FOR SPASMS    furosemide (LASIX) 20 mg tablet TAKE 1 TABLET BY MOUTH EVERY DAY    Combivent Respimat  mcg/actuation inhaler INHALE 1 PUFF BY MOUTH EVERY 6 HOURS AS NEEDED FOR WHEEZING    ondansetron (ZOFRAN ODT) 4 mg disintegrating tablet DISSOLVE 1 TABLET BY MOUTH EVERY 8 HOURS AS NEEDED FOR NAUSEA    [START ON 9/28/2021] oxyCODONE-acetaminophen (PERCOCET 10)  mg per tablet Take 1 Tablet by mouth every twelve (12) hours as needed for Pain for up to 30 days. Max Daily Amount: 2 Tablets. Indications: pain    amLODIPine (NORVASC) 5 mg tablet TAKE 1 TABLET BY MOUTH EVERY DAY    levothyroxine (SYNTHROID) 125 mcg tablet TAKE 1 TABLET BY MOUTH EVERY DAY BEFORE BREAKFAST    hydroCHLOROthiazide (HYDRODIURIL) 25 mg tablet TAKE 1 TAB BY MOUTH DAILY FOR HYPERTENSION    loratadine (CLARITIN) 10 mg tablet TAKE 1 TABLET BY MOUTH EVERY DAY    DULoxetine (CYMBALTA) 60 mg capsule TAKE 1 CAPSULE BY MOUTH EVERY DAY    traZODone (DESYREL) 100 mg tablet TAKE 1 TABLET BY MOUTH EVERY DAY AT NIGHT    Symbicort 160-4.5 mcg/actuation HFAA TAKE 2 PUFFS BY INHALATION TWO (2) TIMES A DAY.  DULoxetine (CYMBALTA) 30 mg capsule Take 1 Cap by mouth daily. Take WITH 60 mg CAPSULE    pregabalin (LYRICA) 100 mg capsule Take 1 Cap by mouth three (3) times daily. Max Daily Amount: 300 mg.  acetaminophen (Tylenol Extra Strength) 500 mg tablet Take 2 Tabs by mouth every six (6) hours as needed for Pain.  pantoprazole (PROTONIX) 40 mg tablet Take 1 Tab by mouth daily. Take in 2 week intervals for GERD Flares    azelastine (ASTELIN) 137 mcg (0.1 %) nasal spray as needed.  XOLAIR 150 mg solr Indications: injection    hydrOXYzine HCl (ATARAX) 25 mg tablet TAKE 1 TABLET BY MOUTH 3 TIMES A DAY AS NEEDED FOR ITCHING FOR ANXIETY    montelukast (SINGULAIR) 10 mg tablet TAKE 1 TAB BY MOUTH DAILY.  tiotropium (SPIRIVA WITH HANDIHALER) 18 mcg inhalation capsule Take 1 Cap by inhalation daily.  lidocaine (LIDODERM) 5 % Apply patch to the affected area for 12 hours a day and remove for 12 hours a day.  naloxone (NARCAN) 4 mg/actuation nasal spray Use 1 spray into 1 nostril for OVERDOSE. Call 911. For subsequent doses, give in alternating nostrils. May repeat every 2 to 3 min.  albuterol (PROVENTIL VENTOLIN) 2.5 mg /3 mL (0.083 %) nebulizer solution 3 mL by Nebulization route every four (4) hours as needed for Wheezing.     diclofenac (VOLTAREN) 1 % gel Apply 2 g to affected area every six (6) hours.  fluticasone (FLONASE) 50 mcg/actuation nasal spray 2 Sprays by Both Nostrils route daily.  miscellaneous medical supply misc Shower seat for chronic knee pain, pt planning to have right TKR in  due to condition. Very limited range of motion.  diazePAM (VALIUM) 5 mg tablet Take 1 to 2 tablets one hour prior to test (Patient not taking: Reported on 2021)    oxyCODONE-acetaminophen (PERCOCET 10)  mg per tablet Take 1 Tablet by mouth every twelve (12) hours as needed for Pain for up to 30 days. Max Daily Amount: 2 Tablets. Indications: pain     No current facility-administered medications for this visit. SYSTEM REVIEW NOT RELATED TO LIVER DISEASE OR REVIEWED ABOVE:  Constitution systems: Negative for fever, chills, weight gain, weight loss. Eyes: Negative for visual changes. ENT: Negative for sore throat, painful swallowing. Respiratory: Negative for cough, hemoptysis, SOB. Cardiology: Negative for chest pain, palpitations. GI:  Negative for constipation or diarrhea. : Negative for urinary frequency, dysuria, hematuria, nocturia. Skin: Negative for rash. Hematology: Negative for easy bruising, blood clots. Musculo-skeletal: Positive for significant back pain, right knee pain. Neurologic: Negative for headaches, dizziness, vertigo, memory problems not related to HE. Psychology: Negative for anxiety, depression. FAMILY HISTORY:  The father  of lung cancer. The mother has/had the following chronic disease(s): DM. There is no family history of liver disease. SOCIAL HISTORY:  The patient is single. The patient has 2 children. The patient currently smokes 1/2 pack of tobacco daily. The patient consumes 4-6 alcoholic beverages per week. The patient is currently receiving disability.       PHYSICAL EXAMINATION:  Visit Vitals  BP (!) 137/97 (BP 1 Location: Right upper arm, BP Patient Position: Sitting, BP Cuff Size: Large adult)   Pulse 74   Temp 96.8 °F (36 °C) (Temporal)   Resp 17   Ht 5' 1\" (1.549 m)   Wt 207 lb (93.9 kg)   LMP 12/29/2016 (Exact Date) Comment: partial hysterectomy   SpO2 100%   BMI 39.11 kg/m²     General: No acute distress. Eyes: Sclera anicteric. ENT: No oral lesions. Thyroid normal.  Nodes: No adenopathy. Skin: No spider angiomata. No jaundice. No palmar erythema. Respiratory: Lungs clear to auscultation. Cardiovascular: Regular heart rate. No murmurs. No JVD. Abdomen: Soft non-tender. Liver size normal to percussion/palpation. Spleen not palpable. No obvious ascites. Extremities: No edema. No muscle wasting. Right knee surgical scar. Neurologic: Alert and oriented. Cranial nerves grossly intact. No asterixis.     LABORATORY STUDIES:  [address] & Units 7/27/2021 1/22/2021   WBC 3.6 - 11.0 K/uL 4.4    ANC 1.8 - 8.0 K/UL 1.4 (L)    HGB 11.5 - 16.0 g/dL 15.9    HGB 11.5 - 16.0 g/dL  15.4   HGB (iSTAT) 11.5 - 16.0 g/dL  15.4    - 400 K/uL 253    INR 0.9 - 1.1       AST 15 - 37 U/L 14 (L)    ALT 12 - 78 U/L 20    Alk Phos 45 - 117 U/L 158 (H)    Bili, Total 0.2 - 1.0 MG/DL 0.7    Albumin 3.5 - 5.0 g/dL 3.8    BUN 6 - 20 MG/DL 13    Creat 0.55 - 1.02 MG/DL 1.00    Na 136 - 145 mmol/L 136    K 3.5 - 5.1 mmol/L 3.9    Cl 97 - 108 mmol/L 101    CO2 21 - 32 mmol/L 30    Glucose 65 - 100 mg/dL 87      Liver [facility] Latest Ref Rng & Units 7/29/2020 1/14/2020   WBC 3.6 - 11.0 K/uL 5.4 5.5   ANC 1.8 - 8.0 K/UL 2.5 2.2   HGB 11.5 - 16.0 g/dL 14.8 15.2   HGB 11.5 - 16.0 g/dL     HGB (iSTAT) 11.5 - 16.0 g/dL      - 400 K/uL 254 232   INR 0.9 - 1.1       AST 15 - 37 U/L 13 12   ALT 12 - 78 U/L 8 10   Alk Phos 45 - 117 U/L 136 (H) 132 (H)   Bili, Total 0.2 - 1.0 MG/DL 0.5 0.5   Albumin 3.5 - 5.0 g/dL 3.8 3.8   BUN 6 - 20 MG/DL 13 11   Creat 0.55 - 1.02 MG/DL 1.01 (H) 0.91   Na 136 - 145 mmol/L 140 143   K 3.5 - 5.1 mmol/L 4.0 4.4   Cl 97 - 108 mmol/L 103 106   CO2 21 - 32 mmol/L 23 24   Glucose 65 - 100 mg/dL 93 72     SEROLOGIES:  2016. HCV Ab neg, HepBs AG neg. LIVER HISTOLOGY:  Not available or performed    ENDOSCOPIC PROCEDURES:  Not available or performed    RADIOLOGY:  1/2020. CT abdomen with and without contrast.  No acute abdominal process. OTHER TESTING:  Not available or performed    FOLLOW-UP:  All of the issues listed above in the Assessment and Plan were discussed with the patient. All questions were answered. The patient expressed a clear understanding of the above. [address] [place] [address] in 6 weeks for to review all data and determine the treatment plan. Labs and Ultrasound in the meantime.       Kelly Felton PA-C  Liver [facility] 59, [address] [address].  [phone]  [address] [place] [address]

## 2021-09-20 NOTE — PROGRESS NOTES
Identified pt with two pt identifiers(name and ). Reviewed record in preparation for visit and have obtained necessary documentation. Chief Complaint   Patient presents with    New Patient     Establish care      Vitals:    21 1025   BP: (!) 137/97   Pulse: 74   Resp: 17   Temp: 96.8 °F (36 °C)   TempSrc: Temporal   SpO2: 100%   Weight: 207 lb (93.9 kg)   Height: 5' 1\" (1.549 m)   PainSc:  10 - Worst pain ever   PainLoc: Generalized   LMP: 2016       Health Maintenance Review: Patient reminded of \"due or due soon\" health maintenance. I have asked the patient to contact his/her primary care provider (PCP) for follow-up on his/her health maintenance. Coordination of Care Questionnaire:  :   1) Have you been to an emergency room, urgent care, or hospitalized since your last visit? If yes, where when, and reason for visit? no       2. Have seen or consulted any other health care provider since your last visit? If yes, where when, and reason for visit? YES, Dr Bridgette Rogers for epidurals ,     Patient is accompanied by daughter I have received verbal consent from Moreen Bamberger to discuss any/all medical information while they are present in the room.

## 2021-09-22 DIAGNOSIS — R60.0 BILATERAL LEG EDEMA: ICD-10-CM

## 2021-09-22 LAB
ACE SERPL-CCNC: 48 U/L (ref 14–82)
ALP BONE CFR SERPL: 32 % (ref 14–68)
ALP INTEST CFR SERPL: 6 % (ref 0–18)
ALP LIVER CFR SERPL: 62 % (ref 18–85)
ALP SERPL-CCNC: 157 IU/L (ref 44–121)
MITOCHONDRIA M2 IGG SER-ACNC: <20 UNITS (ref 0–20)

## 2021-09-22 RX ORDER — FUROSEMIDE 20 MG/1
20 TABLET ORAL DAILY
Qty: 30 TABLET | Refills: 0 | OUTPATIENT
Start: 2021-09-22

## 2021-09-22 NOTE — TELEPHONE ENCOUNTER
[facility] pharmacy is requsting 90 day supply of Furosemide for this patient  Pharmacy #[phone]

## 2021-09-23 ENCOUNTER — HOSPITAL ENCOUNTER (EMERGENCY)
Age: 56
Discharge: HOME | End: 2021-09-24
Attending: EMERGENCY MEDICINE
Payer: MEDICARE

## 2021-09-23 VITALS
DIASTOLIC BLOOD PRESSURE: 93 MMHG | TEMPERATURE: 98.2 F | SYSTOLIC BLOOD PRESSURE: 146 MMHG | HEART RATE: 86 BPM | HEIGHT: 61 IN | OXYGEN SATURATION: 97 % | WEIGHT: 207 LBS | BODY MASS INDEX: 39.08 KG/M2 | RESPIRATION RATE: 20 BRPM

## 2021-09-23 DIAGNOSIS — S93.401A MILD SPRAIN OF RIGHT ANKLE, INITIAL ENCOUNTER: Primary | ICD-10-CM

## 2021-09-23 LAB
C-ANCA TITR SER IF: NORMAL TITER
P-ANCA ATYPICAL TITR SER IF: NORMAL TITER
P-ANCA TITR SER IF: NORMAL TITER

## 2021-09-23 PROCEDURE — 99283 EMERGENCY DEPT VISIT LOW MDM: CPT

## 2021-09-24 ENCOUNTER — APPOINTMENT (OUTPATIENT)
Dept: GENERAL RADIOLOGY | Age: 56
End: 2021-09-24
Attending: EMERGENCY MEDICINE
Payer: MEDICARE

## 2021-09-24 PROCEDURE — 73610 X-RAY EXAM OF ANKLE: CPT

## 2021-09-24 PROCEDURE — 74011250637 HC RX REV CODE- 250/637: Performed by: EMERGENCY MEDICINE

## 2021-09-24 RX ORDER — IBUPROFEN 600 MG/1
600 TABLET ORAL
Qty: 20 TABLET | Refills: 0 | Status: SHIPPED | OUTPATIENT
Start: 2021-09-24 | End: 2022-04-21

## 2021-09-24 RX ORDER — OXYCODONE AND ACETAMINOPHEN 5; 325 MG/1; MG/1
2 TABLET ORAL
Status: COMPLETED | OUTPATIENT
Start: 2021-09-24 | End: 2021-09-24

## 2021-09-24 RX ADMIN — OXYCODONE HYDROCHLORIDE AND ACETAMINOPHEN 2 TABLET: 5; 325 TABLET ORAL at 00:29

## 2021-09-24 NOTE — DISCHARGE INSTRUCTIONS
Your examination and x-rays are reassuring and are most consistent with an uncomplicated sprain of your ankle. We do not see any signs of significant fracture or dislocation of the bones. Use of supplied ankle brace or Ace wrap to support your ankle while it is healing over the next few weeks. You can continue to walk on it as tolerated. If you continue to have pain that concerns you, follow-up in the orthopedic clinic. It was a pleasure taking care of you at [facility] Emergency Department today. We know that when you come to [facility], you are entrusting us with your health, comfort, and safety. Our physicians and nurses honor that trust, and we truly appreciate the opportunity to care for you and your loved ones. We also value our feedback. If you receive a survey about your Emergency Department experience today, please fill it out. We care about our patients' feedback, and we listen to what you have to say. Thank you!

## 2021-09-24 NOTE — ED NOTES
Emergency Department Nursing Plan of Care       The Nursing Plan of Care is developed from the Nursing assessment and Emergency Department Attending provider initial evaluation. The plan of care may be reviewed in the ED Provider note.     The Plan of Care was developed with the following considerations:   Patient / Family readiness to learn indicated by:verbalized understanding  Persons(s) to be included in education: patient  Barriers to Learning/Limitations:No    Signed     Kenisha Marshall RN    9/24/2021   12:24 AM

## 2021-09-24 NOTE — PROGRESS NOTES
Pt notified of labs indicating no underling biliary markers and normal ggt. Suspect that ALP elevation is related to ortho/bony source but will proceed with US abdomen for complete work-up of biliary and follow-up as scheduled.

## 2021-09-24 NOTE — ED PROVIDER NOTES
EMERGENCY DEPARTMENT HISTORY AND PHYSICAL EXAM      Date: 9/23/2021  Patient Name: Td Martinez  Patient Age and Sex: 60 y.o. female  MRN:  [id number]  CSN:  [id number]    History of Presenting Illness     Chief Complaint   Patient presents with    Ankle Injury       History Provided By: Patient    Ability to gather history was limited by:     HPI: Td Martinez, 60 y.o. female complains of right ankle pain for the last few hours, after minor trip and fall on the grass while carrying groceries. Symptoms are mild to moderate severity, aching and tight in nature, over the anterior and lateral right ankle. No numbness or weakness. No injuries to the knee or hip or wrists. No head injury. Location:    Quality:      Severity:    Duration:   Timing:      Context:    Modifying factors:   Associated symptoms:     Past History      The patient's medical, surgical, and social history on file were reviewed by me today.      The family history was reviewed by me today and was non-contributory, unless otherwise specified below:    Past Medical History:  Past Medical History:   Diagnosis Date    Arthritis     Asthma     uses inhalers    Chronic obstructive pulmonary disease (HCC)     bronchitis    Chronic pain     GERD (gastroesophageal reflux disease)     History of seasonal allergies     Hypertension     Ill-defined condition     environmental allergies     Ill-defined condition     Multiple body piercings; unable to remove tongue and lip piercings    Ill-defined condition 2018    GASTRITIS PER ENDOSCOPY    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    Psychiatric disorder     DEPRESSION    Thyroid disease     hypo    Ventral hernia 12/31/2013       Past Surgical History:  Past Surgical History:   Procedure Laterality Date    HX HEENT  2009    thyroidectomy    HX KNEE REPLACEMENT      R    HX KNEE REPLACEMENT  03/26/2019    HX MOHS PROCEDURES Right 3/8/11    HX OTHER SURGICAL      hiatal hernia repair    HX PARTIAL HYSTERECTOMY      HX TUBAL LIGATION         Family History:  Family History   Problem Relation Age of Onset    Cancer Father         lung cancer    Heart Disease Mother     Hypertension Mother     Diabetes Mother     Anesth Problems Neg Hx        Social History:  Social History     Tobacco Use    Smoking status: Current Every Day Smoker     Packs/day: 0.25     Years: 1.50     Pack years: 0.37     Types: Cigarettes     Last attempt to quit: 10/1/2015     Years since quittin.9    Smokeless tobacco: Never Used   Vaping Use    Vaping Use: Never used   Substance Use Topics    Alcohol use: Yes     Comment: occassional    Drug use: No       Current Medications:  No current facility-administered medications on file prior to encounter. Current Outpatient Medications on File Prior to Encounter   Medication Sig Dispense Refill    diazePAM (VALIUM) 5 mg tablet Take 1 to 2 tablets one hour prior to test (Patient not taking: Reported on 2021)      EPINEPHrine (EPIPEN) 0.3 mg/0.3 mL injection See Admin Instructions.  sucralfate (CARAFATE) 1 gram tablet TAKE 1 TABLET BY MOUTH FOUR TIMES A DAY      metoprolol succinate (TOPROL-XL) 25 mg XL tablet TAKE 1 TABLET BY MOUTH EVERY DAY 30 Tablet 11    methocarbamoL (ROBAXIN) 750 mg tablet TAKE 1 TAB BY MOUTH EVERY 6 HOURS AS NEEDED FOR SPASMS 120 Tablet 6    furosemide (LASIX) 20 mg tablet TAKE 1 TABLET BY MOUTH EVERY DAY 30 Tablet 0    Combivent Respimat  mcg/actuation inhaler INHALE 1 PUFF BY MOUTH EVERY 6 HOURS AS NEEDED FOR WHEEZING 1 Inhaler 0    ondansetron (ZOFRAN ODT) 4 mg disintegrating tablet DISSOLVE 1 TABLET BY MOUTH EVERY 8 HOURS AS NEEDED FOR NAUSEA 20 Tablet 1    [START ON 2021] oxyCODONE-acetaminophen (PERCOCET 10)  mg per tablet Take 1 Tablet by mouth every twelve (12) hours as needed for Pain for up to 30 days. Max Daily Amount: 2 Tablets.  Indications: pain 45 Tablet 0    oxyCODONE-acetaminophen (PERCOCET 10)  mg per tablet Take 1 Tablet by mouth every twelve (12) hours as needed for Pain for up to 30 days. Max Daily Amount: 2 Tablets. Indications: pain 45 Tablet 0    [DISCONTINUED] furosemide (LASIX) 20 mg tablet Take 1 Tablet by mouth daily. 30 Tablet 0    amLODIPine (NORVASC) 5 mg tablet TAKE 1 TABLET BY MOUTH EVERY DAY 30 Tablet 26    levothyroxine (SYNTHROID) 125 mcg tablet TAKE 1 TABLET BY MOUTH EVERY DAY BEFORE BREAKFAST 30 Tablet 35    hydroCHLOROthiazide (HYDRODIURIL) 25 mg tablet TAKE 1 TAB BY MOUTH DAILY FOR HYPERTENSION 30 Tablet 11    loratadine (CLARITIN) 10 mg tablet TAKE 1 TABLET BY MOUTH EVERY DAY 30 Tablet 8    DULoxetine (CYMBALTA) 60 mg capsule TAKE 1 CAPSULE BY MOUTH EVERY DAY 30 Cap 5    traZODone (DESYREL) 100 mg tablet TAKE 1 TABLET BY MOUTH EVERY DAY AT NIGHT 30 Tab 5    Symbicort 160-4.5 mcg/actuation HFAA TAKE 2 PUFFS BY INHALATION TWO (2) TIMES A DAY. 30.6 Inhaler 3    DULoxetine (CYMBALTA) 30 mg capsule Take 1 Cap by mouth daily. Take WITH 60 mg CAPSULE 90 Cap 3    pregabalin (LYRICA) 100 mg capsule Take 1 Cap by mouth three (3) times daily. Max Daily Amount: 300 mg. 90 Cap 5    acetaminophen (Tylenol Extra Strength) 500 mg tablet Take 2 Tabs by mouth every six (6) hours as needed for Pain. 20 Tab 0    [DISCONTINUED] methocarbamoL (ROBAXIN) 750 mg tablet TAKE 1 TAB BY MOUTH EVERY 6 HOURS AS NEEDED FOR SPASMS 120 Tab 6    pantoprazole (PROTONIX) 40 mg tablet Take 1 Tab by mouth daily. Take in 2 week intervals for GERD Flares 30 Tab 1    azelastine (ASTELIN) 137 mcg (0.1 %) nasal spray as needed.  XOLAIR 150 mg solr Indications: injection      hydrOXYzine HCl (ATARAX) 25 mg tablet TAKE 1 TABLET BY MOUTH 3 TIMES A DAY AS NEEDED FOR ITCHING FOR ANXIETY 60 Tab 0    montelukast (SINGULAIR) 10 mg tablet TAKE 1 TAB BY MOUTH DAILY. 30 Tab 6    tiotropium (SPIRIVA WITH HANDIHALER) 18 mcg inhalation capsule Take 1 Cap by inhalation daily.       lidocaine (LIDODERM) 5 % Apply patch to the affected area for 12 hours a day and remove for 12 hours a day. 30 Each 11    naloxone (NARCAN) 4 mg/actuation nasal spray Use 1 spray into 1 nostril for OVERDOSE. Call 911. For subsequent doses, give in alternating nostrils. May repeat every 2 to 3 min. 2 Each 0    albuterol (PROVENTIL VENTOLIN) 2.5 mg /3 mL (0.083 %) nebulizer solution 3 mL by Nebulization route every four (4) hours as needed for Wheezing. 24 Each 2    diclofenac (VOLTAREN) 1 % gel Apply 2 g to affected area every six (6) hours. 100 g 5    fluticasone (FLONASE) 50 mcg/actuation nasal spray 2 Sprays by Both Nostrils route daily. 1 Bottle 11    miscellaneous medical supply misc Shower seat for chronic knee pain, pt planning to have right TKR in June due to condition. Very limited range of motion. 1 Each 0       Allergies: Allergies   Allergen Reactions    Codeine Nausea Only    Hydrocodone Itching    Mold Shortness of Breath and Itching    Tramadol Itching    Ibuprofen Nausea Only     Review of Systems    A complete ROS was reviewed by me today and was negative, unless otherwise specified below:    Review of Systems   Constitutional: Negative for fatigue and fever. Neurological: Negative for headaches. All other systems reviewed and are negative. Physical Exam   Vital Signs  Patient Vitals for the past 8 hrs:   Temp Pulse Resp BP SpO2   09/23/21 2333 98.2 °F (36.8 °C) 86 20 (!) 146/93 97 %          Physical Exam  Vitals and nursing note reviewed. Constitutional:       General: She is not in acute distress. Appearance: She is not ill-appearing. HENT:      Head: Normocephalic and atraumatic. Musculoskeletal:      Cervical back: Normal range of motion and neck supple. Right hip: Normal.      Right knee: Normal.      Right ankle: No deformity. Tenderness present. Decreased range of motion. Normal pulse. Right foot: Normal. No swelling, deformity or tenderness.         Legs:    Skin:     General: Skin is warm. Capillary Refill: Capillary refill takes less than 2 seconds. Findings: No abrasion, bruising, ecchymosis or wound. Neurological:      General: No focal deficit present. Mental Status: She is alert. Sensory: Sensation is intact. Motor: Motor function is intact. Diagnostic Study Results   Labs  No results found for this or any previous visit (from the past 24 hour(s)). Radiologic Studies  XR ANKLE RT MIN 3 V   Final Result   No acute fracture. Lateral soft tissue swelling. CT Results  (Last 48 hours)    None        CXR Results  (Last 48 hours)    None          Billable Procedures   Procedures    Medical Decision Making     I reviewed the patient's most recent Emergency Dept notes and diagnostic tests in formulating my MDM on today's visit. Provider Notes (Medical Decision Making):   40-year-old female with mild right ankle pain after trip and fall a few hours ago. Normal examination other than subjective tenderness and pain with range of motion. No swelling or deformity. Normal neurovascular exam.  No bruising. X-rays are negative for acute injury. Uncomplicated mild ankle sprain. Patient was placed in an ankle stirrup, prescribed extra strength ibuprofen. No crutches indicated. Follow-up with orthopedics as needed. No other apparent injuries or areas of pain other than the ankle.     Hector Olea MD  12:42 AM  2021     Consults:    Social History     Tobacco Use    Smoking status: Current Every Day Smoker     Packs/day: 0.25     Years: 1.50     Pack years: 0.37     Types: Cigarettes     Last attempt to quit: 10/1/2015     Years since quittin.9    Smokeless tobacco: Never Used   Vaping Use    Vaping Use: Never used   Substance Use Topics    Alcohol use: Yes     Comment: occassional    Drug use: No       Medications Administered during ED course:  Medications   oxyCODONE-acetaminophen (PERCOCET) 5-325 mg per tablet 2 Tablet (2 Tablets Oral Given 9/24/21 0029)          Prescriptions from today's ED visit:  Current Discharge Medication List      START taking these medications    Details   ibuprofen (MOTRIN) 600 mg tablet Take 1 Tablet by mouth every six (6) hours as needed for Pain. Qty: 20 Tablet, Refills: 0  Start date: 9/24/2021            Diagnosis and Disposition     Disposition:  Discharged    Clinical Impression:   1. Mild sprain of right ankle, initial encounter        Attestation:  I personally performed the services described in this documentation on this date 9/23/2021 for patient Roel Bains. Hector Olea MD        I was the first provider for this patient on this visit. To the best of my ability I reviewed relevant prior medical records, electrocardiograms, laboratories, and radiologic studies. The patient's presenting problems were discussed, and the patient was in agreement with the care plan formulated and outlined with them. Hector Olea MD    Please note that this dictation was completed with Dragon voice recognition software. Quite often unanticipated grammatical, syntax, homophones, and other interpretive errors are inadvertently transcribed by the computer software. Please disregard these errors and excuse any errors that have escaped final proofreading.

## 2021-09-24 NOTE — ED TRIAGE NOTES
Patient [id number] amb with limp with C/O \"twisting right ankle earlier today. \"  Reports walking taking groceries inside and tripped into a hole and twisted right ankle.   Denies any other injuries  resp even and unlabored

## 2021-09-30 NOTE — ED PROVIDER NOTES
EMERGENCY DEPARTMENT HISTORY AND PHYSICAL EXAM    Date: 10/17/2020  Patient Name: Cesar Amanda    History of Presenting Illness     Chief Complaint   Patient presents with    Wrist Pain     right wrist pain after ground level fall         History Provided By: Patient    Chief Complaint: wrist pain      HPI: Cesar Amanda is a 60 y.o. female with a PMH of arthritis asthma thyroid diseasse who presents with right wrist pain acute onset 30 minutes prior to arrival.  Patient reports pain is sharp 10 out of 10 in severity. Patient reports it is painful to move her wrist.  Patient reports it is difficult to move her fingers. Patient states her wrist broke the fall. PCP: Ada Hankins NP    Current Outpatient Medications   Medication Sig Dispense Refill    acetaminophen (Tylenol Extra Strength) 500 mg tablet Take 2 Tabs by mouth every six (6) hours as needed for Pain. 20 Tab 0    metoprolol succinate (TOPROL-XL) 25 mg XL tablet TAKE 1 TABLET BY MOUTH EVERY DAY 90 Tab 3    ondansetron (ZOFRAN ODT) 4 mg disintegrating tablet DISSOLVE 1 TABLET BY MOUTH EVERY 8 HOURS AS NEEDED FOR NAUSEA 20 Tab 1    [START ON 12/4/2020] oxyCODONE-acetaminophen (PERCOCET 10)  mg per tablet Take 1 Tab by mouth every twelve (12) hours as needed for Pain for up to 30 days. Max Daily Amount: 2 Tabs. Indications: pain 45 Tab 0    [START ON 11/4/2020] oxyCODONE-acetaminophen (PERCOCET 10)  mg per tablet Take 1 Tab by mouth two (2) times daily as needed for Pain for up to 30 days. Max Daily Amount: 2 Tabs. Indications: pain 45 Tab 0    oxyCODONE-acetaminophen (PERCOCET 10)  mg per tablet Take 1 Tab by mouth two (2) times daily as needed for Pain for up to 30 days. Max Daily Amount: 2 Tabs.  Indications: pain 45 Tab 0    loratadine (CLARITIN) 10 mg tablet TAKE 1 TABLET BY MOUTH EVERY DAY 30 Tab 8    methocarbamoL (ROBAXIN) 750 mg tablet TAKE 1 TAB BY MOUTH EVERY 6 HOURS AS NEEDED FOR SPASMS 120 Tab 6    hydroCHLOROthiazide (HYDRODIURIL) 25 mg tablet TAKE 1 TAB BY MOUTH DAILY FOR HYPERTENSION 90 Tab 3    gabapentin (NEURONTIN) 800 mg tablet Take 1 Tab by mouth three (3) times daily. Max Daily Amount: 2,400 mg. 90 Tab 5    pantoprazole (PROTONIX) 40 mg tablet Take 1 Tab by mouth daily. Take in 2 week intervals for GERD Flares 30 Tab 1    levothyroxine (SYNTHROID) 125 mcg tablet TAKE 1 TABLET BY MOUTH EVERY DAY BEFORE BREAKFAST INDICATIONS: HYPOTHYROIDISM  Indications: a condition with low thyroid hormone levels 90 Tab 3    amLODIPine (NORVASC) 5 mg tablet Take 1 Tab by mouth daily. 90 Tab 3    Combivent Respimat  mcg/actuation inhaler TAKE 1 PUFF BY INHALATION EVERY SIX (6) HOURS AS NEEDED FOR WHEEZING. 1 Inhaler 0    Symbicort 160-4.5 mcg/actuation HFAA TAKE 2 PUFFS BY INHALATION TWO (2) TIMES A DAY. 30.6 Inhaler 3    azelastine (ASTELIN) 137 mcg (0.1 %) nasal spray       traZODone (DESYREL) 50 mg tablet Take 1 Tab by mouth nightly. 90 Tab 0    DULoxetine (CYMBALTA) 30 mg capsule Take 2 Caps by mouth two (2) times a day. 180 Cap 0    XOLAIR 150 mg solr Indications: injection      meloxicam (MOBIC) 15 mg tablet TAKE 1 TABLET BY MOUTH EVERY DAY IN THE MORNING WITH BREAKFAST 30 Tab 11    hydrOXYzine HCl (ATARAX) 25 mg tablet TAKE 1 TABLET BY MOUTH 3 TIMES A DAY AS NEEDED FOR ITCHING FOR ANXIETY 60 Tab 0    furosemide (LASIX) 20 mg tablet TAKE 1 TABLET BY MOUTH DAILY X 3-5 DAYS FOR LEG SWELLING INDICATIONS: VISIBLE WATER RETENTION 15 Tab 0    montelukast (SINGULAIR) 10 mg tablet TAKE 1 TAB BY MOUTH DAILY. 30 Tab 6    tiotropium (SPIRIVA WITH HANDIHALER) 18 mcg inhalation capsule Take 1 Cap by inhalation daily.       lidocaine 4 % patch Every 12 hours as needed for pain 20 Patch 0    PROAIR HFA 90 mcg/actuation inhaler TAKE 2 PUFFS BY INHALATION EVERY FOUR (4) HOURS AS NEEDED. 8.5 Inhaler 0    lidocaine (LIDODERM) 5 % Apply patch to the affected area for 12 hours a day and remove for 12 hours a day. 30 Each 11    naloxone (NARCAN) 4 mg/actuation nasal spray Use 1 spray into 1 nostril for OVERDOSE. Call 911. For subsequent doses, give in alternating nostrils. May repeat every 2 to 3 min. 2 Each 0    albuterol (PROVENTIL VENTOLIN) 2.5 mg /3 mL (0.083 %) nebulizer solution 3 mL by Nebulization route every four (4) hours as needed for Wheezing. 24 Each 2    diclofenac (VOLTAREN) 1 % gel Apply 2 g to affected area every six (6) hours. 100 g 5    fluticasone (FLONASE) 50 mcg/actuation nasal spray 2 Sprays by Both Nostrils route daily. 1 Bottle 11    miscellaneous medical supply misc Shower seat for chronic knee pain, pt planning to have right TKR in June due to condition. Very limited range of motion.  1 Each 0       Past History     Past Medical History:  Past Medical History:   Diagnosis Date    Arthritis     Asthma     uses inhalers    Chronic obstructive pulmonary disease (HCC)     bronchitis    Chronic pain     GERD (gastroesophageal reflux disease)     Hypertension     Ill-defined condition     environmental allergies     Ill-defined condition     Multiple body piercings; unable to remove tongue and lip piercings    Ill-defined condition 2018    GASTRITIS PER ENDOSCOPY    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    Psychiatric disorder     DEPRESSION    Thyroid disease     hypo    Ventral hernia 12/31/2013       Past Surgical History:  Past Surgical History:   Procedure Laterality Date    HX HEENT  2009    thyroidectomy    HX KNEE REPLACEMENT      R    HX KNEE REPLACEMENT  03/26/2019    HX MOHS PROCEDURES Right 3/8/11    HX OTHER SURGICAL      hiatal hernia repair    HX TUBAL LIGATION         Family History:  Family History   Problem Relation Age of Onset    Cancer Father         lung cancer    Heart Disease Mother     Hypertension Mother     Diabetes Mother     Anesth Problems Neg Hx        Social History:  Social History     Tobacco Use    Smoking status: Current Every Day Smoker     Packs/day: 0.25     Years: 1.50     Pack years: 0.37     Types: Cigarettes     Last attempt to quit: 10/1/2015     Years since quittin.0    Smokeless tobacco: Never Used   Substance Use Topics    Alcohol use: No    Drug use: No       Allergies: Allergies   Allergen Reactions    Hydrocodone Itching    Mold Shortness of Breath and Itching    Ibuprofen Nausea Only         Review of Systems   Review of Systems   Constitutional: Negative for fatigue and fever. Respiratory: Negative for shortness of breath and wheezing. Cardiovascular: Negative for chest pain and palpitations. Gastrointestinal: Negative for abdominal pain. Musculoskeletal: Positive for arthralgias (wrist pain). Negative for myalgias, neck pain and neck stiffness. Skin: Negative for pallor and rash. Neurological: Negative for dizziness, tremors, weakness and headaches. Hematological: Negative for adenopathy. Psychiatric/Behavioral: Negative for agitation and behavioral problems. All other systems reviewed and are negative. Physical Exam     Vitals:    10/17/20 1703   BP: (!) 159/90   Pulse: (!) 102   Resp: (!) 118   Temp: 97.9 °F (36.6 °C)   Weight: 85.7 kg (189 lb)   Height: 5' 1\" (1.549 m)     Physical Exam  Vitals signs and nursing note reviewed. Constitutional:       General: She is not in acute distress. Appearance: She is well-developed. HENT:      Head: Normocephalic and atraumatic. Right Ear: External ear normal.      Left Ear: External ear normal.      Nose: Nose normal.   Eyes:      Conjunctiva/sclera: Conjunctivae normal.   Neck:      Musculoskeletal: Normal range of motion and neck supple. Cardiovascular:      Rate and Rhythm: Normal rate and regular rhythm. Heart sounds: Normal heart sounds. Pulmonary:      Effort: Pulmonary effort is normal. No respiratory distress. Breath sounds: Normal breath sounds. No wheezing.    Abdominal:      General: Bowel sounds are normal.      Palpations: Abdomen is soft. Tenderness: There is no abdominal tenderness. Musculoskeletal: Normal range of motion. General: Swelling and tenderness present. Comments: +TTP distal radius ulna. Pain on flexion. DNV intact. Lymphadenopathy:      Cervical: No cervical adenopathy. Skin:     General: Skin is warm and dry. Findings: No rash. Neurological:      Mental Status: She is alert and oriented to person, place, and time. Cranial Nerves: No cranial nerve deficit. Coordination: Coordination normal.   Psychiatric:         Behavior: Behavior normal.         Thought Content: Thought content normal.         Judgment: Judgment normal.           Diagnostic Study Results     Labs -   No results found for this or any previous visit (from the past 12 hour(s)). Radiologic Studies -   XR WRIST RT AP/LAT/OBL MIN 3V   Final Result   IMPRESSION: No acute abnormality. CT Results  (Last 48 hours)    None        CXR Results  (Last 48 hours)    None            Medical Decision Making   I am the first provider for this patient. I reviewed the vital signs, available nursing notes, past medical history, past surgical history, family history and social history. Vital Signs-Reviewed the patient's vital signs. Records Reviewed: Nursing Notes            Disposition:  home    DISCHARGE NOTE:         Care plan outlined and precautions discussed. Patient has no new complaints, changes, or physical findings. Results of xray were reviewed with the patient. All medications were reviewed with the patient; will d/c home with tylenol . All of pt's questions and concerns were addressed. Patient was instructed and agrees to follow up with PCP, as well as to return to the ED upon further deterioration. Patient is ready to go home.     Follow-up Information     Follow up With Specialties Details Why [address] [place], [place], SHAGGY Nurse Practitioner In 1 week  Adrien Cordova  [address] [address]  [phone]            Discharge Medication List as of 10/17/2020  6:53 PM      START taking these medications    Details   acetaminophen (Tylenol Extra Strength) 500 mg tablet Take 2 Tabs by mouth every six (6) hours as needed for Pain., Normal, Disp-20 Tab,R-0         CONTINUE these medications which have NOT CHANGED    Details   metoprolol succinate (TOPROL-XL) 25 mg XL tablet TAKE 1 TABLET BY MOUTH EVERY DAY, Normal, Disp-90 Tab,R-3PLEASE PROVIDE NEW SCRIPT FOR EPIRED METOPROLOL SUCC ER 25 MG TAB TAKE 1 TABLET DAILY      ondansetron (ZOFRAN ODT) 4 mg disintegrating tablet DISSOLVE 1 TABLET BY MOUTH EVERY 8 HOURS AS NEEDED FOR NAUSEA, Normal, Disp-20 Tab,R-1      !! oxyCODONE-acetaminophen (PERCOCET 10)  mg per tablet Take 1 Tab by mouth every twelve (12) hours as needed for Pain for up to 30 days. Max Daily Amount: 2 Tabs. Indications: pain, Normal, Disp-45 Tab,R-0      !! oxyCODONE-acetaminophen (PERCOCET 10)  mg per tablet Take 1 Tab by mouth two (2) times daily as needed for Pain for up to 30 days. Max Daily Amount: 2 Tabs. Indications: pain, Normal, Disp-45 Tab,R-0      !! oxyCODONE-acetaminophen (PERCOCET 10)  mg per tablet Take 1 Tab by mouth two (2) times daily as needed for Pain for up to 30 days. Max Daily Amount: 2 Tabs. Indications: pain, Normal, Disp-45 Tab,R-0      loratadine (CLARITIN) 10 mg tablet TAKE 1 TABLET BY MOUTH EVERY DAY, Normal, Disp-30 Tab,R-8PLEASE SEND TO [facility]      methocarbamoL (ROBAXIN) 750 mg tablet TAKE 1 TAB BY MOUTH EVERY 6 HOURS AS NEEDED FOR SPASMS, Normal, Disp-120 Tab,R-6      hydroCHLOROthiazide (HYDRODIURIL) 25 mg tablet TAKE 1 TAB BY MOUTH DAILY FOR HYPERTENSION, Normal, Disp-90 Tab,R-3DX Code Needed  . gabapentin (NEURONTIN) 800 mg tablet Take 1 Tab by mouth three (3) times daily. Max Daily Amount: 2,400 mg., Normal, Disp-90 Tab, R-5      pantoprazole (PROTONIX) 40 mg tablet Take 1 Tab by mouth daily.  Take in 2 week intervals for GERD Flares, Normal, Disp-30 Tab, R-1      levothyroxine (SYNTHROID) 125 mcg tablet TAKE 1 TABLET BY MOUTH EVERY DAY BEFORE BREAKFAST INDICATIONS: HYPOTHYROIDISM  Indications: a condition with low thyroid hormone levels, Normal, Disp-90 Tab, R-3DX Code Needed  . amLODIPine (NORVASC) 5 mg tablet Take 1 Tab by mouth daily. , Normal, Disp-90 Tab, R-3RX HAS       Combivent Respimat  mcg/actuation inhaler TAKE 1 PUFF BY INHALATION EVERY SIX (6) HOURS AS NEEDED FOR WHEEZING., Normal, Disp-1 Inhaler, R-0, [place]      Symbicort 160-4.5 mcg/actuation HFAA TAKE 2 PUFFS BY INHALATION TWO (2) TIMES A DAY., Normal, Disp-30.6 Inhaler, R-3      azelastine (ASTELIN) 137 mcg (0.1 %) nasal spray Historical Med      traZODone (DESYREL) 50 mg tablet Take 1 Tab by mouth nightly., Normal, Disp-90 Tab, R-0      DULoxetine (CYMBALTA) 30 mg capsule Take 2 Caps by mouth two (2) times a day., Normal, Disp-180 Cap, R-0      XOLAIR 150 mg solr Indications: injection, Historical Med, [place]      meloxicam (MOBIC) 15 mg tablet TAKE 1 TABLET BY MOUTH EVERY DAY IN THE MORNING WITH BREAKFAST, Normal, Disp-30 Tab, R-11      hydrOXYzine HCl (ATARAX) 25 mg tablet TAKE 1 TABLET BY MOUTH 3 TIMES A DAY AS NEEDED FOR ITCHING FOR ANXIETY, NormalDX Code Needed  . Disp-60 Tab, R-0      furosemide (LASIX) 20 mg tablet TAKE 1 TABLET BY MOUTH DAILY X 3-5 DAYS FOR LEG SWELLING INDICATIONS: VISIBLE WATER RETENTION, NormalDX Code Needed  . Disp-15 Tab, R-0      montelukast (SINGULAIR) 10 mg tablet TAKE 1 TAB BY MOUTH DAILY., NormalDX Code Needed  . Disp-30 Tab, R-6      tiotropium (SPIRIVA WITH HANDIHALER) 18 mcg inhalation capsule Take 1 Cap by inhalation daily. , Historical Med      lidocaine 4 % patch Every 12 hours as needed for pain, Normal, Disp-20 Patch, R-0      PROAIR HFA 90 mcg/actuation inhaler TAKE 2 PUFFS BY INHALATION EVERY FOUR (4) HOURS AS NEEDED., Normal, Disp-8.5 Inhaler, R-0      lidocaine (LIDODERM) 5 % Apply patch to the affected area for 12 hours a day and remove for 12 hours a day., Normal, Disp-30 Each, R-11      naloxone (NARCAN) 4 mg/actuation nasal spray Use 1 spray into 1 nostril for OVERDOSE. Call 911. For subsequent doses, give in alternating nostrils. May repeat every 2 to 3 min., Normal, Disp-2 Each, R-0      albuterol (PROVENTIL VENTOLIN) 2.5 mg /3 mL (0.083 %) nebulizer solution 3 mL by Nebulization route every four (4) hours as needed for Wheezing., Normal, Disp-24 Each, R-2      diclofenac (VOLTAREN) 1 % gel Apply 2 g to affected area every six (6) hours. , Normal, Disp-100 g, R-5      fluticasone (FLONASE) 50 mcg/actuation nasal spray 2 Sprays by Both Nostrils route daily. , Normal, Disp-1 Bottle, R-11      miscellaneous medical supply misc Shower seat for chronic knee pain, pt planning to have right TKR in June due to condition. Very limited range of motion. , Print, Disp-1 Each, R-0       !! - Potential duplicate medications found. Please discuss with provider. STOP taking these medications       acetaminophen (TYLENOL) 650 mg TbER Comments:   Reason for Stopping:               Provider Notes (Medical Decision Making):     Procedures:  Splint, Volar    Date/Time: 10/17/2020 6:30 PM  Performed by: Cristine Downing NP  Authorized by: Cristine Downing NP     Consent:     Consent obtained:  Verbal    Consent given by:  Patient    Risks discussed:  Pain    Alternatives discussed: n/a. Pre-procedure details:     Sensation:  Normal    Skin color:  Pink  Procedure details:     Laterality:  Right    Location:  Wrist    Wrist:  R wrist    Strapping: no      Splint type:  Volar short arm    Supplies used: velcro. Post-procedure details:     Pain:  Improved    Sensation:  Normal    Skin color:  Pink    Patient tolerance of procedure: Tolerated well, no immediate complications        Please note that this dictation was completed with Dragon, computer voice recognition software.   Quite often unanticipated grammatical, syntax, homophones, and other interpretive errors are inadvertently transcribed by the computer software. Please disregard these errors. Additionally, please excuse any errors that have escaped final proofreading. Diagnosis     Clinical Impression:   1.  Sprain of right wrist, initial encounter No

## 2021-10-04 ENCOUNTER — HOSPITAL ENCOUNTER (OUTPATIENT)
Dept: ULTRASOUND IMAGING | Age: 56
Discharge: HOME | End: 2021-10-04
Attending: PHYSICIAN ASSISTANT
Payer: MEDICARE

## 2021-10-04 DIAGNOSIS — R74.8 ELEVATED ALKALINE PHOSPHATASE LEVEL: ICD-10-CM

## 2021-10-04 PROCEDURE — 76700 US EXAM ABDOM COMPLETE: CPT

## 2021-10-05 NOTE — PROGRESS NOTES
Pt notified of negative US imaging, no biliary source of ALP elevation. Follow-up in one month as scheduled.

## 2021-10-05 NOTE — PROGRESS NOTES
Pt notified of negative, normal imaging study. No evidence of biliary changes/stone to account for increase in ALP. Maintain follow-up in 11/2021 for Fibroscan and likely dc from practice at that point.

## 2021-10-28 ENCOUNTER — OFFICE VISIT (OUTPATIENT)
Dept: INTERNAL MEDICINE CLINIC | Age: 56
End: 2021-10-28
Payer: MEDICARE

## 2021-10-28 VITALS
TEMPERATURE: 97.4 F | HEART RATE: 84 BPM | HEIGHT: 61 IN | SYSTOLIC BLOOD PRESSURE: 135 MMHG | WEIGHT: 204 LBS | OXYGEN SATURATION: 95 % | RESPIRATION RATE: 16 BRPM | DIASTOLIC BLOOD PRESSURE: 89 MMHG | BODY MASS INDEX: 38.51 KG/M2

## 2021-10-28 DIAGNOSIS — Z23 ENCOUNTER FOR IMMUNIZATION: ICD-10-CM

## 2021-10-28 DIAGNOSIS — I10 ESSENTIAL HYPERTENSION WITH GOAL BLOOD PRESSURE LESS THAN 140/90: ICD-10-CM

## 2021-10-28 DIAGNOSIS — M54.41 CHRONIC BILATERAL LOW BACK PAIN WITH RIGHT-SIDED SCIATICA: ICD-10-CM

## 2021-10-28 DIAGNOSIS — Z79.891 CHRONIC USE OF OPIATE FOR THERAPEUTIC PURPOSE: ICD-10-CM

## 2021-10-28 DIAGNOSIS — M19.012 PRIMARY OSTEOARTHRITIS OF LEFT SHOULDER: ICD-10-CM

## 2021-10-28 DIAGNOSIS — R60.0 BILATERAL LEG EDEMA: ICD-10-CM

## 2021-10-28 DIAGNOSIS — Z96.651 S/P TOTAL KNEE ARTHROPLASTY, RIGHT: ICD-10-CM

## 2021-10-28 DIAGNOSIS — G89.29 CHRONIC BILATERAL LOW BACK PAIN WITH RIGHT-SIDED SCIATICA: ICD-10-CM

## 2021-10-28 DIAGNOSIS — M17.11 PRIMARY OSTEOARTHRITIS OF RIGHT KNEE: Primary | ICD-10-CM

## 2021-10-28 PROCEDURE — 3017F COLORECTAL CA SCREEN DOC REV: CPT | Performed by: NURSE PRACTITIONER

## 2021-10-28 PROCEDURE — G9899 SCRN MAM PERF RSLTS DOC: HCPCS | Performed by: NURSE PRACTITIONER

## 2021-10-28 PROCEDURE — G8417 CALC BMI ABV UP PARAM F/U: HCPCS | Performed by: NURSE PRACTITIONER

## 2021-10-28 PROCEDURE — G8752 SYS BP LESS 140: HCPCS | Performed by: NURSE PRACTITIONER

## 2021-10-28 PROCEDURE — 90686 IIV4 VACC NO PRSV 0.5 ML IM: CPT | Performed by: INTERNAL MEDICINE

## 2021-10-28 PROCEDURE — G8427 DOCREV CUR MEDS BY ELIG CLIN: HCPCS | Performed by: NURSE PRACTITIONER

## 2021-10-28 PROCEDURE — G9717 DOC PT DX DEP/BP F/U NT REQ: HCPCS | Performed by: NURSE PRACTITIONER

## 2021-10-28 PROCEDURE — 99214 OFFICE O/P EST MOD 30 MIN: CPT | Performed by: NURSE PRACTITIONER

## 2021-10-28 PROCEDURE — G0008 ADMIN INFLUENZA VIRUS VAC: HCPCS | Performed by: INTERNAL MEDICINE

## 2021-10-28 PROCEDURE — G8754 DIAS BP LESS 90: HCPCS | Performed by: NURSE PRACTITIONER

## 2021-10-28 RX ORDER — LIDOCAINE 50 MG/G
PATCH TOPICAL
Qty: 30 EACH | Refills: 11 | Status: SHIPPED | OUTPATIENT
Start: 2021-10-28

## 2021-10-28 RX ORDER — OXYCODONE AND ACETAMINOPHEN 10; 325 MG/1; MG/1
1 TABLET ORAL
Qty: 45 TABLET | Refills: 0 | Status: SHIPPED | OUTPATIENT
Start: 2021-11-27 | End: 2021-12-06 | Stop reason: SDUPTHER

## 2021-10-28 RX ORDER — OXYCODONE AND ACETAMINOPHEN 10; 325 MG/1; MG/1
1 TABLET ORAL
Qty: 45 TABLET | Refills: 0 | Status: SHIPPED | OUTPATIENT
Start: 2021-10-28 | End: 2022-01-28 | Stop reason: SDUPTHER

## 2021-10-28 RX ORDER — OXYCODONE AND ACETAMINOPHEN 10; 325 MG/1; MG/1
1 TABLET ORAL
Qty: 45 TABLET | Refills: 0 | Status: SHIPPED | OUTPATIENT
Start: 2021-12-27 | End: 2022-01-28 | Stop reason: SDUPTHER

## 2021-10-28 RX ORDER — NALOXONE HYDROCHLORIDE 4 MG/.1ML
SPRAY NASAL
Qty: 2 EACH | Refills: 0 | Status: SHIPPED | OUTPATIENT
Start: 2021-10-28

## 2021-10-28 NOTE — PATIENT INSTRUCTIONS
Back Stretches: Exercises  Introduction  Here are some examples of exercises for stretching your back. Start each exercise slowly. Ease off the exercise if you start to have pain. Your doctor or physical therapist will tell you when you can start these exercises and which ones will work best for you. How to do the exercises  Overhead stretch    1. Stand comfortably with your feet shoulder-width apart. 2. Looking straight ahead, raise both arms over your head and reach toward the ceiling. Do not allow your head to tilt back. 3. Hold for 15 to 30 seconds, then lower your arms to your sides. 4. Repeat 2 to 4 times. Side stretch    1. Stand comfortably with your feet shoulder-width apart. 2. Raise one arm over your head, and then lean to the other side. 3. Slide your hand down your leg as you let the weight of your arm gently stretch your side muscles. Hold for 15 to 30 seconds. 4. Repeat 2 to 4 times on each side. Press-up    1. Lie on your stomach, supporting your body with your forearms. 2. Press your elbows down into the floor to raise your upper back. As you do this, relax your stomach muscles and allow your back to arch without using your back muscles. As your press up, do not let your hips or pelvis come off the floor. 3. Hold for 15 to 30 seconds, then relax. 4. Repeat 2 to 4 times. Relax and rest    1. Lie on your back with a rolled towel under your neck and a pillow under your knees. Extend your arms comfortably to your sides. 2. Relax and breathe normally. 3. Remain in this position for about 10 minutes. 4. If you can, do this 2 or 3 times each day. Follow-up care is a key part of your treatment and safety. Be sure to make and go to all appointments, and call your doctor if you are having problems. It's also a good idea to know your test results and keep a list of the medicines you take. Where can you learn more?   Go to http://www.gray.com/  Enter Y090 in the search box to learn more about \"Back Stretches: Exercises. \"  Current as of: July 1, 2021               Content Version: 13.0  © 4357-9923 [employer]. Care instructions adapted under license by [employer] (which disclaims liability or warranty for this information). If you have questions about a medical condition or this instruction, always ask your healthcare professional. [place] [address] any warranty or liability for your use of this information.

## 2021-10-28 NOTE — PROGRESS NOTES
Robert Smyth (: 1965) is a 60 y.o. female, established patient, here for evaluation of the following chief complaint(s):  Medication Refill (pain meds ) and Follow-up (HTN, Edema)       ASSESSMENT/PLAN:  Below is the assessment and plan developed based on review of pertinent history, physical exam, labs, studies, and medications. 1. Primary osteoarthritis of right knee  -     oxyCODONE-acetaminophen (PERCOCET 10)  mg per tablet; Take 1 Tablet by mouth every twelve (12) hours as needed for Pain for up to 30 days. Max Daily Amount: 2 Tablets. Indications: pain, Normal, Disp-45 Tablet, R-0  -     lidocaine (Lidoderm) 5 %; Apply patch to the affected area for 12 hours a day and remove for 12 hours a day., Normal, Disp-30 Each, R-11  -     TOXASSURE SELECT 13 (MW); Future  -     oxyCODONE-acetaminophen (PERCOCET 10)  mg per tablet; Take 1 Tablet by mouth every twelve (12) hours as needed for Pain for up to 30 days. Max Daily Amount: 2 Tablets. Indications: pain, Normal, Disp-45 Tablet, R-0  -     oxyCODONE-acetaminophen (PERCOCET 10)  mg per tablet; Take 1 Tablet by mouth two (2) times daily as needed for Pain for up to 30 days. Max Daily Amount: 2 Tablets. Indications: pain, Normal, Disp-45 Tablet, R-0  2. Chronic use of opiate for therapeutic purpose  -     oxyCODONE-acetaminophen (PERCOCET 10)  mg per tablet; Take 1 Tablet by mouth every twelve (12) hours as needed for Pain for up to 30 days. Max Daily Amount: 2 Tablets. Indications: pain, Normal, Disp-45 Tablet, R-0  -     lidocaine (Lidoderm) 5 %; Apply patch to the affected area for 12 hours a day and remove for 12 hours a day., Normal, Disp-30 Each, R-11  -     naloxone (Narcan) 4 mg/actuation nasal spray; Use 1 spray into 1 nostril for OVERDOSE. Call 911. For subsequent doses, give in alternating nostrils.  May repeat every 2 to 3 min., Normal, Disp-2 Each, R-0  -     TOXASSURE SELECT 13 (MW); Future  - oxyCODONE-acetaminophen (PERCOCET 10)  mg per tablet; Take 1 Tablet by mouth every twelve (12) hours as needed for Pain for up to 30 days. Max Daily Amount: 2 Tablets. Indications: pain, Normal, Disp-45 Tablet, R-0  -     oxyCODONE-acetaminophen (PERCOCET 10)  mg per tablet; Take 1 Tablet by mouth two (2) times daily as needed for Pain for up to 30 days. Max Daily Amount: 2 Tablets. Indications: pain, Normal, Disp-45 Tablet, R-0  3. Primary osteoarthritis of left shoulder  -     oxyCODONE-acetaminophen (PERCOCET 10)  mg per tablet; Take 1 Tablet by mouth every twelve (12) hours as needed for Pain for up to 30 days. Max Daily Amount: 2 Tablets. Indications: pain, Normal, Disp-45 Tablet, R-0  -     lidocaine (Lidoderm) 5 %; Apply patch to the affected area for 12 hours a day and remove for 12 hours a day., Normal, Disp-30 Each, R-11  -     TOXASSURE SELECT 13 (MW); Future  -     oxyCODONE-acetaminophen (PERCOCET 10)  mg per tablet; Take 1 Tablet by mouth every twelve (12) hours as needed for Pain for up to 30 days. Max Daily Amount: 2 Tablets. Indications: pain, Normal, Disp-45 Tablet, R-0  -     oxyCODONE-acetaminophen (PERCOCET 10)  mg per tablet; Take 1 Tablet by mouth two (2) times daily as needed for Pain for up to 30 days. Max Daily Amount: 2 Tablets. Indications: pain, Normal, Disp-45 Tablet, R-0  4. Chronic bilateral low back pain with right-sided sciatica  -     oxyCODONE-acetaminophen (PERCOCET 10)  mg per tablet; Take 1 Tablet by mouth every twelve (12) hours as needed for Pain for up to 30 days. Max Daily Amount: 2 Tablets. Indications: pain, Normal, Disp-45 Tablet, R-0  -     lidocaine (Lidoderm) 5 %; Apply patch to the affected area for 12 hours a day and remove for 12 hours a day., Normal, Disp-30 Each, R-11  -     TOXASSURE SELECT 13 (MW); Future  -     oxyCODONE-acetaminophen (PERCOCET 10)  mg per tablet;  Take 1 Tablet by mouth every twelve (12) hours as needed for Pain for up to 30 days. Max Daily Amount: 2 Tablets. Indications: pain, Normal, Disp-45 Tablet, R-0  -     oxyCODONE-acetaminophen (PERCOCET 10)  mg per tablet; Take 1 Tablet by mouth two (2) times daily as needed for Pain for up to 30 days. Max Daily Amount: 2 Tablets. Indications: pain, Normal, Disp-45 Tablet, R-0  5. Encounter for immunization  -     INFLUENZA VIRUS VAC QUAD,SPLIT,PRESV FREE SYRINGE IM  6. S/P total knee arthroplasty, right  7. Essential hypertension with goal blood pressure less than 140/90  8. Bilateral leg edema      Return in about 3 months (around 1/28/2022) for VV- pain med refill. Pt asked to complete follow by next visit: advised to try calling [facility] to see if they can see her since unable to continue care with [facility] due to bill in collections. SUBJECTIVE/OBJECTIVE:  HPI    Chronic Pain:  Patient has chronic BL knee pain and LBP for years, h/o right TKR (2016, Last revised ~ 2 yrs ago) and right wrist surgery (1/2021). Having CONTINUED right knee pain, seen by [facility], told her hardware is sticking forward, but deferring further intervention/imaging until LBP addressed. Had MRI done 7/2021, seen by spinal specialist with [facility] on 8/3 & 9/3 to SI and lumbar spine. Has since been told she can't see them anymore due to outstanding zoe sent to , unable to see any provider at [facility] and had planned for protruding pole from TKR imaging to be done and repaired. Pain Scale: 10 - Worst pain ever/10. Pain in the left shoulder, wrist, knees, legs, and lower back is still limiting walking, sitting, reaching, lifting, and standing, exacerbated by forementioned acitivities to include stair climbing. Has tried prednisone, tramadol, injections, PT, gabapentin, cymbalta, and surgery in past with minimal relief. Pain has been controlled with Oxycodone, last taken EARLY MORNING. The medication is kept safe by staying with her.  Has NOT seen any other providers since last OV for pain medication. No significant changes to pain presentation since last OV. she is  able to do her normal daily activities. she reports the following adverse side effects: none. Least pain over the last week has been 7/10  Worst pain over the last week has been 10/10. Aberrant behaviors: None         Symptoms onset: problem is longstanding. Rheumatological ROS: ongoing significant pain which is stable and controlled by pain med. Response to treatment plan: waxing and waning. LEG swelling still improved with Lasix use. Hypertension Review:  The patient has hypertension  Diet and Lifestyle: generally does  try to follow a  low sodium diet, exercises sporadically   Home BP Monitoring: is not measured at home. Pertinent ROS: taking medications as instructed, no medication side effects noted. No HA's, chest pain on exertion, dyspnea on exertion, or swelling of ankles. BP Readings from Last 3 Encounters:   10/28/21 135/89   09/23/21 (!) 146/93   09/20/21 (!) 137/97       Review of Systems  Constitutional: +MRSA in nose. negative for fevers, chills, anorexia and weight loss  Eyes:   negative for visual disturbance, drainage, and irritation  ENT:   +AR and environmental allergies. negative for tinnitus,sore throat,ear pain,and hoarseness  Respiratory:  + asthma/COPD. negative for hemoptysis  CV:   negative for chest pain, palpitations, and lower extremity edema  GI:   + GERD. negative for nausea, vomiting, diarrhea, abdominal pain, and melena  Endo:              +hypothyroidsim.  negative for polyuria,polydipsia,polyphagia, and heat intolerance  Genitourinary: negative for frequency, urgency, dysuria, retention, and hematuria  Integument:  negative for rash, ulcerations, and pruritus  Hematologic:  negative for easy bruising and bleeding  Musculoskel: negative for  muscle weakness  Neurological:  negative for headaches, dizziness, vertigo,and memory problems  Behavl/Psych: +depression, on cymbalta and zoloft. negative for feelings of  suicide    1./2. Medical history/Past medical History   Past Medical History:   Diagnosis Date    Arthritis     Asthma     uses inhalers    Chronic obstructive pulmonary disease (HCC)     bronchitis    Chronic pain     GERD (gastroesophageal reflux disease)     History of seasonal allergies     Hypertension     Ill-defined condition     environmental allergies     Ill-defined condition     Multiple body piercings; unable to remove tongue and lip piercings    Ill-defined condition 2018    GASTRITIS PER ENDOSCOPY    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    Psychiatric disorder     DEPRESSION    Thyroid disease     hypo    Ventral hernia 12/31/2013     Past Surgical History:   Procedure Laterality Date    HX HEENT  2009    thyroidectomy    HX KNEE REPLACEMENT      R    HX KNEE REPLACEMENT  03/26/2019    HX MOHS PROCEDURES Right 3/8/11    HX OTHER SURGICAL      hiatal hernia repair    HX PARTIAL HYSTERECTOMY      HX TUBAL LIGATION       Patient Active Problem List   Diagnosis Code    Ventral hernia K43.9    Chronic pain of right knee M25.561, G89.29    Chronic right shoulder pain M25.511, G89.29    Environmental and seasonal allergies J30.89    Ventral hernia without obstruction or gangrene K43.9    Primary osteoarthritis of right knee M17.11    Mixed simple and mucopurulent chronic bronchitis (HCC) J41.8    Acquired hypothyroidism E03.9    Chronic bilateral low back pain with right-sided sciatica M54.41, G89.29    Status post total right knee replacement Z96.651    Malignant hypertension I10    Pain management contract signed Z02.89    Chronic pain of left knee M25.562, G89.29    Moderate episode of recurrent major depressive disorder (HCC) F33.1    Psychophysiological insomnia F51.04    Severe obesity (BMI 35.0-39. 9) with comorbidity (HCC) E66.01    Post-tussive vomiting R11.10    Chronic use of opiate for therapeutic purpose Z79.891    Obesity, Class II, BMI 35-39.9 E66.9    Left wrist pain M25.532    Chronic left shoulder pain M25.512, G89.29    Osteoarthritis of spine with radiculopathy, cervical region M47.22    Primary osteoarthritis of left shoulder M19.012    MRSA carrier Z22.322    S/P total knee arthroplasty, right Z96.651    Loosening of knee joint prosthesis (HCC) T84.038A, Z96.659    Sprain and strain of right wrist S63.501A, I18.909C    History of recent fall Z91.81    Immunotherapy Z29.8    Ulnar impaction syndrome, right M25.831    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 Applicable records from prior treatment providers are apart of [facility] under the media/encounters tab. 4. Diagnostic, therapeutic and laboratory results are available in the [facility] chart. 5. Consultation notes are available for review in the media/encounters tab of the [facility] chart. 6. Treatment goals include: pain control, improve activity level and function in regards to activities of daily living, and improved comfort level. Previously pt has been limited by pain in all these aspects. 7. The risks and benefits of treatment have been discussed at this office visit with the pt.  she understands that the medication has addictive potential.  Additionally the pt has been advised that narcotic pain medication may impair mental and/or physical ability required for performance of tasks such as driving or operating any other machinery. 8. Pt has an updated signed pain contract on file and can be found under the media section of the [facility] chart. 9. The pain contract is reviewed. Pain medication will be continued at the SAME dosage. PILL COUNT: 1, last fill date 9/28/21. Urine drug screening ordered/collected today. Additional diagnostic studies are not indicated at this time. Interventional procedure are not indicated at this time. Narcan prescribed already.     10. Medication prescibed is oxycodone  every 12 PRN # 45 with 2 refills for a 3 month supply.  was reviewed while planning for pain/anxiety management, no indications of drug diversion suspected. Prescription history is no suspicious for controlled substance overuse. 11. Patient instructions have been reviewed in detail as outlined above and in the pain contract. 12. Re-evaluation is planned for 3 months. Current Outpatient Medications   Medication Sig    [START ON 12/27/2021] oxyCODONE-acetaminophen (PERCOCET 10)  mg per tablet Take 1 Tablet by mouth every twelve (12) hours as needed for Pain for up to 30 days. Max Daily Amount: 2 Tablets. Indications: pain    lidocaine (Lidoderm) 5 % Apply patch to the affected area for 12 hours a day and remove for 12 hours a day.  naloxone (Narcan) 4 mg/actuation nasal spray Use 1 spray into 1 nostril for OVERDOSE. Call 911. For subsequent doses, give in alternating nostrils. May repeat every 2 to 3 min.  [START ON 11/27/2021] oxyCODONE-acetaminophen (PERCOCET 10)  mg per tablet Take 1 Tablet by mouth every twelve (12) hours as needed for Pain for up to 30 days. Max Daily Amount: 2 Tablets. Indications: pain    oxyCODONE-acetaminophen (PERCOCET 10)  mg per tablet Take 1 Tablet by mouth two (2) times daily as needed for Pain for up to 30 days. Max Daily Amount: 2 Tablets. Indications: pain    ibuprofen (MOTRIN) 600 mg tablet Take 1 Tablet by mouth every six (6) hours as needed for Pain.  EPINEPHrine (EPIPEN) 0.3 mg/0.3 mL injection See Admin Instructions.       sucralfate (CARAFATE) 1 gram tablet TAKE 1 TABLET BY MOUTH FOUR TIMES A DAY    metoprolol succinate (TOPROL-XL) 25 mg XL tablet TAKE 1 TABLET BY MOUTH EVERY DAY    methocarbamoL (ROBAXIN) 750 mg tablet TAKE 1 TAB BY MOUTH EVERY 6 HOURS AS NEEDED FOR SPASMS    furosemide (LASIX) 20 mg tablet TAKE 1 TABLET BY MOUTH EVERY DAY    Combivent Respimat  mcg/actuation inhaler INHALE 1 PUFF BY MOUTH EVERY 6 HOURS AS NEEDED FOR WHEEZING    amLODIPine (NORVASC) 5 mg tablet TAKE 1 TABLET BY MOUTH EVERY DAY    levothyroxine (SYNTHROID) 125 mcg tablet TAKE 1 TABLET BY MOUTH EVERY DAY BEFORE BREAKFAST    hydroCHLOROthiazide (HYDRODIURIL) 25 mg tablet TAKE 1 TAB BY MOUTH DAILY FOR HYPERTENSION    loratadine (CLARITIN) 10 mg tablet TAKE 1 TABLET BY MOUTH EVERY DAY    DULoxetine (CYMBALTA) 60 mg capsule TAKE 1 CAPSULE BY MOUTH EVERY DAY    traZODone (DESYREL) 100 mg tablet TAKE 1 TABLET BY MOUTH EVERY DAY AT NIGHT    Symbicort 160-4.5 mcg/actuation HFAA TAKE 2 PUFFS BY INHALATION TWO (2) TIMES A DAY.  DULoxetine (CYMBALTA) 30 mg capsule Take 1 Cap by mouth daily. Take WITH 60 mg CAPSULE    pregabalin (LYRICA) 100 mg capsule Take 1 Cap by mouth three (3) times daily. Max Daily Amount: 300 mg.  acetaminophen (Tylenol Extra Strength) 500 mg tablet Take 2 Tabs by mouth every six (6) hours as needed for Pain.  pantoprazole (PROTONIX) 40 mg tablet Take 1 Tab by mouth daily. Take in 2 week intervals for GERD Flares    azelastine (ASTELIN) 137 mcg (0.1 %) nasal spray as needed.  XOLAIR 150 mg solr Indications: injection    hydrOXYzine HCl (ATARAX) 25 mg tablet TAKE 1 TABLET BY MOUTH 3 TIMES A DAY AS NEEDED FOR ITCHING FOR ANXIETY    montelukast (SINGULAIR) 10 mg tablet TAKE 1 TAB BY MOUTH DAILY.  tiotropium (SPIRIVA WITH HANDIHALER) 18 mcg inhalation capsule Take 1 Cap by inhalation daily.  albuterol (PROVENTIL VENTOLIN) 2.5 mg /3 mL (0.083 %) nebulizer solution 3 mL by Nebulization route every four (4) hours as needed for Wheezing.  diclofenac (VOLTAREN) 1 % gel Apply 2 g to affected area every six (6) hours.  fluticasone (FLONASE) 50 mcg/actuation nasal spray 2 Sprays by Both Nostrils route daily.  [employer] misc Shower seat for chronic knee pain, pt planning to have right TKR in June due to condition. Very limited range of motion.      No current facility-administered medications for this visit. Visit Vitals  /89 (BP 1 Location: Right upper arm, BP Patient Position: Sitting, BP Cuff Size: Large adult)   Pulse 84   Temp 97.4 °F (36.3 °C) (Temporal)   Resp 16   Ht 5' 1\" (1.549 m)   Wt 204 lb (92.5 kg)   LMP 12/29/2016 (Exact Date) Comment: partial hysterectomy   SpO2 95%   BMI 38.55 kg/m²       Wt Readings from Last 3 Encounters:   10/28/21 204 lb (92.5 kg)   09/23/21 207 lb (93.9 kg)   09/20/21 207 lb (93.9 kg)     Physical Exam:   General appearance - alert, well appearing, and in no distress. Mental status - A/O x 4,normal mood and affect. Chest -  CTA. Symmetric chest rise. No wheezing. No distress. Heart - Normal rate. Abdomen- Soft, round. Non-distended, NT. No pulsatile masses or hernias. Ext-  No clubbing or cyanosis. Right knee grossly enlarged. Skin-Warm and dry. No hyperpigmentation, ulcerations, or suspicious lesions. Neuro - Normal speech, no focal findings or movement disorder. Normal strength and muscle tone. Slow gait. An electronic signature was used to authenticate this note.   -- Chantell Watkins NP

## 2021-11-17 RX ORDER — TRAZODONE HYDROCHLORIDE 100 MG/1
TABLET ORAL
Qty: 30 TABLET | Refills: 5 | Status: SHIPPED | OUTPATIENT
Start: 2021-11-17 | End: 2022-05-13

## 2021-11-17 RX ORDER — DULOXETIN HYDROCHLORIDE 60 MG/1
CAPSULE, DELAYED RELEASE ORAL
Qty: 30 CAPSULE | Refills: 5 | Status: SHIPPED | OUTPATIENT
Start: 2021-11-17 | End: 2022-05-13

## 2021-11-18 LAB — DRUGS UR: NORMAL

## 2021-12-06 ENCOUNTER — OFFICE VISIT (OUTPATIENT)
Dept: HEMATOLOGY | Age: 56
End: 2021-12-06
Payer: MEDICARE

## 2021-12-06 VITALS
SYSTOLIC BLOOD PRESSURE: 144 MMHG | TEMPERATURE: 95.8 F | HEIGHT: 61 IN | RESPIRATION RATE: 14 BRPM | BODY MASS INDEX: 39.84 KG/M2 | WEIGHT: 211 LBS | HEART RATE: 76 BPM | DIASTOLIC BLOOD PRESSURE: 77 MMHG

## 2021-12-06 DIAGNOSIS — R74.8 ELEVATED ALKALINE PHOSPHATASE LEVEL: Primary | ICD-10-CM

## 2021-12-06 LAB
ALBUMIN SERPL-MCNC: 3.4 G/DL (ref 3.5–5)
ALBUMIN/GLOB SERPL: 0.8 {RATIO} (ref 1.1–2.2)
ALP SERPL-CCNC: 144 U/L (ref 45–117)
ALT SERPL-CCNC: 22 U/L (ref 12–78)
AST SERPL-CCNC: 13 U/L (ref 15–37)
BILIRUB DIRECT SERPL-MCNC: 0.2 MG/DL (ref 0–0.2)
BILIRUB SERPL-MCNC: 0.7 MG/DL (ref 0.2–1)
GGT SERPL-CCNC: 45 U/L (ref 5–55)
GLOBULIN SER CALC-MCNC: 4.5 G/DL (ref 2–4)
PROT SERPL-MCNC: 7.9 G/DL (ref 6.4–8.2)

## 2021-12-06 PROCEDURE — 99213 OFFICE O/P EST LOW 20 MIN: CPT | Performed by: PHYSICIAN ASSISTANT

## 2021-12-06 PROCEDURE — G9717 DOC PT DX DEP/BP F/U NT REQ: HCPCS | Performed by: PHYSICIAN ASSISTANT

## 2021-12-06 PROCEDURE — G8417 CALC BMI ABV UP PARAM F/U: HCPCS | Performed by: PHYSICIAN ASSISTANT

## 2021-12-06 PROCEDURE — G8754 DIAS BP LESS 90: HCPCS | Performed by: PHYSICIAN ASSISTANT

## 2021-12-06 PROCEDURE — G8753 SYS BP > OR = 140: HCPCS | Performed by: PHYSICIAN ASSISTANT

## 2021-12-06 PROCEDURE — G8427 DOCREV CUR MEDS BY ELIG CLIN: HCPCS | Performed by: PHYSICIAN ASSISTANT

## 2021-12-06 PROCEDURE — 3017F COLORECTAL CA SCREEN DOC REV: CPT | Performed by: PHYSICIAN ASSISTANT

## 2021-12-06 PROCEDURE — G9899 SCRN MAM PERF RSLTS DOC: HCPCS | Performed by: PHYSICIAN ASSISTANT

## 2021-12-06 NOTE — PROGRESS NOTES
[facility] [address]      Willie Kirkland MD, Kodi Moser, Delmar Cantrell MD, MPH      Joce Mckeon, PA-IMTIAZ Cohen, [facility]-BC     Alba Moore, [facility]   Emi Greco, P-C    Dorita Diez, [facility]       Thee Jacobo [facility] [address]    at [address], [address] [place] [address], [facility] 22.    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) ICD-10-CM: M24.131  ICD-9-CM: 718.83  1/22/2021        Sprain and strain of right wrist ICD-10-CM: S63.501A, Q71.023X  ICD-9-CM: 842.00  1/4/2021        History of recent fall ICD-10-CM: Z91.81  ICD-9-CM: V15.88  1/4/2021        Immunotherapy ICD-10-CM: Z29.8  ICD-9-CM: V07.2  1/4/2021        S/P total knee arthroplasty, right ICD-10-CM: Z96.651  ICD-9-CM: V43.65  3/26/2019        Loosening of knee joint prosthesis ([facility] ICD-10-CM: C88.274J, Z96.659  ICD-9-CM: 996.41, V43.65  3/26/2019        MRSA carrier ICD-10-CM: M98.543  ICD-9-CM: V02.54  3/6/2019        Osteoarthritis of spine with radiculopathy, cervical region ICD-10-CM: M47.22  ICD-9-CM: 721.0  9/26/2018        Primary osteoarthritis of left shoulder ICD-10-CM: M19.012  ICD-9-CM: 715.11  9/26/2018    Overview Signed 9/26/2018 11:24 AM by Rene Nobles NP     [facility] joint             Obesity, Class II, BMI 35-39.9 ICD-10-CM: E66.9  ICD-9-CM: 278.00  9/25/2018        Left wrist pain ICD-10-CM: M25.532  ICD-9-CM: 719.43  9/25/2018        Chronic left shoulder pain ICD-10-CM: M25.512, G89.29  ICD-9-CM: 719.41, 338.29  9/25/2018        Chronic use of opiate for therapeutic purpose ICD-10-CM: Z79.891  ICD-9-CM: V58.69  5/24/2018        Severe obesity (BMI 35.0-39. 9) with comorbidity ([facility] ICD-10-CM: E66.01  ICD-9-CM: 278.01  3/28/2018        Post-tussive vomiting ICD-10-CM: R11.10  ICD-9-CM: 787.03  3/28/2018        Moderate episode of recurrent major depressive disorder ([facility] ICD-10-CM: F33.1  ICD-9-CM: 296.32  2/28/2018        Psychophysiological insomnia ICD-10-CM: F51.04  ICD-9-CM: 307.42  2/28/2018        Chronic pain of left knee ICD-10-CM: M25.562, G89.29  ICD-9-CM: 719.46, 338.29  6/15/2017        Chronic bilateral low back pain with right-sided sciatica ICD-10-CM: M54.41, G89.29  ICD-9-CM: 724.2, 724.3, 338.29  5/8/2017        Status post total right knee replacement ICD-10-CM: Z96.651  ICD-9-CM: V43.65  5/8/2017        Malignant hypertension ICD-10-CM: I10  ICD-9-CM: 401.0  5/8/2017        Pain management contract signed ICD-10-CM: Z02.89  ICD-9-CM: V68.89  5/8/2017        Acquired hypothyroidism ICD-10-CM: E03.9  ICD-9-CM: 244.9  1/6/2017        Mixed simple and mucopurulent chronic bronchitis ([facility] ICD-10-CM: J41.8  ICD-9-CM: 491.1  9/8/2016        Primary osteoarthritis of right knee ICD-10-CM: M17.11  ICD-9-CM: 715.16  6/6/2016        Ventral hernia without obstruction or gangrene ICD-10-CM: K43.9  ICD-9-CM: 553.20  5/26/2016        Chronic pain of right knee ICD-10-CM: M25.561, G89.29  ICD-9-CM: 719.46, 338.29  2/5/2016        Chronic right shoulder pain ICD-10-CM: M25.511, G89.29  ICD-9-CM: 719.41, 338.29  2/5/2016        Environmental and seasonal allergies ICD-10-CM: J30.89  ICD-9-CM: 477.8  2/5/2016        Ventral hernia ICD-10-CM: K43.9  ICD-9-CM: 553.20 12/31/2013            Sylvie Nichols is being seen at [place] [address] for management of elevated alkaline phosphatase. The active problem list, all pertinent past medical history, medications, radiologic findings and laboratory findings related to the liver disorder were reviewed and discussed with the patient. The patient is a 60 y.o. Black female who was found to have elevated alkaline phosphatase in 2020 and rising over the course of the past 18 months. Serologic evaluation for markers of chronic liver disease was negative for HCV, HBV in 2016. Labs done at the time of her initial office visit were negative for inherited, autoimmune liver conditions and GGT was NOT elevated . CT scan of the liver was performed in 1/2020. The results of the imaging demonstrated a normal appearing liver. Repeat imaging with the rise in enzymes in the past 6 months with ultrasound showed no abnormality with biliary system, stone disease or textural changes of the liver. An assessment of liver fibrosis with biopsy, Fibroscan or elastography has not been performed. The patient had not started any new medications within 3 months preceding the elevation in liver chemistries. The patient does not have any symptoms which could be attributed to the liver disorder. The patient has severe limitations in functional activities which can be attributed to other medical problems that are not related to the liver disease. She has extensive orthopedic issues with a failed right total knee replacement x 2, sciatica and shoulder arthritis. ASSESSMENT AND PLAN:  Elevated liver enzymes  Persistent elevation in alkaline phosphatase. This is thought to be due to orthopedic (bony) source of ALP. She has had extensive lab evaluation showing normal serologies for common etiologies of liver disease, including normal GGT.   The ALK phosphatase bone fraction was also higher than is typical.  Ultrasound of the abdomen did not suggest hepatobiliary source of ALP elevation. Given her extensive orthopedic complaints, it is thought that the rise in ALK phosphatase is likely associated with a bony source. I have explained these findings in detail with the patient and that we will repeat GGT and hep function panel in the office today. If the GGT remains normal, we will discharge her from future follow-up in this office and she can be followed intermittently with PCP. We would be happy to see her back in the future if conditions suggest additional liver studies are appropriate. No need at present for additional imaging of the liver with ultrasound to assess for any biliary disease/obstruction. This was recently negative and she is without symptoms. Orthopedic issues  She has expensive knee, back, and wrist pain relating to her know arthritis and prosthetic surgical changes. I have suggested that she can take reasonable dosing of NSAIDs and pain management courses as prescribed given that there is no indication of underlying liver disease. Screening for Hepatocellular Carcinoma  HCC screening is not necessary if the patient has no evidence of cirrhosis. Treatment of other medical problems in patients with chronic liver disease  There are no contraindications for the patient to take most medications that are necessary for treatment of other medical issues. Counseling for alcohol in patients with chronic liver disease  The patient was counseled regarding alcohol consumption and the effect of alcohol on chronic liver disease. The patient consumes alcohol on weekends only and reportedly not in excess.        ALLERGIES  Allergies   Allergen Reactions    Codeine Nausea Only    Hydrocodone Itching    Mold Shortness of Breath and Itching    Tramadol Itching    Ibuprofen Nausea Only       MEDICATIONS  Current Outpatient Medications   Medication Sig    traZODone (DESYREL) 100 mg tablet TAKE 1 TABLET BY MOUTH EVERY DAY AT NIGHT    DULoxetine (CYMBALTA) 60 mg capsule TAKE 1 CAPSULE BY MOUTH EVERY DAY    [START ON 12/27/2021] oxyCODONE-acetaminophen (PERCOCET 10)  mg per tablet Take 1 Tablet by mouth every twelve (12) hours as needed for Pain for up to 30 days. Max Daily Amount: 2 Tablets. Indications: pain    lidocaine (Lidoderm) 5 % Apply patch to the affected area for 12 hours a day and remove for 12 hours a day.  naloxone (Narcan) 4 mg/actuation nasal spray Use 1 spray into 1 nostril for OVERDOSE. Call 911. For subsequent doses, give in alternating nostrils. May repeat every 2 to 3 min.  EPINEPHrine (EPIPEN) 0.3 mg/0.3 mL injection See Admin Instructions.  sucralfate (CARAFATE) 1 gram tablet TAKE 1 TABLET BY MOUTH FOUR TIMES A DAY    metoprolol succinate (TOPROL-XL) 25 mg XL tablet TAKE 1 TABLET BY MOUTH EVERY DAY    methocarbamoL (ROBAXIN) 750 mg tablet TAKE 1 TAB BY MOUTH EVERY 6 HOURS AS NEEDED FOR SPASMS    furosemide (LASIX) 20 mg tablet TAKE 1 TABLET BY MOUTH EVERY DAY    Combivent Respimat  mcg/actuation inhaler INHALE 1 PUFF BY MOUTH EVERY 6 HOURS AS NEEDED FOR WHEEZING    amLODIPine (NORVASC) 5 mg tablet TAKE 1 TABLET BY MOUTH EVERY DAY    levothyroxine (SYNTHROID) 125 mcg tablet TAKE 1 TABLET BY MOUTH EVERY DAY BEFORE BREAKFAST    hydroCHLOROthiazide (HYDRODIURIL) 25 mg tablet TAKE 1 TAB BY MOUTH DAILY FOR HYPERTENSION    loratadine (CLARITIN) 10 mg tablet TAKE 1 TABLET BY MOUTH EVERY DAY    Symbicort 160-4.5 mcg/actuation HFAA TAKE 2 PUFFS BY INHALATION TWO (2) TIMES A DAY.  DULoxetine (CYMBALTA) 30 mg capsule Take 1 Cap by mouth daily. Take WITH 60 mg CAPSULE    pregabalin (LYRICA) 100 mg capsule Take 1 Cap by mouth three (3) times daily. Max Daily Amount: 300 mg.  acetaminophen (Tylenol Extra Strength) 500 mg tablet Take 2 Tabs by mouth every six (6) hours as needed for Pain.  pantoprazole (PROTONIX) 40 mg tablet Take 1 Tab by mouth daily.  Take in 2 week intervals for GERD Flares    azelastine (ASTELIN) 137 mcg (0.1 %) nasal spray as needed.  XOLAIR 150 mg solr Indications: injection    hydrOXYzine HCl (ATARAX) 25 mg tablet TAKE 1 TABLET BY MOUTH 3 TIMES A DAY AS NEEDED FOR ITCHING FOR ANXIETY    montelukast (SINGULAIR) 10 mg tablet TAKE 1 TAB BY MOUTH DAILY.  tiotropium (SPIRIVA WITH HANDIHALER) 18 mcg inhalation capsule Take 1 Cap by inhalation daily.  albuterol (PROVENTIL VENTOLIN) 2.5 mg /3 mL (0.083 %) nebulizer solution 3 mL by Nebulization route every four (4) hours as needed for Wheezing.  diclofenac (VOLTAREN) 1 % gel Apply 2 g to affected area every six (6) hours.  fluticasone (FLONASE) 50 mcg/actuation nasal spray 2 Sprays by Both Nostrils route daily.  ibuprofen (MOTRIN) 600 mg tablet Take 1 Tablet by mouth every six (6) hours as needed for Pain. (Patient not taking: Reported on 2021)    miscellaneous medical supply misc Shower seat for chronic knee pain, pt planning to have right TKR in  due to condition. Very limited range of motion. No current facility-administered medications for this visit. SYSTEM REVIEW NOT RELATED TO LIVER DISEASE OR REVIEWED ABOVE:  Constitution systems: Negative for fever, chills, weight gain, weight loss. Eyes: Negative for visual changes. ENT: Negative for sore throat, painful swallowing. Respiratory: Negative for cough, hemoptysis, SOB. Cardiology: Negative for chest pain, palpitations. GI:  Negative for constipation or diarrhea. : Negative for urinary frequency, dysuria, hematuria, nocturia. Skin: Negative for rash. Hematology: Negative for easy bruising, blood clots. Musculo-skeletal: Positive for significant back pain, right knee pain. Neurologic: Negative for headaches, dizziness, vertigo, memory problems not related to HE. Psychology: Negative for anxiety, depression. FAMILY HISTORY:  The father  of lung cancer.   The mother has/had the following chronic disease(s): DM. There is no family history of liver disease. SOCIAL HISTORY:  The patient is single. The patient has 2 children. The patient currently smokes 1/2 pack of tobacco daily. The patient consumes 4-6 alcoholic beverages per week. The patient is currently receiving disability. PHYSICAL EXAMINATION:  Visit Vitals  BP (!) 144/77 (BP 1 Location: Right upper arm, BP Patient Position: Sitting)   Pulse 76   Temp (!) 95.8 °F (35.4 °C) (Temporal)   Resp 14   Ht 5' 1\" (1.549 m)   Wt 211 lb (95.7 kg)   LMP 12/29/2016 (Exact Date) Comment: partial hysterectomy   BMI 39.87 kg/m²     General: No acute distress. Walks with some difficulty. Eyes: Sclera anicteric. ENT: No oral lesions. Thyroid normal.  Nodes: No adenopathy. Skin: No spider angiomata. No jaundice. No palmar erythema. Respiratory: Lungs clear to auscultation. Cardiovascular: Regular heart rate. No murmurs. No JVD. Abdomen: Soft non-tender. Liver size normal to percussion/palpation. Spleen not palpable. No obvious ascites. Extremities: No edema. No muscle wasting. Right knee surgical scar. Neurologic: Alert and oriented. Cranial nerves grossly intact. No asterixis.     LABORATORY STUDIES:  Liver [place] [address] Units 9/20/2021 7/27/2021   WBC 3.6 - 11.0 K/uL 7.6 4.4   ANC 1.8 - 8.0 K/UL  1.4 (L)   HGB 11.5 - 16.0 g/dL 15.6 15.9   HGB 11.5 - 16.0 g/dL     HGB (iSTAT) 11.5 - 16.0 g/dL      - 400 K/uL 242 253   INR 0.9 - 1.1       AST 15 - 37 U/L 13 (L) 14 (L)   ALT 12 - 78 U/L 17 20   Alk Phos 45 - 117 U/L 154 (H) 158 (H)   Bili, Total 0.2 - 1.0 MG/DL 0.7 0.7   Bili, Direct 0.0 - 0.2 MG/DL 0.2    Albumin 3.5 - 5.0 g/dL 3.5 3.8   BUN 6 - 20 MG/DL 14 13   Creat 0.55 - 1.02 MG/DL 1.00 1.00   Na 136 - 145 mmol/L 138 136   K 3.5 - 5.1 mmol/L 4.3 3.9   Cl 97 - 108 mmol/L 106 101   CO2 21 - 32 mmol/L 28 30   Glucose 65 - 100 mg/dL 86 87   Additional lab values drawn at today's office visit are pending at the time of documentation. SEROLOGIES:  Serologies Latest Ref Rng & Units 9/20/2021   C-ANCA Neg:<1:20 titer <1:20   P-ANCA Neg:<1:20 titer <1:20   ANCA Neg:<1:20 titer <1:20   M2 Ab 0.0 - 20.0 Units <20.0   9/2021. ACE level is 48,  GGT is 30; Alk Phos isoenzymes liver fraction 62%, bone fraction 32%, intestinal fraction 6%  2016. HCV Ab neg, HepBs AG neg. LIVER HISTOLOGY:  Not available or performed    ENDOSCOPIC PROCEDURES:  Not available or performed    RADIOLOGY:  1/2020. CT abdomen with and without contrast.  No acute abdominal process. 10/2021. Ultrasound of liver. Normal appearing liver. No liver mass lesions. No biliary changes or stone disease. OTHER TESTING:  Not available or performed    FOLLOW-UP:  All of the issues listed above in the Assessment and Plan were discussed with the patient. All questions were answered. The patient expressed a clear understanding of the above. [address] [place] [address] prn only pending review of final lab studies - repeat hepatic function and GGT, if consistent with above bone source, no indication for future follow-up in this office.        Keyanna Ramirez PA-C  Liver [place] [facility], [address] [facility] 22.  [phone]  [address] [place] [address]

## 2021-12-06 NOTE — PROGRESS NOTES
Chief Complaint   Patient presents with    Follow-up     6 weeks     Visit Vitals  BP (!) 144/77 (BP 1 Location: Right upper arm, BP Patient Position: Sitting)   Pulse 76   Temp (!) 95.8 °F (35.4 °C) (Temporal)   Resp 14   Ht 5' 1\" (1.549 m)   Wt 211 lb (95.7 kg)   BMI 39.87 kg/m²

## 2021-12-07 NOTE — PROGRESS NOTES
Pt notified via [address] of isolated ALP elevation and normal GGT. Work-up for underlying liver disease is negative, likely source for ALP is bony/ortho. Follow-up with PCP.

## 2022-01-28 ENCOUNTER — OFFICE VISIT (OUTPATIENT)
Dept: INTERNAL MEDICINE CLINIC | Age: 57
End: 2022-01-28
Payer: MEDICARE

## 2022-01-28 VITALS
HEART RATE: 79 BPM | SYSTOLIC BLOOD PRESSURE: 140 MMHG | DIASTOLIC BLOOD PRESSURE: 80 MMHG | OXYGEN SATURATION: 97 % | WEIGHT: 214 LBS | RESPIRATION RATE: 20 BRPM | BODY MASS INDEX: 40.4 KG/M2 | HEIGHT: 61 IN

## 2022-01-28 DIAGNOSIS — M54.41 CHRONIC BILATERAL LOW BACK PAIN WITH RIGHT-SIDED SCIATICA: ICD-10-CM

## 2022-01-28 DIAGNOSIS — Z79.891 CHRONIC USE OF OPIATE FOR THERAPEUTIC PURPOSE: ICD-10-CM

## 2022-01-28 DIAGNOSIS — G89.29 CHRONIC BILATERAL LOW BACK PAIN WITH RIGHT-SIDED SCIATICA: ICD-10-CM

## 2022-01-28 DIAGNOSIS — M17.11 PRIMARY OSTEOARTHRITIS OF RIGHT KNEE: ICD-10-CM

## 2022-01-28 DIAGNOSIS — M19.012 PRIMARY OSTEOARTHRITIS OF LEFT SHOULDER: ICD-10-CM

## 2022-01-28 PROCEDURE — 99214 OFFICE O/P EST MOD 30 MIN: CPT | Performed by: NURSE PRACTITIONER

## 2022-01-28 RX ORDER — OXYCODONE AND ACETAMINOPHEN 10; 325 MG/1; MG/1
1 TABLET ORAL
Qty: 45 TABLET | Refills: 0 | Status: SHIPPED | OUTPATIENT
Start: 2022-01-28 | End: 2022-04-20 | Stop reason: SDUPTHER

## 2022-01-28 RX ORDER — OXYCODONE AND ACETAMINOPHEN 10; 325 MG/1; MG/1
1 TABLET ORAL
Qty: 45 TABLET | Refills: 0 | Status: SHIPPED | OUTPATIENT
Start: 2022-02-27 | End: 2022-04-20 | Stop reason: SDUPTHER

## 2022-01-28 RX ORDER — OXYCODONE AND ACETAMINOPHEN 10; 325 MG/1; MG/1
1 TABLET ORAL
Qty: 45 TABLET | Refills: 0 | Status: SHIPPED | OUTPATIENT
Start: 2022-03-29 | End: 2022-04-20 | Stop reason: SDUPTHER

## 2022-01-28 NOTE — PATIENT INSTRUCTIONS
Safe Use of Opioid Pain Medicine: Care Instructions  Your Care Instructions  Pain is your body's way of warning you that something is wrong. Pain feels different for everybody. Only you can describe your pain. A doctor can suggest or prescribe many types of medicines for pain. These range from over-the-counter medicines like acetaminophen (Tylenol) to powerful medicines called opioids. Examples of opioids are fentanyl, hydrocodone, morphine, and oxycodone. Heroin is an illegal opioid  Opioids are strong medicines. They can help you manage pain when you use them the right way. But if you misuse them, they can cause serious harm and even death. For these reasons, doctors are very careful about how they prescribe opioids. If you decide to take opioids, here are some things to remember. · Keep your doctor informed. You can develop opioid use disorder. Moderate to severe opioid use disorder is sometimes called addiction. The risk is higher if you have a history of substance use. Your doctor will monitor you closely for signs of opioid use disorder and to figure out when you no longer need to take opioids. · Make a treatment plan. The goal of your plan is to be able to function and do the things you need to do, even if you still have some pain. You might be able to manage your pain with other non-opioid options like physical therapy, relaxation, or over-the-counter pain medicines. · Be aware of the side effects. Opioids can cause serious side effects, such as constipation, dry mouth, and nausea. And over time, you may need a higher dose to get pain relief. This is called tolerance. Your body also gets used to opioids. This is called physical dependence. If you suddenly stop taking them, you may have withdrawal symptoms. The doctor carefully considered what pain medicine is right for you.  You may not have received opioids if your doctor was concerned about drug interactions or your safety, or if he or she had other concerns. It is best to have one doctor or clinic treat your pain. This way you will get the pain medicine that will help you the most. And a doctor will be able to watch for any problems that the medicine might cause. The doctor has checked you carefully, but problems can develop later. If you notice any problems or new symptoms,  get medical treatment right away. Follow-up care is a key part of your treatment and safety. Be sure to make and go to all appointments, and call your doctor if you are having problems. It's also a good idea to know your test results and keep a list of the medicines you take. How can you care for yourself at home? If you need to take opioids to manage your pain, remember these safety tips. · Follow directions carefully. It's easy to misuse opioids if you take a dose other than what's prescribed by your doctor. This can lead to overdose and even death. Even sharing them with someone they weren't meant for is misuse. · Be cautious. Opioids may affect your judgment and decision making. Do not drive or operate machinery until you can think clearly. Talk with your doctor about when it is safe to drive. · Reduce the risk of drug interactions. Opioids can be dangerous if you take them with alcohol or with certain drugs like sleeping pills and muscle relaxers. Make sure your doctor knows about all the other medicines you take, including over-the-counter medicines. Don't start any new medicines before you talk to your doctor or pharmacist.  · Safely store and dispose of opioids. Store opioids in a safe and secure place. Make sure that pets, children, friends, and family can't get to them. When you're done using opioids, make sure to dispose of them safely and as quickly as possible. The U.S. Food and Drug Administration (FDA) recommends these disposal options.   ? The best option is to take your medicine to a drop-off box or take-back program that is authorized by the U.S. Drug Enforcement Administration (TRISH). ? If these programs aren't available in your area and your medicine doesn't have specific disposal instructions (such as flushing), you can throw them into your household trash if you follow the FDA's instructions. Visit fda.gov and search for \"unused medicine disposal.\"  ? If you have opioid patches (used or unused), your options are to take them to a TRISH-authorized site or flush them down the toilet. Do not throw them in the trash. ? Only flush your medicine down the toilet if you can't get to a TRISH-approved site or your medicine instructions state clearly to flush them. · Reduce the risk of overdose. Misuse of opioids can be very dangerous. Protect yourself by asking your doctor about a naloxone rescue kit. It can help you--and even save your life--if you take too much of an opioid. Try other ways to reduce pain. · Relax, and reduce stress. Relaxation techniques such as deep breathing or meditation can help. · Keep moving. Gentle, daily exercise can help reduce pain over the long run. Try low- or no-impact exercises such as walking, swimming, and stationary biking. Do stretches to stay flexible. · Try heat, cold packs, and massage. · Get enough sleep. Pain can make you tired and drain your energy. Talk with your doctor if you have trouble sleeping because of pain. · Think positive. Your thoughts can affect your pain level. Do things that you enjoy to distract yourself when you have pain instead of focusing on the pain. See a movie, read a book, listen to music, or spend time with a friend. If you are not taking a prescription pain medicine, ask your doctor if you can take an over-the-counter medicine. When should you call for help? Call 911 anytime you think you may need emergency care. For example, call if:  · You have symptoms of a severe allergic reaction. These may include:  ? Sudden raised, red areas (hives) all over your body. ?  Swelling of the throat, mouth, lips, or tongue. ? Trouble breathing. ? Passing out (losing consciousness). Or you may feel very lightheaded or suddenly feel weak, confused, or restless. · You have signs of an overdose. These include:  ? Cold, clammy skin. ? Confusion. ? Severe nervousness or restlessness. ? Severe dizziness, drowsiness, or weakness. ? Slow breathing. ? Seizures. Call your doctor now or seek immediate medical care if:  · You have symptoms of an allergic reaction, such as:  ? A rash or hives (raised, red areas on the skin). ? Itching. ? Swelling. ? Belly pain, nausea, or vomiting. Watch closely for changes in your health, and be sure to contact your doctor if:  · You think you might be taking too much pain medicine, and you need help to take less or stop. · Your medicine is not helping with the pain. · You are having side effects, such as constipation. Where can you learn more? Go to http://www.gray.com/  Enter R108 in the search box to learn more about \"Safe Use of Opioid Pain Medicine: Care Instructions. \"  Current as of: April 8, 2021               Content Version: 13.0  © 8990-5625 [employer]. Care instructions adapted under license by [employer] (which disclaims liability or warranty for this information). If you have questions about a medical condition or this instruction, always ask your healthcare professional. [place] [address] any warranty or liability for your use of this information.

## 2022-01-28 NOTE — PROGRESS NOTES
Aleida Moss (: 1965) is a 60 y.o. female, established patient, here for evaluation of the following chief complaint(s):  Medication Refill       ASSESSMENT/PLAN:  Below is the assessment and plan developed based on review of pertinent history, physical exam, labs, studies, and medications. 1. Primary osteoarthritis of right knee  2. Chronic use of opiate for therapeutic purpose  3. Primary osteoarthritis of left shoulder  4. Chronic bilateral low back pain with right-sided sciatica      No follow-ups on file. Pt asked to complete follow by next visit: advised to try calling [facility] to see if they can see her since unable to continue care with [facility] due to bill in collections. SUBJECTIVE/OBJECTIVE:  HPI    Chronic Pain:  Patient has chronic BL knee pain and LBP for years, h/o right TKR (, Last revised ~ 2 yrs ago) and right wrist surgery (2021). Having CONTINUED right knee pain, seen by Thee Gil , told her hardware is sticking forward, but deferring further intervention/imaging until LBP addressed. Had MRI done 2021, seen by spinal specialist with [facility] on 8/3 & 9/3 to SI and lumbar spine. Has since been told there are no treatments to help with pain in lower back or legs by specialist. Pain Scale: 8/10. Pain in the left shoulder, wrist, knees, legs, and lower back is still limiting walking, sitting, reaching, lifting, and standing, exacerbated by forementioned acitivities to include stair climbing. Has tried prednisone, tramadol, injections, PT, gabapentin, cymbalta, and surgery in past with minimal relief. Pain has been controlled with Oxycodone, last taken YESTERDAY. The medication is kept safe by staying with her. Has NOT seen any other providers since last OV for pain medication. No significant changes to pain presentation since last OV. she is  able to do her normal daily activities. she reports the following adverse side effects: none.      Least pain over the last week has been 7/10  Worst pain over the last week has been 10/10. Aberrant behaviors: None         Symptoms onset: problem is longstanding. Rheumatological ROS: ongoing significant pain which is stable and controlled by pain med. Response to treatment plan: waxing and waning. Hypertension Review:  The patient has hypertension, has to appear in court this morning unexpectedly for grand-niece's child support. Diet and Lifestyle: generally does  try to follow a  low sodium diet, exercises sporadically   Home BP Monitoring: is not measured at home. Pertinent ROS: taking medications as instructed, no medication side effects noted. No HA's, chest pain on exertion, dyspnea on exertion, or swelling of ankles. BP Readings from Last 3 Encounters:   01/28/22 (!) 140/80   12/06/21 (!) 144/77   10/28/21 135/89       Review of Systems  Constitutional: +MRSA in nose. negative for fevers, chills, anorexia and weight loss  Eyes:   negative for visual disturbance, drainage, and irritation  ENT:   +AR and environmental allergies. negative for tinnitus,sore throat,ear pain,and hoarseness  Respiratory:  + asthma/COPD. negative for hemoptysis  CV:   negative for chest pain, palpitations, and lower extremity edema  GI:   + GERD. negative for nausea, vomiting, diarrhea, abdominal pain, and melena  Endo:              +hypothyroidsim.  negative for polyuria,polydipsia,polyphagia, and heat intolerance  Genitourinary: negative for frequency, urgency, dysuria, retention, and hematuria  Integument:  negative for rash, ulcerations, and pruritus  Hematologic:  negative for easy bruising and bleeding  Musculoskel: negative for  muscle weakness  Neurological:  negative for headaches, dizziness, vertigo,and memory problems  Behavl/Psych: +depression, on cymbalta and zoloft. negative for feelings of  suicide    1./2. Medical history/Past medical History   Past Medical History:   Diagnosis Date    Arthritis     Asthma     uses inhalers  Chronic obstructive pulmonary disease (HCC)     bronchitis    Chronic pain     GERD (gastroesophageal reflux disease)     History of seasonal allergies     Hypertension     Ill-defined condition     environmental allergies     Ill-defined condition     Multiple body piercings; unable to remove tongue and lip piercings    Ill-defined condition 2018    GASTRITIS PER ENDOSCOPY    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    Psychiatric disorder     DEPRESSION    Thyroid disease     hypo    Ventral hernia 12/31/2013     Past Surgical History:   Procedure Laterality Date    HX HEENT  2009    thyroidectomy    HX KNEE REPLACEMENT      R    HX KNEE REPLACEMENT  03/26/2019    HX MOHS PROCEDURES Right 3/8/11    HX OTHER SURGICAL      hiatal hernia repair    HX PARTIAL HYSTERECTOMY      HX TUBAL LIGATION       Patient Active Problem List   Diagnosis Code    Ventral hernia K43.9    Chronic pain of right knee M25.561, G89.29    Chronic right shoulder pain M25.511, G89.29    Environmental and seasonal allergies J30.89    Ventral hernia without obstruction or gangrene K43.9    Primary osteoarthritis of right knee M17.11    Mixed simple and mucopurulent chronic bronchitis (HCC) J41.8    Acquired hypothyroidism E03.9    Chronic bilateral low back pain with right-sided sciatica M54.41, G89.29    Status post total right knee replacement Z96.651    Malignant hypertension I10    Pain management contract signed Z02.89    Chronic pain of left knee M25.562, G89.29    Moderate episode of recurrent major depressive disorder (HCC) F33.1    Psychophysiological insomnia F51.04    Severe obesity (BMI 35.0-39. 9) with comorbidity (HCC) E66.01    Post-tussive vomiting R11.10    Chronic use of opiate for therapeutic purpose Z79.891    Obesity, Class II, BMI 35-39.9 E66.9    Left wrist pain M25.532    Chronic left shoulder pain M25.512, G89.29    Osteoarthritis of spine with radiculopathy, cervical region M47.22    Primary osteoarthritis of left shoulder M19.012    MRSA carrier Z22.322    S/P total knee arthroplasty, right Z96.651    Loosening of knee joint prosthesis (HCC) T84.038A, Z96.659    Sprain and strain of right wrist S63.501A, M60.246S    History of recent fall Z91.81    Immunotherapy Z29.8    Ulnar impaction syndrome, right M25.831    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 Applicable records from prior treatment providers are apart of [facility] under the media/encounters tab. 4. Diagnostic, therapeutic and laboratory results are available in the [facility] chart. 5. Consultation notes are available for review in the media/encounters tab of the [facility] chart. 6. Treatment goals include: pain control, improve activity level and function in regards to activities of daily living, and improved comfort level. Previously pt has been limited by pain in all these aspects. 7. The risks and benefits of treatment have been discussed at this office visit with the pt.  she understands that the medication has addictive potential.  Additionally the pt has been advised that narcotic pain medication may impair mental and/or physical ability required for performance of tasks such as driving or operating any other machinery. 8. Pt has an updated signed pain contract on file and can be found under the media section of the [facility] chart. 9. The pain contract is reviewed. Pain medication will be continued at the SAME dosage. PILL COUNT: 0, last fill date 12/28/21. Urine drug screening ordered/collected today. Additional diagnostic studies are not indicated at this time. Interventional procedure are not indicated at this time. Narcan prescribed already. 10. Medication prescibed is oxycodone  every 12 PRN # 45 with 2 refills for a 3 month supply.   was reviewed while planning for pain/anxiety management, no indications of drug diversion suspected. Prescription history is no suspicious for controlled substance overuse. 11. Patient instructions have been reviewed in detail as outlined above and in the pain contract. 12. Re-evaluation is planned for 3 months. Current Outpatient Medications   Medication Sig    traZODone (DESYREL) 100 mg tablet TAKE 1 TABLET BY MOUTH EVERY DAY AT NIGHT    DULoxetine (CYMBALTA) 60 mg capsule TAKE 1 CAPSULE BY MOUTH EVERY DAY    lidocaine (Lidoderm) 5 % Apply patch to the affected area for 12 hours a day and remove for 12 hours a day.  naloxone (Narcan) 4 mg/actuation nasal spray Use 1 spray into 1 nostril for OVERDOSE. Call 911. For subsequent doses, give in alternating nostrils. May repeat every 2 to 3 min.  ibuprofen (MOTRIN) 600 mg tablet Take 1 Tablet by mouth every six (6) hours as needed for Pain.  EPINEPHrine (EPIPEN) 0.3 mg/0.3 mL injection See Admin Instructions.  sucralfate (CARAFATE) 1 gram tablet TAKE 1 TABLET BY MOUTH FOUR TIMES A DAY    metoprolol succinate (TOPROL-XL) 25 mg XL tablet TAKE 1 TABLET BY MOUTH EVERY DAY    methocarbamoL (ROBAXIN) 750 mg tablet TAKE 1 TAB BY MOUTH EVERY 6 HOURS AS NEEDED FOR SPASMS    furosemide (LASIX) 20 mg tablet TAKE 1 TABLET BY MOUTH EVERY DAY    Combivent Respimat  mcg/actuation inhaler INHALE 1 PUFF BY MOUTH EVERY 6 HOURS AS NEEDED FOR WHEEZING    amLODIPine (NORVASC) 5 mg tablet TAKE 1 TABLET BY MOUTH EVERY DAY    levothyroxine (SYNTHROID) 125 mcg tablet TAKE 1 TABLET BY MOUTH EVERY DAY BEFORE BREAKFAST    hydroCHLOROthiazide (HYDRODIURIL) 25 mg tablet TAKE 1 TAB BY MOUTH DAILY FOR HYPERTENSION    loratadine (CLARITIN) 10 mg tablet TAKE 1 TABLET BY MOUTH EVERY DAY    Symbicort 160-4.5 mcg/actuation HFAA TAKE 2 PUFFS BY INHALATION TWO (2) TIMES A DAY.  DULoxetine (CYMBALTA) 30 mg capsule Take 1 Cap by mouth daily. Take WITH 60 mg CAPSULE    pregabalin (LYRICA) 100 mg capsule Take 1 Cap by mouth three (3) times daily.  Max Daily Amount: 300 mg.  acetaminophen (Tylenol Extra Strength) 500 mg tablet Take 2 Tabs by mouth every six (6) hours as needed for Pain.  pantoprazole (PROTONIX) 40 mg tablet Take 1 Tab by mouth daily. Take in 2 week intervals for GERD Flares    azelastine (ASTELIN) 137 mcg (0.1 %) nasal spray as needed.  XOLAIR 150 mg solr Indications: injection    hydrOXYzine HCl (ATARAX) 25 mg tablet TAKE 1 TABLET BY MOUTH 3 TIMES A DAY AS NEEDED FOR ITCHING FOR ANXIETY    montelukast (SINGULAIR) 10 mg tablet TAKE 1 TAB BY MOUTH DAILY.  tiotropium (SPIRIVA WITH HANDIHALER) 18 mcg inhalation capsule Take 1 Cap by inhalation daily.  albuterol (PROVENTIL VENTOLIN) 2.5 mg /3 mL (0.083 %) nebulizer solution 3 mL by Nebulization route every four (4) hours as needed for Wheezing.  diclofenac (VOLTAREN) 1 % gel Apply 2 g to affected area every six (6) hours.  fluticasone (FLONASE) 50 mcg/actuation nasal spray 2 Sprays by Both Nostrils route daily.  miscellaneous medical supply misc Shower seat for chronic knee pain, pt planning to have right TKR in June due to condition. Very limited range of motion. No current facility-administered medications for this visit. Visit Vitals  BP (!) 140/80 (BP 1 Location: Right arm, BP Patient Position: Sitting, BP Cuff Size: Adult)   Pulse 79   Resp 20   Ht 5' 1\" (1.549 m)   Wt 214 lb (97.1 kg)   LMP 12/29/2016 (Exact Date) Comment: partial hysterectomy   SpO2 97%   BMI 40.43 kg/m²       Wt Readings from Last 3 Encounters:   01/28/22 214 lb (97.1 kg)   12/06/21 211 lb (95.7 kg)   10/28/21 204 lb (92.5 kg)     Physical Exam:   General appearance - alert, well appearing, and in no distress. Mental status - A/O x 4,normal mood and affect. Chest -   Symmetric chest rise. No wheezing. No distress. Heart - Normal rate. Abdomen- Soft, round. Non-distended, NT. No pulsatile masses or hernias. Ext-  No clubbing or cyanosis. Right knee grossly enlarged.    Skin-Warm and dry. No hyperpigmentation, ulcerations, or suspicious lesions. Neuro - Normal speech, no focal findings or movement disorder. Normal strength and muscle tone. Slow, limping gait using cane. An electronic signature was used to authenticate this note.   -- Berta Poole, NP

## 2022-01-28 NOTE — PROGRESS NOTES
Identified pt with two pt identifiers(name and ). Chief Complaint   Patient presents with    Medication Refill      Vitals:    22 0829   BP: (!) 146/76   Pulse: 81   Resp: 20   SpO2: 97%   Weight: 214 lb (97.1 kg)   Height: 5' 1\" (1.549 m)   PainSc:   8   PainLoc: Generalized   LMP: 2016      Health Maintenance Due   Topic    COVID-19 Vaccine (1)    Cervical cancer screen     Shingrix Vaccine Age 50> (1 of 2)    Colorectal Cancer Screening Combo     Breast Cancer Screen Mammogram     Medicare Yearly Exam        Depression Screening:  :     3 most recent PHQ Screens 2022   Little interest or pleasure in doing things Not at all   Feeling down, depressed, irritable, or hopeless Not at all   Total Score PHQ 2 0   Trouble falling or staying asleep, or sleeping too much -   Feeling tired or having little energy -   Poor appetite, weight loss, or overeating -   Feeling bad about yourself - or that you are a failure or have let yourself or your family down -   Trouble concentrating on things such as school, work, reading, or watching TV -   Moving or speaking so slowly that other people could have noticed; or the opposite being so fidgety that others notice -   Thoughts of being better off dead, or hurting yourself in some way -   PHQ 9 Score -   How difficult have these problems made it for you to do your work, take care of your home and get along with others -        Fall Risk Assessment:  :     Fall Risk Assessment, last 12 mths 10/21/2020   Able to walk? Yes   Fall in past 12 months? Yes   Number of falls in past 12 months 1   Fall with injury? 1       Abuse Screening:  :     No flowsheet data found. Coordination of Care Questionnaire:  :     1. Have you been to the ER, urgent care clinic since your last visit? Hospitalized since your last visit? No    2. Have you seen or consulted any other health care providers outside of the [address] [place] [address] since your last visit?   Include any pap smears or colon screening.  No

## 2022-01-30 LAB — UR CULT HOLD, URHOLD: NORMAL

## 2022-02-04 LAB — DRUGS UR: NORMAL

## 2022-03-18 PROBLEM — Z79.891 CHRONIC USE OF OPIATE FOR THERAPEUTIC PURPOSE: Status: ACTIVE | Noted: 2018-05-24

## 2022-03-18 PROBLEM — Z29.89 IMMUNOTHERAPY: Status: ACTIVE | Noted: 2021-01-04

## 2022-03-18 PROBLEM — M25.532 LEFT WRIST PAIN: Status: ACTIVE | Noted: 2018-09-25

## 2022-03-18 PROBLEM — M25.831 ULNAR IMPACTION SYNDROME, RIGHT: Status: ACTIVE | Noted: 2021-01-22

## 2022-03-18 PROBLEM — E03.9 ACQUIRED HYPOTHYROIDISM: Status: ACTIVE | Noted: 2017-01-06

## 2022-03-18 PROBLEM — F51.04 PSYCHOPHYSIOLOGICAL INSOMNIA: Status: ACTIVE | Noted: 2018-02-28

## 2022-03-18 PROBLEM — Z29.8 IMMUNOTHERAPY: Status: ACTIVE | Noted: 2021-01-04

## 2022-03-19 PROBLEM — M25.562 CHRONIC PAIN OF LEFT KNEE: Status: ACTIVE | Noted: 2017-06-15

## 2022-03-19 PROBLEM — T84.038A LOOSENING OF KNEE JOINT PROSTHESIS (HCC): Status: ACTIVE | Noted: 2019-03-26

## 2022-03-19 PROBLEM — E66.812 OBESITY, CLASS II, BMI 35-39.9: Status: ACTIVE | Noted: 2018-09-25

## 2022-03-19 PROBLEM — Z02.89 PAIN MANAGEMENT CONTRACT SIGNED: Status: ACTIVE | Noted: 2017-05-08

## 2022-03-19 PROBLEM — G89.29 CHRONIC LEFT SHOULDER PAIN: Status: ACTIVE | Noted: 2018-09-25

## 2022-03-19 PROBLEM — E66.9 OBESITY, CLASS II, BMI 35-39.9: Status: ACTIVE | Noted: 2018-09-25

## 2022-03-19 PROBLEM — M19.012 PRIMARY OSTEOARTHRITIS OF LEFT SHOULDER: Status: ACTIVE | Noted: 2018-09-26

## 2022-03-19 PROBLEM — Z96.659 LOOSENING OF KNEE JOINT PROSTHESIS (HCC): Status: ACTIVE | Noted: 2019-03-26

## 2022-03-19 PROBLEM — Z96.651 S/P TOTAL KNEE ARTHROPLASTY, RIGHT: Status: ACTIVE | Noted: 2019-03-26

## 2022-03-19 PROBLEM — Z96.651 STATUS POST TOTAL RIGHT KNEE REPLACEMENT: Status: ACTIVE | Noted: 2017-05-08

## 2022-03-19 PROBLEM — G89.29 CHRONIC PAIN OF LEFT KNEE: Status: ACTIVE | Noted: 2017-06-15

## 2022-03-19 PROBLEM — S66.911A SPRAIN AND STRAIN OF RIGHT WRIST: Status: ACTIVE | Noted: 2021-01-04

## 2022-03-19 PROBLEM — M24.131 DEGENERATIVE TEAR OF TRIANGULAR FIBROCARTILAGE COMPLEX (TFCC) OF RIGHT WRIST: Status: ACTIVE | Noted: 2021-01-22

## 2022-03-19 PROBLEM — S63.501A SPRAIN AND STRAIN OF RIGHT WRIST: Status: ACTIVE | Noted: 2021-01-04

## 2022-03-19 PROBLEM — R11.10 POST-TUSSIVE VOMITING: Status: ACTIVE | Noted: 2018-03-28

## 2022-03-19 PROBLEM — M47.22 OSTEOARTHRITIS OF SPINE WITH RADICULOPATHY, CERVICAL REGION: Status: ACTIVE | Noted: 2018-09-26

## 2022-03-19 PROBLEM — Z22.322 MRSA CARRIER: Status: ACTIVE | Noted: 2019-03-06

## 2022-03-19 PROBLEM — M25.512 CHRONIC LEFT SHOULDER PAIN: Status: ACTIVE | Noted: 2018-09-25

## 2022-03-19 PROBLEM — Z91.81 HISTORY OF RECENT FALL: Status: ACTIVE | Noted: 2021-01-04

## 2022-03-19 PROBLEM — F33.1 MODERATE EPISODE OF RECURRENT MAJOR DEPRESSIVE DISORDER (HCC): Status: ACTIVE | Noted: 2018-02-28

## 2022-03-20 PROBLEM — E66.01 SEVERE OBESITY (BMI 35.0-39.9) WITH COMORBIDITY (HCC): Status: ACTIVE | Noted: 2018-03-28

## 2022-03-20 PROBLEM — I10 MALIGNANT HYPERTENSION: Status: ACTIVE | Noted: 2017-05-08

## 2022-03-20 PROBLEM — M54.41 CHRONIC BILATERAL LOW BACK PAIN WITH RIGHT-SIDED SCIATICA: Status: ACTIVE | Noted: 2017-05-08

## 2022-03-20 PROBLEM — G89.29 CHRONIC BILATERAL LOW BACK PAIN WITH RIGHT-SIDED SCIATICA: Status: ACTIVE | Noted: 2017-05-08

## 2022-04-13 DIAGNOSIS — M19.012 PRIMARY OSTEOARTHRITIS OF LEFT SHOULDER: ICD-10-CM

## 2022-04-13 DIAGNOSIS — M17.11 PRIMARY OSTEOARTHRITIS OF RIGHT KNEE: ICD-10-CM

## 2022-04-13 DIAGNOSIS — Z79.891 CHRONIC USE OF OPIATE FOR THERAPEUTIC PURPOSE: ICD-10-CM

## 2022-04-13 RX ORDER — METHOCARBAMOL 750 MG/1
TABLET, FILM COATED ORAL
Qty: 120 TABLET | Refills: 6 | Status: SHIPPED | OUTPATIENT
Start: 2022-04-13

## 2022-04-20 ENCOUNTER — VIRTUAL VISIT (OUTPATIENT)
Dept: INTERNAL MEDICINE CLINIC | Age: 57
End: 2022-04-20
Payer: MEDICARE

## 2022-04-20 DIAGNOSIS — M15.9 PRIMARY OSTEOARTHRITIS INVOLVING MULTIPLE JOINTS: Primary | ICD-10-CM

## 2022-04-20 DIAGNOSIS — Z00.00 MEDICARE ANNUAL WELLNESS VISIT, SUBSEQUENT: ICD-10-CM

## 2022-04-20 DIAGNOSIS — G89.29 CHRONIC BILATERAL LOW BACK PAIN WITH RIGHT-SIDED SCIATICA: ICD-10-CM

## 2022-04-20 DIAGNOSIS — N60.11 FIBROCYSTIC BREAST CHANGES OF BOTH BREASTS: ICD-10-CM

## 2022-04-20 DIAGNOSIS — M54.41 CHRONIC BILATERAL LOW BACK PAIN WITH RIGHT-SIDED SCIATICA: ICD-10-CM

## 2022-04-20 DIAGNOSIS — M19.012 PRIMARY OSTEOARTHRITIS OF LEFT SHOULDER: ICD-10-CM

## 2022-04-20 DIAGNOSIS — Z12.39 ENCOUNTER FOR SCREENING FOR MALIGNANT NEOPLASM OF BREAST, UNSPECIFIED SCREENING MODALITY: ICD-10-CM

## 2022-04-20 DIAGNOSIS — N60.12 FIBROCYSTIC BREAST CHANGES OF BOTH BREASTS: ICD-10-CM

## 2022-04-20 DIAGNOSIS — Z79.891 CHRONIC USE OF OPIATE FOR THERAPEUTIC PURPOSE: ICD-10-CM

## 2022-04-20 DIAGNOSIS — Z71.89 ADVANCE CARE PLANNING: ICD-10-CM

## 2022-04-20 DIAGNOSIS — M17.11 PRIMARY OSTEOARTHRITIS OF RIGHT KNEE: ICD-10-CM

## 2022-04-20 PROBLEM — Z90.711 S/P PARTIAL HYSTERECTOMY: Status: ACTIVE | Noted: 2022-04-20

## 2022-04-20 PROBLEM — S66.911A SPRAIN AND STRAIN OF RIGHT WRIST: Status: RESOLVED | Noted: 2021-01-04 | Resolved: 2022-04-20

## 2022-04-20 PROBLEM — R11.10 POST-TUSSIVE VOMITING: Status: RESOLVED | Noted: 2018-03-28 | Resolved: 2022-04-20

## 2022-04-20 PROBLEM — S63.501A SPRAIN AND STRAIN OF RIGHT WRIST: Status: RESOLVED | Noted: 2021-01-04 | Resolved: 2022-04-20

## 2022-04-20 PROBLEM — M25.512 CHRONIC LEFT SHOULDER PAIN: Status: RESOLVED | Noted: 2018-09-25 | Resolved: 2022-04-20

## 2022-04-20 PROBLEM — K08.109 EDENTULOUS: Status: ACTIVE | Noted: 2022-04-20

## 2022-04-20 PROBLEM — M25.562 CHRONIC PAIN OF LEFT KNEE: Status: RESOLVED | Noted: 2017-06-15 | Resolved: 2022-04-20

## 2022-04-20 PROBLEM — Z91.81 HISTORY OF RECENT FALL: Status: RESOLVED | Noted: 2021-01-04 | Resolved: 2022-04-20

## 2022-04-20 PROBLEM — I10 MALIGNANT HYPERTENSION: Status: RESOLVED | Noted: 2017-05-08 | Resolved: 2022-04-20

## 2022-04-20 PROBLEM — M24.131 DEGENERATIVE TEAR OF TRIANGULAR FIBROCARTILAGE COMPLEX (TFCC) OF RIGHT WRIST: Chronic | Status: RESOLVED | Noted: 2021-01-22 | Resolved: 2022-04-20

## 2022-04-20 PROBLEM — Z96.651 STATUS POST TOTAL RIGHT KNEE REPLACEMENT: Status: RESOLVED | Noted: 2017-05-08 | Resolved: 2022-04-20

## 2022-04-20 PROBLEM — Z96.659 LOOSENING OF KNEE JOINT PROSTHESIS (HCC): Status: RESOLVED | Noted: 2019-03-26 | Resolved: 2022-04-20

## 2022-04-20 PROBLEM — M25.532 LEFT WRIST PAIN: Status: RESOLVED | Noted: 2018-09-25 | Resolved: 2022-04-20

## 2022-04-20 PROBLEM — M25.831 ULNAR IMPACTION SYNDROME, RIGHT: Status: RESOLVED | Noted: 2021-01-22 | Resolved: 2022-04-20

## 2022-04-20 PROBLEM — T84.038A LOOSENING OF KNEE JOINT PROSTHESIS (HCC): Status: RESOLVED | Noted: 2019-03-26 | Resolved: 2022-04-20

## 2022-04-20 PROBLEM — Z22.322 MRSA CARRIER: Status: RESOLVED | Noted: 2019-03-06 | Resolved: 2022-04-20

## 2022-04-20 PROBLEM — M25.831 ULNAR IMPACTION SYNDROME, RIGHT: Chronic | Status: RESOLVED | Noted: 2021-01-22 | Resolved: 2022-04-20

## 2022-04-20 PROBLEM — M24.131 DEGENERATIVE TEAR OF TRIANGULAR FIBROCARTILAGE COMPLEX (TFCC) OF RIGHT WRIST: Status: RESOLVED | Noted: 2021-01-22 | Resolved: 2022-04-20

## 2022-04-20 PROCEDURE — G8756 NO BP MEASURE DOC: HCPCS | Performed by: NURSE PRACTITIONER

## 2022-04-20 PROCEDURE — G9717 DOC PT DX DEP/BP F/U NT REQ: HCPCS | Performed by: NURSE PRACTITIONER

## 2022-04-20 PROCEDURE — G0439 PPPS, SUBSEQ VISIT: HCPCS | Performed by: NURSE PRACTITIONER

## 2022-04-20 PROCEDURE — 3017F COLORECTAL CA SCREEN DOC REV: CPT | Performed by: NURSE PRACTITIONER

## 2022-04-20 PROCEDURE — 99214 OFFICE O/P EST MOD 30 MIN: CPT | Performed by: NURSE PRACTITIONER

## 2022-04-20 PROCEDURE — G8417 CALC BMI ABV UP PARAM F/U: HCPCS | Performed by: NURSE PRACTITIONER

## 2022-04-20 PROCEDURE — G8427 DOCREV CUR MEDS BY ELIG CLIN: HCPCS | Performed by: NURSE PRACTITIONER

## 2022-04-20 RX ORDER — OXYCODONE AND ACETAMINOPHEN 10; 325 MG/1; MG/1
1 TABLET ORAL
Qty: 45 TABLET | Refills: 0 | Status: SHIPPED | OUTPATIENT
Start: 2022-06-19 | End: 2022-07-12 | Stop reason: SDUPTHER

## 2022-04-20 RX ORDER — OXYCODONE AND ACETAMINOPHEN 10; 325 MG/1; MG/1
1 TABLET ORAL
Qty: 45 TABLET | Refills: 0 | Status: SHIPPED | OUTPATIENT
Start: 2022-04-20 | End: 2022-04-21

## 2022-04-20 RX ORDER — OXYCODONE AND ACETAMINOPHEN 10; 325 MG/1; MG/1
1 TABLET ORAL
Qty: 45 TABLET | Refills: 0 | Status: SHIPPED | OUTPATIENT
Start: 2022-05-20 | End: 2022-04-21

## 2022-04-20 NOTE — ACP (ADVANCE CARE PLANNING)
Advanced care planning- discussed Advance directive, Medical POA, and life sustaining options. Advised of free virtual or in-person visit for advance care planning paperwork completion with ACP specialist. Referreal sent. Advance Care Planning (ACP) Provider Conversation Snapshot    Date of ACP Conversation: 04/20/22  Persons included in Conversation:  Patient/family  Length of ACP Conversation in minutes:  5-10 minutes    Authorized Decision Maker (if patient is incapable of making informed decisions): This person is:   Healthcare Agent/Medical Power of  under Advance Directive        For Patients with Decision Making Capacity:   Intubation, CPR, use of IVF/Nutrition, Tube Feedings, and organ donation options reviewed briefly    Conversation Outcomes / Follow-Up Plan:   Recommended completion of Advance Directive form after review of ACP materials and conversation with prospective healthcare agent     Referral made for ACP follow-up assistance to:  ACP facilitator/specialist    ====Advance Care Planning Invitation====    Patient was invited to begin or continue Advance Care Planning on this date and reviewed ACP materials in the office OR discussed in detail during virtual visit. Recommended appointment with a [facility] [employer]  facilitator for ACP conversation regarding advance directives. [x] Yes  [] No  Referral sent to [facility] ACP team member or Coordinator for follow-up    [] Yes  [x] No  Patient scheduled an appointment.        Site of Referral: [place] [id number]

## 2022-04-20 NOTE — PROGRESS NOTES
10/10 mouth and knee     Pt states last had something for pain this morning       Pt is here for   Chief Complaint   Patient presents with    Annual Wellness Visit     medicare    Medication Refill     pain meds and zofran     1. Have you been to the ER, urgent care clinic since your last visit? Hospitalized since your last visit? No    2. Have you seen or consulted any other health care providers outside of the [address] [place] [address] since your last visit? Include any pap smears or colon screening.  Yes When: Dentist for teeth extraction

## 2022-04-20 NOTE — PATIENT INSTRUCTIONS
COVID-19 Vaccine: Care Instructions  Overview     The COVID-19 vaccine can help you avoid getting COVID-19, a disease caused by a type of coronavirus. COVID-19 can cause pneumonia and even death. You may need 1 or 2 doses of the vaccine. And you might need \"booster\" doses later to help you stay protected. It takes two weeks after your last dose to be fully protected from COVID-19. The vaccine prevents most cases of COVID-19. But if you do still catch COVID-19, your symptoms will probably be less severe than if you hadn't gotten the vaccine. You can't get COVID-19 from the vaccine. What are the side effects of the COVID-19 vaccine? You might not have side effects. But if you do, they'll probably be like those of other vaccines, including:  · Fever. · Soreness. · Feeling very tired. This is normal. Your body is building protection against COVID-19. You may also have other side effects, including:  · Chills. · Headache. · Pain, redness, a rash, or swelling in the arm where you had the vaccine. · Swollen lymph nodes in the armpit of the arm where you had the vaccine. · Nausea. Side effects will likely go away in a few days. Until then, it may be harder to do your usual activities. If you think you've been exposed to COVID-19 or have symptoms like a cough, trouble breathing, or a new loss of smell or taste, call your doctor. These aren't vaccine side effects. You need a COVID-19 test.  Follow-up care is a key part of your treatment and safety. Be sure to make and go to all appointments, and call your doctor if you are having problems. It's also a good idea to know your test results and keep a list of the medicines you take. How can you care for yourself at home? · If you have a sore arm or a fever after getting the COVID-19 vaccine, you can take an over-the-counter pain medicine, such as acetaminophen (Tylenol) or ibuprofen (Advil, Motrin). Read and follow all instructions on the label.  Do not give aspirin to anyone younger than 20. It has been linked to Reye syndrome, a serious illness. · Put ice or a cold pack on the sore area for 10 to 20 minutes at a time. Put a thin cloth between the ice and your skin. · If you have side effects, such as a fever, be sure to get enough rest and drink plenty of fluids. When should you call for help? Call 911  anytime you think you may need emergency care. For example, call if after getting the COVID-19 vaccine:    · You have symptoms of a severe reaction to the vaccine. Symptoms of a severe reaction may include:  ? Severe difficulty breathing. ? Sudden raised, red areas (hives) all over your body. ? Severe lightheadedness. Call your doctor now or seek immediate medical care if:    · You have one or more of these symptoms within several weeks of getting a COVID-19 vaccine. ? Severe headache that does not go away. ? Shortness of breath. ? Chest pain, or a feeling of a fast-beating, fluttering, or pounding heart. ? Weakness or tingling sensations, especially in the legs or arms. ? Problems walking. ? Problems speaking, chewing, or swallowing. ? Blurred or double vision or other problems with your eyes. ? Losing bladder control or having bowel problems. ? Abdominal pain that does not go away. ? Pain, redness, or swelling in the leg.  ? Bruising or tiny spots under the skin that's not near where you got the vaccine. Watch closely for changes in your health, and be sure to contact your doctor if you have any problems. Where can you learn more? Go to http://www.gray.com/  Enter C126 in the search box to learn more about \"COVID-19 Vaccine: Care Instructions. \"  Current as of: July 1, 2021               Content Version: 13.2  © 8381-9959 [employer]. Care instructions adapted under license by [employer] (which disclaims liability or warranty for this information).  If you have questions about a medical condition or this instruction, always ask your healthcare professional. [address] any warranty or liability for your use of this information. Advance Directives: Care Instructions  Overview  An advance directive is a legal way to state your wishes at the end of your life. It tells your family and your doctor what to do if you can't say what you want. There are two main types of advance directives. You can change them any time your wishes change. Living will. This form tells your family and your doctor your wishes about life support and other treatment. The form is also called a declaration. Medical power of . This form lets you name a person to make treatment decisions for you when you can't speak for yourself. This person is called a health care agent (health care proxy, health care surrogate). The form is also called a durable power of  for health care. If you do not have an advance directive, decisions about your medical care may be made by a family member, or by a doctor or a  who doesn't know you. It may help to think of an advance directive as a gift to the people who care for you. If you have one, they won't have to make tough decisions by themselves. Follow-up care is a key part of your treatment and safety. Be sure to make and go to all appointments, and call your doctor if you are having problems. It's also a good idea to know your test results and keep a list of the medicines you take. What should you include in an advance directive? Many states have a unique advance directive form. (It may ask you to address specific issues.) Or you might use a universal form that's approved by many states. If your form doesn't tell you what to address, it may be hard to know what to include in your advance directive. Use the questions below to help you get started. · Who do you want to make decisions about your medical care if you are not able to?   · What life-support measures do you want if you have a serious illness that gets worse over time or can't be cured? · What are you most afraid of that might happen? (Maybe you're afraid of having pain, losing your independence, or being kept alive by machines.)  · Where would you prefer to die? (Your home? A hospital? A nursing home?)  · Do you want to donate your organs when you die? · Do you want certain [employer] practices performed before you die? When should you call for help? Be sure to contact your doctor if you have any questions. Where can you learn more? Go to http://www.gray.com/  Enter R264 in the search box to learn more about \"Advance Directives: Care Instructions. \"  Current as of: October 18, 2021               Content Version: 13.2  © 1013-2009 [employer]. Care instructions adapted under license by [employer] (which disclaims liability or warranty for this information). If you have questions about a medical condition or this instruction, always ask your healthcare professional. [address] any warranty or liability for your use of this information.

## 2022-04-20 NOTE — PROGRESS NOTES
Kedar Kearney is a 60 y.o. female established patient, here for evaluation of the following chief complaint(s): Annual Wellness Visit (medicare) and Medication Refill (pain meds and zofran)          Assessment & Plan:   Diagnoses and all orders for this visit:    1. Primary osteoarthritis involving multiple joints    2. Primary osteoarthritis of right knee  -     oxyCODONE-acetaminophen (PERCOCET 10)  mg per tablet; Take 1 Tablet by mouth every twelve (12) hours as needed for Pain for up to 30 days. Max Daily Amount: 2 Tablets. Indications: pain  -     oxyCODONE-acetaminophen (PERCOCET 10)  mg per tablet; Take 1 Tablet by mouth two (2) times daily as needed for Pain for up to 30 days. Max Daily Amount: 2 Tablets. Indications: pain  -     oxyCODONE-acetaminophen (PERCOCET 10)  mg per tablet; Take 1 Tablet by mouth every twelve (12) hours as needed for Pain for up to 30 days. Max Daily Amount: 2 Tablets. Indications: pain    3. Chronic use of opiate for therapeutic purpose  -     oxyCODONE-acetaminophen (PERCOCET 10)  mg per tablet; Take 1 Tablet by mouth every twelve (12) hours as needed for Pain for up to 30 days. Max Daily Amount: 2 Tablets. Indications: pain  -     oxyCODONE-acetaminophen (PERCOCET 10)  mg per tablet; Take 1 Tablet by mouth two (2) times daily as needed for Pain for up to 30 days. Max Daily Amount: 2 Tablets. Indications: pain  -     oxyCODONE-acetaminophen (PERCOCET 10)  mg per tablet; Take 1 Tablet by mouth every twelve (12) hours as needed for Pain for up to 30 days. Max Daily Amount: 2 Tablets. Indications: pain    4. Primary osteoarthritis of left shoulder  -     oxyCODONE-acetaminophen (PERCOCET 10)  mg per tablet; Take 1 Tablet by mouth every twelve (12) hours as needed for Pain for up to 30 days. Max Daily Amount: 2 Tablets. Indications: pain  -     oxyCODONE-acetaminophen (PERCOCET 10)  mg per tablet;  Take 1 Tablet by mouth two (2) times daily as needed for Pain for up to 30 days. Max Daily Amount: 2 Tablets. Indications: pain  -     oxyCODONE-acetaminophen (PERCOCET 10)  mg per tablet; Take 1 Tablet by mouth every twelve (12) hours as needed for Pain for up to 30 days. Max Daily Amount: 2 Tablets. Indications: pain    5. Chronic bilateral low back pain with right-sided sciatica  -     oxyCODONE-acetaminophen (PERCOCET 10)  mg per tablet; Take 1 Tablet by mouth every twelve (12) hours as needed for Pain for up to 30 days. Max Daily Amount: 2 Tablets. Indications: pain  -     oxyCODONE-acetaminophen (PERCOCET 10)  mg per tablet; Take 1 Tablet by mouth two (2) times daily as needed for Pain for up to 30 days. Max Daily Amount: 2 Tablets. Indications: pain  -     oxyCODONE-acetaminophen (PERCOCET 10)  mg per tablet; Take 1 Tablet by mouth every twelve (12) hours as needed for Pain for up to 30 days. Max Daily Amount: 2 Tablets. Indications: pain    6. Fibrocystic breast changes of both breasts  -     US BREASTS COMPLETE; Future    7. Encounter for screening for malignant neoplasm of breast, unspecified screening modality  -     US BREASTS COMPLETE; Future    8. Advance care planning  -     REFERRAL TO ACP CLINICAL SPECIALIST    9. Medicare annual wellness visit, subsequent  -     REFERRAL TO ACP CLINICAL SPECIALIST      Follow-up and Dispositions    · Return in about 12 weeks (around 7/13/2022) for OV- pain med refill. We discussed the expected course, resolution and complications of the diagnosis(es) in detail. Medication risks, benefits, costs, interactions, and alternatives were discussed as indicated. I advised her to contact the office if her condition worsens, changes or fails to improve as anticipated. She expressed understanding with the diagnosis(es) and plan. Specific pt instructions until next visit: inquire about colonoscopy report, not on file.  Pt unable to tolerate MAMMO, US requested for breast ca screening. Encouraged to repeat PCV 23 vaccine and GET COVID VACC. Refilled meds a little sooner since NOT given meds following recent dental extraction of ALL her teeth. Subjective:   Lyndia Dandy is a 60 y.o. female who was seen for Annual Wellness Visit (medicare) and Medication Refill (pain meds and zofran)      Pt presents for subsequent MWV and to f/u chronic lower back and knee pain. Taking percocet 5-325 mg, last taken today, has 6 left. NOT given any additional meds with recent tooth extraction on 4/13/22. Denies side effects from medication. Feels mouth is still sore with swelling and altered diet. No acute complaints otherwise. Patient Active Problem List    Diagnosis Date Noted    S/P partial hysterectomy 04/20/2022    Edentulous 04/20/2022    Immunotherapy 01/04/2021    S/P total knee arthroplasty, right 03/26/2019    Osteoarthritis of spine with radiculopathy, cervical region 09/26/2018    Primary osteoarthritis of left shoulder 09/26/2018    Obesity, Class II, BMI 35-39.9 09/25/2018    Chronic use of opiate for therapeutic purpose 05/24/2018    Severe obesity (BMI 35.0-39. 9) with comorbidity ([facility] [address] 03/28/2018    Moderate episode of recurrent major depressive disorder ([facility] [address] 02/28/2018    Psychophysiological insomnia 02/28/2018    Pain management contract signed 05/08/2017    Acquired hypothyroidism 01/06/2017    Mixed simple and mucopurulent chronic bronchitis ([facility] [address] 09/08/2016    Primary osteoarthritis involving multiple joints 06/06/2016    Ventral hernia without obstruction or gangrene 05/26/2016    Environmental and seasonal allergies 02/05/2016     Current Outpatient Medications   Medication Sig Dispense Refill    oxyCODONE-acetaminophen (PERCOCET 10)  mg per tablet Take 1 Tablet by mouth every twelve (12) hours as needed for Pain for up to 30 days. Max Daily Amount: 2 Tablets.  Indications: pain 45 Tablet 0    [START ON 6/19/2022] oxyCODONE-acetaminophen (PERCOCET 10)  mg per tablet Take 1 Tablet by mouth two (2) times daily as needed for Pain for up to 30 days. Max Daily Amount: 2 Tablets. Indications: pain 45 Tablet 0    [START ON 5/20/2022] oxyCODONE-acetaminophen (PERCOCET 10)  mg per tablet Take 1 Tablet by mouth every twelve (12) hours as needed for Pain for up to 30 days. Max Daily Amount: 2 Tablets. Indications: pain 45 Tablet 0    methocarbamoL (ROBAXIN) 750 mg tablet TAKE 1 TAB BY MOUTH EVERY 6 HOURS AS NEEDED FOR SPASMS 120 Tablet 6    DULoxetine (CYMBALTA) 30 mg capsule TAKE 1 CAP BY MOUTH DAILY. TAKE WITH 60 MG CAPSULE 90 Capsule 3    loratadine (CLARITIN) 10 mg tablet TAKE 1 TABLET BY MOUTH EVERY DAY 90 Tablet 3    traZODone (DESYREL) 100 mg tablet TAKE 1 TABLET BY MOUTH EVERY DAY AT NIGHT 30 Tablet 5    DULoxetine (CYMBALTA) 60 mg capsule TAKE 1 CAPSULE BY MOUTH EVERY DAY 30 Capsule 5    lidocaine (Lidoderm) 5 % Apply patch to the affected area for 12 hours a day and remove for 12 hours a day. 30 Each 11    naloxone (Narcan) 4 mg/actuation nasal spray Use 1 spray into 1 nostril for OVERDOSE. Call 911. For subsequent doses, give in alternating nostrils. May repeat every 2 to 3 min. 2 Each 0    ibuprofen (MOTRIN) 600 mg tablet Take 1 Tablet by mouth every six (6) hours as needed for Pain. 20 Tablet 0    EPINEPHrine (EPIPEN) 0.3 mg/0.3 mL injection See Admin Instructions.         sucralfate (CARAFATE) 1 gram tablet TAKE 1 TABLET BY MOUTH FOUR TIMES A DAY      metoprolol succinate (TOPROL-XL) 25 mg XL tablet TAKE 1 TABLET BY MOUTH EVERY DAY 30 Tablet 11    furosemide (LASIX) 20 mg tablet TAKE 1 TABLET BY MOUTH EVERY DAY 30 Tablet 0    Combivent Respimat  mcg/actuation inhaler INHALE 1 PUFF BY MOUTH EVERY 6 HOURS AS NEEDED FOR WHEEZING 1 Inhaler 0    amLODIPine (NORVASC) 5 mg tablet TAKE 1 TABLET BY MOUTH EVERY DAY 30 Tablet 26    levothyroxine (SYNTHROID) 125 mcg tablet TAKE 1 TABLET BY MOUTH EVERY DAY BEFORE BREAKFAST 30 Tablet 35    hydroCHLOROthiazide (HYDRODIURIL) 25 mg tablet TAKE 1 TAB BY MOUTH DAILY FOR HYPERTENSION 30 Tablet 11    Symbicort 160-4.5 mcg/actuation HFAA TAKE 2 PUFFS BY INHALATION TWO (2) TIMES A DAY. 30.6 Inhaler 3    pregabalin (LYRICA) 100 mg capsule Take 1 Cap by mouth three (3) times daily. Max Daily Amount: 300 mg. 90 Cap 5    acetaminophen (Tylenol Extra Strength) 500 mg tablet Take 2 Tabs by mouth every six (6) hours as needed for Pain. 20 Tab 0    pantoprazole (PROTONIX) 40 mg tablet Take 1 Tab by mouth daily. Take in 2 week intervals for GERD Flares 30 Tab 1    azelastine (ASTELIN) 137 mcg (0.1 %) nasal spray as needed.  XOLAIR 150 mg solr Indications: injection      hydrOXYzine HCl (ATARAX) 25 mg tablet TAKE 1 TABLET BY MOUTH 3 TIMES A DAY AS NEEDED FOR ITCHING FOR ANXIETY 60 Tab 0    montelukast (SINGULAIR) 10 mg tablet TAKE 1 TAB BY MOUTH DAILY. 30 Tab 6    tiotropium (SPIRIVA WITH HANDIHALER) 18 mcg inhalation capsule Take 1 Cap by inhalation daily.  albuterol (PROVENTIL VENTOLIN) 2.5 mg /3 mL (0.083 %) nebulizer solution 3 mL by Nebulization route every four (4) hours as needed for Wheezing. 24 Each 2    diclofenac (VOLTAREN) 1 % gel Apply 2 g to affected area every six (6) hours. 100 g 5    fluticasone (FLONASE) 50 mcg/actuation nasal spray 2 Sprays by Both Nostrils route daily. 1 Bottle 11    miscellaneous medical supply misc Shower seat for chronic knee pain, pt planning to have right TKR in June due to condition. Very limited range of motion.  1 Each 0     Allergies   Allergen Reactions    Codeine Nausea Only    Hydrocodone Itching    Mold Shortness of Breath and Itching    Tramadol Itching    Ibuprofen Nausea Only     Past Medical History:   Diagnosis Date    Arthritis     Asthma     uses inhalers    Chronic bilateral low back pain with right-sided sciatica 5/8/2017    Chronic obstructive pulmonary disease (HCC)     bronchitis    Chronic pain     Chronic pain of left knee 6/15/2017    Chronic right shoulder pain 2/5/2016    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 1/22/2021    GERD (gastroesophageal reflux disease)     History of seasonal allergies     Hypertension     Ill-defined condition     environmental allergies     Ill-defined condition     Multiple body piercings; unable to remove tongue and lip piercings    Ill-defined condition 2018    GASTRITIS PER ENDOSCOPY    Loosening of knee joint prosthesis ([facility] [address] 3/26/2019    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    Psychiatric disorder     DEPRESSION    Status post total right knee replacement 5/8/2017    Thyroid disease     hypo    Ulnar impaction syndrome, right 1/22/2021    Ventral hernia 12/31/2013     Past Surgical History:   Procedure Laterality Date    HX HEENT  2009    thyroidectomy    HX KNEE REPLACEMENT      R    HX KNEE REPLACEMENT  03/26/2019    HX MOHS PROCEDURES Right 3/8/11    HX OTHER SURGICAL      hiatal hernia repair    HX PARTIAL HYSTERECTOMY      HX TUBAL LIGATION         Review of Systems   Constitutional: Negative for fever and malaise/fatigue. Eyes: Negative for blurred vision. Respiratory: Negative for cough and shortness of breath. Cardiovascular: Negative for chest pain and leg swelling. Neurological: Negative for dizziness, weakness and headaches. Objective:   Vital Signs: (As obtained by patient/caregiver at home)  Visit Vitals  LMP 12/29/2016 (Exact Date) Comment: partial hysterectomy              Physical Exam:  General appearance - alert, well  appearing, and in mild distress. Covering mouth, mild facial swelling, & edentulous  Mental status - normal mood and affect. Eyes- trace periorbital edema, drainage, or irritation noted. Nose- no obvious drainage or swelling. Throat- no obvious swelling, goiter, or notable lymphadenopathy  Chest - Symmetric chest rise. No wheezing or coughing. No distress.   Skin- normal skin tone noted. No hyperpigmentation or obvious deformities. No diaphoresis noted. No flushing. Neuro - Normal speech, no focal findings or movement disorder. Other pertinent observable physical exam findings:-    Assessment of cognitive impairment: Alert and oriented x 4    Depression Screen:   3 most recent PHQ Screens 4/20/2022   Little interest or pleasure in doing things Not at all   Feeling down, depressed, irritable, or hopeless Not at all   Total Score PHQ 2 0   Trouble falling or staying asleep, or sleeping too much -   Feeling tired or having little energy -   Poor appetite, weight loss, or overeating -   Feeling bad about yourself - or that you are a failure or have let yourself or your family down -   Trouble concentrating on things such as school, work, reading, or watching TV -   Moving or speaking so slowly that other people could have noticed; or the opposite being so fidgety that others notice -   Thoughts of being better off dead, or hurting yourself in some way -   PHQ 9 Score -   How difficult have these problems made it for you to do your work, take care of your home and get along with others -       Fall Risk Assessment:    Fall Risk Assessment, last 12 mths 4/20/2022   Able to walk? Yes   Fall in past 12 months? 0   Do you feel unsteady? 0   Are you worried about falling 0   Number of falls in past 12 months -   Fall with injury? -       Abuse Assessment: Patient not domesticly abuse (verbal, physical, and financial)    Current Alcohol use: none   reports current alcohol use. Functional Ability:   Does the patient exhibit a steady gait? Yes   How long did it take the patient to get up and walk from a sitting position? 4 seconds   Is the patient self reliant?  (ie can do own laundry, meals, household chores) yes     Does the patient handle his/her own medications? yes     Does the patient handle his/her own money?    Yes     Is the patients home safe (ie good lighting, handrails on stairs and bath, etc.)? Yes   Did you notice or did patient express any hearing difficulties? yes   Did you notice of did patient express any vision difficulties? No, but has new glasses   Were distance and reading eye charts used? n/a     Advance Care Planning:   Patient was offered the opportunity to discuss advance care planning:  Yes   Does patient have an Advance Directive: No   If no, did you provide information and forms? yes     Health Maintenance   Topic Date Due    COVID-19 Vaccine (1) Never done    Pneumococcal 0-64 years (2 - PCV) 03/03/2018    Colorectal Cancer Screening Combo  04/01/2019    Breast Cancer Screen Mammogram  12/03/2021    Cervical cancer screen  07/20/2022 (Originally 6/23/1986)    Shingrix Vaccine Age 50> (1 of 2) 07/26/2022 (Originally 6/23/2013)    Depression Monitoring  01/28/2023    Medicare Yearly Exam  04/21/2023    DTaP/Tdap/Td series (2 - Td or Tdap) 07/06/2025    Lipid Screen  07/27/2026    Hepatitis C Screening  Completed    Flu Vaccine  Completed                     London Lopez is being evaluated by a Virtual Visit (video visit) encounter to address concerns as mentioned above. A caregiver was present when appropriate. Due to this being a TeleHealth encounter (During [facility] public health emergency), evaluation of the following organ systems was limited: Vitals/Constitutional/EENT/Resp/CV/GI//MS/Neuro/Skin/Heme-Lymph-Imm. Pursuant to the emergency declaration under the [address] [place] [address], [address] [place] [address] authority and the [employer] General Act, this Virtual Visit was conducted with patient's (and/or legal guardian's) consent, to reduce the patient's risk of exposure to COVID-19 and provide necessary medical care.   The patient (and/or legal guardian) has also been advised to contact this office for worsening conditions or problems, and seek emergency medical treatment and/or call 911 if deemed necessary. Patient identification was verified at the start of the visit: YES    Services were provided through a video synchronous discussion virtually to substitute for in-person clinic visit. Patient and provider were located at their individual homes. An electronic signature was used to authenticate this note.   -- Ashley Zapien NP

## 2022-04-21 ENCOUNTER — TRANSCRIBE ORDER (OUTPATIENT)
Dept: SCHEDULING | Age: 57
End: 2022-04-21

## 2022-04-21 ENCOUNTER — PATIENT OUTREACH (OUTPATIENT)
Dept: CASE MANAGEMENT | Age: 57
End: 2022-04-21

## 2022-04-21 ENCOUNTER — TELEPHONE (OUTPATIENT)
Dept: INTERNAL MEDICINE CLINIC | Age: 57
End: 2022-04-21

## 2022-04-21 DIAGNOSIS — Z12.31 SCREENING MAMMOGRAM FOR HIGH-RISK PATIENT: Primary | ICD-10-CM

## 2022-04-21 DIAGNOSIS — N60.11 FIBROCYSTIC BREAST CHANGES OF BOTH BREASTS: Primary | ICD-10-CM

## 2022-04-21 DIAGNOSIS — N60.12 FIBROCYSTIC BREAST CHANGES OF BOTH BREASTS: Primary | ICD-10-CM

## 2022-04-21 RX ORDER — DICLOFENAC SODIUM 10 MG/G
GEL TOPICAL AS NEEDED
COMMUNITY

## 2022-04-21 RX ORDER — PANTOPRAZOLE SODIUM 40 MG/1
40 TABLET, DELAYED RELEASE ORAL 2 TIMES DAILY
COMMUNITY

## 2022-04-21 NOTE — TELEPHONE ENCOUNTER
Jonah Reilly with scheduling dept called. You need to  Add diagnostic mammogram to pt's order. Also she states pt wants to get  This done at [facility] and because of the wording it won't let her schedule it there. If you put ultrasound breast limited 3 quad she can schedule at [facility].   Paula # [id number]

## 2022-04-21 NOTE — ACP (ADVANCE CARE PLANNING)
Advance Care Planning   Ambulatory ACP Specialist Patient Outreach    Date:  4/21/2022    ACP Specialist:  Sonia King LPN    Outreach call to patient in follow-up to ACP Specialist referral from:    [x] PCP  [] Provider   [] Ambulatory Care Management [] Other     For:                  [x] Advance Directive Assistance              [] Complete Portable DNR order              [] Complete POST/MOST              [] Code Status Discussion             [] Discuss Goals of Care             [] Early ACP Decision-Making              [] Other (Specify)    Date Referral Received: 4/20/22    Today's Outreach:  [x] First   [] Second  [] Third       Third outreach made by: [] Phone  [] Email / mail    [] [place]     Intervention:  [x] Spoke with Patient   [] Left VM requesting return call      Outcome:   LPN spoke with the pt who wishes to move forward in having an ACP conversation with an ACP specialist. Pt is alert and oriented x4. Appointment scheduled for 4/25/2022 at 11 am. ACP information has been e-mailed to the pt for review prior to the appointment. Next Step:   [x] ACP scheduled conversation  [] Outreach again in one week               [x] Email / Mail ACP Info Sheets  [] Email / Mail Advance Directive   [] Closing referral.  Routing closure to referring provider/staff and to ACP Specialist . [] Closure letter mailed to patient with invitation to contact ACP Specialist if / when ready.   Thank you for this referral.

## 2022-04-25 ENCOUNTER — DOCUMENTATION ONLY (OUTPATIENT)
Dept: CASE MANAGEMENT | Age: 57
End: 2022-04-25

## 2022-04-25 DIAGNOSIS — M19.012 PRIMARY OSTEOARTHRITIS OF LEFT SHOULDER: ICD-10-CM

## 2022-04-25 DIAGNOSIS — G89.29 CHRONIC BILATERAL LOW BACK PAIN WITH RIGHT-SIDED SCIATICA: ICD-10-CM

## 2022-04-25 DIAGNOSIS — M17.11 PRIMARY OSTEOARTHRITIS OF RIGHT KNEE: ICD-10-CM

## 2022-04-25 DIAGNOSIS — Z79.891 CHRONIC USE OF OPIATE FOR THERAPEUTIC PURPOSE: ICD-10-CM

## 2022-04-25 DIAGNOSIS — M54.41 CHRONIC BILATERAL LOW BACK PAIN WITH RIGHT-SIDED SCIATICA: ICD-10-CM

## 2022-04-25 RX ORDER — OXYCODONE AND ACETAMINOPHEN 10; 325 MG/1; MG/1
1 TABLET ORAL
Qty: 45 TABLET | Refills: 0 | Status: SHIPPED | OUTPATIENT
Start: 2022-05-20 | End: 2022-07-12 | Stop reason: SDUPTHER

## 2022-04-25 RX ORDER — OXYCODONE AND ACETAMINOPHEN 10; 325 MG/1; MG/1
1 TABLET ORAL
Qty: 45 TABLET | Refills: 0 | OUTPATIENT
Start: 2022-06-19 | End: 2022-07-19

## 2022-04-25 RX ORDER — OXYCODONE AND ACETAMINOPHEN 10; 325 MG/1; MG/1
1 TABLET ORAL
Qty: 45 TABLET | Refills: 0 | Status: SHIPPED | OUTPATIENT
Start: 2022-04-25 | End: 2022-07-12 | Stop reason: SDUPTHER

## 2022-04-25 NOTE — ACP (ADVANCE CARE PLANNING)
Advance Care Planning     Advance Care Planning Clinical Specialist  Conversation Note      Date of ACP Conversation: 04/25/22    Conversation Conducted with:  Patient with Decision Making Capacity    ACP Clinical Specialist: Rodolfo Sutton, [address] [facility] Decision Maker:    Current Designated Health Care Decision Maker:     Primary Decision Maker: Cherise Robb - Daughter - [phone]    Secondary Decision Maker: Raz Hughes - Son - [phone]     Today we discussed Pt's HCDMs, reviewed various medical scenarois, and completed and ACP document. Care Preferences    Hospitalization: \"If your health worsens and it becomes clear that your chance of recovery is unlikely, what would your preference be regarding hospitalization? \"    Choice:  []  The patient wants hospitalization  [x]  The patient prefers comfort-focused treatment without hospitalization. Pt does share some hesitancy about being home as well. \"I dont know if I want to put all of that on my children though. \" Discussed possibility of additional aid through something such as hospice care. Ventilation: \"If you were in your present state of health and suddenly became very ill and were unable to breathe on your own, what would your preference be about the use of a ventilator (breathing machine) if it were available to you? \"      If patient would desire the use of a ventilator (breathing machine), answer \"yes\", if not \"no\":yes, \"If it would be helpful to me then yes I'd go on it. \"    \"If your health worsens and it becomes clear that your chance of recovery is unlikely, what would your preference be about the use of a ventilator (breathing machine) if it were available to you? \"     Would the patient desire the use of a ventilator (breathing machine)? NO. \"I'm on the verge of going, then just let me go\".     Resuscitation  \"CPR works best to restart the heart when there is a sudden event, like a heart attack, in someone who is otherwise healthy. Unfortunately, CPR does not typically restart the heart for people who have serious health conditions or who are very sick. \"    \"In the event your heart stopped as a result of an underlying serious health condition, would you want attempts to be made to restart your heart (answer \"yes\" for attempt to resuscitate) or would you prefer a natural death (answer \"no\" for do not attempt to resuscitate)? \" no, Pt stated she would not want resuscitation in event of arrest d/t a progressive health concern. However, she would be willing to receive CPR for a possible acute event or accident; hence a DDNR was not completed during this conversation. Education was provided. [x] Yes  [] No   Educated Patient / Jamelanahi Diaz regarding differences between Advance Directives and portable DNR orders. Length of ACP Conversation in minutes:  45m    Conversation Outcomes:  [x] ACP discussion completed  [] Existing advance directive reviewed with patient; no changes to patient's previously recorded wishes   [x] New Advance Directive completed   [] Portable Do Not Resuscitate prepared for Provider review and signature  [] POLST/POST/MOLST/MOST prepared for Provider review and signature      Follow-up plan:    [] Schedule follow-up conversation to continue planning  [] Referred individual to Provider for additional questions/concerns   [x] Advised patient/agent/surrogate to review completed ACP document and update if needed with changes in condition, patient preferences or care setting     [x] This note routed to one or more involved healthcare providers    4/25- Pt was very engaged and prepared for today's discussion. She seemed very decisive about her choices for herself. Pt said on several occasions that she would not want to be \"kept around to just suffer more\"; hence her choices for largely non-aggressive interventions. She was successfully able to identify her two HCDMs and completed an ACP document as well. The document will be uploaded into the EMR upon finalization. At that time, this referral will be recommended for closure.       Jennie Castle LCSW, [facility] Hospital[facility]-SW  Advance Care   Population Health

## 2022-04-25 NOTE — TELEPHONE ENCOUNTER
oxyCODONE-acetaminophen (PERCOCET 10)  mg per tablet [[id number]]  DISCONTINUED    Order Details  Dose: 1 Tablet Route: Oral Frequency: EVERY 12 HOURS AS NEEDED for Pain   Dispense Quantity: 45 Tablet Refills: 0    Indications of Use: pain         Sig: Take 1 Tablet by mouth every twelve (12) hours as needed for Pain for up to 30 days. Max Daily Amount: 2 Tablets. Indications: pain         Start Date: 04/20/22 End Date: 04/21/22   Discontinued by:  Yvette Snowden RN on 4/21/2022 14:23   Reason: Not A Current Medication     April and May prescriptions were cancelled

## 2022-04-28 ENCOUNTER — ANESTHESIA EVENT (OUTPATIENT)
Dept: ENDOSCOPY | Age: 57
End: 2022-04-28
Payer: MEDICARE

## 2022-04-28 ENCOUNTER — HOSPITAL ENCOUNTER (OUTPATIENT)
Age: 57
Setting detail: OUTPATIENT SURGERY
Discharge: HOME | End: 2022-04-28
Attending: SPECIALIST | Admitting: SPECIALIST
Payer: MEDICARE

## 2022-04-28 ENCOUNTER — ANESTHESIA (OUTPATIENT)
Dept: ENDOSCOPY | Age: 57
End: 2022-04-28
Payer: MEDICARE

## 2022-04-28 VITALS
BODY MASS INDEX: 40.59 KG/M2 | SYSTOLIC BLOOD PRESSURE: 162 MMHG | DIASTOLIC BLOOD PRESSURE: 96 MMHG | HEIGHT: 61 IN | HEART RATE: 64 BPM | RESPIRATION RATE: 15 BRPM | OXYGEN SATURATION: 98 % | TEMPERATURE: 97.8 F | WEIGHT: 215 LBS

## 2022-04-28 PROCEDURE — 77030021593 HC FCPS BIOP ENDOSC BSC -A: Performed by: SPECIALIST

## 2022-04-28 PROCEDURE — 74011250636 HC RX REV CODE- 250/636: Performed by: SPECIALIST

## 2022-04-28 PROCEDURE — 2709999900 HC NON-CHARGEABLE SUPPLY: Performed by: SPECIALIST

## 2022-04-28 PROCEDURE — 76060000031 HC ANESTHESIA FIRST 0.5 HR: Performed by: SPECIALIST

## 2022-04-28 PROCEDURE — 88305 TISSUE EXAM BY PATHOLOGIST: CPT

## 2022-04-28 PROCEDURE — 76040000019: Performed by: SPECIALIST

## 2022-04-28 PROCEDURE — 74011250636 HC RX REV CODE- 250/636: Performed by: NURSE ANESTHETIST, CERTIFIED REGISTERED

## 2022-04-28 PROCEDURE — 74011000250 HC RX REV CODE- 250: Performed by: NURSE ANESTHETIST, CERTIFIED REGISTERED

## 2022-04-28 RX ORDER — FLUMAZENIL 0.1 MG/ML
0.2 INJECTION INTRAVENOUS
Status: DISCONTINUED | OUTPATIENT
Start: 2022-04-28 | End: 2022-04-28 | Stop reason: HOSPADM

## 2022-04-28 RX ORDER — PROPOFOL 10 MG/ML
INJECTION, EMULSION INTRAVENOUS AS NEEDED
Status: DISCONTINUED | OUTPATIENT
Start: 2022-04-28 | End: 2022-04-28 | Stop reason: HOSPADM

## 2022-04-28 RX ORDER — LIDOCAINE HYDROCHLORIDE 20 MG/ML
INJECTION, SOLUTION EPIDURAL; INFILTRATION; INTRACAUDAL; PERINEURAL AS NEEDED
Status: DISCONTINUED | OUTPATIENT
Start: 2022-04-28 | End: 2022-04-28 | Stop reason: HOSPADM

## 2022-04-28 RX ORDER — SODIUM CHLORIDE 0.9 % (FLUSH) 0.9 %
5-40 SYRINGE (ML) INJECTION AS NEEDED
Status: DISCONTINUED | OUTPATIENT
Start: 2022-04-28 | End: 2022-04-28 | Stop reason: HOSPADM

## 2022-04-28 RX ORDER — EPINEPHRINE 0.1 MG/ML
1 INJECTION INTRACARDIAC; INTRAVENOUS
Status: DISCONTINUED | OUTPATIENT
Start: 2022-04-28 | End: 2022-04-28 | Stop reason: HOSPADM

## 2022-04-28 RX ORDER — DEXTROMETHORPHAN/PSEUDOEPHED 2.5-7.5/.8
1.2 DROPS ORAL
Status: DISCONTINUED | OUTPATIENT
Start: 2022-04-28 | End: 2022-04-28 | Stop reason: HOSPADM

## 2022-04-28 RX ORDER — NALOXONE HYDROCHLORIDE 0.4 MG/ML
0.4 INJECTION, SOLUTION INTRAMUSCULAR; INTRAVENOUS; SUBCUTANEOUS
Status: DISCONTINUED | OUTPATIENT
Start: 2022-04-28 | End: 2022-04-28 | Stop reason: HOSPADM

## 2022-04-28 RX ORDER — SODIUM CHLORIDE 9 MG/ML
50 INJECTION, SOLUTION INTRAVENOUS CONTINUOUS
Status: DISCONTINUED | OUTPATIENT
Start: 2022-04-28 | End: 2022-04-28 | Stop reason: HOSPADM

## 2022-04-28 RX ORDER — SODIUM CHLORIDE 0.9 % (FLUSH) 0.9 %
5-40 SYRINGE (ML) INJECTION EVERY 8 HOURS
Status: DISCONTINUED | OUTPATIENT
Start: 2022-04-28 | End: 2022-04-28 | Stop reason: HOSPADM

## 2022-04-28 RX ORDER — ATROPINE SULFATE 0.1 MG/ML
0.5 INJECTION INTRAVENOUS
Status: DISCONTINUED | OUTPATIENT
Start: 2022-04-28 | End: 2022-04-28 | Stop reason: HOSPADM

## 2022-04-28 RX ORDER — GLYCOPYRROLATE 0.2 MG/ML
INJECTION INTRAMUSCULAR; INTRAVENOUS AS NEEDED
Status: DISCONTINUED | OUTPATIENT
Start: 2022-04-28 | End: 2022-04-28 | Stop reason: HOSPADM

## 2022-04-28 RX ADMIN — PROPOFOL 75 MG: 10 INJECTION, EMULSION INTRAVENOUS at 15:05

## 2022-04-28 RX ADMIN — SODIUM CHLORIDE: 900 INJECTION, SOLUTION INTRAVENOUS at 14:49

## 2022-04-28 RX ADMIN — GLYCOPYRROLATE 0.1 MG: 0.2 INJECTION, SOLUTION INTRAMUSCULAR; INTRAVENOUS at 15:04

## 2022-04-28 RX ADMIN — LIDOCAINE HYDROCHLORIDE 100 MG: 20 INJECTION, SOLUTION EPIDURAL; INFILTRATION; INTRACAUDAL; PERINEURAL at 15:05

## 2022-04-28 RX ADMIN — PROPOFOL 75 MG: 10 INJECTION, EMULSION INTRAVENOUS at 15:07

## 2022-04-28 RX ADMIN — PROPOFOL 75 MG: 10 INJECTION, EMULSION INTRAVENOUS at 15:10

## 2022-04-28 NOTE — PROCEDURES
[address]  [facility], [address]                 NAME:  Lyndia Dandy   :   1965   MRN:   [id number]     Date/Time:  2022 3:17 PM    Esophagogastroduodenoscopy (EGD) Procedure Note    :  Isaias Hart MD    Staff: Endoscopy Technician-1: Ricki Liriano  Endoscopy RN-1: Shalini Pascal RN     Referring Provider:  Malick De La Vega NP    Anethesia/Sedation:  MAC anesthesia Propofol    Preoperative diagnosis: EPIGASTRIC PAIN  GERD    Postoperative diagnosis: 1)Gastritis  2)Hiatal Hernia    Procedure Details     After infom consent was obtained for the procedure, with all risks and benefits of procedure explained the patient was taken to the endoscopy suite and placed in the left lateral decubitus position. Following sequential administration of sedation as per above, the [id number] gastroscope was inserted into the mouth and advanced under direct vision to second portion of the duodenum. A careful inspection was made as the gastroscope was withdrawn, including a retroflexed view of the proximal stomach; findings and interventions are described below. Findings:  Esophagus: Moderate hiatal hernia  Stomach:Patchy erythema and congestion in antrum, biopsies done  Duodenum/jejunum:normal      Therapies:  none    Specimens: antral bx           EBL: None    Complications:   None; patient tolerated the procedure well. Impression:    See Postoperative diagnosis above    Recommendations:  -Acid suppression with a proton pump inhibitor. , -Await pathology. , -No NSAIDS    Discharge disposition:  Home in the company of  when able to ambulate    Isaias Hart MD

## 2022-04-28 NOTE — DISCHARGE INSTRUCTIONS
Patrick Hdez  [id number]  1965    EGD DISCHARGE INSTRUCTIONS  Discomfort:  Sore throat- throat lozenges or warm salt water gargle  redness at IV site- apply warm compress to area; if redness or soreness persist- contact your physician  Gaseous discomfort- walking, belching will help relieve any discomfort    DIET  You may resume your regular diet - however -  remember your colon is empty and a heavy meal will produce gas. Avoid these foods:  vegetables, fried / greasy foods, carbonated drinks  You may not drink alcoholic beverages for at least 12 hours    MEDICATIONS   Regarding Aspirin or Nonsteroidal medications specifically, please see below. ACTIVITY  You may resume your normal daily activities. Spend the remainder of the day resting -  avoid any strenuous activity. You may not operate a vehicle for 12 hours  You may not engage in an occupation involving machinery or appliances for rest of today. Avoid making any critical decisions for at least 24 hour    CALL M.D. ANY SIGN OF   Increasing pain, nausea, vomiting  Abdominal distension (swelling)  New increased bleeding (oral or rectal)  Fever (chills)  Pain in chest area  Bloody discharge from nose or mouth  Shortness of breath    You may not  take any Advil, Aspirin, Ibuprofen, Motrin, Aleve, or Goodys for 10 days, ONLY  Tylenol as needed for pain.     Post procedure diagnosis: 1)Gastritis  2)Hiatal Hernia      Follow-up Instructions:   Call Dr. Marylu Marin  Results of procedure / biopsy in 10 days  Telephone #  [phone]        DISCHARGE SUMMARY from Nurse    The following personal items collected during your admission are returned to you:   Dental Appliance: Dental Appliances: None  Vision: Visual Aid: Glasses  Hearing Aid:    Jewelry:    Clothing:    Other Valuables:    Valuables sent to safe:

## 2022-04-28 NOTE — PERIOP NOTES

## 2022-04-28 NOTE — H&P
Pre-endoscopy H and P     The patient was seen and examined in the endoscopy suite. The airway was assessed and docuemented. The problem list and medications were reviewed. Patient Active Problem List   Diagnosis Code    Environmental and seasonal allergies J30.89    Ventral hernia without obstruction or gangrene K43.9    Primary osteoarthritis involving multiple joints M15.9    Mixed simple and mucopurulent chronic bronchitis ([address] J41.8    Acquired hypothyroidism E03.9    Pain management contract signed Z02.89    Moderate episode of recurrent major depressive disorder (HCC) F33.1    Psychophysiological insomnia F51.04    Severe obesity (BMI 35.0-39. 9) with comorbidity (HCC) E66.01    Chronic use of opiate for therapeutic purpose Z79.891    Obesity, Class II, BMI 35-39.9 E66.9    Osteoarthritis of spine with radiculopathy, cervical region M47.22    Primary osteoarthritis of left shoulder M19.012    S/P total knee arthroplasty, right Z96.651    Immunotherapy Z29.8    S/P partial hysterectomy Z90.711    Edentulous K08.109     Social History     Socioeconomic History    Marital status: SINGLE     Spouse name: Not on file    Number of children: Not on file    Years of education: Not on file    Highest education level: Not on file   Occupational History    Not on file   Tobacco Use    Smoking status: Current Every Day Smoker     Packs/day: 0.25     Years: 1.50     Pack years: 0.37     Types: Cigarettes     Last attempt to quit: 10/1/2015     Years since quittin.5    Smokeless tobacco: Never Used   Vaping Use    Vaping Use: Never used   Substance and Sexual Activity    Alcohol use: Yes     Comment: occasional    Drug use: No    Sexual activity: Yes     Partners: Female     Birth control/protection: Surgical     Comment: Post menopausal/partial hysterectomy   Other Topics Concern    Not on file   Social History Narrative    Not on file     Social Determinants of Health     Financial Resource Strain:     Difficulty of Paying Living Expenses: Not on file   Food Insecurity:     Worried About Running Out of Food in the Last Year: Not on file    Johnny of Food in the Last Year: Not on file   Transportation Needs:     Lack of Transportation (Medical): Not on file    Lack of Transportation (Non-Medical):  Not on file   Physical Activity:     Days of Exercise per Week: Not on file    Minutes of Exercise per Session: Not on file   Stress:     Feeling of Stress : Not on file   Social Connections:     Frequency of Communication with Friends and Family: Not on file    Frequency of Social Gatherings with Friends and Family: Not on file    Attends Hoahaoism Services: Not on file    Active Member of [address] [place] [address] or Organizations: Not on file    Attends Club or Organization Meetings: Not on file    Marital Status: Not on file   Intimate Partner Violence:     Fear of Current or Ex-Partner: Not on file    Emotionally Abused: Not on file    Physically Abused: Not on file    Sexually Abused: Not on file   Housing Stability:     Unable to Pay for Housing in the Last Year: Not on file    Number of [place] in the Last Year: Not on file    Unstable Housing in the Last Year: Not on file     Past Medical History:   Diagnosis Date    Arthritis     Asthma     uses inhalers    Chronic bilateral low back pain with right-sided sciatica 5/8/2017    Chronic obstructive pulmonary disease (HCC)     bronchitis    Chronic pain     right knee    Chronic pain of left knee 6/15/2017    Chronic right shoulder pain 2/5/2016    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 1/22/2021    GERD (gastroesophageal reflux disease)     History of seasonal allergies     Hypertension     Ill-defined condition     environmental allergies     Ill-defined condition     Multiple body piercings; unable to remove tongue and lip piercings    Ill-defined condition 2018    GASTRITIS PER ENDOSCOPY    Loosening of knee joint prosthesis ([facility] [address] 3/26/2019    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    Psychiatric disorder     DEPRESSION    Status post total right knee replacement 2017    Thyroid disease     hypo    Ulnar impaction syndrome, right 2021    Ventral hernia 2013         Prior to Admission Medications   Prescriptions Last Dose Informant Patient Reported? Taking? Combivent Respimat  mcg/actuation inhaler   No Yes   Sig: INHALE 1 PUFF BY MOUTH EVERY 6 HOURS AS NEEDED FOR WHEEZING   DULoxetine (CYMBALTA) 30 mg capsule   No Yes   Sig: TAKE 1 CAP BY MOUTH DAILY. TAKE WITH 60 MG CAPSULE   DULoxetine (CYMBALTA) 60 mg capsule   No Yes   Sig: TAKE 1 CAPSULE BY MOUTH EVERY DAY   EPINEPHrine (EPIPEN) 0.3 mg/0.3 mL injection   Yes Yes   Sig: See Admin Instructions. Symbicort 160-4.5 mcg/actuation HFAA   No Yes   Sig: TAKE 2 PUFFS BY INHALATION TWO (2) TIMES A DAY. XOLAIR 150 mg solr   Yes Yes   Sig: every thirty (30) days. Indications: injection   acetaminophen (Tylenol Extra Strength) 500 mg tablet   No Yes   Sig: Take 2 Tabs by mouth every six (6) hours as needed for Pain. albuterol (PROVENTIL VENTOLIN) 2.5 mg /3 mL (0.083 %) nebulizer solution   No Yes   Sig: 3 mL by Nebulization route every four (4) hours as needed for Wheezing. amLODIPine (NORVASC) 5 mg tablet   No Yes   Sig: TAKE 1 TABLET BY MOUTH EVERY DAY   azelastine (ASTELIN) 137 mcg (0.1 %) nasal spray  Self Yes Yes   Si [employer] by Both Nostrils route as needed. diclofenac (VOLTAREN) 1 % gel   Yes Yes   Sig: Apply  to affected area as needed for Pain. fluticasone (FLONASE) 50 mcg/actuation nasal spray   No Yes   Si Sprays by Both Nostrils route daily.    furosemide (LASIX) 20 mg tablet   No Yes   Sig: TAKE 1 TABLET BY MOUTH EVERY DAY   hydrOXYzine HCl (ATARAX) 25 mg tablet   No Yes   Sig: TAKE 1 TABLET BY MOUTH 3 TIMES A DAY AS NEEDED FOR ITCHING FOR ANXIETY   hydroCHLOROthiazide (HYDRODIURIL) 25 mg tablet   No Yes Sig: TAKE 1 TAB BY MOUTH DAILY FOR HYPERTENSION   levothyroxine (SYNTHROID) 125 mcg tablet   No Yes   Sig: TAKE 1 TABLET BY MOUTH EVERY DAY BEFORE BREAKFAST   lidocaine (Lidoderm) 5 %   No Yes   Sig: Apply patch to the affected area for 12 hours a day and remove for 12 hours a day. loratadine (CLARITIN) 10 mg tablet   No Yes   Sig: TAKE 1 TABLET BY MOUTH EVERY DAY   methocarbamoL (ROBAXIN) 750 mg tablet   No Yes   Sig: TAKE 1 TAB BY MOUTH EVERY 6 HOURS AS NEEDED FOR SPASMS   metoprolol succinate (TOPROL-XL) 25 mg XL tablet   No Yes   Sig: TAKE 1 TABLET BY MOUTH EVERY DAY   miscellaneous medical supply misc   No No   Sig: Shower seat for chronic knee pain, pt planning to have right TKR in June due to condition. Very limited range of motion. montelukast (SINGULAIR) 10 mg tablet   No Yes   Sig: TAKE 1 TAB BY MOUTH DAILY. naloxone (Narcan) 4 mg/actuation nasal spray   No Yes   Sig: Use 1 spray into 1 nostril for OVERDOSE. Call 911. For subsequent doses, give in alternating nostrils. May repeat every 2 to 3 min. oxyCODONE-acetaminophen (PERCOCET 10)  mg per tablet   No No   Sig: Take 1 Tablet by mouth two (2) times daily as needed for Pain for up to 30 days. Max Daily Amount: 2 Tablets. Indications: pain   oxyCODONE-acetaminophen (PERCOCET 10)  mg per tablet   No No   Sig: Take 1 Tablet by mouth every twelve (12) hours as needed for Pain for up to 30 days. Max Daily Amount: 2 Tablets. Indications: pain   oxyCODONE-acetaminophen (PERCOCET 10)  mg per tablet   No No   Sig: Take 1 Tablet by mouth every twelve (12) hours as needed for Pain for up to 30 days. Max Daily Amount: 2 Tablets. Indications: pain   pantoprazole (PROTONIX) 40 mg tablet   Yes Yes   Sig: Take 40 mg by mouth two (2) times a day. pregabalin (LYRICA) 100 mg capsule   No Yes   Sig: Take 1 Cap by mouth three (3) times daily.  Max Daily Amount: 300 mg.   sucralfate (CARAFATE) 1 gram tablet   Yes Yes   Sig: TAKE 1 TABLET BY MOUTH FOUR TIMES A DAY   tiotropium (SPIRIVA WITH HANDIHALER) 18 mcg inhalation capsule   Yes Yes   Sig: Take 1 Cap by inhalation daily. traZODone (DESYREL) 100 mg tablet   No Yes   Sig: TAKE 1 TABLET BY MOUTH EVERY DAY AT NIGHT      Facility-Administered Medications: None       Chief complaint, history of present illness, and review of systems and Past medical History are positive for: epigastric pain and GERD    The heart, lungs and mental status were satisfactory for the administration of sedation and for the procedure. I discussed with the patient the objectives, risks, consequences and alternatives to the procedure. The patient was counseled at length about the risks of jules Covid-19 in the lm-operative and post-operative states including the recovery window of their procedure. The patient was made aware that jules Covid-19 after a surgical procedure may worsen their prognosis for recovering from the virus and lend to a higher morbidity and or mortality risk. The patient was given the options of postponing their procedure. All of the risks, benefits, and alternatives were discussed. The patient does  wish to proceed with the procedure.     Plan: Endoscopic procedure with sedation     Isaac Cohen MD   4/28/2022  2:05 PM

## 2022-04-28 NOTE — ANESTHESIA POSTPROCEDURE EVALUATION
Procedure(s):  ESOPHAGOGASTRODUODENOSCOPY (EGD)  ESOPHAGOGASTRODUODENAL (EGD) BIOPSY. MAC    Anesthesia Post Evaluation      Multimodal analgesia: multimodal analgesia not used between 6 hours prior to anesthesia start to PACU discharge  Patient location during evaluation: PACU  Patient participation: complete - patient participated  Level of consciousness: awake  Pain score: 0  Pain management: adequate  Airway patency: patent  Anesthetic complications: no  Cardiovascular status: acceptable  Respiratory status: acceptable  Hydration status: acceptable  Comments: I have evaluated the patient and meets criteria for discharge from PACU. Carletta Spatz., MD    Final Post Anesthesia Temperature Assessment:  Normothermia (36.0-37.5 degrees C)      INITIAL Post-op Vital signs:   Vitals Value Taken Time   /86 04/28/22 1525   Temp     Pulse 76 04/28/22 1526   Resp 38 04/28/22 1526   SpO2 96 % 04/28/22 1526   Vitals shown include unvalidated device data.

## 2022-04-28 NOTE — ANESTHESIA PREPROCEDURE EVALUATION
Relevant Problems   RESPIRATORY SYSTEM   (+) Mixed simple and mucopurulent chronic bronchitis (HCC)      NEUROLOGY   (+) Moderate episode of recurrent major depressive disorder (HCC)      ENDOCRINE   (+) Acquired hypothyroidism   (+) Severe obesity (BMI 35.0-39. 9) with comorbidity ([address]       Anesthetic History   No history of anesthetic complications            Review of Systems / Medical History  Patient summary reviewed, nursing notes reviewed and pertinent labs reviewed    Pulmonary  Within defined limits  COPD        Asthma        Neuro/Psych   Within defined limits      Psychiatric history     Cardiovascular  Within defined limits  Hypertension                   GI/Hepatic/Renal  Within defined limits   GERD           Endo/Other  Within defined limits    Hypothyroidism  Morbid obesity and arthritis     Other Findings              Physical Exam    Airway  Mallampati: II  TM Distance: > 6 cm  Neck ROM: normal range of motion   Mouth opening: Normal     Cardiovascular  Regular rate and rhythm,  S1 and S2 normal,  no murmur, click, rub, or gallop             Dental  No notable dental hx       Pulmonary  Breath sounds clear to auscultation               Abdominal  GI exam deferred       Other Findings            Anesthetic Plan    ASA: 3  Anesthesia type: MAC            Anesthetic plan and risks discussed with: Patient

## 2022-05-09 DIAGNOSIS — J41.8 MIXED SIMPLE AND MUCOPURULENT CHRONIC BRONCHITIS (HCC): ICD-10-CM

## 2022-05-09 RX ORDER — MONTELUKAST SODIUM 10 MG/1
10 TABLET ORAL DAILY
Qty: 30 TABLET | Refills: 11 | Status: SHIPPED | OUTPATIENT
Start: 2022-05-09

## 2022-05-09 NOTE — TELEPHONE ENCOUNTER
[facility] is requesting 2 rx refills.  [facility] # [phone]

## 2022-05-10 RX ORDER — AZELASTINE 1 MG/ML
1 SPRAY, METERED NASAL AS NEEDED
Qty: 1 EACH | Refills: 0 | Status: SHIPPED | OUTPATIENT
Start: 2022-05-10 | End: 2022-06-07 | Stop reason: SDUPTHER

## 2022-06-07 RX ORDER — AZELASTINE 1 MG/ML
1 SPRAY, METERED NASAL AS NEEDED
Qty: 3 EACH | Refills: 3 | Status: SHIPPED | OUTPATIENT
Start: 2022-06-07

## 2022-07-12 ENCOUNTER — OFFICE VISIT (OUTPATIENT)
Dept: INTERNAL MEDICINE CLINIC | Age: 57
End: 2022-07-12
Payer: MEDICARE

## 2022-07-12 VITALS
DIASTOLIC BLOOD PRESSURE: 75 MMHG | TEMPERATURE: 97.7 F | WEIGHT: 187.8 LBS | SYSTOLIC BLOOD PRESSURE: 130 MMHG | HEIGHT: 61 IN | BODY MASS INDEX: 35.45 KG/M2 | OXYGEN SATURATION: 96 % | HEART RATE: 77 BPM | RESPIRATION RATE: 18 BRPM

## 2022-07-12 DIAGNOSIS — G89.29 CHRONIC BILATERAL LOW BACK PAIN WITH RIGHT-SIDED SCIATICA: ICD-10-CM

## 2022-07-12 DIAGNOSIS — K43.9 VENTRAL HERNIA WITHOUT OBSTRUCTION OR GANGRENE: ICD-10-CM

## 2022-07-12 DIAGNOSIS — M17.11 PRIMARY OSTEOARTHRITIS OF RIGHT KNEE: Primary | ICD-10-CM

## 2022-07-12 DIAGNOSIS — M19.012 PRIMARY OSTEOARTHRITIS OF LEFT SHOULDER: ICD-10-CM

## 2022-07-12 DIAGNOSIS — Z79.891 CHRONIC USE OF OPIATE FOR THERAPEUTIC PURPOSE: ICD-10-CM

## 2022-07-12 DIAGNOSIS — M54.41 CHRONIC BILATERAL LOW BACK PAIN WITH RIGHT-SIDED SCIATICA: ICD-10-CM

## 2022-07-12 DIAGNOSIS — E03.9 ACQUIRED HYPOTHYROIDISM: ICD-10-CM

## 2022-07-12 DIAGNOSIS — Z29.8 IMMUNOTHERAPY: ICD-10-CM

## 2022-07-12 DIAGNOSIS — Z13.220 SCREENING FOR LIPID DISORDERS: ICD-10-CM

## 2022-07-12 DIAGNOSIS — I10 ESSENTIAL HYPERTENSION WITH GOAL BLOOD PRESSURE LESS THAN 140/90: ICD-10-CM

## 2022-07-12 PROCEDURE — G8417 CALC BMI ABV UP PARAM F/U: HCPCS | Performed by: NURSE PRACTITIONER

## 2022-07-12 PROCEDURE — 3017F COLORECTAL CA SCREEN DOC REV: CPT | Performed by: NURSE PRACTITIONER

## 2022-07-12 PROCEDURE — 99214 OFFICE O/P EST MOD 30 MIN: CPT | Performed by: NURSE PRACTITIONER

## 2022-07-12 PROCEDURE — G8427 DOCREV CUR MEDS BY ELIG CLIN: HCPCS | Performed by: NURSE PRACTITIONER

## 2022-07-12 PROCEDURE — G9717 DOC PT DX DEP/BP F/U NT REQ: HCPCS | Performed by: NURSE PRACTITIONER

## 2022-07-12 PROCEDURE — G8752 SYS BP LESS 140: HCPCS | Performed by: NURSE PRACTITIONER

## 2022-07-12 PROCEDURE — G8754 DIAS BP LESS 90: HCPCS | Performed by: NURSE PRACTITIONER

## 2022-07-12 PROCEDURE — G9899 SCRN MAM PERF RSLTS DOC: HCPCS | Performed by: NURSE PRACTITIONER

## 2022-07-12 RX ORDER — OXYCODONE AND ACETAMINOPHEN 10; 325 MG/1; MG/1
1 TABLET ORAL
Qty: 60 TABLET | Refills: 0 | Status: SHIPPED | OUTPATIENT
Start: 2022-07-19 | End: 2022-08-18

## 2022-07-12 RX ORDER — OXYCODONE AND ACETAMINOPHEN 10; 325 MG/1; MG/1
1 TABLET ORAL
Qty: 45 TABLET | Refills: 0 | Status: SHIPPED | OUTPATIENT
Start: 2022-09-17 | End: 2022-10-13 | Stop reason: SDUPTHER

## 2022-07-12 RX ORDER — OXYCODONE AND ACETAMINOPHEN 10; 325 MG/1; MG/1
1 TABLET ORAL
Qty: 45 TABLET | Refills: 0 | Status: SHIPPED | OUTPATIENT
Start: 2022-08-18 | End: 2022-09-17

## 2022-07-12 NOTE — PROGRESS NOTES
Pt is here for   Chief Complaint   Patient presents with    Medication Refill     pain meds      1. Have you been to the ER, urgent care clinic since your last visit? Hospitalized since your last visit? No    2. Have you seen or consulted any other health care providers outside of the [address] [place] [address] since your last visit? Include any pap smears or colon screening.  No    States pain level is a 7/10 knees and back     Last had something for pain this morning

## 2022-07-12 NOTE — PROGRESS NOTES
Danny Saini (: 1965) is a 59 y.o. female, established patient, here for evaluation of the following chief complaint(s):  Medication Refill (pain meds )       ASSESSMENT/PLAN:  Below is the assessment and plan developed based on review of pertinent history, physical exam, labs, studies, and medications. 1. Primary osteoarthritis of right knee  -     oxyCODONE-acetaminophen (PERCOCET 10)  mg per tablet; Take 1 Tablet by mouth two (2) times daily as needed for Pain for up to 30 days. Max Daily Amount: 2 Tablets. Indications: pain, Normal, Disp-45 Tablet, R-0  -     oxyCODONE-acetaminophen (PERCOCET 10)  mg per tablet; Take 1 Tablet by mouth every twelve (12) hours as needed for Pain for up to 30 days. Max Daily Amount: 2 Tablets. Indications: pain, Normal, Disp-45 Tablet, R-0  -     oxyCODONE-acetaminophen (PERCOCET 10)  mg per tablet; Take 1 Tablet by mouth every twelve (12) hours as needed for Pain for up to 30 days. Max Daily Amount: 2 Tablets. Indications: pain, Normal, Disp-60 Tablet, R-0  -     HARJEET BARR VIRUS AB PANEL; Future  -     TOXASSURE SELECT 13 (MW); Future  2. Chronic use of opiate for therapeutic purpose  -     oxyCODONE-acetaminophen (PERCOCET 10)  mg per tablet; Take 1 Tablet by mouth two (2) times daily as needed for Pain for up to 30 days. Max Daily Amount: 2 Tablets. Indications: pain, Normal, Disp-45 Tablet, R-0  -     oxyCODONE-acetaminophen (PERCOCET 10)  mg per tablet; Take 1 Tablet by mouth every twelve (12) hours as needed for Pain for up to 30 days. Max Daily Amount: 2 Tablets. Indications: pain, Normal, Disp-45 Tablet, R-0  -     oxyCODONE-acetaminophen (PERCOCET 10)  mg per tablet; Take 1 Tablet by mouth every twelve (12) hours as needed for Pain for up to 30 days. Max Daily Amount: 2 Tablets. Indications: pain, Normal, Disp-60 Tablet, R-0  -     HARJEET BARR VIRUS AB PANEL; Future  -     TOXASSURE SELECT 13 (MW); Future  3.  Primary osteoarthritis of left shoulder  -     oxyCODONE-acetaminophen (PERCOCET 10)  mg per tablet; Take 1 Tablet by mouth two (2) times daily as needed for Pain for up to 30 days. Max Daily Amount: 2 Tablets. Indications: pain, Normal, Disp-45 Tablet, R-0  -     oxyCODONE-acetaminophen (PERCOCET 10)  mg per tablet; Take 1 Tablet by mouth every twelve (12) hours as needed for Pain for up to 30 days. Max Daily Amount: 2 Tablets. Indications: pain, Normal, Disp-45 Tablet, R-0  -     oxyCODONE-acetaminophen (PERCOCET 10)  mg per tablet; Take 1 Tablet by mouth every twelve (12) hours as needed for Pain for up to 30 days. Max Daily Amount: 2 Tablets. Indications: pain, Normal, Disp-60 Tablet, R-0  -     HARJEET BARR VIRUS AB PANEL; Future  -     TOXASSURE SELECT 13 (MW); Future  4. Chronic bilateral low back pain with right-sided sciatica  -     oxyCODONE-acetaminophen (PERCOCET 10)  mg per tablet; Take 1 Tablet by mouth two (2) times daily as needed for Pain for up to 30 days. Max Daily Amount: 2 Tablets. Indications: pain, Normal, Disp-45 Tablet, R-0  -     oxyCODONE-acetaminophen (PERCOCET 10)  mg per tablet; Take 1 Tablet by mouth every twelve (12) hours as needed for Pain for up to 30 days. Max Daily Amount: 2 Tablets. Indications: pain, Normal, Disp-45 Tablet, R-0  -     oxyCODONE-acetaminophen (PERCOCET 10)  mg per tablet; Take 1 Tablet by mouth every twelve (12) hours as needed for Pain for up to 30 days. Max Daily Amount: 2 Tablets. Indications: pain, Normal, Disp-60 Tablet, R-0  -     HARJEET BARR VIRUS AB PANEL; Future  -     TOXASSURE SELECT 13 (MW); Future  5. Screening for lipid disorders  -     LIPID PANEL; Future  6. Essential hypertension with goal blood pressure less than 200/08  -     METABOLIC PANEL, BASIC; Future  -     CBC W/O DIFF; Future  7. Immunotherapy  8. Ventral hernia without obstruction or gangrene  9.  Acquired hypothyroidism  -     TSH 3RD GENERATION; Future      Return in about 3 months (around 10/12/2022) for OV- pain med refill. Pt asked to complete follow by next visit: advised to try calling [facility] to see if they can see her since unable to continue care with [facility] due to bill in collections. SUBJECTIVE/OBJECTIVE:  HPI    Chronic Pain:  Patient has chronic BL knee pain and LBP for years, h/o right TKR (2016, Last revised ~ 2 yrs ago) and right wrist surgery (1/2021). Pain Scale: 7/10, improved since losing ~30 lbs by eating smaller portions and baking more. Pain in the left shoulder, wrist, knees, legs, and lower back is still limiting walking, sitting, reaching, lifting, and standing, exacerbated by forementioned acitivities to include stair climbing. Has tried prednisone, tramadol, injections, PT, gabapentin, cymbalta, and surgery in past with minimal relief. Pain has been controlled with Oxycodone, last taken YESTERDAY, but took extra when had teeth removed for dentures. The medication is kept safe by staying with her. Has NOT seen any other providers since last OV for pain medication. No significant changes to pain presentation since last OV. she is  able to do her normal daily activities. she reports the following adverse side effects: none. Least pain over the last week has been 7/10  Worst pain over the last week has been 10/10. Aberrant behaviors: None         Symptoms onset: problem is longstanding. Rheumatological ROS: ongoing significant pain which is stable and controlled by pain med. Response to treatment plan: waxing and waning. Hypertension Review:  The patient has hypertension. Diet and Lifestyle: generally does  try to follow a  low sodium diet, exercises sporadically   Home BP Monitoring: is not measured at home. Pertinent ROS: taking medications as instructed, no medication side effects noted. No HA's, chest pain on exertion, dyspnea on exertion, or swelling of ankles.    BP Readings from Last 3 Encounters:   07/12/22 130/75   04/28/22 (!) 162/96   01/28/22 (!) 140/80       Review of Systems  Constitutional: +MRSA in nose. negative for fevers, chills, anorexia and weight loss  Eyes:   negative for visual disturbance, drainage, and irritation  ENT:   +AR and environmental allergies; immunotherapy. negative for tinnitus,sore throat,ear pain,and hoarseness  Respiratory:  + asthma/COPD, followed by PULM, Stable. negative for hemoptysis  CV:   negative for chest pain, palpitations, and lower extremity edema  GI:   + GERD. negative for nausea, vomiting, diarrhea, abdominal pain, and melena  Endo:              +hypothyroidsim.  negative for polyuria,polydipsia,polyphagia, and heat intolerance  Genitourinary: negative for frequency, urgency, dysuria, retention, and hematuria  Integument:  negative for rash, ulcerations, and pruritus  Hematologic:  negative for easy bruising and bleeding  Musculoskel: negative for  muscle weakness  Neurological:  negative for headaches, dizziness, vertigo,and memory problems  Behavl/Psych: +depression, on cymbalta and zoloft. negative for feelings of  suicide    1./2. 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 1/22/2021    GERD (gastroesophageal reflux disease)     History of seasonal allergies     Hypertension     Ill-defined condition     environmental allergies     Ill-defined condition     Multiple body piercings; unable to remove tongue and lip piercings    Ill-defined condition 2018    GASTRITIS PER ENDOSCOPY    Loosening of knee joint prosthesis ([address] 3/26/2019    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    Psychiatric disorder     DEPRESSION    Status post total right knee replacement 5/8/2017    Thyroid disease     hypo    Ulnar impaction syndrome, right 1/22/2021    Ventral hernia 12/31/2013     Past Surgical History:   Procedure Laterality Date    HX HEENT  2009    thyroidectomy - benign    HX KNEE REPLACEMENT      R    HX KNEE REPLACEMENT  03/26/2019    R twice    HX MOHS PROCEDURES Right 03/08/2011    RCR - right    HX ORTHOPAEDIC      right wrist repair - has hardware    HX OTHER SURGICAL      hiatal hernia repair    HX PARTIAL HYSTERECTOMY      HX TUBAL LIGATION       Patient Active Problem List   Diagnosis Code    Environmental and seasonal allergies J30.89    Ventral hernia without obstruction or gangrene K43.9    Primary osteoarthritis involving multiple joints M15.9    Mixed simple and mucopurulent chronic bronchitis (HCC) J41.8    Acquired hypothyroidism E03.9    Pain management contract signed Z02.89    Moderate episode of recurrent major depressive disorder (HCC) F33.1    Psychophysiological insomnia F51.04    Severe obesity (BMI 35.0-39. 9) with comorbidity ([facility]) E66.01    Chronic use of opiate for therapeutic purpose Z79.891    Obesity, Class II, BMI 35-39.9 E66.9    Osteoarthritis of spine with radiculopathy, cervical region M47.22    Primary osteoarthritis of left shoulder M19.012    S/P total knee arthroplasty, right Z96.651    Immunotherapy Z29.8    S/P partial hysterectomy Z90.711    Edentulous K08.109       3. Applicable records from prior treatment providers are apart of [facility] under the media/encounters tab. 4. Diagnostic, therapeutic and laboratory results are available in the [address] [place] [address] chart. 5. Consultation notes are available for review in the media/encounters tab of the [address] [place] [address] chart.     6. Treatment goals include: pain control, improve activity level and function in regards to activities of daily living, and improved comfort level. Previously pt has been limited by pain in all these aspects. 7. The risks and benefits of treatment have been discussed at this office visit with the pt.  she understands that the medication has addictive potential.  Additionally the pt has been advised that narcotic pain medication may impair mental and/or physical ability required for performance of tasks such as driving or operating any other machinery. 8. Pt has an updated signed pain contract on file UPDATED TODAY and can be found under the media section of the [facility] chart. 9. The pain contract is reviewed. Pain medication will be continued at the SAME dosage. PILL COUNT: 2, expected 10-12, last fill date 6/21/22. Urine drug screening ordered/collected today. Additional diagnostic studies are not indicated at this time. Interventional procedure are not indicated at this time. Narcan prescribed already. 10. Medication prescibed is oxycodone  every 12 PRN #60 with 2 refills for #45 for a 3 month supply.  was reviewed while planning for pain/anxiety management, no indications of drug diversion suspected. Prescription history is no suspicious for controlled substance overuse. 11. Patient instructions have been reviewed in detail as outlined above and in the pain contract. 12. Re-evaluation is planned for 3 months. Current Outpatient Medications   Medication Sig    [START ON 9/17/2022] oxyCODONE-acetaminophen (PERCOCET 10)  mg per tablet Take 1 Tablet by mouth two (2) times daily as needed for Pain for up to 30 days. Max Daily Amount: 2 Tablets. Indications: pain    [START ON 8/18/2022] oxyCODONE-acetaminophen (PERCOCET 10)  mg per tablet Take 1 Tablet by mouth every twelve (12) hours as needed for Pain for up to 30 days. Max Daily Amount: 2 Tablets.  Indications: pain    [START ON 7/19/2022] oxyCODONE-acetaminophen (PERCOCET 10)  mg per tablet Take 1 Tablet by mouth every twelve (12) hours as needed for Pain for up to 30 days. Max Daily Amount: 2 Tablets. Indications: pain    levothyroxine (SYNTHROID) 125 mcg tablet TAKE 1 TABLET BY MOUTH EVERY DAY BEFORE BREAKFAST    amLODIPine (NORVASC) 5 mg tablet TAKE 1 TABLET BY MOUTH EVERY DAY    hydroCHLOROthiazide (HYDRODIURIL) 25 mg tablet TAKE 1 TAB BY MOUTH DAILY FOR HYPERTENSION    traZODone (DESYREL) 100 mg tablet TAKE 1 TABLET BY MOUTH EVERY DAY AT NIGHT    DULoxetine (CYMBALTA) 60 mg capsule TAKE 1 CAPSULE BY MOUTH EVERY DAY    montelukast (SINGULAIR) 10 mg tablet Take 1 Tablet by mouth daily.  pantoprazole (PROTONIX) 40 mg tablet Take 40 mg by mouth two (2) times a day.  methocarbamoL (ROBAXIN) 750 mg tablet TAKE 1 TAB BY MOUTH EVERY 6 HOURS AS NEEDED FOR SPASMS    DULoxetine (CYMBALTA) 30 mg capsule TAKE 1 CAP BY MOUTH DAILY. TAKE WITH 60 MG CAPSULE    loratadine (CLARITIN) 10 mg tablet TAKE 1 TABLET BY MOUTH EVERY DAY    lidocaine (Lidoderm) 5 % Apply patch to the affected area for 12 hours a day and remove for 12 hours a day.  sucralfate (CARAFATE) 1 gram tablet TAKE 1 TABLET BY MOUTH FOUR TIMES A DAY    metoprolol succinate (TOPROL-XL) 25 mg XL tablet TAKE 1 TABLET BY MOUTH EVERY DAY    furosemide (LASIX) 20 mg tablet TAKE 1 TABLET BY MOUTH EVERY DAY    Combivent Respimat  mcg/actuation inhaler INHALE 1 PUFF BY MOUTH EVERY 6 HOURS AS NEEDED FOR WHEEZING    Symbicort 160-4.5 mcg/actuation HFAA TAKE 2 PUFFS BY INHALATION TWO (2) TIMES A DAY.  pregabalin (LYRICA) 100 mg capsule Take 1 Cap by mouth three (3) times daily. Max Daily Amount: 300 mg.  XOLAIR 150 mg solr every thirty (30) days. Indications: injection    hydrOXYzine HCl (ATARAX) 25 mg tablet TAKE 1 TABLET BY MOUTH 3 TIMES A DAY AS NEEDED FOR ITCHING FOR ANXIETY    tiotropium (SPIRIVA WITH HANDIHALER) 18 mcg inhalation capsule Take 1 Cap by inhalation daily.  fluticasone (FLONASE) 50 mcg/actuation nasal spray 2 Sprays by Both Nostrils route daily.  azelastine (ASTELIN) 137 mcg (0.1 %) nasal spray 1 Broad Top by Both Nostrils route as needed for Rhinitis.  diclofenac (VOLTAREN) 1 % gel Apply  to affected area as needed for Pain.  naloxone (Narcan) 4 mg/actuation nasal spray Use 1 spray into 1 nostril for OVERDOSE. Call 911. For subsequent doses, give in alternating nostrils. May repeat every 2 to 3 min.  EPINEPHrine (EPIPEN) 0.3 mg/0.3 mL injection See Admin Instructions. (Patient not taking: Reported on 4/28/2022)    acetaminophen (Tylenol Extra Strength) 500 mg tablet Take 2 Tabs by mouth every six (6) hours as needed for Pain.  albuterol (PROVENTIL VENTOLIN) 2.5 mg /3 mL (0.083 %) nebulizer solution 3 mL by Nebulization route every four (4) hours as needed for Wheezing.  miscellaneous medical supply misc Shower seat for chronic knee pain, pt planning to have right TKR in June due to condition. Very limited range of motion. No current facility-administered medications for this visit. Visit Vitals  /75 (BP 1 Location: Left upper arm, BP Patient Position: Sitting, BP Cuff Size: Large adult)   Pulse 77   Temp 97.7 °F (36.5 °C) (Temporal)   Resp 18   Ht 5' 1\" (1.549 m)   Wt 187 lb 12.8 oz (85.2 kg)   LMP 12/29/2016 (Exact Date) Comment: partial hysterectomy   SpO2 96%   BMI 35.48 kg/m²       Wt Readings from Last 3 Encounters:   07/12/22 187 lb 12.8 oz (85.2 kg)   04/28/22 215 lb (97.5 kg)   01/28/22 214 lb (97.1 kg)     Physical Exam:   General appearance - alert, well appearing, and in no distress. Edentulous, will start fitting process soon. Mental status - A/O x 4,normal mood and affect. Chest -  CTA. Symmetric chest rise. No wheezing. No distress. Heart - Normal rate & rhythm. Normal S1 & S2. No MGR. Abdomen- Soft, round. Non-distended, NT. No pulsatile masses or hernias. Ext-  No clubbing or cyanosis. Right knee grossly enlarged. Skin-Warm and dry. No hyperpigmentation, ulcerations, or suspicious lesions.   Neuro - Normal speech, no focal findings or movement disorder. Normal strength and muscle tone. Slow gait using cane. An electronic signature was used to authenticate this note.   -- Ronda Coffman NP

## 2022-07-12 NOTE — Clinical Note
NOTIFICATION RETURN TO WORK / SCHOOL    7/12/2022 12:56 PM    Ms. Gerard Mcfarlane  [address] [address]      To Whom It May Concern:    Gerard Mcfarlane is currently under the care of Gina Hopper. She will return to work/school on: ***    If there are questions or concerns please have the patient contact our office.         Sincerely,      Liliane Hebert NP

## 2022-07-12 NOTE — LETTER
CONTROLLED SUBSTANCE MEDICATION AGREEMENT     Patient Name: Saintclair Seitz  Patient YOB: 1965     I understand, that controlled substance medications may be used to help better manage my symptoms and to improve my ability to function at home, work and in social settings. However, I also understand that these medications do have risks, which have been discussed with me, including possible development of physical or psychological dependence. I understand that successful treatment requires mutual trust and honesty between me and my provider. I understand and agree that following this Medication Agreement is necessary in continuing my provider-patient relationship and the success of my treatment plan. Explanation from my Provider: Benefits and Goals of Controlled Substance Medications: There are two potential goals for your treatment: (1) decreased pain and suffering (2) improved daily life functions. There are many possible treatments for your chronic condition(s). Alternatives such as physical therapy, yoga, massage, home daily exercise, meditation, relaxation techniques, injections, chiropractic manipulations, surgery, cognitive therapy, hypnosis and many medications that are not habit-forming may be used. Use of controlled substance medications may be helpful, but they are unlikely to resolve all symptoms or restore all function. Explanation from my Provider: Risks of Controlled Substance Medications:  Opioid pain medications: These medications can lead to problems such as addiction/dependence, sedation, lightheadedness/dizziness, memory issues, falls, constipation, nausea, or vomiting. They may also impair the ability to drive or operate machinery. Additionally, these medications may lower testosterone levels, leading to loss of bone strength, stamina and sex drive.   They may cause problems with breathing, sleep apnea and reduced coughing, which is especially dangerous for patients with lung disease. Overdose or dangerous interactions with alcohol and other medications may occur, leading to death. Hyperalgesia may develop, which means patients receiving opioids for the treatment of pain may become more sensitive to certain painful stimuli, and in some cases, experience pain from ordinarily non-painful stimuli. Women between the ages of 14-53 who could become pregnant should carefully weigh the risks and benefits of opioids with their physicians, as these medications increase the risk of pregnancy complications, including miscarriage,  delivery and stillbirth. It is also possible for babies to be born addicted to opioids. Opioid dependence withdrawal symptoms may include; feelings of uneasiness, increased pain, irritability, belly pain, diarrhea, sweats and goose-flesh. Benzodiazepines and non-benzodiazepine sleep medications: These medications can lead to problems such as addiction/dependence, sedation, fatigue, lightheadedness, dizziness, incoordination, falls, depression, hallucinations, and impaired judgment, memory and concentration. The ability to drive and operate machinery may also be affected. Abnormal sleep-related behaviors have been reported, including sleepwalking, driving, making telephone calls, eating, or having sex while not fully awake. These medications can suppress breathing and worsen sleep apnea, particularly when combined with alcohol or other sedating medications, potentially leading to death. Dependence withdrawal symptoms may include tremors, anxiety, hallucinations and seizures. Initials:_______  Stimulants:  Common adverse effects include addiction/dependence, increased blood  pressure and heart rate, decreased appetite, nausea, involuntary weight loss, insomnia,  irritability, and headaches.   These risks may increase when these medications are combined with other stimulants, such as caffeine pills or energy drinks, certain weight loss supplements and oral decongestants. Dependence withdrawal symptoms may include depressed mood, loss of interest, suicidal thoughts, anxiety, fatigue, appetite changes and agitation. Testosterone replacement therapy:  Potential side effects include increased risk of stroke and heart attack, blood clots, increased blood pressure, increased cholesterol, enlarged prostate, sleep apnea, irritability/aggression and other mood disorders, and decreased fertility. I agree and understand that I and my prescriber have the following rights and responsibilities regarding my treatment plan:     1. MY RIGHTS:  To be informed of my treatment and medication plan. To be an active participant in my health and wellbeing. 2. MY RESPONSIBILITY AND UNDERSTANDING FOR USE OF MEDICATIONS   I will take medications at the dose and frequency as directed. For my safety, I will not increase or change how I take my medications without the recommendation of my healthcare provider.  I will actively participate in any program recommended by my provider which may improve function, including social, physical, psychological programs.  I will not take my medications with alcohol or other drugs not prescribed to me. I understand that drinking alcohol with my medications increases the chances of side effects, including reduced breathing rate and could lead to personal injury when operating machinery.  I understand that if I have a history of substance use disorders, including alcohol or other illicit drugs, that I may be at increased risk of addiction to my medications.  I agree to notify my provider immediately if I should become pregnant so that my treatment plan can be adjusted.    I agree and understand that I shall only receive controlled substance medications from the prescriber that signed this agreement unless there is written agreement among other prescribers of controlled substances outlining the responsibility of the medications being prescribed.  I understand that the if the controlled medication is not helping to achieve goals, the dosage may be tapered and no longer prescribed. 3. MY RESPONSIBILITY FOR COMMUNICATION / PRESCRIPTION RENEWALS   I agree that all controlled substance medications that I take will be prescribed only by my provider. If another healthcare provider prescribes me medication in an emergency, I will notify my provider within seventy-two (72) hours.  I will arrange for refills at the prescribed interval ONLY during regular office hours. I will not ask for refills earlier than agreed, after-hours, on holidays or weekends. Refills may take up to 72 hours for processing and prescriptions to reach the pharmacy.  I will inform my other health care providers that I am taking these medications and of the existence of this [place] [id number]. In the event of an emergency, I will provide the same information to the emergency department prescribers. Initials:_______  Vitaliy Loser I will keep my provider updated on the pharmacy I am using for controlled medication prescription filling. 4. MY RESPONSIBILITY FOR PROTECTING MEDICATIONS   I will protect my prescriptions and medications. I understand that lost or misplaced prescriptions will not be replaced.  I will keep medications only for my own use and will not share them with others. I will keep all medications away from children.  I agree that if my medications are adjusted or discontinued, I will properly dispose of any remaining medications. I understand that I will be required to dispose of any remaining controlled medications as, directed by my prescriber, prior to being provided with any prescriptions for other controlled medications. Medication drop box locations can be found at: [email or web address]    5.  MY RESPONSIBILITY WITH ILLEGAL DRUGS    I will not use illegal or street drugs or another person's prescription medications not prescribed to me.  If there are identified addiction type symptoms, then referral to a program may be provided by my provider and I agree to follow through with this recommendation. 6. MY RESPONSIBILITY FOR COOPERATION WITH INVESTIGATIONS   I understand that my provider will comply with any applicable law and may discuss my use and/or possible misuse/abuse of controlled substances and alcohol, as appropriate, with any health care provider involved in my care, pharmacist, or legal authority.  I authorize my provider and pharmacy to cooperate fully with law enforcement agencies (as permitted by law) in the investigation of any possible misuse, sale, or other diversion of my controlled substances.  I agree to waive any applicable privilege or right of privacy or confidentiality with respect to these authorizations. 7. PROVIDERS RIGHT TO MONITOR FOR SAFETY: PRESCRIPTION MONITORING / DRUG TESTING   I consent to drug/toxicology screening and will submit to a drug screen upon my providers request to assure I am only taking the prescribed drugs for my safety monitoring. I understand that a drug screen is a laboratory test in which a sample of my urine, blood or saliva is checked to see what drugs I have been taking. This may entail an observed urine specimen, which means that a nurse or other health care provider may watch me provide urine, and I will cooperate if I am asked to provide an observed specimen.  I understand that my provider will check a copy of my State Prescription Monitoring Program () Report in order to safely prescribe medications.  Pill Counts: I consent to pill counts when requested. I may be asked to bring all my prescribed controlled substance medications, in their original bottles, to all of my scheduled appointments. In addition, my provider may ask me to come to the practice at any time for a random pill count. Initials:_______    8. TERMINATION OF THIS AGREEMENT  For my safety, my prescriber has the right to stop prescribing controlled substance medications and may end this agreement.  Conditions that may result in termination of this agreement:  a. I do not show any improvement in pain, or my activity has not improved. b. I develop rapid tolerance or loss of improvement, as described in my treatment plan.  c. I develop significant side effects from the medication. d. My behavior is not consistent with the responsibilities outlined above, thereby causing safety concerns to continue prescribing controlled substance medications. e. I fail to follow the terms of this agreement. f. Other:____________________________       UNDERSTANDING THIS MEDICATION AGREEMENT:    I have read the above and have had all my questions answered. For chronic disease management, I know that my symptoms can be managed with many types of treatments. A chronic medication trial may be part of my treatment, but I must be an active participant in my care. Medication therapy is only one part of my symptom management plan. In some cases, there may be limited scientific evidence to support the chronic use of certain medications to improve symptoms and daily function. Furthermore, in certain circumstances, there may be scientific information that suggests that the use of chronic controlled substances may worsen my symptoms and increase my risk of unintentional death directly related to this medication therapy. I know that if my provider feels my risk from controlled medications is greater than my benefit, I will have my controlled substance medication(s) compassionately lowered or removed altogether. I further agree to allow this office to contact my HIPAA contact if there are concerns about my safety and use of the controlled medications.    I have agreed to use the prescribed controlled substance medications to me as instructed by my provider and as stated in this Medication Agreement. My initial on each page and my signature below shows that I have read each page and I have had the opportunity to ask questions with answers provided by my provider.     Patient Name (Printed): _____________________________________  Patient Signature:  ______________________   Date: _____________    Prescriber Name (Printed): ___________________________________  Prescriber Signature: _____________________  Date: _____________

## 2022-07-12 NOTE — PATIENT INSTRUCTIONS
Read CLEANSE to HEAL by Dean Christie. Try to drink 16 oz. Of FRESH celery juice every morning on an empty stomach. Otherwise continue eating every 2 hours. Mostly fruits, to include berries and veggies like broccoli, butter or renetta lettuce, spinach, and sprouts. Also try to include herbs like cilantro and mint. Pain Medicine Side Effects: Care Instructions  Your Care Instructions     When you go to a medical facility in pain, you may get a strong medicine to give you relief. The medicine may be given in a vein (by IV) or as an injection (shot). Examples of this type of pain medicine include fentanyl, hydromorphone, and morphine. While these medicines help relieve pain, they also have side effects. For your safety, it's important that you know how this strong pain medicine affects you. Common side effects can include:  · Nausea or vomiting. · Feeling dizzy or lightheaded. · Feeling sleepy. The doctor has checked you carefully, but problems can develop later. If you notice any problems or new symptoms, get medical treatment right away. Follow-up care is a key part of your treatment and safety. Be sure to make and go to all appointments, and call your doctor if you are having problems. It's also a good idea to know your test results and keep a list of the medicines you take. How can you care for yourself at home? Activity    · Don't do anything for 24 hours that requires attention to detail, such as going to work, making important decisions, or signing any legal documents. Strong pain medicines can make your mind foggy. It takes time for the effects to wear off completely.     · Don't drive a car until you are sure the effects from the medicine are gone. Medicines    · Be safe with medicines. Read and follow all instructions on the label. ? If the doctor gave you a prescription medicine for pain, take it as prescribed.   ? If you are not taking a prescription pain medicine, ask your doctor if you can take an over-the-counter medicine. Diet    · You can eat your normal diet, unless your doctor gives you other instructions. If your stomach is upset, try clear liquids and bland, low-fat foods like plain toast or rice.     · If your strong pain medicine makes you feel constipated, talk to your doctor about a laxative. If a laxative doesn't work, he or she may suggest a prescription medicine.     · Drink plenty of fluids (unless your doctor tells you not to).   · Don't drink alcohol for 24 hours. When should you call for help? Call 911 anytime you think you may need emergency care. For example, call if:    · You have trouble breathing.     · You passed out (lost consciousness). Call your doctor now or seek immediate medical care if:    · You have new or worse pain. Watch closely for changes in your health, and be sure to contact your doctor if:    · You do not get better as expected. Where can you learn more? Go to http://www.gray.com/  Enter G910 in the search box to learn more about \"Pain Medicine Side Effects: Care Instructions. \"  Current as of: December 13, 2021               Content Version: 13.2  © 9323-7256 [employer]. Care instructions adapted under license by [employer] (which disclaims liability or warranty for this information). If you have questions about a medical condition or this instruction, always ask your healthcare professional. [place] [address] any warranty or liability for your use of this information.

## 2022-07-16 LAB
BUN SERPL-MCNC: 11 MG/DL (ref 6–24)
BUN/CREAT SERPL: 10 (ref 9–23)
CALCIUM SERPL-MCNC: 8.9 MG/DL (ref 8.7–10.2)
CHLORIDE SERPL-SCNC: 102 MMOL/L (ref 96–106)
CHOLEST SERPL-MCNC: 162 MG/DL (ref 100–199)
CO2 SERPL-SCNC: 24 MMOL/L (ref 20–29)
CREAT SERPL-MCNC: 1.08 MG/DL (ref 0.57–1)
EBV NA IGG SER IA-ACNC: >600 U/ML (ref 0–17.9)
EBV VCA IGG SER IA-ACNC: >600 U/ML (ref 0–17.9)
EBV VCA IGM SER IA-ACNC: <36 U/ML (ref 0–35.9)
EGFR: 59 ML/MIN/1.73
ERYTHROCYTE [DISTWIDTH] IN BLOOD BY AUTOMATED COUNT: 14.4 % (ref 11.7–15.4)
GLUCOSE SERPL-MCNC: 87 MG/DL (ref 65–99)
HCT VFR BLD AUTO: 53.9 % (ref 34–46.6)
HDLC SERPL-MCNC: 47 MG/DL
HGB BLD-MCNC: 16.7 G/DL (ref 11.1–15.9)
IMP & REVIEW OF LAB RESULTS: NORMAL
LDLC SERPL CALC-MCNC: 101 MG/DL (ref 0–99)
MCH RBC QN AUTO: 27.7 PG (ref 26.6–33)
MCHC RBC AUTO-ENTMCNC: 31 G/DL (ref 31.5–35.7)
MCV RBC AUTO: 89 FL (ref 79–97)
PLATELET # BLD AUTO: 220 X10E3/UL (ref 150–450)
POTASSIUM SERPL-SCNC: 4.2 MMOL/L (ref 3.5–5.2)
RBC # BLD AUTO: 6.03 X10E6/UL (ref 3.77–5.28)
SERVICE CMNT-IMP: ABNORMAL
SODIUM SERPL-SCNC: 139 MMOL/L (ref 134–144)
TRIGL SERPL-MCNC: 75 MG/DL (ref 0–149)
TSH SERPL DL<=0.005 MIU/L-ACNC: 2.15 UIU/ML (ref 0.45–4.5)
VLDLC SERPL CALC-MCNC: 14 MG/DL (ref 5–40)
WBC # BLD AUTO: 3.9 X10E3/UL (ref 3.4–10.8)

## 2022-07-18 PROBLEM — B27.90 CHRONIC EBV INFECTION: Status: ACTIVE | Noted: 2022-07-18

## 2022-07-18 NOTE — PROGRESS NOTES
Overall your labs look good, but... Your kidney functioning is IMPAIRED/Decreasing. Avoid NSAIDs (BC powder, Ibuprofen, Advill, Motrin, and Aleve), as these may also decrease your kidney functioning. Try topical agents, heat,  or Tylenol for pain. As suspected you have [place] Speed Virus in your system, this means you were EXPOSED at some time in the past and the virus is still sitting in your system. However, you are NOT contagious and will NOT give this to anyone else, as your infection is NOT new. To treat this, no antibiotics or antivirals are needed, instead you need to OVERHAUL your eating by following the CLEANSE TO HEAL regimen of foods and supplements as outlined in the book by Heather Rios. This will likely additionally help with worsening pain.

## 2022-07-19 LAB — DRUGS UR: NORMAL

## 2022-08-11 ENCOUNTER — HOSPITAL ENCOUNTER (EMERGENCY)
Age: 57
Discharge: HOME | End: 2022-08-11
Attending: EMERGENCY MEDICINE
Payer: MEDICARE

## 2022-08-11 VITALS
TEMPERATURE: 98.4 F | OXYGEN SATURATION: 99 % | SYSTOLIC BLOOD PRESSURE: 156 MMHG | BODY MASS INDEX: 35.3 KG/M2 | HEART RATE: 69 BPM | DIASTOLIC BLOOD PRESSURE: 84 MMHG | WEIGHT: 187 LBS | HEIGHT: 61 IN | RESPIRATION RATE: 18 BRPM

## 2022-08-11 DIAGNOSIS — T78.40XA ALLERGIC REACTION, INITIAL ENCOUNTER: Primary | ICD-10-CM

## 2022-08-11 PROCEDURE — 74011250636 HC RX REV CODE- 250/636: Performed by: EMERGENCY MEDICINE

## 2022-08-11 PROCEDURE — 96375 TX/PRO/DX INJ NEW DRUG ADDON: CPT

## 2022-08-11 PROCEDURE — 99284 EMERGENCY DEPT VISIT MOD MDM: CPT

## 2022-08-11 PROCEDURE — 74011000250 HC RX REV CODE- 250: Performed by: EMERGENCY MEDICINE

## 2022-08-11 PROCEDURE — 96374 THER/PROPH/DIAG INJ IV PUSH: CPT

## 2022-08-11 RX ORDER — PREDNISONE 20 MG/1
60 TABLET ORAL DAILY
Qty: 9 TABLET | Refills: 0 | Status: SHIPPED | OUTPATIENT
Start: 2022-08-11 | End: 2022-08-14

## 2022-08-11 RX ORDER — DIPHENHYDRAMINE HYDROCHLORIDE 50 MG/ML
25 INJECTION, SOLUTION INTRAMUSCULAR; INTRAVENOUS
Status: COMPLETED | OUTPATIENT
Start: 2022-08-11 | End: 2022-08-11

## 2022-08-11 RX ORDER — DIPHENHYDRAMINE HCL 25 MG
25 CAPSULE ORAL
Qty: 15 CAPSULE | Refills: 0 | Status: SHIPPED | OUTPATIENT
Start: 2022-08-11 | End: 2022-10-13 | Stop reason: ALTCHOICE

## 2022-08-11 RX ADMIN — FAMOTIDINE 20 MG: 10 INJECTION, SOLUTION INTRAVENOUS at 21:32

## 2022-08-11 RX ADMIN — METHYLPREDNISOLONE SODIUM SUCCINATE 125 MG: 125 INJECTION, POWDER, FOR SOLUTION INTRAMUSCULAR; INTRAVENOUS at 21:31

## 2022-08-11 RX ADMIN — DIPHENHYDRAMINE HYDROCHLORIDE 25 MG: 50 INJECTION, SOLUTION INTRAMUSCULAR; INTRAVENOUS at 21:32

## 2022-08-12 NOTE — ED NOTES
Emergency Department Nursing Plan of Care       The Nursing Plan of Care is developed from the Nursing assessment and Emergency Department Attending provider initial evaluation. The plan of care may be reviewed in the ED Provider note.     The Plan of Care was developed with the following considerations:   Patient / Family readiness to learn indicated by:verbalized understanding  Persons(s) to be included in education: patient  Barriers to Learning/Limitations:No    Signed     Karan Reynolds RN    8/11/2022   9:01 PM

## 2022-08-12 NOTE — ED TRIAGE NOTES
Patient to ED for rash to chest and bilateral arms that started 15min PTA. Patient reports drinking a pink lemonade and a ginger ale and then started to breakout. Patient denies throat swelling, endorses raspy voice.

## 2022-08-12 NOTE — ED PROVIDER NOTES
EMERGENCY DEPARTMENT HISTORY AND PHYSICAL EXAM      Date: 8/11/2022  Patient Name: Antonino Yung    History of Presenting Illness     Chief Complaint   Patient presents with    Allergic Reaction       History Provided By: Patient    HPI: Antonino Yung, 59 y.o. female with PMHx significant for arthritis, COPD, GERD, hypertension, who presents with a chief complaint of an itchy rash to her upper extremities, neck, and trunk. She denies any new lotions, foods, or exposures. She does report a history of prior severe allergic reactions and has an EpiPen at home but did not use it. She states that her throat feels \"raspy\" but she denies any sensation of swelling or shortness of breath. No nausea or vomiting. No chest pain. PCP: Madyson Nicolas NP    There are no other complaints, changes, or physical findings at this time. Current Outpatient Medications   Medication Sig Dispense Refill    predniSONE (DELTASONE) 20 mg tablet Take 3 Tablets by mouth in the morning for 3 days. 9 Tablet 0    diphenhydrAMINE (BenadryL) 25 mg capsule Take 1 Capsule by mouth every six (6) hours as needed for Itching or Allergies. 15 Capsule 0    [START ON 9/17/2022] oxyCODONE-acetaminophen (PERCOCET 10)  mg per tablet Take 1 Tablet by mouth two (2) times daily as needed for Pain for up to 30 days. Max Daily Amount: 2 Tablets. Indications: pain 45 Tablet 0    [START ON 8/18/2022] oxyCODONE-acetaminophen (PERCOCET 10)  mg per tablet Take 1 Tablet by mouth every twelve (12) hours as needed for Pain for up to 30 days. Max Daily Amount: 2 Tablets. Indications: pain 45 Tablet 0    oxyCODONE-acetaminophen (PERCOCET 10)  mg per tablet Take 1 Tablet by mouth every twelve (12) hours as needed for Pain for up to 30 days. Max Daily Amount: 2 Tablets.  Indications: pain 60 Tablet 0    levothyroxine (SYNTHROID) 125 mcg tablet TAKE 1 TABLET BY MOUTH EVERY DAY BEFORE BREAKFAST 30 Tablet 11    amLODIPine (NORVASC) 5 mg tablet TAKE 1 TABLET BY MOUTH EVERY DAY 30 Tablet 11    hydroCHLOROthiazide (HYDRODIURIL) 25 mg tablet TAKE 1 TAB BY MOUTH DAILY FOR HYPERTENSION 30 Tablet 11    azelastine (ASTELIN) 137 mcg (0.1 %) nasal spray 1 [place] by Both Nostrils route as needed for Rhinitis. 3 Each 3    traZODone (DESYREL) 100 mg tablet TAKE 1 TABLET BY MOUTH EVERY DAY AT NIGHT 30 Tablet 11    DULoxetine (CYMBALTA) 60 mg capsule TAKE 1 CAPSULE BY MOUTH EVERY DAY 30 Capsule 11    montelukast (SINGULAIR) 10 mg tablet Take 1 Tablet by mouth daily. 30 Tablet 11    pantoprazole (PROTONIX) 40 mg tablet Take 40 mg by mouth two (2) times a day. diclofenac (VOLTAREN) 1 % gel Apply  to affected area as needed for Pain. methocarbamoL (ROBAXIN) 750 mg tablet TAKE 1 TAB BY MOUTH EVERY 6 HOURS AS NEEDED FOR SPASMS 120 Tablet 6    DULoxetine (CYMBALTA) 30 mg capsule TAKE 1 CAP BY MOUTH DAILY. TAKE WITH 60 MG CAPSULE 90 Capsule 3    loratadine (CLARITIN) 10 mg tablet TAKE 1 TABLET BY MOUTH EVERY DAY 90 Tablet 3    lidocaine (Lidoderm) 5 % Apply patch to the affected area for 12 hours a day and remove for 12 hours a day. 30 Each 11    naloxone (Narcan) 4 mg/actuation nasal spray Use 1 spray into 1 nostril for OVERDOSE. Call 911. For subsequent doses, give in alternating nostrils. May repeat every 2 to 3 min. 2 Each 0    EPINEPHrine (EPIPEN) 0.3 mg/0.3 mL injection See Admin Instructions. (Patient not taking: Reported on 4/28/2022)      sucralfate (CARAFATE) 1 gram tablet TAKE 1 TABLET BY MOUTH FOUR TIMES A DAY      metoprolol succinate (TOPROL-XL) 25 mg XL tablet TAKE 1 TABLET BY MOUTH EVERY DAY 30 Tablet 11    furosemide (LASIX) 20 mg tablet TAKE 1 TABLET BY MOUTH EVERY DAY 30 Tablet 0    Combivent Respimat  mcg/actuation inhaler INHALE 1 PUFF BY MOUTH EVERY 6 HOURS AS NEEDED FOR WHEEZING 1 Inhaler 0    Symbicort 160-4.5 mcg/actuation HFAA TAKE 2 PUFFS BY INHALATION TWO (2) TIMES A DAY.  30.6 Inhaler 3    pregabalin (LYRICA) 100 mg capsule Take 1 Cap by mouth three (3) times daily. Max Daily Amount: 300 mg. 90 Cap 5    acetaminophen (Tylenol Extra Strength) 500 mg tablet Take 2 Tabs by mouth every six (6) hours as needed for Pain. 20 Tab 0    XOLAIR 150 mg solr every thirty (30) days. Indications: injection      hydrOXYzine HCl (ATARAX) 25 mg tablet TAKE 1 TABLET BY MOUTH 3 TIMES A DAY AS NEEDED FOR ITCHING FOR ANXIETY 60 Tab 0    tiotropium (SPIRIVA WITH HANDIHALER) 18 mcg inhalation capsule Take 1 Cap by inhalation daily. albuterol (PROVENTIL VENTOLIN) 2.5 mg /3 mL (0.083 %) nebulizer solution 3 mL by Nebulization route every four (4) hours as needed for Wheezing. 24 Each 2    fluticasone (FLONASE) 50 mcg/actuation nasal spray 2 Sprays by Both Nostrils route daily. 1 Bottle 11    miscellaneous medical supply misc Shower seat for chronic knee pain, pt planning to have right TKR in June due to condition. Very limited range of motion.  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 1/22/2021    GERD (gastroesophageal reflux disease)     History of seasonal allergies     Hypertension     Ill-defined condition     environmental allergies     Ill-defined condition     Multiple body piercings; unable to remove tongue and lip piercings    Ill-defined condition 2018    GASTRITIS PER ENDOSCOPY    Loosening of knee joint prosthesis ([facility] [address] 3/26/2019    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    Psychiatric disorder     DEPRESSION    Status post total right knee replacement 5/8/2017    Thyroid disease     hypo    Ulnar impaction syndrome, right 1/22/2021    Ventral hernia 12/31/2013 Past Surgical History:  Past Surgical History:   Procedure Laterality Date    HX HEENT  2009    thyroidectomy - benign    HX KNEE REPLACEMENT      R    HX KNEE REPLACEMENT  2019    R twice    HX MOHS PROCEDURES Right 2011    RCR - right    HX ORTHOPAEDIC      right wrist repair - has hardware    HX OTHER SURGICAL      hiatal hernia repair    HX PARTIAL HYSTERECTOMY      HX TUBAL LIGATION       Family History:  Family History   Problem Relation Age of Onset    Cancer Father         lung cancer    Heart Disease Mother     Hypertension Mother     Diabetes Mother     Anesth Problems Neg Hx      Social History:  Social History     Tobacco Use    Smoking status: Every Day     Packs/day: 0.25     Years: 1.50     Pack years: 0.38     Types: Cigarettes     Last attempt to quit: 10/1/2015     Years since quittin.8    Smokeless tobacco: Never   Vaping Use    Vaping Use: Never used   Substance Use Topics    Alcohol use: Yes     Comment: occasional    Drug use: No     Allergies: Allergies   Allergen Reactions    Codeine Nausea Only    Hydrocodone Itching    Mold Shortness of Breath and Itching    Other Plant, Animal, Environmental Other (comments)     Environmental and seasonal allergies. Tramadol Itching    Ibuprofen Nausea Only     Review of Systems   Review of Systems   Constitutional:  Negative for chills and fever. HENT:  Negative for congestion, rhinorrhea and sore throat. Respiratory:  Negative for cough and shortness of breath. Cardiovascular:  Negative for chest pain. Gastrointestinal:  Negative for abdominal pain, nausea and vomiting. Genitourinary:  Negative for dysuria and urgency. Skin:  Positive for rash. Neurological:  Negative for dizziness, light-headedness and headaches. All other systems reviewed and are negative. Physical Exam   Physical Exam  Vitals and nursing note reviewed. Constitutional:       General: She is not in acute distress.      Appearance: She is well-developed. HENT:      Head: Normocephalic and atraumatic. Mouth/Throat:      Pharynx: Uvula midline. No pharyngeal swelling. Eyes:      Conjunctiva/sclera: Conjunctivae normal.      Pupils: Pupils are equal, round, and reactive to light. Cardiovascular:      Rate and Rhythm: Normal rate and regular rhythm. Pulmonary:      Effort: Pulmonary effort is normal. No respiratory distress. Breath sounds: Normal breath sounds. No stridor. Abdominal:      General: There is no distension. Palpations: Abdomen is soft. Tenderness: There is no abdominal tenderness. Musculoskeletal:         General: Normal range of motion. Cervical back: Normal range of motion. Skin:     General: Skin is warm and dry. Findings: Rash present. Rash is urticarial (over upper extremities and trunk). Neurological:      Mental Status: She is alert and oriented to person, place, and time. Psychiatric:         Mood and Affect: Mood normal.         Thought Content: Thought content normal.     Diagnostic Study Results   Labs -   No results found for this or any previous visit (from the past 12 hour(s)). Radiologic Studies -   No orders to display     No results found. Medical Decision Making   I am the first provider for this patient. I reviewed the vital signs, available nursing notes, past medical history, past surgical history, family history and social history. Vital Signs-Reviewed the patient's vital signs. Patient Vitals for the past 12 hrs:   Temp Pulse Resp BP SpO2   08/11/22 2139 -- -- -- (!) 156/84 99 %   08/11/22 2046 98.4 °F (36.9 °C) 69 18 104/83 96 %       Pulse Oximetry Analysis - 99% on ra      Records Reviewed: Nursing Notes and Old Medical Records    Provider Notes (Medical Decision Making):   Patient presents with a chief complaint of allergic reaction. Does have urticarial rash over her trunk and upper extremities. No posterior oropharyngeal involvement.   Plan for Benadryl, steroids, Pepcid, reevaluation. ED Course:   Initial assessment performed. The patients presenting problems have been discussed, and they are in agreement with the care plan formulated and outlined with them. I have encouraged them to ask questions as they arise throughout their visit. Patient feeling improved. Will discharge on steroids, Benadryl. Procedures:  Procedures    Critical Care:  none    Disposition:  Discharge Note:  The patient has been re-evaluated and is ready for discharge. Reviewed available results with patient. Counseled patient on diagnosis and care plan. Patient has expressed understanding, and all questions have been answered. Patient agrees with plan and agrees to follow up as recommended, or to return to the ED if their symptoms worsen. Discharge instructions have been provided and explained to the patient, along with reasons to return to the ED. PLAN:  1. Discharge Medication List as of 8/11/2022 10:12 PM        START taking these medications    Details   predniSONE (DELTASONE) 20 mg tablet Take 3 Tablets by mouth in the morning for 3 days. , Normal, Disp-9 Tablet, R-0      diphenhydrAMINE (BenadryL) 25 mg capsule Take 1 Capsule by mouth every six (6) hours as needed for Itching or Allergies. , Normal, Disp-15 Capsule, R-0           CONTINUE these medications which have NOT CHANGED    Details   !! oxyCODONE-acetaminophen (PERCOCET 10)  mg per tablet Take 1 Tablet by mouth two (2) times daily as needed for Pain for up to 30 days. Max Daily Amount: 2 Tablets. Indications: pain, Normal, Disp-45 Tablet, R-0      !! oxyCODONE-acetaminophen (PERCOCET 10)  mg per tablet Take 1 Tablet by mouth every twelve (12) hours as needed for Pain for up to 30 days. Max Daily Amount: 2 Tablets.  Indications: pain, Normal, Disp-45 Tablet, R-0      !! oxyCODONE-acetaminophen (PERCOCET 10)  mg per tablet Take 1 Tablet by mouth every twelve (12) hours as needed for Pain for up to 30 days. Max Daily Amount: 2 Tablets. Indications: pain, Normal, Disp-60 Tablet, R-0      levothyroxine (SYNTHROID) 125 mcg tablet TAKE 1 TABLET BY MOUTH EVERY DAY BEFORE BREAKFAST, Normal, Disp-30 Tablet, R-11      amLODIPine (NORVASC) 5 mg tablet TAKE 1 TABLET BY MOUTH EVERY DAY, Normal, Disp-30 Tablet, R-11      hydroCHLOROthiazide (HYDRODIURIL) 25 mg tablet TAKE 1 TAB BY MOUTH DAILY FOR HYPERTENSION, Normal, Disp-30 Tablet, R-11      azelastine (ASTELIN) 137 mcg (0.1 %) nasal spray 1 [place] by Both Nostrils route as needed for Rhinitis., Normal, Disp-3 Each, R-3      traZODone (DESYREL) 100 mg tablet TAKE 1 TABLET BY MOUTH EVERY DAY AT NIGHT, Normal, Disp-30 Tablet, R-11      !! DULoxetine (CYMBALTA) 60 mg capsule TAKE 1 CAPSULE BY MOUTH EVERY DAY, Normal, Disp-30 Capsule, R-11      montelukast (SINGULAIR) 10 mg tablet Take 1 Tablet by mouth daily. , Normal, Disp-30 Tablet, R-11DX Code Needed  . pantoprazole (PROTONIX) 40 mg tablet Take 40 mg by mouth two (2) times a day., Historical Med      diclofenac (VOLTAREN) 1 % gel Apply  to affected area as needed for Pain., Historical Med      methocarbamoL (ROBAXIN) 750 mg tablet TAKE 1 TAB BY MOUTH EVERY 6 HOURS AS NEEDED FOR SPASMS, Normal, Disp-120 Tablet, R-6      !! DULoxetine (CYMBALTA) 30 mg capsule TAKE 1 CAP BY MOUTH DAILY. TAKE WITH 60 MG CAPSULE, Normal, Disp-90 Capsule, R-3      loratadine (CLARITIN) 10 mg tablet TAKE 1 TABLET BY MOUTH EVERY DAY, Normal, Disp-90 Tablet, R-3      lidocaine (Lidoderm) 5 % Apply patch to the affected area for 12 hours a day and remove for 12 hours a day., Normal, Disp-30 Each, R-11      naloxone (Narcan) 4 mg/actuation nasal spray Use 1 spray into 1 nostril for OVERDOSE. Call 911. For subsequent doses, give in alternating nostrils. May repeat every 2 to 3 min., Normal, Disp-2 Each, R-0      EPINEPHrine (EPIPEN) 0.3 mg/0.3 mL injection See Admin Instructions.   , Historical Med      sucralfate (CARAFATE) 1 gram tablet TAKE 1 TABLET BY MOUTH FOUR TIMES A DAY, Historical Med      metoprolol succinate (TOPROL-XL) 25 mg XL tablet TAKE 1 TABLET BY MOUTH EVERY DAY, Normal, Disp-30 Tablet, R-11      furosemide (LASIX) 20 mg tablet TAKE 1 TABLET BY MOUTH EVERY DAY, Normal, Disp-30 Tablet, R-0      Combivent Respimat  mcg/actuation inhaler INHALE 1 PUFF BY MOUTH EVERY 6 HOURS AS NEEDED FOR WHEEZING, Normal, Disp-1 Inhaler, R-0, [place]      Symbicort 160-4.5 mcg/actuation HFAA TAKE 2 PUFFS BY INHALATION TWO (2) TIMES A DAY., Normal, Disp-30.6 Inhaler, R-3      pregabalin (LYRICA) 100 mg capsule Take 1 Cap by mouth three (3) times daily. Max Daily Amount: 300 mg., Normal, Disp-90 Cap, R-5      acetaminophen (Tylenol Extra Strength) 500 mg tablet Take 2 Tabs by mouth every six (6) hours as needed for Pain., Normal, Disp-20 Tab,R-0      XOLAIR 150 mg solr every thirty (30) days. Indications: injection, Historical Med, [place]      hydrOXYzine HCl (ATARAX) 25 mg tablet TAKE 1 TABLET BY MOUTH 3 TIMES A DAY AS NEEDED FOR ITCHING FOR ANXIETY, NormalDX Code Needed  . Disp-60 Tab, R-0      tiotropium (SPIRIVA WITH HANDIHALER) 18 mcg inhalation capsule Take 1 Cap by inhalation daily. , Historical Med      albuterol (PROVENTIL VENTOLIN) 2.5 mg /3 mL (0.083 %) nebulizer solution 3 mL by Nebulization route every four (4) hours as needed for Wheezing., Normal, Disp-24 Each, R-2      fluticasone (FLONASE) 50 mcg/actuation nasal spray 2 Sprays by Both Nostrils route daily. , Normal, Disp-1 Bottle, R-11      miscellaneous medical supply misc Shower seat for chronic knee pain, pt planning to have right TKR in June due to condition. Very limited range of motion. , Print, Disp-1 Each, R-0       !! - Potential duplicate medications found. Please discuss with provider.         2.   Follow-up Information       Follow up With Specialties Details Why Contact Deep Meza NP Nurse Practitioner Schedule an appointment as soon as possible for a visit [phone]      [facility] EMERGENCY DEPT Emergency Medicine  As needed, If symptoms worsen [address] [facility]  [phone]          Return to ED if worse     Diagnosis     Clinical Impression:   1. Allergic reaction, initial encounter            Please note that this dictation was completed with AmberWave, the computer voice recognition software. Quite often unanticipated grammatical, syntax, homophones, and other interpretive errors are inadvertently transcribed by the computer software. Please disregard these errors.   Please excuse any errors that have escaped final proofreading

## 2022-08-19 NOTE — PROGRESS NOTES
Chief Complaint   Patient presents with    Medication Refill     percocet    Leg Swelling     1. Have you been to the ER, urgent care clinic since your last visit? Hospitalized since your last visit? No    2. Have you seen or consulted any other health care providers outside of the [address] [place] [address] since your last visit? Include any pap smears or colon screening.  No Electrolyte abnormality

## 2022-08-21 ENCOUNTER — APPOINTMENT (OUTPATIENT)
Dept: GENERAL RADIOLOGY | Age: 57
End: 2022-08-21
Attending: PHYSICIAN ASSISTANT
Payer: MEDICARE

## 2022-08-21 ENCOUNTER — HOSPITAL ENCOUNTER (EMERGENCY)
Age: 57
Discharge: HOME | End: 2022-08-21
Attending: EMERGENCY MEDICINE
Payer: MEDICARE

## 2022-08-21 VITALS
BODY MASS INDEX: 35.3 KG/M2 | SYSTOLIC BLOOD PRESSURE: 147 MMHG | HEART RATE: 73 BPM | OXYGEN SATURATION: 97 % | DIASTOLIC BLOOD PRESSURE: 69 MMHG | TEMPERATURE: 98.5 F | WEIGHT: 187 LBS | RESPIRATION RATE: 18 BRPM | HEIGHT: 61 IN

## 2022-08-21 DIAGNOSIS — S63.91XA SPRAIN OF RIGHT HAND, INITIAL ENCOUNTER: Primary | ICD-10-CM

## 2022-08-21 PROCEDURE — 73130 X-RAY EXAM OF HAND: CPT

## 2022-08-21 PROCEDURE — 99283 EMERGENCY DEPT VISIT LOW MDM: CPT

## 2022-08-21 RX ORDER — ACETAMINOPHEN 500 MG
1000 TABLET ORAL
Qty: 20 TABLET | Refills: 0 | Status: SHIPPED | OUTPATIENT
Start: 2022-08-21
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
## 2022-08-21 NOTE — ED TRIAGE NOTES
Patient presents to ED with c/o right hand pain and swelling upon waking this morning.  Denies injury
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
## 2022-09-15 DIAGNOSIS — I10 MALIGNANT HYPERTENSION: ICD-10-CM

## 2022-09-15 RX ORDER — METOPROLOL SUCCINATE 25 MG/1
TABLET, EXTENDED RELEASE ORAL
Qty: 30 TABLET | Refills: 11 | Status: SHIPPED | OUTPATIENT
Start: 2022-09-15

## 2022-10-09 ENCOUNTER — APPOINTMENT (OUTPATIENT)
Dept: GENERAL RADIOLOGY | Age: 57
End: 2022-10-09
Attending: EMERGENCY MEDICINE
Payer: MEDICARE

## 2022-10-09 ENCOUNTER — HOSPITAL ENCOUNTER (EMERGENCY)
Age: 57
Discharge: HOME | End: 2022-10-09
Attending: EMERGENCY MEDICINE
Payer: MEDICARE

## 2022-10-09 VITALS
DIASTOLIC BLOOD PRESSURE: 99 MMHG | SYSTOLIC BLOOD PRESSURE: 155 MMHG | WEIGHT: 187 LBS | OXYGEN SATURATION: 100 % | HEIGHT: 61 IN | RESPIRATION RATE: 16 BRPM | BODY MASS INDEX: 35.3 KG/M2 | HEART RATE: 66 BPM | TEMPERATURE: 98.6 F

## 2022-10-09 DIAGNOSIS — S93.401A SPRAIN OF RIGHT ANKLE, UNSPECIFIED LIGAMENT, INITIAL ENCOUNTER: Primary | ICD-10-CM

## 2022-10-09 PROCEDURE — 74011250637 HC RX REV CODE- 250/637: Performed by: EMERGENCY MEDICINE

## 2022-10-09 PROCEDURE — 73610 X-RAY EXAM OF ANKLE: CPT

## 2022-10-09 PROCEDURE — 73630 X-RAY EXAM OF FOOT: CPT

## 2022-10-09 PROCEDURE — 99283 EMERGENCY DEPT VISIT LOW MDM: CPT

## 2022-10-09 RX ORDER — ACETAMINOPHEN 500 MG
1000 TABLET ORAL
Status: COMPLETED | OUTPATIENT
Start: 2022-10-09 | End: 2022-10-09

## 2022-10-09 RX ORDER — OXYCODONE HYDROCHLORIDE 5 MG/1
10 TABLET ORAL
Status: COMPLETED | OUTPATIENT
Start: 2022-10-09 | End: 2022-10-09

## 2022-10-09 RX ADMIN — OXYCODONE HYDROCHLORIDE 10 MG: 5 TABLET ORAL at 01:55

## 2022-10-09 RX ADMIN — ACETAMINOPHEN 1000 MG: 500 TABLET, FILM COATED ORAL at 01:55

## 2022-10-09 NOTE — ED PROVIDER NOTES
EMERGENCY DEPARTMENT HISTORY AND PHYSICAL EXAM      Date: 10/9/2022  Patient Name: Stanislav Wilkins    Please note that this dictation was completed with [employer], the computer voice recognition software. Quite often unanticipated grammatical, syntax, homophones, and other interpretive errors are inadvertently transcribed by the computer software. Please disregard these errors. Please excuse any errors that have escaped final proofreading. History of Presenting Illness     Chief Complaint   Patient presents with    Ankle Pain       History Provided By: Patient     HPI: Stanislav Wilkins, 59 y.o. female, with a past medical history significant for hypertension, hypothyroidism, chronic pain presenting the emergency department complaining of right ankle pain. She states she got her ankle caught in the frame of her bed and twisted it. Pain is on the anterior and lateral aspect of the ankle. Rates the pain as severe. No distal numbness or tingling. No rashes wounds or sores. PCP: Alvin Cardozo NP    No current facility-administered medications on file prior to encounter. Current Outpatient Medications on File Prior to Encounter   Medication Sig Dispense Refill    metoprolol succinate (TOPROL-XL) 25 mg XL tablet TAKE 1 TABLET BY MOUTH EVERY DAY 30 Tablet 11    acetaminophen (Tylenol Extra Strength) 500 mg tablet Take 2 Tablets by mouth every six (6) hours as needed for Pain. 20 Tablet 0    diphenhydrAMINE (BenadryL) 25 mg capsule Take 1 Capsule by mouth every six (6) hours as needed for Itching or Allergies. 15 Capsule 0    oxyCODONE-acetaminophen (PERCOCET 10)  mg per tablet Take 1 Tablet by mouth two (2) times daily as needed for Pain for up to 30 days. Max Daily Amount: 2 Tablets.  Indications: pain 45 Tablet 0    levothyroxine (SYNTHROID) 125 mcg tablet TAKE 1 TABLET BY MOUTH EVERY DAY BEFORE BREAKFAST 30 Tablet 11    amLODIPine (NORVASC) 5 mg tablet TAKE 1 TABLET BY MOUTH EVERY DAY 30 Tablet 11 hydroCHLOROthiazide (HYDRODIURIL) 25 mg tablet TAKE 1 TAB BY MOUTH DAILY FOR HYPERTENSION 30 Tablet 11    azelastine (ASTELIN) 137 mcg (0.1 %) nasal spray 1 [place] by Both Nostrils route as needed for Rhinitis. 3 Each 3    traZODone (DESYREL) 100 mg tablet TAKE 1 TABLET BY MOUTH EVERY DAY AT NIGHT 30 Tablet 11    DULoxetine (CYMBALTA) 60 mg capsule TAKE 1 CAPSULE BY MOUTH EVERY DAY 30 Capsule 11    montelukast (SINGULAIR) 10 mg tablet Take 1 Tablet by mouth daily. 30 Tablet 11    pantoprazole (PROTONIX) 40 mg tablet Take 40 mg by mouth two (2) times a day. diclofenac (VOLTAREN) 1 % gel Apply  to affected area as needed for Pain. methocarbamoL (ROBAXIN) 750 mg tablet TAKE 1 TAB BY MOUTH EVERY 6 HOURS AS NEEDED FOR SPASMS 120 Tablet 6    DULoxetine (CYMBALTA) 30 mg capsule TAKE 1 CAP BY MOUTH DAILY. TAKE WITH 60 MG CAPSULE 90 Capsule 3    loratadine (CLARITIN) 10 mg tablet TAKE 1 TABLET BY MOUTH EVERY DAY 90 Tablet 3    lidocaine (Lidoderm) 5 % Apply patch to the affected area for 12 hours a day and remove for 12 hours a day. 30 Each 11    naloxone (Narcan) 4 mg/actuation nasal spray Use 1 spray into 1 nostril for OVERDOSE. Call 911. For subsequent doses, give in alternating nostrils. May repeat every 2 to 3 min. 2 Each 0    sucralfate (CARAFATE) 1 gram tablet TAKE 1 TABLET BY MOUTH FOUR TIMES A DAY      furosemide (LASIX) 20 mg tablet TAKE 1 TABLET BY MOUTH EVERY DAY 30 Tablet 0    Combivent Respimat  mcg/actuation inhaler INHALE 1 PUFF BY MOUTH EVERY 6 HOURS AS NEEDED FOR WHEEZING 1 Inhaler 0    Symbicort 160-4.5 mcg/actuation HFAA TAKE 2 PUFFS BY INHALATION TWO (2) TIMES A DAY. 30.6 Inhaler 3    pregabalin (LYRICA) 100 mg capsule Take 1 Cap by mouth three (3) times daily. Max Daily Amount: 300 mg. 90 Cap 5    XOLAIR 150 mg solr every thirty (30) days.  Indications: injection      hydrOXYzine HCl (ATARAX) 25 mg tablet TAKE 1 TABLET BY MOUTH 3 TIMES A DAY AS NEEDED FOR ITCHING FOR ANXIETY 60 Tab 0 tiotropium (SPIRIVA WITH HANDIHALER) 18 mcg inhalation capsule Take 1 Cap by inhalation daily. albuterol (PROVENTIL VENTOLIN) 2.5 mg /3 mL (0.083 %) nebulizer solution 3 mL by Nebulization route every four (4) hours as needed for Wheezing. 24 Each 2    fluticasone (FLONASE) 50 mcg/actuation nasal spray 2 Sprays by Both Nostrils route daily. 1 Bottle 11    miscellaneous medical supply misc Shower seat for chronic knee pain, pt planning to have right TKR in June due to condition. Very limited range of motion.  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 1/22/2021    GERD (gastroesophageal reflux disease)     History of seasonal allergies     Hypertension     Ill-defined condition     environmental allergies     Ill-defined condition     Multiple body piercings; unable to remove tongue and lip piercings    Ill-defined condition 2018    GASTRITIS PER ENDOSCOPY    Loosening of knee joint prosthesis ([facility] [address] 3/26/2019    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    Psychiatric disorder     DEPRESSION    Status post total right knee replacement 5/8/2017    Thyroid disease     hypo    Ulnar impaction syndrome, right 1/22/2021    Ventral hernia 12/31/2013       Past Surgical History:  Past Surgical History:   Procedure Laterality Date    HX HEENT  2009    thyroidectomy - benign    HX KNEE REPLACEMENT      R    HX KNEE REPLACEMENT  03/26/2019    R twice    HX MOHS PROCEDURES Right 03/08/2011    RCR - right    HX ORTHOPAEDIC      right wrist repair - has hardware    HX OTHER SURGICAL      hiatal hernia repair    HX PARTIAL HYSTERECTOMY HX TUBAL LIGATION         Family History:  Family History   Problem Relation Age of Onset    Cancer Father         lung cancer    Heart Disease Mother     Hypertension Mother     Diabetes Mother     Anesth Problems Neg Hx        Social History:  Social History     Tobacco Use    Smoking status: Every Day     Packs/day: 0.25     Years: 1.50     Pack years: 0.38     Types: Cigarettes     Last attempt to quit: 10/1/2015     Years since quittin.0    Smokeless tobacco: Never   Vaping Use    Vaping Use: Never used   Substance Use Topics    Alcohol use: Yes     Comment: occasional    Drug use: No       Allergies: Allergies   Allergen Reactions    Codeine Nausea Only    Hydrocodone Itching    Mold Shortness of Breath and Itching    Other Plant, Animal, Environmental Other (comments)     Environmental and seasonal allergies. Tramadol Itching    Ibuprofen Nausea Only         Review of Systems   Review of Systems   Constitutional:  Negative for fever. HENT:  Negative for congestion. Respiratory:  Negative for shortness of breath. Musculoskeletal:  Positive for arthralgias and joint swelling. Negative for back pain and myalgias. Skin:  Negative for wound. Hematological:  Does not bruise/bleed easily. Physical Exam   Physical Exam  Constitutional:       Appearance: Normal appearance. HENT:      Head: Normocephalic and atraumatic. Mouth/Throat:      Mouth: Mucous membranes are moist.   Pulmonary:      Effort: Pulmonary effort is normal. No respiratory distress. Abdominal:      General: There is no distension. Musculoskeletal:      Comments: Mild edema over the lateral malleolus, mild tenderness to palpation over the lateral malleolus. No significant effusion. Neurovascularly intact distally. Achilles tendon function appears intact. No tenderness to palpation over the fibular head   Skin:     General: Skin is warm and dry. Neurological:      General: No focal deficit present.       Mental Status: She is alert and oriented to person, place, and time. Psychiatric:         Behavior: Behavior normal.       Diagnostic Study Results     Labs -   No results found for this or any previous visit (from the past 12 hour(s)). Radiologic Studies -   XR FOOT RT MIN 3 V   Final Result   No acute fracture or dislocation      XR ANKLE RT MIN 3 V   Final Result   No acute abnormality. CT Results  (Last 48 hours)      None          CXR Results  (Last 48 hours)      None              Medical Decision Making   I am the first provider for this patient. I reviewed the vital signs, available nursing notes, past medical history, past surgical history, family history and social history. Vital Signs-Reviewed the patient's vital signs. Patient Vitals for the past 12 hrs:   Temp Pulse Resp BP SpO2   10/09/22 0139 98.6 °F (37 °C) 66 16 (!) 155/99 100 %         Provider Notes (Medical Decision Making):   Sprain, strain, fracture, contusion    ED Course:   Initial assessment performed. The patients presenting problems have been discussed, and they are in agreement with the care plan formulated and outlined with them. I have encouraged them to ask questions as they arise throughout their visit. PDMP reviewed, patient on chronic opiates. Critical Care Time:   None      Disposition:    DISCHARGE NOTE  Patients results have been reviewed with them. Patient and/or family have verbally conveyed their understanding and agreement of the patient's signs, symptoms, diagnosis, treatment and prognosis and additionally agree to follow up as recommended or return to the Emergency Room should their condition change or have any new concerns prior to their follow-up appointment. Patient verbally agrees with the care-plan and verbally conveys that all of their questions have been answered.    Discharge instructions have also been provided to the patient with some educational information regarding their diagnosis as well a list of reasons why they would want to return to the ER prior to their follow-up appointment should their condition change. PLAN:  1. Discharge Medication List as of 10/9/2022  3:06 AM        2. Follow-up Information       Follow up With Specialties Details Why Contact Info    Shelley Nieves NP Nurse Practitioner Schedule an appointment as soon as possible for a visit   Adrien Tasha  [address] [address] [phone]      [facility] EMERGENCY DEPT Emergency Medicine  If symptoms worsen [facility]  [phone]            Return to ED if worse     Diagnosis     Clinical Impression:   1. Sprain of right ankle, unspecified ligament, initial encounter        Attestations:   This note was completed by Colletta Meier, DO

## 2022-10-09 NOTE — ED NOTES
Pt presents to ED ambulatory complaining of right ankle pain  . Pt is alert and oriented x 4, RR even and unlabored, skin is warm and dry. Assessment completed and pt updated on plan of care. Call bell in reach. Emergency Department Nursing Plan of Care       The Nursing Plan of Care is developed from the Nursing assessment and Emergency Department Attending provider initial evaluation. The plan of care may be reviewed in the ED Provider note.     The Plan of Care was developed with the following considerations:   Patient / Family readiness to learn indicated by:verbalized understanding  Persons(s) to be included in education: patient  Barriers to Learning/Limitations:No    Signed     Henry Antoine RN    10/9/2022   1:48 AM

## 2022-10-09 NOTE — ED NOTES
Discharge instructions were given to the patient by Justo Clifton RN. The patient left the Emergency Department ambulatory, alert and oriented and in no acute distress with 0   prescriptions. The patient was encouraged to call or return to the ED for worsening issues or problems and was encouraged to schedule a follow up appointment for continuing care. The patient verbalized understanding of discharge instructions and prescriptions, all questions were answered. The patient has no further concerns at this time.

## 2022-10-13 ENCOUNTER — OFFICE VISIT (OUTPATIENT)
Dept: INTERNAL MEDICINE CLINIC | Age: 57
End: 2022-10-13
Payer: MEDICARE

## 2022-10-13 VITALS
OXYGEN SATURATION: 97 % | TEMPERATURE: 97.7 F | WEIGHT: 183 LBS | HEART RATE: 65 BPM | DIASTOLIC BLOOD PRESSURE: 84 MMHG | HEIGHT: 61 IN | BODY MASS INDEX: 34.55 KG/M2 | SYSTOLIC BLOOD PRESSURE: 128 MMHG | RESPIRATION RATE: 18 BRPM

## 2022-10-13 DIAGNOSIS — S93.401A SPRAIN OF RIGHT ANKLE, UNSPECIFIED LIGAMENT, INITIAL ENCOUNTER: ICD-10-CM

## 2022-10-13 DIAGNOSIS — K21.9 GERD WITHOUT ESOPHAGITIS: Primary | ICD-10-CM

## 2022-10-13 DIAGNOSIS — R11.0 NAUSEA: ICD-10-CM

## 2022-10-13 DIAGNOSIS — J45.901 ASTHMA WITH COPD WITH EXACERBATION (HCC): ICD-10-CM

## 2022-10-13 DIAGNOSIS — Z79.891 CHRONIC USE OF OPIATE FOR THERAPEUTIC PURPOSE: ICD-10-CM

## 2022-10-13 DIAGNOSIS — M54.41 CHRONIC BILATERAL LOW BACK PAIN WITH RIGHT-SIDED SCIATICA: ICD-10-CM

## 2022-10-13 DIAGNOSIS — M17.11 PRIMARY OSTEOARTHRITIS OF RIGHT KNEE: ICD-10-CM

## 2022-10-13 DIAGNOSIS — G89.29 CHRONIC BILATERAL LOW BACK PAIN WITH RIGHT-SIDED SCIATICA: ICD-10-CM

## 2022-10-13 DIAGNOSIS — J44.1 ASTHMA WITH COPD WITH EXACERBATION (HCC): ICD-10-CM

## 2022-10-13 DIAGNOSIS — M19.012 PRIMARY OSTEOARTHRITIS OF LEFT SHOULDER: ICD-10-CM

## 2022-10-13 PROCEDURE — G9899 SCRN MAM PERF RSLTS DOC: HCPCS | Performed by: NURSE PRACTITIONER

## 2022-10-13 PROCEDURE — G8752 SYS BP LESS 140: HCPCS | Performed by: NURSE PRACTITIONER

## 2022-10-13 PROCEDURE — 99214 OFFICE O/P EST MOD 30 MIN: CPT | Performed by: NURSE PRACTITIONER

## 2022-10-13 PROCEDURE — G8427 DOCREV CUR MEDS BY ELIG CLIN: HCPCS | Performed by: NURSE PRACTITIONER

## 2022-10-13 PROCEDURE — G8417 CALC BMI ABV UP PARAM F/U: HCPCS | Performed by: NURSE PRACTITIONER

## 2022-10-13 PROCEDURE — G9717 DOC PT DX DEP/BP F/U NT REQ: HCPCS | Performed by: NURSE PRACTITIONER

## 2022-10-13 PROCEDURE — G8754 DIAS BP LESS 90: HCPCS | Performed by: NURSE PRACTITIONER

## 2022-10-13 PROCEDURE — 3017F COLORECTAL CA SCREEN DOC REV: CPT | Performed by: NURSE PRACTITIONER

## 2022-10-13 RX ORDER — OXYCODONE AND ACETAMINOPHEN 10; 325 MG/1; MG/1
1 TABLET ORAL
Qty: 60 TABLET | Refills: 0 | Status: SHIPPED | OUTPATIENT
Start: 2022-10-13 | End: 2022-11-12

## 2022-10-13 RX ORDER — ONDANSETRON 4 MG/1
4 TABLET, FILM COATED ORAL
Qty: 60 TABLET | Refills: 2 | Status: SHIPPED | OUTPATIENT
Start: 2022-10-13

## 2022-10-13 NOTE — PROGRESS NOTES
Jerson Ahuja (: 1965) is a 59 y.o. female, established patient, here for evaluation of the following chief complaint(s):  Follow-up (3 months) and Medication Refill (Pain meds and Zofran )       ASSESSMENT/PLAN:  Below is the assessment and plan developed based on review of pertinent history, physical exam, labs, studies, and medications. 1. GERD without esophagitis  -     ondansetron hcl (ZOFRAN) 4 mg tablet; Take 1 Tablet by mouth every eight (8) hours as needed for Nausea or Vomiting., Normal, Disp-60 Tablet, R-2  2. Primary osteoarthritis of right knee  -     oxyCODONE-acetaminophen (PERCOCET 10)  mg per tablet; Take 1 Tablet by mouth two (2) times daily as needed for Pain for up to 30 days. Max Daily Amount: 2 Tablets. Indications: pain, Normal, Disp-60 Tablet, R-0  -     TOXASSURE SELECT 13 (MW); Future  3. Chronic use of opiate for therapeutic purpose  -     oxyCODONE-acetaminophen (PERCOCET 10)  mg per tablet; Take 1 Tablet by mouth two (2) times daily as needed for Pain for up to 30 days. Max Daily Amount: 2 Tablets. Indications: pain, Normal, Disp-60 Tablet, R-0  -     TOXASSURE SELECT 13 (MW); Future  4. Primary osteoarthritis of left shoulder  -     oxyCODONE-acetaminophen (PERCOCET 10)  mg per tablet; Take 1 Tablet by mouth two (2) times daily as needed for Pain for up to 30 days. Max Daily Amount: 2 Tablets. Indications: pain, Normal, Disp-60 Tablet, R-0  -     TOXASSURE SELECT 13 (MW); Future  5. Chronic bilateral low back pain with right-sided sciatica  -     oxyCODONE-acetaminophen (PERCOCET 10)  mg per tablet; Take 1 Tablet by mouth two (2) times daily as needed for Pain for up to 30 days. Max Daily Amount: 2 Tablets. Indications: pain, Normal, Disp-60 Tablet, R-0  -     TOXASSURE SELECT 13 (MW); Future  6. Nausea  -     ondansetron hcl (ZOFRAN) 4 mg tablet;  Take 1 Tablet by mouth every eight (8) hours as needed for Nausea or Vomiting., Normal, Disp-60 Tablet, R-2  7. Sprain of right ankle, unspecified ligament, initial encounter  8. Asthma with COPD with exacerbation ([facility] [address]      Return in about 4 weeks (around 11/10/2022) for VV- Pain med refill, ankle/ORTHO fu.      Pt asked to complete follow by next visit: temporarily increased quantity to #60 for acute ankle sprain. SUBJECTIVE/OBJECTIVE:  HPI    Chronic Pain:  Patient has chronic BL knee pain and LBP for years, h/o right TKR (2016, Last revised ~ 2 yrs ago) and right wrist surgery (1/2021). Had recent injury to right foot and ankle on the 8th when entering dark room. Foot became caught under bed frame and when pulling foot out twisted ankle. Having painful ambulation and difficulty bearing wt since. Seen in ER a few hours after imaging with normal foot and ankle xray. Referred to [facility] and using crutches with bunny boot since. Pain Scale: 10 - Worst pain ever/10. Has dentures now, but still learning to talk and eat with them. Eating less and has lost 3 additional pounds. Would like to get down to 175 lbs ideally. Pain in the left shoulder, wrist, knees, legs, and lower back is still limiting walking, sitting, reaching, lifting, and standing, exacerbated by forementioned acitivities to include stair climbing. Has tried prednisone, tramadol, injections, PT, gabapentin, cymbalta, and surgery in past with minimal relief; on prednisone now for URI treated by allergist last week in asthma with COPD. Pain has been controlled with Oxycodone, last taken YESTERDAY, ran out. The medication is kept safe by staying with her. Has NOT seen any other providers since last OV for pain medication. No significant changes to pain presentation since last OV. she is  able to do her normal daily activities. she reports the following adverse side effects: none. Least pain over the last week has been 7/10  Worst pain over the last week has been 10/10.     Aberrant behaviors: None         Symptoms onset: problem is longstanding. Rheumatological ROS: ongoing significant pain which is stable and controlled by pain med. Response to treatment plan: waxing and waning. Hypertension Review:  The patient has hypertension. Diet and Lifestyle: generally does  try to follow a  low sodium diet, exercises sporadically   Home BP Monitoring: is not measured at home. Pertinent ROS: taking medications as instructed, no medication side effects noted. No HA's, chest pain on exertion, dyspnea on exertion, or swelling of ankles. BP Readings from Last 3 Encounters:   10/13/22 128/84   10/09/22 (!) 155/99   08/21/22 (!) 147/69       Review of Systems  Constitutional: +MRSA in nose. negative for fevers, chills, anorexia and weight loss  Eyes:   negative for visual disturbance, drainage, and irritation  ENT:   +AR and environmental allergies; immunotherapy. negative for tinnitus,sore throat,ear pain,and hoarseness  Respiratory:  + asthma/COPD, followed by PULM, Stable. negative for hemoptysis  CV:   negative for chest pain, palpitations, and lower extremity edema  GI:   + GERD. negative for nausea, vomiting, diarrhea, abdominal pain, and melena  Endo:              +hypothyroidsim.  negative for polyuria,polydipsia,polyphagia, and heat intolerance  Genitourinary: negative for frequency, urgency, dysuria, retention, and hematuria  Integument:  negative for rash, ulcerations, and pruritus  Hematologic:  negative for easy bruising and bleeding  Musculoskel: negative for  muscle weakness  Neurological:  negative for headaches, dizziness, vertigo,and memory problems  Behavl/Psych: +depression, on cymbalta and zoloft. negative for feelings of  suicide    1./2. 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 1/22/2021    GERD (gastroesophageal reflux disease)     History of seasonal allergies     Hypertension     Ill-defined condition     environmental allergies     Ill-defined condition     Multiple body piercings; unable to remove tongue and lip piercings    Ill-defined condition 2018    GASTRITIS PER ENDOSCOPY    Loosening of knee joint prosthesis ([facility] [address] 3/26/2019    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    Psychiatric disorder     DEPRESSION    Status post total right knee replacement 5/8/2017    Thyroid disease     hypo    Ulnar impaction syndrome, right 1/22/2021    Ventral hernia 12/31/2013     Past Surgical History:   Procedure Laterality Date    HX HEENT  2009    thyroidectomy - benign    HX KNEE REPLACEMENT      R    HX KNEE REPLACEMENT  03/26/2019    R twice    HX MOHS PROCEDURES Right 03/08/2011    RCR - right    HX ORTHOPAEDIC      right wrist repair - has hardware    HX OTHER SURGICAL      hiatal hernia repair    HX PARTIAL HYSTERECTOMY      HX TUBAL LIGATION       Patient Active Problem List   Diagnosis Code    Environmental and seasonal allergies J30.89    Ventral hernia without obstruction or gangrene K43.9    Primary osteoarthritis involving multiple joints M15.9    Mixed simple and mucopurulent chronic bronchitis (HCC) J41.8    Acquired hypothyroidism E03.9    Pain management contract signed Z02.89    Moderate episode of recurrent major depressive disorder (HCC) F33.1    Psychophysiological insomnia F51.04    Severe obesity (BMI 35.0-39. 9) with comorbidity (HCC) E66.01    Chronic use of opiate for therapeutic purpose Z79.891    Obesity, Class II, BMI 35-39.9 E66.9    Osteoarthritis of spine with radiculopathy, cervical region M47.22    Primary osteoarthritis of left shoulder M19.012    S/P total knee arthroplasty, right Z96.651    Immunotherapy Z29.8    S/P partial hysterectomy Z90.711    Edentulous K08.109    Chronic EBV infection B27.90    Sprain of right ankle, unspecified ligament, initial encounter S93.401A       3. Applicable records from prior treatment providers are apart of [facility] under the media/encounters tab. 4. Diagnostic, therapeutic and laboratory results are available in the [facility] chart. 5. Consultation notes are available for review in the media/encounters tab of the [facility] chart. 6. Treatment goals include: pain control, improve activity level and function in regards to activities of daily living, and improved comfort level. Previously pt has been limited by pain in all these aspects. 7. The risks and benefits of treatment have been discussed at this office visit with the pt.  she understands that the medication has addictive potential.  Additionally the pt has been advised that narcotic pain medication may impair mental and/or physical ability required for performance of tasks such as driving or operating any other machinery. 8. Pt has an updated signed pain contract on file and can be found under the media section of the [facility] chart. 9. The pain contract is reviewed. Pain medication will be continued at the SAME dosage. PILL COUNT: 0, less than expected, but with acute pain understandable. Last fill date 9/17/22. Urine drug screening ordered/collected today. Additional diagnostic studies are not indicated at this time. Interventional procedure are not indicated at this time. Narcan prescribed already. 10. Medication prescibed is oxycodone  every 12 PRN #60 with 0 refills for a 1 month supply.  was reviewed while planning for pain/anxiety management, no indications of drug diversion suspected. Prescription history is no suspicious for controlled substance overuse. 11. Patient instructions have been reviewed in detail as outlined above and in the pain contract.       12. Re-evaluation is planned for 1 month. Current Outpatient Medications   Medication Sig    oxyCODONE-acetaminophen (PERCOCET 10)  mg per tablet Take 1 Tablet by mouth two (2) times daily as needed for Pain for up to 30 days. Max Daily Amount: 2 Tablets. Indications: pain    ondansetron hcl (ZOFRAN) 4 mg tablet Take 1 Tablet by mouth every eight (8) hours as needed for Nausea or Vomiting.    metoprolol succinate (TOPROL-XL) 25 mg XL tablet TAKE 1 TABLET BY MOUTH EVERY DAY    acetaminophen (Tylenol Extra Strength) 500 mg tablet Take 2 Tablets by mouth every six (6) hours as needed for Pain.    levothyroxine (SYNTHROID) 125 mcg tablet TAKE 1 TABLET BY MOUTH EVERY DAY BEFORE BREAKFAST    amLODIPine (NORVASC) 5 mg tablet TAKE 1 TABLET BY MOUTH EVERY DAY    hydroCHLOROthiazide (HYDRODIURIL) 25 mg tablet TAKE 1 TAB BY MOUTH DAILY FOR HYPERTENSION    azelastine (ASTELIN) 137 mcg (0.1 %) nasal spray 1 [place] by Both Nostrils route as needed for Rhinitis. traZODone (DESYREL) 100 mg tablet TAKE 1 TABLET BY MOUTH EVERY DAY AT NIGHT    DULoxetine (CYMBALTA) 60 mg capsule TAKE 1 CAPSULE BY MOUTH EVERY DAY    montelukast (SINGULAIR) 10 mg tablet Take 1 Tablet by mouth daily. pantoprazole (PROTONIX) 40 mg tablet Take 40 mg by mouth two (2) times a day. diclofenac (VOLTAREN) 1 % gel Apply  to affected area as needed for Pain. methocarbamoL (ROBAXIN) 750 mg tablet TAKE 1 TAB BY MOUTH EVERY 6 HOURS AS NEEDED FOR SPASMS    DULoxetine (CYMBALTA) 30 mg capsule TAKE 1 CAP BY MOUTH DAILY. TAKE WITH 60 MG CAPSULE    loratadine (CLARITIN) 10 mg tablet TAKE 1 TABLET BY MOUTH EVERY DAY    lidocaine (Lidoderm) 5 % Apply patch to the affected area for 12 hours a day and remove for 12 hours a day.     sucralfate (CARAFATE) 1 gram tablet TAKE 1 TABLET BY MOUTH FOUR TIMES A DAY    furosemide (LASIX) 20 mg tablet TAKE 1 TABLET BY MOUTH EVERY DAY    Combivent Respimat  mcg/actuation inhaler INHALE 1 PUFF BY MOUTH EVERY 6 HOURS AS NEEDED FOR WHEEZING Symbicort 160-4.5 mcg/actuation HFAA TAKE 2 PUFFS BY INHALATION TWO (2) TIMES A DAY. pregabalin (LYRICA) 100 mg capsule Take 1 Cap by mouth three (3) times daily. Max Daily Amount: 300 mg. XOLAIR 150 mg solr every thirty (30) days. Indications: injection    hydrOXYzine HCl (ATARAX) 25 mg tablet TAKE 1 TABLET BY MOUTH 3 TIMES A DAY AS NEEDED FOR ITCHING FOR ANXIETY    tiotropium (SPIRIVA) 18 mcg inhalation capsule Take 1 Cap by inhalation daily. albuterol (PROVENTIL VENTOLIN) 2.5 mg /3 mL (0.083 %) nebulizer solution 3 mL by Nebulization route every four (4) hours as needed for Wheezing. fluticasone (FLONASE) 50 mcg/actuation nasal spray 2 Sprays by Both Nostrils route daily. naloxone (Narcan) 4 mg/actuation nasal spray Use 1 spray into 1 nostril for OVERDOSE. Call 911. For subsequent doses, give in alternating nostrils. May repeat every 2 to 3 min.    miscellaneous medical supply misc Shower seat for chronic knee pain, pt planning to have right TKR in June due to condition. Very limited range of motion. No current facility-administered medications for this visit. Visit Vitals  /84 (BP 1 Location: Left upper arm, BP Patient Position: Sitting, BP Cuff Size: Large adult)   Pulse 65   Temp 97.7 °F (36.5 °C) (Temporal)   Resp 18   Ht 5' 1\" (1.549 m)   Wt 183 lb (83 kg)   LMP 12/29/2016 (Exact Date) Comment: partial hysterectomy   SpO2 97%   BMI 34.58 kg/m²       Wt Readings from Last 3 Encounters:   10/13/22 183 lb (83 kg)   10/09/22 187 lb (84.8 kg)   08/21/22 187 lb (84.8 kg)     Physical Exam:   General appearance - alert, well appearing, and in no distress. New dentures in place, fitting well. Mental status - A/O x 4,normal mood and affect. Chest -  CTA. Symmetric chest rise. No wheezing. No distress. Heart - Normal rate & rhythm. Normal S1 & S2. No MGR. Abdomen- Soft, round. Non-distended, NT. No pulsatile masses or hernias. Ext-  No clubbing or cyanosis.  Bunny boot in place to right foot. Skin-Warm and dry. No hyperpigmentation, ulcerations, or suspicious lesions. Neuro - Normal speech, no focal findings or movement disorder. Normal strength and muscle tone. Antalgic gait using crutches. An electronic signature was used to authenticate this note.   -- Sona Garcia, NP

## 2022-10-13 NOTE — PROGRESS NOTES
Pt is here for   Chief Complaint   Patient presents with    Follow-up     3 months    Medication Refill     Pain meds and Zofran      1. Have you been to the ER, urgent care clinic since your last visit? Hospitalized since your last visit? No    2. Have you seen or consulted any other health care providers outside of the [address] [place] [address] since your last visit? Include any pap smears or colon screening.  No    10/10 right foot

## 2022-10-21 LAB — DRUGS UR: NORMAL

## 2022-11-04 ENCOUNTER — OFFICE VISIT (OUTPATIENT)
Dept: ORTHOPEDIC SURGERY | Age: 57
End: 2022-11-04
Payer: MEDICARE

## 2022-11-04 VITALS — BODY MASS INDEX: 34.55 KG/M2 | HEIGHT: 61 IN | WEIGHT: 183 LBS

## 2022-11-04 DIAGNOSIS — S99.921A RIGHT FOOT INJURY, INITIAL ENCOUNTER: Primary | ICD-10-CM

## 2022-11-04 PROCEDURE — G8427 DOCREV CUR MEDS BY ELIG CLIN: HCPCS | Performed by: ORTHOPAEDIC SURGERY

## 2022-11-04 PROCEDURE — 99213 OFFICE O/P EST LOW 20 MIN: CPT | Performed by: ORTHOPAEDIC SURGERY

## 2022-11-04 PROCEDURE — G9717 DOC PT DX DEP/BP F/U NT REQ: HCPCS | Performed by: ORTHOPAEDIC SURGERY

## 2022-11-04 PROCEDURE — G8417 CALC BMI ABV UP PARAM F/U: HCPCS | Performed by: ORTHOPAEDIC SURGERY

## 2022-11-04 PROCEDURE — G8756 NO BP MEASURE DOC: HCPCS | Performed by: ORTHOPAEDIC SURGERY

## 2022-11-04 PROCEDURE — G9899 SCRN MAM PERF RSLTS DOC: HCPCS | Performed by: ORTHOPAEDIC SURGERY

## 2022-11-04 PROCEDURE — 3017F COLORECTAL CA SCREEN DOC REV: CPT | Performed by: ORTHOPAEDIC SURGERY

## 2022-11-04 NOTE — PROGRESS NOTES
Michael Venegas (: 1965) is a 59 y.o. female, patient,here for evaluation of the following   Chief Complaint   Patient presents with    Foot Injury        ASSESSMENT/PLAN:  Below is the assessment and plan developed based on review of pertinent history, physical exam, labs, studies, and medications. 1. Right foot injury, initial encounter  -     XR ANKLE RT MIN 3 V; Future  -     XR FOOT RT MIN 3 V; Future      The patient is informed of findings, the examination as well as all the x-rays obtained today and from previous x-rays in chart. It appears she had an initial injury in 2022 as found by chart reviewed. This was likely a mild to moderate sprain, she did have some soft tissue swelling along the lateral border of the ankle on the 2022 xrays. I did not see a fracture and the other more recent x-rays do not show fracture. Today, also obtained right foot xrays, there is a possibility of previous nondisplaced fifth metatarsal fracture, it is tender today but cannot clinically differentiate from all the other areas of tenderness around her foot without other findings. If it was a fracture, it would be healed by now and I did not see any significant swelling and its not displaced. Patient was informed of this finding the possibility that there could have been a base of fifth metatarsal fracture but at this point, nothing needs to be done as the fracture is not easily visible and it was noted only on 1 view. She is still wearing the boot and she can wear a boot, eventually she may need physical therapy. She did state I could give her pain medications if needed, as advised by her other doctor. I informed the patient I do not give anything other than anti-inflammatory medications or she can do over-the-counter Tylenol since she is more than several weeks or couple months out since initial injury. Narcotic medications are not indicated at this time.   Otherwise no surgical indications. No follow-ups on file. Allergies   Allergen Reactions    Codeine Nausea Only    Hydrocodone Itching    Mold Shortness of Breath and Itching    Other Plant, Animal, Environmental Other (comments)     Environmental and seasonal allergies. Tramadol Itching    Ibuprofen Nausea Only       Current Outpatient Medications   Medication Sig    oxyCODONE-acetaminophen (PERCOCET 10)  mg per tablet Take 1 Tablet by mouth two (2) times daily as needed for Pain for up to 30 days. Max Daily Amount: 2 Tablets. Indications: pain    ondansetron hcl (ZOFRAN) 4 mg tablet Take 1 Tablet by mouth every eight (8) hours as needed for Nausea or Vomiting.    metoprolol succinate (TOPROL-XL) 25 mg XL tablet TAKE 1 TABLET BY MOUTH EVERY DAY    acetaminophen (Tylenol Extra Strength) 500 mg tablet Take 2 Tablets by mouth every six (6) hours as needed for Pain.    levothyroxine (SYNTHROID) 125 mcg tablet TAKE 1 TABLET BY MOUTH EVERY DAY BEFORE BREAKFAST    amLODIPine (NORVASC) 5 mg tablet TAKE 1 TABLET BY MOUTH EVERY DAY    hydroCHLOROthiazide (HYDRODIURIL) 25 mg tablet TAKE 1 TAB BY MOUTH DAILY FOR HYPERTENSION    azelastine (ASTELIN) 137 mcg (0.1 %) nasal spray 1 [place] by Both Nostrils route as needed for Rhinitis. traZODone (DESYREL) 100 mg tablet TAKE 1 TABLET BY MOUTH EVERY DAY AT NIGHT    DULoxetine (CYMBALTA) 60 mg capsule TAKE 1 CAPSULE BY MOUTH EVERY DAY    montelukast (SINGULAIR) 10 mg tablet Take 1 Tablet by mouth daily. pantoprazole (PROTONIX) 40 mg tablet Take 40 mg by mouth two (2) times a day. diclofenac (VOLTAREN) 1 % gel Apply  to affected area as needed for Pain. methocarbamoL (ROBAXIN) 750 mg tablet TAKE 1 TAB BY MOUTH EVERY 6 HOURS AS NEEDED FOR SPASMS    DULoxetine (CYMBALTA) 30 mg capsule TAKE 1 CAP BY MOUTH DAILY.  TAKE WITH 60 MG CAPSULE    loratadine (CLARITIN) 10 mg tablet TAKE 1 TABLET BY MOUTH EVERY DAY    lidocaine (Lidoderm) 5 % Apply patch to the affected area for 12 hours a day and remove for 12 hours a day.    naloxone (Narcan) 4 mg/actuation nasal spray Use 1 spray into 1 nostril for OVERDOSE. Call 911. For subsequent doses, give in alternating nostrils. May repeat every 2 to 3 min. sucralfate (CARAFATE) 1 gram tablet TAKE 1 TABLET BY MOUTH FOUR TIMES A DAY    furosemide (LASIX) 20 mg tablet TAKE 1 TABLET BY MOUTH EVERY DAY    Combivent Respimat  mcg/actuation inhaler INHALE 1 PUFF BY MOUTH EVERY 6 HOURS AS NEEDED FOR WHEEZING    Symbicort 160-4.5 mcg/actuation HFAA TAKE 2 PUFFS BY INHALATION TWO (2) TIMES A DAY. pregabalin (LYRICA) 100 mg capsule Take 1 Cap by mouth three (3) times daily. Max Daily Amount: 300 mg. XOLAIR 150 mg solr every thirty (30) days. Indications: injection    hydrOXYzine HCl (ATARAX) 25 mg tablet TAKE 1 TABLET BY MOUTH 3 TIMES A DAY AS NEEDED FOR ITCHING FOR ANXIETY    tiotropium (SPIRIVA) 18 mcg inhalation capsule Take 1 Cap by inhalation daily. albuterol (PROVENTIL VENTOLIN) 2.5 mg /3 mL (0.083 %) nebulizer solution 3 mL by Nebulization route every four (4) hours as needed for Wheezing. fluticasone (FLONASE) 50 mcg/actuation nasal spray 2 Sprays by Both Nostrils route daily. miscellaneous medical supply misc Shower seat for chronic knee pain, pt planning to have right TKR in June due to condition. Very limited range of motion. No current facility-administered medications for this visit.        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 1/22/2021    GERD (gastroesophageal reflux disease)     History of seasonal allergies     Hypertension     Ill-defined condition     environmental allergies     Ill-defined condition     Multiple body piercings; unable to remove tongue and lip piercings    Ill-defined condition 2018    GASTRITIS PER ENDOSCOPY    Loosening of knee joint prosthesis ([address] 3/26/2019    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    Psychiatric disorder     DEPRESSION    Status post total right knee replacement 2017    Thyroid disease     hypo    Ulnar impaction syndrome, right 2021    Ventral hernia 2013       Past Surgical History:   Procedure Laterality Date    HX HEENT      thyroidectomy - benign    HX KNEE REPLACEMENT      R    HX KNEE REPLACEMENT  2019    R twice    HX MOHS PROCEDURES Right 2011    RCR - right    HX ORTHOPAEDIC      right wrist repair - has hardware    HX OTHER SURGICAL      hiatal hernia repair    HX PARTIAL HYSTERECTOMY      HX TUBAL LIGATION         Family History   Problem Relation Age of Onset    Cancer Father         lung cancer    Heart Disease Mother     Hypertension Mother     Diabetes Mother     Anesth Problems Neg Hx        Social History     Socioeconomic History    Marital status: SINGLE     Spouse name: Not on file    Number of children: Not on file    Years of education: Not on file    Highest education level: Not on file   Occupational History    Not on file   Tobacco Use    Smoking status: Every Day     Packs/day: 0.25     Years: 1.50     Pack years: 0.38     Types: Cigarettes     Last attempt to quit: 10/1/2015     Years since quittin.0    Smokeless tobacco: Never   Vaping Use    Vaping Use: Never used   Substance and Sexual Activity    Alcohol use: Yes     Comment: occasional    Drug use: No    Sexual activity: Yes     Partners: Female     Birth control/protection: Surgical     Comment: Post menopausal/partial hysterectomy   Other Topics Concern    Not on file   Social History Narrative    Not on file     Social Determinants of Health     Financial Resource Strain: Not on file   Food Insecurity: Not on file   Transportation Needs: Not on file   Physical Activity: Not on file   Stress: Not on file   Social Connections: Not on file   Intimate Partner Violence: Not on file   Housing Stability: Not on file           Vitals:  Ht 5' 1\" (1.549 m)   Wt 183 lb (83 kg)   LMP 2016 (Exact Date) Comment: partial hysterectomy  BMI 34.58 kg/m²    Body mass index is 34.58 kg/m². SUBJECTIVE:  Debby Pool (: 1965)   New patient presents today with complaint of right foot injury sustained around early 2022, she was seen at [place] [address] around 2022 and had x-rays obtained around that time. She states she was outside and when she was outside, tripped and after stepping into a pothole. She states she twisted her foot and ankle at the right lower extremity. Then after she was trying to get back into the house, she hit her foot on a bed frame. She states she has had knee replacements x2 at right lower extremity, initial one was in  and then the second 1 around . She describes having numbness and tingling sensation also because she has history of sciatica mostly focused to the right side. Her current pain she states is severe sharp stabbing throbbing pain that is constant and interferes with sleep. There is numbness and tingling and weakness sensation. Standing makes it worse and walking is also painful. She is currently taking oxycodone prescribed by pain management specialist.  She is not diabetic, is a user of tobacco products, smoker of cigarettes. OBJECTIVE EXAM:     Visit Vitals  Ht 5' 1\" (1.549 m)   Wt 183 lb (83 kg)   LMP 2016 (Exact Date) Comment: partial hysterectomy   BMI 34.58 kg/m²       Appearance: Alert, well appearing and pleasant patient who is in no distress, oriented to person, place/time, and who follows commands. This patient is accompanied in the       office by her  self. Psychiatric: Affect and mood are appropriate.  No dementia noted on examination  Musculoskeletal:  LOCATION: Diffuse tenderness ankle and foot - right      Integumentary: No rashes, skin patches, wounds, or abrasions to the right or left legs       Warm and normal color. No regions of expressible drainage. Gait: Normal      Tenderness: No tenderness        Motor/Strength/Tone Exam: Normal       Sensory Exam:   Intact Normal Sensation to ankle/foot      Stability Testing: No anterolateral or varus instability of the Ankle or Subtalar Joints               No peroneal tendon instability noted      ROM: Normal ROM noted to ankle/foot      Contractures: No Achilles or Gastrocnemius Contractures      Calf tenderness: Absent for calf or gastrocnemius muscle regions       Soft, supple, non tender, non taut lower extremity compartment  Alignment:      NEUTRAL Hindfoot,         none Metatarsus Adductus Metatarsus   Wounds/Abrasions:    None present  Extremities:   No embolic phenomena to the toes          No significant edema to the foot and or toes. Lower extremities are warm and appear well perfused    DVT: No evidence of DVT seen on examination at this time     No calf swelling, no tenderness to calf muscles  Lymphatic:  No Evidence of Lymphedema  Vascular: Medial Border of Tibia Region: Edema Not present         Pulses: Dorsalis Pedis &  Posterior Tibial Pulses : Palpable yes        Varicosities Lower Limbs :  None  noted  Neuro: Negative bilateral Straight leg raise (seated position)    See Musculoskeletal section for pertinent individual extremity examination    No abnormal hand/wrist, foot/ankle, or facial/neck tremors. Lower Extremity/Ankle/Foot:  Mostly normal gait, normal weightbearing stance.     Right lower extremity/ankle: Patient is in a boot, when removed, there is minimal swelling, diffuse tenderness around the entire ankle, ligaments are grossly stable for anterior drawer lateral talar tilt stress, Achilles tendon intact with negative Mota test.  Negative ankle squeeze test.    Right foot: Normal weightbearing stance, no swelling, no ecchymosis, no erythema, no fluctuance, diffuse tenderness to palpation throughout the entire foot. Ligaments are grossly stable. Tender at the base of fifth metatarsal    Contralateral lower extremity/ankle /foot exam:  Nontender, no swelling ligaments grossly stable. Normal weightbearing stance. Neurovascular exam intact EHL/FHL 5/5, dorsalis pedis pulse palpable, light touch sensation intact. Skin intact without open wounds or lesions, no erythema, fluctuance or swelling. Imaging:    XR Results (maximum last 4): Results from [facility] encounter on 11/04/22    XR FOOT RT MIN 3 V    Narrative  Right foot AP, lateral and oblique x-rays nonweightbearing views show no obvious fractures or dislocations, there is midfoot arthritis notable on 3 views, mostly tarsometatarsal.  No acute abnormalities. Small plantar heel spur. XR ANKLE RT MIN 3 V    Narrative  Right ankle AP, lateral and oblique x-rays show a tendency towards varus position at the ankle but there is no obvious fractures or dislocations seen on these views, there is minimal soft tissue swelling may be more on the lateral aspect notable on the oblique view only. Lateral view shows plantar heel spur. Normal joint space. Results from [place] encounter on 10/09/22    XR FOOT RT MIN 3 V    Narrative  EXAM: XR FOOT RT MIN 3 V    INDICATION: ? 5th metatarsal fracture. COMPARISON: None. FINDINGS: Three views of the right foot demonstrate no fracture or other acute  osseous or articular abnormality. The soft tissues are within normal limits. Impression  No acute fracture or dislocation      XR ANKLE RT MIN 3 V    Narrative  EXAM: XR ANKLE RT MIN 3 V    INDICATION: ? fracture. COMPARISON: 9/21/2021. FINDINGS: Three views of the right ankle demonstrate no fracture or disruption  of the ankle mortise.  There is no other acute osseous or articular abnormality. The soft tissues are within normal limits. Impression  No acute abnormality. Also reviewed all the previous x-rays from October 9, 2022. There is also another x-ray from September 24, 2021 of right ankle. Those are also reviewed and shows no fractures or dislocations, normal x-rays. There is little bit of soft tissue swelling laterally however. An electronic signature was used to authenticate this note.   -- Jessa Joseph MD

## 2022-11-04 NOTE — LETTER
11/4/2022    Patient: Stanislav Wilkins   YOB: 1965   Date of Visit: 11/4/2022     Letty Baldwin NP  [address]  [address] [facility], nurse practitioner student  [address] [facility] [address]    Dear SHAGGY Loaiza, nurse practitioner student,      Thank you for referring Ms. Margaret Bacon to [facility] for evaluation. My notes for this consultation are attached. If you have questions, please do not hesitate to call me. I look forward to following your patient along with you.       Sincerely,    Omar Ogden MD

## 2022-11-09 ENCOUNTER — VIRTUAL VISIT (OUTPATIENT)
Dept: INTERNAL MEDICINE CLINIC | Age: 57
End: 2022-11-09
Payer: MEDICARE

## 2022-11-09 DIAGNOSIS — M17.11 PRIMARY OSTEOARTHRITIS OF RIGHT KNEE: ICD-10-CM

## 2022-11-09 DIAGNOSIS — G89.29 CHRONIC BILATERAL LOW BACK PAIN WITH RIGHT-SIDED SCIATICA: Primary | ICD-10-CM

## 2022-11-09 DIAGNOSIS — F33.1 MODERATE EPISODE OF RECURRENT MAJOR DEPRESSIVE DISORDER (HCC): ICD-10-CM

## 2022-11-09 DIAGNOSIS — Z79.891 CHRONIC USE OF OPIATE FOR THERAPEUTIC PURPOSE: ICD-10-CM

## 2022-11-09 DIAGNOSIS — M19.012 PRIMARY OSTEOARTHRITIS OF LEFT SHOULDER: ICD-10-CM

## 2022-11-09 DIAGNOSIS — Z87.81 HISTORY OF FOOT FRACTURE: ICD-10-CM

## 2022-11-09 DIAGNOSIS — F43.21 GRIEF AT LOSS OF CHILD: ICD-10-CM

## 2022-11-09 DIAGNOSIS — M54.41 CHRONIC BILATERAL LOW BACK PAIN WITH RIGHT-SIDED SCIATICA: Primary | ICD-10-CM

## 2022-11-09 DIAGNOSIS — Z63.4 GRIEF AT LOSS OF CHILD: ICD-10-CM

## 2022-11-09 PROCEDURE — G8756 NO BP MEASURE DOC: HCPCS | Performed by: NURSE PRACTITIONER

## 2022-11-09 PROCEDURE — G8427 DOCREV CUR MEDS BY ELIG CLIN: HCPCS | Performed by: NURSE PRACTITIONER

## 2022-11-09 PROCEDURE — 99214 OFFICE O/P EST MOD 30 MIN: CPT | Performed by: NURSE PRACTITIONER

## 2022-11-09 PROCEDURE — G9899 SCRN MAM PERF RSLTS DOC: HCPCS | Performed by: NURSE PRACTITIONER

## 2022-11-09 PROCEDURE — 3017F COLORECTAL CA SCREEN DOC REV: CPT | Performed by: NURSE PRACTITIONER

## 2022-11-09 PROCEDURE — G9717 DOC PT DX DEP/BP F/U NT REQ: HCPCS | Performed by: NURSE PRACTITIONER

## 2022-11-09 RX ORDER — OXYCODONE AND ACETAMINOPHEN 10; 325 MG/1; MG/1
1 TABLET ORAL
Qty: 60 TABLET | Refills: 0 | Status: CANCELLED | OUTPATIENT
Start: 2022-11-09 | End: 2022-12-09

## 2022-11-09 RX ORDER — OXYCODONE AND ACETAMINOPHEN 10; 325 MG/1; MG/1
1 TABLET ORAL
Qty: 45 TABLET | Refills: 0 | Status: SHIPPED | OUTPATIENT
Start: 2022-11-13 | End: 2022-12-13

## 2022-11-09 RX ORDER — OXYCODONE AND ACETAMINOPHEN 10; 325 MG/1; MG/1
1 TABLET ORAL
Qty: 45 TABLET | Refills: 0 | Status: SHIPPED | OUTPATIENT
Start: 2022-12-13 | End: 2023-01-12

## 2022-11-09 SDOH — SOCIAL STABILITY - SOCIAL INSECURITY: DISSAPEARANCE AND DEATH OF FAMILY MEMBER: Z63.4
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
## 2022-11-09 NOTE — PROGRESS NOTES
Pt is here for   Chief Complaint   Patient presents with    Follow-up     Pain med refill, ankle/ortho     1. Have you been to the ER, urgent care clinic since your last visit? Hospitalized since your last visit? No    2. Have you seen or consulted any other health care providers outside of the [address] [place] [address] since your last visit? Include any pap smears or colon screening.  No    Pain level is a 7/10 right foot and ankle

## 2022-11-14 DIAGNOSIS — M19.012 PRIMARY OSTEOARTHRITIS OF LEFT SHOULDER: ICD-10-CM

## 2022-11-14 DIAGNOSIS — Z79.891 CHRONIC USE OF OPIATE FOR THERAPEUTIC PURPOSE: ICD-10-CM

## 2022-11-14 DIAGNOSIS — M17.11 PRIMARY OSTEOARTHRITIS OF RIGHT KNEE: ICD-10-CM

## 2022-11-14 RX ORDER — METHOCARBAMOL 750 MG/1
TABLET, FILM COATED ORAL
Qty: 120 TABLET | Refills: 6 | Status: SHIPPED | OUTPATIENT
Start: 2022-11-14
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
## 2022-12-16 ENCOUNTER — OFFICE VISIT (OUTPATIENT)
Dept: ORTHOPEDIC SURGERY | Age: 57
End: 2022-12-16
Payer: MEDICARE

## 2022-12-16 VITALS — WEIGHT: 183 LBS | BODY MASS INDEX: 34.55 KG/M2 | HEIGHT: 61 IN

## 2022-12-16 DIAGNOSIS — G89.29 CHRONIC PAIN IN RIGHT FOOT: Primary | ICD-10-CM

## 2022-12-16 DIAGNOSIS — M79.671 CHRONIC PAIN IN RIGHT FOOT: Primary | ICD-10-CM

## 2022-12-16 DIAGNOSIS — S99.921D RIGHT FOOT INJURY, SUBSEQUENT ENCOUNTER: ICD-10-CM

## 2022-12-16 NOTE — PROGRESS NOTES
Jaron Henry (: 1965) is a 59 y.o. female, patient,here for evaluation of the following   Chief Complaint   Patient presents with    Follow-up    Foot Pain    Ankle Pain     Right        ASSESSMENT/PLAN:  Below is the assessment and plan developed based on review of pertinent history, physical exam, labs, studies, and medications. 1. Chronic pain in right foot  -     MRI FOOT RT WO CONT; Future  -     REFERRAL TO DME; Future  -     [facility] NON-PNEUM WALK BOOT PRE OTS  2. Right foot injury, subsequent encounter  -     MRI FOOT RT WO CONT; Future  -     REFERRAL TO DME; Future  -     [facility] NON-PNEUM WALK BOOT PRE OTS    Patient continues to have pain at the right foot where she states she had an injury, she is extremely hypersensitive near the area of the sinus tarsi. Her symptoms are otherwise not severe and her previous x-rays have been negative. She continues to wear a boot and her current boot is falling apart she wants another boot. We have fitted her with another set of short boot today to wear and she is weightbearing as tolerated. Because of persistent pain that has not improved despite immobilization work and get an MRI to evaluate the area of pain which happens to be the sinus tarsi today. We will send her for the MRI and she will return after it is completed to discuss the findings and the treatment as indicated. Next time she returns no x-rays are needed. Return if symptoms worsen or fail to improve, for For review MRI when completed, treatment as indicated. Allergies   Allergen Reactions    Codeine Nausea Only    Hydrocodone Itching    Mold Shortness of Breath and Itching    Other Plant, Animal, Environmental Other (comments)     Environmental and seasonal allergies.     Tramadol Itching    Ibuprofen Nausea Only       Current Outpatient Medications   Medication Sig    methocarbamoL (ROBAXIN) 750 mg tablet TAKE 1 TABLET BY MOUTH EVERY 6 HOURS AS NEEDED SPASMS    oxyCODONE-acetaminophen (PERCOCET 10)  mg per tablet Take 1 Tablet by mouth every twelve (12) hours as needed for Pain for up to 30 days. Max Daily Amount: 2 Tablets. Indications: pain    ondansetron hcl (ZOFRAN) 4 mg tablet Take 1 Tablet by mouth every eight (8) hours as needed for Nausea or Vomiting.    metoprolol succinate (TOPROL-XL) 25 mg XL tablet TAKE 1 TABLET BY MOUTH EVERY DAY    acetaminophen (Tylenol Extra Strength) 500 mg tablet Take 2 Tablets by mouth every six (6) hours as needed for Pain.    levothyroxine (SYNTHROID) 125 mcg tablet TAKE 1 TABLET BY MOUTH EVERY DAY BEFORE BREAKFAST    amLODIPine (NORVASC) 5 mg tablet TAKE 1 TABLET BY MOUTH EVERY DAY    hydroCHLOROthiazide (HYDRODIURIL) 25 mg tablet TAKE 1 TAB BY MOUTH DAILY FOR HYPERTENSION    azelastine (ASTELIN) 137 mcg (0.1 %) nasal spray 1 [place] by Both Nostrils route as needed for Rhinitis. traZODone (DESYREL) 100 mg tablet TAKE 1 TABLET BY MOUTH EVERY DAY AT NIGHT    DULoxetine (CYMBALTA) 60 mg capsule TAKE 1 CAPSULE BY MOUTH EVERY DAY    montelukast (SINGULAIR) 10 mg tablet Take 1 Tablet by mouth daily. pantoprazole (PROTONIX) 40 mg tablet Take 40 mg by mouth two (2) times a day. diclofenac (VOLTAREN) 1 % gel Apply  to affected area as needed for Pain. DULoxetine (CYMBALTA) 30 mg capsule TAKE 1 CAP BY MOUTH DAILY. TAKE WITH 60 MG CAPSULE    loratadine (CLARITIN) 10 mg tablet TAKE 1 TABLET BY MOUTH EVERY DAY    lidocaine (Lidoderm) 5 % Apply patch to the affected area for 12 hours a day and remove for 12 hours a day.    naloxone (Narcan) 4 mg/actuation nasal spray Use 1 spray into 1 nostril for OVERDOSE. Call 911. For subsequent doses, give in alternating nostrils. May repeat every 2 to 3 min.     sucralfate (CARAFATE) 1 gram tablet TAKE 1 TABLET BY MOUTH FOUR TIMES A DAY    furosemide (LASIX) 20 mg tablet TAKE 1 TABLET BY MOUTH EVERY DAY    Combivent Respimat  mcg/actuation inhaler INHALE 1 PUFF BY MOUTH EVERY 6 HOURS AS NEEDED FOR WHEEZING Symbicort 160-4.5 mcg/actuation HFAA TAKE 2 PUFFS BY INHALATION TWO (2) TIMES A DAY. pregabalin (LYRICA) 100 mg capsule Take 1 Cap by mouth three (3) times daily. Max Daily Amount: 300 mg. XOLAIR 150 mg solr every thirty (30) days. Indications: injection    hydrOXYzine HCl (ATARAX) 25 mg tablet TAKE 1 TABLET BY MOUTH 3 TIMES A DAY AS NEEDED FOR ITCHING FOR ANXIETY    tiotropium (SPIRIVA) 18 mcg inhalation capsule Take 1 Cap by inhalation daily. albuterol (PROVENTIL VENTOLIN) 2.5 mg /3 mL (0.083 %) nebulizer solution 3 mL by Nebulization route every four (4) hours as needed for Wheezing. fluticasone (FLONASE) 50 mcg/actuation nasal spray 2 Sprays by Both Nostrils route daily. miscellaneous medical supply misc Shower seat for chronic knee pain, pt planning to have right TKR in June due to condition. Very limited range of motion. No current facility-administered medications for this visit.        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 1/22/2021    GERD (gastroesophageal reflux disease)     History of seasonal allergies     Hypertension     Ill-defined condition     environmental allergies     Ill-defined condition     Multiple body piercings; unable to remove tongue and lip piercings    Ill-defined condition 2018    GASTRITIS PER ENDOSCOPY    Loosening of knee joint prosthesis ([facility] [address] 3/26/2019    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    Psychiatric disorder     DEPRESSION    Status post total right knee replacement 5/8/2017    Thyroid disease     hypo    Ulnar impaction syndrome, right 1/22/2021    Ventral hernia 12/31/2013       Past Surgical History: Procedure Laterality Date    HX HEENT      thyroidectomy - benign    HX KNEE REPLACEMENT      R    HX KNEE REPLACEMENT  2019    R twice    HX MOHS PROCEDURES Right 2011    RCR - right    HX ORTHOPAEDIC      right wrist repair - has hardware    HX OTHER SURGICAL      hiatal hernia repair    HX PARTIAL HYSTERECTOMY      HX TUBAL LIGATION         Family History   Problem Relation Age of Onset    Cancer Father         lung cancer    Heart Disease Mother     Hypertension Mother     Diabetes Mother     Anesth Problems Neg Hx        Social History     Socioeconomic History    Marital status: SINGLE     Spouse name: Not on file    Number of children: Not on file    Years of education: Not on file    Highest education level: Not on file   Occupational History    Not on file   Tobacco Use    Smoking status: Every Day     Packs/day: 0.25     Years: 1.50     Pack years: 0.38     Types: Cigarettes     Last attempt to quit: 10/1/2015     Years since quittin.2     Passive exposure: Current    Smokeless tobacco: Never   Vaping Use    Vaping Use: Never used   Substance and Sexual Activity    Alcohol use: Yes     Comment: occasional    Drug use: No    Sexual activity: Yes     Partners: Female     Birth control/protection: Surgical     Comment: Post menopausal/partial hysterectomy   Other Topics Concern    Not on file   Social History Narrative    Not on file     Social Determinants of Health     Financial Resource Strain: Not on file   Food Insecurity: Not on file   Transportation Needs: Not on file   Physical Activity: Not on file   Stress: Not on file   Social Connections: Not on file   Intimate Partner Violence: Not on file   Housing Stability: Not on file           Vitals:  Ht 5' 1\" (1.549 m)   Wt 183 lb (83 kg)   LMP 2016 (Exact Date) Comment: partial hysterectomy  BMI 34.58 kg/m²    Body mass index is 34.58 kg/m².             SUBJECTIVE:  Pratik Roberts (: 1965)   Patient is here again for reevaluation of the right foot where she has chronic pain. She has been in a boot since I saw her last, she has some history of problems since she tripped in a pothole then hit it on the bed again more recently. This problem ongoing since October 2022. She states the pain is still severe and causing her a lot of trouble and she continues to wear a boot. OBJECTIVE EXAM:     Visit Vitals  Ht 5' 1\" (1.549 m)   Wt 183 lb (83 kg)   LMP 12/29/2016 (Exact Date) Comment: partial hysterectomy   BMI 34.58 kg/m²       Appearance: Alert, well appearing and pleasant patient who is in no distress, oriented to person, place/time, and who follows commands. This patient is accompanied in the       office by her  self. Psychiatric: Affect and mood are appropriate. No dementia noted on examination  Musculoskeletal:  LOCATION: Diffuse tenderness sinus tarsi foot - right      Integumentary: No rashes, skin patches, wounds, or abrasions to the right or left legs       Warm and normal color. No regions of expressible drainage. Gait: Normal      Tenderness: No tenderness        Motor/Strength/Tone Exam: Normal       Sensory Exam:   Intact Normal Sensation to ankle/foot      Stability Testing: No anterolateral or varus instability of the Ankle or Subtalar Joints               No peroneal tendon instability noted      ROM: Normal ROM noted to ankle/foot      Contractures: No Achilles or Gastrocnemius Contractures      Calf tenderness: Absent for calf or gastrocnemius muscle regions       Soft, supple, non tender, non taut lower extremity compartment  Alignment:      NEUTRAL Hindfoot,         none Metatarsus Adductus Metatarsus   Wounds/Abrasions:    None present  Extremities:   No embolic phenomena to the toes          No significant edema to the foot and or toes.         Lower extremities are warm and appear well perfused    DVT: No evidence of DVT seen on examination at this time     No calf swelling, no tenderness to calf muscles  Lymphatic:  No Evidence of Lymphedema  Vascular: Medial Border of Tibia Region: Edema is not present         Pulses: Dorsalis Pedis &  Posterior Tibial Pulses : Palpable yes        Varicosities Lower Limbs :  None  noted  Neuro: Negative bilateral Straight leg raise (seated position)    See Musculoskeletal section for pertinent individual extremity examination    No abnormal hand/wrist, foot/ankle, or facial/neck tremors. Lower Extremity/Ankle/Foot:  Mostly normal gait, satisfactory weightbearing stance. Right lower extremity/ankle: There is no malalignment or deformity, nontender, no swelling at the ankle joint area today, calf soft nontender, ligaments grossly stable, tibia and fibula intact and nontender. Right foot: Tender at the sinus tarsi with minimal swelling, ligaments are grossly stable. Rest of foot exam is nontender at the forefoot metatarsals, toes nontender with ability to flex and extend all toes with good range of motion and strength. Contralateral lower extremity/ankle /foot exam:  Nontender, no swelling ligaments grossly stable. Normal weightbearing stance. Neurovascular exam grossly intact. Imaging:    No x-rays were obtained today, previous x-rays were negative for fractures or dislocation. An electronic signature was used to authenticate this note.   -- Damaris Quezada MD

## 2022-12-16 NOTE — LETTER
12/16/2022    Patient: Bart Ash   YOB: 1965   Date of Visit: 12/16/2022     Susan Sheridan NP  [place] [id number] [facility] [phone]  [place] [place]    Dear Susan Sheridan NP,      Thank you for referring Ms. Apurva Parmar to [facility] for evaluation. My notes for this consultation are attached. If you have questions, please do not hesitate to call me. I look forward to following your patient along with you.       Sincerely,    Madonna Pyle MD

## 2022-12-30 ENCOUNTER — HOSPITAL ENCOUNTER (OUTPATIENT)
Dept: MRI IMAGING | Age: 57
Discharge: HOME | End: 2022-12-30
Attending: ORTHOPAEDIC SURGERY
Payer: MEDICARE

## 2022-12-30 DIAGNOSIS — M79.671 CHRONIC PAIN IN RIGHT FOOT: ICD-10-CM

## 2022-12-30 DIAGNOSIS — G89.29 CHRONIC PAIN IN RIGHT FOOT: ICD-10-CM

## 2022-12-30 DIAGNOSIS — S99.921D RIGHT FOOT INJURY, SUBSEQUENT ENCOUNTER: ICD-10-CM

## 2022-12-30 PROCEDURE — 73718 MRI LOWER EXTREMITY W/O DYE: CPT

## 2023-01-03 ENCOUNTER — OFFICE VISIT (OUTPATIENT)
Dept: INTERNAL MEDICINE CLINIC | Age: 58
End: 2023-01-03
Payer: MEDICARE

## 2023-01-03 VITALS
TEMPERATURE: 97.1 F | SYSTOLIC BLOOD PRESSURE: 127 MMHG | RESPIRATION RATE: 16 BRPM | DIASTOLIC BLOOD PRESSURE: 76 MMHG | HEIGHT: 61 IN | HEART RATE: 69 BPM | OXYGEN SATURATION: 98 % | WEIGHT: 175.6 LBS | BODY MASS INDEX: 33.15 KG/M2

## 2023-01-03 DIAGNOSIS — M19.012 PRIMARY OSTEOARTHRITIS OF LEFT SHOULDER: ICD-10-CM

## 2023-01-03 DIAGNOSIS — S93.401D SPRAIN OF RIGHT ANKLE, UNSPECIFIED LIGAMENT, SUBSEQUENT ENCOUNTER: ICD-10-CM

## 2023-01-03 DIAGNOSIS — Z79.891 CHRONIC USE OF OPIATE FOR THERAPEUTIC PURPOSE: ICD-10-CM

## 2023-01-03 DIAGNOSIS — F43.21 GRIEF: ICD-10-CM

## 2023-01-03 DIAGNOSIS — Z63.79 STRESS DUE TO ILLNESS OF FAMILY MEMBER: ICD-10-CM

## 2023-01-03 DIAGNOSIS — M17.11 PRIMARY OSTEOARTHRITIS OF RIGHT KNEE: ICD-10-CM

## 2023-01-03 DIAGNOSIS — G89.29 CHRONIC BILATERAL LOW BACK PAIN WITH RIGHT-SIDED SCIATICA: Primary | ICD-10-CM

## 2023-01-03 DIAGNOSIS — M54.41 CHRONIC BILATERAL LOW BACK PAIN WITH RIGHT-SIDED SCIATICA: Primary | ICD-10-CM

## 2023-01-03 PROCEDURE — G8417 CALC BMI ABV UP PARAM F/U: HCPCS | Performed by: NURSE PRACTITIONER

## 2023-01-03 PROCEDURE — G9899 SCRN MAM PERF RSLTS DOC: HCPCS | Performed by: NURSE PRACTITIONER

## 2023-01-03 PROCEDURE — 3017F COLORECTAL CA SCREEN DOC REV: CPT | Performed by: NURSE PRACTITIONER

## 2023-01-03 PROCEDURE — 99214 OFFICE O/P EST MOD 30 MIN: CPT | Performed by: NURSE PRACTITIONER

## 2023-01-03 PROCEDURE — G8427 DOCREV CUR MEDS BY ELIG CLIN: HCPCS | Performed by: NURSE PRACTITIONER

## 2023-01-03 PROCEDURE — G9717 DOC PT DX DEP/BP F/U NT REQ: HCPCS | Performed by: NURSE PRACTITIONER

## 2023-01-03 RX ORDER — OXYCODONE AND ACETAMINOPHEN 10; 325 MG/1; MG/1
1 TABLET ORAL
Qty: 60 TABLET | Refills: 0 | Status: SHIPPED | OUTPATIENT
Start: 2023-01-03 | End: 2023-02-02

## 2023-01-03 NOTE — PROGRESS NOTES
Chief Complaint   Patient presents with    Medication Refill       1. \"Have you been to the ER, urgent care clinic since your last visit? Hospitalized since your last visit? \" No    2. \"Have you seen or consulted any other health care providers outside of the [address] [place] [address] since your last visit? \" No     3. For patients aged 39-70: Has the patient had a colonoscopy / FIT/ Cologuard? Yes - no Care Gap present      If the patient is female:    4. For patients aged 41-77: Has the patient had a mammogram within the past 2 years? Yes - no Care Gap present      5. For patients aged 21-65: Has the patient had a pap smear?  Yes - no Care Gap present    Visit Vitals  /76 (BP 1 Location: Left upper arm, BP Patient Position: Sitting)   Pulse 69   Temp 97.1 °F (36.2 °C) (Temporal)   Resp 16   Ht 5' 1\" (1.549 m)   Wt 175 lb 9.6 oz (79.7 kg)   LMP 12/29/2016 (Exact Date) Comment: partial hysterectomy   SpO2 98%   BMI 33.18 kg/m²

## 2023-01-03 NOTE — PROGRESS NOTES
Julius Hong (: 1965) is a 59 y.o. female, established patient, here for evaluation of the following chief complaint(s):  Medication Refill       ASSESSMENT/PLAN:  Below is the assessment and plan developed based on review of pertinent history, physical exam, labs, studies, and medications. 1. Chronic bilateral low back pain with right-sided sciatica  -     oxyCODONE-acetaminophen (PERCOCET 10)  mg per tablet; Take 1 Tablet by mouth every twelve (12) hours as needed for Pain for up to 30 days. Max Daily Amount: 2 Tablets. Indications: pain, Normal, Disp-60 Tablet, R-0  2. Primary osteoarthritis of right knee  -     oxyCODONE-acetaminophen (PERCOCET 10)  mg per tablet; Take 1 Tablet by mouth every twelve (12) hours as needed for Pain for up to 30 days. Max Daily Amount: 2 Tablets. Indications: pain, Normal, Disp-60 Tablet, R-0  3. Chronic use of opiate for therapeutic purpose  -     oxyCODONE-acetaminophen (PERCOCET 10)  mg per tablet; Take 1 Tablet by mouth every twelve (12) hours as needed for Pain for up to 30 days. Max Daily Amount: 2 Tablets. Indications: pain, Normal, Disp-60 Tablet, R-0  4. Primary osteoarthritis of left shoulder  -     oxyCODONE-acetaminophen (PERCOCET 10)  mg per tablet; Take 1 Tablet by mouth every twelve (12) hours as needed for Pain for up to 30 days. Max Daily Amount: 2 Tablets. Indications: pain, Normal, Disp-60 Tablet, R-0  5. Grief  6. Stress due to illness of family member  9. Sprain of right ankle, unspecified ligament, subsequent encounter      Return in about 4 weeks (around 2023) for VV- pain med refill. Pt asked to complete follow by next visit: temporarily increased quantity to #60 for acute sciatica. SUBJECTIVE/OBJECTIVE:  HPI    Chronic Pain:  Patient has chronic BL knee pain and LBP for years, h/o right TKR (, Last revised ~ 2 yrs ago) and right wrist surgery (2021).  Grandson killed just before sarahy and mother sick with hyperglycemia in a coma. Having worsening sciatica pain. Foot still in boot, had MRI. Pain Scale: 10 - Worst pain ever/10. Pain in the left shoulder, wrist, knees, legs, and lower back is still limiting walking, sitting, reaching, lifting, and standing, exacerbated by forementioned acitivities to include stair climbing. Has tried prednisone, tramadol, injections, PT, gabapentin, cymbalta, and surgery in past with minimal relief; on prednisone now for URI treated by allergist last week in asthma with COPD. Pain has been controlled with Oxycodone, last taken YESTERDAY, only has 2 left. The medication is kept safe by staying with her. Has NOT seen any other providers since last OV for pain medication. No significant changes to pain presentation since last OV. she is  able to do her normal daily activities. she reports the following adverse side effects: none. Least pain over the last week has been 7/10  Worst pain over the last week has been 10/10. Aberrant behaviors: None         Symptoms onset: problem is longstanding. Rheumatological ROS: ongoing significant pain which is stable and controlled by pain med. Response to treatment plan: waxing and waning. Review of Systems  Constitutional: +MRSA in nose. negative for fevers, chills, anorexia and weight loss  Eyes:   negative for visual disturbance, drainage, and irritation  ENT:   +AR and environmental allergies; immunotherapy. negative for tinnitus,sore throat,ear pain,and hoarseness  Respiratory:  + asthma/COPD, followed by PULM, Stable. negative for hemoptysis  CV:   negative for chest pain, palpitations, and lower extremity edema  GI:   + GERD. negative for nausea, vomiting, diarrhea, abdominal pain, and melena  Endo:              +hypothyroidsim.  negative for polyuria,polydipsia,polyphagia, and heat intolerance  Genitourinary: negative for frequency, urgency, dysuria, retention, and hematuria  Integument:  negative for rash, ulcerations, and pruritus  Hematologic:  negative for easy bruising and bleeding  Musculoskel: negative for  muscle weakness  Neurological:  negative for headaches, dizziness, vertigo,and memory problems  Behavl/Psych: +depression, on cymbalta and zoloft. negative for feelings of  suicide    1./2. 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 1/22/2021    GERD (gastroesophageal reflux disease)     History of seasonal allergies     Hypertension     Ill-defined condition     environmental allergies     Ill-defined condition     Multiple body piercings; unable to remove tongue and lip piercings    Ill-defined condition 2018    GASTRITIS PER ENDOSCOPY    Loosening of knee joint prosthesis ([facility] [address] 3/26/2019    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    Psychiatric disorder     DEPRESSION    Status post total right knee replacement 5/8/2017    Thyroid disease     hypo    Ulnar impaction syndrome, right 1/22/2021    Ventral hernia 12/31/2013     Past Surgical History:   Procedure Laterality Date    HX HEENT  2009    thyroidectomy - benign    HX KNEE REPLACEMENT      R    HX KNEE REPLACEMENT  03/26/2019    R twice    HX MOHS PROCEDURES Right 03/08/2011    RCR - right    HX ORTHOPAEDIC      right wrist repair - has hardware    HX OTHER SURGICAL      hiatal hernia repair    HX PARTIAL HYSTERECTOMY      HX TUBAL LIGATION       Patient Active Problem List   Diagnosis Code    Environmental and seasonal allergies J30.89    Ventral hernia without obstruction or gangrene K43.9    Primary osteoarthritis involving multiple joints M15.9    Mixed simple and mucopurulent chronic bronchitis ([facility]) J41.8    Acquired hypothyroidism E03.9    Pain management contract signed Z02.89    Moderate episode of recurrent major depressive disorder ([facility]) F33.1    Psychophysiological insomnia F51.04    Severe obesity (BMI 35.0-39. 9) with comorbidity ([facility]) E66.01    Chronic use of opiate for therapeutic purpose Z79.891    Obesity, Class II, BMI 35-39.9 E66.9    Osteoarthritis of spine with radiculopathy, cervical region M47.22    Primary osteoarthritis of left shoulder M19.012    S/P total knee arthroplasty, right Z96.651    Immunotherapy Z29.8    S/P partial hysterectomy Z90.711    Edentulous K08.109    Chronic EBV infection B27.90    Sprain of right ankle, unspecified ligament, initial encounter S93.401A       3. Applicable records from prior treatment providers are apart of [facility] under the media/encounters tab. 4. Diagnostic, therapeutic and laboratory results are available in the [address] [place] [address] chart. 5. Consultation notes are available for review in the media/encounters tab of the [address] [place] [address] chart. 6. Treatment goals include: pain control, improve activity level and function in regards to activities of daily living, and improved comfort level. Previously pt has been limited by pain in all these aspects. 7. The risks and benefits of treatment have been discussed at this office visit with the pt.  she understands that the medication has addictive potential.  Additionally the pt has been advised that narcotic pain medication may impair mental and/or physical ability required for performance of tasks such as driving or operating any other machinery. 8. Pt has an updated signed pain contract on file and can be found under the media section of the [facility] chart. 9. The pain contract is reviewed. Pain medication will be continued at the SAME dosage. PILL COUNT: 2, less than expected, but with acute pain/sciatica. Last fill date 12/13/22.  Urine drug screening ordered/collected today. Additional diagnostic studies are not indicated at this time. Interventional procedure are not indicated at this time. Narcan prescribed already. 10. Medication prescibed is oxycodone  every 12 PRN #60 with 0 refills for a 1 month supply.  was reviewed while planning for pain/anxiety management, no indications of drug diversion suspected. Prescription history is no suspicious for controlled substance overuse. 11. Patient instructions have been reviewed in detail as outlined above and in the pain contract. 12. Re-evaluation is planned for 1 month. Current Outpatient Medications   Medication Sig    oxyCODONE-acetaminophen (PERCOCET 10)  mg per tablet Take 1 Tablet by mouth every twelve (12) hours as needed for Pain for up to 30 days. Max Daily Amount: 2 Tablets. Indications: pain    methocarbamoL (ROBAXIN) 750 mg tablet TAKE 1 TABLET BY MOUTH EVERY 6 HOURS AS NEEDED SPASMS    ondansetron hcl (ZOFRAN) 4 mg tablet Take 1 Tablet by mouth every eight (8) hours as needed for Nausea or Vomiting.    metoprolol succinate (TOPROL-XL) 25 mg XL tablet TAKE 1 TABLET BY MOUTH EVERY DAY    acetaminophen (Tylenol Extra Strength) 500 mg tablet Take 2 Tablets by mouth every six (6) hours as needed for Pain.    levothyroxine (SYNTHROID) 125 mcg tablet TAKE 1 TABLET BY MOUTH EVERY DAY BEFORE BREAKFAST    amLODIPine (NORVASC) 5 mg tablet TAKE 1 TABLET BY MOUTH EVERY DAY    hydroCHLOROthiazide (HYDRODIURIL) 25 mg tablet TAKE 1 TAB BY MOUTH DAILY FOR HYPERTENSION    azelastine (ASTELIN) 137 mcg (0.1 %) nasal spray 1 [place] by Both Nostrils route as needed for Rhinitis. traZODone (DESYREL) 100 mg tablet TAKE 1 TABLET BY MOUTH EVERY DAY AT NIGHT    DULoxetine (CYMBALTA) 60 mg capsule TAKE 1 CAPSULE BY MOUTH EVERY DAY    montelukast (SINGULAIR) 10 mg tablet Take 1 Tablet by mouth daily. pantoprazole (PROTONIX) 40 mg tablet Take 40 mg by mouth two (2) times a day. diclofenac (VOLTAREN) 1 % gel Apply  to affected area as needed for Pain. DULoxetine (CYMBALTA) 30 mg capsule TAKE 1 CAP BY MOUTH DAILY. TAKE WITH 60 MG CAPSULE    loratadine (CLARITIN) 10 mg tablet TAKE 1 TABLET BY MOUTH EVERY DAY    lidocaine (Lidoderm) 5 % Apply patch to the affected area for 12 hours a day and remove for 12 hours a day.    naloxone (Narcan) 4 mg/actuation nasal spray Use 1 spray into 1 nostril for OVERDOSE. Call 911. For subsequent doses, give in alternating nostrils. May repeat every 2 to 3 min. sucralfate (CARAFATE) 1 gram tablet TAKE 1 TABLET BY MOUTH FOUR TIMES A DAY    furosemide (LASIX) 20 mg tablet TAKE 1 TABLET BY MOUTH EVERY DAY    Combivent Respimat  mcg/actuation inhaler INHALE 1 PUFF BY MOUTH EVERY 6 HOURS AS NEEDED FOR WHEEZING    Symbicort 160-4.5 mcg/actuation HFAA TAKE 2 PUFFS BY INHALATION TWO (2) TIMES A DAY. pregabalin (LYRICA) 100 mg capsule Take 1 Cap by mouth three (3) times daily. Max Daily Amount: 300 mg. XOLAIR 150 mg solr every thirty (30) days. Indications: injection    hydrOXYzine HCl (ATARAX) 25 mg tablet TAKE 1 TABLET BY MOUTH 3 TIMES A DAY AS NEEDED FOR ITCHING FOR ANXIETY    tiotropium (SPIRIVA) 18 mcg inhalation capsule Take 1 Cap by inhalation daily. albuterol (PROVENTIL VENTOLIN) 2.5 mg /3 mL (0.083 %) nebulizer solution 3 mL by Nebulization route every four (4) hours as needed for Wheezing. fluticasone (FLONASE) 50 mcg/actuation nasal spray 2 Sprays by Both Nostrils route daily. miscellaneous medical supply misc Shower seat for chronic knee pain, pt planning to have right TKR in June due to condition. Very limited range of motion. (Patient not taking: Reported on 1/3/2023)     No current facility-administered medications for this visit.        Visit Vitals  /76 (BP 1 Location: Left upper arm, BP Patient Position: Sitting)   Pulse 69   Temp 97.1 °F (36.2 °C) (Temporal)   Resp 16   Ht 5' 1\" (1.549 m)   Wt 175 lb 9.6 oz (79.7 kg) LMP 12/29/2016 (Exact Date) Comment: partial hysterectomy   SpO2 98%   BMI 33.18 kg/m²       Wt Readings from Last 3 Encounters:   01/03/23 175 lb 9.6 oz (79.7 kg)   12/16/22 183 lb (83 kg)   11/04/22 183 lb (83 kg)     Physical Exam:   General appearance - alert, well appearing, and in mild distress. +restless. Mental status - A/O x 4, sad mood and affect. Chest - CTA. Symmetric chest rise. No wheezing. No distress. +NP cough. Heart - Normal rate & rhythm. Normal S1 & S2. No MGR. Abdomen- Soft, round. Non-distended, NT. No pulsatile masses or hernias. Ext-  No clubbing or cyanosis. Bunny boot in place to right foot. Skin-Warm and dry. No hyperpigmentation, ulcerations, or suspicious lesions. Neuro - Normal speech, no focal findings or movement disorder. Normal strength and muscle tone. Slow gait with boot in place. An electronic signature was used to authenticate this note.   -- Abby Neal, NP

## 2023-01-13 ENCOUNTER — TELEPHONE (OUTPATIENT)
Dept: INTERNAL MEDICINE CLINIC | Age: 58
End: 2023-01-13

## 2023-01-30 ENCOUNTER — VIRTUAL VISIT (OUTPATIENT)
Dept: INTERNAL MEDICINE CLINIC | Age: 58
End: 2023-01-30
Payer: MEDICARE

## 2023-01-30 DIAGNOSIS — J32.9 CHRONIC SINUSITIS, UNSPECIFIED LOCATION: Primary | ICD-10-CM

## 2023-01-30 DIAGNOSIS — M19.012 PRIMARY OSTEOARTHRITIS OF LEFT SHOULDER: ICD-10-CM

## 2023-01-30 DIAGNOSIS — M54.41 CHRONIC BILATERAL LOW BACK PAIN WITH RIGHT-SIDED SCIATICA: ICD-10-CM

## 2023-01-30 DIAGNOSIS — Z79.891 CHRONIC USE OF OPIATE FOR THERAPEUTIC PURPOSE: ICD-10-CM

## 2023-01-30 DIAGNOSIS — M17.11 PRIMARY OSTEOARTHRITIS OF RIGHT KNEE: ICD-10-CM

## 2023-01-30 DIAGNOSIS — G89.29 CHRONIC BILATERAL LOW BACK PAIN WITH RIGHT-SIDED SCIATICA: ICD-10-CM

## 2023-01-30 PROCEDURE — G9717 DOC PT DX DEP/BP F/U NT REQ: HCPCS | Performed by: NURSE PRACTITIONER

## 2023-01-30 PROCEDURE — 99214 OFFICE O/P EST MOD 30 MIN: CPT | Performed by: NURSE PRACTITIONER

## 2023-01-30 PROCEDURE — G9899 SCRN MAM PERF RSLTS DOC: HCPCS | Performed by: NURSE PRACTITIONER

## 2023-01-30 PROCEDURE — 3017F COLORECTAL CA SCREEN DOC REV: CPT | Performed by: NURSE PRACTITIONER

## 2023-01-30 PROCEDURE — G8427 DOCREV CUR MEDS BY ELIG CLIN: HCPCS | Performed by: NURSE PRACTITIONER

## 2023-01-30 RX ORDER — OXYCODONE AND ACETAMINOPHEN 10; 325 MG/1; MG/1
1 TABLET ORAL
Qty: 60 TABLET | Refills: 0 | Status: SHIPPED | OUTPATIENT
Start: 2023-02-03 | End: 2023-03-05

## 2023-01-30 RX ORDER — AMOXICILLIN AND CLAVULANATE POTASSIUM 875; 125 MG/1; MG/1
1 TABLET, FILM COATED ORAL 2 TIMES DAILY
Qty: 14 TABLET | Refills: 0 | Status: SHIPPED | OUTPATIENT
Start: 2023-01-30 | End: 2023-02-06

## 2023-01-30 RX ORDER — OXYCODONE AND ACETAMINOPHEN 10; 325 MG/1; MG/1
1 TABLET ORAL
Qty: 45 TABLET | Refills: 0 | Status: SHIPPED | OUTPATIENT
Start: 2023-03-05 | End: 2023-04-04

## 2023-01-30 NOTE — PROGRESS NOTES
9/10 back, knee, and foot pain    Last had something for pain yesterday       Pt is here for   Chief Complaint   Patient presents with    Medication Refill     Pain meds      1. Have you been to the ER, urgent care clinic since your last visit? Hospitalized since your last visit? No    2. Have you seen or consulted any other health care providers outside of the [address] [place] [address] since your last visit? Include any pap smears or colon screening.  No

## 2023-01-30 NOTE — PROGRESS NOTES
Charles Lujan is a 59 y.o. female established patient, here for evaluation of the following chief complaint(s):   Medication Refill (Pain meds )          Assessment & Plan:   Diagnoses and all orders for this visit:    1. Chronic sinusitis, unspecified location  -     amoxicillin-clavulanate (Augmentin) 875-125 mg per tablet; Take 1 Tablet by mouth two (2) times a day for 7 days. 2. Primary osteoarthritis of right knee  -     oxyCODONE-acetaminophen (PERCOCET 10)  mg per tablet; Take 1 Tablet by mouth every twelve (12) hours as needed for Pain for up to 30 days. Max Daily Amount: 2 Tablets. Indications: pain  -     oxyCODONE-acetaminophen (PERCOCET 10)  mg per tablet; Take 1 Tablet by mouth two (2) times daily as needed for Pain for up to 30 days. Max Daily Amount: 2 Tablets. Indications: pain    3. Chronic use of opiate for therapeutic purpose  -     oxyCODONE-acetaminophen (PERCOCET 10)  mg per tablet; Take 1 Tablet by mouth every twelve (12) hours as needed for Pain for up to 30 days. Max Daily Amount: 2 Tablets. Indications: pain  -     oxyCODONE-acetaminophen (PERCOCET 10)  mg per tablet; Take 1 Tablet by mouth two (2) times daily as needed for Pain for up to 30 days. Max Daily Amount: 2 Tablets. Indications: pain    4. Primary osteoarthritis of left shoulder  -     oxyCODONE-acetaminophen (PERCOCET 10)  mg per tablet; Take 1 Tablet by mouth every twelve (12) hours as needed for Pain for up to 30 days. Max Daily Amount: 2 Tablets. Indications: pain  -     oxyCODONE-acetaminophen (PERCOCET 10)  mg per tablet; Take 1 Tablet by mouth two (2) times daily as needed for Pain for up to 30 days. Max Daily Amount: 2 Tablets. Indications: pain    5. Chronic bilateral low back pain with right-sided sciatica  -     oxyCODONE-acetaminophen (PERCOCET 10)  mg per tablet; Take 1 Tablet by mouth every twelve (12) hours as needed for Pain for up to 30 days.  Max Daily Amount: 2 Tablets. Indications: pain  -     oxyCODONE-acetaminophen (PERCOCET 10)  mg per tablet; Take 1 Tablet by mouth two (2) times daily as needed for Pain for up to 30 days. Max Daily Amount: 2 Tablets. Indications: pain              Follow-up and Dispositions    Return in about 2 months (around 3/31/2023) for OV-pain med refill. Specific pt instructions until next visit: continue present plan, call if any problems, pt still in shock from grand son's murder. Advised if mood worsens to call office for an appt. May consider wellbutrin or abilify adjunctive therapy. Taking cymbalta 90 mg already. Subjective:   Igor Villagomez is a 59 y.o. female who was seen for Medication Refill (Pain meds )      Pt presents to f/u chronic lower back, knee pain, and right foot pain. Pain 9/10. Seen by allergist, told she has upper and lower respiratory infection with sinus infection. Given steroid, no antibiotics. Seen by POD and told she has plantar However grandson murdered over weekend and not handling well. Taking percocet 5-325 mg, last taken last night, has 11 left. Denies side effects from medication. Feels better, but currently unable to stand due to severe back pain. Review of Systems  Constitutional: negative for fevers, chills, anorexia and weight loss  Respiratory:  negative for cough, hemoptysis, dyspnea, and wheezing  CV:   negative for chest pain, palpitations, and lower extremity edema  GI:   negative for nausea, vomiting, diarrhea, abdominal pain, and melena  Endo:               negative for polyuria,polydipsia,polyphagia, and heat intolerance  Genitourinary: negative for frequency, urgency, dysuria, retention, and hematuria  Integument:  negative for rash, ulcerations, and pruritus  Hematologic:  negative for easy bruising and bleeding  Musculoskel: +see HPI/PMH.  negative for  muscle weakness  Neurological:  negative for headaches, dizziness, vertigo,and memory problems  Behavl/Psych: negative for feelings of anxiety, depression, suicide, and mood changes    1./2. 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 1/22/2021    GERD (gastroesophageal reflux disease)     History of seasonal allergies     Hypertension     Ill-defined condition     environmental allergies     Ill-defined condition     Multiple body piercings; unable to remove tongue and lip piercings    Ill-defined condition 2018    GASTRITIS PER ENDOSCOPY    Loosening of knee joint prosthesis ([facility] [address] 3/26/2019    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    Psychiatric disorder     DEPRESSION    Status post total right knee replacement 5/8/2017    Thyroid disease     hypo    Ulnar impaction syndrome, right 1/22/2021    Ventral hernia 12/31/2013     Past Surgical History:   Procedure Laterality Date    HX HEENT  2009    thyroidectomy - benign    HX KNEE REPLACEMENT      R    HX KNEE REPLACEMENT  03/26/2019    R twice    HX MOHS PROCEDURES Right 03/08/2011    RCR - right    HX ORTHOPAEDIC      right wrist repair - has hardware    HX OTHER SURGICAL      hiatal hernia repair    HX PARTIAL HYSTERECTOMY      HX TUBAL LIGATION       Patient Active Problem List   Diagnosis Code    Environmental and seasonal allergies J30.89    Ventral hernia without obstruction or gangrene K43.9    Primary osteoarthritis involving multiple joints M15.9    Mixed simple and mucopurulent chronic bronchitis ([address] J41.8    Acquired hypothyroidism E03.9    Pain management contract signed Z02.89    Moderate episode of recurrent major depressive disorder (HCC) F33.1    Psychophysiological insomnia F51.04    Severe obesity (BMI 35.0-39. 9) with comorbidity (HCC) E66.01    Chronic use of opiate for therapeutic purpose Z79.891    Obesity, Class II, BMI 35-39.9 E66.9    Osteoarthritis of spine with radiculopathy, cervical region M47.22    Primary osteoarthritis of left shoulder M19.012    S/P total knee arthroplasty, right Z96.651    Immunotherapy Z29.8    S/P partial hysterectomy Z90.711    Edentulous K08.109    Chronic EBV infection B27.90    Sprain of right ankle, unspecified ligament, initial encounter S93.401A       3. Applicable records from prior treatment providers are apart of [facility] under the media/encounters tab. 4. Diagnostic, therapeutic and laboratory results are available in the [facility] chart. 5. Consultation notes are available for review in the media/encounters tab of the [facility] chart. 6. Treatment goals include: pain control, improve activity level and function in regards to activities of daily living, and improved comfort level. Previously pt has been limited by pain in all these aspects. 7. The risks and benefits of treatment have been discussed at this office visit with the pt.  she understands that the medication has addictive potential.  Additionally the pt has been advised that narcotic pain medication may impair mental and/or physical ability required for performance of tasks such as driving or operating any other machinery. 8. Pt has an updated signed pain contract on file and can be found under the media section of the [facility] chart. 9. The pain contract is reviewed. Pain medication will be continued at the SAME dosage. PILL COUNT: counted 11 pills in pt hand during VV . Last fill date 10/14/22 per . Urine drug screening ordered/collected today. Additional diagnostic studies are not indicated at this time. Interventional procedure are not indicated at this time. Narcan prescribed already.     10. Medication prescibed is oxycodone  every 12 PRN #60 with 1 refills for #45 tabs in March to have a 2 month supply.  was reviewed while planning for pain/anxiety management, no indications of drug diversion suspected. Prescription history is no suspicious for controlled substance overuse. 11. Patient instructions have been reviewed in detail as outlined above and in the pain contract. 12. Re-evaluation is planned for 2 months. Current Outpatient Medications   Medication Sig    [START ON 2/3/2023] oxyCODONE-acetaminophen (PERCOCET 10)  mg per tablet Take 1 Tablet by mouth every twelve (12) hours as needed for Pain for up to 30 days. Max Daily Amount: 2 Tablets. Indications: pain    amoxicillin-clavulanate (Augmentin) 875-125 mg per tablet Take 1 Tablet by mouth two (2) times a day for 7 days. [START ON 3/5/2023] oxyCODONE-acetaminophen (PERCOCET 10)  mg per tablet Take 1 Tablet by mouth two (2) times daily as needed for Pain for up to 30 days. Max Daily Amount: 2 Tablets. Indications: pain    methocarbamoL (ROBAXIN) 750 mg tablet TAKE 1 TABLET BY MOUTH EVERY 6 HOURS AS NEEDED SPASMS    metoprolol succinate (TOPROL-XL) 25 mg XL tablet TAKE 1 TABLET BY MOUTH EVERY DAY    levothyroxine (SYNTHROID) 125 mcg tablet TAKE 1 TABLET BY MOUTH EVERY DAY BEFORE BREAKFAST    amLODIPine (NORVASC) 5 mg tablet TAKE 1 TABLET BY MOUTH EVERY DAY    hydroCHLOROthiazide (HYDRODIURIL) 25 mg tablet TAKE 1 TAB BY MOUTH DAILY FOR HYPERTENSION    traZODone (DESYREL) 100 mg tablet TAKE 1 TABLET BY MOUTH EVERY DAY AT NIGHT    DULoxetine (CYMBALTA) 60 mg capsule TAKE 1 CAPSULE BY MOUTH EVERY DAY    montelukast (SINGULAIR) 10 mg tablet Take 1 Tablet by mouth daily. pantoprazole (PROTONIX) 40 mg tablet Take 40 mg by mouth two (2) times a day. DULoxetine (CYMBALTA) 30 mg capsule TAKE 1 CAP BY MOUTH DAILY.  TAKE WITH 60 MG CAPSULE    loratadine (CLARITIN) 10 mg tablet TAKE 1 TABLET BY MOUTH EVERY DAY    lidocaine (Lidoderm) 5 % Apply patch to the affected area for 12 hours a day and remove for 12 hours a day. sucralfate (CARAFATE) 1 gram tablet TAKE 1 TABLET BY MOUTH FOUR TIMES A DAY    Combivent Respimat  mcg/actuation inhaler INHALE 1 PUFF BY MOUTH EVERY 6 HOURS AS NEEDED FOR WHEEZING    Symbicort 160-4.5 mcg/actuation HFAA TAKE 2 PUFFS BY INHALATION TWO (2) TIMES A DAY. pregabalin (LYRICA) 100 mg capsule Take 1 Cap by mouth three (3) times daily. Max Daily Amount: 300 mg. XOLAIR 150 mg solr every thirty (30) days. Indications: injection    hydrOXYzine HCl (ATARAX) 25 mg tablet TAKE 1 TABLET BY MOUTH 3 TIMES A DAY AS NEEDED FOR ITCHING FOR ANXIETY    tiotropium (SPIRIVA) 18 mcg inhalation capsule Take 1 Cap by inhalation daily. fluticasone (FLONASE) 50 mcg/actuation nasal spray 2 Sprays by Both Nostrils route daily. ondansetron hcl (ZOFRAN) 4 mg tablet Take 1 Tablet by mouth every eight (8) hours as needed for Nausea or Vomiting. acetaminophen (Tylenol Extra Strength) 500 mg tablet Take 2 Tablets by mouth every six (6) hours as needed for Pain. azelastine (ASTELIN) 137 mcg (0.1 %) nasal spray 1 [place] by Both Nostrils route as needed for Rhinitis. diclofenac (VOLTAREN) 1 % gel Apply  to affected area as needed for Pain.    naloxone (Narcan) 4 mg/actuation nasal spray Use 1 spray into 1 nostril for OVERDOSE. Call 911. For subsequent doses, give in alternating nostrils. May repeat every 2 to 3 min. furosemide (LASIX) 20 mg tablet TAKE 1 TABLET BY MOUTH EVERY DAY    albuterol (PROVENTIL VENTOLIN) 2.5 mg /3 mL (0.083 %) nebulizer solution 3 mL by Nebulization route every four (4) hours as needed for Wheezing. miscellaneous medical supply misc Shower seat for chronic knee pain, pt planning to have right TKR in June due to condition. Very limited range of motion. (Patient not taking: Reported on 1/3/2023)     No current facility-administered medications for this visit.        Objective:   Vital Signs: (As obtained by patient/caregiver at home)  Visit Vitals  LMP 12/29/2016 (Exact Date) Comment: partial hysterectomy              Physical Exam:  General appearance - alert, fair appearing, and in mild distress. Grimacing with movement. +sniffling. Mental status - A/O x 4, sad mood and affect. Eyes- trace periorbital edema, drainage, or irritation noted. Nose- no obvious drainage or swelling. Throat- no obvious swelling, goiter, or notable lymphadenopathy  Chest - Symmetric chest rise. No wheezing or coughing. No distress. Skin- normal skin tone noted. No hyperpigmentation or obvious deformities. No diaphoresis noted. No flushing. Neuro - Normal speech, no focal findings or movement disorder. Other pertinent observable physical exam findings:-        We discussed the expected course, resolution and complications of the diagnosis(es) in detail. Medication risks, benefits, costs, interactions, and alternatives were discussed as indicated. I advised her to contact the office if her condition worsens, changes or fails to improve as anticipated. She expressed understanding with the diagnosis(es) and plan. Colette Christian, was evaluated through a synchronous (real-time) audio-video encounter. The patient (or guardian if applicable) is aware that this is a billable service, which includes applicable co-pays. This Virtual Visit was conducted with patient's (and/or legal guardian's) consent. The visit was conducted pursuant to the emergency declaration under the [address] [place] [address], [address] [place] [address] authority and the [employer] Act. Patient identification was verified, and a caregiver was present when appropriate. The patient was located at: Home: [address] [place] [address] [place] [id number]  The provider was located at: Home: [unfilled]   in [place] [address] [facility] was used to authenticate this note.   -- Oumar Ambrocio NP

## 2023-01-30 NOTE — PATIENT INSTRUCTIONS
Also try FACIAL STEAMS for 10 minutes. You can do this by boiling a pot of water, then take the pot OFF the stove, sitting it on a secure surface where you can safely HOVER your face over it about 10-12 in above. Using a towel over your head and pot, locks in the steam. Use of THYME season or citrus fruit in the boiling water helps too.

## 2023-02-17 ENCOUNTER — OFFICE VISIT (OUTPATIENT)
Dept: ORTHOPEDIC SURGERY | Age: 58
End: 2023-02-17
Payer: MEDICARE

## 2023-02-17 VITALS — HEIGHT: 61 IN | WEIGHT: 175 LBS | BODY MASS INDEX: 33.04 KG/M2

## 2023-02-17 DIAGNOSIS — G89.29 CHRONIC PAIN IN RIGHT FOOT: ICD-10-CM

## 2023-02-17 DIAGNOSIS — M72.2 PLANTAR FASCIITIS, RIGHT: ICD-10-CM

## 2023-02-17 DIAGNOSIS — M19.071 PRIMARY OSTEOARTHRITIS OF RIGHT FOOT: Primary | ICD-10-CM

## 2023-02-17 DIAGNOSIS — M79.671 CHRONIC PAIN IN RIGHT FOOT: ICD-10-CM

## 2023-02-17 NOTE — PROGRESS NOTES
Gina Zeng (: 1965) is a 59 y.o. female, patient,here for evaluation of the following   Chief Complaint   Patient presents with    Follow-up        ASSESSMENT/PLAN:  Below is the assessment and plan developed based on review of pertinent history, physical exam, labs, studies, and medications. 1. Primary osteoarthritis of right foot  2. Plantar fasciitis, right  3. Chronic pain in right foot  Patient verbalized understanding of exam findings and treatment plan. We engaged in the shared decision-making process and treatment options were discussed at length with the patient. Surgical and conservative management discussed today along with risk and benefits. Patient is informed of findings on exam and MRI reviewed with her. Previously we had also obtain x-rays which had not shown much and there was remote history of injury from 2022 and then a reinjury thereafter when I saw the patient thereafter. She has had persistent pain not improved with the treatments that started therefore referred for an MRI without contrast.  She has what appears to be a little bit of planter fasciitis and mid tarsal arthritis. Most of her pain has been around the sinus tarsi. Patient is informed again of appropriate shoe wear and insoles, stretching program as demonstrated today in clinic. Otherwise no surgical indications. I discussed management options with the patient. All questions were answered to the best of my ability and to the patient's satisfaction. I discussed their history, symptoms, physical exam findings, diagnostic testing results, diagnosis and treatment options. The patient verbalized understanding and elected to proceed with conservative treatment as above. Return if symptoms worsen or fail to improve.       Allergies   Allergen Reactions    Codeine Nausea Only    Hydrocodone Itching    Mold Shortness of Breath and Itching    Other Plant, Animal, Environmental Other (comments) Environmental and seasonal allergies. Tramadol Itching    Ibuprofen Nausea Only       Current Outpatient Medications   Medication Sig    oxyCODONE-acetaminophen (PERCOCET 10)  mg per tablet Take 1 Tablet by mouth every twelve (12) hours as needed for Pain for up to 30 days. Max Daily Amount: 2 Tablets. Indications: pain    [START ON 3/5/2023] oxyCODONE-acetaminophen (PERCOCET 10)  mg per tablet Take 1 Tablet by mouth two (2) times daily as needed for Pain for up to 30 days. Max Daily Amount: 2 Tablets. Indications: pain    methocarbamoL (ROBAXIN) 750 mg tablet TAKE 1 TABLET BY MOUTH EVERY 6 HOURS AS NEEDED SPASMS    ondansetron hcl (ZOFRAN) 4 mg tablet Take 1 Tablet by mouth every eight (8) hours as needed for Nausea or Vomiting.    metoprolol succinate (TOPROL-XL) 25 mg XL tablet TAKE 1 TABLET BY MOUTH EVERY DAY    acetaminophen (Tylenol Extra Strength) 500 mg tablet Take 2 Tablets by mouth every six (6) hours as needed for Pain.    levothyroxine (SYNTHROID) 125 mcg tablet TAKE 1 TABLET BY MOUTH EVERY DAY BEFORE BREAKFAST    amLODIPine (NORVASC) 5 mg tablet TAKE 1 TABLET BY MOUTH EVERY DAY    hydroCHLOROthiazide (HYDRODIURIL) 25 mg tablet TAKE 1 TAB BY MOUTH DAILY FOR HYPERTENSION    azelastine (ASTELIN) 137 mcg (0.1 %) nasal spray 1 Webb by Both Nostrils route as needed for Rhinitis. traZODone (DESYREL) 100 mg tablet TAKE 1 TABLET BY MOUTH EVERY DAY AT NIGHT    DULoxetine (CYMBALTA) 60 mg capsule TAKE 1 CAPSULE BY MOUTH EVERY DAY    montelukast (SINGULAIR) 10 mg tablet Take 1 Tablet by mouth daily. pantoprazole (PROTONIX) 40 mg tablet Take 40 mg by mouth two (2) times a day. diclofenac (VOLTAREN) 1 % gel Apply  to affected area as needed for Pain. DULoxetine (CYMBALTA) 30 mg capsule TAKE 1 CAP BY MOUTH DAILY.  TAKE WITH 60 MG CAPSULE    loratadine (CLARITIN) 10 mg tablet TAKE 1 TABLET BY MOUTH EVERY DAY    lidocaine (Lidoderm) 5 % Apply patch to the affected area for 12 hours a day and remove for 12 hours a day.    naloxone (Narcan) 4 mg/actuation nasal spray Use 1 spray into 1 nostril for OVERDOSE. Call 911. For subsequent doses, give in alternating nostrils. May repeat every 2 to 3 min. sucralfate (CARAFATE) 1 gram tablet TAKE 1 TABLET BY MOUTH FOUR TIMES A DAY    furosemide (LASIX) 20 mg tablet TAKE 1 TABLET BY MOUTH EVERY DAY    Combivent Respimat  mcg/actuation inhaler INHALE 1 PUFF BY MOUTH EVERY 6 HOURS AS NEEDED FOR WHEEZING    Symbicort 160-4.5 mcg/actuation HFAA TAKE 2 PUFFS BY INHALATION TWO (2) TIMES A DAY. pregabalin (LYRICA) 100 mg capsule Take 1 Cap by mouth three (3) times daily. Max Daily Amount: 300 mg. XOLAIR 150 mg solr every thirty (30) days. Indications: injection    hydrOXYzine HCl (ATARAX) 25 mg tablet TAKE 1 TABLET BY MOUTH 3 TIMES A DAY AS NEEDED FOR ITCHING FOR ANXIETY    tiotropium (SPIRIVA) 18 mcg inhalation capsule Take 1 Cap by inhalation daily. albuterol (PROVENTIL VENTOLIN) 2.5 mg /3 mL (0.083 %) nebulizer solution 3 mL by Nebulization route every four (4) hours as needed for Wheezing. fluticasone (FLONASE) 50 mcg/actuation nasal spray 2 Sprays by Both Nostrils route daily. miscellaneous medical supply misc Shower seat for chronic knee pain, pt planning to have right TKR in June due to condition. Very limited range of motion. (Patient not taking: Reported on 1/3/2023)     No current facility-administered medications for this visit.        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 1/22/2021    GERD (gastroesophageal reflux disease)     History of seasonal allergies     Hypertension Ill-defined condition     environmental allergies     Ill-defined condition     Multiple body piercings; unable to remove tongue and lip piercings    Ill-defined condition 2018    GASTRITIS PER ENDOSCOPY    Loosening of knee joint prosthesis ([facility] [address] 3/26/2019    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    Psychiatric disorder     DEPRESSION    Status post total right knee replacement 2017    Thyroid disease     hypo    Ulnar impaction syndrome, right 2021    Ventral hernia 2013       Past Surgical History:   Procedure Laterality Date    HX HEENT      thyroidectomy - benign    HX KNEE REPLACEMENT      R    HX KNEE REPLACEMENT  2019    R twice    HX MOHS PROCEDURES Right 2011    RCR - right    HX ORTHOPAEDIC      right wrist repair - has hardware    HX OTHER SURGICAL      hiatal hernia repair    HX PARTIAL HYSTERECTOMY      HX TUBAL LIGATION         Family History   Problem Relation Age of Onset    Cancer Father         lung cancer    Heart Disease Mother     Hypertension Mother     Diabetes Mother     Anesth Problems Neg Hx        Social History     Socioeconomic History    Marital status: SINGLE     Spouse name: Not on file    Number of children: Not on file    Years of education: Not on file    Highest education level: Not on file   Occupational History    Not on file   Tobacco Use    Smoking status: Every Day     Packs/day: 0.25     Years: 1.50     Pack years: 0.38     Types: Cigarettes     Last attempt to quit: 10/1/2015     Years since quittin.4     Passive exposure: Current    Smokeless tobacco: Never   Vaping Use    Vaping Use: Never used   Substance and Sexual Activity    Alcohol use: Yes     Comment: occasional    Drug use: No    Sexual activity: Yes     Partners: Female     Birth control/protection: Surgical     Comment: Post menopausal/partial hysterectomy   Other Topics Concern    Not on file   Social History Narrative    Not on file     Social Determinants of Health     Financial Resource Strain: Not on file   Food Insecurity: Not on file   Transportation Needs: Not on file   Physical Activity: Not on file   Stress: Not on file   Social Connections: Not on file   Intimate Partner Violence: Not on file   Housing Stability: Not on file           Vitals:  Ht 5' 1\" (1.549 m)   Wt 175 lb (79.4 kg)   LMP 2016 (Exact Date) Comment: partial hysterectomy  BMI 33.07 kg/m²    Body mass index is 33.07 kg/m². SUBJECTIVE:  Adele Ormond (: 1965)   Patient is back today for right foot pain which has been a chronic problem since an injury sustained from 2022, after stepping into a pothole and then she hit her foot again on the bed recently that had exacerbated the pain more. We had tried initial immobilization and usual treatment for injuries, x-rays did not show displacement or dislocation. She had persistent pain last time seen so I recommended an MRI to further evaluate. She did have some underlying arthritis at her foot although not severe on the x-rays, more likely moderate in some spots. She is back today after MRI completed, still complains of pain. OBJECTIVE EXAM:     Visit Vitals  Ht 5' 1\" (1.549 m)   Wt 175 lb (79.4 kg)   LMP 2016 (Exact Date) Comment: partial hysterectomy   BMI 33.07 kg/m²       Appearance: Alert, well appearing and pleasant patient who is in no distress, oriented to person, place/time, and who follows commands. This patient is accompanied in the       office by her  self. Psychiatric: Affect and mood are appropriate. No dementia noted on examination  Musculoskeletal:  LOCATION: Diffuse tenderness without swelling foot - right      Integumentary: No rashes, skin patches, wounds, or abrasions to the right or left legs       Warm and normal color. No regions of expressible drainage.       Gait: Normal      Tenderness: No tenderness        Motor/Strength/Tone Exam: Normal       Sensory Exam:   Intact Normal Sensation to ankle/foot Stability Testing: No anterolateral or varus instability of the Ankle or Subtalar Joints               No peroneal tendon instability noted      ROM: Normal ROM noted to ankle/foot      Contractures: No Achilles or Gastrocnemius Contractures      Calf tenderness: Absent for calf or gastrocnemius muscle regions       Soft, supple, non tender, non taut lower extremity compartment  Alignment:      NEUTRAL Hindfoot,         none Metatarsus Adductus Metatarsus   Wounds/Abrasions:    None present  Extremities:   No embolic phenomena to the toes          No significant edema to the foot and or toes. Lower extremities are warm and appear well perfused    DVT: No evidence of DVT seen on examination at this time     No calf swelling, no tenderness to calf muscles  Lymphatic:  No Evidence of Lymphedema  Vascular: Medial Border of Tibia Region: Edema is not present         Pulses: Dorsalis Pedis &  Posterior Tibial Pulses : Palpable yes        Varicosities Lower Limbs :  None  noted  Neuro: Negative bilateral Straight leg raise (seated position)    See Musculoskeletal section for pertinent individual extremity examination    No abnormal hand/wrist, foot/ankle, or facial/neck tremors. Lower Extremity/Ankle/Foot:  Mostly normal gait, normal weightbearing stance. Right lower extremity/ankle: Nontender, no swelling, ligament stable, calf soft nontender. Normal exam.    Right foot: Diffuse tenderness to palpation unchanged, areas of sinus tarsi, also plantar heel, midtarsal joints. No significant swelling, no ecchymosis, erythema, no fluctuance. Able to flex and extend all toes with good range of motion and strength. Contralateral lower extremity: Skin intact without erythema or wounds. 2+ dorsalis pedis pulse. Toes are warm, and well-perfused. Sensation is intact to light touch in the DP, SP, sural, saphenous, and tibial nerve distributions.   5/5 strength in ankle dorsiflexion, plantarflexion, inversion, and eversion. Ankle range of motion is 10 degrees of dorsiflexion to 40 degrees of plantarflexion. Smooth and painless hindfoot and midfoot range of motion. No severe hallux, lesser toe malalignment or deformities, no pain with passive motion of lesser MTP joints, hallux MTP joint, no first TMT instability. Neurovascular exam is similar to contralateral lower extremity. Imaging:    I did not obtain an x-ray today, previous x-rays have been negative for fracture or dislocation. Reviewed the MRI without contrast and this was reviewed on PACS, films show no obvious fractures or dislocations. She does have some arthritis mild to moderate several areas of the foot and there is a bit of edema in the posterior aspect of the medial cuneiform possibly arthritis of. There is mild Planter fasciitis. Detail radiology report in chart, summary below. IMPRESSION  1. No acute fracture. 2.  Overall mild to moderate osteoarthritis with several foci of high-grade  chondrosis as described above. 3.  Mild plantar fasciitis. An electronic signature was used to authenticate this note.   -- Uri Rios MD

## 2023-02-17 NOTE — LETTER
2023    Patient: Estella [place]   YOB: 1965   Date of Visit: 2023     Stacie Gaston NP  [facility]  [address] [facility] [address]  [place]    Dear Stacie Gaston NP,      Thank you for referring Ms. Sarahy Marie to [facility] for evaluation. My notes for this consultation are attached. If you have questions, please do not hesitate to call me. I look forward to following your patient along with you.       Sincerely,    Delfina Nettles MD

## 2023-03-14 DIAGNOSIS — M54.41 CHRONIC BILATERAL LOW BACK PAIN WITH RIGHT-SIDED SCIATICA: ICD-10-CM

## 2023-03-14 DIAGNOSIS — J30.89 ENVIRONMENTAL AND SEASONAL ALLERGIES: ICD-10-CM

## 2023-03-14 DIAGNOSIS — G89.29 CHRONIC BILATERAL LOW BACK PAIN WITH RIGHT-SIDED SCIATICA: ICD-10-CM

## 2023-03-14 DIAGNOSIS — F33.1 MODERATE EPISODE OF RECURRENT MAJOR DEPRESSIVE DISORDER (HCC): ICD-10-CM

## 2023-03-14 RX ORDER — LORATADINE 10 MG/1
TABLET ORAL
Qty: 30 TABLET | Refills: 11 | Status: SHIPPED | OUTPATIENT
Start: 2023-03-14

## 2023-03-14 RX ORDER — DULOXETIN HYDROCHLORIDE 30 MG/1
30 CAPSULE, DELAYED RELEASE ORAL DAILY
Qty: 30 CAPSULE | Refills: 11 | Status: SHIPPED | OUTPATIENT
Start: 2023-03-14

## 2023-03-15 ENCOUNTER — PATIENT OUTREACH (OUTPATIENT)
Dept: CASE MANAGEMENT | Age: 58
End: 2023-03-15

## 2023-03-15 NOTE — PROGRESS NOTES
Ambulatory Care Management Note    Date/Time:  3/15/2023 2:39 PM    Patient Current Location: [place]     This patient was received as a referral from [employer]. Ambulatory Care Manager outreached to patient today to offer care management services. Introduction to self and role of care manager provided. Patient accepted care management services at this time. Follow up call scheduled at this time. Patient has Ambulatory Care Manager's contact number for any questions or concerns.

## 2023-03-17 NOTE — ED NOTES
Patient discharged by provider. Patient given copy of dc instructions and two script(s). Patient verbalized understanding of instructions and script(s). Patient given a current medication reconciliation form and verbalized understanding of their medications. Patient verbalized understanding of the importance of discussing medications with  his or her physician or clinic when they follow up. Patient alert and oriented and in no acute distress. Pt verbalizes pain scale of 10 out of 10. Patient discharged home without assistance. Wheelchair was declined. 53 yo M w/ history of HTN coming in w/ left sided chest pain. States it started 15 minutes PTA and radiates to the left side of the neck; patient hemodynamically stable. Informed patient that due to concerns for cardiac etiology, we recommend an EKG and blood test at this time but patient refusing work up at this time and opts to leave the emergency department. The patient wishes to be discharged against medical advice. I have assessed the patient's mental status and  the patient has capacity to make this decision. I have explained the risks of leaving without full treatment, including severe disability and death, which the patient understands and is willing to accept. I have answered all of the patient's questions. I reiterated my medical opinion and advised the patient to return at any time. We discussed the further workup outside of the current visit and return precautions. 55 yo M w/ history of HTN coming in w/ left sided chest pain. States it started 15 minutes PTA and radiates to the left side of the neck; patient hemodynamically stable. Informed patient that due to concerns for cardiac etiology, we recommend an EKG and blood test at this time but patient refusing work up at this time and opts to leave the emergency department. The patient wishes to be discharged against medical advice. I have assessed the patient's mental status and  the patient has capacity to make this decision. I have explained the risks of leaving without full treatment, including severe disability and death, which the patient understands and is willing to accept. I have answered all of the patient's questions. I reiterated my medical opinion and advised the patient to return at any time. We discussed the further workup outside of the current visit and return precautions. the patient consents to leaving AMA. the patient walked out of the prior to signing AMA paperwork

## 2023-03-27 ENCOUNTER — PATIENT OUTREACH (OUTPATIENT)
Dept: CASE MANAGEMENT | Age: 58
End: 2023-03-27

## 2023-03-27 NOTE — PROGRESS NOTES
3/27/2023  1:52 PM    Patient Current Location: Unknown    Patient outreach attempt by this ACM today to perform care management follow-up (second CCM call assessments). AC was unable to reach the patient by telephone today; lvm requesting a return phone call to this ACM.

## 2023-03-31 ENCOUNTER — OFFICE VISIT (OUTPATIENT)
Dept: INTERNAL MEDICINE CLINIC | Age: 58
End: 2023-03-31

## 2023-03-31 VITALS
WEIGHT: 178 LBS | HEIGHT: 61 IN | OXYGEN SATURATION: 99 % | TEMPERATURE: 98.2 F | HEART RATE: 80 BPM | BODY MASS INDEX: 33.61 KG/M2 | SYSTOLIC BLOOD PRESSURE: 135 MMHG | RESPIRATION RATE: 18 BRPM | DIASTOLIC BLOOD PRESSURE: 79 MMHG

## 2023-03-31 DIAGNOSIS — Z79.891 CHRONIC USE OF OPIATE FOR THERAPEUTIC PURPOSE: ICD-10-CM

## 2023-03-31 DIAGNOSIS — M54.41 CHRONIC BILATERAL LOW BACK PAIN WITH RIGHT-SIDED SCIATICA: Primary | ICD-10-CM

## 2023-03-31 DIAGNOSIS — G89.29 CHRONIC BILATERAL LOW BACK PAIN WITH RIGHT-SIDED SCIATICA: Primary | ICD-10-CM

## 2023-03-31 DIAGNOSIS — M19.012 PRIMARY OSTEOARTHRITIS OF LEFT SHOULDER: ICD-10-CM

## 2023-03-31 DIAGNOSIS — M17.11 PRIMARY OSTEOARTHRITIS OF RIGHT KNEE: ICD-10-CM

## 2023-03-31 RX ORDER — PREDNISONE 50 MG/1
50 TABLET ORAL DAILY
Qty: 5 TABLET | Refills: 0 | Status: SHIPPED | OUTPATIENT
Start: 2023-03-31 | End: 2023-04-05

## 2023-03-31 RX ORDER — OXYCODONE AND ACETAMINOPHEN 10; 325 MG/1; MG/1
1 TABLET ORAL
Qty: 60 TABLET | Refills: 0 | Status: SHIPPED | OUTPATIENT
Start: 2023-03-31 | End: 2023-04-30

## 2023-03-31 NOTE — PROGRESS NOTES
Osmani Regan (: 1965) is a 59 y.o. female, established patient, here for evaluation of the following chief complaint(s):  Medication Refill (Pain med refill)       ASSESSMENT/PLAN:  Below is the assessment and plan developed based on review of pertinent history, physical exam, labs, studies, and medications. 1. Chronic bilateral low back pain with right-sided sciatica  -     oxyCODONE-acetaminophen (PERCOCET 10)  mg per tablet; Take 1 Tablet by mouth every twelve (12) hours as needed for Pain for up to 30 days. Max Daily Amount: 2 Tablets. Indications: pain, Normal, Disp-60 Tablet, R-0  -     predniSONE (DELTASONE) 50 mg tablet; Take 1 Tablet by mouth daily for 5 days. , Normal, Disp-5 Tablet, R-0  -     TOXASSURE SELECT 13 (MW); Future  2. Primary osteoarthritis of right knee  -     oxyCODONE-acetaminophen (PERCOCET 10)  mg per tablet; Take 1 Tablet by mouth every twelve (12) hours as needed for Pain for up to 30 days. Max Daily Amount: 2 Tablets. Indications: pain, Normal, Disp-60 Tablet, R-0  -     predniSONE (DELTASONE) 50 mg tablet; Take 1 Tablet by mouth daily for 5 days. , Normal, Disp-5 Tablet, R-0  -     TOXASSURE SELECT 13 (MW); Future  3. Chronic use of opiate for therapeutic purpose  -     oxyCODONE-acetaminophen (PERCOCET 10)  mg per tablet; Take 1 Tablet by mouth every twelve (12) hours as needed for Pain for up to 30 days. Max Daily Amount: 2 Tablets. Indications: pain, Normal, Disp-60 Tablet, R-0  -     predniSONE (DELTASONE) 50 mg tablet; Take 1 Tablet by mouth daily for 5 days. , Normal, Disp-5 Tablet, R-0  -     TOXASSURE SELECT 13 (MW); Future  4. Primary osteoarthritis of left shoulder  -     oxyCODONE-acetaminophen (PERCOCET 10)  mg per tablet; Take 1 Tablet by mouth every twelve (12) hours as needed for Pain for up to 30 days. Max Daily Amount: 2 Tablets. Indications: pain, Normal, Disp-60 Tablet, R-0  -     predniSONE (DELTASONE) 50 mg tablet;  Take 1 Tablet by mouth daily for 5 days. , Normal, Disp-5 Tablet, R-0  -     TOXASSURE SELECT 13 (MW); Future      Return in about 4 weeks (around 4/28/2023) for VV- Pain med refill. Pt asked to complete follow by next visit: temporarily increased quantity to #60 for acute sciatica. Prednisone 50 mg x 5 days    SUBJECTIVE/OBJECTIVE:  HPI    Chronic Pain:  Patient has chronic BL knee pain and LBP for years, h/o right TKR (2016, Last revised ~ 2 yrs ago) and right wrist surgery (1/2021). Went to [employer] last week for baby's 5[age] birthday. Had worsened sciatica pain. Pain Scale: 10 - Worst pain ever/10. Pain in the left shoulder, wrist, knees, legs, and lower back is still limiting walking, sitting, reaching, lifting, and standing, exacerbated by forementioned acitivities to include stair climbing. Has tried prednisone, tramadol, injections, PT, gabapentin, cymbalta, and surgery in past with minimal relief; on prednisone now for URI treated by allergist last week in asthma with COPD. Pain has been controlled with Oxycodone, last taken 2 days ago, took extra from acute pain. The medication is kept safe by staying with her. Has NOT seen any other providers since last OV for pain medication. No significant changes to pain presentation since last OV. she is  able to do her normal daily activities. she reports the following adverse side effects: none. Least pain over the last week has been 7/10  Worst pain over the last week has been 10/10. Aberrant behaviors: None         Symptoms onset: problem is longstanding. Rheumatological ROS: ongoing significant pain which is stable and controlled by pain med. Response to treatment plan: waxing and waning. Review of Systems  Constitutional: +MRSA in nose. negative for fevers, chills, anorexia and weight loss  Eyes:   negative for visual disturbance, drainage, and irritation  ENT:   +AR and environmental allergies; immunotherapy.  negative for [employer] throat,ear pain,and hoarseness  Respiratory:  + asthma/COPD, followed by PULM, Stable. negative for hemoptysis  CV:   negative for chest pain, palpitations, and lower extremity edema  GI:   + GERD. negative for nausea, vomiting, diarrhea, abdominal pain, and melena  Endo:              +hypothyroidsim.  negative for polyuria,polydipsia,polyphagia, and heat intolerance  Genitourinary: negative for frequency, urgency, dysuria, retention, and hematuria  Integument:  negative for rash, ulcerations, and pruritus  Hematologic:  negative for easy bruising and bleeding  Musculoskel: negative for  muscle weakness  Neurological:  negative for headaches, dizziness, vertigo,and memory problems  Behavl/Psych: +depression, on cymbalta and zoloft. negative for feelings of  suicide    1./2. 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 1/22/2021    GERD (gastroesophageal reflux disease)     History of seasonal allergies     Hypertension     Ill-defined condition     environmental allergies     Ill-defined condition     Multiple body piercings; unable to remove tongue and lip piercings    Ill-defined condition 2018    GASTRITIS PER ENDOSCOPY    Loosening of knee joint prosthesis ([facility] [address] 3/26/2019    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    Psychiatric disorder     DEPRESSION    Status post total right knee replacement 5/8/2017    Thyroid disease     hypo    Ulnar impaction syndrome, right 1/22/2021    Ventral hernia 12/31/2013     Past Surgical History:   Procedure Laterality Date    HX HEENT  2009    thyroidectomy - benign    HX KNEE REPLACEMENT      R    HX KNEE REPLACEMENT  03/26/2019    R twice    HX MOHS PROCEDURES Right 03/08/2011    RCR - right    HX ORTHOPAEDIC      right wrist repair - has hardware    HX OTHER SURGICAL      hiatal hernia repair    HX PARTIAL HYSTERECTOMY      HX TUBAL LIGATION       Patient Active Problem List   Diagnosis Code    Environmental and seasonal allergies J30.89    Ventral hernia without obstruction or gangrene K43.9    Primary osteoarthritis involving multiple joints M15.9    Mixed simple and mucopurulent chronic bronchitis (HCC) J41.8    Acquired hypothyroidism E03.9    Pain management contract signed Z02.89    Moderate episode of recurrent major depressive disorder ([facility]) F33.1    Psychophysiological insomnia F51.04    Severe obesity (BMI 35.0-39. 9) with comorbidity ([facility]) E66.01    Chronic use of opiate for therapeutic purpose Z79.891    Obesity, Class II, BMI 35-39.9 E66.9    Osteoarthritis of spine with radiculopathy, cervical region M47.22    Primary osteoarthritis of left shoulder M19.012    S/P total knee arthroplasty, right Z96.651    Immunotherapy Z29.8    S/P partial hysterectomy Z90.711    Edentulous K08.109    Chronic EBV infection B27.90    Sprain of right ankle, unspecified ligament, initial encounter S93.401A       3. Applicable records from prior treatment providers are apart of [facility] under the media/encounters tab. 4. Diagnostic, therapeutic and laboratory results are available in the [facility] chart. 5. Consultation notes are available for review in the media/encounters tab of the [facility] chart. 6. Treatment goals include: pain control, improve activity level and function in regards to activities of daily living, and improved comfort level. Previously pt has been limited by pain in all these aspects.     7. The risks and benefits of treatment have been discussed at this office visit with the pt.  she understands that the medication has addictive potential.  Additionally the pt has been advised that narcotic pain medication may impair mental and/or physical ability required for performance of tasks such as driving or operating any other machinery. 8. Pt has an updated signed pain contract on file and can be found under the media section of the [facility] chart. 9. The pain contract is reviewed. Pain medication will be continued at the SAME dosage. PILL COUNT: 0. Last fill date 3/13/23. Urine drug screening ordered/collected today. Additional diagnostic studies are not indicated at this time. Interventional procedure are not indicated at this time. Narcan prescribed already. 10. Medication prescibed is oxycodone  every 12 PRN #60 with 0 refills for a 1 month supply.  was reviewed while planning for pain/anxiety management, no indications of drug diversion suspected. Prescription history is no suspicious for controlled substance overuse. 11. Patient instructions have been reviewed in detail as outlined above and in the pain contract. 12. Re-evaluation is planned for 1 month. Current Outpatient Medications   Medication Sig    oxyCODONE-acetaminophen (PERCOCET 10)  mg per tablet Take 1 Tablet by mouth every twelve (12) hours as needed for Pain for up to 30 days. Max Daily Amount: 2 Tablets. Indications: pain    predniSONE (DELTASONE) 50 mg tablet Take 1 Tablet by mouth daily for 5 days. loratadine (CLARITIN) 10 mg tablet TAKE 1 TABLET BY MOUTH EVERY DAY    DULoxetine (CYMBALTA) 30 mg capsule TAKE 1 CAP BY MOUTH DAILY. TAKE WITH 60 MG CAPSULE    methocarbamoL (ROBAXIN) 750 mg tablet TAKE 1 TABLET BY MOUTH EVERY 6 HOURS AS NEEDED SPASMS    ondansetron hcl (ZOFRAN) 4 mg tablet Take 1 Tablet by mouth every eight (8) hours as needed for Nausea or Vomiting.    metoprolol succinate (TOPROL-XL) 25 mg XL tablet TAKE 1 TABLET BY MOUTH EVERY DAY    acetaminophen (Tylenol Extra Strength) 500 mg tablet Take 2 Tablets by mouth every six (6) hours as needed for Pain. levothyroxine (SYNTHROID) 125 mcg tablet TAKE 1 TABLET BY MOUTH EVERY DAY BEFORE BREAKFAST    amLODIPine (NORVASC) 5 mg tablet TAKE 1 TABLET BY MOUTH EVERY DAY    hydroCHLOROthiazide (HYDRODIURIL) 25 mg tablet TAKE 1 TAB BY MOUTH DAILY FOR HYPERTENSION    traZODone (DESYREL) 100 mg tablet TAKE 1 TABLET BY MOUTH EVERY DAY AT NIGHT    DULoxetine (CYMBALTA) 60 mg capsule TAKE 1 CAPSULE BY MOUTH EVERY DAY    montelukast (SINGULAIR) 10 mg tablet Take 1 Tablet by mouth daily. pantoprazole (PROTONIX) 40 mg tablet Take 40 mg by mouth two (2) times a day. diclofenac (VOLTAREN) 1 % gel Apply  to affected area as needed for Pain.    lidocaine (Lidoderm) 5 % Apply patch to the affected area for 12 hours a day and remove for 12 hours a day. sucralfate (CARAFATE) 1 gram tablet TAKE 1 TABLET BY MOUTH FOUR TIMES A DAY    furosemide (LASIX) 20 mg tablet TAKE 1 TABLET BY MOUTH EVERY DAY    Combivent Respimat  mcg/actuation inhaler INHALE 1 PUFF BY MOUTH EVERY 6 HOURS AS NEEDED FOR WHEEZING    Symbicort 160-4.5 mcg/actuation HFAA TAKE 2 PUFFS BY INHALATION TWO (2) TIMES A DAY. pregabalin (LYRICA) 100 mg capsule Take 1 Cap by mouth three (3) times daily. Max Daily Amount: 300 mg. XOLAIR 150 mg solr every thirty (30) days. Indications: injection    hydrOXYzine HCl (ATARAX) 25 mg tablet TAKE 1 TABLET BY MOUTH 3 TIMES A DAY AS NEEDED FOR ITCHING FOR ANXIETY    tiotropium (SPIRIVA) 18 mcg inhalation capsule Take 1 Cap by inhalation daily. albuterol (PROVENTIL VENTOLIN) 2.5 mg /3 mL (0.083 %) nebulizer solution 3 mL by Nebulization route every four (4) hours as needed for Wheezing. azelastine (ASTELIN) 137 mcg (0.1 %) nasal spray 1 Clubb by Both Nostrils route as needed for Rhinitis. naloxone (Narcan) 4 mg/actuation nasal spray Use 1 spray into 1 nostril for OVERDOSE. Call 911. For subsequent doses, give in alternating nostrils. May repeat every 2 to 3 min.     fluticasone (FLONASE) 50 mcg/actuation nasal spray 2 Sprays by Both Nostrils route daily. miscellaneous medical supply misc Shower seat for chronic knee pain, pt planning to have right TKR in June due to condition. Very limited range of motion. (Patient not taking: No sig reported)     No current facility-administered medications for this visit. Visit Vitals  /79 (BP 1 Location: Right upper arm, BP Patient Position: Sitting, BP Cuff Size: Large adult)   Pulse 80   Temp 98.2 °F (36.8 °C) (Temporal)   Resp 18   Ht 5' 1\" (1.549 m)   Wt 178 lb (80.7 kg)   LMP 12/29/2016 (Exact Date) Comment: partial hysterectomy   SpO2 99%   BMI 33.63 kg/m²       Wt Readings from Last 3 Encounters:   03/31/23 178 lb (80.7 kg)   02/17/23 175 lb (79.4 kg)   01/03/23 175 lb 9.6 oz (79.7 kg)     Physical Exam:   General appearance - alert, well appearing, and in mild distress. Mental status - A/O x 4, tired/normal mood and affect. Chest - CTA. Symmetric chest rise. No wheezing. No distress. Heart - Normal rate & rhythm. Normal S1 & S2. No MGR. Abdomen- Soft, round. Non-distended, NT. No pulsatile masses or hernias. Ext-  No clubbing or cyanosis. Skin-Warm and dry. No hyperpigmentation, ulcerations, or suspicious lesions. Neuro - Normal speech, no focal findings or movement disorder. Normal strength and muscle tone. Antalgic gait. An electronic signature was used to authenticate this note.   -- Gia Thurston NP

## 2023-03-31 NOTE — PROGRESS NOTES
Last had something for pain 2 days ago     Pt states pain level is a 10/10 body pain      Pt is here for   Chief Complaint   Patient presents with    Medication Refill     Pain med refill     1. \"Have you been to the ER, urgent care clinic since your last visit? Hospitalized since your last visit? \" No    2. \"Have you seen or consulted any other health care providers outside of the [address] [place] [address] since your last visit? \" No     3. For patients aged 39-70: Has the patient had a colonoscopy / FIT/ Cologuard? Yes - Care Gap present. Most recent result on file      If the patient is female:    4. For patients aged 41-77: Has the patient had a mammogram within the past 2 years? No      5. For patients aged 21-65: Has the patient had a pap smear?  No

## 2023-04-03 ENCOUNTER — PATIENT OUTREACH (OUTPATIENT)
Dept: CASE MANAGEMENT | Age: 58
End: 2023-04-03

## 2023-04-03 NOTE — PROGRESS NOTES
4/3/2023  2:21 PM    Patient Current Location:  Unknown    Patient outreach attempt by this ACM today to perform care management follow-up (second CCM call assessments). ACM was unable to reach the patient by telephone today; lvm requesting a return phone call to this ACM.

## 2023-04-20 ENCOUNTER — PATIENT OUTREACH (OUTPATIENT)
Dept: CASE MANAGEMENT | Age: 58
End: 2023-04-20

## 2023-04-20 NOTE — PROGRESS NOTES
4/20/2023  2:12 PM    Patient Current Location: [place]    Patient outreach by this ACM today to perform care management follow-up (second CCM call assessments). Two patient identifiers verified. Patient is unable to follow-up with ACM today.

## 2023-04-26 ENCOUNTER — VIRTUAL VISIT (OUTPATIENT)
Dept: INTERNAL MEDICINE CLINIC | Age: 58
End: 2023-04-26
Payer: MEDICARE

## 2023-04-26 DIAGNOSIS — Z79.891 CHRONIC USE OF OPIATE FOR THERAPEUTIC PURPOSE: ICD-10-CM

## 2023-04-26 DIAGNOSIS — M54.41 CHRONIC BILATERAL LOW BACK PAIN WITH RIGHT-SIDED SCIATICA: ICD-10-CM

## 2023-04-26 DIAGNOSIS — M17.11 PRIMARY OSTEOARTHRITIS OF RIGHT KNEE: ICD-10-CM

## 2023-04-26 DIAGNOSIS — G89.29 CHRONIC BILATERAL LOW BACK PAIN WITH RIGHT-SIDED SCIATICA: ICD-10-CM

## 2023-04-26 PROCEDURE — G9899 SCRN MAM PERF RSLTS DOC: HCPCS | Performed by: NURSE PRACTITIONER

## 2023-04-26 PROCEDURE — 3017F COLORECTAL CA SCREEN DOC REV: CPT | Performed by: NURSE PRACTITIONER

## 2023-04-26 PROCEDURE — G9717 DOC PT DX DEP/BP F/U NT REQ: HCPCS | Performed by: NURSE PRACTITIONER

## 2023-04-26 PROCEDURE — G8427 DOCREV CUR MEDS BY ELIG CLIN: HCPCS | Performed by: NURSE PRACTITIONER

## 2023-04-26 PROCEDURE — 99214 OFFICE O/P EST MOD 30 MIN: CPT | Performed by: NURSE PRACTITIONER

## 2023-04-26 RX ORDER — OXYCODONE AND ACETAMINOPHEN 10; 325 MG/1; MG/1
1 TABLET ORAL
Qty: 60 TABLET | Refills: 0 | Status: CANCELLED | OUTPATIENT
Start: 2023-04-26 | End: 2023-05-26

## 2023-04-26 RX ORDER — OXYCODONE AND ACETAMINOPHEN 10; 325 MG/1; MG/1
1 TABLET ORAL
Qty: 45 TABLET | Refills: 0 | Status: SHIPPED | OUTPATIENT
Start: 2023-05-09 | End: 2023-06-08

## 2023-04-26 RX ORDER — OXYCODONE AND ACETAMINOPHEN 10; 325 MG/1; MG/1
1 TABLET ORAL
Qty: 45 TABLET | Refills: 0 | Status: SHIPPED | OUTPATIENT
Start: 2023-06-08 | End: 2023-07-08

## 2023-04-26 RX ORDER — OXYCODONE AND ACETAMINOPHEN 10; 325 MG/1; MG/1
1 TABLET ORAL
Qty: 45 TABLET | Refills: 0 | Status: SHIPPED | OUTPATIENT
Start: 2023-07-08 | End: 2023-08-07

## 2023-04-26 NOTE — PROGRESS NOTES
Jasson Valverde is a 59 y.o. female established patient, here for evaluation of the following chief complaint(s):   Medication Refill (Pain medications)            Assessment & Plan:   Diagnoses and all orders for this visit:    1. Primary osteoarthritis of right knee  -     oxyCODONE-acetaminophen (PERCOCET 10)  mg per tablet; Take 1 Tablet by mouth two (2) times daily as needed for Pain for up to 30 days. Max Daily Amount: 2 Tablets. Indications: pain  -     oxyCODONE-acetaminophen (PERCOCET 10)  mg per tablet; Take 1 Tablet by mouth every twelve (12) hours as needed for Pain for up to 30 days. Max Daily Amount: 2 Tablets. Indications: pain  -     oxyCODONE-acetaminophen (PERCOCET 10)  mg per tablet; Take 1 Tablet by mouth two (2) times daily as needed for Pain for up to 30 days. Max Daily Amount: 2 Tablets. Indications: pain    2. Chronic use of opiate for therapeutic purpose  -     oxyCODONE-acetaminophen (PERCOCET 10)  mg per tablet; Take 1 Tablet by mouth two (2) times daily as needed for Pain for up to 30 days. Max Daily Amount: 2 Tablets. Indications: pain  -     oxyCODONE-acetaminophen (PERCOCET 10)  mg per tablet; Take 1 Tablet by mouth every twelve (12) hours as needed for Pain for up to 30 days. Max Daily Amount: 2 Tablets. Indications: pain  -     oxyCODONE-acetaminophen (PERCOCET 10)  mg per tablet; Take 1 Tablet by mouth two (2) times daily as needed for Pain for up to 30 days. Max Daily Amount: 2 Tablets. Indications: pain    3. Chronic bilateral low back pain with right-sided sciatica  -     oxyCODONE-acetaminophen (PERCOCET 10)  mg per tablet; Take 1 Tablet by mouth two (2) times daily as needed for Pain for up to 30 days. Max Daily Amount: 2 Tablets. Indications: pain  -     oxyCODONE-acetaminophen (PERCOCET 10)  mg per tablet; Take 1 Tablet by mouth every twelve (12) hours as needed for Pain for up to 30 days. Max Daily Amount: 2 Tablets. Indications: pain  -     oxyCODONE-acetaminophen (PERCOCET 10)  mg per tablet; Take 1 Tablet by mouth two (2) times daily as needed for Pain for up to 30 days. Max Daily Amount: 2 Tablets. Indications: pain                Follow-up and Dispositions    Return in about 3 months (around 8/4/2023) for OV-pain med refill, annual labs, [address] Specific pt instructions until next visit: continue present plan, call if any problems, pt still in shock from grand son's murder. Advised if mood worsens to call office for an appt. May consider wellbutrin or abilify adjunctive therapy. Taking cymbalta 90 mg already. Subjective:   Daiana Osborn is a 59 y.o. female who was seen for Medication Refill (Pain medications)        Pt presents to f/u chronic lower back, knee pain, and worsened sciatica pain. Improved since last visit and trying to walk more. Out in garden in backyard, cleaning today. Pain 7/10. Taking percocet  mg, last taken today, has ~20 left. Denies side effects from medication. Review of Systems  Constitutional: negative for fevers, chills, anorexia and weight loss  Respiratory:  negative for cough, hemoptysis, dyspnea, and wheezing  CV:   negative for chest pain, palpitations, and lower extremity edema  GI:   negative for nausea, vomiting, diarrhea, abdominal pain, and melena  Endo:               negative for polyuria,polydipsia,polyphagia, and heat intolerance  Genitourinary: negative for frequency, urgency, dysuria, retention, and hematuria  Integument:  negative for rash, ulcerations, and pruritus  Hematologic:  negative for easy bruising and bleeding  Musculoskel: +see HPI/PMH.  negative for  muscle weakness  Neurological:  negative for headaches, dizziness, vertigo,and memory problems  Behavl/Psych: negative for feelings of anxiety, depression, suicide, and mood changes    1./2. Medical history/Past medical History   Past Medical History:   Diagnosis Date    Arthritis     Asthma uses inhalers    Chronic bilateral low back pain with right-sided sciatica 5/8/2017    Chronic obstructive pulmonary disease (HCC)     bronchitis    Chronic pain     right knee    Chronic pain of left knee 6/15/2017    Chronic right shoulder pain 2/5/2016    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 1/22/2021    GERD (gastroesophageal reflux disease)     History of seasonal allergies     Hypertension     Ill-defined condition     environmental allergies     Ill-defined condition     Multiple body piercings; unable to remove tongue and lip piercings    Ill-defined condition 2018    GASTRITIS PER ENDOSCOPY    Loosening of knee joint prosthesis ([facility] [address] 3/26/2019    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    Psychiatric disorder     DEPRESSION    Status post total right knee replacement 5/8/2017    Thyroid disease     hypo    Ulnar impaction syndrome, right 1/22/2021    Ventral hernia 12/31/2013     Past Surgical History:   Procedure Laterality Date    HX HEENT  2009    thyroidectomy - benign    HX KNEE REPLACEMENT      R    HX KNEE REPLACEMENT  03/26/2019    R twice    HX MOHS PROCEDURES Right 03/08/2011    RCR - right    HX ORTHOPAEDIC      right wrist repair - has hardware    HX OTHER SURGICAL      hiatal hernia repair    HX PARTIAL HYSTERECTOMY      HX TUBAL LIGATION       Patient Active Problem List   Diagnosis Code    Environmental and seasonal allergies J30.89    Ventral hernia without obstruction or gangrene K43.9    Primary osteoarthritis involving multiple joints M15.9    Mixed simple and mucopurulent chronic bronchitis ([facility] [address] J41.8    Acquired hypothyroidism E03.9    Pain management contract signed Z02.89    Moderate episode of recurrent major depressive disorder (HCC) F33.1    Psychophysiological insomnia F51.04    Severe obesity (BMI 35.0-39. 9) with comorbidity (HCC) E66.01    Chronic use of opiate for therapeutic purpose Z79.891    Obesity, Class II, BMI 35-39.9 E66.9    Osteoarthritis of spine with radiculopathy, cervical region M47.22    Primary osteoarthritis of left shoulder M19.012    S/P total knee arthroplasty, right Z96.651    Immunotherapy Z29.8    S/P partial hysterectomy Z90.711    Edentulous K08.109    Chronic EBV infection B27.90    Sprain of right ankle, unspecified ligament, initial encounter S93.401A       3. Applicable records from prior treatment providers are apart of [facility] under the media/encounters tab. 4. Diagnostic, therapeutic and laboratory results are available in the [facility] chart. 5. Consultation notes are available for review in the media/encounters tab of the [facility] chart. 6. Treatment goals include: pain control, improve activity level and function in regards to activities of daily living, and improved comfort level. Previously pt has been limited by pain in all these aspects. 7. The risks and benefits of treatment have been discussed at this office visit with the pt.  she understands that the medication has addictive potential.  Additionally the pt has been advised that narcotic pain medication may impair mental and/or physical ability required for performance of tasks such as driving or operating any other machinery. 8. Pt has an updated signed pain contract on file and can be found under the media section of the [facility] chart. 9. The pain contract is reviewed. Pain medication will be continued at the SAME dosage. PILL COUNT: ~20 pills reported during VV . Last fill date 4/9/23 per . Urine drug screening ordered/collected today. Additional diagnostic studies are not indicated at this time. Interventional procedure are not indicated at this time. Narcan prescribed already. 10. Medication prescibed is oxycodone  every 12 PRN #45 with 2 refills for a 3 month supply.  was reviewed while planning for pain/anxiety management, no indications of drug diversion suspected.  Prescription history is no suspicious for controlled substance overuse. 11. Patient instructions have been reviewed in detail as outlined above and in the pain contract. 12. Re-evaluation is planned for 3 months. Current Outpatient Medications   Medication Sig    [START ON 7/8/2023] oxyCODONE-acetaminophen (PERCOCET 10)  mg per tablet Take 1 Tablet by mouth two (2) times daily as needed for Pain for up to 30 days. Max Daily Amount: 2 Tablets. Indications: pain    [START ON 6/8/2023] oxyCODONE-acetaminophen (PERCOCET 10)  mg per tablet Take 1 Tablet by mouth every twelve (12) hours as needed for Pain for up to 30 days. Max Daily Amount: 2 Tablets. Indications: pain    [START ON 5/9/2023] oxyCODONE-acetaminophen (PERCOCET 10)  mg per tablet Take 1 Tablet by mouth two (2) times daily as needed for Pain for up to 30 days. Max Daily Amount: 2 Tablets. Indications: pain    oxyCODONE-acetaminophen (PERCOCET 10)  mg per tablet Take 1 Tablet by mouth every twelve (12) hours as needed for Pain for up to 30 days. Max Daily Amount: 2 Tablets. Indications: pain    DULoxetine (CYMBALTA) 30 mg capsule TAKE 1 CAP BY MOUTH DAILY. TAKE WITH 60 MG CAPSULE    metoprolol succinate (TOPROL-XL) 25 mg XL tablet TAKE 1 TABLET BY MOUTH EVERY DAY    levothyroxine (SYNTHROID) 125 mcg tablet TAKE 1 TABLET BY MOUTH EVERY DAY BEFORE BREAKFAST    amLODIPine (NORVASC) 5 mg tablet TAKE 1 TABLET BY MOUTH EVERY DAY    hydroCHLOROthiazide (HYDRODIURIL) 25 mg tablet TAKE 1 TAB BY MOUTH DAILY FOR HYPERTENSION    traZODone (DESYREL) 100 mg tablet TAKE 1 TABLET BY MOUTH EVERY DAY AT NIGHT    DULoxetine (CYMBALTA) 60 mg capsule TAKE 1 CAPSULE BY MOUTH EVERY DAY    montelukast (SINGULAIR) 10 mg tablet Take 1 Tablet by mouth daily. pantoprazole (PROTONIX) 40 mg tablet Take 1 Tablet by mouth two (2) times a day.     sucralfate (CARAFATE) 1 gram tablet TAKE 1 TABLET BY MOUTH FOUR TIMES A DAY    Combivent Respimat  mcg/actuation inhaler INHALE 1 PUFF BY MOUTH EVERY 6 HOURS AS NEEDED FOR WHEEZING    Symbicort 160-4.5 mcg/actuation HFAA TAKE 2 PUFFS BY INHALATION TWO (2) TIMES A DAY. pregabalin (LYRICA) 100 mg capsule Take 1 Cap by mouth three (3) times daily. Max Daily Amount: 300 mg. XOLAIR 150 mg solr every thirty (30) days. Indications: injection    hydrOXYzine HCl (ATARAX) 25 mg tablet TAKE 1 TABLET BY MOUTH 3 TIMES A DAY AS NEEDED FOR ITCHING FOR ANXIETY    tiotropium (SPIRIVA) 18 mcg inhalation capsule Take 1 Capsule by inhalation daily. fluticasone (FLONASE) 50 mcg/actuation nasal spray 2 Sprays by Both Nostrils route daily. loratadine (CLARITIN) 10 mg tablet TAKE 1 TABLET BY MOUTH EVERY DAY    methocarbamoL (ROBAXIN) 750 mg tablet TAKE 1 TABLET BY MOUTH EVERY 6 HOURS AS NEEDED SPASMS    ondansetron hcl (ZOFRAN) 4 mg tablet Take 1 Tablet by mouth every eight (8) hours as needed for Nausea or Vomiting. acetaminophen (Tylenol Extra Strength) 500 mg tablet Take 2 Tablets by mouth every six (6) hours as needed for Pain. azelastine (ASTELIN) 137 mcg (0.1 %) nasal spray 1 [place] by Both Nostrils route as needed for Rhinitis. diclofenac (VOLTAREN) 1 % gel Apply  to affected area as needed for Pain.    lidocaine (Lidoderm) 5 % Apply patch to the affected area for 12 hours a day and remove for 12 hours a day.    naloxone (Narcan) 4 mg/actuation nasal spray Use 1 spray into 1 nostril for OVERDOSE. Call 911. For subsequent doses, give in alternating nostrils. May repeat every 2 to 3 min. furosemide (LASIX) 20 mg tablet TAKE 1 TABLET BY MOUTH EVERY DAY    albuterol (PROVENTIL VENTOLIN) 2.5 mg /3 mL (0.083 %) nebulizer solution 3 mL by Nebulization route every four (4) hours as needed for Wheezing. miscellaneous medical supply misc Shower seat for chronic knee pain, pt planning to have right TKR in June due to condition. Very limited range of motion. (Patient not taking: No sig reported)     No current facility-administered medications for this visit. Objective:   Vital Signs: (As obtained by patient/caregiver at home)  Visit Vitals  LMP 12/29/2016 (Exact Date) Comment: partial hysterectomy              Physical Exam:  General appearance - alert, well appearing, and in no acute distress. Outside, standing without grimacing. Mental status - A/O x 4, normal mood and affect. Smiling. Eyes- no periorbital edema, drainage, or irritation noted. Nose- no obvious drainage or swelling. Throat- no obvious swelling, goiter, or notable lymphadenopathy  Chest - Symmetric chest rise. No wheezing or coughing. No distress. Skin- normal skin tone noted. No hyperpigmentation or obvious deformities. No diaphoresis noted. No flushing. Neuro - Normal speech, no focal findings or movement disorder. Other pertinent observable physical exam findings:-        We discussed the expected course, resolution and complications of the diagnosis(es) in detail. Medication risks, benefits, costs, interactions, and alternatives were discussed as indicated. I advised her to contact the office if her condition worsens, changes or fails to improve as anticipated. She expressed understanding with the diagnosis(es) and plan. Vinny Chuck, was evaluated through a synchronous (real-time) audio-video encounter. The patient (or guardian if applicable) is aware that this is a billable service, which includes applicable co-pays. This Virtual Visit was conducted with patient's (and/or legal guardian's) consent. The visit was conducted pursuant to the emergency declaration under the [address] [place] [address] and the [employer] and [employer] General Act. Patient identification was verified, and a caregiver was present when appropriate.   The patient was located at: Home: [address] [place] [id number]  The provider was located at: Home: [unfilled]   [place]      An [address] [facility] was used to authenticate this note.   -- Nilesh Christianson NP

## 2023-04-27 ENCOUNTER — PATIENT OUTREACH (OUTPATIENT)
Dept: CASE MANAGEMENT | Age: 58
End: 2023-04-27

## 2023-04-27 NOTE — ACP (ADVANCE CARE PLANNING)
4/27/2023  2:03 PM    Advance Medical Directive on file; reviewed with patient today and document is current, per patient.

## 2023-04-27 NOTE — PROGRESS NOTES
Ambulatory Care Management Note    Date/Time:  4/27/2023 1:55 PM    Patient Current Location: [place]    This [facility]) reviewed and updated the following screenings during this call; general assessment, disease specific assessment , SDOH assessments, ACP assessment and note, medication reconciliation , and health maintenance review. Patient's challenges to self-management identified:   transportation (Patient does not currently have a vehicle; her vehicle has been in the  shop for four months because she is unable to afford the estimated repair cost of approximately $6,000. Patient has an extended warranty on the vehicle through [employer], however they are only willing to pay $1500 towards the repairs; additionally, she is unable to reach their customer service department to discuss the issue. Patient has attempted to get loan financing for the vehicle repairs, however she has been unsuccessful. Patient is paying the  $200-$300 per month towards the repairs; she has made a total of one payment.)      Medication Management:  good adherence and good understanding  Med Rec during this call  Current Outpatient Medications   Medication Sig    oxyCODONE-acetaminophen (PERCOCET 10)  mg per tablet Take 1 Tablet by mouth every twelve (12) hours as needed for Pain for up to 30 days. Max Daily Amount: 2 Tablets. Indications: pain    loratadine (CLARITIN) 10 mg tablet TAKE 1 TABLET BY MOUTH EVERY DAY    DULoxetine (CYMBALTA) 30 mg capsule TAKE 1 CAP BY MOUTH DAILY.  TAKE WITH 60 MG CAPSULE    methocarbamoL (ROBAXIN) 750 mg tablet TAKE 1 TABLET BY MOUTH EVERY 6 HOURS AS NEEDED SPASMS    ondansetron hcl (ZOFRAN) 4 mg tablet Take 1 Tablet by mouth every eight (8) hours as needed for Nausea or Vomiting.    metoprolol succinate (TOPROL-XL) 25 mg XL tablet TAKE 1 TABLET BY MOUTH EVERY DAY    acetaminophen (Tylenol Extra Strength) 500 mg tablet Take 2 Tablets by mouth every six (6) hours as needed for Pain.    levothyroxine (SYNTHROID) 125 mcg tablet TAKE 1 TABLET BY MOUTH EVERY DAY BEFORE BREAKFAST    amLODIPine (NORVASC) 5 mg tablet TAKE 1 TABLET BY MOUTH EVERY DAY    hydroCHLOROthiazide (HYDRODIURIL) 25 mg tablet TAKE 1 TAB BY MOUTH DAILY FOR HYPERTENSION    azelastine (ASTELIN) 137 mcg (0.1 %) nasal spray 1 [place] by Both Nostrils route as needed for Rhinitis. traZODone (DESYREL) 100 mg tablet TAKE 1 TABLET BY MOUTH EVERY DAY AT NIGHT    DULoxetine (CYMBALTA) 60 mg capsule TAKE 1 CAPSULE BY MOUTH EVERY DAY    montelukast (SINGULAIR) 10 mg tablet Take 1 Tablet by mouth daily. pantoprazole (PROTONIX) 40 mg tablet Take 1 Tablet by mouth two (2) times a day. diclofenac (VOLTAREN) 1 % gel Apply  to affected area as needed for Pain.    lidocaine (Lidoderm) 5 % Apply patch to the affected area for 12 hours a day and remove for 12 hours a day. sucralfate (CARAFATE) 1 gram tablet TAKE 1 TABLET BY MOUTH FOUR TIMES A DAY    furosemide (LASIX) 20 mg tablet TAKE 1 TABLET BY MOUTH EVERY DAY    Combivent Respimat  mcg/actuation inhaler INHALE 1 PUFF BY MOUTH EVERY 6 HOURS AS NEEDED FOR WHEEZING    Symbicort 160-4.5 mcg/actuation HFAA TAKE 2 PUFFS BY INHALATION TWO (2) TIMES A DAY. pregabalin (LYRICA) 100 mg capsule Take 1 Cap by mouth three (3) times daily. Max Daily Amount: 300 mg. XOLAIR 150 mg solr every thirty (30) days. Indications: injection    hydrOXYzine HCl (ATARAX) 25 mg tablet TAKE 1 TABLET BY MOUTH 3 TIMES A DAY AS NEEDED FOR ITCHING FOR ANXIETY    tiotropium (SPIRIVA) 18 mcg inhalation capsule Take 1 Capsule by inhalation daily. albuterol (PROVENTIL VENTOLIN) 2.5 mg /3 mL (0.083 %) nebulizer solution 3 mL by Nebulization route every four (4) hours as needed for Wheezing. fluticasone (FLONASE) 50 mcg/actuation nasal spray 2 Sprays by Both Nostrils route daily.     [START ON 7/8/2023] oxyCODONE-acetaminophen (PERCOCET 10)  mg per tablet Take 1 Tablet by mouth two (2) times daily as needed for Pain for up to 30 days. Max Daily Amount: 2 Tablets. Indications: pain    [START ON 6/8/2023] oxyCODONE-acetaminophen (PERCOCET 10)  mg per tablet Take 1 Tablet by mouth every twelve (12) hours as needed for Pain for up to 30 days. Max Daily Amount: 2 Tablets. Indications: pain    [START ON 5/9/2023] oxyCODONE-acetaminophen (PERCOCET 10)  mg per tablet Take 1 Tablet by mouth two (2) times daily as needed for Pain for up to 30 days. Max Daily Amount: 2 Tablets. Indications: pain    naloxone (Narcan) 4 mg/actuation nasal spray Use 1 spray into 1 nostril for OVERDOSE. Call 911. For subsequent doses, give in alternating nostrils. May repeat every 2 to 3 min. (Patient not taking: Reported on 4/27/2023)    miscellaneous medical supply misc Shower seat for chronic knee pain, pt planning to have right TKR in June due to condition. Very limited range of motion. (Patient not taking: Reported on 1/3/2023)     No current facility-administered medications for this visit. There are no discontinued medications. Advance Care Planning:   Does patient have an Advance Directive:  yes; reviewed and current     [employer], Referrals, and Durable Medical Equipment: Possible referral to Ambulatory Social Work Team during [employer]) for patient assistance with community resources (transportation); ACM will follow-up with the patient during future outreach encounter(s). Health Maintenance Due   Topic Date Due    COVID-19 Vaccine (1) Never done    Cervical cancer screen  Never done    Shingles Vaccine (1 of 2) Never done    Breast Cancer Screen Mammogram  12/03/2021    Medicare Yearly Exam  04/21/2023     Health Maintenance Reviewed:   COVID-19 Vaccine (1): Patient denies history of receiving COVID-19 Vaccination(s). Cervical Cancer Screen: Date of last screening is unknown, per patient. Shingles Vaccine (1 of 2):  Patient denies history of receiving Shingles Vaccination(s). Breast Cancer Screen Mammogram: Patient reports that her last screening mammogram was completed a couple of years ago. Patient was asked to consider health care goals that they would like to focus on with this ACM. ACM will follow up with patient to discuss goals and establish care plan in the next 7-14 days. PCP/Specialist follow up: No future appointments.

## 2023-06-08 DIAGNOSIS — Z79.891 LONG TERM (CURRENT) USE OF OPIATE ANALGESIC: ICD-10-CM

## 2023-06-08 DIAGNOSIS — M17.11 UNILATERAL PRIMARY OSTEOARTHRITIS, RIGHT KNEE: Primary | ICD-10-CM

## 2023-06-08 RX ORDER — OXYCODONE AND ACETAMINOPHEN 10; 325 MG/1; MG/1
1 TABLET ORAL EVERY 12 HOURS PRN
Qty: 60 TABLET | Refills: 0 | Status: SHIPPED | OUTPATIENT
Start: 2023-06-08 | End: 2023-07-08

## 2023-06-08 RX ORDER — OXYCODONE AND ACETAMINOPHEN 10; 325 MG/1; MG/1
TABLET ORAL
COMMUNITY
Start: 2023-05-09 | End: 2023-06-08 | Stop reason: SDUPTHER

## 2023-06-08 RX ORDER — OXYCODONE AND ACETAMINOPHEN 10; 325 MG/1; MG/1
TABLET ORAL
Status: CANCELLED | OUTPATIENT
Start: 2023-06-08

## 2023-06-08 NOTE — TELEPHONE ENCOUNTER
[facility] not [facility] the [facility]. She wants oxycodone sent to  [facility] @ [address] [place] [address].   Pt # [phone]

## 2023-06-08 NOTE — TELEPHONE ENCOUNTER
Patient is switching pharmacy because the one she was using is out, she [facility] [address]    oxyCODONE-acetaminophen (PERCOCET)  MG per tablet

## 2023-08-04 ENCOUNTER — OFFICE VISIT (OUTPATIENT)
Facility: CLINIC | Age: 58
End: 2023-08-04

## 2023-08-04 VITALS
RESPIRATION RATE: 19 BRPM | OXYGEN SATURATION: 97 % | SYSTOLIC BLOOD PRESSURE: 136 MMHG | DIASTOLIC BLOOD PRESSURE: 86 MMHG | HEIGHT: 61 IN | WEIGHT: 169 LBS | BODY MASS INDEX: 31.91 KG/M2 | HEART RATE: 65 BPM | TEMPERATURE: 97 F

## 2023-08-04 DIAGNOSIS — K08.109 EDENTULOUS: ICD-10-CM

## 2023-08-04 DIAGNOSIS — J41.8 MIXED SIMPLE AND MUCOPURULENT CHRONIC BRONCHITIS (HCC): ICD-10-CM

## 2023-08-04 DIAGNOSIS — Z13.21 SCREENING FOR ENDOCRINE, NUTRITIONAL, METABOLIC AND IMMUNITY DISORDER: ICD-10-CM

## 2023-08-04 DIAGNOSIS — Z11.4 SCREENING FOR HIV WITHOUT PRESENCE OF RISK FACTORS: ICD-10-CM

## 2023-08-04 DIAGNOSIS — Z13.0 SCREENING FOR ENDOCRINE, NUTRITIONAL, METABOLIC AND IMMUNITY DISORDER: ICD-10-CM

## 2023-08-04 DIAGNOSIS — E03.9 ACQUIRED HYPOTHYROIDISM: ICD-10-CM

## 2023-08-04 DIAGNOSIS — Z11.59 NEED FOR HEPATITIS C SCREENING TEST: ICD-10-CM

## 2023-08-04 DIAGNOSIS — F33.1 MAJOR DEPRESSIVE DISORDER, RECURRENT, MODERATE (HCC): ICD-10-CM

## 2023-08-04 DIAGNOSIS — Z13.220 SCREENING FOR LIPID DISORDERS: ICD-10-CM

## 2023-08-04 DIAGNOSIS — M17.11 UNILATERAL PRIMARY OSTEOARTHRITIS, RIGHT KNEE: ICD-10-CM

## 2023-08-04 DIAGNOSIS — Z00.00 MEDICARE ANNUAL WELLNESS VISIT, SUBSEQUENT: Primary | ICD-10-CM

## 2023-08-04 DIAGNOSIS — M15.9 PRIMARY OSTEOARTHRITIS INVOLVING MULTIPLE JOINTS: ICD-10-CM

## 2023-08-04 DIAGNOSIS — Z79.891 CHRONIC USE OF OPIATE FOR THERAPEUTIC PURPOSE: ICD-10-CM

## 2023-08-04 DIAGNOSIS — J44.1 CHRONIC OBSTRUCTIVE PULMONARY DISEASE WITH (ACUTE) EXACERBATION (HCC): ICD-10-CM

## 2023-08-04 DIAGNOSIS — Z13.228 SCREENING FOR ENDOCRINE, NUTRITIONAL, METABOLIC AND IMMUNITY DISORDER: ICD-10-CM

## 2023-08-04 DIAGNOSIS — Z13.29 SCREENING FOR ENDOCRINE, NUTRITIONAL, METABOLIC AND IMMUNITY DISORDER: ICD-10-CM

## 2023-08-04 DIAGNOSIS — Z79.891 LONG TERM (CURRENT) USE OF OPIATE ANALGESIC: ICD-10-CM

## 2023-08-04 RX ORDER — OXYCODONE AND ACETAMINOPHEN 10; 325 MG/1; MG/1
1 TABLET ORAL EVERY 12 HOURS PRN
Qty: 45 TABLET | Refills: 0 | Status: SHIPPED | OUTPATIENT
Start: 2023-08-04 | End: 2023-09-03

## 2023-08-04 RX ORDER — OXYCODONE AND ACETAMINOPHEN 10; 325 MG/1; MG/1
1 TABLET ORAL EVERY 12 HOURS PRN
Qty: 45 TABLET | Refills: 0 | Status: SHIPPED | OUTPATIENT
Start: 2023-09-03 | End: 2023-10-03

## 2023-08-04 RX ORDER — OXYCODONE AND ACETAMINOPHEN 10; 325 MG/1; MG/1
1 TABLET ORAL EVERY 12 HOURS PRN
Qty: 45 TABLET | Refills: 0 | Status: SHIPPED | OUTPATIENT
Start: 2023-10-03 | End: 2023-11-02

## 2023-08-04 SDOH — ECONOMIC STABILITY: FOOD INSECURITY: WITHIN THE PAST 12 MONTHS, THE FOOD YOU BOUGHT JUST DIDN'T LAST AND YOU DIDN'T HAVE MONEY TO GET MORE.: NEVER TRUE

## 2023-08-04 SDOH — ECONOMIC STABILITY: INCOME INSECURITY: HOW HARD IS IT FOR YOU TO PAY FOR THE VERY BASICS LIKE FOOD, HOUSING, MEDICAL CARE, AND HEATING?: NOT HARD AT ALL

## 2023-08-04 SDOH — ECONOMIC STABILITY: FOOD INSECURITY: WITHIN THE PAST 12 MONTHS, YOU WORRIED THAT YOUR FOOD WOULD RUN OUT BEFORE YOU GOT MONEY TO BUY MORE.: NEVER TRUE

## 2023-08-04 SDOH — ECONOMIC STABILITY: HOUSING INSECURITY
IN THE LAST 12 MONTHS, WAS THERE A TIME WHEN YOU DID NOT HAVE A STEADY PLACE TO SLEEP OR SLEPT IN A SHELTER (INCLUDING NOW)?: NO

## 2023-08-04 ASSESSMENT — PATIENT HEALTH QUESTIONNAIRE - PHQ9
8. MOVING OR SPEAKING SO SLOWLY THAT OTHER PEOPLE COULD HAVE NOTICED. OR THE OPPOSITE, BEING SO FIGETY OR RESTLESS THAT YOU HAVE BEEN MOVING AROUND A LOT MORE THAN USUAL: 0
SUM OF ALL RESPONSES TO PHQ QUESTIONS 1-9: 1
1. LITTLE INTEREST OR PLEASURE IN DOING THINGS: 0
9. THOUGHTS THAT YOU WOULD BE BETTER OFF DEAD, OR OF HURTING YOURSELF: 0
10. IF YOU CHECKED OFF ANY PROBLEMS, HOW DIFFICULT HAVE THESE PROBLEMS MADE IT FOR YOU TO DO YOUR WORK, TAKE CARE OF THINGS AT HOME, OR GET ALONG WITH OTHER PEOPLE: 0
2. FEELING DOWN, DEPRESSED OR HOPELESS: 0
SUM OF ALL RESPONSES TO PHQ QUESTIONS 1-9: 1
6. FEELING BAD ABOUT YOURSELF - OR THAT YOU ARE A FAILURE OR HAVE LET YOURSELF OR YOUR FAMILY DOWN: 0
SUM OF ALL RESPONSES TO PHQ QUESTIONS 1-9: 1
4. FEELING TIRED OR HAVING LITTLE ENERGY: 0
SUM OF ALL RESPONSES TO PHQ9 QUESTIONS 1 & 2: 0
SUM OF ALL RESPONSES TO PHQ QUESTIONS 1-9: 1
5. POOR APPETITE OR OVEREATING: 1
7. TROUBLE CONCENTRATING ON THINGS, SUCH AS READING THE NEWSPAPER OR WATCHING TELEVISION: 0
3. TROUBLE FALLING OR STAYING ASLEEP: 0

## 2023-08-04 ASSESSMENT — LIFESTYLE VARIABLES
HOW MANY STANDARD DRINKS CONTAINING ALCOHOL DO YOU HAVE ON A TYPICAL DAY: 1 OR 2
HOW OFTEN DO YOU HAVE A DRINK CONTAINING ALCOHOL: MONTHLY OR LESS
HOW OFTEN DO YOU HAVE A DRINK CONTAINING ALCOHOL: NEVER
HOW MANY STANDARD DRINKS CONTAINING ALCOHOL DO YOU HAVE ON A TYPICAL DAY: PATIENT DOES NOT DRINK

## 2023-08-04 NOTE — PROGRESS NOTES
Pt is here for   Chief Complaint   Patient presents with    Medication Refill     Pain med refill     Labs Only     annual    Medicare AWV     1. Have you been to the ER, urgent care clinic since your last visit? Hospitalized since your last visit? No    2. Have you seen or consulted any other health care providers outside of the [address] [place] [address] Avenue since your last visit? Include any pap smears or colon screening.  No
Elliot Mathew - NP as PCP - General  LOUISA Scherer - NP as PCP - Empaneled Provider     Reviewed and updated this visit:  Tobacco  Allergies  Meds  Problems  Med Hx  Surg Hx  Soc Hx  Fam Hx

## 2023-08-05 LAB
ALBUMIN SERPL-MCNC: 3.6 G/DL (ref 3.8–4.9)
ALBUMIN/GLOB SERPL: 1.1 {RATIO} (ref 1.2–2.2)
ALP SERPL-CCNC: 117 IU/L (ref 44–121)
ALT SERPL-CCNC: 12 IU/L (ref 0–32)
AST SERPL-CCNC: 19 IU/L (ref 0–40)
BASOPHILS # BLD AUTO: 0 X10E3/UL (ref 0–0.2)
BASOPHILS NFR BLD AUTO: 1 %
BILIRUB SERPL-MCNC: 0.5 MG/DL (ref 0–1.2)
BUN SERPL-MCNC: 9 MG/DL (ref 8–27)
BUN/CREAT SERPL: 9 (ref 12–28)
CALCIUM SERPL-MCNC: 8.7 MG/DL (ref 8.7–10.3)
CHLORIDE SERPL-SCNC: 105 MMOL/L (ref 96–106)
CHOLEST SERPL-MCNC: 143 MG/DL (ref 100–199)
CO2 SERPL-SCNC: 23 MMOL/L (ref 20–29)
CREAT SERPL-MCNC: 0.99 MG/DL (ref 0.57–1)
EGFRCR SERPLBLD CKD-EPI 2021: 65 ML/MIN/1.73
EOSINOPHIL # BLD AUTO: 0.1 X10E3/UL (ref 0–0.4)
EOSINOPHIL NFR BLD AUTO: 2 %
ERYTHROCYTE [DISTWIDTH] IN BLOOD BY AUTOMATED COUNT: 13.4 % (ref 11.7–15.4)
EST. AVERAGE GLUCOSE BLD GHB EST-MCNC: 105 MG/DL
GLOBULIN SER CALC-MCNC: 3.2 G/DL (ref 1.5–4.5)
GLUCOSE SERPL-MCNC: 74 MG/DL (ref 70–99)
HBA1C MFR BLD: 5.3 % (ref 4.8–5.6)
HCT VFR BLD AUTO: 43.7 % (ref 34–46.6)
HCV RNA SERPL NAA+PROBE-ACNC: NORMAL IU/ML
HDLC SERPL-MCNC: 63 MG/DL
HGB BLD-MCNC: 14.3 G/DL (ref 11.1–15.9)
HIV 1+2 AB+HIV1 P24 AG SERPL QL IA: NON REACTIVE
IMM GRANULOCYTES # BLD AUTO: 0 X10E3/UL (ref 0–0.1)
IMM GRANULOCYTES NFR BLD AUTO: 0 %
LDLC SERPL CALC-MCNC: 69 MG/DL (ref 0–99)
LYMPHOCYTES # BLD AUTO: 2.6 X10E3/UL (ref 0.7–3.1)
LYMPHOCYTES NFR BLD AUTO: 53 %
MCH RBC QN AUTO: 29.1 PG (ref 26.6–33)
MCHC RBC AUTO-ENTMCNC: 32.7 G/DL (ref 31.5–35.7)
MCV RBC AUTO: 89 FL (ref 79–97)
MONOCYTES # BLD AUTO: 0.4 X10E3/UL (ref 0.1–0.9)
MONOCYTES NFR BLD AUTO: 8 %
NEUTROPHILS # BLD AUTO: 1.7 X10E3/UL (ref 1.4–7)
NEUTROPHILS NFR BLD AUTO: 36 %
PLATELET # BLD AUTO: 178 X10E3/UL (ref 150–450)
POTASSIUM SERPL-SCNC: 3.9 MMOL/L (ref 3.5–5.2)
PROT SERPL-MCNC: 6.8 G/DL (ref 6–8.5)
RBC # BLD AUTO: 4.92 X10E6/UL (ref 3.77–5.28)
SODIUM SERPL-SCNC: 140 MMOL/L (ref 134–144)
TEST INFORMATION: NORMAL
TRIGL SERPL-MCNC: 52 MG/DL (ref 0–149)
TSH SERPL DL<=0.005 MIU/L-ACNC: 3.85 UIU/ML (ref 0.45–4.5)
VLDLC SERPL CALC-MCNC: 11 MG/DL (ref 5–40)
WBC # BLD AUTO: 4.8 X10E3/UL (ref 3.4–10.8)

## 2023-08-07 LAB — IMP & REVIEW OF LAB RESULTS: NORMAL

## 2023-08-10 LAB — DRUGS UR: NORMAL

## 2023-10-17 NOTE — PROGRESS NOTES
Pt is here for   Chief Complaint   Patient presents with    Medication Refill     Pain medications     1. \"Have you been to the ER, urgent care clinic since your last visit? Hospitalized since your last visit? \" No    2. \"Have you seen or consulted any other health care providers outside of the [address] [place] [address] since your last visit? \" No     3. For patients aged 39-70: Has the patient had a colonoscopy / FIT/ Cologuard? No      If the patient is female:    4. For patients aged 41-77: Has the patient had a mammogram within the past 2 years? No      5. For patients aged 21-65: Has the patient had a pap smear?  No normal

## 2023-11-06 ENCOUNTER — TELEMEDICINE (OUTPATIENT)
Facility: CLINIC | Age: 58
End: 2023-11-06
Payer: MEDICARE

## 2023-11-06 DIAGNOSIS — Z79.891 LONG TERM (CURRENT) USE OF OPIATE ANALGESIC: ICD-10-CM

## 2023-11-06 DIAGNOSIS — M17.11 UNILATERAL PRIMARY OSTEOARTHRITIS, RIGHT KNEE: ICD-10-CM

## 2023-11-06 DIAGNOSIS — M15.9 PRIMARY OSTEOARTHRITIS INVOLVING MULTIPLE JOINTS: ICD-10-CM

## 2023-11-06 PROCEDURE — G8417 CALC BMI ABV UP PARAM F/U: HCPCS | Performed by: NURSE PRACTITIONER

## 2023-11-06 PROCEDURE — 3017F COLORECTAL CA SCREEN DOC REV: CPT | Performed by: NURSE PRACTITIONER

## 2023-11-06 PROCEDURE — G8427 DOCREV CUR MEDS BY ELIG CLIN: HCPCS | Performed by: NURSE PRACTITIONER

## 2023-11-06 PROCEDURE — 4004F PT TOBACCO SCREEN RCVD TLK: CPT | Performed by: NURSE PRACTITIONER

## 2023-11-06 PROCEDURE — 99214 OFFICE O/P EST MOD 30 MIN: CPT | Performed by: NURSE PRACTITIONER

## 2023-11-06 PROCEDURE — G8484 FLU IMMUNIZE NO ADMIN: HCPCS | Performed by: NURSE PRACTITIONER

## 2023-11-06 RX ORDER — OXYCODONE AND ACETAMINOPHEN 10; 325 MG/1; MG/1
1 TABLET ORAL EVERY 12 HOURS PRN
Qty: 60 TABLET | Refills: 0 | Status: SHIPPED | OUTPATIENT
Start: 2024-01-02 | End: 2024-02-01

## 2023-11-06 RX ORDER — OXYCODONE AND ACETAMINOPHEN 10; 325 MG/1; MG/1
1 TABLET ORAL EVERY 12 HOURS PRN
Qty: 60 TABLET | Refills: 0 | Status: SHIPPED | OUTPATIENT
Start: 2023-12-03 | End: 2024-01-02

## 2023-11-06 RX ORDER — ONDANSETRON 4 MG/1
4 TABLET, FILM COATED ORAL EVERY 8 HOURS PRN
Qty: 180 TABLET | Refills: 3 | Status: SHIPPED | OUTPATIENT
Start: 2023-11-06

## 2023-11-06 RX ORDER — NALOXONE HYDROCHLORIDE 4 MG/.1ML
SPRAY NASAL
Qty: 2 EACH | Refills: 0 | Status: SHIPPED | OUTPATIENT
Start: 2023-11-06

## 2023-11-06 SDOH — ECONOMIC STABILITY: FOOD INSECURITY: WITHIN THE PAST 12 MONTHS, THE FOOD YOU BOUGHT JUST DIDN'T LAST AND YOU DIDN'T HAVE MONEY TO GET MORE.: NEVER TRUE

## 2023-11-06 SDOH — ECONOMIC STABILITY: FOOD INSECURITY: WITHIN THE PAST 12 MONTHS, YOU WORRIED THAT YOUR FOOD WOULD RUN OUT BEFORE YOU GOT MONEY TO BUY MORE.: NEVER TRUE

## 2023-11-06 SDOH — ECONOMIC STABILITY: INCOME INSECURITY: HOW HARD IS IT FOR YOU TO PAY FOR THE VERY BASICS LIKE FOOD, HOUSING, MEDICAL CARE, AND HEATING?: NOT HARD AT ALL

## 2023-11-06 ASSESSMENT — PATIENT HEALTH QUESTIONNAIRE - PHQ9
6. FEELING BAD ABOUT YOURSELF - OR THAT YOU ARE A FAILURE OR HAVE LET YOURSELF OR YOUR FAMILY DOWN: 0
SUM OF ALL RESPONSES TO PHQ QUESTIONS 1-9: 2
5. POOR APPETITE OR OVEREATING: 0
1. LITTLE INTEREST OR PLEASURE IN DOING THINGS: 1
2. FEELING DOWN, DEPRESSED OR HOPELESS: 1
3. TROUBLE FALLING OR STAYING ASLEEP: 0
4. FEELING TIRED OR HAVING LITTLE ENERGY: 0
SUM OF ALL RESPONSES TO PHQ QUESTIONS 1-9: 2
9. THOUGHTS THAT YOU WOULD BE BETTER OFF DEAD, OR OF HURTING YOURSELF: 0
SUM OF ALL RESPONSES TO PHQ9 QUESTIONS 1 & 2: 2
7. TROUBLE CONCENTRATING ON THINGS, SUCH AS READING THE NEWSPAPER OR WATCHING TELEVISION: 0
SUM OF ALL RESPONSES TO PHQ QUESTIONS 1-9: 2
SUM OF ALL RESPONSES TO PHQ QUESTIONS 1-9: 2
10. IF YOU CHECKED OFF ANY PROBLEMS, HOW DIFFICULT HAVE THESE PROBLEMS MADE IT FOR YOU TO DO YOUR WORK, TAKE CARE OF THINGS AT HOME, OR GET ALONG WITH OTHER PEOPLE: 1
8. MOVING OR SPEAKING SO SLOWLY THAT OTHER PEOPLE COULD HAVE NOTICED. OR THE OPPOSITE, BEING SO FIGETY OR RESTLESS THAT YOU HAVE BEEN MOVING AROUND A LOT MORE THAN USUAL: 0

## 2023-11-06 ASSESSMENT — ANXIETY QUESTIONNAIRES
IF YOU CHECKED OFF ANY PROBLEMS ON THIS QUESTIONNAIRE, HOW DIFFICULT HAVE THESE PROBLEMS MADE IT FOR YOU TO DO YOUR WORK, TAKE CARE OF THINGS AT HOME, OR GET ALONG WITH OTHER PEOPLE: SOMEWHAT DIFFICULT
3. WORRYING TOO MUCH ABOUT DIFFERENT THINGS: 0
2. NOT BEING ABLE TO STOP OR CONTROL WORRYING: 1
6. BECOMING EASILY ANNOYED OR IRRITABLE: 0
7. FEELING AFRAID AS IF SOMETHING AWFUL MIGHT HAPPEN: 0
5. BEING SO RESTLESS THAT IT IS HARD TO SIT STILL: 0
1. FEELING NERVOUS, ANXIOUS, OR ON EDGE: 1
4. TROUBLE RELAXING: 0
GAD7 TOTAL SCORE: 2

## 2023-11-06 ASSESSMENT — COLUMBIA-SUICIDE SEVERITY RATING SCALE - C-SSRS
3. HAVE YOU BEEN THINKING ABOUT HOW YOU MIGHT KILL YOURSELF?: NO
7. DID THIS OCCUR IN THE LAST THREE MONTHS: NO
4. HAVE YOU HAD THESE THOUGHTS AND HAD SOME INTENTION OF ACTING ON THEM?: NO
5. HAVE YOU STARTED TO WORK OUT OR WORKED OUT THE DETAILS OF HOW TO KILL YOURSELF? DO YOU INTEND TO CARRY OUT THIS PLAN?: NO

## 2023-11-06 NOTE — PROGRESS NOTES
Nelsy Uriostegui is a 59 y.o. female  HIPAA verified by two patient identifiers. Chief Complaint   Patient presents with    Medication Refill         11/6/2023     8:52 AM   Patient-Reported Vitals   Patient-Reported Weight 167lb         Pain Scale:10 /10  Pain Location: Generalized  1. Have you been to the ER, urgent care clinic since your last visit? Hospitalized since your last visit? No    2. Have you seen or consulted any other health care providers outside of the [address] [place] [address] since your last visit? Include any pap smears or colon screening.  No
flushing. Neuro - Normal speech, no focal findings or movement disorder. Other pertinent observable physical exam findings:-        We discussed the expected course, resolution and complications of the diagnosis(es) in detail. Medication risks, benefits, costs, interactions, and alternatives were discussed as indicated. I advised her to contact the office if her condition worsens, changes or fails to improve as anticipated. She expressed understanding with the diagnosis(es) and plan. Matilde Rush, was evaluated through a synchronous (real-time) audio-video encounter. The patient (or guardian if applicable) is aware that this is a billable service, which includes applicable co-pays. This Virtual Visit was conducted with patient's (and/or legal guardian's) consent. The visit was conducted pursuant to the emergency declaration under the [address] authority and the [employer] and [employer] General Act. Patient identification was verified, and a caregiver was present when appropriate. The patient was located at: Home: [address] [place] [address] Road [address]  The provider was located at: Home: [unfilled]   in [address] was used to authenticate this note.   -- Sarah Harman NP

## 2023-11-14 RX ORDER — DULOXETIN HYDROCHLORIDE 60 MG/1
CAPSULE, DELAYED RELEASE ORAL
Qty: 90 CAPSULE | Refills: 3 | Status: SHIPPED | OUTPATIENT
Start: 2023-11-14

## 2023-11-14 RX ORDER — LEVOTHYROXINE SODIUM 0.12 MG/1
125 TABLET ORAL
Qty: 90 TABLET | Refills: 3 | Status: SHIPPED | OUTPATIENT
Start: 2023-11-14

## 2023-11-14 RX ORDER — METOPROLOL SUCCINATE 25 MG/1
25 TABLET, EXTENDED RELEASE ORAL DAILY
Qty: 90 TABLET | Refills: 3 | Status: SHIPPED | OUTPATIENT
Start: 2023-11-14

## 2023-11-14 RX ORDER — LORATADINE 10 MG/1
10 TABLET ORAL DAILY
Qty: 90 TABLET | Refills: 3 | Status: SHIPPED | OUTPATIENT
Start: 2023-11-14

## 2023-11-14 RX ORDER — AZELASTINE 1 MG/ML
1 SPRAY, METERED NASAL PRN
Qty: 30 ML | Refills: 5 | Status: SHIPPED | OUTPATIENT
Start: 2023-11-14

## 2023-11-14 RX ORDER — DULOXETIN HYDROCHLORIDE 30 MG/1
30 CAPSULE, DELAYED RELEASE ORAL DAILY
Qty: 90 CAPSULE | Refills: 3 | Status: SHIPPED | OUTPATIENT
Start: 2023-11-14

## 2023-11-14 RX ORDER — TRAZODONE HYDROCHLORIDE 100 MG/1
100 TABLET ORAL NIGHTLY
Qty: 90 TABLET | Refills: 3 | Status: SHIPPED | OUTPATIENT
Start: 2023-11-14

## 2023-11-14 RX ORDER — FLUTICASONE PROPIONATE 50 MCG
2 SPRAY, SUSPENSION (ML) NASAL DAILY
Qty: 48 G | Refills: 3 | Status: SHIPPED | OUTPATIENT
Start: 2023-11-14

## 2023-11-14 RX ORDER — HYDROCHLOROTHIAZIDE 25 MG/1
TABLET ORAL
Qty: 90 TABLET | Refills: 3 | Status: SHIPPED | OUTPATIENT
Start: 2023-11-14

## 2023-11-14 RX ORDER — METHOCARBAMOL 750 MG/1
TABLET, FILM COATED ORAL
Qty: 270 TABLET | Refills: 3 | Status: SHIPPED | OUTPATIENT
Start: 2023-11-14

## 2023-11-14 RX ORDER — AMLODIPINE BESYLATE 5 MG/1
5 TABLET ORAL DAILY
Qty: 90 TABLET | Refills: 3 | Status: SHIPPED | OUTPATIENT
Start: 2023-11-14

## 2023-12-04 ENCOUNTER — TELEPHONE (OUTPATIENT)
Facility: CLINIC | Age: 58
End: 2023-12-04

## 2023-12-04 NOTE — TELEPHONE ENCOUNTER
Patient called stating her pharmacy notified her of the need of a PA for rx below    oxyCODONE-acetaminophen (ENDOCET)  MG per tablet

## 2024-01-03 ENCOUNTER — TELEPHONE (OUTPATIENT)
Facility: CLINIC | Age: 59
End: 2024-01-03

## 2024-01-03 NOTE — TELEPHONE ENCOUNTER
April with Member services call with patient on the line to find out if  an authorization could be done to get  30 tabs of Percocet.  Patient made aware that her request has not been reviewed at this point. No other further questions.

## 2024-01-03 NOTE — TELEPHONE ENCOUNTER
Patient requesting that [facility] be called at [phone] to give authorization to get Percocet 30 day supply. [facility] will only pay for seven day supply without  written reason and diagnosis for 30 day supply of Percocet.

## 2024-02-02 ENCOUNTER — OFFICE VISIT (OUTPATIENT)
Facility: CLINIC | Age: 59
End: 2024-02-02
Payer: MEDICARE

## 2024-02-02 VITALS
TEMPERATURE: 96.9 F | BODY MASS INDEX: 32.47 KG/M2 | WEIGHT: 172 LBS | DIASTOLIC BLOOD PRESSURE: 84 MMHG | SYSTOLIC BLOOD PRESSURE: 118 MMHG | OXYGEN SATURATION: 100 % | RESPIRATION RATE: 19 BRPM | HEIGHT: 61 IN | HEART RATE: 64 BPM

## 2024-02-02 DIAGNOSIS — Z00.00 MEDICARE ANNUAL WELLNESS VISIT, SUBSEQUENT: Primary | ICD-10-CM

## 2024-02-02 DIAGNOSIS — M54.41 CHRONIC MIDLINE LOW BACK PAIN WITH RIGHT-SIDED SCIATICA: ICD-10-CM

## 2024-02-02 DIAGNOSIS — Z12.31 ENCOUNTER FOR SCREENING MAMMOGRAM FOR MALIGNANT NEOPLASM OF BREAST: ICD-10-CM

## 2024-02-02 DIAGNOSIS — G89.29 CHRONIC MIDLINE LOW BACK PAIN WITH RIGHT-SIDED SCIATICA: ICD-10-CM

## 2024-02-02 DIAGNOSIS — M79.7 FIBROMYALGIA: ICD-10-CM

## 2024-02-02 DIAGNOSIS — M17.11 UNILATERAL PRIMARY OSTEOARTHRITIS, RIGHT KNEE: ICD-10-CM

## 2024-02-02 DIAGNOSIS — M15.9 PRIMARY OSTEOARTHRITIS INVOLVING MULTIPLE JOINTS: ICD-10-CM

## 2024-02-02 DIAGNOSIS — Z79.891 LONG TERM (CURRENT) USE OF OPIATE ANALGESIC: ICD-10-CM

## 2024-02-02 PROCEDURE — 99214 OFFICE O/P EST MOD 30 MIN: CPT | Performed by: NURSE PRACTITIONER

## 2024-02-02 PROCEDURE — 4004F PT TOBACCO SCREEN RCVD TLK: CPT | Performed by: NURSE PRACTITIONER

## 2024-02-02 PROCEDURE — 3017F COLORECTAL CA SCREEN DOC REV: CPT | Performed by: NURSE PRACTITIONER

## 2024-02-02 PROCEDURE — G8484 FLU IMMUNIZE NO ADMIN: HCPCS | Performed by: NURSE PRACTITIONER

## 2024-02-02 PROCEDURE — G8417 CALC BMI ABV UP PARAM F/U: HCPCS | Performed by: NURSE PRACTITIONER

## 2024-02-02 PROCEDURE — G0439 PPPS, SUBSEQ VISIT: HCPCS | Performed by: NURSE PRACTITIONER

## 2024-02-02 PROCEDURE — G8427 DOCREV CUR MEDS BY ELIG CLIN: HCPCS | Performed by: NURSE PRACTITIONER

## 2024-02-02 RX ORDER — OXYCODONE AND ACETAMINOPHEN 10; 325 MG/1; MG/1
1 TABLET ORAL EVERY 12 HOURS PRN
Qty: 60 TABLET | Refills: 0 | Status: SHIPPED | OUTPATIENT
Start: 2024-04-02 | End: 2024-05-02

## 2024-02-02 RX ORDER — OXYCODONE AND ACETAMINOPHEN 10; 325 MG/1; MG/1
1 TABLET ORAL EVERY 12 HOURS PRN
Qty: 60 TABLET | Refills: 0 | Status: SHIPPED | OUTPATIENT
Start: 2024-02-02 | End: 2024-03-03

## 2024-02-02 RX ORDER — OXYCODONE AND ACETAMINOPHEN 10; 325 MG/1; MG/1
1 TABLET ORAL EVERY 12 HOURS PRN
Qty: 60 TABLET | Refills: 0 | Status: SHIPPED | OUTPATIENT
Start: 2024-03-03 | End: 2024-04-02

## 2024-02-02 ASSESSMENT — PATIENT HEALTH QUESTIONNAIRE - PHQ9
1. LITTLE INTEREST OR PLEASURE IN DOING THINGS: 0
3. TROUBLE FALLING OR STAYING ASLEEP: 1
10. IF YOU CHECKED OFF ANY PROBLEMS, HOW DIFFICULT HAVE THESE PROBLEMS MADE IT FOR YOU TO DO YOUR WORK, TAKE CARE OF THINGS AT HOME, OR GET ALONG WITH OTHER PEOPLE: 0
SUM OF ALL RESPONSES TO PHQ QUESTIONS 1-9: 2
8. MOVING OR SPEAKING SO SLOWLY THAT OTHER PEOPLE COULD HAVE NOTICED. OR THE OPPOSITE, BEING SO FIGETY OR RESTLESS THAT YOU HAVE BEEN MOVING AROUND A LOT MORE THAN USUAL: 0
4. FEELING TIRED OR HAVING LITTLE ENERGY: 0
5. POOR APPETITE OR OVEREATING: 0
7. TROUBLE CONCENTRATING ON THINGS, SUCH AS READING THE NEWSPAPER OR WATCHING TELEVISION: 0
SUM OF ALL RESPONSES TO PHQ QUESTIONS 1-9: 2
SUM OF ALL RESPONSES TO PHQ9 QUESTIONS 1 & 2: 1
6. FEELING BAD ABOUT YOURSELF - OR THAT YOU ARE A FAILURE OR HAVE LET YOURSELF OR YOUR FAMILY DOWN: 0
9. THOUGHTS THAT YOU WOULD BE BETTER OFF DEAD, OR OF HURTING YOURSELF: 0
2. FEELING DOWN, DEPRESSED OR HOPELESS: 1

## 2024-02-02 ASSESSMENT — LIFESTYLE VARIABLES
HOW MANY STANDARD DRINKS CONTAINING ALCOHOL DO YOU HAVE ON A TYPICAL DAY: PATIENT DOES NOT DRINK
HOW OFTEN DO YOU HAVE A DRINK CONTAINING ALCOHOL: NEVER

## 2024-02-02 NOTE — PATIENT INSTRUCTIONS
Avoid hot showers and pat dry. Use BLACK SOAP, VANICream wash, OR Dove/Aveeno soap. Apply vaseline like ointment (CERAVEE, Eucerin, or VANICREAM) to affected areas or raw shea butter blended with oil (flaxseed, olive, or coconut). Only use steroids when you have redness and itching at the first sign (start with HYDROCORTISONE over the counter FIRST, then prescription strength if available). Stop steroids once itching and redness resolve, and continue using vaseline like moisturizer.        Starting a Weight Loss Plan: Care Instructions  Overview     If you're thinking about losing weight, it can be hard to know where to start. Your doctor can help you set up a weight loss plan that best meets your needs. You may want to take a class on nutrition or exercise, or you could join a weight loss support group. If you have questions about how to make changes to your eating or exercise habits, ask your doctor about seeing a registered dietitian or an exercise specialist.  It can be a big challenge to lose weight. But you don't have to make huge changes at once. Make small changes, and stick with them. When those changes become habit, add a few more changes.  If you don't think you're ready to make changes right now, try to pick a date in the future. Make an appointment to see your doctor to discuss whether the time is right for you to start a plan.  Follow-up care is a key part of your treatment and safety. Be sure to make and go to all appointments, and call your doctor if you are having problems. It's also a good idea to know your test results and keep a list of the medicines you take.  How can you care for yourself at home?  Set realistic goals. Many people expect to lose much more weight than is likely. A weight loss of 5% to 10% of your body weight may be enough to improve your health.  Get family and friends involved to provide support. Talk to them about why you are trying to lose weight, and ask them to help. They

## 2024-02-02 NOTE — PROGRESS NOTES
Pt is here for   Chief Complaint   Patient presents with    Medication Refill     Pain meds     Medicare AWV     Pt states pain level is a 9/10 all on right side   Last had something for pain last night     1. Have you been to the ER, urgent care clinic since your last visit?  Hospitalized since your last visit?No    2. Have you seen or consulted any other health care providers outside of the [facility] since your last visit?  Include any pap smears or colon screening. No

## 2024-02-02 NOTE — PROGRESS NOTES
Medicare Annual Wellness Visit    Leticia Medellin is here for Medication Refill (Pain meds ) and Medicare AWV    Assessment & Plan   Medicare annual wellness visit, subsequent  Primary osteoarthritis involving multiple joints  -     ToxAssure Select 13; Future  -     oxyCODONE-acetaminophen (ENDOCET)  MG per tablet; Take 1 tablet by mouth every 12 hours as needed for Pain for up to 30 days. Intended supply: 30 days Max Daily Amount: 2 tablets, Disp-60 tablet, R-0Normal  -     oxyCODONE-acetaminophen (PERCOCET)  MG per tablet; Take 1 tablet by mouth every 12 hours as needed for Pain for up to 30 days. Max Daily Amount: 2 tablets, Disp-60 tablet, R-0Normal  -     oxyCODONE-acetaminophen (PERCOCET)  MG per tablet; Take 1 tablet by mouth every 12 hours as needed for Pain for up to 30 days. Max Daily Amount: 2 tablets, Disp-60 tablet, R-0Normal  Chronic midline low back pain with right-sided sciatica  -     ToxAssure Select 13; Future  -     oxyCODONE-acetaminophen (ENDOCET)  MG per tablet; Take 1 tablet by mouth every 12 hours as needed for Pain for up to 30 days. Intended supply: 30 days Max Daily Amount: 2 tablets, Disp-60 tablet, R-0Normal  -     oxyCODONE-acetaminophen (PERCOCET)  MG per tablet; Take 1 tablet by mouth every 12 hours as needed for Pain for up to 30 days. Max Daily Amount: 2 tablets, Disp-60 tablet, R-0Normal  -     oxyCODONE-acetaminophen (PERCOCET)  MG per tablet; Take 1 tablet by mouth every 12 hours as needed for Pain for up to 30 days. Max Daily Amount: 2 tablets, Disp-60 tablet, R-0Normal  Unilateral primary osteoarthritis, right knee  -     ToxAssure Select 13; Future  -     oxyCODONE-acetaminophen (ENDOCET)  MG per tablet; Take 1 tablet by mouth every 12 hours as needed for Pain for up to 30 days. Intended supply: 30 days Max Daily Amount: 2 tablets, Disp-60 tablet, R-0Normal  -     oxyCODONE-acetaminophen (PERCOCET)  MG per tablet; Take 1

## 2024-05-03 ENCOUNTER — HOSPITAL ENCOUNTER (OUTPATIENT)
Facility: HOSPITAL | Age: 59
End: 2024-05-03
Payer: MEDICARE

## 2024-05-03 ENCOUNTER — OFFICE VISIT (OUTPATIENT)
Facility: CLINIC | Age: 59
End: 2024-05-03

## 2024-05-03 VITALS
SYSTOLIC BLOOD PRESSURE: 161 MMHG | TEMPERATURE: 96.9 F | RESPIRATION RATE: 19 BRPM | WEIGHT: 168 LBS | HEART RATE: 64 BPM | DIASTOLIC BLOOD PRESSURE: 87 MMHG | OXYGEN SATURATION: 100 % | BODY MASS INDEX: 31.72 KG/M2 | HEIGHT: 61 IN

## 2024-05-03 DIAGNOSIS — M15.9 PRIMARY OSTEOARTHRITIS INVOLVING MULTIPLE JOINTS: ICD-10-CM

## 2024-05-03 DIAGNOSIS — M54.41 CHRONIC MIDLINE LOW BACK PAIN WITH RIGHT-SIDED SCIATICA: ICD-10-CM

## 2024-05-03 DIAGNOSIS — S89.92XA INJURY OF LEFT LOWER EXTREMITY, INITIAL ENCOUNTER: ICD-10-CM

## 2024-05-03 DIAGNOSIS — Z79.891 LONG TERM (CURRENT) USE OF OPIATE ANALGESIC: ICD-10-CM

## 2024-05-03 DIAGNOSIS — G89.29 CHRONIC MIDLINE LOW BACK PAIN WITH RIGHT-SIDED SCIATICA: ICD-10-CM

## 2024-05-03 DIAGNOSIS — Z91.81 HISTORY OF RECENT FALL: ICD-10-CM

## 2024-05-03 DIAGNOSIS — S89.92XA INJURY OF LEFT LOWER EXTREMITY, INITIAL ENCOUNTER: Primary | ICD-10-CM

## 2024-05-03 DIAGNOSIS — M17.11 UNILATERAL PRIMARY OSTEOARTHRITIS, RIGHT KNEE: ICD-10-CM

## 2024-05-03 DIAGNOSIS — M79.7 FIBROMYALGIA: ICD-10-CM

## 2024-05-03 PROCEDURE — 73590 X-RAY EXAM OF LOWER LEG: CPT

## 2024-05-03 RX ORDER — PREDNISONE 50 MG/1
50 TABLET ORAL DAILY
Qty: 5 TABLET | Refills: 0 | Status: SHIPPED | OUTPATIENT
Start: 2024-05-03 | End: 2024-05-08

## 2024-05-03 RX ORDER — OXYCODONE AND ACETAMINOPHEN 10; 325 MG/1; MG/1
1 TABLET ORAL
Qty: 75 TABLET | Refills: 0 | Status: SHIPPED | OUTPATIENT
Start: 2024-05-03 | End: 2024-06-02

## 2024-05-03 NOTE — PROGRESS NOTES
Pt Is here for   Chief Complaint   Patient presents with    Follow-up    Fall     Pt states that she had a slip and fall last week, running up on the porch out of the rain, hurt her leg and face    Foot Pain     Pt states that her feet has been feeling like its burning and also shooting pains      \"Have you been to the ER, urgent care clinic since your last visit?  Hospitalized since your last visit?\"    NO    “Have you seen or consulted any other health care providers outside of [facility] since your last visit?”    NO    Have you had a mammogram?”   NO    Date of last Mammogram: 12/3/2019      “Have you had a pap smear?”    NO    No cervical cancer screening on file         “Have you had a colorectal cancer screening such as a colonoscopy/FIT/Cologuard?    NO    Date of last Colonoscopy: 1/8/2014  No cologuard on file  No FIT/FOBT on file   No flexible sigmoidoscopy on file         Click Here for Release of Records Request    
mouth daily Take with 60 mg Cymbalta for total dose of 90 mg.    DULoxetine (CYMBALTA) 60 MG extended release capsule TAKE 1 CAPSULE BY MOUTH EVERY DAY. Take with 30 mg Cymbalta for total dose of 90 mg.    hydroCHLOROthiazide (HYDRODIURIL) 25 MG tablet TAKE 1 TAB BY MOUTH DAILY FOR HYPERTENSION    levothyroxine (SYNTHROID) 125 MCG tablet Take 1 tablet by mouth every morning (before breakfast)    loratadine (CLARITIN) 10 MG tablet Take 1 tablet by mouth daily    methocarbamol (ROBAXIN) 750 MG tablet TAKE 1 TABLET BY MOUTH EVERY 6 HOURS AS NEEDED SPASMS    metoprolol succinate (TOPROL XL) 25 MG extended release tablet Take 1 tablet by mouth daily    fluticasone (FLONASE) 50 MCG/ACT nasal spray 2 sprays by Nasal route daily    traZODone (DESYREL) 100 MG tablet Take 1 tablet by mouth nightly    acetaminophen (TYLENOL) 500 MG tablet Take by mouth every 6 hours as needed    albuterol (PROVENTIL) (2.5 MG/3ML) 0.083% nebulizer solution Inhale into the lungs every 4 hours as needed    budesonide-formoterol (SYMBICORT) 160-4.5 MCG/ACT AERO TAKE 2 PUFFS BY INHALATION TWO (2) TIMES A DAY.    furosemide (LASIX) 20 MG tablet TAKE 1 TABLET BY MOUTH EVERY DAY    hydrOXYzine HCl (ATARAX) 25 MG tablet TAKE 1 TABLET BY MOUTH 3 TIMES A DAY AS NEEDED FOR ITCHING FOR ANXIETY    albuterol-ipratropium (COMBIVENT RESPIMAT)  MCG/ACT AERS inhaler INHALE 1 PUFF BY MOUTH EVERY 6 HOURS AS NEEDED FOR WHEEZING    omalizumab 150 MG injection every 30 days    pantoprazole (PROTONIX) 40 MG tablet Take by mouth 2 times daily    pregabalin (LYRICA) 100 MG capsule Take by mouth 3 times daily.    sucralfate (CARAFATE) 1 GM tablet TAKE 1 TABLET BY MOUTH FOUR TIMES A DAY    tiotropium (SPIRIVA) 18 MCG inhalation capsule Inhale 1 capsule into the lungs daily    azelastine (ASTELIN) 0.1 % nasal spray 1 spray by Nasal route as needed for Rhinitis    ondansetron (ZOFRAN) 4 MG tablet Take 1 tablet by mouth every 8 hours as needed for Nausea or Vomiting

## 2024-06-03 ENCOUNTER — TELEMEDICINE (OUTPATIENT)
Facility: CLINIC | Age: 59
End: 2024-06-03
Payer: MEDICARE

## 2024-06-03 DIAGNOSIS — G89.29 CHRONIC MIDLINE LOW BACK PAIN WITH RIGHT-SIDED SCIATICA: ICD-10-CM

## 2024-06-03 DIAGNOSIS — M15.9 PRIMARY OSTEOARTHRITIS INVOLVING MULTIPLE JOINTS: ICD-10-CM

## 2024-06-03 DIAGNOSIS — M79.7 FIBROMYALGIA: ICD-10-CM

## 2024-06-03 DIAGNOSIS — M54.41 CHRONIC MIDLINE LOW BACK PAIN WITH RIGHT-SIDED SCIATICA: ICD-10-CM

## 2024-06-03 DIAGNOSIS — M17.11 UNILATERAL PRIMARY OSTEOARTHRITIS, RIGHT KNEE: ICD-10-CM

## 2024-06-03 DIAGNOSIS — Z79.891 LONG TERM (CURRENT) USE OF OPIATE ANALGESIC: ICD-10-CM

## 2024-06-03 PROCEDURE — 99214 OFFICE O/P EST MOD 30 MIN: CPT | Performed by: NURSE PRACTITIONER

## 2024-06-03 PROCEDURE — G8417 CALC BMI ABV UP PARAM F/U: HCPCS | Performed by: NURSE PRACTITIONER

## 2024-06-03 PROCEDURE — G8427 DOCREV CUR MEDS BY ELIG CLIN: HCPCS | Performed by: NURSE PRACTITIONER

## 2024-06-03 PROCEDURE — 3017F COLORECTAL CA SCREEN DOC REV: CPT | Performed by: NURSE PRACTITIONER

## 2024-06-03 PROCEDURE — 4004F PT TOBACCO SCREEN RCVD TLK: CPT | Performed by: NURSE PRACTITIONER

## 2024-06-03 RX ORDER — OXYCODONE AND ACETAMINOPHEN 10; 325 MG/1; MG/1
1 TABLET ORAL
Qty: 75 TABLET | Refills: 0 | Status: SHIPPED | OUTPATIENT
Start: 2024-06-03 | End: 2024-07-03

## 2024-06-03 RX ORDER — OXYCODONE AND ACETAMINOPHEN 10; 325 MG/1; MG/1
1 TABLET ORAL EVERY 12 HOURS PRN
Qty: 60 TABLET | Refills: 0 | Status: SHIPPED | OUTPATIENT
Start: 2024-07-03 | End: 2024-08-02

## 2024-06-03 NOTE — PROGRESS NOTES
Leticia Medellin is a 58 y.o. female  Chief Complaint   Patient presents with    Other     Pain in 95% of body    Medication Refill     \"Have you been to the ER, urgent care clinic since your last visit?  Hospitalized since your last visit?\"    NO    “Have you seen or consulted any other health care providers outside of [facility] since your last visit?”    NO    Have you had a mammogram?”   NO    Date of last Mammogram: 12/3/2019      “Have you had a pap smear?”    NO    No cervical cancer screening on file         “Have you had a colorectal cancer screening such as a colonoscopy/FIT/Cologuard?    NO    Date of last Colonoscopy: 1/8/2014  No cologuard on file  No FIT/FOBT on file   No flexible sigmoidoscopy on file         Click Here for Release of Records Request    
    Leticia Medellin, was evaluated through a synchronous (real-time) audio-video encounter. The patient (or guardian if applicable) is aware that this is a billable service, which includes applicable co-pays. This Virtual Visit was conducted with patient's (and/or legal guardian's) consent. Patient identification was verified, and a caregiver was present when appropriate.   The patient was located at Home: [address] [place] [id number]  Provider was located at Home (Appt Dept State): [place]  Confirm you are appropriately licensed, registered, or certified to deliver care in the state where the patient is located as indicated above. If you are not or unsure, please re-schedule the visit: Yes, I confirm.         An electronic signature was used to authenticate this note.  -- Destiny Decker NP

## 2024-06-07 ENCOUNTER — HOSPITAL ENCOUNTER (EMERGENCY)
Facility: HOSPITAL | Age: 59
Discharge: HOME | End: 2024-06-07
Payer: MEDICARE

## 2024-06-07 ENCOUNTER — APPOINTMENT (OUTPATIENT)
Facility: HOSPITAL | Age: 59
End: 2024-06-07
Payer: MEDICARE

## 2024-06-07 VITALS
HEART RATE: 69 BPM | DIASTOLIC BLOOD PRESSURE: 74 MMHG | SYSTOLIC BLOOD PRESSURE: 167 MMHG | WEIGHT: 168 LBS | TEMPERATURE: 98.7 F | RESPIRATION RATE: 18 BRPM | OXYGEN SATURATION: 96 % | BODY MASS INDEX: 31.72 KG/M2 | HEIGHT: 61 IN

## 2024-06-07 DIAGNOSIS — S50.01XA CONTUSION OF RIGHT ELBOW, INITIAL ENCOUNTER: Primary | ICD-10-CM

## 2024-06-07 PROCEDURE — 73080 X-RAY EXAM OF ELBOW: CPT

## 2024-06-07 PROCEDURE — 99283 EMERGENCY DEPT VISIT LOW MDM: CPT

## 2024-06-07 PROCEDURE — 99284 EMERGENCY DEPT VISIT MOD MDM: CPT

## 2024-06-07 PROCEDURE — 93971 EXTREMITY STUDY: CPT

## 2024-06-07 RX ORDER — ACETAMINOPHEN 500 MG
1000 TABLET ORAL EVERY 6 HOURS PRN
Qty: 20 TABLET | Refills: 0 | Status: SHIPPED | OUTPATIENT
Start: 2024-06-07

## 2024-06-07 RX ORDER — IBUPROFEN 800 MG/1
800 TABLET ORAL EVERY 8 HOURS PRN
Qty: 20 TABLET | Refills: 0 | Status: SHIPPED | OUTPATIENT
Start: 2024-06-07

## 2024-06-07 ASSESSMENT — ENCOUNTER SYMPTOMS
WHEEZING: 0
SORE THROAT: 0
EYE PAIN: 0
ABDOMINAL PAIN: 0
COLOR CHANGE: 1
DIARRHEA: 0
SHORTNESS OF BREATH: 0
RHINORRHEA: 0
VOMITING: 0
CHEST TIGHTNESS: 0
COUGH: 0
NAUSEA: 0
BACK PAIN: 0

## 2024-06-07 ASSESSMENT — PAIN DESCRIPTION - DESCRIPTORS: DESCRIPTORS: THROBBING

## 2024-06-07 ASSESSMENT — PAIN DESCRIPTION - ORIENTATION: ORIENTATION: RIGHT

## 2024-06-07 ASSESSMENT — PAIN DESCRIPTION - LOCATION: LOCATION: ARM

## 2024-06-07 ASSESSMENT — PAIN - FUNCTIONAL ASSESSMENT: PAIN_FUNCTIONAL_ASSESSMENT: 0-10

## 2024-06-07 ASSESSMENT — PAIN SCALES - GENERAL: PAINLEVEL_OUTOF10: 8

## 2024-06-07 ASSESSMENT — PAIN DESCRIPTION - PAIN TYPE: TYPE: ACUTE PAIN

## 2024-06-08 ENCOUNTER — APPOINTMENT (OUTPATIENT)
Facility: HOSPITAL | Age: 59
End: 2024-06-08
Payer: MEDICARE

## 2024-06-08 ENCOUNTER — HOSPITAL ENCOUNTER (EMERGENCY)
Facility: HOSPITAL | Age: 59
Discharge: HOME | End: 2024-06-08
Attending: EMERGENCY MEDICINE
Payer: MEDICARE

## 2024-06-08 VITALS
TEMPERATURE: 98.2 F | DIASTOLIC BLOOD PRESSURE: 90 MMHG | OXYGEN SATURATION: 96 % | RESPIRATION RATE: 18 BRPM | HEIGHT: 61 IN | BODY MASS INDEX: 31.72 KG/M2 | HEART RATE: 67 BPM | SYSTOLIC BLOOD PRESSURE: 137 MMHG | WEIGHT: 168 LBS
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
## 2024-06-08 DIAGNOSIS — M79.631 RIGHT FOREARM PAIN: ICD-10-CM

## 2024-06-08 DIAGNOSIS — G89.4 CHRONIC PAIN SYNDROME: ICD-10-CM

## 2024-06-08 DIAGNOSIS — M79.641 RIGHT HAND PAIN: Primary | ICD-10-CM

## 2024-06-08 LAB — ECHO BSA: 1.81 M2

## 2024-06-08 PROCEDURE — 73130 X-RAY EXAM OF HAND: CPT

## 2024-06-08 PROCEDURE — 6370000000 HC RX 637 (ALT 250 FOR IP): Performed by: EMERGENCY MEDICINE

## 2024-06-08 PROCEDURE — 73090 X-RAY EXAM OF FOREARM: CPT

## 2024-06-08 PROCEDURE — 99283 EMERGENCY DEPT VISIT LOW MDM: CPT

## 2024-06-08 RX ORDER — OXYCODONE HYDROCHLORIDE AND ACETAMINOPHEN 5; 325 MG/1; MG/1
1 TABLET ORAL
Status: COMPLETED | OUTPATIENT
Start: 2024-06-08 | End: 2024-06-08

## 2024-06-08 RX ORDER — KETOROLAC TROMETHAMINE 30 MG/ML
30 INJECTION, SOLUTION INTRAMUSCULAR; INTRAVENOUS ONCE
Status: DISCONTINUED | OUTPATIENT
Start: 2024-06-08 | End: 2024-06-08

## 2024-06-08 RX ADMIN — OXYCODONE HYDROCHLORIDE AND ACETAMINOPHEN 1 TABLET: 5; 325 TABLET ORAL at 21:54

## 2024-06-08 ASSESSMENT — PAIN DESCRIPTION - DESCRIPTORS
DESCRIPTORS_2: ACHING
DESCRIPTORS: ACHING
DESCRIPTORS: ACHING

## 2024-06-08 ASSESSMENT — LIFESTYLE VARIABLES
HOW OFTEN DO YOU HAVE A DRINK CONTAINING ALCOHOL: NEVER
HOW MANY STANDARD DRINKS CONTAINING ALCOHOL DO YOU HAVE ON A TYPICAL DAY: PATIENT DOES NOT DRINK

## 2024-06-08 ASSESSMENT — ENCOUNTER SYMPTOMS
ABDOMINAL PAIN: 0
NAUSEA: 0
RHINORRHEA: 0
SORE THROAT: 0
SHORTNESS OF BREATH: 0
DIARRHEA: 0
EYE PAIN: 0
COUGH: 0
VOMITING: 0

## 2024-06-08 ASSESSMENT — PAIN DESCRIPTION - INTENSITY: RATING_2: 7

## 2024-06-08 ASSESSMENT — PAIN DESCRIPTION - LOCATION
LOCATION: ELBOW
LOCATION: ELBOW
LOCATION_2: HAND

## 2024-06-08 ASSESSMENT — PAIN DESCRIPTION - ORIENTATION
ORIENTATION: RIGHT
ORIENTATION_2: RIGHT
ORIENTATION: RIGHT

## 2024-06-08 ASSESSMENT — PAIN SCALES - GENERAL
PAINLEVEL_OUTOF10: 9
PAINLEVEL_OUTOF10: 9
PAINLEVEL_OUTOF10: 5

## 2024-06-08 ASSESSMENT — PAIN - FUNCTIONAL ASSESSMENT
PAIN_FUNCTIONAL_ASSESSMENT: 0-10
PAIN_FUNCTIONAL_ASSESSMENT: 0-10

## 2024-06-08 NOTE — ED NOTES
Discharge instructions were given to the patient by rekha.     The patient left the Emergency Department ambulatory, alert and oriented and in no acute distress with 2 prescriptions. The patient was encouraged to call or return to the ED for worsening issues or problems and was encouraged to schedule a follow up appointment for continuing care.     The patient verbalized understanding of discharge instructions and prescriptions, all questions were answered. The patient has no further concerns at this time.

## 2024-06-08 NOTE — ED NOTES
Pt discharged home. Home care and follow-up instructions given to pt. Pt verbalized understanding. No IV. No distress observed. Right elbow ace wrapped. 2 prescriptions sent to pt's Pharmacy. Pt ambulated self to exit.

## 2024-06-08 NOTE — ED PROVIDER NOTES
ordered, EKG's are interpreted by the Emergency Department Physician in the absence of a cardiologist.  Please see their note for interpretation of EKG.      RADIOLOGY:  Non-plain film images such as CT, Ultrasound and MRI are read by the radiologist. Plain radiographic images are visualized and preliminarily interpreted by the ED Provider with the below findings:          Interpretation per the Radiologist below, if available at the time of this note:     XR ELBOW RIGHT (MIN 3 VIEWS)   Final Result   No acute abnormality.      Electronically signed by BARB CAGE      Vascular duplex upper extremity venous right               PROCEDURES   Unless otherwise noted below, none  Procedures     CRITICAL CARE TIME   none    EMERGENCY DEPARTMENT COURSE and DIFFERENTIAL DIAGNOSIS/MDM   Initial assessment performed. The patients presenting problems have been discussed, and they are in agreement with the care plan formulated and outlined with them.  I have encouraged them to ask questions as they arise throughout their visit.    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 Pt has been counseled on the dangers of smoking and was encouraged to quit as soon as possible in order to decrease further risks to their health. Pt has conveyed their understanding of the risks involved should they continue to use tobacco products. 4 min discussion.      Social Determinants

## 2024-06-17 ENCOUNTER — TELEMEDICINE (OUTPATIENT)
Facility: CLINIC | Age: 59
End: 2024-06-17
Payer: MEDICARE

## 2024-06-17 DIAGNOSIS — Z91.81 HISTORY OF RECENT FALL: ICD-10-CM

## 2024-06-17 DIAGNOSIS — S53.401A ELBOW SPRAIN, RIGHT, INITIAL ENCOUNTER: Primary | ICD-10-CM

## 2024-06-17 PROCEDURE — 3017F COLORECTAL CA SCREEN DOC REV: CPT | Performed by: NURSE PRACTITIONER

## 2024-06-17 PROCEDURE — G8417 CALC BMI ABV UP PARAM F/U: HCPCS | Performed by: NURSE PRACTITIONER

## 2024-06-17 PROCEDURE — G8427 DOCREV CUR MEDS BY ELIG CLIN: HCPCS | Performed by: NURSE PRACTITIONER

## 2024-06-17 PROCEDURE — 4004F PT TOBACCO SCREEN RCVD TLK: CPT | Performed by: NURSE PRACTITIONER

## 2024-06-17 PROCEDURE — 99213 OFFICE O/P EST LOW 20 MIN: CPT | Performed by: NURSE PRACTITIONER

## 2024-06-17 RX ORDER — METHYLPREDNISOLONE 4 MG/1
4 TABLET ORAL SEE ADMIN INSTRUCTIONS
COMMUNITY
Start: 2024-06-12 | End: 2024-06-18

## 2024-06-17 NOTE — PROGRESS NOTES
Leticia Medellin is a 58 y.o. female  Chief Complaint   Patient presents with    Follow-up     Pt states having problems Right elbow     \"Have you been to the ER, urgent care clinic since your last visit?  Hospitalized since your last visit?\"    NO    “Have you seen or consulted any other health care providers outside of [facility] since your last visit?”    NO    Have you had a mammogram?”   NO    Date of last Mammogram: 12/3/2019      “Have you had a pap smear?”    NO    No cervical cancer screening on file         “Have you had a colorectal cancer screening such as a colonoscopy/FIT/Cologuard?    NO    Date of last Colonoscopy: 1/8/2014  No cologuard on file  No FIT/FOBT on file   No flexible sigmoidoscopy on file         Click Here for Release of Records Request    
tablet Take by mouth 2 times daily      pregabalin (LYRICA) 100 MG capsule Take by mouth 3 times daily.      sucralfate (CARAFATE) 1 GM tablet TAKE 1 TABLET BY MOUTH FOUR TIMES A DAY      tiotropium (SPIRIVA) 18 MCG inhalation capsule Inhale 1 capsule into the lungs daily       No current facility-administered medications for this visit.     Allergies   Allergen Reactions    Molds & Smuts Itching and Shortness Of Breath    Codeine Nausea Only    Hydrocodone Itching    Tramadol Itching    Ibuprofen Nausea Only     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 1/22/2021    GERD (gastroesophageal reflux disease)     History of seasonal allergies     Hypertension     Ill-defined condition 2018    GASTRITIS PER ENDOSCOPY    Ill-defined condition     Multiple body piercings; unable to remove tongue and lip piercings    Ill-defined condition     environmental allergies     Loosening of knee joint prosthesis (HCC) 3/26/2019    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    Psychiatric disorder     DEPRESSION    Status post total right knee replacement 5/8/2017    Thyroid disease     hypo    Ulnar impaction syndrome, right 1/22/2021    Ventral hernia 12/31/2013     Past Surgical History:   Procedure Laterality Date    HEENT  2009    thyroidectomy - benign    MOHS SURGERY Right 03/08/2011    RCR - right    ORTHOPEDIC SURGERY      right wrist repair - has hardware    OTHER SURGICAL HISTORY      hiatal hernia repair    PARTIAL HYSTERECTOMY (CERVIX NOT REMOVED)      TOTAL KNEE ARTHROPLASTY      R    TOTAL KNEE ARTHROPLASTY  03/26/2019    R twice    TUBAL LIGATION         Review of Systems

## 2024-07-18 ENCOUNTER — HOSPITAL ENCOUNTER (OUTPATIENT)
Facility: HOSPITAL | Age: 59
Discharge: HOME | End: 2024-07-18
Payer: MEDICARE

## 2024-07-18 VITALS — HEIGHT: 61 IN | BODY MASS INDEX: 31.15 KG/M2 | WEIGHT: 165 LBS

## 2024-07-18 DIAGNOSIS — Z12.31 ENCOUNTER FOR SCREENING MAMMOGRAM FOR MALIGNANT NEOPLASM OF BREAST: ICD-10-CM

## 2024-07-18 PROCEDURE — 77063 BREAST TOMOSYNTHESIS BI: CPT

## 2024-08-02 ENCOUNTER — OFFICE VISIT (OUTPATIENT)
Facility: CLINIC | Age: 59
End: 2024-08-02
Payer: MEDICARE

## 2024-08-02 VITALS
DIASTOLIC BLOOD PRESSURE: 91 MMHG | OXYGEN SATURATION: 100 % | RESPIRATION RATE: 17 BRPM | TEMPERATURE: 97.6 F | BODY MASS INDEX: 31.15 KG/M2 | HEIGHT: 61 IN | SYSTOLIC BLOOD PRESSURE: 123 MMHG | HEART RATE: 70 BPM | WEIGHT: 165 LBS

## 2024-08-02 DIAGNOSIS — E03.9 ACQUIRED HYPOTHYROIDISM: ICD-10-CM

## 2024-08-02 DIAGNOSIS — M15.9 PRIMARY OSTEOARTHRITIS INVOLVING MULTIPLE JOINTS: Primary | ICD-10-CM

## 2024-08-02 DIAGNOSIS — Z13.6 ENCOUNTER FOR SCREENING FOR CARDIOVASCULAR DISORDERS: ICD-10-CM

## 2024-08-02 DIAGNOSIS — Z79.891 LONG TERM (CURRENT) USE OF OPIATE ANALGESIC: ICD-10-CM

## 2024-08-02 DIAGNOSIS — M54.41 CHRONIC MIDLINE LOW BACK PAIN WITH RIGHT-SIDED SCIATICA: ICD-10-CM

## 2024-08-02 DIAGNOSIS — F33.1 MAJOR DEPRESSIVE DISORDER, RECURRENT, MODERATE (HCC): ICD-10-CM

## 2024-08-02 DIAGNOSIS — M17.11 UNILATERAL PRIMARY OSTEOARTHRITIS, RIGHT KNEE: ICD-10-CM

## 2024-08-02 DIAGNOSIS — M79.7 FIBROMYALGIA: ICD-10-CM

## 2024-08-02 DIAGNOSIS — Z13.220 SCREENING FOR LIPID DISORDERS: ICD-10-CM

## 2024-08-02 DIAGNOSIS — G89.29 CHRONIC MIDLINE LOW BACK PAIN WITH RIGHT-SIDED SCIATICA: ICD-10-CM

## 2024-08-02 DIAGNOSIS — E66.9 OBESITY, CLASS I, BMI 30-34.9: ICD-10-CM

## 2024-08-02 DIAGNOSIS — Z11.59 NEED FOR HEPATITIS C SCREENING TEST: ICD-10-CM

## 2024-08-02 LAB
ANION GAP SERPL CALC-SCNC: 5 MMOL/L (ref 5–15)
BASOPHILS # BLD: 0 K/UL (ref 0–0.1)
BASOPHILS NFR BLD: 1 % (ref 0–1)
BUN SERPL-MCNC: 9 MG/DL (ref 6–20)
BUN/CREAT SERPL: 10 (ref 12–20)
CALCIUM SERPL-MCNC: 8.6 MG/DL (ref 8.5–10.1)
CHLORIDE SERPL-SCNC: 110 MMOL/L (ref 97–108)
CHOLEST SERPL-MCNC: 156 MG/DL
CO2 SERPL-SCNC: 27 MMOL/L (ref 21–32)
CREAT SERPL-MCNC: 0.92 MG/DL (ref 0.55–1.02)
DIFFERENTIAL METHOD BLD: ABNORMAL
EOSINOPHIL # BLD: 0.1 K/UL (ref 0–0.4)
EOSINOPHIL NFR BLD: 4 % (ref 0–7)
ERYTHROCYTE [DISTWIDTH] IN BLOOD BY AUTOMATED COUNT: 14.5 % (ref 11.5–14.5)
GLUCOSE SERPL-MCNC: 91 MG/DL (ref 65–100)
HCT VFR BLD AUTO: 41 % (ref 35–47)
HDLC SERPL-MCNC: 67 MG/DL
HDLC SERPL: 2.3 (ref 0–5)
HGB BLD-MCNC: 13.8 G/DL (ref 11.5–16)
IMM GRANULOCYTES # BLD AUTO: 0 K/UL (ref 0–0.04)
IMM GRANULOCYTES NFR BLD AUTO: 0 % (ref 0–0.5)
LDLC SERPL CALC-MCNC: 76.6 MG/DL (ref 0–100)
LYMPHOCYTES # BLD: 2 K/UL (ref 0.8–3.5)
LYMPHOCYTES NFR BLD: 50 % (ref 12–49)
MCH RBC QN AUTO: 30.1 PG (ref 26–34)
MCHC RBC AUTO-ENTMCNC: 33.7 G/DL (ref 30–36.5)
MCV RBC AUTO: 89.5 FL (ref 80–99)
MONOCYTES # BLD: 0.3 K/UL (ref 0–1)
MONOCYTES NFR BLD: 8 % (ref 5–13)
NEUTS SEG # BLD: 1.4 K/UL (ref 1.8–8)
NEUTS SEG NFR BLD: 37 % (ref 32–75)
NRBC # BLD: 0 K/UL (ref 0–0.01)
NRBC BLD-RTO: 0 PER 100 WBC
PLATELET # BLD AUTO: 177 K/UL (ref 150–400)
PMV BLD AUTO: 11.4 FL (ref 8.9–12.9)
POTASSIUM SERPL-SCNC: 4.3 MMOL/L (ref 3.5–5.1)
RBC # BLD AUTO: 4.58 M/UL (ref 3.8–5.2)
SODIUM SERPL-SCNC: 142 MMOL/L (ref 136–145)
TRIGL SERPL-MCNC: 62 MG/DL
TSH SERPL DL<=0.05 MIU/L-ACNC: 3.97 UIU/ML (ref 0.36–3.74)
VLDLC SERPL CALC-MCNC: 12.4 MG/DL
WBC # BLD AUTO: 3.9 K/UL (ref 3.6–11)

## 2024-08-02 PROCEDURE — 3017F COLORECTAL CA SCREEN DOC REV: CPT | Performed by: NURSE PRACTITIONER

## 2024-08-02 PROCEDURE — 4004F PT TOBACCO SCREEN RCVD TLK: CPT | Performed by: NURSE PRACTITIONER

## 2024-08-02 PROCEDURE — G8427 DOCREV CUR MEDS BY ELIG CLIN: HCPCS | Performed by: NURSE PRACTITIONER

## 2024-08-02 PROCEDURE — G8417 CALC BMI ABV UP PARAM F/U: HCPCS | Performed by: NURSE PRACTITIONER

## 2024-08-02 PROCEDURE — 99214 OFFICE O/P EST MOD 30 MIN: CPT | Performed by: NURSE PRACTITIONER

## 2024-08-02 RX ORDER — OXYCODONE AND ACETAMINOPHEN 10; 325 MG/1; MG/1
1 TABLET ORAL EVERY 12 HOURS PRN
Qty: 60 TABLET | Refills: 0 | Status: SHIPPED | OUTPATIENT
Start: 2024-08-02 | End: 2024-09-01

## 2024-08-02 RX ORDER — OXYCODONE AND ACETAMINOPHEN 10; 325 MG/1; MG/1
1 TABLET ORAL EVERY 12 HOURS PRN
Qty: 60 TABLET | Refills: 0 | Status: SHIPPED | OUTPATIENT
Start: 2024-09-01 | End: 2024-10-01

## 2024-08-02 RX ORDER — OXYCODONE AND ACETAMINOPHEN 10; 325 MG/1; MG/1
1 TABLET ORAL EVERY 12 HOURS PRN
Qty: 60 TABLET | Refills: 0 | Status: SHIPPED | OUTPATIENT
Start: 2024-10-01 | End: 2024-10-31

## 2024-08-02 NOTE — PROGRESS NOTES
Pt is here for   Chief Complaint   Patient presents with    Follow-up     3 months for pain med refill, annual labs      \"Have you been to the ER, urgent care clinic since your last visit?  Hospitalized since your last visit?\"    NO    “Have you seen or consulted any other health care providers outside of [facility] since your last visit?”    NO        “Have you had a colorectal cancer screening such as a colonoscopy/FIT/Cologuard?    NO    Date of last Colonoscopy: 1/8/2014  No cologuard on file  No FIT/FOBT on file   No flexible sigmoidoscopy on file         Click Here for Release of Records Request

## 2024-08-02 NOTE — PROGRESS NOTES
Leticia Medellin 1965 is a 59 y.o. female, Established patient, here for evaluation of the following chief complaint(s):  Follow-up (3 months for pain med refill, annual labs )      ASSESSMENT/PLAN:  Below is the assessment and plan developed based on review of pertinent history, physical exam, labs, studies, and medications.       Diagnosis Orders   1. Primary osteoarthritis involving multiple joints  oxyCODONE-acetaminophen (ENDOCET)  MG per tablet    oxyCODONE-acetaminophen (ENDOCET)  MG per tablet    oxyCODONE-acetaminophen (ENDOCET)  MG per tablet      2. Chronic midline low back pain with right-sided sciatica  oxyCODONE-acetaminophen (ENDOCET)  MG per tablet    oxyCODONE-acetaminophen (ENDOCET)  MG per tablet    oxyCODONE-acetaminophen (ENDOCET)  MG per tablet      3. Unilateral primary osteoarthritis, right knee  oxyCODONE-acetaminophen (ENDOCET)  MG per tablet    oxyCODONE-acetaminophen (ENDOCET)  MG per tablet    oxyCODONE-acetaminophen (ENDOCET)  MG per tablet      4. Long term (current) use of opiate analgesic  ToxAssure Select 13    oxyCODONE-acetaminophen (ENDOCET)  MG per tablet    oxyCODONE-acetaminophen (ENDOCET)  MG per tablet    oxyCODONE-acetaminophen (ENDOCET)  MG per tablet      5. Fibromyalgia  oxyCODONE-acetaminophen (ENDOCET)  MG per tablet    oxyCODONE-acetaminophen (ENDOCET)  MG per tablet    oxyCODONE-acetaminophen (ENDOCET)  MG per tablet      6. Major depressive disorder, recurrent, moderate (HCC)  TSH      7. Acquired hypothyroidism  TSH      8. Screening for lipid disorders  Lipid Panel    Lipoprotein A (LPA)      9. Obesity, Class I, BMI 30-34.9  Lipid Panel    Basic Metabolic Panel    CBC with Auto Differential    Lipoprotein A (LPA)    TSH      10. Encounter for screening for cardiovascular disorders  Lipoprotein A (LPA)      11. Need for hepatitis C screening test  Hepatitis C Virus

## 2024-08-02 NOTE — PATIENT INSTRUCTIONS
EXERCISING DAILY for AT LEAST 10 minutes IS extremely IMPORTANT to help manage your pain. Think of it as taking one of your meds for pain. Continuous movement for up to 1 hour is most helpful on DAILY basis. This has to be a NON-NEGOTIABLE. Whether you feel like it or not. This will help with the FATIGUE and overall PAIN. Please give it a try.     Also change up your routine, walking/jogging one day, take an online class, go cycling, go to the gym for a class or get on a machine, go to [employer]. The DAILY MOVEMENT is PARAMOUNT to help LOWER your overall pain level.

## 2024-08-03 LAB
HCV RNA SERPL NAA+PROBE-ACNC: NORMAL IU/ML
TEST INFORMATION: NORMAL

## 2024-08-04 LAB — LPA SERPL-SCNC: 246.8 NMOL/L

## 2024-08-05 NOTE — RESULT ENCOUNTER NOTE
Overall your labs look good, but...    Your TSH is slightly elevated, we will just monitor this for now. No dose adjustments are needed.     Your LP(a) is ABOVE the goal of 125, but since your cholesterol has IMPROVED. We will continue to monitor this for now. However, if it increases and your LDL does also, this is an indication to start a STATIN (Crestor, pravastatin, or atorvastatin) to prevent a thrombotic event from happening like a heart attack or stroke.

## 2024-08-08 LAB
DRUG NAME: NORMAL
DRUGS UR: NORMAL
MED LIST NOT PROVIDED?: NO
MED LIST ON REQUISITION?: NO
MED LIST ON SEPARATE FORM?: NO
NO MEDICATION USE?: NO
RX NORM CODE: NORMAL
RX NORM SOURCE: NORMAL
RX NORM TEXT: NORMAL
SEQUENCE NUMBER: NORMAL

## 2024-08-29 ENCOUNTER — HOSPITAL ENCOUNTER (EMERGENCY)
Facility: HOSPITAL | Age: 59
Discharge: HOME | End: 2024-08-29
Payer: MEDICARE

## 2024-08-29 VITALS
SYSTOLIC BLOOD PRESSURE: 136 MMHG | HEART RATE: 70 BPM | RESPIRATION RATE: 16 BRPM | TEMPERATURE: 98.4 F | HEIGHT: 61 IN | DIASTOLIC BLOOD PRESSURE: 70 MMHG | WEIGHT: 161 LBS | OXYGEN SATURATION: 97 % | BODY MASS INDEX: 30.4 KG/M2

## 2024-08-29 DIAGNOSIS — R21 RASH AND OTHER NONSPECIFIC SKIN ERUPTION: Primary | ICD-10-CM

## 2024-08-29 PROCEDURE — 6370000000 HC RX 637 (ALT 250 FOR IP)

## 2024-08-29 PROCEDURE — 99283 EMERGENCY DEPT VISIT LOW MDM: CPT

## 2024-08-29 RX ORDER — PREDNISONE 20 MG/1
40 TABLET ORAL
Status: COMPLETED | OUTPATIENT
Start: 2024-08-29 | End: 2024-08-29

## 2024-08-29 RX ORDER — DIPHENHYDRAMINE HCL 25 MG
25 CAPSULE ORAL
Status: COMPLETED | OUTPATIENT
Start: 2024-08-29 | End: 2024-08-29

## 2024-08-29 RX ORDER — DIPHENHYDRAMINE HCL 25 MG
25 TABLET ORAL EVERY 6 HOURS PRN
Qty: 30 TABLET | Refills: 0 | Status: SHIPPED | OUTPATIENT
Start: 2024-08-29 | End: 2024-09-28

## 2024-08-29 RX ORDER — PREDNISONE 20 MG/1
40 TABLET ORAL DAILY
Qty: 10 TABLET | Refills: 0 | Status: SHIPPED | OUTPATIENT
Start: 2024-08-29 | End: 2024-09-03

## 2024-08-29 RX ORDER — FAMOTIDINE 20 MG/1
20 TABLET, FILM COATED ORAL
Status: COMPLETED | OUTPATIENT
Start: 2024-08-29 | End: 2024-08-29

## 2024-08-29 RX ORDER — FAMOTIDINE 20 MG/1
20 TABLET, FILM COATED ORAL 2 TIMES DAILY
Qty: 30 TABLET | Refills: 0 | Status: SHIPPED | OUTPATIENT
Start: 2024-08-29

## 2024-08-29 RX ADMIN — DIPHENHYDRAMINE HYDROCHLORIDE 25 MG: 25 CAPSULE ORAL at 20:24

## 2024-08-29 RX ADMIN — PREDNISONE 40 MG: 20 TABLET ORAL at 20:24

## 2024-08-29 RX ADMIN — FAMOTIDINE 20 MG: 20 TABLET ORAL at 20:24

## 2024-08-29 ASSESSMENT — PAIN DESCRIPTION - PAIN TYPE: TYPE: ACUTE PAIN

## 2024-08-29 ASSESSMENT — PAIN - FUNCTIONAL ASSESSMENT: PAIN_FUNCTIONAL_ASSESSMENT: 0-10

## 2024-08-29 ASSESSMENT — PAIN SCALES - GENERAL: PAINLEVEL_OUTOF10: 0

## 2024-08-30 NOTE — ED NOTES
Discharge instructions were given to the patient by Nolan.     The patient left the Emergency Department alert and oriented and in no acute distress with 3 prescriptions. The patient was encouraged to call or return to the ED for worsening issues or problems and was encouraged to schedule a follow up appointment for continuing care.     Ambulation assessment completed before discharge.  Pt left Emergency Department ambulating at baseline with no ortho devices  Ortho device education: none    The patient verbalized understanding of discharge instructions and prescriptions, all questions were answered. The patient has no further concerns at this time.

## 2024-08-30 NOTE — ED NOTES
Pt presents to ED ambulatory complaining of rash, hives and pruritus on back and stomach. Pt denies any changes in food or skin care.. Pt is alert and oriented x 4, RR even and unlabored, skin  intact. Assessment completed and pt updated on plan of care.  Call bell in reach.       Emergency Department Nursing Plan of Care       The Nursing Plan of Care is developed from the Nursing assessment and Emergency Department Attending provider initial evaluation.  The plan of care may be reviewed in the “ED Provider note”.    The Plan of Care was developed with the following considerations:   Patient / Family readiness to learn indicated by:verbalized understanding  Persons(s) to be included in education: patient  Barriers to Learning/Limitations:No    Signed

## 2024-08-30 NOTE — ED PROVIDER NOTES
Course/MDM for further details.  None     Social Determinants affecting Diagnosis/Treatment: None    Smoking Cessation: Not Applicable    PROCEDURES   Unless otherwise noted above, none.  Procedures      CRITICAL CARE TIME   Patient does not meet Critical Care Time, 0 minutes    ED IMPRESSION     1. Rash and other nonspecific skin eruption          DISPOSITION/PLAN   DISPOSITION Decision To Discharge 08/29/2024 08:19:12 PM  Condition at Disposition: Good    Discharge Note: The patient is stable for discharge home. The signs, symptoms, diagnosis, and discharge instructions have been discussed, understanding conveyed, and agreed upon. The patient is to follow up as recommended or return to ER should their symptoms worsen.      PATIENT REFERRED TO:  Destiny Decker, LOUISA - NP  [address] [address] [phone]      As needed, if no improvement of symptoms        DISCHARGE MEDICATIONS:     Medication List        START taking these medications      diphenhydrAMINE 25 MG tablet  Commonly known as: Benadryl Allergy  Take 1 tablet by mouth every 6 hours as needed for Itching     famotidine 20 MG tablet  Commonly known as: Pepcid  Take 1 tablet by mouth 2 times daily     predniSONE 20 MG tablet  Commonly known as: DELTASONE  Take 2 tablets by mouth daily for 5 days            ASK your doctor about these medications      acetaminophen 500 MG tablet  Commonly known as: TYLENOL  Take 2 tablets by mouth every 6 hours as needed for Pain     albuterol (2.5 MG/3ML) 0.083% nebulizer solution  Commonly known as: PROVENTIL     amLODIPine 5 MG tablet  Commonly known as: NORVASC  Take 1 tablet by mouth daily     azelastine 0.1 % nasal spray  Commonly known as: ASTELIN  1 spray by Nasal route as needed for Rhinitis     Combivent Respimat  MCG/ACT Aers inhaler  Generic drug: albuterol-ipratropium     * DULoxetine 30 MG extended release capsule  Commonly known as: CYMBALTA  Take 1 capsule by mouth  12 hours as needed for Pain for up to 30 days. Intended supply: 30 days Max Daily Amount: 2 tablets  Start taking on: October 1, 2024     pantoprazole 40 MG tablet  Commonly known as: PROTONIX     pregabalin 100 MG capsule  Commonly known as: LYRICA     sucralfate 1 GM tablet  Commonly known as: CARAFATE     Symbicort 160-4.5 MCG/ACT Aero  Generic drug: budesonide-formoterol     tiotropium 18 MCG inhalation capsule  Commonly known as: SPIRIVA     traZODone 100 MG tablet  Commonly known as: DESYREL  Take 1 tablet by mouth nightly           * This list has 5 medication(s) that are the same as other medications prescribed for you. Read the directions carefully, and ask your doctor or other care provider to review them with you.                   Where to Get Your Medications        These medications were sent to [facility]/pharmacy #[id number] - [place], [place] - [facility] -  [phone] - F [phone]  [address] [facility] [address]      Phone: [phone]   diphenhydrAMINE 25 MG tablet  famotidine 20 MG tablet  predniSONE 20 MG tablet           DISCONTINUED MEDICATIONS:  Current Discharge Medication List        I have seen and evaluated the patient autonomously. My supervision physician was on site and available for consultation if needed.     I am the Primary Clinician of Record: LOUISA Mehta NP (electronically signed)    (Please note that parts of this dictation were completed with voice recognition software. Quite often unanticipated grammatical, syntax, homophones, and other interpretive errors are inadvertently transcribed by the computer software. Please disregards these errors. Please excuse any errors that have escaped final proofreading.)     Peggy Menard APRN - NP  08/29/24 [id number]

## 2024-09-02 DIAGNOSIS — Z79.891 LONG TERM (CURRENT) USE OF OPIATE ANALGESIC: ICD-10-CM

## 2024-09-02 DIAGNOSIS — M15.9 PRIMARY OSTEOARTHRITIS INVOLVING MULTIPLE JOINTS: ICD-10-CM

## 2024-09-02 DIAGNOSIS — M54.41 CHRONIC MIDLINE LOW BACK PAIN WITH RIGHT-SIDED SCIATICA: ICD-10-CM

## 2024-09-02 DIAGNOSIS — M79.7 FIBROMYALGIA: ICD-10-CM

## 2024-09-02 DIAGNOSIS — G89.29 CHRONIC MIDLINE LOW BACK PAIN WITH RIGHT-SIDED SCIATICA: ICD-10-CM

## 2024-09-02 DIAGNOSIS — M17.11 UNILATERAL PRIMARY OSTEOARTHRITIS, RIGHT KNEE: ICD-10-CM

## 2024-09-03 RX ORDER — OXYCODONE AND ACETAMINOPHEN 10; 325 MG/1; MG/1
1 TABLET ORAL EVERY 12 HOURS PRN
Qty: 60 TABLET | Refills: 0 | OUTPATIENT
Start: 2024-09-03 | End: 2024-10-03

## 2024-10-01 DIAGNOSIS — Z79.891 LONG TERM (CURRENT) USE OF OPIATE ANALGESIC: ICD-10-CM

## 2024-10-01 DIAGNOSIS — M17.11 UNILATERAL PRIMARY OSTEOARTHRITIS, RIGHT KNEE: ICD-10-CM

## 2024-10-01 DIAGNOSIS — G89.29 CHRONIC MIDLINE LOW BACK PAIN WITH RIGHT-SIDED SCIATICA: ICD-10-CM

## 2024-10-01 DIAGNOSIS — M79.7 FIBROMYALGIA: ICD-10-CM

## 2024-10-01 DIAGNOSIS — M54.41 CHRONIC MIDLINE LOW BACK PAIN WITH RIGHT-SIDED SCIATICA: ICD-10-CM

## 2024-10-01 DIAGNOSIS — M15.0 PRIMARY OSTEOARTHRITIS INVOLVING MULTIPLE JOINTS: ICD-10-CM

## 2024-10-01 RX ORDER — OXYCODONE AND ACETAMINOPHEN 10; 325 MG/1; MG/1
1 TABLET ORAL EVERY 12 HOURS PRN
Qty: 60 TABLET | Refills: 0 | OUTPATIENT
Start: 2024-10-01 | End: 2024-10-31

## 2024-10-10 RX ORDER — LORATADINE 10 MG/1
10 TABLET ORAL DAILY
Qty: 90 TABLET | Refills: 3 | Status: SHIPPED | OUTPATIENT
Start: 2024-10-10

## 2024-10-10 NOTE — TELEPHONE ENCOUNTER
Last appointment: 08/02/2024 LLOYD Decker   Next appointment: 10/18/2024 LLOYD Decker   Previous refill encounter(s):   11/14/2023 Claritin #90 with 3 refills,    For Pharmacy Admin Tracking Only    Program: Medication Refill  Intervention Detail: New Rx: 1, reason: Patient Preference  Time Spent (min): 5  Requested Prescriptions     Pending Prescriptions Disp Refills    loratadine (CLARITIN) 10 MG tablet 90 tablet 0     Sig: Take 1 tablet by mouth daily

## 2024-10-15 NOTE — TELEPHONE ENCOUNTER
Last appointment: 08/02/2024 LLOYD Decker   Next appointment: 10/18/2024 LLOYD Decker   Previous refill encounter(s):   11/14/2023 Cymbalta 60 mg #90 with 3 refills.     For Pharmacy Admin Tracking Only    Program: Medication Refill  Intervention Detail: New Rx: 1, reason: Patient Preference  Time Spent (min): 5  Requested Prescriptions     Pending Prescriptions Disp Refills    DULoxetine (CYMBALTA) 60 MG extended release capsule [Pharmacy Med Name: duloxetine 60 mg capsule,delayed release] 90 capsule 0     Sig: TAKE ONE CAPSULE BY MOUTH DAILY AT 9AM (TAKE WITH 30MG CAPSULE FOR TOTAL DOSE OF 90MG)

## 2024-10-16 RX ORDER — TRAZODONE HYDROCHLORIDE 100 MG/1
TABLET ORAL
Qty: 90 TABLET | Refills: 3 | Status: SHIPPED | OUTPATIENT
Start: 2024-10-16

## 2024-10-16 RX ORDER — DULOXETIN HYDROCHLORIDE 30 MG/1
CAPSULE, DELAYED RELEASE ORAL
Qty: 90 CAPSULE | Refills: 3 | Status: SHIPPED | OUTPATIENT
Start: 2024-10-16

## 2024-10-16 RX ORDER — LEVOTHYROXINE SODIUM 125 UG/1
TABLET ORAL
Qty: 90 TABLET | Refills: 3 | Status: SHIPPED | OUTPATIENT
Start: 2024-10-16

## 2024-10-16 RX ORDER — METOPROLOL SUCCINATE 25 MG/1
TABLET, EXTENDED RELEASE ORAL
Qty: 90 TABLET | Refills: 3 | Status: SHIPPED | OUTPATIENT
Start: 2024-10-16

## 2024-10-16 RX ORDER — AMLODIPINE BESYLATE 5 MG/1
TABLET ORAL
Qty: 90 TABLET | Refills: 3 | Status: SHIPPED | OUTPATIENT
Start: 2024-10-16

## 2024-10-16 RX ORDER — DULOXETIN HYDROCHLORIDE 60 MG/1
CAPSULE, DELAYED RELEASE ORAL
Qty: 90 CAPSULE | Refills: 3 | Status: SHIPPED | OUTPATIENT
Start: 2024-10-16

## 2024-10-18 ENCOUNTER — OFFICE VISIT (OUTPATIENT)
Facility: CLINIC | Age: 59
End: 2024-10-18

## 2024-10-18 VITALS
TEMPERATURE: 98.5 F | RESPIRATION RATE: 18 BRPM | BODY MASS INDEX: 30.21 KG/M2 | HEIGHT: 61 IN | WEIGHT: 160 LBS | HEART RATE: 60 BPM | DIASTOLIC BLOOD PRESSURE: 78 MMHG | OXYGEN SATURATION: 96 % | SYSTOLIC BLOOD PRESSURE: 135 MMHG

## 2024-10-18 DIAGNOSIS — Z79.891 LONG TERM (CURRENT) USE OF OPIATE ANALGESIC: ICD-10-CM

## 2024-10-18 DIAGNOSIS — M15.0 PRIMARY OSTEOARTHRITIS INVOLVING MULTIPLE JOINTS: Primary | ICD-10-CM

## 2024-10-18 DIAGNOSIS — L30.9 DERMATITIS: ICD-10-CM

## 2024-10-18 DIAGNOSIS — M54.41 CHRONIC MIDLINE LOW BACK PAIN WITH RIGHT-SIDED SCIATICA: ICD-10-CM

## 2024-10-18 DIAGNOSIS — M17.11 UNILATERAL PRIMARY OSTEOARTHRITIS, RIGHT KNEE: ICD-10-CM

## 2024-10-18 DIAGNOSIS — G89.29 CHRONIC MIDLINE LOW BACK PAIN WITH RIGHT-SIDED SCIATICA: ICD-10-CM

## 2024-10-18 DIAGNOSIS — M79.7 FIBROMYALGIA: ICD-10-CM

## 2024-10-18 RX ORDER — OXYCODONE AND ACETAMINOPHEN 10; 325 MG/1; MG/1
1 TABLET ORAL EVERY 12 HOURS PRN
Qty: 60 TABLET | Refills: 0 | Status: SHIPPED | OUTPATIENT
Start: 2024-11-17 | End: 2024-12-17

## 2024-10-18 RX ORDER — OXYCODONE AND ACETAMINOPHEN 10; 325 MG/1; MG/1
1 TABLET ORAL EVERY 12 HOURS PRN
Qty: 60 TABLET | Refills: 0 | Status: SHIPPED | OUTPATIENT
Start: 2024-12-17 | End: 2025-01-16

## 2024-10-18 RX ORDER — OXYCODONE AND ACETAMINOPHEN 10; 325 MG/1; MG/1
1 TABLET ORAL EVERY 12 HOURS PRN
Qty: 60 TABLET | Refills: 0 | Status: SHIPPED | OUTPATIENT
Start: 2024-12-17 | End: 2024-10-18 | Stop reason: SDUPTHER

## 2024-10-18 RX ORDER — HYDROXYZINE HYDROCHLORIDE 25 MG/1
25 TABLET, FILM COATED ORAL EVERY 8 HOURS PRN
Qty: 30 TABLET | Refills: 0 | Status: SHIPPED | OUTPATIENT
Start: 2024-10-18 | End: 2024-10-18 | Stop reason: SDUPTHER

## 2024-10-18 RX ORDER — HYDROXYZINE HYDROCHLORIDE 25 MG/1
25 TABLET, FILM COATED ORAL EVERY 8 HOURS PRN
Qty: 30 TABLET | Refills: 0 | Status: SHIPPED | OUTPATIENT
Start: 2024-10-18 | End: 2024-10-18 | Stop reason: CLARIF

## 2024-10-18 RX ORDER — TRIAMCINOLONE ACETONIDE 1 MG/G
OINTMENT TOPICAL
Qty: 454 G | Refills: 0 | Status: SHIPPED | OUTPATIENT
Start: 2024-10-18

## 2024-10-18 RX ORDER — OXYCODONE AND ACETAMINOPHEN 10; 325 MG/1; MG/1
1 TABLET ORAL EVERY 12 HOURS PRN
Qty: 60 TABLET | Refills: 0 | Status: SHIPPED | OUTPATIENT
Start: 2024-10-18 | End: 2024-10-18 | Stop reason: SDUPTHER

## 2024-10-18 RX ORDER — OXYCODONE AND ACETAMINOPHEN 10; 325 MG/1; MG/1
1 TABLET ORAL EVERY 12 HOURS PRN
Qty: 60 TABLET | Refills: 0 | Status: SHIPPED | OUTPATIENT
Start: 2024-11-17 | End: 2024-10-18 | Stop reason: SDUPTHER

## 2024-10-18 RX ORDER — OXYCODONE AND ACETAMINOPHEN 10; 325 MG/1; MG/1
1 TABLET ORAL EVERY 12 HOURS PRN
Qty: 60 TABLET | Refills: 0 | Status: SHIPPED | OUTPATIENT
Start: 2024-10-18 | End: 2024-11-17

## 2024-10-18 RX ORDER — HYDROXYZINE HYDROCHLORIDE 25 MG/1
25 TABLET, FILM COATED ORAL EVERY 8 HOURS PRN
Qty: 30 TABLET | Refills: 0 | Status: SHIPPED | OUTPATIENT
Start: 2024-10-18 | End: 2024-10-28

## 2024-10-18 NOTE — PROGRESS NOTES
Leticia Medellin 1965 is a 59 y.o. female, Established patient, here for evaluation of the following chief complaint(s):  Medication Refill (Pain meds )      ASSESSMENT/PLAN:  Below is the assessment and plan developed based on review of pertinent history, physical exam, labs, studies, and medications.       Diagnosis Orders   1. Primary osteoarthritis involving multiple joints  oxyCODONE-acetaminophen (ENDOCET)  MG per tablet    oxyCODONE-acetaminophen (ENDOCET)  MG per tablet    oxyCODONE-acetaminophen (ENDOCET)  MG per tablet    DISCONTINUED: oxyCODONE-acetaminophen (ENDOCET)  MG per tablet    DISCONTINUED: oxyCODONE-acetaminophen (ENDOCET)  MG per tablet    DISCONTINUED: oxyCODONE-acetaminophen (ENDOCET)  MG per tablet    DISCONTINUED: oxyCODONE-acetaminophen (ENDOCET)  MG per tablet      2. Chronic midline low back pain with right-sided sciatica  oxyCODONE-acetaminophen (ENDOCET)  MG per tablet    oxyCODONE-acetaminophen (ENDOCET)  MG per tablet    oxyCODONE-acetaminophen (ENDOCET)  MG per tablet    DISCONTINUED: oxyCODONE-acetaminophen (ENDOCET)  MG per tablet    DISCONTINUED: oxyCODONE-acetaminophen (ENDOCET)  MG per tablet    DISCONTINUED: oxyCODONE-acetaminophen (ENDOCET)  MG per tablet    DISCONTINUED: oxyCODONE-acetaminophen (ENDOCET)  MG per tablet      3. Unilateral primary osteoarthritis, right knee  oxyCODONE-acetaminophen (ENDOCET)  MG per tablet    oxyCODONE-acetaminophen (ENDOCET)  MG per tablet    oxyCODONE-acetaminophen (ENDOCET)  MG per tablet    DISCONTINUED: oxyCODONE-acetaminophen (ENDOCET)  MG per tablet    DISCONTINUED: oxyCODONE-acetaminophen (ENDOCET)  MG per tablet    DISCONTINUED: oxyCODONE-acetaminophen (ENDOCET)  MG per tablet    DISCONTINUED: oxyCODONE-acetaminophen (ENDOCET)  MG per tablet      4. Long term (current) use of opiate analgesic  ToxAssure
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
## 2024-11-06 RX ORDER — LORATADINE 10 MG/1
TABLET ORAL
Qty: 90 TABLET | Refills: 3 | Status: SHIPPED | OUTPATIENT
Start: 2024-11-06

## 2024-11-06 RX ORDER — HYDROCHLOROTHIAZIDE 25 MG/1
TABLET ORAL
Qty: 90 TABLET | Refills: 3 | Status: SHIPPED | OUTPATIENT
Start: 2024-11-06

## 2024-11-18 RX ORDER — FLUTICASONE PROPIONATE 50 MCG
SPRAY, SUSPENSION (ML) NASAL
Qty: 48 G | Refills: 11 | Status: SHIPPED | OUTPATIENT
Start: 2024-11-18

## 2025-01-04 ENCOUNTER — APPOINTMENT (OUTPATIENT)
Facility: HOSPITAL | Age: 60
End: 2025-01-04
Payer: MEDICARE

## 2025-01-04 ENCOUNTER — HOSPITAL ENCOUNTER (EMERGENCY)
Facility: HOSPITAL | Age: 60
Discharge: HOME | End: 2025-01-04
Attending: EMERGENCY MEDICINE
Payer: MEDICARE

## 2025-01-04 VITALS
TEMPERATURE: 98 F | WEIGHT: 160 LBS | OXYGEN SATURATION: 99 % | HEIGHT: 61 IN | RESPIRATION RATE: 18 BRPM | BODY MASS INDEX: 30.21 KG/M2 | SYSTOLIC BLOOD PRESSURE: 165 MMHG | HEART RATE: 65 BPM | DIASTOLIC BLOOD PRESSURE: 81 MMHG

## 2025-01-04 DIAGNOSIS — S90.121A CONTUSION OF RIGHT LESSER TOE(S) W/O DAMAGE TO NAIL, INIT: Primary | ICD-10-CM

## 2025-01-04 PROCEDURE — 73630 X-RAY EXAM OF FOOT: CPT

## 2025-01-04 PROCEDURE — 73610 X-RAY EXAM OF ANKLE: CPT

## 2025-01-04 PROCEDURE — 99283 EMERGENCY DEPT VISIT LOW MDM: CPT

## 2025-01-04 RX ORDER — NAPROXEN 500 MG/1
500 TABLET ORAL 2 TIMES DAILY
Qty: 20 TABLET | Refills: 0 | Status: SHIPPED | OUTPATIENT
Start: 2025-01-04 | End: 2025-01-14

## 2025-01-04 ASSESSMENT — PAIN DESCRIPTION - DESCRIPTORS: DESCRIPTORS: THROBBING;SHARP;ACHING

## 2025-01-04 ASSESSMENT — PAIN DESCRIPTION - PAIN TYPE: TYPE: ACUTE PAIN

## 2025-01-04 ASSESSMENT — PAIN - FUNCTIONAL ASSESSMENT: PAIN_FUNCTIONAL_ASSESSMENT: PREVENTS OR INTERFERES SOME ACTIVE ACTIVITIES AND ADLS

## 2025-01-04 ASSESSMENT — PAIN DESCRIPTION - ORIENTATION: ORIENTATION: RIGHT

## 2025-01-04 ASSESSMENT — PAIN DESCRIPTION - FREQUENCY: FREQUENCY: CONTINUOUS

## 2025-01-04 ASSESSMENT — PAIN SCALES - GENERAL: PAINLEVEL_OUTOF10: 10

## 2025-01-04 ASSESSMENT — PAIN DESCRIPTION - LOCATION: LOCATION: FOOT

## 2025-01-05 NOTE — ED PROVIDER NOTES
MG tablet  Commonly known as: Pepcid  Take 1 tablet by mouth 2 times daily     fluticasone 50 MCG/ACT nasal spray  Commonly known as: FLONASE  INSTILL 2 SPRAYS IN EACH NOSTRIL DAILY (BULK)     furosemide 20 MG tablet  Commonly known as: LASIX     hydroCHLOROthiazide 25 MG tablet  Commonly known as: HYDRODIURIL  TAKE ONE TABLET BY MOUTH DAILY AT 9AM FOR HYPERTENSION     levothyroxine 125 MCG tablet  Commonly known as: SYNTHROID  TAKE ONE TABLET BY MOUTH DAILY AT 8AM EVERY MORNING BEFORE BREAKFAST     lidocaine 5 %  Commonly known as: LIDODERM     loratadine 10 MG tablet  Commonly known as: CLARITIN  TAKE ONE TABLET BY MOUTH DAILY AT 9AM     methocarbamol 750 MG tablet  Commonly known as: ROBAXIN  TAKE 1 TABLET BY MOUTH EVERY 6 HOURS AS NEEDED SPASMS     metoprolol succinate 25 MG extended release tablet  Commonly known as: TOPROL XL  TAKE ONE TABLET BY MOUTH DAILY AT 9AM     naloxone 4 MG/0.1ML Liqd nasal spray  Commonly known as: Narcan  Use 1 spray into 1 nostril for OVERDOSE. Call 911. For subsequent doses, give in alternating nostrils. May repeat every 2 to 3 min.     omalizumab 150 MG injection     ondansetron 4 MG tablet  Commonly known as: ZOFRAN  Take 1 tablet by mouth every 8 hours as needed for Nausea or Vomiting     oxyCODONE-acetaminophen  MG per tablet  Commonly known as: Endocet  Take 1 tablet by mouth every 12 hours as needed for Pain for up to 30 days. Intended supply: 30 days Max Daily Amount: 2 tablets     pantoprazole 40 MG tablet  Commonly known as: PROTONIX     pregabalin 100 MG capsule  Commonly known as: LYRICA     sucralfate 1 GM tablet  Commonly known as: CARAFATE     Symbicort 160-4.5 MCG/ACT Aero  Generic drug: budesonide-formoterol     tiotropium 18 MCG inhalation capsule  Commonly known as: SPIRIVA     traZODone 100 MG tablet  Commonly known as: DESYREL  TAKE ONE TABLET BY MOUTH DAILY AT 9PM NIGHTLY     triamcinolone 0.1 % ointment  Commonly known as: KENALOG  Apply topically in a

## 2025-01-05 NOTE — DISCHARGE INSTRUCTIONS
You were evaluated in the emergency department for right foot and ankle pain.  Your examination was reassuring as was your work-up including x-rays.  It will be important for you to follow-up with your primary care physician in 5-7 days if your symptoms are not improving.

## 2025-01-05 NOTE — ED NOTES
Pt presents ambulatory to the ED c/o R foot pain after stubbing second toe on bedpost at 1730 this evening. Pt states the nail on second tow split and the pain radiates all the way to R ankle. Pt took percocet at home with some relief.

## 2025-01-05 NOTE — ED TRIAGE NOTES
ED visit d/t (R) foot / (R) ankle pain - onset of sxs, 1.5 hours ago - Endorses, snubbed foot going into bedroom and kicked corned of bed post leading to injury - reports (R) great toe injury with shooting pain to ankle aspect;;

## 2025-01-21 ENCOUNTER — APPOINTMENT (OUTPATIENT)
Facility: HOSPITAL | Age: 60
End: 2025-01-21
Payer: MEDICARE

## 2025-01-21 ENCOUNTER — HOSPITAL ENCOUNTER (EMERGENCY)
Facility: HOSPITAL | Age: 60
Discharge: HOME | End: 2025-01-21
Payer: MEDICARE

## 2025-01-21 VITALS
OXYGEN SATURATION: 100 % | WEIGHT: 160 LBS | TEMPERATURE: 98.2 F | DIASTOLIC BLOOD PRESSURE: 88 MMHG | RESPIRATION RATE: 22 BRPM | SYSTOLIC BLOOD PRESSURE: 163 MMHG | HEART RATE: 70 BPM | HEIGHT: 61 IN | BODY MASS INDEX: 30.21 KG/M2

## 2025-01-21 DIAGNOSIS — S80.01XA CONTUSION OF RIGHT KNEE, INITIAL ENCOUNTER: ICD-10-CM

## 2025-01-21 DIAGNOSIS — M25.561 ACUTE PAIN OF RIGHT KNEE: Primary | ICD-10-CM

## 2025-01-21 DIAGNOSIS — R03.0 ELEVATED BLOOD PRESSURE READING: ICD-10-CM

## 2025-01-21 PROCEDURE — 6360000002 HC RX W HCPCS: Performed by: PHYSICIAN ASSISTANT

## 2025-01-21 PROCEDURE — 73562 X-RAY EXAM OF KNEE 3: CPT

## 2025-01-21 PROCEDURE — 6370000000 HC RX 637 (ALT 250 FOR IP): Performed by: PHYSICIAN ASSISTANT

## 2025-01-21 PROCEDURE — 96372 THER/PROPH/DIAG INJ SC/IM: CPT

## 2025-01-21 PROCEDURE — 99284 EMERGENCY DEPT VISIT MOD MDM: CPT

## 2025-01-21 RX ORDER — KETOROLAC TROMETHAMINE 30 MG/ML
15 INJECTION, SOLUTION INTRAMUSCULAR; INTRAVENOUS
Status: COMPLETED | OUTPATIENT
Start: 2025-01-21 | End: 2025-01-21

## 2025-01-21 RX ORDER — OXYCODONE HYDROCHLORIDE 5 MG/1
5 TABLET ORAL
Status: COMPLETED | OUTPATIENT
Start: 2025-01-21 | End: 2025-01-21

## 2025-01-21 RX ADMIN — KETOROLAC TROMETHAMINE 15 MG: 30 INJECTION, SOLUTION INTRAMUSCULAR at 22:46

## 2025-01-21 RX ADMIN — OXYCODONE 5 MG: 5 TABLET ORAL at 22:46

## 2025-01-21 SDOH — ECONOMIC STABILITY: TRANSPORTATION INSECURITY
IN THE PAST 12 MONTHS, HAS THE LACK OF TRANSPORTATION KEPT YOU FROM MEDICAL APPOINTMENTS OR FROM GETTING MEDICATIONS?: NO

## 2025-01-21 SDOH — ECONOMIC STABILITY: TRANSPORTATION INSECURITY
IN THE PAST 12 MONTHS, HAS LACK OF TRANSPORTATION KEPT YOU FROM MEETINGS, WORK, OR FROM GETTING THINGS NEEDED FOR DAILY LIVING?: NO

## 2025-01-21 SDOH — ECONOMIC STABILITY: INCOME INSECURITY: IN THE LAST 12 MONTHS, WAS THERE A TIME WHEN YOU WERE NOT ABLE TO PAY THE MORTGAGE OR RENT ON TIME?: NO

## 2025-01-21 SDOH — ECONOMIC STABILITY: FOOD INSECURITY: WITHIN THE PAST 12 MONTHS, YOU WORRIED THAT YOUR FOOD WOULD RUN OUT BEFORE YOU GOT MONEY TO BUY MORE.: NEVER TRUE

## 2025-01-21 SDOH — ECONOMIC STABILITY: FOOD INSECURITY: WITHIN THE PAST 12 MONTHS, THE FOOD YOU BOUGHT JUST DIDN'T LAST AND YOU DIDN'T HAVE MONEY TO GET MORE.: NEVER TRUE

## 2025-01-21 ASSESSMENT — PAIN SCALES - GENERAL
PAINLEVEL_OUTOF10: 8
PAINLEVEL_OUTOF10: 8

## 2025-01-21 ASSESSMENT — PAIN DESCRIPTION - ORIENTATION
ORIENTATION: RIGHT
ORIENTATION: RIGHT

## 2025-01-21 ASSESSMENT — PAIN DESCRIPTION - LOCATION
LOCATION: KNEE
LOCATION: KNEE

## 2025-01-21 ASSESSMENT — PAIN DESCRIPTION - DESCRIPTORS: DESCRIPTORS: DISCOMFORT

## 2025-01-21 ASSESSMENT — PAIN - FUNCTIONAL ASSESSMENT: PAIN_FUNCTIONAL_ASSESSMENT: 0-10

## 2025-01-21 ASSESSMENT — LIFESTYLE VARIABLES: HOW OFTEN DO YOU HAVE A DRINK CONTAINING ALCOHOL: NEVER
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
## 2025-01-22 NOTE — DISCHARGE INSTRUCTIONS
Thank You!    It was a pleasure taking care of you in our Emergency Department today. We know that when you come to [facility], you are entrusting us with your health, comfort, and safety. Our clinicians honor that trust, and truly appreciate the opportunity to care for you and your loved ones.    If you receive a survey about your Emergency Department experience today, please fill it out.  We value your feedback. Thank you.      Kelly Baeza PA-C    ___________________________________  I have included a copy of your lab results and/or radiologic studies from today's visit so you can have them easily available at your follow-up visit.   No results found for this or any previous visit (from the past 12 hour(s)).    XR KNEE RIGHT (3 VIEWS)   Final Result   No acute abnormality. Normal prosthesis alignment.      Electronically signed by Brandt Hung        [unfilled]
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
## 2025-01-22 NOTE — ED NOTES
Discharge instructions were given to the patient by Valeria MANDUJANO.     The patient left the Emergency Department alert and oriented and in no acute distress with 1 prescription. The patient was encouraged to call or return to the ED for worsening issues or problems and was encouraged to schedule a follow up appointment for continuing care.     Ambulation assessment completed before discharge.  Pt left Emergency Department ambulating at baseline with no ortho devices  Ortho device education: none    The patient verbalized understanding of discharge instructions and prescriptions, all questions were answered. The patient has no further concerns at this time.

## 2025-01-22 NOTE — ED NOTES
Patient has been instructed that they have been given oxycodone which contains opioids, benzodiazepines, or other sedating drugs. Patient is aware that they  will need to refrain from driving or operating heavy machinery after taking this medication.  Patient also instructed that they need to avoid drinking alcohol and using other products containing opioids, benzodiazepines, or other sedating drugs.  Patient verbalized understanding.

## 2025-01-23 ENCOUNTER — OFFICE VISIT (OUTPATIENT)
Facility: CLINIC | Age: 60
End: 2025-01-23

## 2025-01-23 VITALS
TEMPERATURE: 97.5 F | BODY MASS INDEX: 30.21 KG/M2 | WEIGHT: 160 LBS | HEART RATE: 67 BPM | SYSTOLIC BLOOD PRESSURE: 177 MMHG | DIASTOLIC BLOOD PRESSURE: 90 MMHG | HEIGHT: 61 IN | OXYGEN SATURATION: 97 % | RESPIRATION RATE: 16 BRPM

## 2025-01-23 DIAGNOSIS — M17.11 UNILATERAL PRIMARY OSTEOARTHRITIS, RIGHT KNEE: ICD-10-CM

## 2025-01-23 DIAGNOSIS — Z79.891 LONG TERM (CURRENT) USE OF OPIATE ANALGESIC: ICD-10-CM

## 2025-01-23 DIAGNOSIS — M15.0 PRIMARY OSTEOARTHRITIS INVOLVING MULTIPLE JOINTS: Primary | ICD-10-CM

## 2025-01-23 DIAGNOSIS — M25.461 KNEE EFFUSION, RIGHT: ICD-10-CM

## 2025-01-23 DIAGNOSIS — I10 PRIMARY HYPERTENSION: ICD-10-CM

## 2025-01-23 DIAGNOSIS — E78.41 ELEVATED LIPOPROTEIN A LEVEL: ICD-10-CM

## 2025-01-23 DIAGNOSIS — M79.7 FIBROMYALGIA: ICD-10-CM

## 2025-01-23 DIAGNOSIS — G89.29 CHRONIC MIDLINE LOW BACK PAIN WITH RIGHT-SIDED SCIATICA: ICD-10-CM

## 2025-01-23 DIAGNOSIS — M54.41 CHRONIC MIDLINE LOW BACK PAIN WITH RIGHT-SIDED SCIATICA: ICD-10-CM

## 2025-01-23 DIAGNOSIS — F33.1 MAJOR DEPRESSIVE DISORDER, RECURRENT, MODERATE (HCC): ICD-10-CM

## 2025-01-23 DIAGNOSIS — E03.9 ACQUIRED HYPOTHYROIDISM: ICD-10-CM

## 2025-01-23 DIAGNOSIS — S92.504D CLOSED NONDISPLACED FRACTURE OF PHALANX OF LESSER TOE OF RIGHT FOOT WITH ROUTINE HEALING, UNSPECIFIED PHALANX, SUBSEQUENT ENCOUNTER: ICD-10-CM

## 2025-01-23 RX ORDER — OXYCODONE AND ACETAMINOPHEN 10; 325 MG/1; MG/1
1 TABLET ORAL EVERY 12 HOURS PRN
Qty: 60 TABLET | Refills: 0 | Status: SHIPPED | OUTPATIENT
Start: 2025-04-01 | End: 2025-05-01

## 2025-01-23 RX ORDER — OXYCODONE AND ACETAMINOPHEN 10; 325 MG/1; MG/1
1 TABLET ORAL EVERY 12 HOURS PRN
Qty: 60 TABLET | Refills: 0 | Status: SHIPPED | OUTPATIENT
Start: 2025-01-31 | End: 2025-03-02

## 2025-01-23 RX ORDER — OXYCODONE AND ACETAMINOPHEN 10; 325 MG/1; MG/1
TABLET ORAL
Status: CANCELLED | OUTPATIENT
Start: 2025-01-23

## 2025-01-23 RX ORDER — LANOLIN ALCOHOL/MO/W.PET/CERES
500 CREAM (GRAM) TOPICAL DAILY
Qty: 90 TABLET | Refills: 3 | Status: SHIPPED | OUTPATIENT
Start: 2025-01-23

## 2025-01-23 RX ORDER — OXYCODONE AND ACETAMINOPHEN 10; 325 MG/1; MG/1
TABLET ORAL
COMMUNITY
Start: 2025-01-01

## 2025-01-23 RX ORDER — PREDNISONE 50 MG/1
50 TABLET ORAL DAILY
Qty: 5 TABLET | Refills: 0 | Status: SHIPPED | OUTPATIENT
Start: 2025-01-23 | End: 2025-01-28

## 2025-01-23 RX ORDER — OXYCODONE AND ACETAMINOPHEN 10; 325 MG/1; MG/1
1 TABLET ORAL EVERY 12 HOURS PRN
Qty: 60 TABLET | Refills: 0 | Status: SHIPPED | OUTPATIENT
Start: 2025-03-02 | End: 2025-04-01

## 2025-01-23 NOTE — PROGRESS NOTES
Pt is here for     Last had something for pain yesterday   Pt states pain level is a 9/10 pain.. pt states throbbing pain       \"Have you been to the ER, urgent care clinic since your last visit?  Hospitalized since your last visit?\"    NO    “Have you seen or consulted any other health care providers outside our system since your last visit?”    NO      “Have you had a colorectal cancer screening such as a colonoscopy/FIT/Cologuard?    NO    Date of last Colonoscopy: 1/8/2014  No cologuard on file  No FIT/FOBT on file   No flexible sigmoidoscopy on file             
BEFORE BREAKFAST    amLODIPine (NORVASC) 5 MG tablet TAKE ONE TABLET BY MOUTH DAILY AT 9AM    DULoxetine (CYMBALTA) 60 MG extended release capsule TAKE ONE CAPSULE BY MOUTH DAILY AT 9AM (TAKE WITH 30MG CAPSULE FOR TOTAL DOSE OF 90MG)    famotidine (PEPCID) 20 MG tablet Take 1 tablet by mouth 2 times daily    acetaminophen (TYLENOL) 500 MG tablet Take 2 tablets by mouth every 6 hours as needed for Pain    azelastine (ASTELIN) 0.1 % nasal spray 1 spray by Nasal route as needed for Rhinitis    methocarbamol (ROBAXIN) 750 MG tablet TAKE 1 TABLET BY MOUTH EVERY 6 HOURS AS NEEDED SPASMS    ondansetron (ZOFRAN) 4 MG tablet Take 1 tablet by mouth every 8 hours as needed for Nausea or Vomiting    albuterol (PROVENTIL) (2.5 MG/3ML) 0.083% nebulizer solution Inhale into the lungs every 4 hours as needed    budesonide-formoterol (SYMBICORT) 160-4.5 MCG/ACT AERO TAKE 2 PUFFS BY INHALATION TWO (2) TIMES A DAY.    furosemide (LASIX) 20 MG tablet TAKE 1 TABLET BY MOUTH EVERY DAY    albuterol-ipratropium (COMBIVENT RESPIMAT)  MCG/ACT AERS inhaler INHALE 1 PUFF BY MOUTH EVERY 6 HOURS AS NEEDED FOR WHEEZING    lidocaine (LIDODERM) 5 % Apply patch to the affected area for 12 hours a day and remove for 12 hours a day.    omalizumab 150 MG injection every 30 days    pantoprazole (PROTONIX) 40 MG tablet Take by mouth 2 times daily    pregabalin (LYRICA) 100 MG capsule Take by mouth 3 times daily.    sucralfate (CARAFATE) 1 GM tablet TAKE 1 TABLET BY MOUTH FOUR TIMES A DAY    tiotropium (SPIRIVA) 18 MCG inhalation capsule Inhale 1 capsule into the lungs daily    naproxen (NAPROSYN) 500 MG tablet Take 1 tablet by mouth 2 times daily for 10 days     No current facility-administered medications for this visit.     Vitals:    01/23/25 0926   BP: (!) 177/90   Site: Right Upper Arm   Position: Sitting   Cuff Size: Large Adult   Pulse: 67   Resp: 16   Temp: 97.5 °F (36.4 °C)   TempSrc: Temporal   SpO2: 97%   Weight: 72.6 kg (160 lb)

## 2025-01-24 DIAGNOSIS — E03.9 ACQUIRED HYPOTHYROIDISM: ICD-10-CM

## 2025-01-24 DIAGNOSIS — Z79.891 LONG TERM (CURRENT) USE OF OPIATE ANALGESIC: ICD-10-CM

## 2025-01-30 ENCOUNTER — NURSE ONLY (OUTPATIENT)
Facility: CLINIC | Age: 60
End: 2025-01-30

## 2025-01-30 VITALS — HEART RATE: 77 BPM | SYSTOLIC BLOOD PRESSURE: 140 MMHG | DIASTOLIC BLOOD PRESSURE: 95 MMHG | TEMPERATURE: 97 F

## 2025-01-30 DIAGNOSIS — I10 PRIMARY HYPERTENSION: Primary | ICD-10-CM

## 2025-01-30 NOTE — PROGRESS NOTES
Pt was advised to return in 1 week for BP CHECK due to just starting medication on Friday 1/23/2025. No med changes this visit    BP (!) 140/95 (Site: Left Upper Arm, Position: Sitting, Cuff Size: Large Adult)   Pulse 77   Temp 97 °F (36.1 °C) (Temporal)

## 2025-01-31 LAB
T4 FREE SERPL-MCNC: 1.4 NG/DL (ref 0.8–1.5)
TSH SERPL DL<=0.05 MIU/L-ACNC: 0.68 UIU/ML (ref 0.36–3.74)

## 2025-02-06 ENCOUNTER — NURSE ONLY (OUTPATIENT)
Facility: CLINIC | Age: 60
End: 2025-02-06

## 2025-02-06 VITALS
SYSTOLIC BLOOD PRESSURE: 126 MMHG | WEIGHT: 160 LBS | RESPIRATION RATE: 17 BRPM | BODY MASS INDEX: 30.21 KG/M2 | OXYGEN SATURATION: 97 % | DIASTOLIC BLOOD PRESSURE: 74 MMHG | HEART RATE: 77 BPM | HEIGHT: 61 IN

## 2025-02-06 DIAGNOSIS — I10 PRIMARY HYPERTENSION: Primary | ICD-10-CM

## 2025-02-06 NOTE — PROGRESS NOTES
NO MED CHANGES THIS VISIT. PT IS TO CONTINUE CURRENT MEDICATION TREATMENT. AND TO KEEP FOLLOW UP APPOINTMENT

## 2025-03-23 PROCEDURE — 99283 EMERGENCY DEPT VISIT LOW MDM: CPT

## 2025-03-24 ENCOUNTER — HOSPITAL ENCOUNTER (EMERGENCY)
Facility: HOSPITAL | Age: 60
Discharge: HOME | End: 2025-03-24
Attending: EMERGENCY MEDICINE
Payer: MEDICARE

## 2025-03-24 ENCOUNTER — APPOINTMENT (OUTPATIENT)
Facility: HOSPITAL | Age: 60
End: 2025-03-24
Payer: MEDICARE

## 2025-03-24 VITALS
HEART RATE: 76 BPM | RESPIRATION RATE: 16 BRPM | DIASTOLIC BLOOD PRESSURE: 88 MMHG | TEMPERATURE: 97.5 F | OXYGEN SATURATION: 99 % | SYSTOLIC BLOOD PRESSURE: 145 MMHG
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
## 2025-03-24 DIAGNOSIS — S43.401A SPRAIN OF RIGHT SHOULDER, UNSPECIFIED SHOULDER SPRAIN TYPE, INITIAL ENCOUNTER: ICD-10-CM

## 2025-03-24 DIAGNOSIS — I16.0 HYPERTENSIVE URGENCY: ICD-10-CM

## 2025-03-24 DIAGNOSIS — Z72.0 TOBACCO ABUSE: ICD-10-CM

## 2025-03-24 DIAGNOSIS — S63.91XA HAND SPRAIN, RIGHT, INITIAL ENCOUNTER: ICD-10-CM

## 2025-03-24 PROCEDURE — 73130 X-RAY EXAM OF HAND: CPT

## 2025-03-24 PROCEDURE — 73030 X-RAY EXAM OF SHOULDER: CPT

## 2025-03-24 PROCEDURE — 6370000000 HC RX 637 (ALT 250 FOR IP): Performed by: EMERGENCY MEDICINE

## 2025-03-24 RX ORDER — LIDOCAINE 50 MG/G
1 PATCH TOPICAL DAILY
Qty: 10 PATCH | Refills: 0 | Status: SHIPPED | OUTPATIENT
Start: 2025-03-24 | End: 2025-04-03

## 2025-03-24 RX ORDER — ACETAMINOPHEN 500 MG
1000 TABLET ORAL
Status: COMPLETED | OUTPATIENT
Start: 2025-03-24 | End: 2025-03-24

## 2025-03-24 RX ORDER — ACETAMINOPHEN 325 MG/1
650 TABLET ORAL EVERY 6 HOURS PRN
Qty: 60 TABLET | Refills: 0 | Status: SHIPPED | OUTPATIENT
Start: 2025-03-24

## 2025-03-24 RX ORDER — NAPROXEN 500 MG/1
500 TABLET ORAL 2 TIMES DAILY
Qty: 60 TABLET | Refills: 0 | Status: SHIPPED | OUTPATIENT
Start: 2025-03-24

## 2025-03-24 RX ORDER — LIDOCAINE 4 G/G
1 PATCH TOPICAL
Status: DISCONTINUED | OUTPATIENT
Start: 2025-03-24 | End: 2025-03-24 | Stop reason: HOSPADM

## 2025-03-24 RX ADMIN — ACETAMINOPHEN 1000 MG: 500 TABLET ORAL at 01:47

## 2025-03-24 ASSESSMENT — ENCOUNTER SYMPTOMS
ABDOMINAL PAIN: 0
COUGH: 0
VOMITING: 0
EYE PAIN: 0
SORE THROAT: 0
NAUSEA: 0
SHORTNESS OF BREATH: 0
DIARRHEA: 0
RHINORRHEA: 0

## 2025-03-24 ASSESSMENT — PAIN DESCRIPTION - ORIENTATION: ORIENTATION: RIGHT

## 2025-03-24 ASSESSMENT — PAIN SCALES - GENERAL: PAINLEVEL_OUTOF10: 10

## 2025-03-24 ASSESSMENT — PAIN DESCRIPTION - PAIN TYPE: TYPE: ACUTE PAIN

## 2025-03-24 ASSESSMENT — PAIN DESCRIPTION - LOCATION: LOCATION: SHOULDER;WRIST

## 2025-03-24 ASSESSMENT — PAIN DESCRIPTION - DESCRIPTORS: DESCRIPTORS: ACHING;THROBBING

## 2025-03-24 ASSESSMENT — PAIN - FUNCTIONAL ASSESSMENT: PAIN_FUNCTIONAL_ASSESSMENT: 0-10

## 2025-03-24 NOTE — ED TRIAGE NOTES
Patient arrived to ED ambulatory with chief complaint of GLF, patient states that while she was lifting boxes she tripped over some and injured her right shoulder and wrist.

## 2025-03-24 NOTE — ED PROVIDER NOTES
EMERGENCY DEPARTMENT HISTORY AND PHYSICAL EXAM            Please note that this dictation was completed with the assistance of \"Dragon\", the computer voice recognition software. Quite often unanticipated grammatical, syntax, homophones, and other interpretive errors are inadvertently transcribed by the computer software. Please disregard these errors and any errors that have escaped final proofreading. Thank you.    Date of Evaluation: 03/24/25  Patient: Leticia Medellin  Patient Age and Sex: 59 y.o. female   MRN: [id number]  CSN: [id number]  PCP: Destiny Decker APRN - NP    History of Present Illness     Chief Complaint   Patient presents with    Fall    Shoulder Injury     Right shoulder + wrist    Wrist Injury     History Provided By: Patient/family/EMS (if available)    History is limited by: Nothing     HPI: Leticia Medellin, 59 y.o. female with past medical history as documented below presents to the ED with c/o of ground-level fall occurring yesterday.  Patient reports that she tripped over something landed onto her right shoulder and right hand.  Reports no medications prior to arrival.  Denies any numbness or weakness.  Denies head injury.. Pt denies any other exacerbating or ameliorating factors. There are no other complaints, changes or physical findings pertinent to the HPI at this time.    Nursing notes were all reviewed and agreed with or any disagreements were addressed in the HPI.    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

## 2025-04-29 ENCOUNTER — OFFICE VISIT (OUTPATIENT)
Facility: CLINIC | Age: 60
End: 2025-04-29
Payer: MEDICARE

## 2025-04-29 VITALS
OXYGEN SATURATION: 97 % | BODY MASS INDEX: 31.72 KG/M2 | HEIGHT: 61 IN | SYSTOLIC BLOOD PRESSURE: 148 MMHG | WEIGHT: 168 LBS | HEART RATE: 66 BPM | TEMPERATURE: 97.5 F | RESPIRATION RATE: 16 BRPM | DIASTOLIC BLOOD PRESSURE: 83 MMHG

## 2025-04-29 DIAGNOSIS — G89.29 CHRONIC MIDLINE LOW BACK PAIN WITH RIGHT-SIDED SCIATICA: ICD-10-CM

## 2025-04-29 DIAGNOSIS — Z00.00 MEDICARE ANNUAL WELLNESS VISIT, SUBSEQUENT: Primary | ICD-10-CM

## 2025-04-29 DIAGNOSIS — M79.7 FIBROMYALGIA: ICD-10-CM

## 2025-04-29 DIAGNOSIS — M17.11 UNILATERAL PRIMARY OSTEOARTHRITIS, RIGHT KNEE: ICD-10-CM

## 2025-04-29 DIAGNOSIS — M25.531 RIGHT WRIST PAIN: ICD-10-CM

## 2025-04-29 DIAGNOSIS — M15.0 PRIMARY OSTEOARTHRITIS INVOLVING MULTIPLE JOINTS: ICD-10-CM

## 2025-04-29 DIAGNOSIS — Z79.891 LONG TERM (CURRENT) USE OF OPIATE ANALGESIC: ICD-10-CM

## 2025-04-29 DIAGNOSIS — M25.511 ACUTE PAIN OF RIGHT SHOULDER: ICD-10-CM

## 2025-04-29 DIAGNOSIS — M54.41 CHRONIC MIDLINE LOW BACK PAIN WITH RIGHT-SIDED SCIATICA: ICD-10-CM

## 2025-04-29 DIAGNOSIS — E78.41 ELEVATED LIPOPROTEIN(A): ICD-10-CM

## 2025-04-29 PROCEDURE — G8427 DOCREV CUR MEDS BY ELIG CLIN: HCPCS | Performed by: NURSE PRACTITIONER

## 2025-04-29 PROCEDURE — 4004F PT TOBACCO SCREEN RCVD TLK: CPT | Performed by: NURSE PRACTITIONER

## 2025-04-29 PROCEDURE — G8417 CALC BMI ABV UP PARAM F/U: HCPCS | Performed by: NURSE PRACTITIONER

## 2025-04-29 PROCEDURE — 99214 OFFICE O/P EST MOD 30 MIN: CPT | Performed by: NURSE PRACTITIONER

## 2025-04-29 PROCEDURE — G0439 PPPS, SUBSEQ VISIT: HCPCS | Performed by: NURSE PRACTITIONER

## 2025-04-29 PROCEDURE — 3017F COLORECTAL CA SCREEN DOC REV: CPT | Performed by: NURSE PRACTITIONER

## 2025-04-29 RX ORDER — METHOCARBAMOL 750 MG/1
TABLET, FILM COATED ORAL
Qty: 270 TABLET | Refills: 3 | Status: SHIPPED | OUTPATIENT
Start: 2025-04-29

## 2025-04-29 RX ORDER — OXYCODONE AND ACETAMINOPHEN 10; 325 MG/1; MG/1
1 TABLET ORAL
Qty: 75 TABLET | Refills: 0 | Status: SHIPPED | OUTPATIENT
Start: 2025-04-29 | End: 2025-05-29

## 2025-04-29 RX ORDER — AMOXICILLIN 500 MG
1000 CAPSULE ORAL 2 TIMES DAILY
Qty: 360 CAPSULE | Refills: 3 | Status: SHIPPED | OUTPATIENT
Start: 2025-04-29

## 2025-04-29 ASSESSMENT — PATIENT HEALTH QUESTIONNAIRE - PHQ9
4. FEELING TIRED OR HAVING LITTLE ENERGY: SEVERAL DAYS
SUM OF ALL RESPONSES TO PHQ QUESTIONS 1-9: 3
9. THOUGHTS THAT YOU WOULD BE BETTER OFF DEAD, OR OF HURTING YOURSELF: NOT AT ALL
6. FEELING BAD ABOUT YOURSELF - OR THAT YOU ARE A FAILURE OR HAVE LET YOURSELF OR YOUR FAMILY DOWN: NOT AT ALL
10. IF YOU CHECKED OFF ANY PROBLEMS, HOW DIFFICULT HAVE THESE PROBLEMS MADE IT FOR YOU TO DO YOUR WORK, TAKE CARE OF THINGS AT HOME, OR GET ALONG WITH OTHER PEOPLE: NOT DIFFICULT AT ALL
SUM OF ALL RESPONSES TO PHQ QUESTIONS 1-9: 3
8. MOVING OR SPEAKING SO SLOWLY THAT OTHER PEOPLE COULD HAVE NOTICED. OR THE OPPOSITE, BEING SO FIGETY OR RESTLESS THAT YOU HAVE BEEN MOVING AROUND A LOT MORE THAN USUAL: NOT AT ALL
2. FEELING DOWN, DEPRESSED OR HOPELESS: SEVERAL DAYS
3. TROUBLE FALLING OR STAYING ASLEEP: NOT AT ALL
SUM OF ALL RESPONSES TO PHQ QUESTIONS 1-9: 3
5. POOR APPETITE OR OVEREATING: NOT AT ALL
SUM OF ALL RESPONSES TO PHQ QUESTIONS 1-9: 3
1. LITTLE INTEREST OR PLEASURE IN DOING THINGS: SEVERAL DAYS
7. TROUBLE CONCENTRATING ON THINGS, SUCH AS READING THE NEWSPAPER OR WATCHING TELEVISION: NOT AT ALL

## 2025-04-29 NOTE — PROGRESS NOTES
Medicare Annual Wellness Visit    Leticia Medellin is here for 3 Month Follow-Up (Pain med refill), Medicare AWV, and Wrist Pain (Right wrist.. )    Assessment & Plan  1. Right wrist pain.  - Reports right wrist pain, potentially exacerbated by cold weather and the presence of metal hardware. There is no numbness or tingling in her hands.  - Advised to use topical treatments such as Tiger Balm, Bengay, or ThermaCare patches to keep the wrist warm.  - An x-ray of the right wrist will be conducted today to check the status of the hardware.    2. Right shoulder pain.  - Experienced increased right shoulder pain over the past three days, possibly due to recent activities.  - Pain is likely muscular, as indicated by tenderness in the trapezius area.  - Advised to pace herself during household chores to prevent overexertion. Topical treatments recommended for the wrist can also be applied to the shoulder area.  - If the shoulder pain persists, updated imaging of the shoulder and neck may be considered, along with potential physical therapy.    3. Chronic osteoarthritis pain.  - Requires a refill for chronic osteoarthritis pain medication, which affects her knee, shoulder, and back.  - Robaxin prescription has been refilled.  - A prescription for 75 tablets of oxycodone has been sent to her pharmacy ([facility]). A virtual follow-up appointment will be scheduled in approximately 4 weeks to monitor her pain levels and adjust the medication dosage if necessary.    4. Gastroesophageal Reflux Disease (GERD).  - Has a history of GERD and is currently taking pantoprazole.  - Has an upcoming appointment with her GI doctor next month.    5. Health Maintenance.  - BMI has decreased, but it remains within a healthy range.  - Blood pressure is slightly elevated today, likely due to increased pain levels.  - Had a mammogram in 07/2024, so it is not due at this time.    6. Smoking cessation.  - Gradually reducing cigarette consumption,

## 2025-04-29 NOTE — PROGRESS NOTES
Pt is here for   Chief Complaint   Patient presents with    3 Month Follow-Up     Pain med refill    Medicare AWV    Wrist Pain     Right wrist..      Have you been to the ER, urgent care clinic since your last visit?  Hospitalized since your last visit?   NO    Have you seen or consulted any other health care providers outside our system since your last visit?   NO      “Have you had a colorectal cancer screening such as a colonoscopy/FIT/Cologuard?    NO    Date of last Colonoscopy: 1/8/2014  No cologuard on file  No FIT/FOBT on file   No flexible sigmoidoscopy on file

## 2025-04-30 ENCOUNTER — HOSPITAL ENCOUNTER (OUTPATIENT)
Facility: HOSPITAL | Age: 60
Discharge: HOME | End: 2025-05-03
Payer: MEDICARE

## 2025-04-30 DIAGNOSIS — M25.531 RIGHT WRIST PAIN: ICD-10-CM

## 2025-04-30 PROCEDURE — 73110 X-RAY EXAM OF WRIST: CPT

## 2025-05-05 ENCOUNTER — RESULTS FOLLOW-UP (OUTPATIENT)
Facility: CLINIC | Age: 60
End: 2025-05-05

## 2025-05-05 DIAGNOSIS — Z96.7 METAL BONE FIXATION HARDWARE IN PLACE: ICD-10-CM

## 2025-05-05 DIAGNOSIS — M25.531 RIGHT WRIST PAIN: Primary | ICD-10-CM

## 2025-05-07 ENCOUNTER — RESULTS FOLLOW-UP (OUTPATIENT)
Facility: CLINIC | Age: 60
End: 2025-05-07

## 2025-05-12 ENCOUNTER — TRANSCRIBE ORDERS (OUTPATIENT)
Facility: HOSPITAL | Age: 60
End: 2025-05-12

## 2025-05-12 DIAGNOSIS — R07.81 PLEURITIC CHEST PAIN: ICD-10-CM

## 2025-05-12 DIAGNOSIS — R05.3 CHRONIC COUGH: Primary | ICD-10-CM

## 2025-05-12 DIAGNOSIS — R07.89 OTHER CHEST PAIN: ICD-10-CM

## 2025-05-12 DIAGNOSIS — F17.210 CIGARETTE SMOKER: Primary | ICD-10-CM

## 2025-05-12 DIAGNOSIS — R06.2 WHEEZING: ICD-10-CM

## 2025-05-19 ENCOUNTER — HOSPITAL ENCOUNTER (OUTPATIENT)
Facility: HOSPITAL | Age: 60
Discharge: HOME | End: 2025-05-22
Attending: ALLERGY & IMMUNOLOGY
Payer: MEDICARE

## 2025-05-19 DIAGNOSIS — R05.3 CHRONIC COUGH: ICD-10-CM

## 2025-05-19 DIAGNOSIS — R07.89 OTHER CHEST PAIN: ICD-10-CM

## 2025-05-19 DIAGNOSIS — R06.2 WHEEZING: ICD-10-CM

## 2025-05-19 PROCEDURE — 94726 PLETHYSMOGRAPHY LUNG VOLUMES: CPT

## 2025-05-19 PROCEDURE — 94010 BREATHING CAPACITY TEST: CPT

## 2025-05-19 PROCEDURE — 94060 EVALUATION OF WHEEZING: CPT

## 2025-05-19 PROCEDURE — 6370000000 HC RX 637 (ALT 250 FOR IP): Performed by: ALLERGY & IMMUNOLOGY

## 2025-05-19 PROCEDURE — 94729 DIFFUSING CAPACITY: CPT

## 2025-05-19 RX ORDER — ALBUTEROL SULFATE 90 UG/1
3 INHALANT RESPIRATORY (INHALATION) ONCE
Status: COMPLETED | OUTPATIENT
Start: 2025-05-19 | End: 2025-05-19

## 2025-05-19 RX ADMIN — ALBUTEROL SULFATE 3 PUFF: 90 AEROSOL, METERED RESPIRATORY (INHALATION) at 10:39

## 2025-05-23 ENCOUNTER — HOSPITAL ENCOUNTER (OUTPATIENT)
Facility: HOSPITAL | Age: 60
Discharge: HOME | End: 2025-05-26
Attending: ALLERGY & IMMUNOLOGY
Payer: MEDICARE

## 2025-05-23 DIAGNOSIS — F17.210 CIGARETTE SMOKER: ICD-10-CM

## 2025-05-23 DIAGNOSIS — R07.81 PLEURITIC CHEST PAIN: ICD-10-CM

## 2025-05-23 PROCEDURE — 71271 CT THORAX LUNG CANCER SCR C-: CPT

## 2025-05-28 ENCOUNTER — TELEMEDICINE (OUTPATIENT)
Facility: CLINIC | Age: 60
End: 2025-05-28
Payer: MEDICARE

## 2025-05-28 DIAGNOSIS — M25.531 RIGHT WRIST PAIN: ICD-10-CM

## 2025-05-28 DIAGNOSIS — Z96.7 METAL BONE FIXATION HARDWARE IN PLACE: ICD-10-CM

## 2025-05-28 DIAGNOSIS — M54.41 CHRONIC MIDLINE LOW BACK PAIN WITH RIGHT-SIDED SCIATICA: ICD-10-CM

## 2025-05-28 DIAGNOSIS — M17.11 UNILATERAL PRIMARY OSTEOARTHRITIS, RIGHT KNEE: ICD-10-CM

## 2025-05-28 DIAGNOSIS — M15.0 PRIMARY OSTEOARTHRITIS INVOLVING MULTIPLE JOINTS: Primary | ICD-10-CM

## 2025-05-28 DIAGNOSIS — M79.7 FIBROMYALGIA: ICD-10-CM

## 2025-05-28 DIAGNOSIS — G89.29 CHRONIC MIDLINE LOW BACK PAIN WITH RIGHT-SIDED SCIATICA: ICD-10-CM

## 2025-05-28 DIAGNOSIS — Z79.891 LONG TERM (CURRENT) USE OF OPIATE ANALGESIC: ICD-10-CM

## 2025-05-28 PROCEDURE — G8427 DOCREV CUR MEDS BY ELIG CLIN: HCPCS | Performed by: NURSE PRACTITIONER

## 2025-05-28 PROCEDURE — 3017F COLORECTAL CA SCREEN DOC REV: CPT | Performed by: NURSE PRACTITIONER

## 2025-05-28 PROCEDURE — 99214 OFFICE O/P EST MOD 30 MIN: CPT | Performed by: NURSE PRACTITIONER

## 2025-05-28 RX ORDER — OXYCODONE AND ACETAMINOPHEN 10; 325 MG/1; MG/1
1 TABLET ORAL EVERY 12 HOURS PRN
Qty: 60 TABLET | Refills: 0 | Status: SHIPPED | OUTPATIENT
Start: 2025-07-27 | End: 2025-08-26

## 2025-05-28 RX ORDER — OXYCODONE AND ACETAMINOPHEN 10; 325 MG/1; MG/1
1 TABLET ORAL EVERY 12 HOURS PRN
Qty: 60 TABLET | Refills: 0 | Status: SHIPPED | OUTPATIENT
Start: 2025-06-27 | End: 2025-07-27

## 2025-05-28 RX ORDER — OXYCODONE AND ACETAMINOPHEN 10; 325 MG/1; MG/1
1 TABLET ORAL
Qty: 75 TABLET | Refills: 0 | Status: SHIPPED | OUTPATIENT
Start: 2025-05-28 | End: 2025-06-27

## 2025-05-28 NOTE — PROGRESS NOTES
Pt is here for   Chief Complaint   Patient presents with    4 week follow up     Pain med refill      Have you been to the ER, urgent care clinic since your last visit?  Hospitalized since your last visit?   NO    Have you seen or consulted any other health care providers outside our system since your last visit?   NO      “Have you had a colorectal cancer screening such as a colonoscopy/FIT/Cologuard?    NO    Date of last Colonoscopy: 1/8/2014  No cologuard on file  No FIT/FOBT on file   No flexible sigmoidoscopy on file           
sucralfate (CARAFATE) 1 GM tablet TAKE 1 TABLET BY MOUTH FOUR TIMES A DAY    tiotropium (SPIRIVA) 18 MCG inhalation capsule Inhale 1 capsule into the lungs daily    naproxen (NAPROSYN) 500 MG tablet Take 1 tablet by mouth 2 times daily    acetaminophen (AMINOFEN) 325 MG tablet Take 2 tablets by mouth every 6 hours as needed for Pain    oxyCODONE-acetaminophen (PERCOCET)  MG per tablet PLEASE SEE ATTACHED FOR DETAILED DIRECTIONS    diclofenac sodium (VOLTAREN) 1 % GEL Apply 1 g topically 4 times daily    triamcinolone (KENALOG) 0.1 % ointment Apply topically in a thin layer 2 times daily for redness or itching.    naloxone (NARCAN) 4 MG/0.1ML LIQD nasal spray Use 1 spray into 1 nostril for OVERDOSE. Call 911. For subsequent doses, give in alternating nostrils. May repeat every 2 to 3 min. (Patient not taking: Reported on 5/28/2025)    famotidine (PEPCID) 20 MG tablet Take 1 tablet by mouth 2 times daily    furosemide (LASIX) 20 MG tablet TAKE 1 TABLET BY MOUTH EVERY DAY    omalizumab 150 MG injection every 30 days    pantoprazole (PROTONIX) 40 MG tablet Take by mouth 2 times daily     No current facility-administered medications for this visit.         Objective:   Vital Signs: (As obtained by patient/caregiver at home)      6/14/2024    10:39 AM   Patient-Reported Vitals   Patient-Reported Weight 168   Patient-Reported Height 5\"1          Physical Exam:  General appearance - alert, well appearing, and in no distress.  Mental status - A/O x 4,normal mood and affect.   Eyes- No periorbital edema, drainage, or irritation noted.   Nose- no obvious drainage or swelling.  Throat- no obvious swelling, goiter, or notable lymphadenopathy  Chest - Symmetric chest rise. No wheezing or coughing. No distress.  Skin- normal skin tone noted. No hyperpigmentation or obvious deformities. No diaphoresis noted. No flushing.  Neuro - Normal speech, no focal findings or movement disorder.     Other pertinent observable physical

## 2025-05-30 ENCOUNTER — TELEPHONE (OUTPATIENT)
Facility: CLINIC | Age: 60
End: 2025-05-30

## 2025-05-30 NOTE — TELEPHONE ENCOUNTER
Having surgery on Monday with Dr. Abarca [phone] [place] ([place])  Would like to talk about pain management  Please give a call back

## 2025-07-08 ENCOUNTER — ANESTHESIA (OUTPATIENT)
Facility: HOSPITAL | Age: 60
End: 2025-07-08
Payer: MEDICARE

## 2025-07-08 ENCOUNTER — ANESTHESIA EVENT (OUTPATIENT)
Facility: HOSPITAL | Age: 60
End: 2025-07-08
Payer: MEDICARE

## 2025-07-08 ENCOUNTER — HOSPITAL ENCOUNTER (OUTPATIENT)
Facility: HOSPITAL | Age: 60
Setting detail: OUTPATIENT SURGERY
Discharge: HOME | End: 2025-07-08
Attending: SPECIALIST | Admitting: SPECIALIST
Payer: MEDICARE

## 2025-07-08 VITALS
DIASTOLIC BLOOD PRESSURE: 62 MMHG | OXYGEN SATURATION: 96 % | HEIGHT: 61 IN | TEMPERATURE: 97.9 F | WEIGHT: 174.7 LBS | HEART RATE: 57 BPM | RESPIRATION RATE: 13 BRPM | SYSTOLIC BLOOD PRESSURE: 151 MMHG | BODY MASS INDEX: 32.98 KG/M2

## 2025-07-08 PROCEDURE — 6360000002 HC RX W HCPCS: Performed by: NURSE ANESTHETIST, CERTIFIED REGISTERED

## 2025-07-08 PROCEDURE — 3600007512: Performed by: SPECIALIST

## 2025-07-08 PROCEDURE — 3600007502: Performed by: SPECIALIST

## 2025-07-08 PROCEDURE — 88305 TISSUE EXAM BY PATHOLOGIST: CPT

## 2025-07-08 PROCEDURE — 7100000011 HC PHASE II RECOVERY - ADDTL 15 MIN: Performed by: SPECIALIST

## 2025-07-08 PROCEDURE — 2720000010 HC SURG SUPPLY STERILE: Performed by: SPECIALIST

## 2025-07-08 PROCEDURE — 3700000001 HC ADD 15 MINUTES (ANESTHESIA): Performed by: SPECIALIST

## 2025-07-08 PROCEDURE — 2709999900 HC NON-CHARGEABLE SUPPLY: Performed by: SPECIALIST

## 2025-07-08 PROCEDURE — 2580000003 HC RX 258: Performed by: NURSE ANESTHETIST, CERTIFIED REGISTERED

## 2025-07-08 PROCEDURE — 3700000000 HC ANESTHESIA ATTENDED CARE: Performed by: SPECIALIST

## 2025-07-08 PROCEDURE — 7100000010 HC PHASE II RECOVERY - FIRST 15 MIN: Performed by: SPECIALIST

## 2025-07-08 RX ORDER — LIDOCAINE HYDROCHLORIDE 20 MG/ML
INJECTION, SOLUTION EPIDURAL; INFILTRATION; INTRACAUDAL; PERINEURAL
Status: DISCONTINUED | OUTPATIENT
Start: 2025-07-08 | End: 2025-07-08 | Stop reason: SDUPTHER

## 2025-07-08 RX ORDER — MONTELUKAST SODIUM 10 MG/1
TABLET ORAL
COMMUNITY

## 2025-07-08 RX ORDER — SODIUM CHLORIDE 9 MG/ML
INJECTION, SOLUTION INTRAVENOUS
Status: DISCONTINUED | OUTPATIENT
Start: 2025-07-08 | End: 2025-07-08 | Stop reason: SDUPTHER

## 2025-07-08 RX ORDER — SODIUM CHLORIDE 9 MG/ML
INJECTION, SOLUTION INTRAVENOUS CONTINUOUS
Status: CANCELLED | OUTPATIENT
Start: 2025-07-08

## 2025-07-08 RX ORDER — SODIUM CHLORIDE 9 MG/ML
INJECTION, SOLUTION INTRAVENOUS PRN
Status: CANCELLED | OUTPATIENT
Start: 2025-07-08

## 2025-07-08 RX ORDER — SODIUM CHLORIDE 0.9 % (FLUSH) 0.9 %
5-40 SYRINGE (ML) INJECTION PRN
Status: CANCELLED | OUTPATIENT
Start: 2025-07-08

## 2025-07-08 RX ORDER — HYDROXYZINE HYDROCHLORIDE 25 MG/1
120 TABLET, FILM COATED ORAL
COMMUNITY

## 2025-07-08 RX ORDER — SODIUM CHLORIDE 0.9 % (FLUSH) 0.9 %
5-40 SYRINGE (ML) INJECTION EVERY 12 HOURS SCHEDULED
Status: CANCELLED | OUTPATIENT
Start: 2025-07-08

## 2025-07-08 RX ADMIN — PROPOFOL 25 MG: 10 INJECTION, EMULSION INTRAVENOUS at 15:13

## 2025-07-08 RX ADMIN — PROPOFOL 25 MG: 10 INJECTION, EMULSION INTRAVENOUS at 15:08

## 2025-07-08 RX ADMIN — PROPOFOL 50 MG: 10 INJECTION, EMULSION INTRAVENOUS at 15:05

## 2025-07-08 RX ADMIN — PROPOFOL 25 MG: 10 INJECTION, EMULSION INTRAVENOUS at 15:10

## 2025-07-08 RX ADMIN — PROPOFOL 25 MG: 10 INJECTION, EMULSION INTRAVENOUS at 15:19

## 2025-07-08 RX ADMIN — PROPOFOL 25 MG: 10 INJECTION, EMULSION INTRAVENOUS at 15:16

## 2025-07-08 RX ADMIN — SODIUM CHLORIDE: 9 INJECTION, SOLUTION INTRAVENOUS at 14:38

## 2025-07-08 RX ADMIN — PROPOFOL 25 MG: 10 INJECTION, EMULSION INTRAVENOUS at 14:50

## 2025-07-08 RX ADMIN — PROPOFOL 25 MG: 10 INJECTION, EMULSION INTRAVENOUS at 14:55

## 2025-07-08 RX ADMIN — SODIUM CHLORIDE: 9 INJECTION, SOLUTION INTRAVENOUS at 15:19

## 2025-07-08 RX ADMIN — PROPOFOL 25 MG: 10 INJECTION, EMULSION INTRAVENOUS at 15:02

## 2025-07-08 RX ADMIN — PROPOFOL 50 MG: 10 INJECTION, EMULSION INTRAVENOUS at 14:59

## 2025-07-08 RX ADMIN — PROPOFOL 75 MG: 10 INJECTION, EMULSION INTRAVENOUS at 14:48

## 2025-07-08 RX ADMIN — PROPOFOL 25 MG: 10 INJECTION, EMULSION INTRAVENOUS at 14:52

## 2025-07-08 RX ADMIN — PROPOFOL 25 MG: 10 INJECTION, EMULSION INTRAVENOUS at 14:53

## 2025-07-08 RX ADMIN — LIDOCAINE HYDROCHLORIDE 100 MG: 20 INJECTION, SOLUTION EPIDURAL; INFILTRATION; INTRACAUDAL; PERINEURAL at 14:46

## 2025-07-08 RX ADMIN — PROPOFOL 25 MG: 10 INJECTION, EMULSION INTRAVENOUS at 14:54

## 2025-07-08 RX ADMIN — PROPOFOL 25 MG: 10 INJECTION, EMULSION INTRAVENOUS at 15:21

## 2025-07-08 RX ADMIN — PROPOFOL 25 MG: 10 INJECTION, EMULSION INTRAVENOUS at 14:56

## 2025-07-08 ASSESSMENT — PAIN - FUNCTIONAL ASSESSMENT: PAIN_FUNCTIONAL_ASSESSMENT: NONE - DENIES PAIN

## 2025-07-08 NOTE — ANESTHESIA PRE PROCEDURE
TWO (2) TIMES A DAY. 4/2/21  Yes Automatic Reconciliation, Ar   furosemide (LASIX) 20 MG tablet TAKE 1 TABLET BY MOUTH EVERY DAY 9/2/21  Yes Automatic Reconciliation, Ar   albuterol-ipratropium (COMBIVENT RESPIMAT)  MCG/ACT AERS inhaler INHALE 1 PUFF BY MOUTH EVERY 6 HOURS AS NEEDED FOR WHEEZING 8/16/21  Yes Automatic Reconciliation, Ar   lidocaine (LIDODERM) 5 % Apply patch to the affected area for 12 hours a day and remove for 12 hours a day. 10/28/21  Yes Automatic Reconciliation, Ar   pantoprazole (PROTONIX) 40 MG tablet Take by mouth 2 times daily   Yes Automatic Reconciliation, Ar   pregabalin (LYRICA) 100 MG capsule Take by mouth 3 times daily. 4/2/21  Yes Automatic Reconciliation, Ar   sucralfate (CARAFATE) 1 GM tablet TAKE 1 TABLET BY MOUTH FOUR TIMES A DAY 9/9/21  Yes Automatic Reconciliation, Ar   tiotropium (SPIRIVA) 18 MCG inhalation capsule Inhale 1 capsule into the lungs daily   Yes Automatic Reconciliation, Ar   naloxone (NARCAN) 4 MG/0.1ML LIQD nasal spray Use 1 spray into 1 nostril for OVERDOSE. Call 911. For subsequent doses, give in alternating nostrils. May repeat every 2 to 3 min.  Patient not taking: Reported on 5/28/2025 10/18/24   Destiny Decker APRN - NP   famotidine (PEPCID) 20 MG tablet Take 1 tablet by mouth 2 times daily  Patient not taking: Reported on 7/8/2025 8/29/24   Peggy Menard APRN - NP   omalizumab 150 MG injection every 30 days  Patient not taking: Reported on 7/8/2025 8/20/19   Automatic Reconciliation, Ar       Current medications:    No current facility-administered medications for this encounter.       Allergies:    Allergies   Allergen Reactions   • Molds & Smuts Itching and Shortness Of Breath   • Codeine Nausea Only   • Tramadol Itching       Problem List:    Patient Active Problem List   Diagnosis Code   • Acquired hypothyroidism E03.9   • Immunotherapy Z29.89   • Chronic use of opiate for therapeutic purpose Z79.891   • Psychophysiological insomnia F51.04

## 2025-07-08 NOTE — H&P
Pre-endoscopy H and P for Colonoscopy    The patient was seen and examined.Date of last colonoscopy: , Polyps  Yes      The airway was assessed and documented.  The problem list, past medical history, and medications were reviewed.     Patient Active Problem List   Diagnosis    Acquired hypothyroidism    Immunotherapy    Chronic use of opiate for therapeutic purpose    Psychophysiological insomnia    Moderate episode of recurrent major depressive disorder (HCC)    Primary osteoarthritis of left shoulder    Obesity, Class II, BMI 35-39.9    Osteoarthritis of spine with radiculopathy, cervical region    Ventral hernia without obstruction or gangrene    Pain management contract signed    S/P total knee arthroplasty, right    Severe obesity (BMI 35.0-39.9) with comorbidity (HCC)    Environmental and seasonal allergies    Mixed simple and mucopurulent chronic bronchitis (HCC)    S/P partial hysterectomy    Edentulous    Primary osteoarthritis involving multiple joints    Chronic EBV infection    Sprain of right ankle, unspecified ligament, initial encounter    Fibromyalgia     Social History     Socioeconomic History    Marital status: Single     Spouse name: Not on file    Number of children: Not on file    Years of education: Not on file    Highest education level: Not on file   Occupational History    Not on file   Tobacco Use    Smoking status: Every Day     Current packs/day: 0.00     Types: Cigarettes     Last attempt to quit: 10/1/2015     Years since quittin.7     Passive exposure: Current    Smokeless tobacco: Never   Vaping Use    Vaping status: Never Used   Substance and Sexual Activity    Alcohol use: Yes     Alcohol/week: 4.0 standard drinks of alcohol     Types: 4 Shots of liquor per week    Drug use: Not Currently     Types: Cocaine    Sexual activity: Not Currently     Birth control/protection: Surgical     Comment: Post menopausal/partial hysterectomy   Other Topics Concern    Not on file

## 2025-07-08 NOTE — PROGRESS NOTES
Verified patient name and date of birth, scheduled procedure, and informed consent. Reviewed general discharge instructions and  information.  Assessed patient. Awake, alert, and oriented per baseline. Vital signs stable (see vital sign flowsheet). Respiratory status within defined limits, abdomen soft and non tender. Skin with in defined limits.     Initial RN admission and assessment performed and documented in Endoscopy navigator.     Patient evaluated by anesthesia in pre-procedure holding.     All procedural vital signs, airway assessment, and level of consciousness information monitored and recorded by anesthesia staff on the anesthesia record.     Report received from CRNA post procedure.  Patient transported to recovery area by RN.    Endoscopy post procedure time out was performed and specimens were verified by physician.    Endoscope was pre-cleaned at bedside immediately following procedure by Brandt.

## 2025-07-08 NOTE — OP NOTE
Tests Collected by Time Destination   1 : Gastric antrum biopsy Tissue Gastric SURGICAL PATHOLOGY Steve Gastelum MD 7/8/2025 0385    2 : Transverse colon polyp Tissue Colon-Transverse SURGICAL PATHOLOGY Steve Gastelum MD 7/8/2025 1511        Complications: None.     EBL:      Impression:    See Postoperative diagnosis above    Recommendations:   - If biopsies were obtained, await pathology. You should receive a letter within 2 weeks.   - Resume normal medications.  - Recommend repeat colonoscopy in 5 years    Discharge Disposition:  Home in the company of a  when able to ambulate.    Steve Gastelum MD  7/8/2025  3:26 PM

## 2025-07-08 NOTE — ANESTHESIA POSTPROCEDURE EVALUATION
Department of Anesthesiology  Postprocedure Note    Patient: Leticia Medellin  MRN: [id number]  YOB: 1965  Date of evaluation: 7/8/2025    Procedure Summary       Date: 07/08/25 Room / Location: [facility] ENDO 04 / [facility] ENDOSCOPY    Anesthesia Start: 1440 Anesthesia Stop: 1525    Procedures:       COLONOSCOPY (Lower GI Region)      ESOPHAGOGASTRODUODENOSCOPY (Upper GI Region) Diagnosis:       Gastroesophageal reflux disease, unspecified whether esophagitis present      Epigastric pain      (Gastroesophageal reflux disease, unspecified whether esophagitis present [K21.9])      (Epigastric pain [R10.13])    Surgeons: Steve Gastelum MD Responsible Provider: Ellyn Goldman DO    Anesthesia Type: MAC ASA Status: 2            Anesthesia Type: No value filed.    Mickie Phase I: Mickie Score: 10    Mickie Phase II: Mickie Score: 9    Anesthesia Post Evaluation    Patient location during evaluation: PACU  Level of consciousness: awake  Airway patency: patent  Nausea & Vomiting: no nausea  Cardiovascular status: hemodynamically stable  Respiratory status: acceptable  Hydration status: stable  Multimodal analgesia pain management approach  Pain management: adequate    No notable events documented.

## 2025-08-22 ENCOUNTER — OFFICE VISIT (OUTPATIENT)
Facility: CLINIC | Age: 60
End: 2025-08-22

## 2025-08-22 ENCOUNTER — HOSPITAL ENCOUNTER (OUTPATIENT)
Facility: HOSPITAL | Age: 60
Discharge: HOME | End: 2025-08-25
Payer: MEDICARE

## 2025-08-22 VITALS
DIASTOLIC BLOOD PRESSURE: 82 MMHG | TEMPERATURE: 98.1 F | SYSTOLIC BLOOD PRESSURE: 138 MMHG | WEIGHT: 174.16 LBS | HEART RATE: 77 BPM | OXYGEN SATURATION: 96 % | HEIGHT: 61 IN | BODY MASS INDEX: 32.88 KG/M2 | RESPIRATION RATE: 17 BRPM

## 2025-08-22 DIAGNOSIS — M17.11 UNILATERAL PRIMARY OSTEOARTHRITIS, RIGHT KNEE: ICD-10-CM

## 2025-08-22 DIAGNOSIS — N39.46 MIXED STRESS AND URGE URINARY INCONTINENCE: ICD-10-CM

## 2025-08-22 DIAGNOSIS — Z79.891 LONG TERM (CURRENT) USE OF OPIATE ANALGESIC: ICD-10-CM

## 2025-08-22 DIAGNOSIS — M54.41 CHRONIC MIDLINE LOW BACK PAIN WITH RIGHT-SIDED SCIATICA: ICD-10-CM

## 2025-08-22 DIAGNOSIS — E78.41 ELEVATED LIPOPROTEIN A LEVEL: ICD-10-CM

## 2025-08-22 DIAGNOSIS — E03.9 ACQUIRED HYPOTHYROIDISM: ICD-10-CM

## 2025-08-22 DIAGNOSIS — I10 PRIMARY HYPERTENSION: ICD-10-CM

## 2025-08-22 DIAGNOSIS — M25.473 ANKLE EDEMA: ICD-10-CM

## 2025-08-22 DIAGNOSIS — M15.0 PRIMARY OSTEOARTHRITIS INVOLVING MULTIPLE JOINTS: ICD-10-CM

## 2025-08-22 DIAGNOSIS — M79.7 FIBROMYALGIA: ICD-10-CM

## 2025-08-22 DIAGNOSIS — M48.061 LUMBAR FORAMINAL STENOSIS: ICD-10-CM

## 2025-08-22 DIAGNOSIS — G89.29 CHRONIC MIDLINE LOW BACK PAIN WITH RIGHT-SIDED SCIATICA: ICD-10-CM

## 2025-08-22 DIAGNOSIS — M48.061 LUMBAR FORAMINAL STENOSIS: Primary | ICD-10-CM

## 2025-08-22 DIAGNOSIS — E78.5 HYPERLIPIDEMIA LDL GOAL <100: ICD-10-CM

## 2025-08-22 DIAGNOSIS — M47.816 LUMBAR FACET ARTHROPATHY: ICD-10-CM

## 2025-08-22 DIAGNOSIS — R20.2 PARESTHESIA: ICD-10-CM

## 2025-08-22 PROCEDURE — 72100 X-RAY EXAM L-S SPINE 2/3 VWS: CPT

## 2025-08-22 RX ORDER — FUROSEMIDE 20 MG/1
TABLET ORAL
Qty: 15 TABLET | Refills: 1 | Status: SHIPPED | OUTPATIENT
Start: 2025-08-22

## 2025-08-22 RX ORDER — DULOXETIN HYDROCHLORIDE 60 MG/1
60 CAPSULE, DELAYED RELEASE ORAL 2 TIMES DAILY
Qty: 180 CAPSULE | Refills: 3 | Status: SHIPPED | OUTPATIENT
Start: 2025-08-22

## 2025-08-22 RX ORDER — OXYCODONE AND ACETAMINOPHEN 10; 325 MG/1; MG/1
1 TABLET ORAL
Qty: 75 TABLET | Refills: 0 | Status: SHIPPED | OUTPATIENT
Start: 2025-08-31 | End: 2025-09-30

## 2025-08-22 ASSESSMENT — PATIENT HEALTH QUESTIONNAIRE - PHQ9
SUM OF ALL RESPONSES TO PHQ QUESTIONS 1-9: 0
1. LITTLE INTEREST OR PLEASURE IN DOING THINGS: NOT AT ALL
2. FEELING DOWN, DEPRESSED OR HOPELESS: NOT AT ALL
SUM OF ALL RESPONSES TO PHQ QUESTIONS 1-9: 0

## 2025-08-23 LAB
ANION GAP SERPL CALC-SCNC: 9 MMOL/L (ref 2–14)
BASOPHILS # BLD: 0.04 K/UL (ref 0–0.1)
BASOPHILS NFR BLD: 1 % (ref 0–1)
BUN SERPL-MCNC: 13 MG/DL (ref 8–23)
BUN/CREAT SERPL: 14 (ref 12–20)
CALCIUM SERPL-MCNC: 8.8 MG/DL (ref 8.8–10.2)
CHLORIDE SERPL-SCNC: 108 MMOL/L (ref 98–107)
CHOLEST SERPL-MCNC: 174 MG/DL (ref 0–200)
CO2 SERPL-SCNC: 24 MMOL/L (ref 20–29)
CREAT SERPL-MCNC: 0.94 MG/DL (ref 0.6–1)
DIFFERENTIAL METHOD BLD: ABNORMAL
EOSINOPHIL # BLD: 0.2 K/UL (ref 0–0.4)
EOSINOPHIL NFR BLD: 5.2 % (ref 0–7)
ERYTHROCYTE [DISTWIDTH] IN BLOOD BY AUTOMATED COUNT: 14.6 % (ref 11.5–14.5)
GLUCOSE SERPL-MCNC: 89 MG/DL (ref 65–100)
HCT VFR BLD AUTO: 45.5 % (ref 35–47)
HDLC SERPL-MCNC: 63 MG/DL (ref 40–60)
HDLC SERPL: 2.7 (ref 0–5)
HGB BLD-MCNC: 14.5 G/DL (ref 11.5–16)
IMM GRANULOCYTES # BLD AUTO: 0.01 K/UL (ref 0–0.04)
IMM GRANULOCYTES NFR BLD AUTO: 0.3 % (ref 0–0.5)
LDLC SERPL CALC-MCNC: 96 MG/DL (ref 0–100)
LPA SERPL-SCNC: 267.1 NMOL/L
LYMPHOCYTES # BLD: 1.65 K/UL (ref 0.8–3.5)
LYMPHOCYTES NFR BLD: 42.5 % (ref 12–49)
MCH RBC QN AUTO: 29 PG (ref 26–34)
MCHC RBC AUTO-ENTMCNC: 31.9 G/DL (ref 30–36.5)
MCV RBC AUTO: 91 FL (ref 80–99)
MONOCYTES # BLD: 0.37 K/UL (ref 0–1)
MONOCYTES NFR BLD: 9.5 % (ref 5–13)
NEUTS SEG # BLD: 1.61 K/UL (ref 1.8–8)
NEUTS SEG NFR BLD: 41.5 % (ref 32–75)
NRBC # BLD: 0 K/UL (ref 0–0.01)
NRBC BLD-RTO: 0 PER 100 WBC
PLATELET # BLD AUTO: 181 K/UL (ref 150–400)
PMV BLD AUTO: 11.4 FL (ref 8.9–12.9)
POTASSIUM SERPL-SCNC: 4.6 MMOL/L (ref 3.5–5.1)
RBC # BLD AUTO: 5 M/UL (ref 3.8–5.2)
SODIUM SERPL-SCNC: 141 MMOL/L (ref 136–145)
T4 FREE SERPL-MCNC: 0.9 NG/DL (ref 0.9–1.6)
TRIGL SERPL-MCNC: 71 MG/DL (ref 0–150)
TSH, 3RD GENERATION: 2.02 UIU/ML (ref 0.27–4.2)
VLDLC SERPL CALC-MCNC: 14 MG/DL
WBC # BLD AUTO: 3.9 K/UL (ref 3.6–11)

## 2025-09-03 ENCOUNTER — HOSPITAL ENCOUNTER (OUTPATIENT)
Facility: HOSPITAL | Age: 60
Setting detail: RECURRING SERIES
Discharge: HOME | End: 2025-09-06
Payer: MEDICARE

## 2025-09-03 PROCEDURE — 97161 PT EVAL LOW COMPLEX 20 MIN: CPT

## 2025-09-03 PROCEDURE — 97110 THERAPEUTIC EXERCISES: CPT

## (undated) DEVICE — SNARE ENDOSCP DIA9MM SHTH DIA2.4MM CLD FOR POLYP EXACTO

## (undated) DEVICE — SOLUTION IV 1000ML 0.9% SOD CHL

## (undated) DEVICE — BANDAGE,ELASTIC,ESMARK,STERILE,4"X9',LF: Brand: MEDLINE

## (undated) DEVICE — SUTURE STRATAFIX SYMMETRIC PDS + SZ 1 L18IN ABSRB VLT L48MM SXPP1A400

## (undated) DEVICE — SYR LR LCK 1ML GRAD NSAF 30ML --

## (undated) DEVICE — SOLUTION IRRIG 1000ML H2O STRL BLT

## (undated) DEVICE — SLIM BODY SKIN STAPLER: Brand: APPOSE ULC

## (undated) DEVICE — CATH IV AUTOGRD BC BLU 22GA 25 -- INSYTE

## (undated) DEVICE — NEEDLE HYPO 18GA L1.5IN PNK S STL HUB POLYPR SHLD REG BVL

## (undated) DEVICE — SUTURE VCRL SZ 2-0 L27IN ABSRB UD L26MM SH 1/2 CIR J417H

## (undated) DEVICE — DYONICS 2.9 MM FULL RADIUS BLADES,                                    7 CM LENGTH, RED, PACKAGED 6 PER                                    BOX, STERILE: Brand: DYONICS POWERMINI

## (undated) DEVICE — BLADE SAW W083XL354IN THK0047IN CUT THK0047IN SAG FLR

## (undated) DEVICE — FORCEPS BX L240CM JAW DIA2.8MM L CAP W/ NDL MIC MESH TOOTH

## (undated) DEVICE — HANDLE LT SNAP ON ULT DURABLE LENS FOR TRUMPF ALC DISPOSABLE

## (undated) DEVICE — 4-PORT MANIFOLD: Brand: NEPTUNE 2

## (undated) DEVICE — 1200 GUARD II KIT W/5MM TUBE W/O VAC TUBE: Brand: GUARDIAN

## (undated) DEVICE — DISPOSABLE TOURNIQUET CUFF SINGLE BLADDER, DUAL PORT AND QUICK CONNECT CONNECTOR: Brand: COLOR CUFF

## (undated) DEVICE — Device

## (undated) DEVICE — SOLUTION IRRIG 3000ML 0.9% SOD CHL FLX CONT 0797208] ICU MEDICAL INC]

## (undated) DEVICE — TUBING HYDR IRR --

## (undated) DEVICE — SOLIDIFIER FLUID 3000 CC ABSORB

## (undated) DEVICE — SPONGE GZ W4XL4IN COT 12 PLY TYP VII WVN C FLD DSGN

## (undated) DEVICE — DRESSING,GAUZE,XEROFORM,CURAD,1"X8",ST: Brand: CURAD

## (undated) DEVICE — SUPPLEMENT DIGESTIVE H2O SOL GI-EASE

## (undated) DEVICE — CARTRIDGE BNE CEM MIX UNIV TWR VAC ROTOR BRK OFF NOZ W/O

## (undated) DEVICE — ORISE PROKNIFE 3.0 MM ELECTRODE: Brand: ORISE™ PROKNIFE

## (undated) DEVICE — PADDING CAST 3 INX4 YD STRL

## (undated) DEVICE — HANDPIECE SET WITH BONE CLEANING TIP AND SUCTION TUBE: Brand: INTERPULSE

## (undated) DEVICE — INFECTION CONTROL KIT SYS

## (undated) DEVICE — HOOK LOCK LATEX FREE ELASTIC BANDAGE D/L 6INX10YD

## (undated) DEVICE — CANN NASAL O2 CAPNOGRAPHY AD -- FILTERLINE

## (undated) DEVICE — NEEDLE HYPO 22GA L1.5IN BLK S STL HUB POLYPR SHLD REG BVL

## (undated) DEVICE — CONTAINER SPEC 20 ML LID NEUT BUFF FORMALIN 10 % POLYPR STS

## (undated) DEVICE — PADDING CST CRMPD 3INX4YD NS --

## (undated) DEVICE — SET ADMIN 16ML TBNG L100IN 2 Y INJ SITE IV PIGGY BK DISP

## (undated) DEVICE — BW-412T DISP COMBO CLEANING BRUSH: Brand: SINGLE USE COMBINATION CLEANING BRUSH

## (undated) DEVICE — TRAY CATH 16F URIN MTR LTX -- CONVERT TO ITEM 363111

## (undated) DEVICE — BAG BELONG PT PERS CLEAR HANDL

## (undated) DEVICE — Device: Brand: MEDICAL ACTION INDUSTRIES

## (undated) DEVICE — TRAP SURG QUAD PARABOLA SLOT DSGN SFTY SCRN TRAPEASE

## (undated) DEVICE — CUSTOM CAST PD STR

## (undated) DEVICE — BNDG ELAS HK LOOP 3X5YD NS -- MATRIX

## (undated) DEVICE — ORISE PROKNIFE 1.5 MM ELECTRODE: Brand: ORISE™ PROKNIFE

## (undated) DEVICE — NDL PRT INJ NSAF BLNT 18GX1.5 --

## (undated) DEVICE — BIPOLAR FORCEPS CORD: Brand: VALLEYLAB

## (undated) DEVICE — BANDAGE,GAUZE,BULKEE II,2.25"X3YD,STRL: Brand: MEDLINE

## (undated) DEVICE — DRAPE,REIN 53X77,STERILE: Brand: MEDLINE

## (undated) DEVICE — STERILE POLYISOPRENE POWDER-FREE SURGICAL GLOVES: Brand: PROTEXIS

## (undated) DEVICE — T4 HOOD

## (undated) DEVICE — STERILE POLYISOPRENE POWDER-FREE SURGICAL GLOVES WITH EMOLLIENT COATING: Brand: PROTEXIS

## (undated) DEVICE — DEVON™ KNEE AND BODY STRAP 60" X 3" (1.5 M X 7.6 CM): Brand: DEVON

## (undated) DEVICE — BLADE SAW W051XL276IN THK005IN CUT THK005IN REPL SAG FLR

## (undated) DEVICE — ENDO CARRY-ON PROCEDURE KIT INCLUDES ENZYMATIC SPONGE, GAUZE, BIOHAZARD LABEL, TRAY, LUBRICANT, DIRTY SCOPE LABEL, WATER LABEL, TRAY, DRAWSTRING PAD, AND DEFENDO 4-PIECE KIT.: Brand: ENDO CARRY-ON PROCEDURE KIT

## (undated) DEVICE — KENDALL RADIOLUCENT FOAM MONITORING ELECTRODE -RECTANGULAR SHAPE: Brand: KENDALL

## (undated) DEVICE — FORCEPS BX L240CM JAW DIA2.4MM ORNG L CAP W/ NDL DISP RAD

## (undated) DEVICE — SUTURE VCRL SZ 2-0 L36IN ABSRB UD L40MM CT 1/2 CIR J957H

## (undated) DEVICE — SYR 10ML LUER LOK 1/5ML GRAD --

## (undated) DEVICE — Z DISCONTINUED NO SUB IDED SET EXTN W/ 4 W STPCOCK M SPIN LOK 36IN

## (undated) DEVICE — SCRUB DRY SURG EZ SCRUB BRUSH PREOPERATIVE GRN

## (undated) DEVICE — DYONICS 25 INFLOW/OUTFLOW TUBE                                    SET, SINGLE SUCTION, 3 PER BOX

## (undated) DEVICE — NEEDLE HYPO 25GA L1.5IN BVL ORIENTED ECLIPSE

## (undated) DEVICE — BAG SPEC BIOHZD LF 2MIL 6X10IN -- CONVERT TO ITEM 357326

## (undated) DEVICE — BLADE OPHTH MINI BEAV SHRP --

## (undated) DEVICE — PAD,ABDOMINAL,5"X9",ST,LF,25/BX: Brand: MEDLINE INDUSTRIES, INC.

## (undated) DEVICE — SOLUTION IRRIG 3000ML LAC R FLX CONT

## (undated) DEVICE — (D)PREP SKN CHLRAPRP APPL 26ML -- CONVERT TO ITEM 371833

## (undated) DEVICE — PREP SKN CHLRAPRP APL 26ML STR --

## (undated) DEVICE — DRAPE,HAND,STERILE: Brand: MEDLINE

## (undated) DEVICE — SYRINGE MED 20ML STD CLR PLAS LUERLOCK TIP N CTRL DISP

## (undated) DEVICE — REM POLYHESIVE ADULT PATIENT RETURN ELECTRODE: Brand: VALLEYLAB

## (undated) DEVICE — GARMENT,MEDLINE,DVT,INT,CALF,MED, GEN2: Brand: MEDLINE

## (undated) DEVICE — SUTURE MCRYL SZ 4-0 L27IN ABSRB UD L19MM PS-2 1/2 CIR PRIM Y426H

## (undated) DEVICE — CONTAINER,SPECIMEN,3OZ,OR STRL: Brand: MEDLINE

## (undated) DEVICE — SYRINGE,EAR/ULCER, 2 OZ, STERILE: Brand: MEDLINE